# Patient Record
Sex: MALE | Race: WHITE | NOT HISPANIC OR LATINO | Employment: UNEMPLOYED | URBAN - METROPOLITAN AREA
[De-identification: names, ages, dates, MRNs, and addresses within clinical notes are randomized per-mention and may not be internally consistent; named-entity substitution may affect disease eponyms.]

---

## 2018-04-02 ENCOUNTER — OFFICE VISIT (OUTPATIENT)
Dept: ORTHOPEDICS | Facility: CLINIC | Age: 53
End: 2018-04-02
Payer: COMMERCIAL

## 2018-04-02 VITALS
WEIGHT: 215 LBS | SYSTOLIC BLOOD PRESSURE: 135 MMHG | HEART RATE: 98 BPM | BODY MASS INDEX: 30.78 KG/M2 | HEIGHT: 70 IN | DIASTOLIC BLOOD PRESSURE: 84 MMHG

## 2018-04-02 DIAGNOSIS — M75.122 COMPLETE ROTATOR CUFF TEAR OF LEFT SHOULDER: Primary | ICD-10-CM

## 2018-04-02 PROCEDURE — 73030 X-RAY EXAM OF SHOULDER: CPT | Mod: FY,LT,, | Performed by: ORTHOPAEDIC SURGERY

## 2018-04-02 PROCEDURE — 99203 OFFICE O/P NEW LOW 30 MIN: CPT | Mod: 25,,, | Performed by: ORTHOPAEDIC SURGERY

## 2018-04-02 NOTE — PROGRESS NOTES
Past Medical History:   Diagnosis Date    Alcohol abuse, unspecified     Allergic rhinitis, cause unspecified     Asthma attack     Carpal tunnel syndrome     Depressive disorder, not elsewhere classified     Diarrhea     GERD (gastroesophageal reflux disease)     Hypersomnia, unspecified     Hypertension     Lumbago     Obesity, unspecified     JAH (obstructive sleep apnea)     Other and unspecified hyperlipidemia     Other ankle sprain and strain     Psychosexual dysfunction with inhibited sexual excitement     Ventral hernia, unspecified, without mention of obstruction or gangrene        Past Surgical History:   Procedure Laterality Date    A-scope knees      Bilateral    CARPAL TUNNEL RELEASE      Bilateral    EYE SURGERY      Lasik    HERNIA REPAIR      KNEE SURGERY      osmar    NISSEN FUNDOPLICATION      ROTATOR CUFF REPAIR      right       Current Outpatient Prescriptions   Medication Sig    cyanocobalamin (VITAMIN B-12) 1000 MCG tablet Take 1,000 mcg by mouth once daily.      No current facility-administered medications for this visit.        Review of patient's allergies indicates:  No Known Allergies    History reviewed. No pertinent family history.    Social History     Social History    Marital status:      Spouse name: N/A    Number of children: N/A    Years of education: N/A     Occupational History    Not on file.     Social History Main Topics    Smoking status: Never Smoker    Smokeless tobacco: Not on file    Alcohol use Yes    Drug use: No    Sexual activity: Not on file     Other Topics Concern    Not on file     Social History Narrative    No narrative on file       Chief Complaint:   Chief Complaint   Patient presents with    Left Shoulder - Pain, Shoulder Injury     Left shoulder rotator cuff tear and bicep 5/2013. Surgery done in may 2013.  Coming back for re-injury shoulder and bicep torn again.  Torn again in the same year.  Not taking any  "medication for injury.       Consulting Physician: [facility]    History of Present Illness:    This is a 52 y.o. year old male who complains of patient has a history of rotator cuff tear which was repaired back in 2013 in the left shoulder he also biceps tendon rupture he 2 years ago the biceps reruptured he also complains of pain in the left shoulder and weaknes      ROS    Examination:    Vital Signs:    Vitals:    04/02/18 1427   BP: 135/84   Pulse: 98   Weight: 97.5 kg (215 lb)   Height: 5' 10" (1.778 m)   PainSc:   6       This a well-developed, well nourished patient in no acute distress.    Alert and oriented and cooperative to examination.       Physical Exam: left shoulder-patient hasn't atrophy side lateral head of biceps he has healed incisions on the left shoulder is able to move the shoulder well there is some crepitance he does have a lot of weakness on abduction    Imaging: X-rays ordered and reviewed today x-ray examination of the left shoulder shows no proximal migration of the humeral head appears be normal     Assessment: problem rerupture of her rotator cuff tear    Plan:  Patient has not had an MRI of the left shoulder for several months recommend getting an MRI of the left shoulder      DISCLAIMER: This note may have been dictated using voice recognition software and may contain grammatical errors.     NOTE: Consult report sent to referring provider via EPIC EMR.  "

## 2018-04-02 NOTE — LETTER
April 2, 2018      [facility]  [address] [address]           [facility] Orthopedic [facility]  [address]  [address] [place] [address]  Phone: [phone]  Fax: [phone]          Patient: Himanshu Connor II   MR Number: [id number]   YOB: 1965   Date of Visit: 4/2/2018       Dear [facility]:    Thank you for referring Himanshu Connor to me for evaluation. Attached you will find relevant portions of my assessment and plan of care.    If you have questions, please do not hesitate to call me. I look forward to following Himanshu Connor along with you.    Sincerely,    Alfonzo Carmona MD    Enclosure  CC:  No Recipients    If you would like to receive this communication electronically, please contact [email or web address] or [phone] to request more information on [employer] Link access.    For providers and/or their staff who would like to refer a patient to [facility], please contact us through our one-stop-shop provider referral line, [facility], at [phone].    If you feel you have received this communication in error or would no longer like to receive these types of communications, please e-mail [email or web address]

## 2018-07-23 ENCOUNTER — OFFICE VISIT (OUTPATIENT)
Dept: ORTHOPEDICS | Facility: CLINIC | Age: 53
End: 2018-07-23
Payer: OTHER GOVERNMENT

## 2018-07-23 VITALS
SYSTOLIC BLOOD PRESSURE: 136 MMHG | HEART RATE: 79 BPM | HEIGHT: 70 IN | DIASTOLIC BLOOD PRESSURE: 95 MMHG | WEIGHT: 215 LBS | BODY MASS INDEX: 30.78 KG/M2

## 2018-07-23 DIAGNOSIS — M75.122 COMPLETE TEAR OF LEFT ROTATOR CUFF: Primary | ICD-10-CM

## 2018-07-23 PROCEDURE — 20610 DRAIN/INJ JOINT/BURSA W/O US: CPT | Mod: PBBFAC,PN | Performed by: ORTHOPAEDIC SURGERY

## 2018-07-23 PROCEDURE — 99213 OFFICE O/P EST LOW 20 MIN: CPT | Mod: 25,S$PBB,, | Performed by: ORTHOPAEDIC SURGERY

## 2018-07-23 PROCEDURE — 99999 PR PBB SHADOW E&M-EST. PATIENT-LVL III: CPT | Mod: PBBFAC,,, | Performed by: ORTHOPAEDIC SURGERY

## 2018-07-23 PROCEDURE — 99213 OFFICE O/P EST LOW 20 MIN: CPT | Mod: PBBFAC,PN | Performed by: ORTHOPAEDIC SURGERY

## 2018-07-23 RX ORDER — TRIAMCINOLONE ACETONIDE 40 MG/ML
40 INJECTION, SUSPENSION INTRA-ARTICULAR; INTRAMUSCULAR
Status: DISCONTINUED | OUTPATIENT
Start: 2018-07-23 | End: 2018-07-23 | Stop reason: HOSPADM

## 2018-07-23 RX ADMIN — TRIAMCINOLONE ACETONIDE 40 MG: 40 INJECTION, SUSPENSION INTRA-ARTICULAR; INTRAMUSCULAR at 10:07

## 2018-07-23 NOTE — PROCEDURES
Large Joint Aspiration/Injection  Date/Time: 7/23/2018 10:19 AM  Performed by: DANIELITO JUSTICE  Authorized by: DANIELITO JUSTICE     Consent Done?:  Yes (Verbal)  Indications:  Pain  Procedure site marked: Yes    Timeout: Prior to procedure the correct patient, procedure, and site was verified      Location:  Shoulder  Site:  L subacromial bursa  Prep: Patient was prepped and draped in usual sterile fashion    Ultrasonic Guidance for needle placement: No  Needle size:  20 G  Approach:  Posterior  Medications:  40 mg triamcinolone acetonide 40 mg/mL  Patient tolerance:  Patient tolerated the procedure well with no immediate complications

## 2018-07-23 NOTE — PROGRESS NOTES
Past Medical History:   Diagnosis Date    Alcohol abuse, unspecified     Allergic rhinitis, cause unspecified     Asthma attack     Carpal tunnel syndrome     Depressive disorder, not elsewhere classified     Diarrhea     GERD (gastroesophageal reflux disease)     Hypersomnia, unspecified     Hypertension     Lumbago     Obesity, unspecified     JAH (obstructive sleep apnea)     Other and unspecified hyperlipidemia     Other ankle sprain and strain     Psychosexual dysfunction with inhibited sexual excitement     Ventral hernia, unspecified, without mention of obstruction or gangrene        Past Surgical History:   Procedure Laterality Date    A-scope knees      Bilateral    CARPAL TUNNEL RELEASE      Bilateral    EYE SURGERY      Lasik    HERNIA REPAIR      KNEE SURGERY      osmar    NISSEN FUNDOPLICATION      ROTATOR CUFF REPAIR      right       Current Outpatient Prescriptions   Medication Sig    cyanocobalamin (VITAMIN B-12) 1000 MCG tablet Take 1,000 mcg by mouth once daily.      No current facility-administered medications for this visit.        Review of patient's allergies indicates:  No Known Allergies    History reviewed. No pertinent family history.    Social History     Social History    Marital status:      Spouse name: N/A    Number of children: N/A    Years of education: N/A     Occupational History    Not on file.     Social History Main Topics    Smoking status: Never Smoker    Smokeless tobacco: Not on file    Alcohol use Yes    Drug use: No    Sexual activity: Not on file     Other Topics Concern    Not on file     Social History Narrative    No narrative on file       Chief Complaint:   Chief Complaint   Patient presents with    Shoulder Pain     left shoulder pain s/p RCR/scope 5/30/18       History of present illness: This is a 53-year-old right-hand-dominant male who comes in with left shoulder pain.  This been present for several years now but slowly  worsening.  I did a rotator cuff repair and biceps tenodesis back in 2013 on him.  He reinjured it about 7 months after the surgery doing some  work.  [facility]  He had an MRI done which shows recurrent rotator cuff tear.  Pain is up to a 7/10.  We injected him a few years ago with decent success.  Still working as a .      Review of Systems:    Constitution: Negative for chills, fever, and sweats.  Negative for unexplained weight loss.    HENT:  Negative for headaches and blurry vision.    Cardiovascular:Negative for chest pain or irregular heart beat. Negative for hypertension.    Respiratory:  Negative for cough and shortness of breath.    Gastrointestinal: Negative for abdominal pain, heartburn, melena, nausea, and vomitting.    Genitourinary:  Negative bladder incontinence and dysuria.    Musculoskeletal:  See HPI    Neurological: Negative for numbness.    Psychiatric/Behavioral: Negative for depression.  The patient is not nervous/anxious.      Endocrine: Negative for polyuria    Hematologic/Lymphatic: Negative for bleeding problem.  Does not bruise/bleed easily.    Skin: Negative for poor would healing and rash      Physical Examination:    Vital Signs:    Vitals:    07/23/18 0950   BP: (!) 136/95   Pulse: 79       Body mass index is 30.85 kg/m².    This a well-developed, well nourished patient in no acute distress.  They are alert and oriented and cooperative to examination.  Pt. walks without an antalgic gait.      Examination of the left shoulder shows no rashes or erythema. There are no masses, ecchymosis, or atrophy. The patient has full range of motion in forward flexion, external rotation, and internal rotation to the mid T-spine. The patient has moderately positive impingement signs. Nontender to palpation over a.c. joint. Normal stability anteriorly, posteriorly, and negative sulcus sign. Passive range of motion: Forward flexion of 180°, external rotation at  90° of 90°, internal rotation of 50°, and external rotation at 0° of 50°. 2+ radial pulse. Intact axillary, radial, median and ulnar sensation.  4 out of 5 resisted forward flexion, external rotation, and negative lift off test.    Examination of the right shoulder shows no rashes or erythema. There are no masses, ecchymosis, or atrophy. The patient has full range of motion in forward flexion, external rotation, and internal rotation to the mid T-spine. The patient has - impingement signs. - [place]'s test. - Speeds test. Nontender to palpation over a.c. joint. Normal stability anteriorly, posteriorly, and negative sulcus sign. Passive range of motion: Forward flexion of 180°, external rotation at 90° of 90°, internal rotation of 50°, and external rotation at 0° of 50°. 2+ radial pulse. Intact axillary, radial, median and ulnar sensation. 5 out of 5 resisted forward flexion, external rotation, and negative lift off test.    X-rays: X-rays left shoulder  reviewed which show previous distal clavicle excision.    MRI of the left shoulder dated October 29, 2015: Findings suggesting previous rotator cuff repair seen.  There appears to be a full-thickness tear in the posterior supraspinatus.  High-grade partial thickness articular and bursal surface tearing of the remaining supraspinatus.  Infraspinatus tendinosis.  Subscap tendinosis.  Moderate bursitis.  Moderate acromioclavicular joint degenerative changes are seen.     Assessment:: Recurrent left rotator cuff tear    Plan:  Reviewed the findings with the patient today.  We discussed treatment options.  Patient like to get this surgically addressed but cannot at this time.  He has some work obligations that he cannot miss time for this.  Recommend a cortisone injection today to help with some of his symptoms.  Follow-up as needed for periodic injections or to finally schedule surgery.

## 2018-07-23 NOTE — LETTER
July 23, 2018      [facility]  [address] [address]           [address] [address]  Phone: [phone]          Patient: Himanshu Connor II   MR Number: [id number]   YOB: 1965   Date of Visit: 7/23/2018       Dear [facility]:    Thank you for referring Himanshu Connor to me for evaluation. Attached you will find relevant portions of my assessment and plan of care.    If you have questions, please do not hesitate to call me. I look forward to following Himanshu Connor along with you.    Sincerely,    Felix Levin MD    Enclosure  CC:  No Recipients    If you would like to receive this communication electronically, please contact [email or web address] or [phone] to request more information on [employer] Link access.    For providers and/or their staff who would like to refer a patient to [facility], please contact us through our one-stop-shop provider referral line, [facility], at [phone].    If you feel you have received this communication in error or would no longer like to receive these types of communications, please e-mail [email or web address]

## 2018-09-17 ENCOUNTER — OFFICE VISIT (OUTPATIENT)
Dept: ORTHOPEDICS | Facility: CLINIC | Age: 53
End: 2018-09-17
Payer: OTHER GOVERNMENT

## 2018-09-17 VITALS
WEIGHT: 215 LBS | DIASTOLIC BLOOD PRESSURE: 80 MMHG | SYSTOLIC BLOOD PRESSURE: 144 MMHG | HEIGHT: 70 IN | HEART RATE: 77 BPM | BODY MASS INDEX: 30.78 KG/M2

## 2018-09-17 DIAGNOSIS — M75.122 COMPLETE TEAR OF LEFT ROTATOR CUFF: Primary | ICD-10-CM

## 2018-09-17 PROCEDURE — 99213 OFFICE O/P EST LOW 20 MIN: CPT | Mod: PBBFAC,PN | Performed by: ORTHOPAEDIC SURGERY

## 2018-09-17 PROCEDURE — 99999 PR PBB SHADOW E&M-EST. PATIENT-LVL III: CPT | Mod: PBBFAC,,, | Performed by: ORTHOPAEDIC SURGERY

## 2018-09-17 PROCEDURE — 99213 OFFICE O/P EST LOW 20 MIN: CPT | Mod: S$PBB,,, | Performed by: ORTHOPAEDIC SURGERY

## 2018-09-17 NOTE — PROGRESS NOTES
Past Medical History:   Diagnosis Date    Alcohol abuse, unspecified     Allergic rhinitis, cause unspecified     Asthma attack     Carpal tunnel syndrome     Depressive disorder, not elsewhere classified     Diarrhea     GERD (gastroesophageal reflux disease)     Hypersomnia, unspecified     Hypertension     Lumbago     Obesity, unspecified     JAH (obstructive sleep apnea)     Other and unspecified hyperlipidemia     Other ankle sprain and strain     Psychosexual dysfunction with inhibited sexual excitement     Ventral hernia, unspecified, without mention of obstruction or gangrene        Past Surgical History:   Procedure Laterality Date    A-scope knees      Bilateral    CARPAL TUNNEL RELEASE      Bilateral    EYE SURGERY      Lasik    HERNIA REPAIR      KNEE SURGERY      osmar    NISSEN FUNDOPLICATION      REPAIR, ROTATOR CUFF, ARTHROSCOPIC Left 5/30/2013    [facility] by [facility]    ROTATOR CUFF REPAIR      right       Current Outpatient Medications   Medication Sig    cyanocobalamin (VITAMIN B-12) 1000 MCG tablet Take 1,000 mcg by mouth once daily.      No current facility-administered medications for this visit.        Review of patient's allergies indicates:  No Known Allergies    History reviewed. No pertinent family history.    Social History     Socioeconomic History    Marital status:      Spouse name: Not on file    Number of children: Not on file    Years of education: Not on file    Highest education level: Not on file   Social Needs    Financial resource strain: Not on file    Food insecurity - worry: Not on file    Food insecurity - inability: Not on file    Transportation needs - medical: Not on file    Transportation needs - non-medical: Not on file   Occupational History    Not on file   Tobacco Use    Smoking status: Never Smoker   Substance and Sexual Activity    Alcohol use: Yes    Drug use: No    Sexual activity: Not on  file   Other Topics Concern    Not on file   Social History Narrative    Not on file       Chief Complaint:   Chief Complaint   Patient presents with    Left Shoulder - Pain       Interval history This is a 53-year-old right-hand-dominant male who comes in with left shoulder pain.  This been present for several years now but slowly worsening.  I did a rotator cuff repair and biceps tenodesis back in 2013 on him.  He reinjured it about 7 months after the surgery doing some  work.  [facility]  He had an MRI done which shows recurrent rotator cuff tear.  Pain is up to a 7/10.  We injected him a few years ago with decent success.  Still working as a .    History of present illness:  We injected him back in July.  Lasted about a month.  He is about to have a right wrist replacement done soon.  He would like to do his left shoulder surgery possibly at some point after that.  Pain currently a 4/10.      Review of Systems:    Constitution: Negative for chills, fever, and sweats.  Negative for unexplained weight loss.    HENT:  Negative for headaches and blurry vision.    Cardiovascular:Negative for chest pain or irregular heart beat. Negative for hypertension.    Respiratory:  Negative for cough and shortness of breath.    Gastrointestinal: Negative for abdominal pain, heartburn, melena, nausea, and vomitting.    Genitourinary:  Negative bladder incontinence and dysuria.    Musculoskeletal:  See HPI    Neurological: Negative for numbness.    Psychiatric/Behavioral: Negative for depression.  The patient is not nervous/anxious.      Endocrine: Negative for polyuria    Hematologic/Lymphatic: Negative for bleeding problem.  Does not bruise/bleed easily.    Skin: Negative for poor would healing and rash      Physical Examination:    Vital Signs:    Vitals:    09/17/18 0758   BP: (!) 144/80   Pulse: 77       Body mass index is 30.85 kg/m².    This a well-developed, well nourished patient  in no acute distress.  They are alert and oriented and cooperative to examination.  Pt. walks without an antalgic gait.      Examination of the left shoulder shows no rashes or erythema. There are no masses, ecchymosis, or atrophy. The patient has full range of motion in forward flexion, external rotation, and internal rotation to the mid T-spine. The patient has moderately positive impingement signs. Nontender to palpation over a.c. joint. Normal stability anteriorly, posteriorly, and negative sulcus sign. Passive range of motion: Forward flexion of 180°, external rotation at 90° of 90°, internal rotation of 50°, and external rotation at 0° of 50°. 2+ radial pulse. Intact axillary, radial, median and ulnar sensation.  4 out of 5 resisted forward flexion, external rotation, and negative lift off test.    Examination of the right shoulder shows no rashes or erythema. There are no masses, ecchymosis, or atrophy. The patient has full range of motion in forward flexion, external rotation, and internal rotation to the mid T-spine. The patient has - impingement signs. - [place]'s test. - Speeds test. Nontender to palpation over a.c. joint. Normal stability anteriorly, posteriorly, and negative sulcus sign. Passive range of motion: Forward flexion of 180°, external rotation at 90° of 90°, internal rotation of 50°, and external rotation at 0° of 50°. 2+ radial pulse. Intact axillary, radial, median and ulnar sensation. 5 out of 5 resisted forward flexion, external rotation, and negative lift off test.    X-rays: X-rays left shoulder  reviewed which show previous distal clavicle excision.    MRI of the left shoulder dated October 29, 2015: Findings suggesting previous rotator cuff repair seen.  There appears to be a full-thickness tear in the posterior supraspinatus.  High-grade partial thickness articular and bursal surface tearing of the remaining supraspinatus.  Infraspinatus tendinosis.  Subscap tendinosis.  Moderate  bursitis.  Moderate acromioclavicular joint degenerative changes are seen.     Assessment:: Recurrent left rotator cuff tear    Plan:  Reviewed the findings with the patient today.  We discussed treatment options.  Patient would like to get this surgically addressed but cannot at this time.  I gave him a home exercise guide to work on strength and flexibility.  Follow up in 6 weeks

## 2018-10-29 ENCOUNTER — OFFICE VISIT (OUTPATIENT)
Dept: ORTHOPEDICS | Facility: CLINIC | Age: 53
End: 2018-10-29
Payer: OTHER GOVERNMENT

## 2018-10-29 VITALS
SYSTOLIC BLOOD PRESSURE: 128 MMHG | HEIGHT: 70 IN | BODY MASS INDEX: 30.78 KG/M2 | HEART RATE: 72 BPM | WEIGHT: 215 LBS | DIASTOLIC BLOOD PRESSURE: 69 MMHG

## 2018-10-29 DIAGNOSIS — M75.122 COMPLETE TEAR OF LEFT ROTATOR CUFF: Primary | ICD-10-CM

## 2018-10-29 PROCEDURE — 20610 DRAIN/INJ JOINT/BURSA W/O US: CPT | Mod: PBBFAC,PN | Performed by: ORTHOPAEDIC SURGERY

## 2018-10-29 PROCEDURE — 99213 OFFICE O/P EST LOW 20 MIN: CPT | Mod: 25,S$PBB,, | Performed by: ORTHOPAEDIC SURGERY

## 2018-10-29 PROCEDURE — 99213 OFFICE O/P EST LOW 20 MIN: CPT | Mod: PBBFAC,PN,25 | Performed by: ORTHOPAEDIC SURGERY

## 2018-10-29 PROCEDURE — 99999 PR PBB SHADOW E&M-EST. PATIENT-LVL III: CPT | Mod: PBBFAC,,, | Performed by: ORTHOPAEDIC SURGERY

## 2018-10-29 RX ADMIN — TRIAMCINOLONE ACETONIDE 40 MG: 40 INJECTION, SUSPENSION INTRA-ARTICULAR; INTRAMUSCULAR at 07:10

## 2018-10-29 NOTE — PROGRESS NOTES
Past Medical History:   Diagnosis Date    Alcohol abuse, unspecified     Allergic rhinitis, cause unspecified     Asthma attack     Carpal tunnel syndrome     Depressive disorder, not elsewhere classified     Diarrhea     GERD (gastroesophageal reflux disease)     Hypersomnia, unspecified     Hypertension     Lumbago     Obesity, unspecified     JAH (obstructive sleep apnea)     Other and unspecified hyperlipidemia     Other ankle sprain and strain     Psychosexual dysfunction with inhibited sexual excitement     Ventral hernia, unspecified, without mention of obstruction or gangrene        Past Surgical History:   Procedure Laterality Date    A-scope knees      Bilateral    CARPAL TUNNEL RELEASE      Bilateral    EYE SURGERY      Lasik    HERNIA REPAIR      KNEE SURGERY      osmar    NISSEN FUNDOPLICATION      REPAIR, ROTATOR CUFF, ARTHROSCOPIC Left 5/30/2013    [facility]    ROTATOR CUFF REPAIR      right       Current Outpatient Medications   Medication Sig    cyanocobalamin (VITAMIN B-12) 1000 MCG tablet Take 1,000 mcg by mouth once daily.      No current facility-administered medications for this visit.        Review of patient's allergies indicates:  No Known Allergies    History reviewed. No pertinent family history.    Social History     Socioeconomic History    Marital status:      Spouse name: Not on file    Number of children: Not on file    Years of education: Not on file    Highest education level: Not on file   Social Needs    Financial resource strain: Not on file    Food insecurity - worry: Not on file    Food insecurity - inability: Not on file    Transportation needs - medical: Not on file    Transportation needs - non-medical: Not on file   Occupational History    Not on file   Tobacco Use    Smoking status: Never Smoker   Substance and Sexual Activity    Alcohol use: Yes    Drug use: No    Sexual activity: Not on  file   Other Topics Concern    Not on file   Social History Narrative    Not on file       Chief Complaint:   Chief Complaint   Patient presents with    Shoulder Pain     left shoulder 6 week followup       Interval history This is a 53-year-old right-hand-dominant male who comes in with left shoulder pain.  This been present for several years now but slowly worsening.  I did a rotator cuff repair and biceps tenodesis back in 2013 on him.  He reinjured it about 7 months after the surgery doing some  work.  [facility]  He had an MRI done which shows recurrent rotator cuff tear.  Pain is up to a 7/10.  We injected him a few years ago with decent success.  Still working as a .    History of present illness:  We injected him back in July.  Lasted about a month.  He is about to have a right wrist replacement done soon.  He would like to do his left shoulder surgery possibly at some point after that.  Pain currently a 6/10.       Review of Systems:    Constitution: Negative for chills, fever, and sweats.  Negative for unexplained weight loss.    HENT:  Negative for headaches and blurry vision.    Cardiovascular:Negative for chest pain or irregular heart beat. Negative for hypertension.    Respiratory:  Negative for cough and shortness of breath.    Gastrointestinal: Negative for abdominal pain, heartburn, melena, nausea, and vomitting.    Genitourinary:  Negative bladder incontinence and dysuria.    Musculoskeletal:  See HPI    Neurological: Negative for numbness.    Psychiatric/Behavioral: Negative for depression.  The patient is not nervous/anxious.      Endocrine: Negative for polyuria    Hematologic/Lymphatic: Negative for bleeding problem.  Does not bruise/bleed easily.    Skin: Negative for poor would healing and rash      Physical Examination:    Vital Signs:    Vitals:    10/29/18 1556   BP: 128/69   Pulse: 72       Body mass index is 30.85 kg/m².    This a well-developed,  well nourished patient in no acute distress.  They are alert and oriented and cooperative to examination.  Pt. walks without an antalgic gait.      Examination of the left shoulder shows no rashes or erythema. There are no masses, ecchymosis, or atrophy. The patient has full range of motion in forward flexion, external rotation, and internal rotation to the mid T-spine. The patient has moderately positive impingement signs. Nontender to palpation over a.c. joint. Passive range of motion: Forward flexion of 180°, external rotation at 90° of 90°, internal rotation of 50°, and external rotation at 0° of 50°. 2+ radial pulse. Intact axillary, radial, median and ulnar sensation.  4 out of 5 resisted forward flexion, external rotation, and negative lift off test.    X-rays: X-rays left shoulder  reviewed which show previous distal clavicle excision.    MRI of the left shoulder dated October 29, 2015: Findings suggesting previous rotator cuff repair seen.  There appears to be a full-thickness tear in the posterior supraspinatus.  High-grade partial thickness articular and bursal surface tearing of the remaining supraspinatus.  Infraspinatus tendinosis.  Subscap tendinosis.  Moderate bursitis.  Moderate acromioclavicular joint degenerative changes are seen.     Assessment:: Recurrent left rotator cuff tear    Plan:  Reviewed the findings with the patient today.  We discussed treatment options.  Patient would like to get this surgically addressed but cannot at this time.  I gave him a home exercise guide to work on strength and flexibility.  Injected him again today.  Follow up in 6 weeks

## 2018-10-31 RX ORDER — TRIAMCINOLONE ACETONIDE 40 MG/ML
40 INJECTION, SUSPENSION INTRA-ARTICULAR; INTRAMUSCULAR
Status: DISCONTINUED | OUTPATIENT
Start: 2018-10-29 | End: 2018-10-31 | Stop reason: HOSPADM

## 2018-10-31 NOTE — PROCEDURES
Large Joint Aspiration/Injection: L subacromial bursa  Date/Time: 10/29/2018 7:45 AM  Performed by: Felix Levin MD  Authorized by: Felix Levin MD     Consent Done?:  Yes (Verbal)  Indications:  Pain  Procedure site marked: Yes    Timeout: Prior to procedure the correct patient, procedure, and site was verified      Location:  Shoulder  Site:  L subacromial bursa  Prep: Patient was prepped and draped in usual sterile fashion    Ultrasonic Guidance for needle placement: No  Needle size:  20 G  Approach:  Posterior  Medications:  40 mg triamcinolone acetonide 40 mg/mL  Patient tolerance:  Patient tolerated the procedure well with no immediate complications

## 2018-12-27 ENCOUNTER — OFFICE VISIT (OUTPATIENT)
Dept: ORTHOPEDICS | Facility: CLINIC | Age: 53
End: 2018-12-27
Payer: OTHER GOVERNMENT

## 2018-12-27 VITALS
HEIGHT: 70 IN | WEIGHT: 215 LBS | BODY MASS INDEX: 30.78 KG/M2 | HEART RATE: 61 BPM | SYSTOLIC BLOOD PRESSURE: 135 MMHG | DIASTOLIC BLOOD PRESSURE: 78 MMHG

## 2018-12-27 DIAGNOSIS — M75.122 COMPLETE TEAR OF LEFT ROTATOR CUFF: Primary | ICD-10-CM

## 2018-12-27 PROCEDURE — 99999 PR PBB SHADOW E&M-EST. PATIENT-LVL III: CPT | Mod: PBBFAC,,, | Performed by: ORTHOPAEDIC SURGERY

## 2018-12-27 PROCEDURE — 99213 OFFICE O/P EST LOW 20 MIN: CPT | Mod: PBBFAC,PN | Performed by: ORTHOPAEDIC SURGERY

## 2018-12-27 PROCEDURE — 20610 DRAIN/INJ JOINT/BURSA W/O US: CPT | Mod: PBBFAC,PN | Performed by: ORTHOPAEDIC SURGERY

## 2018-12-27 PROCEDURE — 99213 OFFICE O/P EST LOW 20 MIN: CPT | Mod: 25,S$PBB,, | Performed by: ORTHOPAEDIC SURGERY

## 2018-12-27 RX ORDER — TRIAMCINOLONE ACETONIDE 40 MG/ML
40 INJECTION, SUSPENSION INTRA-ARTICULAR; INTRAMUSCULAR
Status: DISCONTINUED | OUTPATIENT
Start: 2018-12-27 | End: 2018-12-27 | Stop reason: HOSPADM

## 2018-12-27 RX ADMIN — TRIAMCINOLONE ACETONIDE 40 MG: 40 INJECTION, SUSPENSION INTRA-ARTICULAR; INTRAMUSCULAR at 08:12

## 2018-12-27 NOTE — PROGRESS NOTES
Past Medical History:   Diagnosis Date    Alcohol abuse, unspecified     Allergic rhinitis, cause unspecified     Asthma attack     Carpal tunnel syndrome     Depressive disorder, not elsewhere classified     Diarrhea     GERD (gastroesophageal reflux disease)     Hypersomnia, unspecified     Hypertension     Lumbago     Obesity, unspecified     JAH (obstructive sleep apnea)     Other and unspecified hyperlipidemia     Other ankle sprain and strain     Psychosexual dysfunction with inhibited sexual excitement     Ventral hernia, unspecified, without mention of obstruction or gangrene        Past Surgical History:   Procedure Laterality Date    A-scope knees      Bilateral    CARPAL TUNNEL RELEASE      Bilateral    EYE SURGERY      Lasik    HERNIA REPAIR      KNEE SURGERY      osmar    NISSEN FUNDOPLICATION      REPAIR, ROTATOR CUFF, ARTHROSCOPIC Left 5/30/2013    [facility] OR    ROTATOR CUFF REPAIR      right       Current Outpatient Medications   Medication Sig    cyanocobalamin (VITAMIN B-12) 1000 MCG tablet Take 1,000 mcg by mouth once daily.      No current facility-administered medications for this visit.        Review of patient's allergies indicates:  No Known Allergies    History reviewed. No pertinent family history.    Social History     Socioeconomic History    Marital status:      Spouse name: Not on file    Number of children: Not on file    Years of education: Not on file    Highest education level: Not on file   Social Needs    Financial resource strain: Not on file    Food insecurity - worry: Not on file    Food insecurity - inability: Not on file    Transportation needs - medical: Not on file    Transportation needs - non-medical: Not on file   Occupational History    Not on file   Tobacco Use    Smoking status: Never Smoker   Substance and Sexual Activity    Alcohol use: Yes    Drug use: No    Sexual activity: Not on  file   Other Topics Concern    Not on file   Social History Narrative    Not on file       Chief Complaint:   Chief Complaint   Patient presents with    Shoulder Pain     left shoulder pain-6 week follow up        Interval history This is a 53-year-old right-hand-dominant male who comes in with left shoulder pain.  This been present for several years now but slowly worsening.  I did a rotator cuff repair and biceps tenodesis back in 2013 on him.  He reinjured it about 7 months after the surgery doing some  work.  [facility]  He had an MRI done which shows recurrent rotator cuff tear.  Pain is up to a 7/10.  We injected him a few years ago with decent success.  Still working as a .    History of present illness:  We injected him back in October.  Lasted about 2 months.  He is about to have a right wrist replacement done soon.  He would like to do his left shoulder surgery possibly at some point after that.  Pain currently a 6/10.       Review of Systems:  Musculoskeletal:  See HPI      Physical Examination:    Vital Signs:    Vitals:    12/27/18 0831   BP: 135/78   Pulse: 61       Body mass index is 30.85 kg/m².    This a well-developed, well nourished patient in no acute distress.  They are alert and oriented and cooperative to examination.  Pt. walks without an antalgic gait.      Examination of the left shoulder shows no rashes or erythema. There are no masses, ecchymosis, or atrophy. The patient has full range of motion in forward flexion, external rotation, and internal rotation to the mid T-spine. The patient has moderately positive impingement signs. Nontender to palpation over a.c. joint. Passive range of motion: Forward flexion of 180°, external rotation at 90° of 90°, internal rotation of 50°, and external rotation at 0° of 50°. 2+ radial pulse. Intact axillary, radial, median and ulnar sensation.  4 out of 5 resisted forward flexion, external rotation, and negative  lift off test.    X-rays: X-rays left shoulder  reviewed which show previous distal clavicle excision.    MRI of the left shoulder dated October 29, 2015: Findings suggesting previous rotator cuff repair seen.  There appears to be a full-thickness tear in the posterior supraspinatus.  High-grade partial thickness articular and bursal surface tearing of the remaining supraspinatus.  Infraspinatus tendinosis.  Subscap tendinosis.  Moderate bursitis.  Moderate acromioclavicular joint degenerative changes are seen.     Assessment:: Recurrent left rotator cuff tear    Plan:  Reviewed the findings with the patient today.  We discussed treatment options.  Patient would like to get this surgically addressed but cannot at this time. Injected him again today.  Follow up in a few months.

## 2018-12-27 NOTE — PROCEDURES
Large Joint Aspiration/Injection: L subacromial bursa  Date/Time: 12/27/2018 8:49 AM  Performed by: Felix Levin MD  Authorized by: Felix Levin MD     Consent Done?:  Yes (Verbal)  Indications:  Pain  Procedure site marked: Yes    Timeout: Prior to procedure the correct patient, procedure, and site was verified      Location:  Shoulder  Site:  L subacromial bursa  Prep: Patient was prepped and draped in usual sterile fashion    Ultrasonic Guidance for needle placement: No  Needle size:  20 G  Approach:  Posterior  Medications:  40 mg triamcinolone acetonide 40 mg/mL  Patient tolerance:  Patient tolerated the procedure well with no immediate complications

## 2019-02-08 ENCOUNTER — TELEPHONE (OUTPATIENT)
Dept: ORTHOPEDICS | Facility: CLINIC | Age: 54
End: 2019-02-08

## 2019-02-08 NOTE — TELEPHONE ENCOUNTER
----- Message from Sherita Vail sent at 2/8/2019 12:36 PM CST -----  Contact: Patient  Type: Needs Medical Advice    Who Called:  Himanshu  Michel Call Back Number: [phone]   Additional Information: Patient is stating he needs the secondary authorization request needs to be sent to [employer]'s fax number at [phone].Please advise.

## 2019-02-14 ENCOUNTER — OFFICE VISIT (OUTPATIENT)
Dept: ORTHOPEDICS | Facility: CLINIC | Age: 54
End: 2019-02-14
Payer: OTHER GOVERNMENT

## 2019-02-14 VITALS
WEIGHT: 215 LBS | BODY MASS INDEX: 30.78 KG/M2 | HEART RATE: 67 BPM | SYSTOLIC BLOOD PRESSURE: 151 MMHG | HEIGHT: 70 IN | DIASTOLIC BLOOD PRESSURE: 83 MMHG

## 2019-02-14 DIAGNOSIS — M75.122 COMPLETE TEAR OF LEFT ROTATOR CUFF: Primary | ICD-10-CM

## 2019-02-14 PROCEDURE — 99213 OFFICE O/P EST LOW 20 MIN: CPT | Mod: PBBFAC,PN,25 | Performed by: ORTHOPAEDIC SURGERY

## 2019-02-14 PROCEDURE — 99999 PR PBB SHADOW E&M-EST. PATIENT-LVL III: CPT | Mod: PBBFAC,,, | Performed by: ORTHOPAEDIC SURGERY

## 2019-02-14 PROCEDURE — 20610 DRAIN/INJ JOINT/BURSA W/O US: CPT | Mod: PBBFAC,PN | Performed by: ORTHOPAEDIC SURGERY

## 2019-02-14 PROCEDURE — 99213 PR OFFICE/OUTPT VISIT, EST, LEVL III, 20-29 MIN: ICD-10-PCS | Mod: 25,S$PBB,, | Performed by: ORTHOPAEDIC SURGERY

## 2019-02-14 PROCEDURE — 99213 OFFICE O/P EST LOW 20 MIN: CPT | Mod: 25,S$PBB,, | Performed by: ORTHOPAEDIC SURGERY

## 2019-02-14 PROCEDURE — 99999 PR PBB SHADOW E&M-EST. PATIENT-LVL III: ICD-10-PCS | Mod: PBBFAC,,, | Performed by: ORTHOPAEDIC SURGERY

## 2019-02-14 PROCEDURE — 20610 LARGE JOINT ASPIRATION/INJECTION: L SUBACROMIAL BURSA: ICD-10-PCS | Mod: S$PBB,LT,, | Performed by: ORTHOPAEDIC SURGERY

## 2019-02-14 RX ORDER — SUCRALFATE 1 G/1
TABLET ORAL
Refills: 4 | COMMUNITY
Start: 2018-11-30 | End: 2021-02-11

## 2019-02-14 RX ORDER — TRIAMCINOLONE ACETONIDE 40 MG/ML
40 INJECTION, SUSPENSION INTRA-ARTICULAR; INTRAMUSCULAR
Status: DISCONTINUED | OUTPATIENT
Start: 2019-02-14 | End: 2019-02-14 | Stop reason: HOSPADM

## 2019-02-14 RX ORDER — PANTOPRAZOLE SODIUM 40 MG/1
TABLET, DELAYED RELEASE ORAL
Refills: 4 | Status: ON HOLD | COMMUNITY
Start: 2018-12-16 | End: 2021-06-01 | Stop reason: ALTCHOICE

## 2019-02-14 RX ORDER — ALPRAZOLAM 0.5 MG/1
TABLET ORAL
Refills: 3 | COMMUNITY
Start: 2019-02-10 | End: 2020-01-30

## 2019-02-14 RX ADMIN — TRIAMCINOLONE ACETONIDE 40 MG: 40 INJECTION, SUSPENSION INTRA-ARTICULAR; INTRAMUSCULAR at 09:02

## 2019-02-14 NOTE — PROCEDURES
Large Joint Aspiration/Injection: L subacromial bursa  Date/Time: 2/14/2019 9:20 AM  Performed by: Felix Levin MD  Authorized by: Felix Levin MD     Consent Done?:  Yes (Verbal)  Indications:  Pain  Procedure site marked: Yes    Timeout: Prior to procedure the correct patient, procedure, and site was verified      Location:  Shoulder  Site:  L subacromial bursa  Prep: Patient was prepped and draped in usual sterile fashion    Ultrasonic Guidance for needle placement: No  Needle size:  20 G  Approach:  Posterior  Medications:  40 mg triamcinolone acetonide 40 mg/mL  Patient tolerance:  Patient tolerated the procedure well with no immediate complications

## 2019-02-14 NOTE — PROGRESS NOTES
Past Medical History:   Diagnosis Date    Alcohol abuse, unspecified     Allergic rhinitis, cause unspecified     Asthma attack     Carpal tunnel syndrome     Depressive disorder, not elsewhere classified     Diarrhea     GERD (gastroesophageal reflux disease)     Hypersomnia, unspecified     Hypertension     Lumbago     Obesity, unspecified     JAH (obstructive sleep apnea)     Other and unspecified hyperlipidemia     Other ankle sprain and strain     Psychosexual dysfunction with inhibited sexual excitement     Ventral hernia, unspecified, without mention of obstruction or gangrene        Past Surgical History:   Procedure Laterality Date    A-scope knees      Bilateral    CARPAL TUNNEL RELEASE      Bilateral    EYE SURGERY      Lasik    HERNIA REPAIR      KNEE SURGERY      osmar    NISSEN FUNDOPLICATION      REPAIR, ROTATOR CUFF, ARTHROSCOPIC Left 5/30/2013    [facility]    ROTATOR CUFF REPAIR      right       Current Outpatient Medications   Medication Sig    cyanocobalamin (VITAMIN B-12) 1000 MCG tablet Take 1,000 mcg by mouth once daily.     ALPRAZolam (XANAX) 0.5 MG tablet TK 1 T PO BID    pantoprazole (PROTONIX) 40 MG tablet TK ONE  T PO D    ranitidine (ZANTAC) 150 MG capsule Take 150 mg by mouth.    sucralfate (CARAFATE) 1 gram tablet TK 1 T PO QID 1 HOUR BEFORE MEALS AND 1 T QHS OES     No current facility-administered medications for this visit.        Review of patient's allergies indicates:  No Known Allergies    History reviewed. No pertinent family history.    Social History     Socioeconomic History    Marital status:      Spouse name: Not on file    Number of children: Not on file    Years of education: Not on file    Highest education level: Not on file   Social Needs    Financial resource strain: Not on file    Food insecurity - worry: Not on file    Food insecurity - inability: Not on file    Transportation needs -  medical: Not on file    Transportation needs - non-medical: Not on file   Occupational History    Not on file   Tobacco Use    Smoking status: Never Smoker   Substance and Sexual Activity    Alcohol use: Yes    Drug use: No    Sexual activity: Not on file   Other Topics Concern    Not on file   Social History Narrative    Not on file       Chief Complaint:   Chief Complaint   Patient presents with    Left Shoulder - Pain    Right Knee - Pain    Left Knee - Pain       Interval history This is a 53-year-old right-hand-dominant male who comes in with left shoulder pain.  This been present for several years now but slowly worsening.  I did a rotator cuff repair and biceps tenodesis back in 2013 on him.  He reinjured it about 7 months after the surgery doing some  work.  [facility]  He had an MRI done which shows recurrent rotator cuff tear.  Pain is up to a 7/10.  We injected him a few years ago with decent success.  Still working as a .    History of present illness:  We injected him back in early December.  Lasted about 2 months.  He is about to have a right wrist replacement done soon.  He would like to do his left shoulder surgery possibly at some point after that.  Pain currently a 6/10.       Review of Systems:  Musculoskeletal:  See HPI      Physical Examination:    Vital Signs:    Vitals:    02/14/19 0830   BP: (!) 151/83   Pulse: 67       Body mass index is 30.85 kg/m².    This a well-developed, well nourished patient in no acute distress.  They are alert and oriented and cooperative to examination.  Pt. walks without an antalgic gait.      Examination of the left shoulder shows no rashes or erythema. There are no masses, ecchymosis, or atrophy. The patient has full range of motion in forward flexion, external rotation, and internal rotation to the mid T-spine. The patient has moderately positive impingement signs. Nontender to palpation over a.c. joint. Passive  range of motion: Forward flexion of 180°, external rotation at 90° of 90°, internal rotation of 50°, and external rotation at 0° of 50°. 2+ radial pulse. Intact axillary, radial, median and ulnar sensation.  4 out of 5 resisted forward flexion, external rotation, and negative lift off test.    X-rays: X-rays left shoulder  reviewed which show previous distal clavicle excision.    MRI of the left shoulder dated October 29, 2015: Findings suggesting previous rotator cuff repair seen.  There appears to be a full-thickness tear in the posterior supraspinatus.  High-grade partial thickness articular and bursal surface tearing of the remaining supraspinatus.  Infraspinatus tendinosis.  Subscap tendinosis.  Moderate bursitis.  Moderate acromioclavicular joint degenerative changes are seen.     Assessment:: Recurrent left rotator cuff tear    Plan:  Reviewed the findings with the patient today.  We discussed treatment options.  Patient would like to get this surgically addressed but cannot at this time. Injected him again today.  Follow up in a few months.

## 2019-04-29 ENCOUNTER — OFFICE VISIT (OUTPATIENT)
Dept: ORTHOPEDICS | Facility: CLINIC | Age: 54
End: 2019-04-29
Payer: OTHER GOVERNMENT

## 2019-04-29 VITALS
SYSTOLIC BLOOD PRESSURE: 151 MMHG | HEIGHT: 70 IN | BODY MASS INDEX: 30.78 KG/M2 | WEIGHT: 215 LBS | DIASTOLIC BLOOD PRESSURE: 85 MMHG | HEART RATE: 76 BPM

## 2019-04-29 DIAGNOSIS — M25.512 LEFT SHOULDER PAIN, UNSPECIFIED CHRONICITY: Primary | ICD-10-CM

## 2019-04-29 DIAGNOSIS — M75.122 COMPLETE TEAR OF LEFT ROTATOR CUFF: Primary | ICD-10-CM

## 2019-04-29 PROCEDURE — 99999 PR PBB SHADOW E&M-EST. PATIENT-LVL III: ICD-10-PCS | Mod: PBBFAC,,, | Performed by: ORTHOPAEDIC SURGERY

## 2019-04-29 PROCEDURE — 99999 PR PBB SHADOW E&M-EST. PATIENT-LVL III: CPT | Mod: PBBFAC,,, | Performed by: ORTHOPAEDIC SURGERY

## 2019-04-29 PROCEDURE — 99213 OFFICE O/P EST LOW 20 MIN: CPT | Mod: PBBFAC,PN | Performed by: ORTHOPAEDIC SURGERY

## 2019-04-29 PROCEDURE — 99213 PR OFFICE/OUTPT VISIT, EST, LEVL III, 20-29 MIN: ICD-10-PCS | Mod: S$PBB,,, | Performed by: ORTHOPAEDIC SURGERY

## 2019-04-29 PROCEDURE — 99213 OFFICE O/P EST LOW 20 MIN: CPT | Mod: S$PBB,,, | Performed by: ORTHOPAEDIC SURGERY

## 2019-04-29 NOTE — PROGRESS NOTES
Past Medical History:   Diagnosis Date    Alcohol abuse, unspecified     Allergic rhinitis, cause unspecified     Asthma attack     Carpal tunnel syndrome     Depressive disorder, not elsewhere classified     Diarrhea     GERD (gastroesophageal reflux disease)     Hypersomnia, unspecified     Hypertension     Lumbago     Obesity, unspecified     JAH (obstructive sleep apnea)     Other and unspecified hyperlipidemia     Other ankle sprain and strain     Psychosexual dysfunction with inhibited sexual excitement     Ventral hernia, unspecified, without mention of obstruction or gangrene        Past Surgical History:   Procedure Laterality Date    A-scope knees      Bilateral    CARPAL TUNNEL RELEASE      Bilateral    EYE SURGERY      Lasik    HERNIA REPAIR      KNEE SURGERY      osmar    NISSEN FUNDOPLICATION      REPAIR, ROTATOR CUFF, ARTHROSCOPIC Left 5/30/2013    [facility]    ROTATOR CUFF REPAIR      right       Current Outpatient Medications   Medication Sig    ALPRAZolam (XANAX) 0.5 MG tablet TK 1 T PO BID    cyanocobalamin (VITAMIN B-12) 1000 MCG tablet Take 1,000 mcg by mouth once daily.     pantoprazole (PROTONIX) 40 MG tablet TK ONE  T PO D    ranitidine (ZANTAC) 150 MG capsule Take 150 mg by mouth.    sucralfate (CARAFATE) 1 gram tablet TK 1 T PO QID 1 HOUR BEFORE MEALS AND 1 T QHS OES     No current facility-administered medications for this visit.        Review of patient's allergies indicates:  No Known Allergies    History reviewed. No pertinent family history.    Social History     Socioeconomic History    Marital status:      Spouse name: Not on file    Number of children: Not on file    Years of education: Not on file    Highest education level: Not on file   Occupational History    Not on file   Social Needs    Financial resource strain: Not on file    Food insecurity:     Worry: Not on file     Inability: Not on file     Transportation needs:     Medical: Not on file     Non-medical: Not on file   Tobacco Use    Smoking status: Never Smoker    Smokeless tobacco: Never Used   Substance and Sexual Activity    Alcohol use: Yes    Drug use: No    Sexual activity: Not on file   Lifestyle    Physical activity:     Days per week: Not on file     Minutes per session: Not on file    Stress: Not on file   Relationships    Social connections:     Talks on phone: Not on file     Gets together: Not on file     Attends Zoroastrianism service: Not on file     Active member of club or organization: Not on file     Attends meetings of clubs or organizations: Not on file     Relationship status: Not on file   Other Topics Concern    Not on file   Social History Narrative    Not on file       Chief Complaint:   Chief Complaint   Patient presents with    Left Shoulder - Pain       Interval history This is a 53-year-old right-hand-dominant male who comes in with left shoulder pain.  This been present for several years now but slowly worsening.  I did a rotator cuff repair and biceps tenodesis back in 2013 on him.  He reinjured it about 7 months after the surgery doing some  work.  [facility]  He had an MRI done which shows recurrent rotator cuff tear.  Pain is up to a 7/10.  We injected him a few years ago with decent success.  Still working as a .    History of present illness:  We injected him back in early February.  Lasted about 2 months.  He would like to do his left shoulder surgery possibly at some point.  Pain currently a 8/10.       Review of Systems:  Musculoskeletal:  See HPI      Physical Examination:    Vital Signs:    Vitals:    04/29/19 1121   BP: (!) 151/85   Pulse: 76       Body mass index is 30.85 kg/m².    This a well-developed, well nourished patient in no acute distress.  They are alert and oriented and cooperative to examination.  Pt. walks without an antalgic gait.      Examination  of the left shoulder shows no rashes or erythema. There are no masses, ecchymosis, or atrophy. The patient has full range of motion in forward flexion, external rotation, and internal rotation to the mid T-spine. The patient has moderately positive impingement signs. Nontender to palpation over a.c. joint. Passive range of motion: Forward flexion of 180°, external rotation at 90° of 90°, internal rotation of 50°, and external rotation at 0° of 50°. 2+ radial pulse. Intact axillary, radial, median and ulnar sensation.  4 out of 5 resisted forward flexion, external rotation, and negative lift off test.    X-rays: X-rays left shoulder  reviewed which show previous distal clavicle excision.    MRI of the left shoulder dated October 29, 2015: Findings suggesting previous rotator cuff repair seen.  There appears to be a full-thickness tear in the posterior supraspinatus.  High-grade partial thickness articular and bursal surface tearing of the remaining supraspinatus.  Infraspinatus tendinosis.  Subscap tendinosis.  Moderate bursitis.  Moderate acromioclavicular joint degenerative changes are seen.     Assessment:: Recurrent left rotator cuff tear    Plan:  Reviewed the findings with the patient today.  We discussed treatment options.  Recommended repeat MRI given that it has been 4 years since his last 1.  Cortisone injections seem to be diminishing in results.

## 2019-07-02 ENCOUNTER — TELEPHONE (OUTPATIENT)
Dept: ORTHOPEDICS | Facility: CLINIC | Age: 54
End: 2019-07-02

## 2019-07-02 NOTE — TELEPHONE ENCOUNTER
----- Message from Ariadna Maravilla MA sent at 7/2/2019 12:16 PM CDT -----  Contact: pt   States Dr Levin needs to send in request for MRI (scheduled tomorrow)and [facility] be [facility]   call back

## 2019-07-03 ENCOUNTER — TELEPHONE (OUTPATIENT)
Dept: ORTHOPEDICS | Facility: CLINIC | Age: 54
End: 2019-07-03

## 2019-07-03 NOTE — TELEPHONE ENCOUNTER
----- Message from Hemanth Burroughs sent at 7/3/2019 10:18 AM CDT -----  Contact: pt  Patient need to check on a referral for MRI today. [phone]

## 2019-07-03 NOTE — TELEPHONE ENCOUNTER
Called pt and advised [facility] yesterday. We are waiting for approval to be sent to us still. Pt verbalized understanding.

## 2019-07-03 NOTE — TELEPHONE ENCOUNTER
Called pt and advsied we still have not received approval for him [facility]ri from the [facility]. Cancelled mri for now per pt request. Advised will call to reschedule mri once [facility] approval has been obtained. Pt verbalized understanding.

## 2019-07-03 NOTE — TELEPHONE ENCOUNTER
----- Message from Hemanth Burroughs sent at 7/3/2019  3:43 PM CDT -----  Contact: pt  Pt is calling again requesting a call back. [phone]  ----- Message -----  From: Hemanth Burroughs  Sent: 7/3/2019  10:18 AM  To: Ollie Martin Staff    Patient need to check on a referral for MRI today. [phone]

## 2019-08-08 ENCOUNTER — TELEPHONE (OUTPATIENT)
Dept: ORTHOPEDICS | Facility: CLINIC | Age: 54
End: 2019-08-08

## 2019-08-08 NOTE — TELEPHONE ENCOUNTER
Returned call and advised we can add shoulder to appointment with Dr. Carmona on 8/21 as long as we get [facility]. Pt verbalized understanding.

## 2019-08-08 NOTE — TELEPHONE ENCOUNTER
----- Message from Preeti Olivas sent at 8/8/2019  1:46 PM CDT -----  Type: Needs Medical Advice    Who Called:  Patient     Best Call Back Number:  [phone] Tyrese Mccabe or daughter cell Tejal [phone]     Additional Information:  Patient would like to also see Dr Levin for his shoulder on the appt for 8/21 when he sees him for his knee as well - he [facility] that [facility] be [facility] for the [facility] and the knees as well  Currently in in a Rehab facility will leave on 8/16 however his fiance Jeanne Marvin does have POA for him and please contact her directly regarding any updates on his chart Also any day anytime he can do the shoulder and knees can do anytime or day after the 8/20

## 2019-08-08 NOTE — TELEPHONE ENCOUNTER
Returned call and rescheduled appt with Dr. Levin. [facility] being [facility] for pt to see Dr. Levin. Pt verbalized understanding.

## 2019-08-08 NOTE — TELEPHONE ENCOUNTER
----- Message from Debra Costello sent at 8/8/2019  4:17 PM CDT -----  Contact: Delores / Jeanne Marvin [phone]  Patient called to ask that the appointment for his shoulder be scheduled with Dr. Levin, as that is his Orthopedist.  [facility] the [facility] and the [facility].  Stated that Dr. Levin has done surgery on his shoulder in the past and he will need additional surgery.  Please call delores Marvin at [phone] to schedule.

## 2019-08-20 DIAGNOSIS — M25.562 PAIN IN BOTH KNEES, UNSPECIFIED CHRONICITY: Primary | ICD-10-CM

## 2019-08-20 DIAGNOSIS — M25.561 PAIN IN BOTH KNEES, UNSPECIFIED CHRONICITY: Primary | ICD-10-CM

## 2019-08-20 DIAGNOSIS — M25.519 SHOULDER PAIN, UNSPECIFIED CHRONICITY, UNSPECIFIED LATERALITY: ICD-10-CM

## 2019-08-22 ENCOUNTER — OFFICE VISIT (OUTPATIENT)
Dept: ORTHOPEDICS | Facility: CLINIC | Age: 54
End: 2019-08-22
Payer: COMMERCIAL

## 2019-08-22 ENCOUNTER — HOSPITAL ENCOUNTER (OUTPATIENT)
Dept: RADIOLOGY | Facility: HOSPITAL | Age: 54
Discharge: HOME | End: 2019-08-22
Attending: ORTHOPAEDIC SURGERY
Payer: COMMERCIAL

## 2019-08-22 VITALS
DIASTOLIC BLOOD PRESSURE: 76 MMHG | BODY MASS INDEX: 30.78 KG/M2 | HEIGHT: 70 IN | SYSTOLIC BLOOD PRESSURE: 139 MMHG | WEIGHT: 215 LBS | HEART RATE: 72 BPM

## 2019-08-22 DIAGNOSIS — M25.561 PAIN IN BOTH KNEES, UNSPECIFIED CHRONICITY: Primary | ICD-10-CM

## 2019-08-22 DIAGNOSIS — M75.122 COMPLETE TEAR OF LEFT ROTATOR CUFF: ICD-10-CM

## 2019-08-22 DIAGNOSIS — M25.561 PAIN IN BOTH KNEES, UNSPECIFIED CHRONICITY: ICD-10-CM

## 2019-08-22 DIAGNOSIS — S83.249A ACUTE MEDIAL MENISCAL TEAR, UNSPECIFIED LATERALITY, INITIAL ENCOUNTER: Primary | ICD-10-CM

## 2019-08-22 DIAGNOSIS — M25.562 PAIN IN BOTH KNEES, UNSPECIFIED CHRONICITY: ICD-10-CM

## 2019-08-22 DIAGNOSIS — M25.562 PAIN IN BOTH KNEES, UNSPECIFIED CHRONICITY: Primary | ICD-10-CM

## 2019-08-22 PROCEDURE — 73564 X-RAY EXAM KNEE 4 OR MORE: CPT | Mod: 26,50,, | Performed by: RADIOLOGY

## 2019-08-22 PROCEDURE — 73564 X-RAY EXAM KNEE 4 OR MORE: CPT | Mod: TC,50,PN

## 2019-08-22 PROCEDURE — 99213 OFFICE O/P EST LOW 20 MIN: CPT | Mod: PBBFAC,25,PN | Performed by: ORTHOPAEDIC SURGERY

## 2019-08-22 PROCEDURE — 99214 OFFICE O/P EST MOD 30 MIN: CPT | Mod: 57,S$PBB,, | Performed by: ORTHOPAEDIC SURGERY

## 2019-08-22 PROCEDURE — 99999 PR PBB SHADOW E&M-EST. PATIENT-LVL III: CPT | Mod: PBBFAC,,, | Performed by: ORTHOPAEDIC SURGERY

## 2019-08-22 PROCEDURE — 73564 XR KNEE ORTHO BILAT WITH FLEXION: ICD-10-PCS | Mod: 26,50,, | Performed by: RADIOLOGY

## 2019-08-22 PROCEDURE — 99214 PR OFFICE/OUTPT VISIT, EST, LEVL IV, 30-39 MIN: ICD-10-PCS | Mod: 57,S$PBB,, | Performed by: ORTHOPAEDIC SURGERY

## 2019-08-22 PROCEDURE — 99999 PR PBB SHADOW E&M-EST. PATIENT-LVL III: ICD-10-PCS | Mod: PBBFAC,,, | Performed by: ORTHOPAEDIC SURGERY

## 2019-08-22 NOTE — LETTER
August 22, 2019      [facility]  [address]  Claims  [place] [address]           [facility] [place] [address] [place] [id number]  Phone: [phone]          Patient: Himanshu Connor II   MR Number: [id number]   YOB: 1965   Date of Visit: 8/22/2019       Dear [facility]:    Thank you for referring Himanshu Connor to me for evaluation. Attached you will find relevant portions of my assessment and plan of care.    If you have questions, please do not hesitate to call me. I look forward to following Himanshu Connor along with you.    Sincerely,    Felix Levin MD    Enclosure  CC:  No Recipients    If you would like to receive this communication electronically, please contact [email or web address] or [phone] to request more information on [employer] Link access.    For providers and/or their staff who would like to refer a patient to [facility], please contact us through our one-stop-shop provider referral line, [facility], at [phone].    If you feel you have received this communication in error or would no longer like to receive these types of communications, please e-mail [email or web address]

## 2019-08-22 NOTE — PROGRESS NOTES
Past Medical History:   Diagnosis Date    Alcohol abuse, unspecified     Allergic rhinitis, cause unspecified     Asthma attack     Carpal tunnel syndrome     Depressive disorder, not elsewhere classified     Diarrhea     GERD (gastroesophageal reflux disease)     Hypersomnia, unspecified     Hypertension     Lumbago     Obesity, unspecified     JAH (obstructive sleep apnea)     Other and unspecified hyperlipidemia     Other ankle sprain and strain     Psychosexual dysfunction with inhibited sexual excitement     Ventral hernia, unspecified, without mention of obstruction or gangrene        Past Surgical History:   Procedure Laterality Date    A-scope knees      Bilateral    CARPAL TUNNEL RELEASE      Bilateral    EYE SURGERY      Lasik    HERNIA REPAIR      KNEE SURGERY      osmar    NISSEN FUNDOPLICATION      REPAIR, ROTATOR CUFF, ARTHROSCOPIC Left 5/30/2013    [facility]    ROTATOR CUFF REPAIR      right       Current Outpatient Medications   Medication Sig    ALPRAZolam (XANAX) 0.5 MG tablet TK 1 T PO BID    cyanocobalamin (VITAMIN B-12) 1000 MCG tablet Take 1,000 mcg by mouth once daily.     pantoprazole (PROTONIX) 40 MG tablet TK ONE  T PO D    ranitidine (ZANTAC) 150 MG capsule Take 150 mg by mouth.    sucralfate (CARAFATE) 1 gram tablet TK 1 T PO QID 1 HOUR BEFORE MEALS AND 1 T QHS OES     No current facility-administered medications for this visit.        Review of patient's allergies indicates:  No Known Allergies    History reviewed. No pertinent family history.    Social History     Socioeconomic History    Marital status:      Spouse name: Not on file    Number of children: Not on file    Years of education: Not on file    Highest education level: Not on file   Occupational History    Not on file   Social Needs    Financial resource strain: Not on file    Food insecurity:     Worry: Not on file     Inability: Not on file     Transportation needs:     Medical: Not on file     Non-medical: Not on file   Tobacco Use    Smoking status: Never Smoker    Smokeless tobacco: Never Used   Substance and Sexual Activity    Alcohol use: Yes    Drug use: No    Sexual activity: Not on file   Lifestyle    Physical activity:     Days per week: Not on file     Minutes per session: Not on file    Stress: Not on file   Relationships    Social connections:     Talks on phone: Not on file     Gets together: Not on file     Attends Sabianism service: Not on file     Active member of club or organization: Not on file     Attends meetings of clubs or organizations: Not on file     Relationship status: Not on file   Other Topics Concern    Not on file   Social History Narrative    Not on file       Chief Complaint:   Chief Complaint   Patient presents with    Knee Pain     bilateral knee pain       Interval history This is a 53-year-old right-hand-dominant male who comes in with left shoulder pain.  This been present for several years now but slowly worsening.  I did a rotator cuff repair and biceps tenodesis back in 2013 on him.  He reinjured it about 7 months after the surgery doing some  work.  [facility]  He had an MRI done which shows recurrent rotator cuff tear.  Pain is up to a 7/10.  We injected him a few years ago with decent success.  Still working as a .    History of present illness:  We injected him back in early February.  Lasted about 2 months.  He would like to do his left shoulder surgery possibly at some point.  Pain currently a 8/10.  He had his repeat MRI done which does show a recurrent tear of the rotator cuff with a moderate amount of retraction.  Has some early arthritic changes.  Also having worsening bilateral knee pain.  Left is worse than the right.  Patient [facility]  They did really help very much.  He was told he  needed a knee replacement although he still has a fair amount of joint space remaining.      Review of Systems:  Musculoskeletal:  See HPI      Physical Examination:    Vital Signs:    Vitals:    08/22/19 0929   BP: 139/76   Pulse: 72       Body mass index is 30.85 kg/m².    This a well-developed, well nourished patient in no acute distress.  They are alert and oriented and cooperative to examination.  Pt. walks without an antalgic gait.      Examination of the left shoulder shows no rashes or erythema. There are no masses, ecchymosis, or atrophy. The patient has full range of motion in forward flexion, external rotation, and internal rotation to the mid T-spine. The patient has moderately positive impingement signs. Nontender to palpation over a.c. joint. Passive range of motion: Forward flexion of 180°, external rotation at 90° of 90°, internal rotation of 50°, and external rotation at 0° of 50°. 2+ radial pulse. Intact axillary, radial, median and ulnar sensation.  4 out of 5 resisted forward flexion, external rotation, and negative lift off test.    Examination of bilateral knees shows no rashes or erythema. There are no masses ecchymosis or effusion. Patient has full range of motion from 0-130°. Patient is nontender to palpation over lateral joint line and mildly tender to palpation over the medial joint line. Patient has a - Lachman exam, - anterior drawer exam, and - posterior drawer exam. - Mallika's exam. Knee is stable to varus and valgus stress. 5 out of 5 motor strength. Palpable distal pulses. Intact light touch sensation. Negative Patellofemoral crepitus      X-rays: X-rays left shoulder  reviewed which show previous distal clavicle excision.  X-rays of both knees are ordered and reviewed which show some mild arthritic changes.    MRI of the left shoulder: Findings suggesting previous rotator cuff repair seen.  There appears to be a full-thickness tear of the supraspinatus with some high riding  changes of the humerus.  Early arthritic changes noted.  Moderate amount of tendon retraction.  Infraspinatus tendinosis.  Subscap tendinosis.  Moderate bursitis.  Moderate acromioclavicular joint degenerative changes are seen.     Assessment:: Recurrent left rotator cuff tear  The meniscal tears of both knees    Plan:  Reviewed the findings with the patient today.  We discussed treatment options.  Plan would be for a attempted left arthroscopic rotator cuff repair with likely graft augmentation versus superior capsular reconstruction.  We also get MRIs of both of his knees.  At his age and the amount of remaining joint space, I would not jump to knee replacements yet.  Risks, benefits, and alternatives to the procedure were explained to the patient including but not limited to damage to nerves, arteries, blood vessels, bones, tendons, ligaments, stiffness, instability, infection, DVT, PE, as well as general anesthetic complications including seizure, stroke, heart attack and even death. The patient understood these risks and wished to proceed and signed the informed consent.

## 2019-08-23 ENCOUNTER — HOSPITAL ENCOUNTER (OUTPATIENT)
Dept: RADIOLOGY | Facility: HOSPITAL | Age: 54
Discharge: HOME | End: 2019-08-23
Attending: ORTHOPAEDIC SURGERY
Payer: COMMERCIAL

## 2019-08-23 DIAGNOSIS — M25.562 PAIN IN BOTH KNEES, UNSPECIFIED CHRONICITY: ICD-10-CM

## 2019-08-23 DIAGNOSIS — M25.561 PAIN IN BOTH KNEES, UNSPECIFIED CHRONICITY: ICD-10-CM

## 2019-08-23 PROCEDURE — 73721 MRI JNT OF LWR EXTRE W/O DYE: CPT | Mod: TC,RT

## 2019-08-23 PROCEDURE — 73721 MRI KNEE WITHOUT CONTRAST RIGHT: ICD-10-PCS | Mod: 26,RT,, | Performed by: RADIOLOGY

## 2019-08-23 PROCEDURE — 73721 MRI KNEE WITHOUT CONTRAST LEFT: ICD-10-PCS | Mod: 26,LT,, | Performed by: RADIOLOGY

## 2019-08-23 PROCEDURE — 73721 MRI JNT OF LWR EXTRE W/O DYE: CPT | Mod: TC,LT

## 2019-08-23 PROCEDURE — 73721 MRI JNT OF LWR EXTRE W/O DYE: CPT | Mod: 26,RT,, | Performed by: RADIOLOGY

## 2019-08-23 PROCEDURE — 73721 MRI JNT OF LWR EXTRE W/O DYE: CPT | Mod: 26,LT,, | Performed by: RADIOLOGY

## 2019-08-23 RX ORDER — CEFAZOLIN SODIUM 2 G/50ML
2 SOLUTION INTRAVENOUS
Status: CANCELLED | OUTPATIENT
Start: 2019-08-23

## 2019-09-13 ENCOUNTER — HOSPITAL ENCOUNTER (OUTPATIENT)
Dept: PREADMISSION TESTING | Facility: HOSPITAL | Age: 54
Discharge: HOME | End: 2019-09-13
Attending: FAMILY MEDICINE
Payer: OTHER GOVERNMENT

## 2019-09-13 ENCOUNTER — HOSPITAL ENCOUNTER (OUTPATIENT)
Dept: RADIOLOGY | Facility: HOSPITAL | Age: 54
Discharge: HOME | End: 2019-09-13
Attending: FAMILY MEDICINE
Payer: OTHER GOVERNMENT

## 2019-09-13 VITALS — BODY MASS INDEX: 26.48 KG/M2 | WEIGHT: 185 LBS | HEIGHT: 70 IN

## 2019-09-13 DIAGNOSIS — M75.122 COMPLETE TEAR OF LEFT ROTATOR CUFF: ICD-10-CM

## 2019-09-13 LAB
ANION GAP SERPL CALC-SCNC: 8 MMOL/L (ref 8–16)
BASOPHILS # BLD AUTO: 0.05 K/UL (ref 0–0.2)
BASOPHILS NFR BLD: 1.1 % (ref 0–1.9)
BUN SERPL-MCNC: 8 MG/DL (ref 6–20)
CALCIUM SERPL-MCNC: 9 MG/DL (ref 8.7–10.5)
CHLORIDE SERPL-SCNC: 108 MMOL/L (ref 95–110)
CO2 SERPL-SCNC: 26 MMOL/L (ref 23–29)
CREAT SERPL-MCNC: 0.8 MG/DL (ref 0.5–1.4)
DIFFERENTIAL METHOD: ABNORMAL
EOSINOPHIL # BLD AUTO: 0.5 K/UL (ref 0–0.5)
EOSINOPHIL NFR BLD: 10.1 % (ref 0–8)
ERYTHROCYTE [DISTWIDTH] IN BLOOD BY AUTOMATED COUNT: 15.2 % (ref 11.5–14.5)
EST. GFR  (AFRICAN AMERICAN): >60 ML/MIN/1.73 M^2
EST. GFR  (NON AFRICAN AMERICAN): >60 ML/MIN/1.73 M^2
GLUCOSE SERPL-MCNC: 68 MG/DL (ref 70–110)
HCT VFR BLD AUTO: 36.1 % (ref 40–54)
HGB BLD-MCNC: 11.1 G/DL (ref 14–18)
IMM GRANULOCYTES # BLD AUTO: 0.02 K/UL (ref 0–0.04)
LYMPHOCYTES # BLD AUTO: 1.7 K/UL (ref 1–4.8)
LYMPHOCYTES NFR BLD: 36.8 % (ref 18–48)
MCH RBC QN AUTO: 27.5 PG (ref 27–31)
MCHC RBC AUTO-ENTMCNC: 30.7 G/DL (ref 32–36)
MCV RBC AUTO: 90 FL (ref 82–98)
MONOCYTES # BLD AUTO: 0.4 K/UL (ref 0.3–1)
MONOCYTES NFR BLD: 8.6 % (ref 4–15)
NEUTROPHILS # BLD AUTO: 2 K/UL (ref 1.8–7.7)
NEUTROPHILS NFR BLD: 43 % (ref 38–73)
NRBC BLD-RTO: 0 /100 WBC
PLATELET # BLD AUTO: 234 K/UL (ref 150–350)
PMV BLD AUTO: 9.1 FL (ref 9.2–12.9)
POTASSIUM SERPL-SCNC: 4.6 MMOL/L (ref 3.5–5.1)
RBC # BLD AUTO: 4.03 M/UL (ref 4.6–6.2)
SODIUM SERPL-SCNC: 142 MMOL/L (ref 136–145)
WBC # BLD AUTO: 4.56 K/UL (ref 3.9–12.7)

## 2019-09-13 PROCEDURE — 80048 BASIC METABOLIC PNL TOTAL CA: CPT

## 2019-09-13 PROCEDURE — 93005 ELECTROCARDIOGRAM TRACING: CPT

## 2019-09-13 PROCEDURE — 71046 X-RAY EXAM CHEST 2 VIEWS: CPT | Mod: TC,FY

## 2019-09-13 PROCEDURE — 99900103 DSU ONLY-NO CHARGE-INITIAL HR (STAT)

## 2019-09-13 PROCEDURE — 85025 COMPLETE CBC W/AUTO DIFF WBC: CPT

## 2019-09-13 PROCEDURE — 36415 COLL VENOUS BLD VENIPUNCTURE: CPT

## 2019-09-13 PROCEDURE — 71046 X-RAY EXAM CHEST 2 VIEWS: CPT | Mod: 26,,, | Performed by: RADIOLOGY

## 2019-09-13 PROCEDURE — 71046 XR CHEST PA AND LATERAL: ICD-10-PCS | Mod: 26,,, | Performed by: RADIOLOGY

## 2019-09-13 PROCEDURE — 99900104 DSU ONLY-NO CHARGE-EA ADD'L HR (STAT)

## 2019-09-13 RX ORDER — ESCITALOPRAM OXALATE 20 MG/1
20 TABLET ORAL DAILY
COMMUNITY
End: 2021-02-11

## 2019-09-13 RX ORDER — LOSARTAN POTASSIUM 50 MG/1
50 TABLET ORAL DAILY
COMMUNITY
End: 2023-03-03

## 2019-09-13 NOTE — DISCHARGE INSTRUCTIONS

## 2019-09-16 ENCOUNTER — OFFICE VISIT (OUTPATIENT)
Dept: ORTHOPEDICS | Facility: CLINIC | Age: 54
End: 2019-09-16
Payer: COMMERCIAL

## 2019-09-16 VITALS
SYSTOLIC BLOOD PRESSURE: 155 MMHG | DIASTOLIC BLOOD PRESSURE: 80 MMHG | WEIGHT: 185 LBS | BODY MASS INDEX: 26.48 KG/M2 | HEART RATE: 77 BPM | HEIGHT: 70 IN

## 2019-09-16 DIAGNOSIS — M23.200 OTHER OLD TEAR OF LATERAL MENISCUS OF RIGHT KNEE: ICD-10-CM

## 2019-09-16 DIAGNOSIS — M75.122 COMPLETE TEAR OF LEFT ROTATOR CUFF: Primary | ICD-10-CM

## 2019-09-16 PROCEDURE — 99999 PR PBB SHADOW E&M-EST. PATIENT-LVL III: ICD-10-PCS | Mod: PBBFAC,,, | Performed by: ORTHOPAEDIC SURGERY

## 2019-09-16 PROCEDURE — 99213 PR OFFICE/OUTPT VISIT, EST, LEVL III, 20-29 MIN: ICD-10-PCS | Mod: S$PBB,,, | Performed by: ORTHOPAEDIC SURGERY

## 2019-09-16 PROCEDURE — 99213 OFFICE O/P EST LOW 20 MIN: CPT | Mod: PBBFAC,PN | Performed by: ORTHOPAEDIC SURGERY

## 2019-09-16 PROCEDURE — 99999 PR PBB SHADOW E&M-EST. PATIENT-LVL III: CPT | Mod: PBBFAC,,, | Performed by: ORTHOPAEDIC SURGERY

## 2019-09-16 PROCEDURE — 99213 OFFICE O/P EST LOW 20 MIN: CPT | Mod: S$PBB,,, | Performed by: ORTHOPAEDIC SURGERY

## 2019-09-16 NOTE — H&P (VIEW-ONLY)
Past Medical History:   Diagnosis Date    Alcohol abuse, unspecified     Allergic rhinitis, cause unspecified     Asthma attack     Carpal tunnel syndrome     Depressive disorder, not elsewhere classified     Diarrhea     GERD (gastroesophageal reflux disease)     Hypersomnia, unspecified     Hypertension     Lumbago     Obesity, unspecified     JAH (obstructive sleep apnea)     Other and unspecified hyperlipidemia     Other ankle sprain and strain     Psychosexual dysfunction with inhibited sexual excitement     Ventral hernia, unspecified, without mention of obstruction or gangrene        Past Surgical History:   Procedure Laterality Date    A-scope knees      Bilateral    CARPAL TUNNEL RELEASE      Bilateral    EYE SURGERY      Lasik    HERNIA REPAIR      KNEE SURGERY      osmar    NISSEN FUNDOPLICATION      REPAIR, ROTATOR CUFF, ARTHROSCOPIC Left 5/30/2013    [facility]    ROTATOR CUFF REPAIR      right       Current Outpatient Medications   Medication Sig    ALPRAZolam (XANAX) 0.5 MG tablet TK 1 T PO BID    cyanocobalamin (VITAMIN B-12) 1000 MCG tablet Take 1,000 mcg by mouth once daily.     escitalopram oxalate (LEXAPRO) 20 MG tablet Take 20 mg by mouth once daily.    losartan (COZAAR) 25 MG tablet Take 25 mg by mouth once daily.    pantoprazole (PROTONIX) 40 MG tablet TK ONE  T PO D    ranitidine (ZANTAC) 150 MG capsule Take 150 mg by mouth 2 (two) times daily as needed.     sucralfate (CARAFATE) 1 gram tablet TK 1 T PO QID 1 HOUR BEFORE MEALS AND 1 T QHS OES     No current facility-administered medications for this visit.        Review of patient's allergies indicates:  No Known Allergies    History reviewed. No pertinent family history.    Social History     Socioeconomic History    Marital status:      Spouse name: Not on file    Number of children: Not on file    Years of education: Not on file    Highest education level: Not  on file   Occupational History    Not on file   Social Needs    Financial resource strain: Not on file    Food insecurity:     Worry: Not on file     Inability: Not on file    Transportation needs:     Medical: Not on file     Non-medical: Not on file   Tobacco Use    Smoking status: Never Smoker    Smokeless tobacco: Never Used   Substance and Sexual Activity    Alcohol use: Not Currently    Drug use: No    Sexual activity: Not on file   Lifestyle    Physical activity:     Days per week: Not on file     Minutes per session: Not on file    Stress: Not on file   Relationships    Social connections:     Talks on phone: Not on file     Gets together: Not on file     Attends Yazidism service: Not on file     Active member of club or organization: Not on file     Attends meetings of clubs or organizations: Not on file     Relationship status: Not on file   Other Topics Concern    Not on file   Social History Narrative    Not on file       Chief Complaint:   Chief Complaint   Patient presents with    Knee Pain     mri results        Interval history This is a 53-year-old right-hand-dominant male who comes in with left shoulder pain.  This been present for several years now but slowly worsening.  I did a rotator cuff repair and biceps tenodesis back in 2013 on him.  He reinjured it about 7 months after the surgery doing some  work.  [facility]  He had an MRI done which shows recurrent rotator cuff tear.  Pain is up to a 7/10.  We injected him a few years ago with decent success.  Still working as a .    History of present illness:  We injected him back in early February.  Lasted about 2 months.  He would like to do his left shoulder surgery possibly at some point.  Pain currently a 8/10.  He had his repeat MRI done which does show a recurrent tear of the rotator cuff with a moderate amount of retraction.  Has some early arthritic changes.  Also having worsening  bilateral knee pain.  Left is worse than the right.  Patient [facility]  They did really help very much.  He was told he needed a knee replacement although he still has a fair amount of joint space remaining.      Review of Systems:  Musculoskeletal:  See HPI      Physical Examination:    Vital Signs:    Vitals:    09/16/19 0921   BP: (!) 155/80   Pulse: 77       Body mass index is 26.54 kg/m².    This a well-developed, well nourished patient in no acute distress.  They are alert and oriented and cooperative to examination.  Pt. walks without an antalgic gait.      Examination of the left shoulder shows no rashes or erythema. There are no masses, ecchymosis, or atrophy. The patient has full range of motion in forward flexion, external rotation, and internal rotation to the mid T-spine. The patient has moderately positive impingement signs. Nontender to palpation over a.c. joint. Passive range of motion: Forward flexion of 180°, external rotation at 90° of 90°, internal rotation of 50°, and external rotation at 0° of 50°. 2+ radial pulse. Intact axillary, radial, median and ulnar sensation.  4 out of 5 resisted forward flexion, external rotation, and negative lift off test.    Examination of bilateral knees shows no rashes or erythema. There are no masses ecchymosis or effusion. Patient has full range of motion from 0-130°. Patient is nontender to palpation over lateral joint line and mildly tender to palpation over the medial joint line. Patient has a - Lachman exam, - anterior drawer exam, and - posterior drawer exam. - Mallika's exam. Knee is stable to varus and valgus stress. 5 out of 5 motor strength. Palpable distal pulses. Intact light touch sensation. Negative Patellofemoral crepitus      X-rays: X-rays left shoulder  reviewed which show previous distal clavicle excision.  X-rays of both knees are ordered and reviewed which show some mild arthritic  changes.    MRI of the left shoulder: Findings suggesting previous rotator cuff repair seen.  There appears to be a full-thickness tear of the supraspinatus with some high riding changes of the humerus.  Early arthritic changes noted.  Moderate amount of tendon retraction.  Infraspinatus tendinosis.  Subscap tendinosis.  Moderate bursitis.  Moderate acromioclavicular joint degenerative changes are seen.    MRI of the left knee:Small tear of the anterior horn of the lateral meniscus.  Moderate patellofemoral predominant osteoarthritis.  Moderate proximal tibiofibular osteoarthritis with ganglion formation.  Small joint effusion.  Small popliteal cyst.    MRI of the right knee:1. Complex degenerative tear of the anterior horn and body of the lateral meniscus.  2. Multifocal articular cartilage loss and fissuring as detailed above.  3. Small knee joint effusion.  4. Small ruptured Vale's cyst.  5. Grade II tear of the proximal and deep fibers of the lateral head of the gastrocnemius muscle within the posterolateral proximal calf.     Assessment:: Recurrent left rotator cuff tear  The meniscal tears of both knees    Plan:  Reviewed the findings with the patient today.  We discussed treatment options.  Plan would be for a attempted left arthroscopic rotator cuff repair with likely graft augmentation versus superior capsular reconstruction.  We also get MRIs of both of his knees.  At his age and the amount of remaining joint space, I would not jump to knee replacements yet.  Risks, benefits, and alternatives to the procedure were explained to the patient including but not limited to damage to nerves, arteries, blood vessels, bones, tendons, ligaments, stiffness, instability, infection, DVT, PE, as well as general anesthetic complications including seizure, stroke, heart attack and even death. The patient understood these risks and wished to proceed and signed the informed consent.

## 2019-09-16 NOTE — H&P (VIEW-ONLY)
Past Medical History:   Diagnosis Date    Alcohol abuse, unspecified     Allergic rhinitis, cause unspecified     Asthma attack     Carpal tunnel syndrome     Depressive disorder, not elsewhere classified     Diarrhea     GERD (gastroesophageal reflux disease)     Hypersomnia, unspecified     Hypertension     Lumbago     Obesity, unspecified     JAH (obstructive sleep apnea)     Other and unspecified hyperlipidemia     Other ankle sprain and strain     Psychosexual dysfunction with inhibited sexual excitement     Ventral hernia, unspecified, without mention of obstruction or gangrene        Past Surgical History:   Procedure Laterality Date    A-scope knees      Bilateral    CARPAL TUNNEL RELEASE      Bilateral    EYE SURGERY      Lasik    HERNIA REPAIR      KNEE SURGERY      osmar    NISSEN FUNDOPLICATION      REPAIR, ROTATOR CUFF, ARTHROSCOPIC Left 5/30/2013    [facility]    ROTATOR CUFF REPAIR      right       Current Outpatient Medications   Medication Sig    ALPRAZolam (XANAX) 0.5 MG tablet TK 1 T PO BID    cyanocobalamin (VITAMIN B-12) 1000 MCG tablet Take 1,000 mcg by mouth once daily.     escitalopram oxalate (LEXAPRO) 20 MG tablet Take 20 mg by mouth once daily.    losartan (COZAAR) 25 MG tablet Take 25 mg by mouth once daily.    pantoprazole (PROTONIX) 40 MG tablet TK ONE  T PO D    ranitidine (ZANTAC) 150 MG capsule Take 150 mg by mouth 2 (two) times daily as needed.     sucralfate (CARAFATE) 1 gram tablet TK 1 T PO QID 1 HOUR BEFORE MEALS AND 1 T QHS OES     No current facility-administered medications for this visit.        Review of patient's allergies indicates:  No Known Allergies    History reviewed. No pertinent family history.    Social History     Socioeconomic History    Marital status:      Spouse name: Not on file    Number of children: Not on file    Years of education: Not on file    Highest education level: Not  on file   Occupational History    Not on file   Social Needs    Financial resource strain: Not on file    Food insecurity:     Worry: Not on file     Inability: Not on file    Transportation needs:     Medical: Not on file     Non-medical: Not on file   Tobacco Use    Smoking status: Never Smoker    Smokeless tobacco: Never Used   Substance and Sexual Activity    Alcohol use: Not Currently    Drug use: No    Sexual activity: Not on file   Lifestyle    Physical activity:     Days per week: Not on file     Minutes per session: Not on file    Stress: Not on file   Relationships    Social connections:     Talks on phone: Not on file     Gets together: Not on file     Attends [employer] service: Not on file     Active member of club or organization: Not on file     Attends meetings of clubs or organizations: Not on file     Relationship status: Not on file   Other Topics Concern    Not on file   Social History Narrative    Not on file       Chief Complaint:   Chief Complaint   Patient presents with    Knee Pain     mri results        Interval history This is a 53-year-old right-hand-dominant male who comes in with left shoulder pain.  This been present for several years now but slowly worsening.  I did a rotator cuff repair and biceps tenodesis back in 2013 on him.  He reinjured it about 7 months after the surgery doing some  work.  [facility]  He had an MRI done which shows recurrent rotator cuff tear.  Pain is up to a 7/10.  We injected him a few years ago with decent success.  Still working as a .    History of present illness:  We injected him back in early February.  Lasted about 2 months.  He would like to do his left shoulder surgery possibly at some point.  Pain currently a 8/10.  He had his repeat MRI done which does show a recurrent tear of the rotator cuff with a moderate amount of retraction.  Has some early arthritic changes.  Also having worsening  bilateral knee pain.  Left is worse than the right.  Patient [facility]  They did really help very much.  He was told he needed a knee replacement although he still has a fair amount of joint space remaining.      Review of Systems:  Musculoskeletal:  See HPI      Physical Examination:    Vital Signs:    Vitals:    09/16/19 0921   BP: (!) 155/80   Pulse: 77       Body mass index is 26.54 kg/m².    This a well-developed, well nourished patient in no acute distress.  They are alert and oriented and cooperative to examination.  Pt. walks without an antalgic gait.      Examination of the left shoulder shows no rashes or erythema. There are no masses, ecchymosis, or atrophy. The patient has full range of motion in forward flexion, external rotation, and internal rotation to the mid T-spine. The patient has moderately positive impingement signs. Nontender to palpation over a.c. joint. Passive range of motion: Forward flexion of 180°, external rotation at 90° of 90°, internal rotation of 50°, and external rotation at 0° of 50°. 2+ radial pulse. Intact axillary, radial, median and ulnar sensation.  4 out of 5 resisted forward flexion, external rotation, and negative lift off test.    Examination of bilateral knees shows no rashes or erythema. There are no masses ecchymosis or effusion. Patient has full range of motion from 0-130°. Patient is nontender to palpation over lateral joint line and mildly tender to palpation over the medial joint line. Patient has a - Lachman exam, - anterior drawer exam, and - posterior drawer exam. - Mallika's exam. Knee is stable to varus and valgus stress. 5 out of 5 motor strength. Palpable distal pulses. Intact light touch sensation. Negative Patellofemoral crepitus      X-rays: X-rays left shoulder  reviewed which show previous distal clavicle excision.  X-rays of both knees are ordered and reviewed which show some mild arthritic  changes.    MRI of the left shoulder: Findings suggesting previous rotator cuff repair seen.  There appears to be a full-thickness tear of the supraspinatus with some high riding changes of the humerus.  Early arthritic changes noted.  Moderate amount of tendon retraction.  Infraspinatus tendinosis.  Subscap tendinosis.  Moderate bursitis.  Moderate acromioclavicular joint degenerative changes are seen.    MRI of the left knee:Small tear of the anterior horn of the lateral meniscus.  Moderate patellofemoral predominant osteoarthritis.  Moderate proximal tibiofibular osteoarthritis with ganglion formation.  Small joint effusion.  Small popliteal cyst.    MRI of the right knee:1. Complex degenerative tear of the anterior horn and body of the lateral meniscus.  2. Multifocal articular cartilage loss and fissuring as detailed above.  3. Small knee joint effusion.  4. Small ruptured Vale's cyst.  5. Grade II tear of the proximal and deep fibers of the lateral head of the gastrocnemius muscle within the posterolateral proximal calf.     Assessment:: Recurrent left rotator cuff tear  The meniscal tears of both knees    Plan:  Reviewed the findings with the patient today.  We discussed treatment options.  Plan would be for a attempted left arthroscopic rotator cuff repair with likely graft augmentation versus superior capsular reconstruction.  We also get MRIs of both of his knees.  At his age and the amount of remaining joint space, I would not jump to knee replacements yet.  Risks, benefits, and alternatives to the procedure were explained to the patient including but not limited to damage to nerves, arteries, blood vessels, bones, tendons, ligaments, stiffness, instability, infection, DVT, PE, as well as general anesthetic complications including seizure, stroke, heart attack and even death. The patient understood these risks and wished to proceed and signed the informed consent.

## 2019-09-16 NOTE — PROGRESS NOTES
Past Medical History:   Diagnosis Date    Alcohol abuse, unspecified     Allergic rhinitis, cause unspecified     Asthma attack     Carpal tunnel syndrome     Depressive disorder, not elsewhere classified     Diarrhea     GERD (gastroesophageal reflux disease)     Hypersomnia, unspecified     Hypertension     Lumbago     Obesity, unspecified     JAH (obstructive sleep apnea)     Other and unspecified hyperlipidemia     Other ankle sprain and strain     Psychosexual dysfunction with inhibited sexual excitement     Ventral hernia, unspecified, without mention of obstruction or gangrene        Past Surgical History:   Procedure Laterality Date    A-scope knees      Bilateral    CARPAL TUNNEL RELEASE      Bilateral    EYE SURGERY      Lasik    HERNIA REPAIR      KNEE SURGERY      osmar    NISSEN FUNDOPLICATION      REPAIR, ROTATOR CUFF, ARTHROSCOPIC Left 5/30/2013    [facility]    ROTATOR CUFF REPAIR      right       Current Outpatient Medications   Medication Sig    ALPRAZolam (XANAX) 0.5 MG tablet TK 1 T PO BID    cyanocobalamin (VITAMIN B-12) 1000 MCG tablet Take 1,000 mcg by mouth once daily.     escitalopram oxalate (LEXAPRO) 20 MG tablet Take 20 mg by mouth once daily.    losartan (COZAAR) 25 MG tablet Take 25 mg by mouth once daily.    pantoprazole (PROTONIX) 40 MG tablet TK ONE  T PO D    ranitidine (ZANTAC) 150 MG capsule Take 150 mg by mouth 2 (two) times daily as needed.     sucralfate (CARAFATE) 1 gram tablet TK 1 T PO QID 1 HOUR BEFORE MEALS AND 1 T QHS OES     No current facility-administered medications for this visit.        Review of patient's allergies indicates:  No Known Allergies    History reviewed. No pertinent family history.    Social History     Socioeconomic History    Marital status:      Spouse name: Not on file    Number of children: Not on file    Years of education: Not on file    Highest education level: Not  on file   Occupational History    Not on file   Social Needs    Financial resource strain: Not on file    Food insecurity:     Worry: Not on file     Inability: Not on file    Transportation needs:     Medical: Not on file     Non-medical: Not on file   Tobacco Use    Smoking status: Never Smoker    Smokeless tobacco: Never Used   Substance and Sexual Activity    Alcohol use: Not Currently    Drug use: No    Sexual activity: Not on file   Lifestyle    Physical activity:     Days per week: Not on file     Minutes per session: Not on file    Stress: Not on file   Relationships    Social connections:     Talks on phone: Not on file     Gets together: Not on file     Attends Nondenominational service: Not on file     Active member of club or organization: Not on file     Attends meetings of clubs or organizations: Not on file     Relationship status: Not on file   Other Topics Concern    Not on file   Social History Narrative    Not on file       Chief Complaint:   Chief Complaint   Patient presents with    Knee Pain     mri results        Interval history This is a 53-year-old right-hand-dominant male who comes in with left shoulder pain.  This been present for several years now but slowly worsening.  I did a rotator cuff repair and biceps tenodesis back in 2013 on him.  He reinjured it about 7 months after the surgery doing some  work.  [facility]  He had an MRI done which shows recurrent rotator cuff tear.  Pain is up to a 7/10.  We injected him a few years ago with decent success.  Still working as a .    History of present illness:  We injected him back in early February.  Lasted about 2 months.  He would like to do his left shoulder surgery possibly at some point.  Pain currently a 8/10.  He had his repeat MRI done which does show a recurrent tear of the rotator cuff with a moderate amount of retraction.  Has some early arthritic changes.  Also having worsening  bilateral knee pain.  Left is worse than the right.  Patient has [facility] injections [facility]  They did really help very much.  He was told he needed a knee replacement although he still has a fair amount of joint space remaining.      Review of Systems:  Musculoskeletal:  See HPI      Physical Examination:    Vital Signs:    Vitals:    09/16/19 0921   BP: (!) 155/80   Pulse: 77       Body mass index is 26.54 kg/m².    This a well-developed, well nourished patient in no acute distress.  They are alert and oriented and cooperative to examination.  Pt. walks without an antalgic gait.      Examination of the left shoulder shows no rashes or erythema. There are no masses, ecchymosis, or atrophy. The patient has full range of motion in forward flexion, external rotation, and internal rotation to the mid T-spine. The patient has moderately positive impingement signs. Nontender to palpation over a.c. joint. Passive range of motion: Forward flexion of 180°, external rotation at 90° of 90°, internal rotation of 50°, and external rotation at 0° of 50°. 2+ radial pulse. Intact axillary, radial, median and ulnar sensation.  4 out of 5 resisted forward flexion, external rotation, and negative lift off test.    Examination of bilateral knees shows no rashes or erythema. There are no masses ecchymosis or effusion. Patient has full range of motion from 0-130°. Patient is nontender to palpation over lateral joint line and mildly tender to palpation over the medial joint line. Patient has a - Lachman exam, - anterior drawer exam, and - posterior drawer exam. - Mallika's exam. Knee is stable to varus and valgus stress. 5 out of 5 motor strength. Palpable distal pulses. Intact light touch sensation. Negative Patellofemoral crepitus      X-rays: X-rays left shoulder  reviewed which show previous distal clavicle excision.  X-rays of both knees are ordered and reviewed which show some mild arthritic  changes.    MRI of the left shoulder: Findings suggesting previous rotator cuff repair seen.  There appears to be a full-thickness tear of the supraspinatus with some high riding changes of the humerus.  Early arthritic changes noted.  Moderate amount of tendon retraction.  Infraspinatus tendinosis.  Subscap tendinosis.  Moderate bursitis.  Moderate acromioclavicular joint degenerative changes are seen.    MRI of the left knee:Small tear of the anterior horn of the lateral meniscus.  Moderate patellofemoral predominant osteoarthritis.  Moderate proximal tibiofibular osteoarthritis with ganglion formation.  Small joint effusion.  Small popliteal cyst.    MRI of the right knee:1. Complex degenerative tear of the anterior horn and body of the lateral meniscus.  2. Multifocal articular cartilage loss and fissuring as detailed above.  3. Small knee joint effusion.  4. Small ruptured Vale's cyst.  5. Grade II tear of the proximal and deep fibers of the lateral head of the gastrocnemius muscle within the posterolateral proximal calf.     Assessment:: Recurrent left rotator cuff tear  The meniscal tears of both knees    Plan:  Reviewed the findings with the patient today.  We discussed treatment options.  Plan would be for a attempted left arthroscopic rotator cuff repair with likely graft augmentation versus superior capsular reconstruction.  We also get MRIs of both of his knees.  At his age and the amount of remaining joint space, I would not jump to knee replacements yet.  Risks, benefits, and alternatives to the procedure were explained to the patient including but not limited to damage to nerves, arteries, blood vessels, bones, tendons, ligaments, stiffness, instability, infection, DVT, PE, as well as general anesthetic complications including seizure, stroke, heart attack and even death. The patient understood these risks and wished to proceed and signed the informed consent.

## 2019-09-19 ENCOUNTER — ANESTHESIA EVENT (OUTPATIENT)
Dept: SURGERY | Facility: HOSPITAL | Age: 54
End: 2019-09-19
Payer: COMMERCIAL

## 2019-09-20 ENCOUNTER — ANESTHESIA (OUTPATIENT)
Dept: SURGERY | Facility: HOSPITAL | Age: 54
End: 2019-09-20
Payer: COMMERCIAL

## 2019-09-20 ENCOUNTER — HOSPITAL ENCOUNTER (OUTPATIENT)
Facility: HOSPITAL | Age: 54
Discharge: HOME | End: 2019-09-20
Attending: ORTHOPAEDIC SURGERY | Admitting: ORTHOPAEDIC SURGERY
Payer: COMMERCIAL

## 2019-09-20 DIAGNOSIS — M75.122 COMPLETE TEAR OF LEFT ROTATOR CUFF: ICD-10-CM

## 2019-09-20 PROCEDURE — D9220A PRA ANESTHESIA: Mod: CRNA,,, | Performed by: NURSE ANESTHETIST, CERTIFIED REGISTERED

## 2019-09-20 PROCEDURE — 71000015 HC POSTOP RECOV 1ST HR: Performed by: ORTHOPAEDIC SURGERY

## 2019-09-20 PROCEDURE — 29823 SHO ARTHRS SRG XTNSV DBRDMT: CPT | Mod: 51,LT,, | Performed by: ORTHOPAEDIC SURGERY

## 2019-09-20 PROCEDURE — 71000039 HC RECOVERY, EACH ADD'L HOUR: Performed by: ORTHOPAEDIC SURGERY

## 2019-09-20 PROCEDURE — 29827 PR SHLDR ARTHROSCOP,SURG,W/ROTAT CUFF REPR: ICD-10-PCS | Mod: LT,,, | Performed by: ORTHOPAEDIC SURGERY

## 2019-09-20 PROCEDURE — 29826 SHO ARTHRS SRG DECOMPRESSION: CPT | Mod: LT,,, | Performed by: ORTHOPAEDIC SURGERY

## 2019-09-20 PROCEDURE — 99900104 DSU ONLY-NO CHARGE-EA ADD'L HR (STAT): Performed by: ORTHOPAEDIC SURGERY

## 2019-09-20 PROCEDURE — 37000009 HC ANESTHESIA EA ADD 15 MINS: Performed by: ORTHOPAEDIC SURGERY

## 2019-09-20 PROCEDURE — 64415 NJX AA&/STRD BRCH PLXS IMG: CPT | Performed by: ANESTHESIOLOGY

## 2019-09-20 PROCEDURE — 29823 PR SHLDR ARTHROSCOP,EXTEN DEBRIDE: ICD-10-PCS | Mod: 51,LT,, | Performed by: ORTHOPAEDIC SURGERY

## 2019-09-20 PROCEDURE — 25000003 PHARM REV CODE 250: Performed by: ANESTHESIOLOGY

## 2019-09-20 PROCEDURE — 63600175 PHARM REV CODE 636 W HCPCS: Performed by: ORTHOPAEDIC SURGERY

## 2019-09-20 PROCEDURE — D9220A PRA ANESTHESIA: ICD-10-PCS | Mod: CRNA,,, | Performed by: NURSE ANESTHETIST, CERTIFIED REGISTERED

## 2019-09-20 PROCEDURE — 76942 ECHO GUIDE FOR BIOPSY: CPT | Performed by: ANESTHESIOLOGY

## 2019-09-20 PROCEDURE — 64415 PR NERVE BLOCK INJ, ANES/STEROID, BRACHIAL PLEXUS, INCL IMAG GUIDANCE: ICD-10-PCS | Mod: 59,LT,, | Performed by: ANESTHESIOLOGY

## 2019-09-20 PROCEDURE — 76942 ECHO GUIDE FOR BIOPSY: CPT | Mod: 26,,, | Performed by: ANESTHESIOLOGY

## 2019-09-20 PROCEDURE — 71000033 HC RECOVERY, INTIAL HOUR: Performed by: ORTHOPAEDIC SURGERY

## 2019-09-20 PROCEDURE — D9220A PRA ANESTHESIA: ICD-10-PCS | Mod: ANES,,, | Performed by: ANESTHESIOLOGY

## 2019-09-20 PROCEDURE — 63600175 PHARM REV CODE 636 W HCPCS

## 2019-09-20 PROCEDURE — C9290 INJ, BUPIVACAINE LIPOSOME: HCPCS | Performed by: ANESTHESIOLOGY

## 2019-09-20 PROCEDURE — 63600175 PHARM REV CODE 636 W HCPCS: Performed by: NURSE ANESTHETIST, CERTIFIED REGISTERED

## 2019-09-20 PROCEDURE — 36000710: Performed by: ORTHOPAEDIC SURGERY

## 2019-09-20 PROCEDURE — 29826 PR SHLDR ARTHROSCOP,PART ACROMIOPLAS: ICD-10-PCS | Mod: LT,,, | Performed by: ORTHOPAEDIC SURGERY

## 2019-09-20 PROCEDURE — 63600175 PHARM REV CODE 636 W HCPCS: Performed by: ANESTHESIOLOGY

## 2019-09-20 PROCEDURE — C1713 ANCHOR/SCREW BN/BN,TIS/BN: HCPCS | Performed by: ORTHOPAEDIC SURGERY

## 2019-09-20 PROCEDURE — C1769 GUIDE WIRE: HCPCS | Performed by: ORTHOPAEDIC SURGERY

## 2019-09-20 PROCEDURE — 29827 SHO ARTHRS SRG RT8TR CUF RPR: CPT | Mod: LT,,, | Performed by: ORTHOPAEDIC SURGERY

## 2019-09-20 PROCEDURE — 76942 PR U/S GUIDANCE FOR NEEDLE GUIDANCE: ICD-10-PCS | Mod: 26,,, | Performed by: ANESTHESIOLOGY

## 2019-09-20 PROCEDURE — 25000003 PHARM REV CODE 250: Performed by: NURSE ANESTHETIST, CERTIFIED REGISTERED

## 2019-09-20 PROCEDURE — 36000711: Performed by: ORTHOPAEDIC SURGERY

## 2019-09-20 PROCEDURE — 01630 ANES OPN/ARTHR PX SHO JT NOS: CPT | Performed by: ORTHOPAEDIC SURGERY

## 2019-09-20 PROCEDURE — 99900103 DSU ONLY-NO CHARGE-INITIAL HR (STAT): Performed by: ORTHOPAEDIC SURGERY

## 2019-09-20 PROCEDURE — D9220A PRA ANESTHESIA: Mod: ANES,,, | Performed by: ANESTHESIOLOGY

## 2019-09-20 PROCEDURE — S0020 INJECTION, BUPIVICAINE HYDRO: HCPCS | Performed by: ANESTHESIOLOGY

## 2019-09-20 PROCEDURE — 37000008 HC ANESTHESIA 1ST 15 MINUTES: Performed by: ORTHOPAEDIC SURGERY

## 2019-09-20 PROCEDURE — 27201423 OPTIME MED/SURG SUP & DEVICES STERILE SUPPLY: Performed by: ORTHOPAEDIC SURGERY

## 2019-09-20 PROCEDURE — C1889 IMPLANT/INSERT DEVICE, NOC: HCPCS | Performed by: ORTHOPAEDIC SURGERY

## 2019-09-20 PROCEDURE — 64415 NJX AA&/STRD BRCH PLXS IMG: CPT | Mod: 59,LT,, | Performed by: ANESTHESIOLOGY

## 2019-09-20 DEVICE — ANCHOR BIOCOMP SWVLLOK: Type: IMPLANTABLE DEVICE | Site: SHOULDER | Status: FUNCTIONAL

## 2019-09-20 DEVICE — ANCHOR BONE ARTHSCP DEL SYS: Type: IMPLANTABLE DEVICE | Site: SHOULDER | Status: FUNCTIONAL

## 2019-09-20 DEVICE — ANCHOR TENDON 8: Type: IMPLANTABLE DEVICE | Site: SHOULDER | Status: FUNCTIONAL

## 2019-09-20 DEVICE — IMPLANT ARTHSCP BIOINDUCTV LG: Type: IMPLANTABLE DEVICE | Site: SHOULDER | Status: FUNCTIONAL

## 2019-09-20 DEVICE — IMPLANTABLE DEVICE: Type: IMPLANTABLE DEVICE | Site: SHOULDER | Status: FUNCTIONAL

## 2019-09-20 DEVICE — ANCHOR SWIVELOCK C BLU FIBERTP: Type: IMPLANTABLE DEVICE | Site: SHOULDER | Status: FUNCTIONAL

## 2019-09-20 RX ORDER — HYDROCODONE BITARTRATE AND ACETAMINOPHEN 5; 325 MG/1; MG/1
1 TABLET ORAL EVERY 4 HOURS PRN
Status: DISCONTINUED | OUTPATIENT
Start: 2019-09-20 | End: 2019-09-20 | Stop reason: HOSPADM

## 2019-09-20 RX ORDER — BUPIVACAINE HYDROCHLORIDE 5 MG/ML
INJECTION, SOLUTION EPIDURAL; INTRACAUDAL
Status: DISCONTINUED | OUTPATIENT
Start: 2019-09-20 | End: 2019-09-20

## 2019-09-20 RX ORDER — DIPHENHYDRAMINE HYDROCHLORIDE 50 MG/ML
25 INJECTION INTRAMUSCULAR; INTRAVENOUS EVERY 6 HOURS PRN
Status: DISCONTINUED | OUTPATIENT
Start: 2019-09-20 | End: 2019-09-20 | Stop reason: HOSPADM

## 2019-09-20 RX ORDER — GLYCOPYRROLATE 0.2 MG/ML
INJECTION INTRAMUSCULAR; INTRAVENOUS
Status: DISCONTINUED | OUTPATIENT
Start: 2019-09-20 | End: 2019-09-20

## 2019-09-20 RX ORDER — SODIUM CHLORIDE 0.9 % (FLUSH) 0.9 %
10 SYRINGE (ML) INJECTION
Status: DISCONTINUED | OUTPATIENT
Start: 2019-09-20 | End: 2019-09-20 | Stop reason: HOSPADM

## 2019-09-20 RX ORDER — PHENYLEPHRINE HYDROCHLORIDE 10 MG/ML
INJECTION INTRAVENOUS
Status: DISCONTINUED | OUTPATIENT
Start: 2019-09-20 | End: 2019-09-20

## 2019-09-20 RX ORDER — SODIUM CHLORIDE 0.9 % (FLUSH) 0.9 %
3 SYRINGE (ML) INJECTION EVERY 8 HOURS
Status: DISCONTINUED | OUTPATIENT
Start: 2019-09-20 | End: 2019-09-20 | Stop reason: HOSPADM

## 2019-09-20 RX ORDER — LIDOCAINE HYDROCHLORIDE 10 MG/ML
1 INJECTION, SOLUTION EPIDURAL; INFILTRATION; INTRACAUDAL; PERINEURAL ONCE
Status: COMPLETED | OUTPATIENT
Start: 2019-09-20 | End: 2019-09-20

## 2019-09-20 RX ORDER — EPINEPHRINE 1 MG/ML
INJECTION, SOLUTION INTRACARDIAC; INTRAMUSCULAR; INTRAVENOUS; SUBCUTANEOUS
Status: DISCONTINUED | OUTPATIENT
Start: 2019-09-20 | End: 2019-09-20 | Stop reason: HOSPADM

## 2019-09-20 RX ORDER — MIDAZOLAM HYDROCHLORIDE 1 MG/ML
INJECTION, SOLUTION INTRAMUSCULAR; INTRAVENOUS
Status: DISCONTINUED | OUTPATIENT
Start: 2019-09-20 | End: 2019-09-20

## 2019-09-20 RX ORDER — ONDANSETRON 2 MG/ML
4 INJECTION INTRAMUSCULAR; INTRAVENOUS DAILY PRN
Status: DISCONTINUED | OUTPATIENT
Start: 2019-09-20 | End: 2019-09-20 | Stop reason: HOSPADM

## 2019-09-20 RX ORDER — HYDROMORPHONE HYDROCHLORIDE 2 MG/ML
0.2 INJECTION, SOLUTION INTRAMUSCULAR; INTRAVENOUS; SUBCUTANEOUS EVERY 5 MIN PRN
Status: DISCONTINUED | OUTPATIENT
Start: 2019-09-20 | End: 2019-09-20 | Stop reason: HOSPADM

## 2019-09-20 RX ORDER — ACETAMINOPHEN 10 MG/ML
INJECTION, SOLUTION INTRAVENOUS
Status: DISCONTINUED | OUTPATIENT
Start: 2019-09-20 | End: 2019-09-20

## 2019-09-20 RX ORDER — ROCURONIUM BROMIDE 10 MG/ML
INJECTION, SOLUTION INTRAVENOUS
Status: DISCONTINUED | OUTPATIENT
Start: 2019-09-20 | End: 2019-09-20

## 2019-09-20 RX ORDER — DEXAMETHASONE SODIUM PHOSPHATE 4 MG/ML
INJECTION, SOLUTION INTRA-ARTICULAR; INTRALESIONAL; INTRAMUSCULAR; INTRAVENOUS; SOFT TISSUE
Status: DISCONTINUED | OUTPATIENT
Start: 2019-09-20 | End: 2019-09-20

## 2019-09-20 RX ORDER — SUCCINYLCHOLINE CHLORIDE 20 MG/ML
INJECTION INTRAMUSCULAR; INTRAVENOUS
Status: DISCONTINUED | OUTPATIENT
Start: 2019-09-20 | End: 2019-09-20

## 2019-09-20 RX ORDER — FENTANYL CITRATE 50 UG/ML
INJECTION, SOLUTION INTRAMUSCULAR; INTRAVENOUS
Status: DISCONTINUED | OUTPATIENT
Start: 2019-09-20 | End: 2019-09-20

## 2019-09-20 RX ORDER — LIDOCAINE HCL/PF 100 MG/5ML
SYRINGE (ML) INTRAVENOUS
Status: DISCONTINUED | OUTPATIENT
Start: 2019-09-20 | End: 2019-09-20

## 2019-09-20 RX ORDER — PROPOFOL 10 MG/ML
VIAL (ML) INTRAVENOUS
Status: DISCONTINUED | OUTPATIENT
Start: 2019-09-20 | End: 2019-09-20

## 2019-09-20 RX ORDER — EPHEDRINE SULFATE 50 MG/ML
INJECTION, SOLUTION INTRAVENOUS
Status: DISCONTINUED | OUTPATIENT
Start: 2019-09-20 | End: 2019-09-20

## 2019-09-20 RX ORDER — CEFAZOLIN SODIUM 2 G/50ML
2 SOLUTION INTRAVENOUS
Status: COMPLETED | OUTPATIENT
Start: 2019-09-20 | End: 2019-09-20

## 2019-09-20 RX ORDER — OXYCODONE HYDROCHLORIDE 5 MG/1
5 TABLET ORAL
Status: DISCONTINUED | OUTPATIENT
Start: 2019-09-20 | End: 2019-09-20 | Stop reason: HOSPADM

## 2019-09-20 RX ORDER — SODIUM CHLORIDE 0.9 % (FLUSH) 0.9 %
3 SYRINGE (ML) INJECTION
Status: DISCONTINUED | OUTPATIENT
Start: 2019-09-20 | End: 2019-09-20 | Stop reason: HOSPADM

## 2019-09-20 RX ORDER — FENTANYL CITRATE 50 UG/ML
25 INJECTION, SOLUTION INTRAMUSCULAR; INTRAVENOUS EVERY 5 MIN PRN
Status: DISCONTINUED | OUTPATIENT
Start: 2019-09-20 | End: 2019-09-20 | Stop reason: HOSPADM

## 2019-09-20 RX ORDER — MEPERIDINE HYDROCHLORIDE 50 MG/ML
12.5 INJECTION INTRAMUSCULAR; INTRAVENOUS; SUBCUTANEOUS ONCE AS NEEDED
Status: DISCONTINUED | OUTPATIENT
Start: 2019-09-20 | End: 2019-09-20 | Stop reason: HOSPADM

## 2019-09-20 RX ORDER — ONDANSETRON 2 MG/ML
INJECTION INTRAMUSCULAR; INTRAVENOUS
Status: DISCONTINUED | OUTPATIENT
Start: 2019-09-20 | End: 2019-09-20

## 2019-09-20 RX ORDER — SODIUM CHLORIDE, SODIUM LACTATE, POTASSIUM CHLORIDE, CALCIUM CHLORIDE 600; 310; 30; 20 MG/100ML; MG/100ML; MG/100ML; MG/100ML
INJECTION, SOLUTION INTRAVENOUS CONTINUOUS
Status: DISCONTINUED | OUTPATIENT
Start: 2019-09-20 | End: 2019-09-20 | Stop reason: HOSPADM

## 2019-09-20 RX ADMIN — OXYCODONE HYDROCHLORIDE 5 MG: 5 TABLET ORAL at 12:09

## 2019-09-20 RX ADMIN — HYDROMORPHONE HYDROCHLORIDE 0.2 MG: 2 INJECTION INTRAMUSCULAR; INTRAVENOUS; SUBCUTANEOUS at 12:09

## 2019-09-20 RX ADMIN — BUPIVACAINE HYDROCHLORIDE 5 ML: 5 INJECTION, SOLUTION EPIDURAL; INTRACAUDAL; PERINEURAL at 07:09

## 2019-09-20 RX ADMIN — EPHEDRINE SULFATE 25 MG: 50 INJECTION, SOLUTION INTRAMUSCULAR; INTRAVENOUS; SUBCUTANEOUS at 10:09

## 2019-09-20 RX ADMIN — ONDANSETRON 4 MG: 2 INJECTION, SOLUTION INTRAMUSCULAR; INTRAVENOUS at 10:09

## 2019-09-20 RX ADMIN — PHENYLEPHRINE HYDROCHLORIDE 100 MCG: 10 INJECTION INTRAVENOUS at 10:09

## 2019-09-20 RX ADMIN — MIDAZOLAM 2 MG: 1 INJECTION INTRAMUSCULAR; INTRAVENOUS at 07:09

## 2019-09-20 RX ADMIN — LIDOCAINE HYDROCHLORIDE 100 MG: 20 INJECTION, SOLUTION INTRAVENOUS at 09:09

## 2019-09-20 RX ADMIN — SODIUM CHLORIDE, SODIUM LACTATE, POTASSIUM CHLORIDE, AND CALCIUM CHLORIDE: .6; .31; .03; .02 INJECTION, SOLUTION INTRAVENOUS at 11:09

## 2019-09-20 RX ADMIN — BUPIVACAINE 10 ML: 13.3 INJECTION, SUSPENSION, LIPOSOMAL INFILTRATION at 05:09

## 2019-09-20 RX ADMIN — ACETAMINOPHEN 1000 MG: 10 INJECTION, SOLUTION INTRAVENOUS at 10:09

## 2019-09-20 RX ADMIN — SODIUM CHLORIDE, SODIUM LACTATE, POTASSIUM CHLORIDE, AND CALCIUM CHLORIDE: .6; .31; .03; .02 INJECTION, SOLUTION INTRAVENOUS at 07:09

## 2019-09-20 RX ADMIN — CEFAZOLIN SODIUM 2 G: 2 SOLUTION INTRAVENOUS at 10:09

## 2019-09-20 RX ADMIN — SUCCINYLCHOLINE CHLORIDE 160 MG: 20 INJECTION, SOLUTION INTRAMUSCULAR; INTRAVENOUS at 09:09

## 2019-09-20 RX ADMIN — PHENYLEPHRINE HYDROCHLORIDE 100 MCG: 10 INJECTION INTRAVENOUS at 11:09

## 2019-09-20 RX ADMIN — ROCURONIUM BROMIDE 10 MG: 10 INJECTION, SOLUTION INTRAVENOUS at 09:09

## 2019-09-20 RX ADMIN — PHENYLEPHRINE HYDROCHLORIDE 200 MCG: 10 INJECTION INTRAVENOUS at 10:09

## 2019-09-20 RX ADMIN — MIDAZOLAM 2 MG: 1 INJECTION INTRAMUSCULAR; INTRAVENOUS at 09:09

## 2019-09-20 RX ADMIN — FENTANYL CITRATE 50 MCG: 50 INJECTION, SOLUTION INTRAMUSCULAR; INTRAVENOUS at 09:09

## 2019-09-20 RX ADMIN — DEXAMETHASONE SODIUM PHOSPHATE 4 MG: 4 INJECTION, SOLUTION INTRAMUSCULAR; INTRAVENOUS at 10:09

## 2019-09-20 RX ADMIN — FENTANYL CITRATE 50 MCG: 50 INJECTION, SOLUTION INTRAMUSCULAR; INTRAVENOUS at 07:09

## 2019-09-20 RX ADMIN — LIDOCAINE HYDROCHLORIDE 10 MG: 10 INJECTION, SOLUTION EPIDURAL; INFILTRATION; INTRACAUDAL; PERINEURAL at 07:09

## 2019-09-20 RX ADMIN — GLYCOPYRROLATE 0.2 MG: 0.2 INJECTION, SOLUTION INTRAMUSCULAR; INTRAVENOUS at 10:09

## 2019-09-20 RX ADMIN — FENTANYL CITRATE 50 MCG: 50 INJECTION, SOLUTION INTRAMUSCULAR; INTRAVENOUS at 11:09

## 2019-09-20 RX ADMIN — PROPOFOL 200 MG: 10 INJECTION, EMULSION INTRAVENOUS at 09:09

## 2019-09-20 NOTE — ANESTHESIA PROCEDURE NOTES
Left Interscalene Nerve Block    Patient location during procedure: pre-op   Block not for primary anesthetic.  Reason for block: at surgeon's request and post-op pain management   Post-op Pain Location: Left [place] pain  Start time: 9/20/2019 7:45 AM  Timeout: 9/20/2019 7:45 AM   End time: 9/20/2019 7:50 AM    Staffing  Authorizing Provider: John Truong MD  Performing Provider: John Truong MD    Preanesthetic Checklist  Completed: patient identified, site marked, surgical consent, pre-op evaluation, timeout performed, IV checked, risks and benefits discussed and monitors and equipment checked  Peripheral Block  Patient position: supine  Prep: ChloraPrep  Patient monitoring: heart rate, cardiac monitor, continuous pulse ox, continuous capnometry and frequent blood pressure checks  Block type: interscalene  Laterality: left  Injection technique: single shot  Needle  Needle type: Stimuplex   Needle gauge: 22 G  Needle length: 2 in  Needle localization: anatomical landmarks and ultrasound guidance  Needle insertion depth: 4 cm   -ultrasound image captured on disc.  Assessment  Injection assessment: negative aspiration, negative parasthesia and local visualized surrounding nerve  Paresthesia pain: none  Heart rate change: no  Slow fractionated injection: yes  Additional Notes  VSS.  [facility] RN monitoring vitals throughout procedure.  Patient tolerated procedure well.     Exparel 10 ml dosed under US guidance.

## 2019-09-20 NOTE — ANESTHESIA POSTPROCEDURE EVALUATION
Anesthesia Post Evaluation    Patient: Himanshu Connor II    Procedure(s) Performed: Procedure(s) (LRB):  ARTHROSCOPY, SHOULDER, WITH SUBACROMIAL SPACE DECOMPRESSION (Left)  REPAIR, ROTATOR CUFF, ARTHROSCOPIC (Left)    Final Anesthesia Type: general  Patient location during evaluation: PACU  Patient participation: Yes- Able to Participate  Level of consciousness: awake and alert and oriented  Post-procedure vital signs: reviewed and stable  Pain management: adequate  Airway patency: patent  PONV status at discharge: No PONV  Anesthetic complications: no      Cardiovascular status: blood pressure returned to baseline and stable  Respiratory status: unassisted and spontaneous ventilation  Hydration status: euvolemic  Follow-up not needed.          Vitals Value Taken Time   /70 9/20/2019  1:48 PM   Temp 36.8 °C (98.2 °F) 9/20/2019  1:48 PM   Pulse 80 9/20/2019  1:48 PM   Resp 18 9/20/2019  1:48 PM   SpO2 100 % 9/20/2019  1:48 PM         Event Time     Out of Recovery 13:07:00          Pain/Yovany Score: Pain Rating Prior to Med Admin: 6 (pt states pain tolerable) (9/20/2019 12:35 PM)  Pain Rating Post Med Admin: 5 (9/20/2019 12:45 PM)  Yovany Score: 10 (9/20/2019  1:48 PM)

## 2019-09-20 NOTE — ANESTHESIA PREPROCEDURE EVALUATION
09/20/2019  Himanshu Connor II is a 54 y.o., male.    Anesthesia Evaluation    I have reviewed the Patient Summary Reports.    I have reviewed the Nursing Notes.   I have reviewed the Medications.     Review of Systems  Anesthesia Hx:  No problems with previous Anesthesia    Social:  Non-Smoker    Cardiovascular:   Hypertension, well controlled    Pulmonary:   Asthma asymptomatic Sleep Apnea    Renal/:  Renal/ Normal     Hepatic/GI:   GERD, well controlled    Musculoskeletal:   Arthritis     Neurological:   Neuromuscular Disease,    Endocrine:  Endocrine Normal    Psych:   Psychiatric History depression          Physical Exam  General:  Well nourished    Airway/Jaw/Neck:  Airway Findings: Mouth Opening: Normal Tongue: Normal  General Airway Assessment: Adult  Oropharynx Findings:  Mallampati: II  Jaw/Neck Findings:  Neck ROM: Normal ROM     Eyes/Ears/Nose:  Eyes/Ears/Nose Findings:    Dental:  Dental Findings:   Chest/Lungs:  Chest/Lungs Findings: Normal Respiratory Rate     Heart/Vascular:  Heart Findings: Rate: Normal  Rhythm: Regular Rhythm        Mental Status:  Mental Status Findings:  Cooperative, Alert and Oriented         Anesthesia Plan  Type of Anesthesia, risks & benefits discussed:  Anesthesia Type:  general  Patient's Preference:   Intra-op Monitoring Plan: standard ASA monitors  Intra-op Monitoring Plan Comments:   Post Op Pain Control Plan: multimodal analgesia  Post Op Pain Control Plan Comments:   Induction:   IV  Beta Blocker:  Patient is not currently on a Beta-Blocker (No further documentation required).       Informed Consent: Patient understands risks and agrees with Anesthesia plan.  Questions answered. Anesthesia consent signed with patient.  ASA Score: 2     Day of Surgery Review of History & Physical:     H&P completed by Anesthesiologist.       Ready For Surgery From  Anesthesia Perspective.

## 2019-09-20 NOTE — TRANSFER OF CARE
"Anesthesia Transfer of Care Note    Patient: Himanshu Connor II    Procedure(s) Performed: Procedure(s) (LRB):  ARTHROSCOPY, SHOULDER, WITH SUBACROMIAL SPACE DECOMPRESSION (Left)  REPAIR, ROTATOR CUFF, ARTHROSCOPIC (Left)    Patient location: PACU    Anesthesia Type: general    Transport from OR: Transported from OR on 2-3 L/min O2 by NC with adequate spontaneous ventilation    Post pain: adequate analgesia    Post assessment: no apparent anesthetic complications and tolerated procedure well    Post vital signs: stable    Level of consciousness: awake and alert    Nausea/Vomiting: no nausea/vomiting    Complications: none    Transfer of care protocol was followed      Last vitals:   Visit Vitals  /72 (BP Location: Right arm, Patient Position: Sitting)   Pulse 71   Temp 36.8 °C (98.2 °F) (Skin)   Resp 18   Ht 5' 10" (1.778 m)   Wt 83.9 kg (185 lb)   SpO2 99%   BMI 26.54 kg/m²     "

## 2019-09-20 NOTE — OP NOTE
[facility]  Orthopedic Surgery  Operative Note    SUMMARY     Date of Procedure: 9/20/2019     Procedure: Procedure(s) (LRB):  ARTHROSCOPY, SHOULDER, WITH SUBACROMIAL SPACE DECOMPRESSION (Left)  REPAIR, ROTATOR CUFF, ARTHROSCOPIC (Left)   With SMith and Nephew patch augmentation  Extensive debridement left shoulder    Surgeon(s) and Role:     * Felix Lvein MD - Primary    Assistant: Wilmer Zhao    Pre-Operative Diagnosis: Complete tear of left rotator cuff [M75.122]    Post-Operative Diagnosis: Post-Op Diagnosis Codes:     * Complete tear of left rotator cuff [M75.122]    Anesthesia: General      Description of the Findings of the Procedure: Diagnostic arthroscopy findings on the patient's Left shoulder showed some degenerative labral tearing.  Of patient did have some moderate arthritis particularly the superior portion of the glenoid and some corresponding humeral arthritis. Because of this we wanted to shy away from doing an SCR.  Patient also had a prior biceps rupture.  The patient had intact subscapularis.  Large U shaped tear of the supraspinatus and infraspinatus.  In the subacromial space, the patient had bursitis in the subacromial space and scarring with some spurring around the AC joint in undersurface the acromion.    Complications: No    Estimated Blood Loss (EBL): 10           Implants:   Implant Name Type Inv. Item Serial No.  Lot No. LRB No. Used   BIOCOMPOSITE KNOTLESS SWIVELOCK SUTURE ANCHOR    [employer] [id number] Left 1   ANCHOR SWIVELOCK C JOSE FIBERTP - [id number]  ANCHOR SWIVELOCK C JOSE FIBERTP  [employer] [id number] Left 1   BIOCOMPOSITE KNOTLESS SWIVELOCK SUTURE ANCHOR    [employer] [id number] Left 1   ANCHOR BIOCOMP SWVLLOK - [id number]  ANCHOR BIOCOMP SWVLLOK  [employer] [id number] Left 2   IMPLANT ARTHSCP BIOINDUCTV LG - [id number]  IMPLANT ARTHSCP BIOINDUCTV LG  [facility] MEDICAL [facility] [id number] Left 1   ANCHOR TENDON 8 - [id number]  ANCHOR TENDON 8  [employer] [id number] Left 1   ANCHOR BONE ARTHSCP DEL SYS - [id number]  ANCHOR BONE ARTHSCP DEL SYS  [employer]  Left 1       Specimens:   Specimen (12h ago, onward)    None                  Condition: Good    Disposition: PACU - hemodynamically stable.    Attestation: I was present and scrubbed for the entire procedure.      Indications for the procedure:A 54 year old male with a history ofLeft shoulder pain that failed to resolve with physical therapy, activity modification, injections, and rest.  Patient desired the procedure listed above after MRI was obtained.    PROCEDURE IN DETAIL: Risks, benefits and alternatives of the procedure were   explained to the patient including, but not limited to damage to nerves,   arteries and blood vessels. Also explained risk of re-rupture, nonhealing, infection, DVT,   PE as well as anesthetic complications including seizure, stroke, heart attack   and death. They understood this, signed informed consent. The patient's Left  shoulder was marked prior to coming to the Operating Room. Once there a formal   timeout was done in which correct patient, procedure and operative site were all   correctly identified and confirmed by the entire operating team. Ancef 2 g was   given prior to surgical incision. General anesthesia was induced. The   patient was placed in lateral decubitus position on a bean bag with his Left upper extremity   hanging in balanced suspension.The arm was suspended with 10 lbs of weight for balanced traction. The extremity was prepped and draped in normal   sterile fashion.A standard posterior portal was then made. A spinal   needle was used to localize an anterior portal within the rotator interval and   this was made as well. We then performed a diagnostic arthroscopy of the   glenohumeral joint through both the anterior and posterior viewing portals,with the findings listed above. We did an extensive debridement within the joint removing some  frayed labrum and some articular fraying.  We also debrided the rotator cuff to some degree.  Rotator cuff seemed to be fairly mobile although retracted back to the level the glenoid.    After this was done, we then proceeded up in the subacromial space   where a spinal needle was used to localize a lateral portal and then this was   made as well. A 4.0 shaver was used to perform a subtotal bursectomy. We then   used the ablator to remove the soft tissue off the undersurface of the acromion   and then the 4.0 bur was used to turn removed some of the spurring around the undersurface of the acromion and the undersurface of the distal clavicle. We also used the bur to abrade the greater tuberosity to get a good bleeding surface for healing.  We did a thorough releases of both the supraspinatus and infraspinatus using a Sue elevator as well as a shaver.  Tendon was mobile.  We did a margin convergence stitch right at the apex of the U turning it into more of a crescent shaped tear although is still U shaped.  Passport cannulas were placed in the lateral portal and an accessory lateral portal was created right off the acromial edge. Cannula placed here also. Plastic shuttling cannula placed anteriorly. Footprint was prepared using the shaver and faustino. 3 x 4.75 Biocomposite Swivellock anchors loaded with Fibertape were punched and placed for the medial row right off the articular margin. The tapes were passed through the cuff tissue using a Fast Pass Scorpion. The tapes were then secured in the lateral row to 2 x 4.75 Biocomposite Swivellock anchors in a SpeedBridge pattern with 1 fiber tape from each anchor in each lateral row anchor.  We then used the knotless SwiveLock sutures from the most posterior to anchors and passed them through the shuttling stitch in the corresponding anchor to stabilize our medial row and give good medial row compression.  Since this was a revision cuff tear with large size and retraction, we  decided to do a patch augmentation to augment healing.  The large patch was then inserted over the cuff repair. The PDS suture staples were used to secure the medial aspect of the patch to the underlying rotator cuff.  We then placed a single peek staple in the anterior lateral corner to fix our anterolateral corner down to the greater tuberosity.      All excess fluid was removed from the shoulder. The portals were closed using nylon. Sterile dressing applied.  They were then placed in a cryotherapy cuff with   UltraSling, extubated, awakened and transferred from the Operating Room to   Recovery Room in stable condition.     Postoperative rehab course will be for massive rotator cuff repair with patch augmentation    22 modifier is being applied to this case due to the complexity being that it was a revision with significant retraction and that a patch had to be used to try and get healing.

## 2019-09-20 NOTE — DISCHARGE INSTRUCTIONS
1.Diet: Ice chips, clear liquids, and then diet as tolerated. Drink plenty of liquids.  2.Ice the area at least three times a day (20 minutes per session).  3.Elevate the extremity above the level of the heart to help reduce swelling.  4.Pain medication can be taken every four to six hours as needed. It is helpful to take pain medication prior to physical therapy.  5.Any activity that requires precise thinking or accuracy should be avoided for a minimum of 72 hours after surgery and while on narcotic pain medication. This includes operating machinery and/or driving a vehicle.  6.All sutures/staples will be removed approximately 14 days from the time of surgery. Leave steri-strips (skin tapes) in place until sutures are removed.  7. If skin glue is used instead of stitches, do not apply ointments or solutions to the incision. Keep the incision dry. The skin glue will peel off in 3-4 weeks.  8. Change dressing on the 2nd post-op day. Use gauze for the first 3 days, then start using Band-Aids over the incision sites.   9. All casts, splints, braces, slings, crutches, abduction pillows, etc... Are to be worn as instructed. Use sling at all times except for showering and exercises.  10. Keep the incision dry for 10-14 days. A waterproof dressing (purchase at [employer], [employer], etc) can be used to shower. No bath, pool, hot tub until instructed.  11. Start PT in 14 days. Call office to help with scheduling.  12. Call [phone] with any questions or concerns.    Discharge Instructions: After Your Surgery/Procedure  Youve just had surgery. During surgery you were given medicine called anesthesia to keep you relaxed and free of pain. After surgery you may have some pain or nausea. This is common. Here are some tips for feeling better and getting well after surgery.     Stay on schedule with your medication.   Going home  Your doctor or nurse will show you how to take care of yourself when you go home. He or she will also answer  "your questions. Have an adult family member or friend drive you home.      For your safety we recommend these precaution for the first 24 hours after your procedure:  · Do not drive or use heavy equipment.  · Do not make important decisions or sign legal papers.  · Do not drink alcohol.  · Have someone stay with you, if needed. He or she can watch for problems and help keep you safe.  · Your concentration, balance, coordination, and judgement may be impaired for many hours after anesthesia.  Use caution when ambulating or standing up.     · You may feel weak and "washed out" after anesthesia and surgery.      Subtle residual effects of general anesthesia or sedation with regional / local anesthesia can last more than 24 hours.  Rest for the remainder of the day or longer if your Doctor/Surgeon has advised you to do so.  Although you may feel normal within the first 24 hours, your reflexes and mental ability may be impaired without you realizing it.  You may feel dizzy, lightheaded or sleepy for 24 hours or longer.      Be sure to go to all follow-up visits with your doctor. And rest after your surgery for as long as your doctor tells you to.  Coping with pain  If you have pain after surgery, pain medicine will help you feel better. Take it as told, before pain becomes severe. Also, ask your doctor or pharmacist about other ways to control pain. This might be with heat, ice, or relaxation. And follow any other instructions your surgeon or nurse gives you.  Tips for taking pain medicine  To get the best relief possible, remember these points:  · Pain medicines can upset your stomach. Taking them with a little food may help.  · Most pain relievers taken by mouth need at least 20 to 30 minutes to start to work.  · Taking medicine on a schedule can help you remember to take it. Try to time your medicine so that you can take it before starting an activity. This might be before you get dressed, go for a walk, or sit down " for dinner.  · Constipation is a common side effect of pain medicines. Call your doctor before taking any medicines such as laxatives or stool softeners to help ease constipation. Also ask if you should skip any foods. Drinking lots of fluids and eating foods such as fruits and vegetables that are high in fiber can also help. Remember, do not take laxatives unless your surgeon has prescribed them.  · Drinking alcohol and taking pain medicine can cause dizziness and slow your breathing. It can even be deadly. Do not drink alcohol while taking pain medicine.  · Pain medicine can make you react more slowly to things. Do not drive or run machinery while taking pain medicine.  Your health care provider may tell you to take acetaminophen to help ease your pain. Ask him or her how much you are supposed to take each day. Acetaminophen or other pain relievers may interact with your prescription medicines or other over-the-counter (OTC) drugs. Some prescription medicines have acetaminophen and other ingredients. Using both prescription and OTC acetaminophen for pain can cause you to overdose. Read the labels on your OTC medicines with care. This will help you to clearly know the list of ingredients, how much to take, and any warnings. It may also help you not take too much acetaminophen. If you have questions or do not understand the information, ask your pharmacist or health care provider to explain it to you before you take the OTC medicine.  Managing nausea  Some people have an upset stomach after surgery. This is often because of anesthesia, pain, or pain medicine, or the stress of surgery. These tips will help you handle nausea and eat healthy foods as you get better. If you were on a special food plan before surgery, ask your doctor if you should follow it while you get better. These tips may help:  · Do not push yourself to eat. Your body will tell you when to eat and how much.  · Start off with clear liquids and soup.  They are easier to digest.  · Next try semi-solid foods, such as mashed potatoes, applesauce, and gelatin, as you feel ready.  · Slowly move to solid foods. Dont eat fatty, rich, or spicy foods at first.  · Do not force yourself to have 3 large meals a day. Instead eat smaller amounts more often.  · Take pain medicines with a small amount of solid food, such as crackers or toast, to avoid nausea.     Call your surgeon if  · You still have pain an hour after taking medicine. The medicine may not be strong enough.  · You feel too sleepy, dizzy, or groggy. The medicine may be too strong.  · You have side effects like nausea, vomiting, or skin changes, such as rash, itching, or hives.       If you have obstructive sleep apnea  You were given anesthesia medicine during surgery to keep you comfortable and free of pain. After surgery, you may have more apnea spells because of this medicine and other medicines you were given. The spells may last longer than usual.   At home:  · Keep using the continuous positive airway pressure (CPAP) device when you sleep. Unless your health care provider tells you not to, use it when you sleep, day or night. CPAP is a common device used to treat obstructive sleep apnea.  · Talk with your provider before taking any pain medicine, muscle relaxants, or sedatives. Your provider will tell you about the possible dangers of taking these medicines.  © 0040-6474 [employer]. [address] [place] [address], [place] [address]. All rights reserved. This information is not intended as a substitute for professional medical care. Always follow your healthcare professional's instructions.    Post op instructions for prevention of DVT  What is deep vein thrombosis?  Deep vein thrombosis (DVT) is the medical term for blood clots in the deep veins of the leg.  These blood clots can be dangerous.  A DVT can block a blood vessel and keep blood from getting where it needs to go.  Another problem is  that the clot can travel to other parts of the body such as the lungs.  A clot that travels to the lungs is called a pulmonary embolus (PE) and can cause serious problems with breathing which can lead to death.  Am I at risk for DVT/PE?  If you are not very active, you are at risk of DVT.  Anyone confined to bed, sitting for long periods of time, recovering from surgery, etc. increases the risk of DVT.  Other risk factors are cancer diagnosis, certain medications, estrogen replacement in any form,older age, obesity, pregnancy, smoking, history of clotting disorders, and dehydration.  How will I know if I have a DVT?   Swelling in the lower leg   Pain   Warmth, redness, hardness or bulging of the vein  If you have any of these symptoms, call your doctors office right away.  Some people will not have any symptoms until the clot moves to the lungs.  What are the symptoms of a PE?   Panting, shortness of breath, or trouble breathing   Sharp, knife-like chest pain when you breathe   Coughing or coughing up blood   Rapid heartbeat  If you have any of these symptoms or get worse quickly, call 911 for emergency treatment.  How can I prevent a DVT?   Avoid long periods of inactivity and dont cross your legs--get up and walk around every hour or so.   Stay active--walking after surgery is highly encouraged.  This means you should get out of the house and walk in the neighborhood.  Going up and down stairs will not impair healing (unless advised against such activity by your doctor).     Drink plenty of noncaffeinated, nonalcoholic fluids each day to prevent dehydration.   Wear special support stockings, if they have been advised by your doctor.   If you travel, stop at least once an hour and walk around.   Avoid smoking (assistance with stopping is available through your healthcare provider)  Always notify your doctor if you are not able to follow the post operative instructions that are given to you at the  time of discharge.  It may be necessary to prescribe one of the medications available to prevent DVT.    We hope your stay was comfortable as you heal now, mend and rest.    For we have enjoyed taking care of you by giving your our best.    And as you get better, by regaining your health and strength;   We count it as a privilege to have served you and hope your time at [facility] was well spent.      Thank  You!!!

## 2019-09-20 NOTE — PLAN OF CARE
Pt tolerating po and states that pain is tolerable.  Ambulated to bathroom, steady gait and voided qs without difficulty,  States that he is ready for discharge

## 2019-09-20 NOTE — DISCHARGE SUMMARY
[facility]  Discharge Note  Short Stay    Admit Date: 9/20/2019    Discharge Date and Time: 9/20/2019    Attending Physician: Felix Levin MD     Discharge Provider: Felix Levin    Diagnoses:  Active Hospital Problems    Diagnosis  POA    *Complete tear of left rotator cuff [M75.122]  Yes      Resolved Hospital Problems   No resolved problems to display.       Discharged Condition: good    Hospital Course: Patient was admitted for an outpatient procedure and tolerated the procedure well with no complications.    Final Diagnoses: Same as principal problem.    Disposition: Home or Self Care    Follow up/Patient Instructions:    Medications:  Reconciled Home Medications:      Medication List      CONTINUE taking these medications    ALPRAZolam 0.5 MG tablet  Commonly known as:  XANAX  TK 1 T PO BID     cyanocobalamin 1000 MCG tablet  Commonly known as:  VITAMIN B-12  Take 1,000 mcg by mouth once daily.     escitalopram oxalate 20 MG tablet  Commonly known as:  LEXAPRO  Take 20 mg by mouth once daily.     losartan 25 MG tablet  Commonly known as:  COZAAR  Take 25 mg by mouth once daily.     pantoprazole 40 MG tablet  Commonly known as:  PROTONIX  TK ONE  T PO D     ranitidine 150 MG capsule  Commonly known as:  ZANTAC  Take 150 mg by mouth 2 (two) times daily as needed.     sucralfate 1 gram tablet  Commonly known as:  CARAFATE  TK 1 T PO QID 1 HOUR BEFORE MEALS AND 1 T QHS OES          Discharge Procedure Orders   Diet general     Ice to affected area     Lifting restrictions     No driving, operating heavy equipment or signing legal documents while taking pain medication     Call MD for:  temperature >100.4     Call MD for:  persistent nausea and vomiting     Call MD for:  severe uncontrolled pain     Call MD for:  difficulty breathing, headache or visual disturbances     Call MD for:  redness, tenderness, or signs of infection (pain, swelling, redness, odor or green/yellow  discharge around incision site)     Call MD for:  hives     Call MD for:  persistent dizziness or light-headedness     Call MD for:  extreme fatigue     Remove dressing in 48 hours     Change dressing (specify)   Order Comments: Dressing change: 1 times per day using waterproof bandaids.     Follow-up Information     Felix Levin MD In 2 weeks.    Specialties:  Sports Medicine, Orthopedic Surgery  Contact information:  [address] [place] [address] [address] Gutierrez  [facility] [address]  [phone]                   Discharge Procedure Orders (must include Diet, Follow-up, Activity):   Discharge Procedure Orders (must include Diet, Follow-up, Activity)   Diet general     Ice to affected area     Lifting restrictions     No driving, operating heavy equipment or signing legal documents while taking pain medication     Call MD for:  temperature >100.4     Call MD for:  persistent nausea and vomiting     Call MD for:  severe uncontrolled pain     Call MD for:  difficulty breathing, headache or visual disturbances     Call MD for:  redness, tenderness, or signs of infection (pain, swelling, redness, odor or green/yellow discharge around incision site)     Call MD for:  hives     Call MD for:  persistent dizziness or light-headedness     Call MD for:  extreme fatigue     Remove dressing in 48 hours     Change dressing (specify)   Order Comments: Dressing change: 1 times per day using waterproof bandaids.

## 2019-09-23 VITALS
HEART RATE: 80 BPM | TEMPERATURE: 98 F | HEIGHT: 70 IN | SYSTOLIC BLOOD PRESSURE: 110 MMHG | OXYGEN SATURATION: 100 % | RESPIRATION RATE: 18 BRPM | BODY MASS INDEX: 26.48 KG/M2 | WEIGHT: 185 LBS | DIASTOLIC BLOOD PRESSURE: 70 MMHG

## 2019-10-03 ENCOUNTER — TELEPHONE (OUTPATIENT)
Dept: ORTHOPEDICS | Facility: CLINIC | Age: 54
End: 2019-10-03

## 2019-10-03 NOTE — TELEPHONE ENCOUNTER
Attempted to return call to pt. Unable to leave message. Called other pt contact in chart and left message to have pt return call.

## 2019-10-03 NOTE — TELEPHONE ENCOUNTER
----- Message from Alyson Seaman sent at 10/3/2019  3:27 PM CDT -----  Type: Needs Medical Advice    Who Called:  Patient  Best Call Back Number: [phone]  Additional Information: Needs to schedule a video chat with doctor to see if it is time to take his stitches out. Also, he needs to schedule his next surgery. Please call to advise.

## 2019-10-03 NOTE — TELEPHONE ENCOUNTER
----- Message from Teresa Mendes sent at 10/3/2019 12:59 PM CDT -----  Contact: PT  Type: Needs Medical Advice    Who Called:  Pt  Best Call Back Number: [phone]  Additional Information: Pt stated he had surgery on the 20th of september. And pt stated that pt was suppose to call in to have a video conference and to have stiches removed and to set up surgery on both knees   Please Advise ---Thank you

## 2019-10-04 DIAGNOSIS — S83.282D ACUTE LATERAL MENISCUS TEAR OF LEFT KNEE, SUBSEQUENT ENCOUNTER: Primary | ICD-10-CM

## 2019-10-04 DIAGNOSIS — S83.282S ACUTE LATERAL MENISCAL TEAR, LEFT, SEQUELA: ICD-10-CM

## 2019-10-04 RX ORDER — CEFAZOLIN SODIUM 2 G/50ML
2 SOLUTION INTRAVENOUS
Status: CANCELLED | OUTPATIENT
Start: 2019-10-04

## 2019-10-14 ENCOUNTER — OFFICE VISIT (OUTPATIENT)
Dept: ORTHOPEDICS | Facility: CLINIC | Age: 54
End: 2019-10-14
Payer: OTHER GOVERNMENT

## 2019-10-14 ENCOUNTER — ANESTHESIA EVENT (OUTPATIENT)
Dept: SURGERY | Facility: HOSPITAL | Age: 54
End: 2019-10-14
Payer: COMMERCIAL

## 2019-10-14 VITALS
HEIGHT: 70 IN | DIASTOLIC BLOOD PRESSURE: 88 MMHG | WEIGHT: 185 LBS | BODY MASS INDEX: 26.48 KG/M2 | HEART RATE: 98 BPM | SYSTOLIC BLOOD PRESSURE: 137 MMHG

## 2019-10-14 DIAGNOSIS — M75.122 NONTRAUMATIC COMPLETE TEAR OF LEFT ROTATOR CUFF: Primary | ICD-10-CM

## 2019-10-14 PROCEDURE — 99999 PR PBB SHADOW E&M-EST. PATIENT-LVL III: ICD-10-PCS | Mod: PBBFAC,,, | Performed by: ORTHOPAEDIC SURGERY

## 2019-10-14 PROCEDURE — 99213 OFFICE O/P EST LOW 20 MIN: CPT | Mod: PBBFAC,PN | Performed by: ORTHOPAEDIC SURGERY

## 2019-10-14 PROCEDURE — 99024 PR POST-OP FOLLOW-UP VISIT: ICD-10-PCS | Mod: ,,, | Performed by: ORTHOPAEDIC SURGERY

## 2019-10-14 PROCEDURE — 99999 PR PBB SHADOW E&M-EST. PATIENT-LVL III: CPT | Mod: PBBFAC,,, | Performed by: ORTHOPAEDIC SURGERY

## 2019-10-14 PROCEDURE — 99024 POSTOP FOLLOW-UP VISIT: CPT | Mod: ,,, | Performed by: ORTHOPAEDIC SURGERY

## 2019-10-14 RX ORDER — SODIUM CHLORIDE, SODIUM LACTATE, POTASSIUM CHLORIDE, CALCIUM CHLORIDE 600; 310; 30; 20 MG/100ML; MG/100ML; MG/100ML; MG/100ML
500 INJECTION, SOLUTION INTRAVENOUS ONCE
Status: CANCELLED | OUTPATIENT
Start: 2019-10-14 | End: 2019-10-14

## 2019-10-14 NOTE — PROGRESS NOTES
Past Medical History:   Diagnosis Date    Alcohol abuse, unspecified     Allergic rhinitis, cause unspecified     Arthritis     Asthma attack     Carpal tunnel syndrome     Depressive disorder, not elsewhere classified     Diarrhea     Encounter for blood transfusion     GERD (gastroesophageal reflux disease)     Hypersomnia, unspecified     Hypertension     Lumbago     Obesity, unspecified     JAH (obstructive sleep apnea)     Other and unspecified hyperlipidemia     Other ankle sprain and strain     Psychosexual dysfunction with inhibited sexual excitement     Ventral hernia, unspecified, without mention of obstruction or gangrene        Past Surgical History:   Procedure Laterality Date    A-scope knees      Bilateral    ARTHROSCOPIC REPAIR OF ROTATOR CUFF OF SHOULDER Left 9/20/2019    Procedure: REPAIR, ROTATOR CUFF, ARTHROSCOPIC;  Surgeon: Felix Levin MD;  Location: [facility] OR;  Service: Orthopedics;  Laterality: Left;    ARTHROSCOPY OF SHOULDER WITH DECOMPRESSION OF SUBACROMIAL SPACE Left 9/20/2019    Procedure: ARTHROSCOPY, SHOULDER, WITH SUBACROMIAL SPACE DECOMPRESSION;  Surgeon: Felix Levin MD;  Location: [facility] OR;  Service: Orthopedics;  Laterality: Left;  Jaspreet- Arthrex notified of all case today 9/16-tcb    CARPAL TUNNEL RELEASE      Bilateral    EYE SURGERY      Lasik    HERNIA REPAIR      KNEE SURGERY      osmar    NISSEN FUNDOPLICATION      ROTATOR CUFF REPAIR      right    SLEEVE GASTROPLASTY         Current Outpatient Medications   Medication Sig    ALPRAZolam (XANAX) 0.5 MG tablet TK 1 T PO BID    cyanocobalamin (VITAMIN B-12) 1000 MCG tablet Take 1,000 mcg by mouth once daily.     escitalopram oxalate (LEXAPRO) 20 MG tablet Take 20 mg by mouth once daily.    losartan (COZAAR) 25 MG tablet Take 25 mg by mouth once daily.    pantoprazole (PROTONIX) 40 MG tablet TK ONE  T PO D    ranitidine (ZANTAC) 150 MG capsule Take 150 mg by mouth 2 (two)  times daily as needed.     sucralfate (CARAFATE) 1 gram tablet TK 1 T PO QID 1 HOUR BEFORE MEALS AND 1 T QHS OES     No current facility-administered medications for this visit.        Review of patient's allergies indicates:  No Known Allergies    History reviewed. No pertinent family history.    Social History     Socioeconomic History    Marital status:      Spouse name: Not on file    Number of children: Not on file    Years of education: Not on file    Highest education level: Not on file   Occupational History    Not on file   Social Needs    Financial resource strain: Not on file    Food insecurity:     Worry: Not on file     Inability: Not on file    Transportation needs:     Medical: Not on file     Non-medical: Not on file   Tobacco Use    Smoking status: Never Smoker    Smokeless tobacco: Never Used   Substance and Sexual Activity    Alcohol use: Not Currently     Comment: sober 72 days    Drug use: No    Sexual activity: Not on file   Lifestyle    Physical activity:     Days per week: Not on file     Minutes per session: Not on file    Stress: Not on file   Relationships    Social connections:     Talks on phone: Not on file     Gets together: Not on file     Attends Taoist service: Not on file     Active member of club or organization: Not on file     Attends meetings of clubs or organizations: Not on file     Relationship status: Not on file   Other Topics Concern    Not on file   Social History Narrative    Not on file       Chief Complaint:   Chief Complaint   Patient presents with    Shoulder Pain     L shoulder s/p RC repair 9/20/19        Date of surgery:  September 20, 2019    History of present illness:  54-year-old male had a left revision massive rotator cuff repair including patch augmentation.  Patient comes in because he has a little more bruising and swelling in his biceps over the last couple days.  Patient is not wearing the sling.  Pain is an  8/10.      Review of Systems:    Musculoskeletal:  See HPI        Physical Examination:    Vital Signs:    Vitals:    10/14/19 1457   BP: 137/88   Pulse: 98       Body mass index is 26.54 kg/m².    This a well-developed, well nourished patient in no acute distress.  They are alert and oriented and cooperative to examination.  Pt. walks without an antalgic gait.      Examination left shoulder shows well-healed surgical portals.  No erythema or drainage. Patient does have some swelling and bruising in his biceps area likely from proximal biceps rupture.    X-rays:  None     Assessment::  Status post revision massive rotator cuff repair with patch augmentation    Plan:  I reviewed the arthroscopic photos with him today.  Patient had had a previous biceps rupture.  It likely pulled loose from more ever did previously scarred an.  I would not recommend any surgery for it given the extensiveness of his rotator cuff.  I gave him a postop exercise guide program and told him he needs to be more careful and use the sling for another month.  Follow up in 4 weeks.    This note was created using [employer] voice recognition software that occasionally misinterpreted phrases or words.

## 2019-10-15 ENCOUNTER — HOSPITAL ENCOUNTER (OUTPATIENT)
Facility: HOSPITAL | Age: 54
Discharge: HOME | End: 2019-10-15
Attending: ORTHOPAEDIC SURGERY | Admitting: ORTHOPAEDIC SURGERY
Payer: COMMERCIAL

## 2019-10-15 ENCOUNTER — ANESTHESIA (OUTPATIENT)
Dept: SURGERY | Facility: HOSPITAL | Age: 54
End: 2019-10-15
Payer: COMMERCIAL

## 2019-10-15 VITALS
TEMPERATURE: 98 F | SYSTOLIC BLOOD PRESSURE: 144 MMHG | OXYGEN SATURATION: 98 % | WEIGHT: 185 LBS | RESPIRATION RATE: 18 BRPM | HEIGHT: 70 IN | BODY MASS INDEX: 26.48 KG/M2 | DIASTOLIC BLOOD PRESSURE: 78 MMHG | HEART RATE: 66 BPM

## 2019-10-15 DIAGNOSIS — S83.282D ACUTE LATERAL MENISCUS TEAR OF LEFT KNEE, SUBSEQUENT ENCOUNTER: ICD-10-CM

## 2019-10-15 DIAGNOSIS — S83.282S ACUTE LATERAL MENISCAL TEAR, LEFT, SEQUELA: ICD-10-CM

## 2019-10-15 PROCEDURE — D9220A PRA ANESTHESIA: Mod: CRNA,,, | Performed by: NURSE ANESTHETIST, CERTIFIED REGISTERED

## 2019-10-15 PROCEDURE — 01400 ANES OPN/ARTHRS KNEE JT NOS: CPT | Performed by: ORTHOPAEDIC SURGERY

## 2019-10-15 PROCEDURE — 37000008 HC ANESTHESIA 1ST 15 MINUTES: Performed by: ORTHOPAEDIC SURGERY

## 2019-10-15 PROCEDURE — 25000003 PHARM REV CODE 250: Performed by: NURSE ANESTHETIST, CERTIFIED REGISTERED

## 2019-10-15 PROCEDURE — 63600175 PHARM REV CODE 636 W HCPCS: Performed by: NURSE ANESTHETIST, CERTIFIED REGISTERED

## 2019-10-15 PROCEDURE — 63600175 PHARM REV CODE 636 W HCPCS: Performed by: ANESTHESIOLOGY

## 2019-10-15 PROCEDURE — 99900103 DSU ONLY-NO CHARGE-INITIAL HR (STAT): Performed by: ORTHOPAEDIC SURGERY

## 2019-10-15 PROCEDURE — 25000003 PHARM REV CODE 250: Performed by: ANESTHESIOLOGY

## 2019-10-15 PROCEDURE — 71000033 HC RECOVERY, INTIAL HOUR: Performed by: ORTHOPAEDIC SURGERY

## 2019-10-15 PROCEDURE — 36000711: Performed by: ORTHOPAEDIC SURGERY

## 2019-10-15 PROCEDURE — 36000710: Performed by: ORTHOPAEDIC SURGERY

## 2019-10-15 PROCEDURE — 99900104 DSU ONLY-NO CHARGE-EA ADD'L HR (STAT): Performed by: ORTHOPAEDIC SURGERY

## 2019-10-15 PROCEDURE — D9220A PRA ANESTHESIA: ICD-10-PCS | Mod: CRNA,,, | Performed by: NURSE ANESTHETIST, CERTIFIED REGISTERED

## 2019-10-15 PROCEDURE — D9220A PRA ANESTHESIA: Mod: ANES,,, | Performed by: ANESTHESIOLOGY

## 2019-10-15 PROCEDURE — 27201423 OPTIME MED/SURG SUP & DEVICES STERILE SUPPLY: Performed by: ORTHOPAEDIC SURGERY

## 2019-10-15 PROCEDURE — 71000039 HC RECOVERY, EACH ADD'L HOUR: Performed by: ORTHOPAEDIC SURGERY

## 2019-10-15 PROCEDURE — 25000003 PHARM REV CODE 250: Performed by: ORTHOPAEDIC SURGERY

## 2019-10-15 PROCEDURE — 71000015 HC POSTOP RECOV 1ST HR: Performed by: ORTHOPAEDIC SURGERY

## 2019-10-15 PROCEDURE — 27200651 HC AIRWAY, LMA: Performed by: NURSE ANESTHETIST, CERTIFIED REGISTERED

## 2019-10-15 PROCEDURE — 29880 ARTHRS KNE SRG MNISECTMY M&L: CPT | Mod: 79,LT,, | Performed by: ORTHOPAEDIC SURGERY

## 2019-10-15 PROCEDURE — 29880 PR KNEE SCOPE MED/LAT MENISCECTOMY: ICD-10-PCS | Mod: 79,LT,, | Performed by: ORTHOPAEDIC SURGERY

## 2019-10-15 PROCEDURE — 37000009 HC ANESTHESIA EA ADD 15 MINS: Performed by: ORTHOPAEDIC SURGERY

## 2019-10-15 PROCEDURE — 63600175 PHARM REV CODE 636 W HCPCS: Performed by: ORTHOPAEDIC SURGERY

## 2019-10-15 PROCEDURE — D9220A PRA ANESTHESIA: ICD-10-PCS | Mod: ANES,,, | Performed by: ANESTHESIOLOGY

## 2019-10-15 RX ORDER — OXYCODONE HYDROCHLORIDE 5 MG/1
5 TABLET ORAL
Status: DISCONTINUED | OUTPATIENT
Start: 2019-10-15 | End: 2019-10-15 | Stop reason: HOSPADM

## 2019-10-15 RX ORDER — DIPHENHYDRAMINE HYDROCHLORIDE 50 MG/ML
25 INJECTION INTRAMUSCULAR; INTRAVENOUS EVERY 6 HOURS PRN
Status: DISCONTINUED | OUTPATIENT
Start: 2019-10-15 | End: 2019-10-15 | Stop reason: HOSPADM

## 2019-10-15 RX ORDER — ONDANSETRON 2 MG/ML
4 INJECTION INTRAMUSCULAR; INTRAVENOUS ONCE AS NEEDED
Status: DISCONTINUED | OUTPATIENT
Start: 2019-10-15 | End: 2019-10-15 | Stop reason: HOSPADM

## 2019-10-15 RX ORDER — GLYCOPYRROLATE 0.2 MG/ML
INJECTION INTRAMUSCULAR; INTRAVENOUS
Status: DISCONTINUED | OUTPATIENT
Start: 2019-10-15 | End: 2019-10-15

## 2019-10-15 RX ORDER — KETOROLAC TROMETHAMINE 30 MG/ML
INJECTION, SOLUTION INTRAMUSCULAR; INTRAVENOUS
Status: DISCONTINUED | OUTPATIENT
Start: 2019-10-15 | End: 2019-10-15

## 2019-10-15 RX ORDER — FENTANYL CITRATE 50 UG/ML
INJECTION, SOLUTION INTRAMUSCULAR; INTRAVENOUS
Status: DISCONTINUED | OUTPATIENT
Start: 2019-10-15 | End: 2019-10-15

## 2019-10-15 RX ORDER — CYCLOBENZAPRINE HCL 10 MG
10 TABLET ORAL 3 TIMES DAILY PRN
COMMUNITY
End: 2023-03-13 | Stop reason: ALTCHOICE

## 2019-10-15 RX ORDER — OXYCODONE HYDROCHLORIDE 5 MG/1
5 TABLET ORAL ONCE AS NEEDED
Status: COMPLETED | OUTPATIENT
Start: 2019-10-15 | End: 2019-10-15

## 2019-10-15 RX ORDER — LIDOCAINE HCL/PF 100 MG/5ML
SYRINGE (ML) INTRAVENOUS
Status: DISCONTINUED | OUTPATIENT
Start: 2019-10-15 | End: 2019-10-15

## 2019-10-15 RX ORDER — SODIUM CHLORIDE 0.9 % (FLUSH) 0.9 %
3 SYRINGE (ML) INJECTION EVERY 8 HOURS
Status: DISCONTINUED | OUTPATIENT
Start: 2019-10-15 | End: 2019-10-15 | Stop reason: HOSPADM

## 2019-10-15 RX ORDER — FENTANYL CITRATE 50 UG/ML
25 INJECTION, SOLUTION INTRAMUSCULAR; INTRAVENOUS EVERY 5 MIN PRN
Status: COMPLETED | OUTPATIENT
Start: 2019-10-15 | End: 2019-10-15

## 2019-10-15 RX ORDER — ONDANSETRON 2 MG/ML
4 INJECTION INTRAMUSCULAR; INTRAVENOUS DAILY PRN
Status: DISCONTINUED | OUTPATIENT
Start: 2019-10-15 | End: 2019-10-15 | Stop reason: HOSPADM

## 2019-10-15 RX ORDER — CEFAZOLIN SODIUM 2 G/50ML
2 SOLUTION INTRAVENOUS
Status: COMPLETED | OUTPATIENT
Start: 2019-10-15 | End: 2019-10-15

## 2019-10-15 RX ORDER — MIDAZOLAM HYDROCHLORIDE 1 MG/ML
INJECTION, SOLUTION INTRAMUSCULAR; INTRAVENOUS
Status: DISCONTINUED | OUTPATIENT
Start: 2019-10-15 | End: 2019-10-15

## 2019-10-15 RX ORDER — BUPIVACAINE HCL/EPINEPHRINE 0.25-.0005
VIAL (ML) INJECTION
Status: DISCONTINUED | OUTPATIENT
Start: 2019-10-15 | End: 2019-10-15 | Stop reason: HOSPADM

## 2019-10-15 RX ORDER — SODIUM CHLORIDE, SODIUM LACTATE, POTASSIUM CHLORIDE, CALCIUM CHLORIDE 600; 310; 30; 20 MG/100ML; MG/100ML; MG/100ML; MG/100ML
10 INJECTION, SOLUTION INTRAVENOUS CONTINUOUS
Status: DISCONTINUED | OUTPATIENT
Start: 2019-10-15 | End: 2019-10-15 | Stop reason: HOSPADM

## 2019-10-15 RX ORDER — LIDOCAINE HYDROCHLORIDE 10 MG/ML
1 INJECTION, SOLUTION EPIDURAL; INFILTRATION; INTRACAUDAL; PERINEURAL ONCE
Status: COMPLETED | OUTPATIENT
Start: 2019-10-15 | End: 2019-10-15

## 2019-10-15 RX ORDER — MEPERIDINE HYDROCHLORIDE 50 MG/ML
12.5 INJECTION INTRAMUSCULAR; INTRAVENOUS; SUBCUTANEOUS ONCE AS NEEDED
Status: DISCONTINUED | OUTPATIENT
Start: 2019-10-15 | End: 2019-10-15 | Stop reason: HOSPADM

## 2019-10-15 RX ORDER — FENTANYL CITRATE 50 UG/ML
25 INJECTION, SOLUTION INTRAMUSCULAR; INTRAVENOUS EVERY 5 MIN PRN
Status: DISCONTINUED | OUTPATIENT
Start: 2019-10-15 | End: 2019-10-15 | Stop reason: HOSPADM

## 2019-10-15 RX ORDER — OXYCODONE AND ACETAMINOPHEN 10; 325 MG/1; MG/1
1 TABLET ORAL EVERY 4 HOURS PRN
Qty: 30 TABLET | Refills: 0 | Status: ON HOLD | OUTPATIENT
Start: 2019-10-15 | End: 2019-11-22 | Stop reason: SDUPTHER

## 2019-10-15 RX ORDER — ONDANSETRON 2 MG/ML
INJECTION INTRAMUSCULAR; INTRAVENOUS
Status: DISCONTINUED | OUTPATIENT
Start: 2019-10-15 | End: 2019-10-15

## 2019-10-15 RX ORDER — DEXAMETHASONE SODIUM PHOSPHATE 4 MG/ML
INJECTION, SOLUTION INTRA-ARTICULAR; INTRALESIONAL; INTRAMUSCULAR; INTRAVENOUS; SOFT TISSUE
Status: DISCONTINUED | OUTPATIENT
Start: 2019-10-15 | End: 2019-10-15

## 2019-10-15 RX ORDER — SODIUM CHLORIDE 0.9 % (FLUSH) 0.9 %
3 SYRINGE (ML) INJECTION
Status: DISCONTINUED | OUTPATIENT
Start: 2019-10-15 | End: 2019-10-15 | Stop reason: HOSPADM

## 2019-10-15 RX ORDER — HYDROMORPHONE HYDROCHLORIDE 2 MG/ML
0.2 INJECTION, SOLUTION INTRAMUSCULAR; INTRAVENOUS; SUBCUTANEOUS EVERY 5 MIN PRN
Status: DISCONTINUED | OUTPATIENT
Start: 2019-10-15 | End: 2019-10-15 | Stop reason: HOSPADM

## 2019-10-15 RX ORDER — PROPOFOL 10 MG/ML
VIAL (ML) INTRAVENOUS
Status: DISCONTINUED | OUTPATIENT
Start: 2019-10-15 | End: 2019-10-15

## 2019-10-15 RX ADMIN — ONDANSETRON 4 MG: 2 INJECTION, SOLUTION INTRAMUSCULAR; INTRAVENOUS at 07:10

## 2019-10-15 RX ADMIN — FENTANYL CITRATE 50 MCG: 50 INJECTION, SOLUTION INTRAMUSCULAR; INTRAVENOUS at 07:10

## 2019-10-15 RX ADMIN — OXYCODONE HYDROCHLORIDE 5 MG: 5 TABLET ORAL at 08:10

## 2019-10-15 RX ADMIN — CEFAZOLIN SODIUM 2 G: 2 SOLUTION INTRAVENOUS at 07:10

## 2019-10-15 RX ADMIN — FENTANYL CITRATE 25 MCG: 0.05 INJECTION, SOLUTION INTRAMUSCULAR; INTRAVENOUS at 08:10

## 2019-10-15 RX ADMIN — KETOROLAC TROMETHAMINE 30 MG: 30 INJECTION, SOLUTION INTRAMUSCULAR; INTRAVENOUS at 07:10

## 2019-10-15 RX ADMIN — PROPOFOL 200 MG: 10 INJECTION, EMULSION INTRAVENOUS at 07:10

## 2019-10-15 RX ADMIN — GLYCOPYRROLATE 0.2 MG: 0.2 INJECTION, SOLUTION INTRAMUSCULAR; INTRAVENOUS at 06:10

## 2019-10-15 RX ADMIN — LIDOCAINE HYDROCHLORIDE 75 MG: 20 INJECTION, SOLUTION INTRAVENOUS at 07:10

## 2019-10-15 RX ADMIN — MIDAZOLAM 2 MG: 1 INJECTION INTRAMUSCULAR; INTRAVENOUS at 06:10

## 2019-10-15 RX ADMIN — LIDOCAINE HYDROCHLORIDE: 10 INJECTION, SOLUTION EPIDURAL; INFILTRATION; INTRACAUDAL; PERINEURAL at 06:10

## 2019-10-15 RX ADMIN — DEXAMETHASONE SODIUM PHOSPHATE 4 MG: 4 INJECTION, SOLUTION INTRAMUSCULAR; INTRAVENOUS at 07:10

## 2019-10-15 RX ADMIN — SODIUM CHLORIDE, SODIUM LACTATE, POTASSIUM CHLORIDE, AND CALCIUM CHLORIDE 10 ML/HR: .6; .31; .03; .02 INJECTION, SOLUTION INTRAVENOUS at 06:10

## 2019-10-15 NOTE — INTERVAL H&P NOTE
The patient has been examined and the H&P has been reviewed:    I concur with the findings and changes have been noted since the H&P was written: Heart: RRRAbdomen: soft  Lungs: clear    Anesthesia/Surgery risks, benefits and alternative options discussed and understood by patient/family.          Active Hospital Problems    Diagnosis  POA    Acute lateral meniscal tear, left, sequela [S80.282S]  Not Applicable      Resolved Hospital Problems   No resolved problems to display.

## 2019-10-15 NOTE — TRANSFER OF CARE
"Anesthesia Transfer of Care Note    Patient: Himanshu Connor II    Procedure(s) Performed: Procedure(s) (LRB):  ARTHROSCOPY, KNEE, WITH MENISCECTOMY (Left)    Patient location: PACU    Anesthesia Type: general    Transport from OR: Transported from OR on 2-3 L/min O2 by NC with adequate spontaneous ventilation    Post pain: adequate analgesia    Post assessment: no apparent anesthetic complications    Post vital signs: stable    Level of consciousness: awake    Nausea/Vomiting: no nausea/vomiting    Complications: none    Transfer of care protocol was followed      Last vitals:   Visit Vitals  /86 (BP Location: Left arm, Patient Position: Lying)   Pulse 63   Temp 36.8 °C (98.2 °F) (Skin)   Resp 18   Ht 5' 10" (1.778 m)   Wt 83.9 kg (185 lb)   SpO2 100%   BMI 26.54 kg/m²     "

## 2019-10-15 NOTE — PLAN OF CARE
Pt ambulated in room with steady gait, tolerating po and states that pain is minimal.  States that he is ready for dscharge

## 2019-10-15 NOTE — ANESTHESIA POSTPROCEDURE EVALUATION
Anesthesia Post Evaluation    Patient: Himanshu Connor II    Procedure(s) Performed: Procedure(s) (LRB):  ARTHROSCOPY, KNEE, WITH MENISCECTOMY (Left)    Final Anesthesia Type: general  Patient location during evaluation: PACU  Patient participation: Yes- Able to Participate  Level of consciousness: awake and alert  Post-procedure vital signs: reviewed and stable  Pain management: adequate  Airway patency: patent  PONV status at discharge: No PONV  Anesthetic complications: no      Cardiovascular status: hemodynamically stable  Respiratory status: unassisted and room air  Hydration status: euvolemic  Follow-up not needed.          Vitals Value Taken Time   /78 10/15/2019  9:08 AM   Temp 36.8 °C (98.2 °F) 10/15/2019  8:47 AM   Pulse 66 10/15/2019  9:08 AM   Resp 18 10/15/2019  9:08 AM   SpO2 98 % 10/15/2019  9:08 AM         Event Time     Out of Recovery 08:50:00          Pain/Yovany Score: Pain Rating Prior to Med Admin: 5 (10/15/2019  8:45 AM)  Yovany Score: 10 (10/15/2019  9:08 AM)

## 2019-10-15 NOTE — OP NOTE
[facility]  Orthopedic Surgery  Operative Note    SUMMARY     Date of Procedure: 10/15/2019     Procedure: Procedure(s) (LRB):  ARTHROSCOPY, KNEE, WITH partial medial and lateral MENISCECTOMY (Left)     Chondroplasty of left patellofemoral joint    Surgeon(s) and Role:     * Felix Levin MD - Primary    Assistant: Wilmer Zhao    Pre-Operative Diagnosis: Acute lateral meniscus tear of left knee, subsequent encounter [S83.282D]    Post-Operative Diagnosis: Post-Op Diagnosis Codes:     * Acute lateral meniscus tear of left knee, subsequent encounter [S83.282D]  Medial meniscal tear of the left knee  Patellofemoral arthritis    Anesthesia: General    Diagnostic arthroscopy findings: Diagnostic arthroscopy findings, the patient's medial compartment was thoroughly examined. The patient had minimal cartilage wear and tear of the anterior horn of the medial meniscus. ACL and PCL were both intact with   good tension. Lateral compartment showed similar tearing of the anterior horn of the lateral meniscus as well with no articular   wear. In the patellofemoral joint, the patient had good central tracking without tilt and moderate chondromalacia particularly of the central trochlea    Complications: No    Estimated Blood Loss (EBL): 5 mL    Tourniquet Time: 15min at 300mmHg           Implants: * No implants in log *    Specimens:   Specimen (12h ago, onward)    None                  Condition: Good    Disposition: PACU - hemodynamically stable.    Attestation: I was present and scrubbed for the entire procedure.    INDICATIONS FOR THE PROCEDURE:  54-year-old male with a history of knee pain.  Patient had an MRI which showed lateral meniscal tear. Patient wished to proceed with the surgery listed above.    PROCEDURE IN DETAIL: Risks, benefits and alternatives of the procedure were   explained to the patient including, but not limited to damage to nerves,   arteries, blood vessels. Also explained  risk of infection, DVT, PE, continued pain due to arthritis,  as well as   anesthetic complications including seizure, stroke, heart attack and death. They  understood this and signed informed consent. The patient's Left knee was marked prior to coming to the Operating Room. Once there a formal   timeout was done in which correct patient, procedure and op site were all   correctly identified and confirmed by the entire operating team. Ancef 2 g was   given prior to surgical incision. Laryngeal mask anesthesia was induced. The   patient's Left lower extremity was prepped and draped in normal sterile fashion.   Leg was then exsanguinated with a tourniquet and tourniquet was inflated up   300 mmHg. Standard inferior lateral portal was then made. A spinal needle was   used to localize an inferior medial portal and this was made under direct   arthroscopic visualization. Diagnostic arthroscopy was then performed with the   findings listed above. Shaver was used to remove redundant fat pad and   Synovium within the notch. A combination of arthroscopic biters and 3.5mm full radius shaver was used to perform a partial menisectomy back to stable healthy appearing tissue.  This was done for both the anterior horn lateral meniscus and the anterior horn medial meniscal tearing.  Chondroplasty was then performed using the shaver of loose chondral fragments within the trochlea.  Chondroplasty was performed back to stable cartilage margins.      Proceeded with closing. All   excess water was removed from the knee joint. Portals were closed using   nylons. Portal was then injected with 0.25% Marcaine with epinephrine. Sterile   dressing was then applied. They were then extubated and awakened and transferred   from the Operating Room to the Recovery Room in stable condition.     Postop course is for an arthroscopic medial and lateral menicestomy

## 2019-10-15 NOTE — PLAN OF CARE
Pt aaox4, denies pain and nausea, tolerating clear liquids, vss, family updated, released by anesthesia to transfer to phase II, report given to Zack pierce

## 2019-10-15 NOTE — DISCHARGE INSTRUCTIONS
1.Diet: Ice chips, clear liquids, and then diet as tolerated. Drink plenty of liquids.  2.Ice the area at least three times a day (20 minutes per session).  3.Elevate the extremity above the level of the heart to help reduce swelling.  4.Pain medication can be taken every four to six hours as needed. It is helpful to take pain medication prior to physical therapy.  5.Any activity that requires precise thinking or accuracy should be avoided for a minimum of 72 hours after surgery and while on narcotic pain medication. This includes operating machinery and/or driving a vehicle.  6.All sutures/staples will be removed approximately 14 days from the time of surgery. Leave steri-strips (skin tapes) in place until sutures are removed.  7. If skin glue is used instead of stitches, do not apply ointments or solutions to the incision. Keep the incision dry. The skin glue will peel off in 3-4 weeks.  8. Change dressing on the first post-op day. Use gauze for the first 3 days, then start using Band-Aids over the incision sites.   9. All casts, splints, braces, slings, crutches, abduction pillows, etc... Are to be worn as instructed. Crutches are just to be used as needed. If not needed for soreness or balance, may weight bear as tolerated without the crutches.  10. Keep the incision dry for 10-14 days. A waterproof dressing (purchase at [employer], [employer], etc) can be used to shower. No bath, pool, hot tub until instructed.  11. Call [phone] with any questions or concerns.      Discharge Instructions: After Your Surgery/Procedure  Youve just had surgery. During surgery you were given medicine called anesthesia to keep you relaxed and free of pain. After surgery you may have some pain or nausea. This is common. Here are some tips for feeling better and getting well after surgery.     Stay on schedule with your medication.   Going home  Your doctor or nurse will show you how to take care of yourself when you go home. He or she will  "also answer your questions. Have an adult family member or friend drive you home.      For your safety we recommend these precaution for the first 24 hours after your procedure:  · Do not drive or use heavy equipment.  · Do not make important decisions or sign legal papers.  · Do not drink alcohol.  · Have someone stay with you, if needed. He or she can watch for problems and help keep you safe.  · Your concentration, balance, coordination, and judgement may be impaired for many hours after anesthesia.  Use caution when ambulating or standing up.     · You may feel weak and "washed out" after anesthesia and surgery.      Subtle residual effects of general anesthesia or sedation with regional / local anesthesia can last more than 24 hours.  Rest for the remainder of the day or longer if your Doctor/Surgeon has advised you to do so.  Although you may feel normal within the first 24 hours, your reflexes and mental ability may be impaired without you realizing it.  You may feel dizzy, lightheaded or sleepy for 24 hours or longer.      Be sure to go to all follow-up visits with your doctor. And rest after your surgery for as long as your doctor tells you to.  Coping with pain  If you have pain after surgery, pain medicine will help you feel better. Take it as told, before pain becomes severe. Also, ask your doctor or pharmacist about other ways to control pain. This might be with heat, ice, or relaxation. And follow any other instructions your surgeon or nurse gives you.  Tips for taking pain medicine  To get the best relief possible, remember these points:  · Pain medicines can upset your stomach. Taking them with a little food may help.  · Most pain relievers taken by mouth need at least 20 to 30 minutes to start to work.  · Taking medicine on a schedule can help you remember to take it. Try to time your medicine so that you can take it before starting an activity. This might be before you get dressed, go for a walk, " or sit down for dinner.  · Constipation is a common side effect of pain medicines. Call your doctor before taking any medicines such as laxatives or stool softeners to help ease constipation. Also ask if you should skip any foods. Drinking lots of fluids and eating foods such as fruits and vegetables that are high in fiber can also help. Remember, do not take laxatives unless your surgeon has prescribed them.  · Drinking alcohol and taking pain medicine can cause dizziness and slow your breathing. It can even be deadly. Do not drink alcohol while taking pain medicine.  · Pain medicine can make you react more slowly to things. Do not drive or run machinery while taking pain medicine.  Your health care provider may tell you to take acetaminophen to help ease your pain. Ask him or her how much you are supposed to take each day. Acetaminophen or other pain relievers may interact with your prescription medicines or other over-the-counter (OTC) drugs. Some prescription medicines have acetaminophen and other ingredients. Using both prescription and OTC acetaminophen for pain can cause you to overdose. Read the labels on your OTC medicines with care. This will help you to clearly know the list of ingredients, how much to take, and any warnings. It may also help you not take too much acetaminophen. If you have questions or do not understand the information, ask your pharmacist or health care provider to explain it to you before you take the OTC medicine.  Managing nausea  Some people have an upset stomach after surgery. This is often because of anesthesia, pain, or pain medicine, or the stress of surgery. These tips will help you handle nausea and eat healthy foods as you get better. If you were on a special food plan before surgery, ask your doctor if you should follow it while you get better. These tips may help:  · Do not push yourself to eat. Your body will tell you when to eat and how much.  · Start off with clear  liquids and soup. They are easier to digest.  · Next try semi-solid foods, such as mashed potatoes, applesauce, and gelatin, as you feel ready.  · Slowly move to solid foods. Dont eat fatty, rich, or spicy foods at first.  · Do not force yourself to have 3 large meals a day. Instead eat smaller amounts more often.  · Take pain medicines with a small amount of solid food, such as crackers or toast, to avoid nausea.     Call your surgeon if  · You still have pain an hour after taking medicine. The medicine may not be strong enough.  · You feel too sleepy, dizzy, or groggy. The medicine may be too strong.  · You have side effects like nausea, vomiting, or skin changes, such as rash, itching, or hives.       If you have obstructive sleep apnea  You were given anesthesia medicine during surgery to keep you comfortable and free of pain. After surgery, you may have more apnea spells because of this medicine and other medicines you were given. The spells may last longer than usual.   At home:  · Keep using the continuous positive airway pressure (CPAP) device when you sleep. Unless your health care provider tells you not to, use it when you sleep, day or night. CPAP is a common device used to treat obstructive sleep apnea.  · Talk with your provider before taking any pain medicine, muscle relaxants, or sedatives. Your provider will tell you about the possible dangers of taking these medicines.  © 2861-1340 [employer]. [address] [place] [address]. All rights reserved. This information is not intended as a substitute for professional medical care. Always follow your healthcare professional's instructions.    Post op instructions for prevention of DVT  What is deep vein thrombosis?  Deep vein thrombosis (DVT) is the medical term for blood clots in the deep veins of the leg.  These blood clots can be dangerous.  A DVT can block a blood vessel and keep blood from getting where it needs to go.   Another problem is that the clot can travel to other parts of the body such as the lungs.  A clot that travels to the lungs is called a pulmonary embolus (PE) and can cause serious problems with breathing which can lead to death.  Am I at risk for DVT/PE?  If you are not very active, you are at risk of DVT.  Anyone confined to bed, sitting for long periods of time, recovering from surgery, etc. increases the risk of DVT.  Other risk factors are cancer diagnosis, certain medications, estrogen replacement in any form,older age, obesity, pregnancy, smoking, history of clotting disorders, and dehydration.  How will I know if I have a DVT?   Swelling in the lower leg   Pain   Warmth, redness, hardness or bulging of the vein  If you have any of these symptoms, call your doctors office right away.  Some people will not have any symptoms until the clot moves to the lungs.  What are the symptoms of a PE?   Panting, shortness of breath, or trouble breathing   Sharp, knife-like chest pain when you breathe   Coughing or coughing up blood   Rapid heartbeat  If you have any of these symptoms or get worse quickly, call 911 for emergency treatment.  How can I prevent a DVT?   Avoid long periods of inactivity and dont cross your legs--get up and walk around every hour or so.   Stay active--walking after surgery is highly encouraged.  This means you should get out of the house and walk in the neighborhood.  Going up and down stairs will not impair healing (unless advised against such activity by your doctor).     Drink plenty of noncaffeinated, nonalcoholic fluids each day to prevent dehydration.   Wear special support stockings, if they have been advised by your doctor.   If you travel, stop at least once an hour and walk around.   Avoid smoking (assistance with stopping is available through your healthcare provider)  Always notify your doctor if you are not able to follow the post operative instructions that are  given to you at the time of discharge.  It may be necessary to prescribe one of the medications available to prevent DVT.

## 2019-10-15 NOTE — ANESTHESIA PREPROCEDURE EVALUATION
10/15/2019  Himanshu Connor II is a 54 y.o., male.    Pre-op Assessment    I have reviewed the Patient Summary Reports.     I have reviewed the Nursing Notes.   I have reviewed the Medications.     Review of Systems  Anesthesia Hx:  No problems with previous Anesthesia    Social:  Non-Smoker    Cardiovascular:   Hypertension, well controlled    Pulmonary:   Asthma asymptomatic Sleep Apnea    Renal/:  Renal/ Normal     Hepatic/GI:   GERD, well controlled    Musculoskeletal:   Arthritis     Neurological:   Neuromuscular Disease,    Endocrine:  Endocrine Normal    Psych:   Psychiatric History depression          Physical Exam  General:  Well nourished    Airway/Jaw/Neck:  Airway Findings: Mouth Opening: Normal Tongue: Normal  General Airway Assessment: Adult  Oropharynx Findings:  Mallampati: II  Jaw/Neck Findings:  Neck ROM: Normal ROM     Eyes/Ears/Nose:  Eyes/Ears/Nose Findings:    Dental:  Dental Findings:   Chest/Lungs:  Chest/Lungs Findings: Normal Respiratory Rate     Heart/Vascular:  Heart Findings: Rate: Normal  Rhythm: Regular Rhythm        Mental Status:  Mental Status Findings:  Cooperative, Alert and Oriented         Anesthesia Plan  Type of Anesthesia, risks & benefits discussed:  Anesthesia Type:  general  Patient's Preference:   Intra-op Monitoring Plan: standard ASA monitors  Intra-op Monitoring Plan Comments:   Post Op Pain Control Plan: multimodal analgesia  Post Op Pain Control Plan Comments:   Induction:   IV  Beta Blocker:  Patient is not currently on a Beta-Blocker (No further documentation required).       Informed Consent: Patient understands risks and agrees with Anesthesia plan.  Questions answered. Anesthesia consent signed with patient.  ASA Score: 2     Day of Surgery Review of History & Physical: I have interviewed and examined the patient. I have reviewed the patient's H&P  dated:  There are no significant changes.  H&P update referred to the surgeon.  H&P completed by Anesthesiologist.       Ready For Surgery From Anesthesia Perspective.

## 2019-11-07 ENCOUNTER — OFFICE VISIT (OUTPATIENT)
Dept: ORTHOPEDICS | Facility: CLINIC | Age: 54
End: 2019-11-07
Payer: COMMERCIAL

## 2019-11-07 VITALS
DIASTOLIC BLOOD PRESSURE: 77 MMHG | HEIGHT: 70 IN | BODY MASS INDEX: 26.48 KG/M2 | HEART RATE: 82 BPM | SYSTOLIC BLOOD PRESSURE: 161 MMHG | WEIGHT: 185 LBS

## 2019-11-07 DIAGNOSIS — S83.281D ACUTE LATERAL MENISCAL TEAR, RIGHT, SUBSEQUENT ENCOUNTER: Primary | ICD-10-CM

## 2019-11-07 PROCEDURE — 99999 PR PBB SHADOW E&M-EST. PATIENT-LVL III: CPT | Mod: PBBFAC,,, | Performed by: ORTHOPAEDIC SURGERY

## 2019-11-07 PROCEDURE — 99213 OFFICE O/P EST LOW 20 MIN: CPT | Mod: PBBFAC,PN | Performed by: ORTHOPAEDIC SURGERY

## 2019-11-07 PROCEDURE — 99999 PR PBB SHADOW E&M-EST. PATIENT-LVL III: ICD-10-PCS | Mod: PBBFAC,,, | Performed by: ORTHOPAEDIC SURGERY

## 2019-11-07 PROCEDURE — 99214 PR OFFICE/OUTPT VISIT, EST, LEVL IV, 30-39 MIN: ICD-10-PCS | Mod: 24,57,S$PBB, | Performed by: ORTHOPAEDIC SURGERY

## 2019-11-07 PROCEDURE — 99214 OFFICE O/P EST MOD 30 MIN: CPT | Mod: 24,57,S$PBB, | Performed by: ORTHOPAEDIC SURGERY

## 2019-11-07 NOTE — PROGRESS NOTES
Past Medical History:   Diagnosis Date    Alcohol abuse, unspecified     Allergic rhinitis, cause unspecified     Arthritis     Asthma attack     Carpal tunnel syndrome     Depressive disorder, not elsewhere classified     Diarrhea     Encounter for blood transfusion     GERD (gastroesophageal reflux disease)     Hypersomnia, unspecified     Hypertension     Lumbago     Obesity, unspecified     JAH (obstructive sleep apnea)     uses BIPAP    Other and unspecified hyperlipidemia     Other ankle sprain and strain     Psychosexual dysfunction with inhibited sexual excitement     Ventral hernia, unspecified, without mention of obstruction or gangrene        Past Surgical History:   Procedure Laterality Date    A-scope knees      Bilateral    ARTHROSCOPIC REPAIR OF ROTATOR CUFF OF SHOULDER Left 9/20/2019    Procedure: REPAIR, ROTATOR CUFF, ARTHROSCOPIC;  Surgeon: Felix Levin MD;  Location: [facility] OR;  Service: Orthopedics;  Laterality: Left;    ARTHROSCOPY OF SHOULDER WITH DECOMPRESSION OF SUBACROMIAL SPACE Left 9/20/2019    Procedure: ARTHROSCOPY, SHOULDER, WITH SUBACROMIAL SPACE DECOMPRESSION;  Surgeon: Felix Levin MD;  Location: [facility] OR;  Service: Orthopedics;  Laterality: Left;  Jaspreet- Arthrex notified of all case today 9/16-tcb    CARPAL TUNNEL RELEASE      Bilateral    EYE SURGERY      Lasik    HERNIA REPAIR      KNEE ARTHROSCOPY W/ MENISCECTOMY Left 10/15/2019    Procedure: ARTHROSCOPY, KNEE, WITH MENISCECTOMY;  Surgeon: Felix Levin MD;  Location: [facility] OR;  Service: Orthopedics;  Laterality: Left;  Medial and Lateral Meniscectomy    KNEE SURGERY      osmar    NISSEN FUNDOPLICATION      ROTATOR CUFF REPAIR      right    SLEEVE GASTROPLASTY         Current Outpatient Medications   Medication Sig    ALPRAZolam (XANAX) 0.5 MG tablet TK 1 T PO BID    cyanocobalamin (VITAMIN B-12) 1000 MCG tablet Take 1,000 mcg by mouth once daily.     cyclobenzaprine  (FLEXERIL) 10 MG tablet Take 10 mg by mouth 3 (three) times daily as needed for Muscle spasms.    escitalopram oxalate (LEXAPRO) 20 MG tablet Take 20 mg by mouth once daily.    losartan (COZAAR) 25 MG tablet Take 25 mg by mouth once daily.    oxyCODONE-acetaminophen (PERCOCET)  mg per tablet Take 1 tablet by mouth every 4 (four) hours as needed for Pain.    pantoprazole (PROTONIX) 40 MG tablet TK ONE  T PO D    ranitidine (ZANTAC) 150 MG capsule Take 150 mg by mouth 2 (two) times daily as needed.     sucralfate (CARAFATE) 1 gram tablet TK 1 T PO QID 1 HOUR BEFORE MEALS AND 1 T QHS OES     No current facility-administered medications for this visit.        Review of patient's allergies indicates:  No Known Allergies    History reviewed. No pertinent family history.    Social History     Socioeconomic History    Marital status:      Spouse name: Not on file    Number of children: Not on file    Years of education: Not on file    Highest education level: Not on file   Occupational History    Not on file   Social Needs    Financial resource strain: Not on file    Food insecurity:     Worry: Not on file     Inability: Not on file    Transportation needs:     Medical: Not on file     Non-medical: Not on file   Tobacco Use    Smoking status: Never Smoker    Smokeless tobacco: Never Used   Substance and Sexual Activity    Alcohol use: Not Currently     Comment: sober 72 days    Drug use: No    Sexual activity: Not on file   Lifestyle    Physical activity:     Days per week: Not on file     Minutes per session: Not on file    Stress: Not on file   Relationships    Social connections:     Talks on phone: Not on file     Gets together: Not on file     Attends Mandaen service: Not on file     Active member of club or organization: Not on file     Attends meetings of clubs or organizations: Not on file     Relationship status: Not on file   Other Topics Concern    Not on file   Social  History Narrative    Not on file       Chief Complaint:   Chief Complaint   Patient presents with    Left Knee - Pain       Date of surgery:  September 20, 2019 revision left massive rotator cuff repair including patch augmentation  October 15, 2019 left partial lateral meniscectomy    History of present illness:  54-year-old male had a left knee arthroscopy recently.  His knee is doing well.  His right knee is giving him more trouble.  He has a lateral meniscal tear on that side as well.  He would like to go ahead and schedule that surgery. Pain is 6/10 in the right knee and is 0/10 in the left.      Review of Systems:    Musculoskeletal:  See HPI        Physical Examination:    Vital Signs:    Vitals:    11/07/19 1051   BP: (!) 161/77   Pulse: 82       Body mass index is 26.54 kg/m².    This a well-developed, well nourished patient in no acute distress.  They are alert and oriented and cooperative to examination.  Pt. walks without an antalgic gait.      Examination left knee shows healing surgical portals.  No erythema or drainage. Full range of motion. No calf pain. Negative Homans sign.    Examination of the right knee shows no rashes or erythema. There are no masses ecchymosis or effusion. Patient has full range of motion from 0-130°. Patient is moderately tender to palpation over lateral joint line and nontender to palpation over the medial joint line. Patient has a - Lachman exam, - anterior drawer exam, and - posterior drawer exam. - Mallika's exam. Knee is stable to varus and valgus stress. 5 out of 5 motor strength. Palpable distal pulses. Intact light touch sensation. Negative Patellofemoral crepitus    Heart is regular rate without obvious murmurs   Normal respiratory effort without audible wheezing  Abdomen is soft and nontender     MRI of the right knee:1. Complex degenerative tear of the anterior horn and body of the lateral meniscus.  2. Multifocal articular cartilage loss and fissuring as  detailed above.  3. Small knee joint effusion.  4. Small ruptured Vale's cyst.  5. Grade II tear of the proximal and deep fibers of the lateral head of the gastrocnemius muscle within the posterolateral proximal calf.     Assessment::  Status post revision massive rotator cuff repair with patch augmentation  Status post left partial lateral meniscectomy  Complex right lateral meniscal tear    Plan:  I reviewed the MRI of the right knee.  We discussed treatment options.  Patient like to go ahead and have a partial lateral meniscectomy.  He did well on the other side. Risks, benefits, and alternatives to the procedure were explained to the patient including but not limited to damage to nerves, arteries, blood vessels, bones, tendons, ligaments, stiffness, instability, infection, DVT, PE, as well as general anesthetic complications including seizure, stroke, heart attack and even death. The patient understood these risks and wished to proceed and signed the informed consent.     This note was created using [employer] voice recognition software that occasionally misinterpreted phrases or words.

## 2019-11-07 NOTE — H&P (VIEW-ONLY)
Past Medical History:   Diagnosis Date    Alcohol abuse, unspecified     Allergic rhinitis, cause unspecified     Arthritis     Asthma attack     Carpal tunnel syndrome     Depressive disorder, not elsewhere classified     Diarrhea     Encounter for blood transfusion     GERD (gastroesophageal reflux disease)     Hypersomnia, unspecified     Hypertension     Lumbago     Obesity, unspecified     JAH (obstructive sleep apnea)     uses BIPAP    Other and unspecified hyperlipidemia     Other ankle sprain and strain     Psychosexual dysfunction with inhibited sexual excitement     Ventral hernia, unspecified, without mention of obstruction or gangrene        Past Surgical History:   Procedure Laterality Date    A-scope knees      Bilateral    ARTHROSCOPIC REPAIR OF ROTATOR CUFF OF SHOULDER Left 9/20/2019    Procedure: REPAIR, ROTATOR CUFF, ARTHROSCOPIC;  Surgeon: Felix Levin MD;  Location: [facility] OR;  Service: Orthopedics;  Laterality: Left;    ARTHROSCOPY OF SHOULDER WITH DECOMPRESSION OF SUBACROMIAL SPACE Left 9/20/2019    Procedure: ARTHROSCOPY, SHOULDER, WITH SUBACROMIAL SPACE DECOMPRESSION;  Surgeon: Felix Levin MD;  Location: [facility] OR;  Service: Orthopedics;  Laterality: Left;  Jaspreet- Arthrex notified of all case today 9/16-tcb    CARPAL TUNNEL RELEASE      Bilateral    EYE SURGERY      Lasik    HERNIA REPAIR      KNEE ARTHROSCOPY W/ MENISCECTOMY Left 10/15/2019    Procedure: ARTHROSCOPY, KNEE, WITH MENISCECTOMY;  Surgeon: Felix Levin MD;  Location: [facility] OR;  Service: Orthopedics;  Laterality: Left;  Medial and Lateral Meniscectomy    KNEE SURGERY      osmar    NISSEN FUNDOPLICATION      ROTATOR CUFF REPAIR      right    SLEEVE GASTROPLASTY         Current Outpatient Medications   Medication Sig    ALPRAZolam (XANAX) 0.5 MG tablet TK 1 T PO BID    cyanocobalamin (VITAMIN B-12) 1000 MCG tablet Take 1,000 mcg by mouth once daily.     cyclobenzaprine  (FLEXERIL) 10 MG tablet Take 10 mg by mouth 3 (three) times daily as needed for Muscle spasms.    escitalopram oxalate (LEXAPRO) 20 MG tablet Take 20 mg by mouth once daily.    losartan (COZAAR) 25 MG tablet Take 25 mg by mouth once daily.    oxyCODONE-acetaminophen (PERCOCET)  mg per tablet Take 1 tablet by mouth every 4 (four) hours as needed for Pain.    pantoprazole (PROTONIX) 40 MG tablet TK ONE  T PO D    ranitidine (ZANTAC) 150 MG capsule Take 150 mg by mouth 2 (two) times daily as needed.     sucralfate (CARAFATE) 1 gram tablet TK 1 T PO QID 1 HOUR BEFORE MEALS AND 1 T QHS OES     No current facility-administered medications for this visit.        Review of patient's allergies indicates:  No Known Allergies    History reviewed. No pertinent family history.    Social History     Socioeconomic History    Marital status:      Spouse name: Not on file    Number of children: Not on file    Years of education: Not on file    Highest education level: Not on file   Occupational History    Not on file   Social Needs    Financial resource strain: Not on file    Food insecurity:     Worry: Not on file     Inability: Not on file    Transportation needs:     Medical: Not on file     Non-medical: Not on file   Tobacco Use    Smoking status: Never Smoker    Smokeless tobacco: Never Used   Substance and Sexual Activity    Alcohol use: Not Currently     Comment: sober 72 days    Drug use: No    Sexual activity: Not on file   Lifestyle    Physical activity:     Days per week: Not on file     Minutes per session: Not on file    Stress: Not on file   Relationships    Social connections:     Talks on phone: Not on file     Gets together: Not on file     Attends Caodaism service: Not on file     Active member of club or organization: Not on file     Attends meetings of clubs or organizations: Not on file     Relationship status: Not on file   Other Topics Concern    Not on file   Social  History Narrative    Not on file       Chief Complaint:   Chief Complaint   Patient presents with    Left Knee - Pain       Date of surgery:  September 20, 2019 revision left massive rotator cuff repair including patch augmentation  October 15, 2019 left partial lateral meniscectomy    History of present illness:  54-year-old male had a left knee arthroscopy recently.  His knee is doing well.  His right knee is giving him more trouble.  He has a lateral meniscal tear on that side as well.  He would like to go ahead and schedule that surgery. Pain is 6/10 in the right knee and is 0/10 in the left.      Review of Systems:    Musculoskeletal:  See HPI        Physical Examination:    Vital Signs:    Vitals:    11/07/19 1051   BP: (!) 161/77   Pulse: 82       Body mass index is 26.54 kg/m².    This a well-developed, well nourished patient in no acute distress.  They are alert and oriented and cooperative to examination.  Pt. walks without an antalgic gait.      Examination left knee shows healing surgical portals.  No erythema or drainage. Full range of motion. No calf pain. Negative Homans sign.    Examination of the right knee shows no rashes or erythema. There are no masses ecchymosis or effusion. Patient has full range of motion from 0-130°. Patient is moderately tender to palpation over lateral joint line and nontender to palpation over the medial joint line. Patient has a - Lachman exam, - anterior drawer exam, and - posterior drawer exam. - Mallika's exam. Knee is stable to varus and valgus stress. 5 out of 5 motor strength. Palpable distal pulses. Intact light touch sensation. Negative Patellofemoral crepitus    Heart is regular rate without obvious murmurs   Normal respiratory effort without audible wheezing  Abdomen is soft and nontender     MRI of the right knee:1. Complex degenerative tear of the anterior horn and body of the lateral meniscus.  2. Multifocal articular cartilage loss and fissuring as  detailed above.  3. Small knee joint effusion.  4. Small ruptured Vale's cyst.  5. Grade II tear of the proximal and deep fibers of the lateral head of the gastrocnemius muscle within the posterolateral proximal calf.     Assessment::  Status post revision massive rotator cuff repair with patch augmentation  Status post left partial lateral meniscectomy  Complex right lateral meniscal tear    Plan:  I reviewed the MRI of the right knee.  We discussed treatment options.  Patient like to go ahead and have a partial lateral meniscectomy.  He did well on the other side. Risks, benefits, and alternatives to the procedure were explained to the patient including but not limited to damage to nerves, arteries, blood vessels, bones, tendons, ligaments, stiffness, instability, infection, DVT, PE, as well as general anesthetic complications including seizure, stroke, heart attack and even death. The patient understood these risks and wished to proceed and signed the informed consent.     This note was created using [employer] voice recognition software that occasionally misinterpreted phrases or words.

## 2019-11-08 RX ORDER — CEFAZOLIN SODIUM 2 G/50ML
2 SOLUTION INTRAVENOUS
Status: CANCELLED | OUTPATIENT
Start: 2019-11-08

## 2019-11-08 RX ORDER — MUPIROCIN 20 MG/G
OINTMENT TOPICAL
Status: CANCELLED | OUTPATIENT
Start: 2019-11-08

## 2019-11-21 ENCOUNTER — ANESTHESIA EVENT (OUTPATIENT)
Dept: SURGERY | Facility: HOSPITAL | Age: 54
End: 2019-11-21
Payer: COMMERCIAL

## 2019-11-22 ENCOUNTER — ANESTHESIA (OUTPATIENT)
Dept: SURGERY | Facility: HOSPITAL | Age: 54
End: 2019-11-22
Payer: COMMERCIAL

## 2019-11-22 ENCOUNTER — HOSPITAL ENCOUNTER (OUTPATIENT)
Facility: HOSPITAL | Age: 54
Discharge: HOME | End: 2019-11-22
Attending: ORTHOPAEDIC SURGERY | Admitting: ORTHOPAEDIC SURGERY
Payer: COMMERCIAL

## 2019-11-22 DIAGNOSIS — S83.281D ACUTE LATERAL MENISCAL TEAR, RIGHT, SUBSEQUENT ENCOUNTER: ICD-10-CM

## 2019-11-22 LAB
ANION GAP SERPL CALC-SCNC: 8 MMOL/L (ref 8–16)
BASOPHILS # BLD AUTO: 0.01 K/UL (ref 0–0.2)
BASOPHILS NFR BLD: 0.3 % (ref 0–1.9)
BUN SERPL-MCNC: 8 MG/DL (ref 6–20)
CALCIUM SERPL-MCNC: 8.7 MG/DL (ref 8.7–10.5)
CHLORIDE SERPL-SCNC: 105 MMOL/L (ref 95–110)
CO2 SERPL-SCNC: 25 MMOL/L (ref 23–29)
CREAT SERPL-MCNC: 0.9 MG/DL (ref 0.5–1.4)
DIFFERENTIAL METHOD: ABNORMAL
EOSINOPHIL # BLD AUTO: 0.1 K/UL (ref 0–0.5)
EOSINOPHIL NFR BLD: 2.9 % (ref 0–8)
ERYTHROCYTE [DISTWIDTH] IN BLOOD BY AUTOMATED COUNT: 14.9 % (ref 11.5–14.5)
EST. GFR  (AFRICAN AMERICAN): >60 ML/MIN/1.73 M^2
EST. GFR  (NON AFRICAN AMERICAN): >60 ML/MIN/1.73 M^2
GLUCOSE SERPL-MCNC: 63 MG/DL (ref 70–110)
HCT VFR BLD AUTO: 31.1 % (ref 40–54)
HGB BLD-MCNC: 9.7 G/DL (ref 14–18)
IMM GRANULOCYTES # BLD AUTO: 0.01 K/UL (ref 0–0.04)
LYMPHOCYTES # BLD AUTO: 1.8 K/UL (ref 1–4.8)
LYMPHOCYTES NFR BLD: 48.4 % (ref 18–48)
MCH RBC QN AUTO: 27.6 PG (ref 27–31)
MCHC RBC AUTO-ENTMCNC: 31.2 G/DL (ref 32–36)
MCV RBC AUTO: 88 FL (ref 82–98)
MONOCYTES # BLD AUTO: 0.4 K/UL (ref 0.3–1)
MONOCYTES NFR BLD: 9.3 % (ref 4–15)
NEUTROPHILS # BLD AUTO: 1.5 K/UL (ref 1.8–7.7)
NEUTROPHILS NFR BLD: 38.8 % (ref 38–73)
NRBC BLD-RTO: 0 /100 WBC
PLATELET # BLD AUTO: 191 K/UL (ref 150–350)
PMV BLD AUTO: 8.5 FL (ref 9.2–12.9)
POTASSIUM SERPL-SCNC: 4 MMOL/L (ref 3.5–5.1)
RBC # BLD AUTO: 3.52 M/UL (ref 4.6–6.2)
SODIUM SERPL-SCNC: 138 MMOL/L (ref 136–145)
WBC # BLD AUTO: 3.76 K/UL (ref 3.9–12.7)

## 2019-11-22 PROCEDURE — 71000039 HC RECOVERY, EACH ADD'L HOUR: Performed by: ORTHOPAEDIC SURGERY

## 2019-11-22 PROCEDURE — 29881 ARTHRS KNE SRG MNISECTMY M/L: CPT | Mod: 79,RT,, | Performed by: ORTHOPAEDIC SURGERY

## 2019-11-22 PROCEDURE — 29881 PR KNEE SCOPE SINGLE MENISECECTOMY: ICD-10-PCS | Mod: 79,RT,, | Performed by: ORTHOPAEDIC SURGERY

## 2019-11-22 PROCEDURE — D9220A PRA ANESTHESIA: Mod: CRNA,,, | Performed by: NURSE ANESTHETIST, CERTIFIED REGISTERED

## 2019-11-22 PROCEDURE — 37000008 HC ANESTHESIA 1ST 15 MINUTES: Performed by: ORTHOPAEDIC SURGERY

## 2019-11-22 PROCEDURE — 80048 BASIC METABOLIC PNL TOTAL CA: CPT

## 2019-11-22 PROCEDURE — 25000003 PHARM REV CODE 250: Performed by: ANESTHESIOLOGY

## 2019-11-22 PROCEDURE — 27200651 HC AIRWAY, LMA: Performed by: NURSE ANESTHETIST, CERTIFIED REGISTERED

## 2019-11-22 PROCEDURE — D9220A PRA ANESTHESIA: ICD-10-PCS | Mod: ANES,,, | Performed by: ANESTHESIOLOGY

## 2019-11-22 PROCEDURE — 99900103 DSU ONLY-NO CHARGE-INITIAL HR (STAT): Performed by: ORTHOPAEDIC SURGERY

## 2019-11-22 PROCEDURE — 63600175 PHARM REV CODE 636 W HCPCS: Performed by: ANESTHESIOLOGY

## 2019-11-22 PROCEDURE — 99900104 DSU ONLY-NO CHARGE-EA ADD'L HR (STAT): Performed by: ORTHOPAEDIC SURGERY

## 2019-11-22 PROCEDURE — D9220A PRA ANESTHESIA: ICD-10-PCS | Mod: CRNA,,, | Performed by: NURSE ANESTHETIST, CERTIFIED REGISTERED

## 2019-11-22 PROCEDURE — 63600175 PHARM REV CODE 636 W HCPCS: Performed by: ORTHOPAEDIC SURGERY

## 2019-11-22 PROCEDURE — 25000003 PHARM REV CODE 250: Performed by: ORTHOPAEDIC SURGERY

## 2019-11-22 PROCEDURE — 36000710: Performed by: ORTHOPAEDIC SURGERY

## 2019-11-22 PROCEDURE — 36000711: Performed by: ORTHOPAEDIC SURGERY

## 2019-11-22 PROCEDURE — 63600175 PHARM REV CODE 636 W HCPCS: Performed by: NURSE ANESTHETIST, CERTIFIED REGISTERED

## 2019-11-22 PROCEDURE — 37000009 HC ANESTHESIA EA ADD 15 MINS: Performed by: ORTHOPAEDIC SURGERY

## 2019-11-22 PROCEDURE — 36415 COLL VENOUS BLD VENIPUNCTURE: CPT

## 2019-11-22 PROCEDURE — 71000015 HC POSTOP RECOV 1ST HR: Performed by: ORTHOPAEDIC SURGERY

## 2019-11-22 PROCEDURE — 27201423 OPTIME MED/SURG SUP & DEVICES STERILE SUPPLY: Performed by: ORTHOPAEDIC SURGERY

## 2019-11-22 PROCEDURE — D9220A PRA ANESTHESIA: Mod: ANES,,, | Performed by: ANESTHESIOLOGY

## 2019-11-22 PROCEDURE — 85025 COMPLETE CBC W/AUTO DIFF WBC: CPT

## 2019-11-22 PROCEDURE — 71000033 HC RECOVERY, INTIAL HOUR: Performed by: ORTHOPAEDIC SURGERY

## 2019-11-22 PROCEDURE — 25000003 PHARM REV CODE 250: Performed by: NURSE ANESTHETIST, CERTIFIED REGISTERED

## 2019-11-22 RX ORDER — ONDANSETRON 2 MG/ML
4 INJECTION INTRAMUSCULAR; INTRAVENOUS ONCE
Status: DISCONTINUED | OUTPATIENT
Start: 2019-11-22 | End: 2019-11-22 | Stop reason: HOSPADM

## 2019-11-22 RX ORDER — SODIUM CHLORIDE 0.9 % (FLUSH) 0.9 %
3 SYRINGE (ML) INJECTION
Status: DISCONTINUED | OUTPATIENT
Start: 2019-11-22 | End: 2019-11-22 | Stop reason: HOSPADM

## 2019-11-22 RX ORDER — LIDOCAINE HYDROCHLORIDE 10 MG/ML
1 INJECTION, SOLUTION EPIDURAL; INFILTRATION; INTRACAUDAL; PERINEURAL ONCE
Status: COMPLETED | OUTPATIENT
Start: 2019-11-22 | End: 2019-11-22

## 2019-11-22 RX ORDER — HYDROMORPHONE HYDROCHLORIDE 2 MG/ML
0.2 INJECTION, SOLUTION INTRAMUSCULAR; INTRAVENOUS; SUBCUTANEOUS EVERY 5 MIN PRN
Status: DISCONTINUED | OUTPATIENT
Start: 2019-11-22 | End: 2019-11-22 | Stop reason: HOSPADM

## 2019-11-22 RX ORDER — ONDANSETRON HYDROCHLORIDE 2 MG/ML
INJECTION, SOLUTION INTRAMUSCULAR; INTRAVENOUS
Status: DISCONTINUED | OUTPATIENT
Start: 2019-11-22 | End: 2019-11-22

## 2019-11-22 RX ORDER — FENTANYL CITRATE 50 UG/ML
25 INJECTION, SOLUTION INTRAMUSCULAR; INTRAVENOUS EVERY 5 MIN PRN
Status: COMPLETED | OUTPATIENT
Start: 2019-11-22 | End: 2019-11-22

## 2019-11-22 RX ORDER — ACETAMINOPHEN 10 MG/ML
INJECTION, SOLUTION INTRAVENOUS
Status: DISCONTINUED | OUTPATIENT
Start: 2019-11-22 | End: 2019-11-22

## 2019-11-22 RX ORDER — MIDAZOLAM HYDROCHLORIDE 1 MG/ML
INJECTION, SOLUTION INTRAMUSCULAR; INTRAVENOUS
Status: DISCONTINUED | OUTPATIENT
Start: 2019-11-22 | End: 2019-11-22

## 2019-11-22 RX ORDER — MEPERIDINE HYDROCHLORIDE 50 MG/ML
12.5 INJECTION INTRAMUSCULAR; INTRAVENOUS; SUBCUTANEOUS ONCE AS NEEDED
Status: DISCONTINUED | OUTPATIENT
Start: 2019-11-22 | End: 2019-11-22 | Stop reason: HOSPADM

## 2019-11-22 RX ORDER — SODIUM CHLORIDE, SODIUM LACTATE, POTASSIUM CHLORIDE, CALCIUM CHLORIDE 600; 310; 30; 20 MG/100ML; MG/100ML; MG/100ML; MG/100ML
INJECTION, SOLUTION INTRAVENOUS CONTINUOUS
Status: DISCONTINUED | OUTPATIENT
Start: 2019-11-22 | End: 2019-11-22 | Stop reason: HOSPADM

## 2019-11-22 RX ORDER — FENTANYL CITRATE 50 UG/ML
INJECTION, SOLUTION INTRAMUSCULAR; INTRAVENOUS
Status: DISCONTINUED | OUTPATIENT
Start: 2019-11-22 | End: 2019-11-22

## 2019-11-22 RX ORDER — EPHEDRINE SULFATE 50 MG/ML
INJECTION, SOLUTION INTRAVENOUS
Status: DISCONTINUED | OUTPATIENT
Start: 2019-11-22 | End: 2019-11-22

## 2019-11-22 RX ORDER — OXYCODONE AND ACETAMINOPHEN 10; 325 MG/1; MG/1
1 TABLET ORAL EVERY 6 HOURS PRN
Qty: 20 TABLET | Refills: 0 | Status: SHIPPED | OUTPATIENT
Start: 2019-11-22 | End: 2020-01-30

## 2019-11-22 RX ORDER — BUPIVACAINE HYDROCHLORIDE 2.5 MG/ML
INJECTION, SOLUTION EPIDURAL; INFILTRATION; INTRACAUDAL
Status: DISCONTINUED | OUTPATIENT
Start: 2019-11-22 | End: 2019-11-22 | Stop reason: HOSPADM

## 2019-11-22 RX ORDER — SODIUM CHLORIDE, SODIUM LACTATE, POTASSIUM CHLORIDE, CALCIUM CHLORIDE 600; 310; 30; 20 MG/100ML; MG/100ML; MG/100ML; MG/100ML
75 INJECTION, SOLUTION INTRAVENOUS CONTINUOUS
Status: DISCONTINUED | OUTPATIENT
Start: 2019-11-22 | End: 2019-11-22 | Stop reason: HOSPADM

## 2019-11-22 RX ORDER — PROPOFOL 10 MG/ML
VIAL (ML) INTRAVENOUS
Status: DISCONTINUED | OUTPATIENT
Start: 2019-11-22 | End: 2019-11-22

## 2019-11-22 RX ORDER — CEFAZOLIN SODIUM 2 G/50ML
2 SOLUTION INTRAVENOUS
Status: COMPLETED | OUTPATIENT
Start: 2019-11-22 | End: 2019-11-22

## 2019-11-22 RX ORDER — LIDOCAINE HCL/PF 100 MG/5ML
SYRINGE (ML) INTRAVENOUS
Status: DISCONTINUED | OUTPATIENT
Start: 2019-11-22 | End: 2019-11-22

## 2019-11-22 RX ORDER — DIPHENHYDRAMINE HYDROCHLORIDE 50 MG/ML
25 INJECTION INTRAMUSCULAR; INTRAVENOUS EVERY 6 HOURS PRN
Status: DISCONTINUED | OUTPATIENT
Start: 2019-11-22 | End: 2019-11-22 | Stop reason: HOSPADM

## 2019-11-22 RX ORDER — MUPIROCIN 20 MG/G
OINTMENT TOPICAL
Status: DISCONTINUED | OUTPATIENT
Start: 2019-11-22 | End: 2019-11-22 | Stop reason: HOSPADM

## 2019-11-22 RX ORDER — OXYCODONE HYDROCHLORIDE 5 MG/1
5 TABLET ORAL
Status: DISCONTINUED | OUTPATIENT
Start: 2019-11-22 | End: 2019-11-22 | Stop reason: HOSPADM

## 2019-11-22 RX ORDER — DEXAMETHASONE SODIUM PHOSPHATE 4 MG/ML
INJECTION, SOLUTION INTRA-ARTICULAR; INTRALESIONAL; INTRAMUSCULAR; INTRAVENOUS; SOFT TISSUE
Status: DISCONTINUED | OUTPATIENT
Start: 2019-11-22 | End: 2019-11-22

## 2019-11-22 RX ADMIN — FENTANYL CITRATE 25 MCG: 0.05 INJECTION, SOLUTION INTRAMUSCULAR; INTRAVENOUS at 08:11

## 2019-11-22 RX ADMIN — EPHEDRINE SULFATE 25 MG: 50 INJECTION, SOLUTION INTRAMUSCULAR; INTRAVENOUS; SUBCUTANEOUS at 07:11

## 2019-11-22 RX ADMIN — DEXAMETHASONE SODIUM PHOSPHATE 4 MG: 4 INJECTION, SOLUTION INTRAMUSCULAR; INTRAVENOUS at 07:11

## 2019-11-22 RX ADMIN — CEFAZOLIN SODIUM 2 G: 2 SOLUTION INTRAVENOUS at 07:11

## 2019-11-22 RX ADMIN — MIDAZOLAM 2 MG: 1 INJECTION INTRAMUSCULAR; INTRAVENOUS at 07:11

## 2019-11-22 RX ADMIN — LIDOCAINE HYDROCHLORIDE 10 MG: 10 INJECTION, SOLUTION EPIDURAL; INFILTRATION; INTRACAUDAL; PERINEURAL at 06:11

## 2019-11-22 RX ADMIN — OXYCODONE HYDROCHLORIDE 5 MG: 5 TABLET ORAL at 08:11

## 2019-11-22 RX ADMIN — ONDANSETRON 4 MG: 2 INJECTION, SOLUTION INTRAMUSCULAR; INTRAVENOUS at 07:11

## 2019-11-22 RX ADMIN — SODIUM CHLORIDE, SODIUM LACTATE, POTASSIUM CHLORIDE, AND CALCIUM CHLORIDE: .6; .31; .03; .02 INJECTION, SOLUTION INTRAVENOUS at 06:11

## 2019-11-22 RX ADMIN — FENTANYL CITRATE 50 MCG: 50 INJECTION, SOLUTION INTRAMUSCULAR; INTRAVENOUS at 07:11

## 2019-11-22 RX ADMIN — MUPIROCIN: 20 OINTMENT TOPICAL at 06:11

## 2019-11-22 RX ADMIN — PROPOFOL 200 MG: 10 INJECTION, EMULSION INTRAVENOUS at 07:11

## 2019-11-22 RX ADMIN — ACETAMINOPHEN 1000 MG: 10 INJECTION, SOLUTION INTRAVENOUS at 07:11

## 2019-11-22 RX ADMIN — LIDOCAINE HYDROCHLORIDE 100 MG: 20 INJECTION, SOLUTION INTRAVENOUS at 07:11

## 2019-11-22 NOTE — PLAN OF CARE
Dr Aguirre updated and released from pacu vs wnl skin w+d  Pain controlled  Tolerable v/s wnl ice to r knee dsg   No nausea no emesis

## 2019-11-22 NOTE — OP NOTE
[facility]  Orthopedic Surgery  Operative Note    SUMMARY     Date of Procedure: 11/22/2019     Procedure: Procedure(s) (LRB):  ARTHROSCOPY, KNEE (Right) with partial lateral meniscectomy       Surgeon(s) and Role:     * Felix Levin MD - Primary    Assistant: Wilmer Zhao    Pre-Operative Diagnosis: Acute lateral meniscal tear, right, subsequent encounter [S83.281D]    Post-Operative Diagnosis: Post-Op Diagnosis Codes:     * Acute lateral meniscal tear, right, subsequent encounter [S83.281D]    Anesthesia: General    Diagnostic arthroscopy findings: Diagnostic arthroscopy findings, the patient's medial compartment was thoroughly examined. The patient had very mild femoral condylar cartilage wear and no distinct meniscal tear. ACL and PCL were both intact with   good tension. Lateral compartment showed anterior horn tearing as well with some mild tibial plateau articular   wear. In the patellofemoral joint, the patient had good central tracking without tilt and mild chondromalacia    Complications: No    Estimated Blood Loss (EBL): 5ml    Tourniquet Time: 12min at 300mmHg           Implants: * No implants in log *    Specimens:   Specimen (12h ago, onward)    None                  Condition: Good    Disposition: PACU - hemodynamically stable.    Attestation: I was present and scrubbed for the entire procedure.    INDICATIONS FOR THE PROCEDURE:  54-year-old male with history of right lateral knee pain.  Patient had an MRI which showed lateral meniscal tear. Patient failed conservative measures and wished to proceed with the procedure listed previously.    PROCEDURE IN DETAIL: Risks, benefits and alternatives of the procedure were   explained to the patient including, but not limited to damage to nerves,   arteries, blood vessels. Also explained risk of infection, DVT, PE, continued pain due to arthritis,  as well as   anesthetic complications including seizure, stroke, heart attack and  death. They  understood this and signed informed consent. The patient's Right knee was marked prior to coming to the Operating Room. Once there a formal   timeout was done in which correct patient, procedure and op site were all   correctly identified and confirmed by the entire operating team. Ancef 2 g was   given prior to surgical incision. Laryngeal mask anesthesia was induced. The   patient's Right lower extremity was prepped and draped in normal sterile fashion.   Leg was then exsanguinated with a tourniquet and tourniquet was inflated up   300 mmHg. Standard inferior lateral portal was then made. A spinal needle was   used to localize an inferior medial portal and this was made under direct   arthroscopic visualization. Diagnostic arthroscopy was then performed with the   findings listed above. Shaver was used to remove redundant fat pad and   Synovium within the notch. A combination of arthroscopic biters and 3.5mm full radius shaver was used to perform a partial anterior horn lateral menisectomy back to stable healthy appearing tissue.      Proceeded with closing. All   excess water was removed from the knee joint. Portals were closed using   nylons. Portal was then injected with 0.25% Marcaine with epinephrine. Sterile   dressing was then applied. They were then extubated and awakened and transferred   from the Operating Room to the Recovery Room in stable condition.     Postop course is for an arthroscopic anterior horn partial lateral meniscectomy

## 2019-11-22 NOTE — TRANSFER OF CARE
"Anesthesia Transfer of Care Note    Patient: Himanshu Connor II    Procedure(s) Performed: Procedure(s) (LRB):  ARTHROSCOPY, KNEE (Right)    Patient location: PACU    Anesthesia Type: general    Transport from OR: Transported from OR on 2-3 L/min O2 by NC with adequate spontaneous ventilation    Post pain: adequate analgesia    Post assessment: no apparent anesthetic complications and tolerated procedure well    Post vital signs: stable    Level of consciousness: awake, alert and oriented    Nausea/Vomiting: no nausea/vomiting    Complications: none    Transfer of care protocol was followed      Last vitals:   Visit Vitals  BP (!) 107/59   Pulse 78   Temp 36.8 °C (98.2 °F) (Skin)   Resp 14   Ht 5' 10" (1.778 m)   Wt 83.9 kg (185 lb)   SpO2 97%   BMI 26.54 kg/m²     "

## 2019-11-22 NOTE — ANESTHESIA PROCEDURE NOTES
Intubation  Performed by: Eloy Munoz CRNA  Authorized by: Rudy Sandoval MD     Intubation:     Induction:  Intravenous    Intubated:  Postinduction    Mask Ventilation:  Easy with oral airway    Attempts:  1    Attempted By:  CRNA    Difficult Airway Encountered?: No      Complications:  None    Airway Device:  Supraglottic airway/LMA    Airway Device Size:  4.0    Style/Cuff Inflation:  Cuffed (inflated to minimal occlusive pressure)    Inflation Amount (mL):  20    Secured at:  The lips    Placement Verified By:  Capnometry    Complicating Factors:  None    Findings Post-Intubation:  BS equal bilateral

## 2019-11-22 NOTE — DISCHARGE INSTRUCTIONS
1.Diet: Ice chips, clear liquids, and then diet as tolerated. Drink plenty of liquids.  2.Ice the area at least three times a day (20 minutes per session).  3.Elevate the extremity above the level of the heart to help reduce swelling.  4.Pain medication can be taken every four to six hours as needed. It is helpful to take pain medication prior to physical therapy.  5.Any activity that requires precise thinking or accuracy should be avoided for a minimum of 72 hours after surgery and while on narcotic pain medication. This includes operating machinery and/or driving a vehicle.  6.All sutures/staples will be removed approximately 14 days from the time of surgery. Leave steri-strips (skin tapes) in place until sutures are removed.  7. If skin glue is used instead of stitches, do not apply ointments or solutions to the incision. Keep the incision dry. The skin glue will peel off in 3-4 weeks.  8. Change dressing on the first post-op day. Use gauze for the first 3 days, then start using Band-Aids over the incision sites.   9. All casts, splints, braces, slings, crutches, abduction pillows, etc... Are to be worn as instructed. Crutches are just to be used as needed. If not needed for soreness or balance, may weight bear as tolerated without the crutches.  10. Keep the incision dry for 10-14 days. A waterproof dressing (purchase at [employer], [employer], etc) can be used to shower. No bath, pool, hot tub until instructed.  11. Call [phone] with any questions or concerns.      Discharge Instructions: After Your Surgery/Procedure  Youve just had surgery. During surgery you were given medicine called anesthesia to keep you relaxed and free of pain. After surgery you may have some pain or nausea. This is common. Here are some tips for feeling better and getting well after surgery.     Stay on schedule with your medication.   Going home  Your doctor or nurse will show you how to take care of yourself when you go home. He or she will  "also answer your questions. Have an adult family member or friend drive you home.      For your safety we recommend these precaution for the first 24 hours after your procedure:  · Do not drive or use heavy equipment.  · Do not make important decisions or sign legal papers.  · Do not drink alcohol.  · Have someone stay with you, if needed. He or she can watch for problems and help keep you safe.  · Your concentration, balance, coordination, and judgement may be impaired for many hours after anesthesia.  Use caution when ambulating or standing up.     · You may feel weak and "washed out" after anesthesia and surgery.      Subtle residual effects of general anesthesia or sedation with regional / local anesthesia can last more than 24 hours.  Rest for the remainder of the day or longer if your Doctor/Surgeon has advised you to do so.  Although you may feel normal within the first 24 hours, your reflexes and mental ability may be impaired without you realizing it.  You may feel dizzy, lightheaded or sleepy for 24 hours or longer.      Be sure to go to all follow-up visits with your doctor. And rest after your surgery for as long as your doctor tells you to.  Coping with pain  If you have pain after surgery, pain medicine will help you feel better. Take it as told, before pain becomes severe. Also, ask your doctor or pharmacist about other ways to control pain. This might be with heat, ice, or relaxation. And follow any other instructions your surgeon or nurse gives you.  Tips for taking pain medicine  To get the best relief possible, remember these points:  · Pain medicines can upset your stomach. Taking them with a little food may help.  · Most pain relievers taken by mouth need at least 20 to 30 minutes to start to work.  · Taking medicine on a schedule can help you remember to take it. Try to time your medicine so that you can take it before starting an activity. This might be before you get dressed, go for a walk, " or sit down for dinner.  · Constipation is a common side effect of pain medicines. Call your doctor before taking any medicines such as laxatives or stool softeners to help ease constipation. Also ask if you should skip any foods. Drinking lots of fluids and eating foods such as fruits and vegetables that are high in fiber can also help. Remember, do not take laxatives unless your surgeon has prescribed them.  · Drinking alcohol and taking pain medicine can cause dizziness and slow your breathing. It can even be deadly. Do not drink alcohol while taking pain medicine.  · Pain medicine can make you react more slowly to things. Do not drive or run machinery while taking pain medicine.  Your health care provider may tell you to take acetaminophen to help ease your pain. Ask him or her how much you are supposed to take each day. Acetaminophen or other pain relievers may interact with your prescription medicines or other over-the-counter (OTC) drugs. Some prescription medicines have acetaminophen and other ingredients. Using both prescription and OTC acetaminophen for pain can cause you to overdose. Read the labels on your OTC medicines with care. This will help you to clearly know the list of ingredients, how much to take, and any warnings. It may also help you not take too much acetaminophen. If you have questions or do not understand the information, ask your pharmacist or health care provider to explain it to you before you take the OTC medicine.  Managing nausea  Some people have an upset stomach after surgery. This is often because of anesthesia, pain, or pain medicine, or the stress of surgery. These tips will help you handle nausea and eat healthy foods as you get better. If you were on a special food plan before surgery, ask your doctor if you should follow it while you get better. These tips may help:  · Do not push yourself to eat. Your body will tell you when to eat and how much.  · Start off with clear  liquids and soup. They are easier to digest.  · Next try semi-solid foods, such as mashed potatoes, applesauce, and gelatin, as you feel ready.  · Slowly move to solid foods. Dont eat fatty, rich, or spicy foods at first.  · Do not force yourself to have 3 large meals a day. Instead eat smaller amounts more often.  · Take pain medicines with a small amount of solid food, such as crackers or toast, to avoid nausea.     Call your surgeon if  · You still have pain an hour after taking medicine. The medicine may not be strong enough.  · You feel too sleepy, dizzy, or groggy. The medicine may be too strong.  · You have side effects like nausea, vomiting, or skin changes, such as rash, itching, or hives.       If you have obstructive sleep apnea  You were given anesthesia medicine during surgery to keep you comfortable and free of pain. After surgery, you may have more apnea spells because of this medicine and other medicines you were given. The spells may last longer than usual.   At home:  · Keep using the continuous positive airway pressure (CPAP) device when you sleep. Unless your health care provider tells you not to, use it when you sleep, day or night. CPAP is a common device used to treat obstructive sleep apnea.  · Talk with your provider before taking any pain medicine, muscle relaxants, or sedatives. Your provider will tell you about the possible dangers of taking these medicines.  © 0231-1280 [employer]. [address] [place] [address]. All rights reserved. This information is not intended as a substitute for professional medical care. Always follow your healthcare professional's instructions.          General Information:    1.  Do not drink alcoholic beverages including beer for 24 hours or as long as you are on pain medication..  2.  Do not drive a motor vehicle, operate machinery or power tools, or signs legal papers for 24 hours or as long as you are on pain medication.   3.   You may experience light-headedness, dizziness, and sleepiness following surgery. Please do not stay alone. A responsible adult should be with you for this 24 hour period.  4.  Go home and rest.    5. Progress slowly to a normal diet unless instructed.  Otherwise, begin with liquids such as soft drinks, then soup and crackers working up to solid foods. Drink plenty of nonalcoholic fluids.  6.  Certain anesthetics and pain medications produce nausea and vomiting in certain       individuals. If nausea becomes a problem at home, call you doctor.    7. A nurse will be calling you sometime after surgery. Do not be alarmed. This is our way of finding out how you are doing.    8. Several times every hour while you are awake, take 2-3 deep breaths and cough. If you had stomach surgery hold a pillow or rolled towel firmly against your stomach before you cough. This will help with any pain the cough might cause.  9. Several times every hour while you are awake, pump and flex your feet 5-6 times and do foot circles. This will help prevent blood clots.    10.Call your doctor for severe pain, bleeding, fever, or signs or symptoms of infection (pain, swelling, redness, foul odor, drainage).        Post op instructions for prevention of DVT  What is deep vein thrombosis?  Deep vein thrombosis (DVT) is the medical term for blood clots in the deep veins of the leg.  These blood clots can be dangerous.  A DVT can block a blood vessel and keep blood from getting where it needs to go.  Another problem is that the clot can travel to other parts of the body such as the lungs.  A clot that travels to the lungs is called a pulmonary embolus (PE) and can cause serious problems with breathing which can lead to death.  Am I at risk for DVT/PE?  If you are not very active, you are at risk of DVT.  Anyone confined to bed, sitting for long periods of time, recovering from surgery, etc. increases the risk of DVT.  Other risk factors are cancer  diagnosis, certain medications, estrogen replacement in any form,older age, obesity, pregnancy, smoking, history of clotting disorders, and dehydration.  How will I know if I have a DVT?   Swelling in the lower leg   Pain   Warmth, redness, hardness or bulging of the vein  If you have any of these symptoms, call your doctors office right away.  Some people will not have any symptoms until the clot moves to the lungs.  What are the symptoms of a PE?   Panting, shortness of breath, or trouble breathing   Sharp, knife-like chest pain when you breathe   Coughing or coughing up blood   Rapid heartbeat  If you have any of these symptoms or get worse quickly, call 911 for emergency treatment.  How can I prevent a DVT?   Avoid long periods of inactivity and dont cross your legs--get up and walk around every hour or so.   Stay active--walking after surgery is highly encouraged.  This means you should get out of the house and walk in the neighborhood.  Going up and down stairs will not impair healing (unless advised against such activity by your doctor).     Drink plenty of noncaffeinated, nonalcoholic fluids each day to prevent dehydration.   Wear special support stockings, if they have been advised by your doctor.   If you travel, stop at least once an hour and walk around.   Avoid smoking (assistance with stopping is available through your healthcare provider)  Always notify your doctor if you are not able to follow the post operative instructions that are given to you at the time of discharge.  It may be necessary to prescribe one of the medications available to prevent DVT.

## 2019-11-22 NOTE — ANESTHESIA PREPROCEDURE EVALUATION
11/22/2019  Himanshu Connor II is a 54 y.o., male.    Pre-op Assessment    I have reviewed the Patient Summary Reports.     I have reviewed the Nursing Notes.   I have reviewed the Medications.     Review of Systems  Anesthesia Hx:  No problems with previous Anesthesia    Social:  Non-Smoker    Cardiovascular:   Hypertension, well controlled    Pulmonary:   Asthma asymptomatic Sleep Apnea    Renal/:  Renal/ Normal     Hepatic/GI:   GERD, well controlled    Musculoskeletal:   Arthritis     Neurological:   Neuromuscular Disease,    Endocrine:  Endocrine Normal    Psych:   Psychiatric History depression          Physical Exam  General:  Well nourished    Airway/Jaw/Neck:  Airway Findings: Mouth Opening: Normal Tongue: Normal  General Airway Assessment: Adult  Oropharynx Findings:  Mallampati: II  Jaw/Neck Findings:  Neck ROM: Normal ROM     Eyes/Ears/Nose:  Eyes/Ears/Nose Findings:    Dental:  Dental Findings:   Chest/Lungs:  Chest/Lungs Findings: Normal Respiratory Rate     Heart/Vascular:  Heart Findings: Rate: Normal  Rhythm: Regular Rhythm        Mental Status:  Mental Status Findings:  Cooperative, Alert and Oriented         Anesthesia Plan  Type of Anesthesia, risks & benefits discussed:  Anesthesia Type:  general  Patient's Preference:   Intra-op Monitoring Plan: standard ASA monitors  Intra-op Monitoring Plan Comments:   Post Op Pain Control Plan: multimodal analgesia  Post Op Pain Control Plan Comments:   Induction:   IV  Beta Blocker:  Patient is not currently on a Beta-Blocker (No further documentation required).       Informed Consent: Patient understands risks and agrees with Anesthesia plan.  Questions answered. Anesthesia consent signed with patient.  ASA Score: 2     Day of Surgery Review of History & Physical: I have interviewed and examined the patient. I have reviewed the patient's H&P  dated:  There are no significant changes.  H&P update referred to the surgeon.  H&P completed by Anesthesiologist.       Ready For Surgery From Anesthesia Perspective.

## 2019-11-22 NOTE — ANESTHESIA POSTPROCEDURE EVALUATION
"Anesthesia Post Evaluation    Patient: Himanshu Connor II    Procedure(s) Performed: Procedure(s) (LRB):  ARTHROSCOPY, KNEE (Right)    Final Anesthesia Type: general    Patient location during evaluation: PACU  Patient participation: Yes- Able to Participate  Level of consciousness: awake and alert and oriented  Post-procedure vital signs: reviewed and stable  Pain management: adequate  Airway patency: patent    PONV status at discharge: No PONV  Anesthetic complications: no      Cardiovascular status: blood pressure returned to baseline  Respiratory status: unassisted, spontaneous ventilation and room air  Hydration status: euvolemic  Follow-up not needed.          Vitals Value Taken Time   /72 11/22/2019  8:59 AM   Temp 36.8 °C (98.2 °F) 11/22/2019  8:55 AM   Pulse 77 11/22/2019  8:59 AM   Resp 14 11/22/2019  8:59 AM   SpO2 99 % 11/22/2019  8:59 AM         Event Time     Out of Recovery 08:59:00          Pain/Yovany Score: Pain Rating Prior to Med Admin: 5 (11/22/2019  8:58 AM)  Pain Rating Post Med Admin: 4 (tolerable ") (11/22/2019  8:58 AM)  Yovany Score: 10 (Simultaneous filing. User may not have seen previous data.) (11/22/2019  8:58 AM)        "

## 2019-11-22 NOTE — PLAN OF CARE
Discharge instructions given to pt who voices understanding.  Friend who is giving him a ride home will call from entrance, will not come up to unit.  Taking po fluids without nausea.  Pain 5/10 and tolerable per pt.  Surgical dressing to RLE dry and intact.  All questions answered, all personal belongings returned to pt.  D/c instructions to be reinforced w/ friend upon actual discharge

## 2019-11-22 NOTE — DISCHARGE SUMMARY
[facility]  Discharge Note  Short Stay    Admit Date: 11/22/2019    Discharge Date and Time: 11/22/2019    Attending Physician: Felix Levin MD     Discharge Provider: Felix Levin    Diagnoses:  Active Hospital Problems    Diagnosis  POA    *Acute lateral meniscal tear, right, subsequent encounter [S83.281D]  Not Applicable      Resolved Hospital Problems   No resolved problems to display.       Discharged Condition: good    Hospital Course: Patient was admitted for an outpatient procedure and tolerated the procedure well with no complications.    Final Diagnoses: Same as principal problem.    Disposition: Home or Self Care    Follow up/Patient Instructions:    Medications:  Reconciled Home Medications:      Medication List      CHANGE how you take these medications    oxyCODONE-acetaminophen  mg per tablet  Commonly known as:  PERCOCET  Take 1 tablet by mouth every 6 (six) hours as needed for Pain.  What changed:  when to take this        CONTINUE taking these medications    ALPRAZolam 0.5 MG tablet  Commonly known as:  XANAX  TK 1 T PO BID     cyanocobalamin 1000 MCG tablet  Commonly known as:  VITAMIN B-12  Take 1,000 mcg by mouth once daily.     cyclobenzaprine 10 MG tablet  Commonly known as:  FLEXERIL  Take 10 mg by mouth 3 (three) times daily as needed for Muscle spasms.     escitalopram oxalate 20 MG tablet  Commonly known as:  LEXAPRO  Take 20 mg by mouth once daily.     losartan 25 MG tablet  Commonly known as:  COZAAR  Take 25 mg by mouth once daily.     pantoprazole 40 MG tablet  Commonly known as:  PROTONIX  TK ONE  T PO D     ranitidine 150 MG capsule  Commonly known as:  ZANTAC  Take 150 mg by mouth 2 (two) times daily as needed.     sucralfate 1 gram tablet  Commonly known as:  CARAFATE  TK 1 T PO QID 1 HOUR BEFORE MEALS AND 1 T QHS OES          Discharge Procedure Orders   Diet Adult Regular     Keep surgical extremity elevated     Ice to affected area      Call MD for:  temperature >100.4     Call MD for:  severe uncontrolled pain     Call MD for:  redness, tenderness, or signs of infection (pain, swelling, redness, odor or green/yellow discharge around incision site)     Remove dressing in 48 hours     Change dressing (specify)   Order Comments: Dressing change: One time per day using Waterproof Bandaids.     Activity as tolerated     Shower on day dressing removed (No bath)     Weight bearing restrictions (specify):     Follow-up Information     Felix Levin MD In 2 weeks.    Specialties:  Sports Medicine, Orthopedic Surgery  Why:  For suture removal  Contact information:  [address] [place] [address] [address]  [place] [address]  [phone]                   Discharge Procedure Orders (must include Diet, Follow-up, Activity):   Discharge Procedure Orders (must include Diet, Follow-up, Activity)   Diet Adult Regular     Keep surgical extremity elevated     Ice to affected area     Call MD for:  temperature >100.4     Call MD for:  severe uncontrolled pain     Call MD for:  redness, tenderness, or signs of infection (pain, swelling, redness, odor or green/yellow discharge around incision site)     Remove dressing in 48 hours     Change dressing (specify)   Order Comments: Dressing change: One time per day using Waterproof Bandaids.     Activity as tolerated     Shower on day dressing removed (No bath)     Weight bearing restrictions (specify):

## 2019-11-22 NOTE — PROGRESS NOTES
Escorted to exit via w/c and discharged home with friend per private vehicle.  Discharge instructions re-inforced

## 2019-11-22 NOTE — INTERVAL H&P NOTE
The patient has been examined and the H&P has been reviewed:    I concur with the findings and no changes have occurred since H&P was written.    Anesthesia/Surgery risks, benefits and alternative options discussed and understood by patient/family.          Active Hospital Problems    Diagnosis  POA    Acute lateral meniscal tear, right, subsequent encounter [H08.816Q]  Not Applicable      Resolved Hospital Problems   No resolved problems to display.

## 2019-11-25 VITALS
WEIGHT: 185 LBS | HEIGHT: 70 IN | OXYGEN SATURATION: 99 % | RESPIRATION RATE: 14 BRPM | HEART RATE: 73 BPM | BODY MASS INDEX: 26.48 KG/M2 | DIASTOLIC BLOOD PRESSURE: 74 MMHG | SYSTOLIC BLOOD PRESSURE: 131 MMHG | TEMPERATURE: 98 F

## 2019-12-09 ENCOUNTER — OFFICE VISIT (OUTPATIENT)
Dept: ORTHOPEDICS | Facility: CLINIC | Age: 54
End: 2019-12-09
Payer: COMMERCIAL

## 2019-12-09 VITALS — WEIGHT: 185 LBS | RESPIRATION RATE: 18 BRPM | HEIGHT: 70 IN | BODY MASS INDEX: 26.48 KG/M2

## 2019-12-09 DIAGNOSIS — S83.282S ACUTE LATERAL MENISCAL TEAR, LEFT, SEQUELA: ICD-10-CM

## 2019-12-09 DIAGNOSIS — S83.281D ACUTE LATERAL MENISCAL TEAR, RIGHT, SUBSEQUENT ENCOUNTER: ICD-10-CM

## 2019-12-09 DIAGNOSIS — M75.122 NONTRAUMATIC COMPLETE TEAR OF LEFT ROTATOR CUFF: Primary | ICD-10-CM

## 2019-12-09 PROCEDURE — 99213 OFFICE O/P EST LOW 20 MIN: CPT | Mod: PBBFAC,PN | Performed by: ORTHOPAEDIC SURGERY

## 2019-12-09 PROCEDURE — 20610 DRAIN/INJ JOINT/BURSA W/O US: CPT | Mod: PBBFAC,PN | Performed by: ORTHOPAEDIC SURGERY

## 2019-12-09 PROCEDURE — 99024 POSTOP FOLLOW-UP VISIT: CPT | Mod: ,,, | Performed by: ORTHOPAEDIC SURGERY

## 2019-12-09 PROCEDURE — 99024 PR POST-OP FOLLOW-UP VISIT: ICD-10-PCS | Mod: ,,, | Performed by: ORTHOPAEDIC SURGERY

## 2019-12-09 PROCEDURE — 99999 PR PBB SHADOW E&M-EST. PATIENT-LVL III: ICD-10-PCS | Mod: PBBFAC,,, | Performed by: ORTHOPAEDIC SURGERY

## 2019-12-09 PROCEDURE — 20610 LARGE JOINT ASPIRATION/INJECTION: L SUBACROMIAL BURSA: ICD-10-PCS | Mod: S$PBB,58,LT, | Performed by: ORTHOPAEDIC SURGERY

## 2019-12-09 PROCEDURE — 99999 PR PBB SHADOW E&M-EST. PATIENT-LVL III: CPT | Mod: PBBFAC,,, | Performed by: ORTHOPAEDIC SURGERY

## 2019-12-09 RX ORDER — TRIAMCINOLONE ACETONIDE 40 MG/ML
40 INJECTION, SUSPENSION INTRA-ARTICULAR; INTRAMUSCULAR
Status: DISCONTINUED | OUTPATIENT
Start: 2019-12-09 | End: 2019-12-09 | Stop reason: HOSPADM

## 2019-12-09 RX ADMIN — TRIAMCINOLONE ACETONIDE 40 MG: 40 INJECTION, SUSPENSION INTRA-ARTICULAR; INTRAMUSCULAR at 11:12

## 2019-12-09 NOTE — PROGRESS NOTES
Past Medical History:   Diagnosis Date    Alcohol abuse, unspecified     Allergic rhinitis, cause unspecified     Arthritis     Asthma attack     Carpal tunnel syndrome     Depressive disorder, not elsewhere classified     Diarrhea     Encounter for blood transfusion     GERD (gastroesophageal reflux disease)     Hypersomnia, unspecified     Hypertension     Lumbago     Obesity, unspecified     JAH (obstructive sleep apnea)     uses BIPAP    Other and unspecified hyperlipidemia     Other ankle sprain and strain     Psychosexual dysfunction with inhibited sexual excitement     Ventral hernia, unspecified, without mention of obstruction or gangrene        Past Surgical History:   Procedure Laterality Date    A-scope knees      Bilateral    ARTHROSCOPIC REPAIR OF ROTATOR CUFF OF SHOULDER Left 9/20/2019    Procedure: REPAIR, ROTATOR CUFF, ARTHROSCOPIC;  Surgeon: Felix Levin MD;  Location: [facility] OR;  Service: Orthopedics;  Laterality: Left;    ARTHROSCOPY OF SHOULDER WITH DECOMPRESSION OF SUBACROMIAL SPACE Left 9/20/2019    Procedure: ARTHROSCOPY, SHOULDER, WITH SUBACROMIAL SPACE DECOMPRESSION;  Surgeon: Felix Levin MD;  Location: [facility] OR;  Service: Orthopedics;  Laterality: Left;  Jaspreet- Arthrex notified of all case today 9/16-tcb    CARPAL TUNNEL RELEASE      Bilateral    EYE SURGERY      Lasik    HERNIA REPAIR      KNEE ARTHROSCOPY W/ MENISCECTOMY Left 10/15/2019    Procedure: ARTHROSCOPY, KNEE, WITH MENISCECTOMY;  Surgeon: Felix Levin MD;  Location: [facility] OR;  Service: Orthopedics;  Laterality: Left;  Medial and Lateral Meniscectomy    KNEE ARTHROSCOPY W/ MENISCECTOMY Right 11/22/2019    Procedure: ARTHROSCOPY, KNEE, WITH MENISCECTOMY;  Surgeon: Felix Levin MD;  Location: [facility] OR;  Service: Orthopedics;  Laterality: Right;    KNEE SURGERY      osmar    NISSEN FUNDOPLICATION      ROTATOR CUFF REPAIR      right    SLEEVE GASTROPLASTY          Current Outpatient Medications   Medication Sig    ALPRAZolam (XANAX) 0.5 MG tablet TK 1 T PO BID    cyanocobalamin (VITAMIN B-12) 1000 MCG tablet Take 1,000 mcg by mouth once daily.     cyclobenzaprine (FLEXERIL) 10 MG tablet Take 10 mg by mouth 3 (three) times daily as needed for Muscle spasms.    escitalopram oxalate (LEXAPRO) 20 MG tablet Take 20 mg by mouth once daily.    losartan (COZAAR) 25 MG tablet Take 25 mg by mouth once daily.    oxyCODONE-acetaminophen (PERCOCET)  mg per tablet Take 1 tablet by mouth every 6 (six) hours as needed for Pain.    pantoprazole (PROTONIX) 40 MG tablet TK ONE  T PO D    ranitidine (ZANTAC) 150 MG capsule Take 150 mg by mouth 2 (two) times daily as needed.     sucralfate (CARAFATE) 1 gram tablet TK 1 T PO QID 1 HOUR BEFORE MEALS AND 1 T QHS OES     No current facility-administered medications for this visit.        Review of patient's allergies indicates:  No Known Allergies    History reviewed. No pertinent family history.    Social History     Socioeconomic History    Marital status:      Spouse name: Not on file    Number of children: Not on file    Years of education: Not on file    Highest education level: Not on file   Occupational History    Not on file   Social Needs    Financial resource strain: Not on file    Food insecurity:     Worry: Not on file     Inability: Not on file    Transportation needs:     Medical: Not on file     Non-medical: Not on file   Tobacco Use    Smoking status: Never Smoker    Smokeless tobacco: Never Used   Substance and Sexual Activity    Alcohol use: Not Currently     Comment: sober 72 days    Drug use: No    Sexual activity: Not on file   Lifestyle    Physical activity:     Days per week: Not on file     Minutes per session: Not on file    Stress: Not on file   Relationships    Social connections:     Talks on phone: Not on file     Gets together: Not on file     Attends Zoroastrian service: Not on  file     Active member of club or organization: Not on file     Attends meetings of clubs or organizations: Not on file     Relationship status: Not on file   Other Topics Concern    Not on file   Social History Narrative    Not on file       Chief Complaint:   Chief Complaint   Patient presents with    Post-op Evaluation     s/p right knee scope 11/22/19        Date of surgery:  September 20, 2019 revision left massive rotator cuff repair including patch augmentation  October 15, 2019 left partial lateral meniscectomy  November 22, 2019 right partial lateral meniscectomy    History of present illness:  54-year-old male had a right knee arthroscopy recently.  His knee is doing well.  His shoulder is clicking and crunching a little more.  Increased pain.  Pain is an 8/10.  Knee pain is minimal.      Review of Systems:    Musculoskeletal:  See HPI        Physical Examination:    Vital Signs:    Vitals:    12/09/19 1035   Resp: 18       Body mass index is 26.54 kg/m².    This a well-developed, well nourished patient in no acute distress.  They are alert and oriented and cooperative to examination.  Pt. walks without an antalgic gait.      Examination of the left shoulder shows full range of motion.  Fair amount of crepitus.  4/5 strength. Neurovascularly intact      Assessment::  Status post revision massive rotator cuff repair with patch augmentation  Status post left partial lateral meniscectomy  Status post right partial lateral meniscectomy    Plan:  I reviewed the arthroscopic photos with him from his knee.  Continue to rehab his knee. Offered him an injection for his shoulder.  I will see him back in a couple months.    This note was created using [employer] voice recognition software that occasionally misinterpreted phrases or words.

## 2019-12-09 NOTE — PROCEDURES
Large Joint Aspiration/Injection: L subacromial bursa  Date/Time: 12/9/2019 11:15 AM  Performed by: Felix Levin MD  Authorized by: Felix Levin MD     Consent Done?:  Yes (Verbal)  Indications:  Pain  Procedure site marked: Yes    Timeout: Prior to procedure the correct patient, procedure, and site was verified    Anesthesia    Anesthetic: lidocaine 1% without epinephrine and bupivacaine 0.25% without epinephrine  Anesthetic total: 6mL    Location:  Shoulder  Site:  L subacromial bursa  Prep: Patient was prepped and draped in usual sterile fashion    Needle size:  20 G  Ultrasonic Guidance for needle placement: No  Approach:  Posterior  Medications:  40 mg triamcinolone acetonide 40 mg/mL  Patient tolerance:  Patient tolerated the procedure well with no immediate complications

## 2020-01-06 ENCOUNTER — OFFICE VISIT (OUTPATIENT)
Dept: ORTHOPEDICS | Facility: CLINIC | Age: 55
End: 2020-01-06
Payer: COMMERCIAL

## 2020-01-06 VITALS
WEIGHT: 184.94 LBS | SYSTOLIC BLOOD PRESSURE: 152 MMHG | HEART RATE: 88 BPM | DIASTOLIC BLOOD PRESSURE: 81 MMHG | BODY MASS INDEX: 26.48 KG/M2 | RESPIRATION RATE: 13 BRPM | HEIGHT: 70 IN

## 2020-01-06 DIAGNOSIS — M75.122 NONTRAUMATIC COMPLETE TEAR OF LEFT ROTATOR CUFF: Primary | ICD-10-CM

## 2020-01-06 PROCEDURE — 99999 PR PBB SHADOW E&M-EST. PATIENT-LVL III: ICD-10-PCS | Mod: PBBFAC,,, | Performed by: ORTHOPAEDIC SURGERY

## 2020-01-06 PROCEDURE — 99999 PR PBB SHADOW E&M-EST. PATIENT-LVL III: CPT | Mod: PBBFAC,,, | Performed by: ORTHOPAEDIC SURGERY

## 2020-01-06 PROCEDURE — 99213 PR OFFICE/OUTPT VISIT, EST, LEVL III, 20-29 MIN: ICD-10-PCS | Mod: S$PBB,24,, | Performed by: ORTHOPAEDIC SURGERY

## 2020-01-06 PROCEDURE — 99213 OFFICE O/P EST LOW 20 MIN: CPT | Mod: S$PBB,24,, | Performed by: ORTHOPAEDIC SURGERY

## 2020-01-06 PROCEDURE — 99213 OFFICE O/P EST LOW 20 MIN: CPT | Mod: PBBFAC,PN | Performed by: ORTHOPAEDIC SURGERY

## 2020-01-06 RX ORDER — HYDROCODONE BITARTRATE AND ACETAMINOPHEN 5; 325 MG/1; MG/1
1 TABLET ORAL EVERY 6 HOURS PRN
Qty: 40 TABLET | Refills: 0 | Status: SHIPPED | OUTPATIENT
Start: 2020-01-06 | End: 2020-01-16 | Stop reason: SDUPTHER

## 2020-01-06 NOTE — PROGRESS NOTES
Past Medical History:   Diagnosis Date    Alcohol abuse, unspecified     Allergic rhinitis, cause unspecified     Arthritis     Asthma attack     Carpal tunnel syndrome     Depressive disorder, not elsewhere classified     Diarrhea     Encounter for blood transfusion     GERD (gastroesophageal reflux disease)     Hypersomnia, unspecified     Hypertension     Lumbago     Obesity, unspecified     JAH (obstructive sleep apnea)     uses BIPAP    Other and unspecified hyperlipidemia     Other ankle sprain and strain     Psychosexual dysfunction with inhibited sexual excitement     Ventral hernia, unspecified, without mention of obstruction or gangrene        Past Surgical History:   Procedure Laterality Date    A-scope knees      Bilateral    ARTHROSCOPIC REPAIR OF ROTATOR CUFF OF SHOULDER Left 9/20/2019    Procedure: REPAIR, ROTATOR CUFF, ARTHROSCOPIC;  Surgeon: Felix Levin MD;  Location: [facility] OR;  Service: Orthopedics;  Laterality: Left;    ARTHROSCOPY OF SHOULDER WITH DECOMPRESSION OF SUBACROMIAL SPACE Left 9/20/2019    Procedure: ARTHROSCOPY, SHOULDER, WITH SUBACROMIAL SPACE DECOMPRESSION;  Surgeon: Felix Levin MD;  Location: [facility] OR;  Service: Orthopedics;  Laterality: Left;  Jaspreet- Arthrex notified of all case today 9/16-tcb    CARPAL TUNNEL RELEASE      Bilateral    EYE SURGERY      Lasik    HERNIA REPAIR      KNEE ARTHROSCOPY W/ MENISCECTOMY Left 10/15/2019    Procedure: ARTHROSCOPY, KNEE, WITH MENISCECTOMY;  Surgeon: Felix Levin MD;  Location: [facility] OR;  Service: Orthopedics;  Laterality: Left;  Medial and Lateral Meniscectomy    KNEE ARTHROSCOPY W/ MENISCECTOMY Right 11/22/2019    Procedure: ARTHROSCOPY, KNEE, WITH MENISCECTOMY;  Surgeon: Felix Levin MD;  Location: [facility] OR;  Service: Orthopedics;  Laterality: Right;    KNEE SURGERY      osmar    NISSEN FUNDOPLICATION      ROTATOR CUFF REPAIR      right    SLEEVE GASTROPLASTY          Current Outpatient Medications   Medication Sig    ALPRAZolam (XANAX) 0.5 MG tablet TK 1 T PO BID    cyanocobalamin (VITAMIN B-12) 1000 MCG tablet Take 1,000 mcg by mouth once daily.     cyclobenzaprine (FLEXERIL) 10 MG tablet Take 10 mg by mouth 3 (three) times daily as needed for Muscle spasms.    escitalopram oxalate (LEXAPRO) 20 MG tablet Take 20 mg by mouth once daily.    losartan (COZAAR) 25 MG tablet Take 25 mg by mouth once daily.    oxyCODONE-acetaminophen (PERCOCET)  mg per tablet Take 1 tablet by mouth every 6 (six) hours as needed for Pain.    pantoprazole (PROTONIX) 40 MG tablet TK ONE  T PO D    ranitidine (ZANTAC) 150 MG capsule Take 150 mg by mouth 2 (two) times daily as needed.     sucralfate (CARAFATE) 1 gram tablet TK 1 T PO QID 1 HOUR BEFORE MEALS AND 1 T QHS OES     No current facility-administered medications for this visit.        Review of patient's allergies indicates:  No Known Allergies    History reviewed. No pertinent family history.    Social History     Socioeconomic History    Marital status:      Spouse name: Not on file    Number of children: Not on file    Years of education: Not on file    Highest education level: Not on file   Occupational History    Not on file   Social Needs    Financial resource strain: Not on file    Food insecurity:     Worry: Not on file     Inability: Not on file    Transportation needs:     Medical: Not on file     Non-medical: Not on file   Tobacco Use    Smoking status: Never Smoker    Smokeless tobacco: Never Used   Substance and Sexual Activity    Alcohol use: Not Currently     Comment: sober 72 days    Drug use: No    Sexual activity: Not on file   Lifestyle    Physical activity:     Days per week: Not on file     Minutes per session: Not on file    Stress: Not on file   Relationships    Social connections:     Talks on phone: Not on file     Gets together: Not on file     Attends Judaism service: Not on  file     Active member of club or organization: Not on file     Attends meetings of clubs or organizations: Not on file     Relationship status: Not on file   Other Topics Concern    Not on file   Social History Narrative    Not on file       Chief Complaint:   Chief Complaint   Patient presents with    Right Knee - Pain, Post-op Evaluation       Date of surgery:  September 20, 2019 revision left massive rotator cuff repair including patch augmentation  October 15, 2019 left partial lateral meniscectomy  November 22, 2019 right partial lateral meniscectomy    History of present illness:  54-year-old male had a right knee arthroscopy recently.  His knee is doing well.  His shoulder is clicking and crunching a little more.  Increased pain. Shoulder injection really did not help for more than a day or 2.  Pain is an 8/10.  Knee pain is minimal.  Lot of pain at night as well as 1st thing in the morning.      Review of Systems:    Musculoskeletal:  See HPI        Physical Examination:    Vital Signs:    Vitals:    01/06/20 1052   BP: (!) 152/81   Pulse: 88   Resp: 13       Body mass index is 26.54 kg/m².    This a well-developed, well nourished patient in no acute distress.  They are alert and oriented and cooperative to examination.  Pt. walks without an antalgic gait.      Examination of the left shoulder shows full range of motion.  Fair amount of crepitus.  4/5 strength. Neurovascularly intact      Assessment::  Status post revision massive rotator cuff repair with patch augmentation  Status post left partial lateral meniscectomy  Status post right partial lateral meniscectomy    Plan: Continue to rehab his knee. The injection shoulder really did not help much.  I recommended repeating the MRI of his left shoulder.  Given the size of his tear and then it was already revision, I fear that it is likely torn again and that the next step would be SCR.  Gave him some Barre to help with his pain.    This note was  created using [employer] voice recognition software that occasionally misinterpreted phrases or words.

## 2020-01-09 ENCOUNTER — TELEPHONE (OUTPATIENT)
Dept: ORTHOPEDICS | Facility: CLINIC | Age: 55
End: 2020-01-09

## 2020-01-09 NOTE — TELEPHONE ENCOUNTER
----- Message from Mercedes Rosen sent at 1/9/2020  2:48 PM CST -----  [facility] to pt has MRI scheduled to be done at their clinic  and she needs a authorization that's on file to make sure it is covered at their facility     Please advise Anastasia can be reached  [phone]

## 2020-01-09 NOTE — TELEPHONE ENCOUNTER
Attempted to contact patient 4x's with no answer. Unable to leave message due to full voicemail. Attempting to contact patient to [facility] to have [facility] at the [place] location as requested.

## 2020-01-09 NOTE — TELEPHONE ENCOUNTER
----- Message from Don Freire sent at 1/9/2020 11:58 AM CST -----  Contact: pt  Type: Needs Medical Advice    Who Called:  pt    Best Call Back Number: [phone]  Additional Information: States that his MRI was scheduled at the wrong location. Please call to advise.

## 2020-01-13 ENCOUNTER — OFFICE VISIT (OUTPATIENT)
Dept: ORTHOPEDICS | Facility: CLINIC | Age: 55
End: 2020-01-13
Payer: COMMERCIAL

## 2020-01-13 VITALS
BODY MASS INDEX: 26.34 KG/M2 | DIASTOLIC BLOOD PRESSURE: 92 MMHG | HEIGHT: 70 IN | WEIGHT: 184 LBS | SYSTOLIC BLOOD PRESSURE: 127 MMHG | HEART RATE: 67 BPM | RESPIRATION RATE: 13 BRPM

## 2020-01-13 DIAGNOSIS — M75.122 NONTRAUMATIC COMPLETE TEAR OF LEFT ROTATOR CUFF: Primary | ICD-10-CM

## 2020-01-13 PROCEDURE — 99999 PR PBB SHADOW E&M-EST. PATIENT-LVL III: ICD-10-PCS | Mod: PBBFAC,,, | Performed by: ORTHOPAEDIC SURGERY

## 2020-01-13 PROCEDURE — 99214 PR OFFICE/OUTPT VISIT, EST, LEVL IV, 30-39 MIN: ICD-10-PCS | Mod: 57,S$PBB,, | Performed by: ORTHOPAEDIC SURGERY

## 2020-01-13 PROCEDURE — 99214 OFFICE O/P EST MOD 30 MIN: CPT | Mod: 57,S$PBB,, | Performed by: ORTHOPAEDIC SURGERY

## 2020-01-13 PROCEDURE — 99213 OFFICE O/P EST LOW 20 MIN: CPT | Mod: PBBFAC,PN | Performed by: ORTHOPAEDIC SURGERY

## 2020-01-13 PROCEDURE — 99999 PR PBB SHADOW E&M-EST. PATIENT-LVL III: CPT | Mod: PBBFAC,,, | Performed by: ORTHOPAEDIC SURGERY

## 2020-01-13 NOTE — H&P (VIEW-ONLY)
Past Medical History:   Diagnosis Date    Alcohol abuse, unspecified     Allergic rhinitis, cause unspecified     Arthritis     Asthma attack     Carpal tunnel syndrome     Depressive disorder, not elsewhere classified     Diarrhea     Encounter for blood transfusion     GERD (gastroesophageal reflux disease)     Hypersomnia, unspecified     Hypertension     Lumbago     Obesity, unspecified     JAH (obstructive sleep apnea)     uses BIPAP    Other and unspecified hyperlipidemia     Other ankle sprain and strain     Psychosexual dysfunction with inhibited sexual excitement     Ventral hernia, unspecified, without mention of obstruction or gangrene        Past Surgical History:   Procedure Laterality Date    A-scope knees      Bilateral    ARTHROSCOPIC REPAIR OF ROTATOR CUFF OF SHOULDER Left 9/20/2019    Procedure: REPAIR, ROTATOR CUFF, ARTHROSCOPIC;  Surgeon: Felix Levin MD;  Location: [facility] OR;  Service: Orthopedics;  Laterality: Left;    ARTHROSCOPY OF SHOULDER WITH DECOMPRESSION OF SUBACROMIAL SPACE Left 9/20/2019    Procedure: ARTHROSCOPY, SHOULDER, WITH SUBACROMIAL SPACE DECOMPRESSION;  Surgeon: Felix Levin MD;  Location: [facility] OR;  Service: Orthopedics;  Laterality: Left;  Jaspreet- Arthrex notified of all case today 9/16-tcb    CARPAL TUNNEL RELEASE      Bilateral    EYE SURGERY      Lasik    HERNIA REPAIR      KNEE ARTHROSCOPY W/ MENISCECTOMY Left 10/15/2019    Procedure: ARTHROSCOPY, KNEE, WITH MENISCECTOMY;  Surgeon: Felix Levin MD;  Location: [facility] OR;  Service: Orthopedics;  Laterality: Left;  Medial and Lateral Meniscectomy    KNEE ARTHROSCOPY W/ MENISCECTOMY Right 11/22/2019    Procedure: ARTHROSCOPY, KNEE, WITH MENISCECTOMY;  Surgeon: Felix Levin MD;  Location: [facility] OR;  Service: Orthopedics;  Laterality: Right;    KNEE SURGERY      osmar    NISSEN FUNDOPLICATION      ROTATOR CUFF REPAIR      right    SLEEVE GASTROPLASTY          Current Outpatient Medications   Medication Sig    ALPRAZolam (XANAX) 0.5 MG tablet TK 1 T PO BID    cyanocobalamin (VITAMIN B-12) 1000 MCG tablet Take 1,000 mcg by mouth once daily.     cyclobenzaprine (FLEXERIL) 10 MG tablet Take 10 mg by mouth 3 (three) times daily as needed for Muscle spasms.    escitalopram oxalate (LEXAPRO) 20 MG tablet Take 20 mg by mouth once daily.    HYDROcodone-acetaminophen (NORCO) 5-325 mg per tablet Take 1 tablet by mouth every 6 (six) hours as needed for Pain.    losartan (COZAAR) 25 MG tablet Take 25 mg by mouth once daily.    oxyCODONE-acetaminophen (PERCOCET)  mg per tablet Take 1 tablet by mouth every 6 (six) hours as needed for Pain. (Patient not taking: Reported on 1/13/2020)    pantoprazole (PROTONIX) 40 MG tablet TK ONE  T PO D    ranitidine (ZANTAC) 150 MG capsule Take 150 mg by mouth 2 (two) times daily as needed.     sucralfate (CARAFATE) 1 gram tablet TK 1 T PO QID 1 HOUR BEFORE MEALS AND 1 T QHS OES     No current facility-administered medications for this visit.        Review of patient's allergies indicates:  No Known Allergies    History reviewed. No pertinent family history.    Social History     Socioeconomic History    Marital status:      Spouse name: Not on file    Number of children: Not on file    Years of education: Not on file    Highest education level: Not on file   Occupational History    Not on file   Social Needs    Financial resource strain: Not on file    Food insecurity:     Worry: Not on file     Inability: Not on file    Transportation needs:     Medical: Not on file     Non-medical: Not on file   Tobacco Use    Smoking status: Never Smoker    Smokeless tobacco: Never Used   Substance and Sexual Activity    Alcohol use: Not Currently     Comment: sober 72 days    Drug use: No    Sexual activity: Not on file   Lifestyle    Physical activity:     Days per week: Not on file     Minutes per session: Not on file     Stress: Not on file   Relationships    Social connections:     Talks on phone: Not on file     Gets together: Not on file     Attends Yarsani service: Not on file     Active member of club or organization: Not on file     Attends meetings of clubs or organizations: Not on file     Relationship status: Not on file   Other Topics Concern    Not on file   Social History Narrative    Not on file       Chief Complaint:   Chief Complaint   Patient presents with    Left Shoulder - Pain       Date of surgery:  September 20, 2019 revision left massive rotator cuff repair including patch augmentation  October 15, 2019 left partial lateral meniscectomy  November 22, 2019 right partial lateral meniscectomy    History of present illness:  54-year-old male had a right knee arthroscopy recently.  His knee is doing well.  His shoulder is clicking and crunching a little more.  Increased pain. Shoulder injection really did not help for more than a day or 2.  Pain is an 8/10.  Knee pain is minimal.  Lot of pain at night as well as 1st thing in the morning.  MRI did show significant read tearing of the cuff.      Review of Systems:    Musculoskeletal:  See HPI        Physical Examination:    Vital Signs:    Vitals:    01/13/20 1015   BP: (!) 127/92   Pulse: 67   Resp: 13       Body mass index is 26.4 kg/m².    This a well-developed, well nourished patient in no acute distress.  They are alert and oriented and cooperative to examination.  Pt. walks without an antalgic gait.      Examination of the left shoulder shows full range of motion.  Fair amount of crepitus.  4/5 strength. Neurovascularly intact    Heart is regular rate without obvious murmurs   Normal respiratory effort without audible wheezing  Abdomen is soft and nontender     MRI of the left shoulder:Rotator cuff tear with retraction back to the level the glenoid.  Moderate arthritic changes of the joint. Prior surgical changes noted      Assessment::  Status post  revision massive rotator cuff repair with patch augmentation  Recurrent left rotator cuff tear with arthritis present    Plan:  We talked in detail about next steps for his left shoulder.  We talked about possible superior capsular reconstruction versus reverse total shoulder arthroplasty.  Patient is concerned that he has already had 3 attempted rotator cuff repairs with failure of each of them.  He would like to just proceed with shoulder replacement so that he does not have to work about healing of a patch or graft.  The plan is for left reverse total shoulder arthroplasty. Risks, benefits, and alternatives to the procedure were explained to the patient including but not limited to damage to nerves, arteries, blood vessels, bones, tendons, ligaments, stiffness, instability, infection, DVT, PE, as well as general anesthetic complications including seizure, stroke, heart attack and even death. The patient understood these risks and wished to proceed and signed the informed consent.   .    This note was created using [employer] voice recognition software that occasionally misinterpreted phrases or words.

## 2020-01-13 NOTE — PROGRESS NOTES
Past Medical History:   Diagnosis Date    Alcohol abuse, unspecified     Allergic rhinitis, cause unspecified     Arthritis     Asthma attack     Carpal tunnel syndrome     Depressive disorder, not elsewhere classified     Diarrhea     Encounter for blood transfusion     GERD (gastroesophageal reflux disease)     Hypersomnia, unspecified     Hypertension     Lumbago     Obesity, unspecified     JAH (obstructive sleep apnea)     uses BIPAP    Other and unspecified hyperlipidemia     Other ankle sprain and strain     Psychosexual dysfunction with inhibited sexual excitement     Ventral hernia, unspecified, without mention of obstruction or gangrene        Past Surgical History:   Procedure Laterality Date    A-scope knees      Bilateral    ARTHROSCOPIC REPAIR OF ROTATOR CUFF OF SHOULDER Left 9/20/2019    Procedure: REPAIR, ROTATOR CUFF, ARTHROSCOPIC;  Surgeon: Felix Levin MD;  Location: [facility] OR;  Service: Orthopedics;  Laterality: Left;    ARTHROSCOPY OF SHOULDER WITH DECOMPRESSION OF SUBACROMIAL SPACE Left 9/20/2019    Procedure: ARTHROSCOPY, SHOULDER, WITH SUBACROMIAL SPACE DECOMPRESSION;  Surgeon: Felix Levin MD;  Location: [facility] OR;  Service: Orthopedics;  Laterality: Left;  Jaspreet- Arthrex notified of all case today 9/16-tcb    CARPAL TUNNEL RELEASE      Bilateral    EYE SURGERY      Lasik    HERNIA REPAIR      KNEE ARTHROSCOPY W/ MENISCECTOMY Left 10/15/2019    Procedure: ARTHROSCOPY, KNEE, WITH MENISCECTOMY;  Surgeon: Felix Levin MD;  Location: [facility] OR;  Service: Orthopedics;  Laterality: Left;  Medial and Lateral Meniscectomy    KNEE ARTHROSCOPY W/ MENISCECTOMY Right 11/22/2019    Procedure: ARTHROSCOPY, KNEE, WITH MENISCECTOMY;  Surgeon: Felix Levin MD;  Location: [facility] OR;  Service: Orthopedics;  Laterality: Right;    KNEE SURGERY      osmar    NISSEN FUNDOPLICATION      ROTATOR CUFF REPAIR      right    SLEEVE GASTROPLASTY          Current Outpatient Medications   Medication Sig    ALPRAZolam (XANAX) 0.5 MG tablet TK 1 T PO BID    cyanocobalamin (VITAMIN B-12) 1000 MCG tablet Take 1,000 mcg by mouth once daily.     cyclobenzaprine (FLEXERIL) 10 MG tablet Take 10 mg by mouth 3 (three) times daily as needed for Muscle spasms.    escitalopram oxalate (LEXAPRO) 20 MG tablet Take 20 mg by mouth once daily.    HYDROcodone-acetaminophen (NORCO) 5-325 mg per tablet Take 1 tablet by mouth every 6 (six) hours as needed for Pain.    losartan (COZAAR) 25 MG tablet Take 25 mg by mouth once daily.    oxyCODONE-acetaminophen (PERCOCET)  mg per tablet Take 1 tablet by mouth every 6 (six) hours as needed for Pain. (Patient not taking: Reported on 1/13/2020)    pantoprazole (PROTONIX) 40 MG tablet TK ONE  T PO D    ranitidine (ZANTAC) 150 MG capsule Take 150 mg by mouth 2 (two) times daily as needed.     sucralfate (CARAFATE) 1 gram tablet TK 1 T PO QID 1 HOUR BEFORE MEALS AND 1 T QHS OES     No current facility-administered medications for this visit.        Review of patient's allergies indicates:  No Known Allergies    History reviewed. No pertinent family history.    Social History     Socioeconomic History    Marital status:      Spouse name: Not on file    Number of children: Not on file    Years of education: Not on file    Highest education level: Not on file   Occupational History    Not on file   Social Needs    Financial resource strain: Not on file    Food insecurity:     Worry: Not on file     Inability: Not on file    Transportation needs:     Medical: Not on file     Non-medical: Not on file   Tobacco Use    Smoking status: Never Smoker    Smokeless tobacco: Never Used   Substance and Sexual Activity    Alcohol use: Not Currently     Comment: sober 72 days    Drug use: No    Sexual activity: Not on file   Lifestyle    Physical activity:     Days per week: Not on file     Minutes per session: Not on file     Stress: Not on file   Relationships    Social connections:     Talks on phone: Not on file     Gets together: Not on file     Attends [employer] service: Not on file     Active member of club or organization: Not on file     Attends meetings of clubs or organizations: Not on file     Relationship status: Not on file   Other Topics Concern    Not on file   Social History Narrative    Not on file       Chief Complaint:   Chief Complaint   Patient presents with    Left Shoulder - Pain       Date of surgery:  September 20, 2019 revision left massive rotator cuff repair including patch augmentation  October 15, 2019 left partial lateral meniscectomy  November 22, 2019 right partial lateral meniscectomy    History of present illness:  54-year-old male had a right knee arthroscopy recently.  His knee is doing well.  His shoulder is clicking and crunching a little more.  Increased pain. Shoulder injection really did not help for more than a day or 2.  Pain is an 8/10.  Knee pain is minimal.  Lot of pain at night as well as 1st thing in the morning.  MRI did show significant read tearing of the cuff.      Review of Systems:    Musculoskeletal:  See HPI        Physical Examination:    Vital Signs:    Vitals:    01/13/20 1015   BP: (!) 127/92   Pulse: 67   Resp: 13       Body mass index is 26.4 kg/m².    This a well-developed, well nourished patient in no acute distress.  They are alert and oriented and cooperative to examination.  Pt. walks without an antalgic gait.      Examination of the left shoulder shows full range of motion.  Fair amount of crepitus.  4/5 strength. Neurovascularly intact    Heart is regular rate without obvious murmurs   Normal respiratory effort without audible wheezing  Abdomen is soft and nontender     MRI of the left shoulder:Rotator cuff tear with retraction back to the level the glenoid.  Moderate arthritic changes of the joint. Prior surgical changes noted      Assessment::  Status post  revision massive rotator cuff repair with patch augmentation  Recurrent left rotator cuff tear with arthritis present    Plan:  We talked in detail about next steps for his left shoulder.  We talked about possible superior capsular reconstruction versus reverse total shoulder arthroplasty.  Patient is concerned that he has already had 3 attempted rotator cuff repairs with failure of each of them.  He would like to just proceed with shoulder replacement so that he does not have to work about healing of a patch or graft.  The plan is for left reverse total shoulder arthroplasty. Risks, benefits, and alternatives to the procedure were explained to the patient including but not limited to damage to nerves, arteries, blood vessels, bones, tendons, ligaments, stiffness, instability, infection, DVT, PE, as well as general anesthetic complications including seizure, stroke, heart attack and even death. The patient understood these risks and wished to proceed and signed the informed consent.   .    This note was created using [employer] voice recognition software that occasionally misinterpreted phrases or words.

## 2020-01-14 ENCOUNTER — TELEPHONE (OUTPATIENT)
Dept: ORTHOPEDICS | Facility: CLINIC | Age: 55
End: 2020-01-14

## 2020-01-14 NOTE — TELEPHONE ENCOUNTER
----- Message from Ariadna Maravilla MA sent at 1/14/2020  1:50 PM CST -----  Contact: samantha   Wants to schedule surgery   Call back

## 2020-01-14 NOTE — TELEPHONE ENCOUNTER
Returned pt's call, advised that Claribel will contact him later this week and get surgery date scheduled.  He verbalized understanding.

## 2020-01-16 ENCOUNTER — TELEPHONE (OUTPATIENT)
Dept: ORTHOPEDICS | Facility: CLINIC | Age: 55
End: 2020-01-16

## 2020-01-16 DIAGNOSIS — Z98.890 S/P ARTHROSCOPIC SURGERY OF RIGHT KNEE: Primary | ICD-10-CM

## 2020-01-16 RX ORDER — HYDROCODONE BITARTRATE AND ACETAMINOPHEN 5; 325 MG/1; MG/1
1 TABLET ORAL EVERY 6 HOURS PRN
Qty: 40 TABLET | Refills: 0 | Status: SHIPPED | OUTPATIENT
Start: 2020-01-16 | End: 2020-01-29 | Stop reason: SDUPTHER

## 2020-01-17 RX ORDER — MUPIROCIN 20 MG/G
OINTMENT TOPICAL
Status: CANCELLED | OUTPATIENT
Start: 2020-01-17

## 2020-01-17 RX ORDER — CEFAZOLIN SODIUM 2 G/50ML
2 SOLUTION INTRAVENOUS
Status: CANCELLED | OUTPATIENT
Start: 2020-01-17

## 2020-01-29 DIAGNOSIS — Z98.890 S/P ARTHROSCOPIC SURGERY OF RIGHT KNEE: ICD-10-CM

## 2020-01-29 NOTE — TELEPHONE ENCOUNTER
----- Message from Hemanth Burroughs sent at 1/29/2020  3:56 PM CST -----  Contact: pt,      [phone]  Refill HYDROcodone-acetaminophen (NORCO) 5-325 mg per tablet 40     [facility]  [phone]  [address] [id number]

## 2020-01-30 ENCOUNTER — HOSPITAL ENCOUNTER (OUTPATIENT)
Dept: RADIOLOGY | Facility: HOSPITAL | Age: 55
Discharge: HOME | End: 2020-01-30
Attending: ORTHOPAEDIC SURGERY
Payer: COMMERCIAL

## 2020-01-30 ENCOUNTER — HOSPITAL ENCOUNTER (OUTPATIENT)
Dept: PREADMISSION TESTING | Facility: HOSPITAL | Age: 55
Discharge: HOME | End: 2020-01-30
Attending: ORTHOPAEDIC SURGERY
Payer: COMMERCIAL

## 2020-01-30 DIAGNOSIS — M75.122 NONTRAUMATIC COMPLETE TEAR OF LEFT ROTATOR CUFF: Primary | ICD-10-CM

## 2020-01-30 LAB
ABO + RH BLD: NORMAL
ANION GAP SERPL CALC-SCNC: 8 MMOL/L (ref 8–16)
BASOPHILS # BLD AUTO: 0.04 K/UL (ref 0–0.2)
BASOPHILS NFR BLD: 0.8 % (ref 0–1.9)
BILIRUB UR QL STRIP: NEGATIVE
BLD GP AB SCN CELLS X3 SERPL QL: NORMAL
BUN SERPL-MCNC: 11 MG/DL (ref 6–20)
CALCIUM SERPL-MCNC: 8.9 MG/DL (ref 8.7–10.5)
CHLORIDE SERPL-SCNC: 110 MMOL/L (ref 95–110)
CLARITY UR: CLEAR
CO2 SERPL-SCNC: 23 MMOL/L (ref 23–29)
COLOR UR: YELLOW
CREAT SERPL-MCNC: 0.9 MG/DL (ref 0.5–1.4)
DIFFERENTIAL METHOD: ABNORMAL
EOSINOPHIL # BLD AUTO: 0.1 K/UL (ref 0–0.5)
EOSINOPHIL NFR BLD: 2.3 % (ref 0–8)
ERYTHROCYTE [DISTWIDTH] IN BLOOD BY AUTOMATED COUNT: 16.1 % (ref 11.5–14.5)
EST. GFR  (AFRICAN AMERICAN): >60 ML/MIN/1.73 M^2
EST. GFR  (NON AFRICAN AMERICAN): >60 ML/MIN/1.73 M^2
GLUCOSE SERPL-MCNC: 92 MG/DL (ref 70–110)
GLUCOSE UR QL STRIP: NEGATIVE
HCT VFR BLD AUTO: 38.4 % (ref 40–54)
HGB BLD-MCNC: 11.8 G/DL (ref 14–18)
HGB UR QL STRIP: NEGATIVE
IMM GRANULOCYTES # BLD AUTO: 0.02 K/UL (ref 0–0.04)
KETONES UR QL STRIP: NEGATIVE
LEUKOCYTE ESTERASE UR QL STRIP: NEGATIVE
LYMPHOCYTES # BLD AUTO: 1.6 K/UL (ref 1–4.8)
LYMPHOCYTES NFR BLD: 33.5 % (ref 18–48)
MCH RBC QN AUTO: 27.4 PG (ref 27–31)
MCHC RBC AUTO-ENTMCNC: 30.7 G/DL (ref 32–36)
MCV RBC AUTO: 89 FL (ref 82–98)
MONOCYTES # BLD AUTO: 0.3 K/UL (ref 0.3–1)
MONOCYTES NFR BLD: 7 % (ref 4–15)
NEUTROPHILS # BLD AUTO: 2.7 K/UL (ref 1.8–7.7)
NEUTROPHILS NFR BLD: 56 % (ref 38–73)
NITRITE UR QL STRIP: NEGATIVE
NRBC BLD-RTO: 0 /100 WBC
PH UR STRIP: 6 [PH] (ref 5–8)
PLATELET # BLD AUTO: 230 K/UL (ref 150–350)
PMV BLD AUTO: 8.8 FL (ref 9.2–12.9)
POTASSIUM SERPL-SCNC: 4.7 MMOL/L (ref 3.5–5.1)
PROT UR QL STRIP: NEGATIVE
RBC # BLD AUTO: 4.3 M/UL (ref 4.6–6.2)
SODIUM SERPL-SCNC: 141 MMOL/L (ref 136–145)
SP GR UR STRIP: 1.02 (ref 1–1.03)
URN SPEC COLLECT METH UR: NORMAL
UROBILINOGEN UR STRIP-ACNC: NEGATIVE EU/DL
WBC # BLD AUTO: 4.83 K/UL (ref 3.9–12.7)

## 2020-01-30 PROCEDURE — 80048 BASIC METABOLIC PNL TOTAL CA: CPT

## 2020-01-30 PROCEDURE — 99900103 DSU ONLY-NO CHARGE-INITIAL HR (STAT)

## 2020-01-30 PROCEDURE — 85025 COMPLETE CBC W/AUTO DIFF WBC: CPT

## 2020-01-30 PROCEDURE — 86850 RBC ANTIBODY SCREEN: CPT

## 2020-01-30 PROCEDURE — 87081 CULTURE SCREEN ONLY: CPT

## 2020-01-30 PROCEDURE — 99900104 DSU ONLY-NO CHARGE-EA ADD'L HR (STAT)

## 2020-01-30 PROCEDURE — 81003 URINALYSIS AUTO W/O SCOPE: CPT

## 2020-01-30 RX ORDER — GABAPENTIN 300 MG/1
300 CAPSULE ORAL NIGHTLY
COMMUNITY
End: 2021-02-11

## 2020-01-30 RX ORDER — CHOLECALCIFEROL (VITAMIN D3) 25 MCG
2000 TABLET ORAL DAILY
COMMUNITY

## 2020-01-30 RX ORDER — LIDOCAINE 50 MG/G
1 PATCH TOPICAL NIGHTLY
COMMUNITY
End: 2021-02-11

## 2020-01-30 RX ORDER — HYDROXYZINE HYDROCHLORIDE 25 MG/1
25 TABLET, FILM COATED ORAL 3 TIMES DAILY
COMMUNITY
End: 2021-02-11

## 2020-01-30 RX ORDER — HYDROCODONE BITARTRATE AND ACETAMINOPHEN 5; 325 MG/1; MG/1
1 TABLET ORAL EVERY 6 HOURS PRN
Qty: 40 TABLET | Refills: 0 | Status: ON HOLD | OUTPATIENT
Start: 2020-01-30 | End: 2020-08-05 | Stop reason: HOSPADM

## 2020-01-30 NOTE — DISCHARGE INSTRUCTIONS

## 2020-02-01 LAB — MRSA SPEC QL CULT: NORMAL

## 2020-02-10 ENCOUNTER — ANESTHESIA EVENT (OUTPATIENT)
Dept: SURGERY | Facility: HOSPITAL | Age: 55
DRG: 483 | End: 2020-02-10
Payer: COMMERCIAL

## 2020-02-11 ENCOUNTER — HOSPITAL ENCOUNTER (INPATIENT)
Facility: HOSPITAL | Age: 55
LOS: 1 days | Discharge: HOME | DRG: 483 | End: 2020-02-12
Attending: ORTHOPAEDIC SURGERY | Admitting: HOSPITALIST
Payer: COMMERCIAL

## 2020-02-11 ENCOUNTER — ANESTHESIA (OUTPATIENT)
Dept: SURGERY | Facility: HOSPITAL | Age: 55
DRG: 483 | End: 2020-02-11
Payer: COMMERCIAL

## 2020-02-11 DIAGNOSIS — M75.122 NONTRAUMATIC COMPLETE TEAR OF LEFT ROTATOR CUFF: Primary | ICD-10-CM

## 2020-02-11 PROBLEM — I10 ESSENTIAL HYPERTENSION: Status: ACTIVE | Noted: 2020-02-11

## 2020-02-11 PROBLEM — K21.9 GASTROESOPHAGEAL REFLUX DISEASE WITHOUT ESOPHAGITIS: Status: ACTIVE | Noted: 2020-02-11

## 2020-02-11 PROBLEM — F32.A DEPRESSION: Status: ACTIVE | Noted: 2020-02-11

## 2020-02-11 PROBLEM — F33.0 MILD EPISODE OF RECURRENT MAJOR DEPRESSIVE DISORDER: Status: ACTIVE | Noted: 2020-02-11

## 2020-02-11 LAB
POCT GLUCOSE: 75 MG/DL (ref 70–110)
POCT GLUCOSE: 83 MG/DL (ref 70–110)

## 2020-02-11 PROCEDURE — 36000711: Performed by: ORTHOPAEDIC SURGERY

## 2020-02-11 PROCEDURE — 12000002 HC ACUTE/MED SURGE SEMI-PRIVATE ROOM

## 2020-02-11 PROCEDURE — 63600175 PHARM REV CODE 636 W HCPCS: Performed by: ANESTHESIOLOGY

## 2020-02-11 PROCEDURE — 27201423 OPTIME MED/SURG SUP & DEVICES STERILE SUPPLY: Performed by: ORTHOPAEDIC SURGERY

## 2020-02-11 PROCEDURE — 25000003 PHARM REV CODE 250: Performed by: ANESTHESIOLOGY

## 2020-02-11 PROCEDURE — 37000008 HC ANESTHESIA 1ST 15 MINUTES: Performed by: ORTHOPAEDIC SURGERY

## 2020-02-11 PROCEDURE — D9220A PRA ANESTHESIA: Mod: CRNA,,, | Performed by: NURSE ANESTHETIST, CERTIFIED REGISTERED

## 2020-02-11 PROCEDURE — 71000033 HC RECOVERY, INTIAL HOUR: Performed by: ORTHOPAEDIC SURGERY

## 2020-02-11 PROCEDURE — 63600175 PHARM REV CODE 636 W HCPCS: Performed by: ORTHOPAEDIC SURGERY

## 2020-02-11 PROCEDURE — 25000003 PHARM REV CODE 250: Performed by: ORTHOPAEDIC SURGERY

## 2020-02-11 PROCEDURE — 63600175 PHARM REV CODE 636 W HCPCS

## 2020-02-11 PROCEDURE — 94761 N-INVAS EAR/PLS OXIMETRY MLT: CPT

## 2020-02-11 PROCEDURE — 25000003 PHARM REV CODE 250: Performed by: NURSE ANESTHETIST, CERTIFIED REGISTERED

## 2020-02-11 PROCEDURE — 71000039 HC RECOVERY, EACH ADD'L HOUR: Performed by: ORTHOPAEDIC SURGERY

## 2020-02-11 PROCEDURE — 99900104 DSU ONLY-NO CHARGE-EA ADD'L HR (STAT): Performed by: ORTHOPAEDIC SURGERY

## 2020-02-11 PROCEDURE — C1776 JOINT DEVICE (IMPLANTABLE): HCPCS | Performed by: ORTHOPAEDIC SURGERY

## 2020-02-11 PROCEDURE — 23472 PR RECONSTR TOTAL SHOULDER IMPLANT: ICD-10-PCS | Mod: 79,LT,, | Performed by: ORTHOPAEDIC SURGERY

## 2020-02-11 PROCEDURE — D9220A PRA ANESTHESIA: ICD-10-PCS | Mod: CRNA,,, | Performed by: NURSE ANESTHETIST, CERTIFIED REGISTERED

## 2020-02-11 PROCEDURE — 37000009 HC ANESTHESIA EA ADD 15 MINS: Performed by: ORTHOPAEDIC SURGERY

## 2020-02-11 PROCEDURE — 36000710: Performed by: ORTHOPAEDIC SURGERY

## 2020-02-11 PROCEDURE — C1713 ANCHOR/SCREW BN/BN,TIS/BN: HCPCS | Performed by: ORTHOPAEDIC SURGERY

## 2020-02-11 PROCEDURE — D9220A PRA ANESTHESIA: ICD-10-PCS | Mod: ANES,,, | Performed by: ANESTHESIOLOGY

## 2020-02-11 PROCEDURE — D9220A PRA ANESTHESIA: Mod: ANES,,, | Performed by: ANESTHESIOLOGY

## 2020-02-11 PROCEDURE — 99900103 DSU ONLY-NO CHARGE-INITIAL HR (STAT): Performed by: ORTHOPAEDIC SURGERY

## 2020-02-11 PROCEDURE — C9290 INJ, BUPIVACAINE LIPOSOME: HCPCS | Performed by: ANESTHESIOLOGY

## 2020-02-11 PROCEDURE — 63600175 PHARM REV CODE 636 W HCPCS: Performed by: NURSE ANESTHETIST, CERTIFIED REGISTERED

## 2020-02-11 PROCEDURE — C1769 GUIDE WIRE: HCPCS | Performed by: ORTHOPAEDIC SURGERY

## 2020-02-11 PROCEDURE — 23472 RECONSTRUCT SHOULDER JOINT: CPT | Mod: 79,LT,, | Performed by: ORTHOPAEDIC SURGERY

## 2020-02-11 DEVICE — SCREW PERIPH REUNION 4.5X16MM: Type: IMPLANTABLE DEVICE | Site: SHOULDER | Status: FUNCTIONAL

## 2020-02-11 DEVICE — SCREW BONE TSA 4.5X32MM: Type: IMPLANTABLE DEVICE | Site: SHOULDER | Status: FUNCTIONAL

## 2020-02-11 DEVICE — BASEPLATE GLENOID REUNION 28MM: Type: IMPLANTABLE DEVICE | Site: SHOULDER | Status: FUNCTIONAL

## 2020-02-11 DEVICE — CUP HUMERAL REUN RSA 36X4MM: Type: IMPLANTABLE DEVICE | Site: SHOULDER | Status: FUNCTIONAL

## 2020-02-11 RX ORDER — ACETAMINOPHEN 10 MG/ML
INJECTION, SOLUTION INTRAVENOUS
Status: DISCONTINUED | OUTPATIENT
Start: 2020-02-11 | End: 2020-02-11

## 2020-02-11 RX ORDER — SODIUM CHLORIDE 0.9 % (FLUSH) 0.9 %
10 SYRINGE (ML) INJECTION
Status: DISCONTINUED | OUTPATIENT
Start: 2020-02-11 | End: 2020-02-12 | Stop reason: HOSPADM

## 2020-02-11 RX ORDER — SODIUM CHLORIDE 9 MG/ML
INJECTION, SOLUTION INTRAVENOUS CONTINUOUS
Status: DISCONTINUED | OUTPATIENT
Start: 2020-02-11 | End: 2020-02-12 | Stop reason: HOSPADM

## 2020-02-11 RX ORDER — MIDAZOLAM HYDROCHLORIDE 1 MG/ML
INJECTION INTRAMUSCULAR; INTRAVENOUS
Status: DISCONTINUED | OUTPATIENT
Start: 2020-02-11 | End: 2020-02-11

## 2020-02-11 RX ORDER — LANOLIN ALCOHOL/MO/W.PET/CERES
800 CREAM (GRAM) TOPICAL
Status: DISCONTINUED | OUTPATIENT
Start: 2020-02-11 | End: 2020-02-12 | Stop reason: HOSPADM

## 2020-02-11 RX ORDER — HYDROXYZINE HYDROCHLORIDE 25 MG/1
25 TABLET, FILM COATED ORAL 3 TIMES DAILY PRN
Status: DISCONTINUED | OUTPATIENT
Start: 2020-02-11 | End: 2020-02-12 | Stop reason: HOSPADM

## 2020-02-11 RX ORDER — PROPOFOL 10 MG/ML
VIAL (ML) INTRAVENOUS
Status: DISCONTINUED | OUTPATIENT
Start: 2020-02-11 | End: 2020-02-11

## 2020-02-11 RX ORDER — TRANEXAMIC ACID 100 MG/ML
INJECTION, SOLUTION INTRAVENOUS
Status: DISCONTINUED | OUTPATIENT
Start: 2020-02-11 | End: 2020-02-11

## 2020-02-11 RX ORDER — ONDANSETRON HYDROCHLORIDE 2 MG/ML
INJECTION, SOLUTION INTRAMUSCULAR; INTRAVENOUS
Status: DISCONTINUED | OUTPATIENT
Start: 2020-02-11 | End: 2020-02-11

## 2020-02-11 RX ORDER — AMOXICILLIN 250 MG
1 CAPSULE ORAL 2 TIMES DAILY
Status: DISCONTINUED | OUTPATIENT
Start: 2020-02-11 | End: 2020-02-12 | Stop reason: HOSPADM

## 2020-02-11 RX ORDER — POTASSIUM CHLORIDE 20 MEQ/15ML
40 SOLUTION ORAL
Status: DISCONTINUED | OUTPATIENT
Start: 2020-02-11 | End: 2020-02-12 | Stop reason: HOSPADM

## 2020-02-11 RX ORDER — ONDANSETRON 2 MG/ML
8 INJECTION INTRAMUSCULAR; INTRAVENOUS EVERY 8 HOURS PRN
Status: DISCONTINUED | OUTPATIENT
Start: 2020-02-11 | End: 2020-02-12 | Stop reason: HOSPADM

## 2020-02-11 RX ORDER — SUCRALFATE 1 G/1
1 TABLET ORAL
Status: DISCONTINUED | OUTPATIENT
Start: 2020-02-11 | End: 2020-02-12 | Stop reason: HOSPADM

## 2020-02-11 RX ORDER — CHOLECALCIFEROL (VITAMIN D3) 25 MCG
2000 TABLET ORAL DAILY
Status: DISCONTINUED | OUTPATIENT
Start: 2020-02-11 | End: 2020-02-12 | Stop reason: HOSPADM

## 2020-02-11 RX ORDER — MIDAZOLAM HYDROCHLORIDE 1 MG/ML
INJECTION INTRAMUSCULAR; INTRAVENOUS
Status: COMPLETED
Start: 2020-02-11 | End: 2020-02-11

## 2020-02-11 RX ORDER — NEOSTIGMINE METHYLSULFATE 1 MG/ML
INJECTION, SOLUTION INTRAVENOUS
Status: DISCONTINUED | OUTPATIENT
Start: 2020-02-11 | End: 2020-02-11

## 2020-02-11 RX ORDER — LOSARTAN POTASSIUM 25 MG/1
25 TABLET ORAL DAILY
Status: DISCONTINUED | OUTPATIENT
Start: 2020-02-11 | End: 2020-02-12 | Stop reason: HOSPADM

## 2020-02-11 RX ORDER — DICLOFENAC SODIUM 10 MG/G
GEL TOPICAL DAILY PRN
COMMUNITY

## 2020-02-11 RX ORDER — ACETAMINOPHEN 500 MG
1000 TABLET ORAL EVERY 6 HOURS PRN
Status: DISCONTINUED | OUTPATIENT
Start: 2020-02-11 | End: 2020-02-12 | Stop reason: HOSPADM

## 2020-02-11 RX ORDER — CYANOCOBALAMIN (VITAMIN B-12) 250 MCG
1000 TABLET ORAL DAILY
Status: DISCONTINUED | OUTPATIENT
Start: 2020-02-11 | End: 2020-02-12 | Stop reason: HOSPADM

## 2020-02-11 RX ORDER — HYDROCODONE BITARTRATE AND ACETAMINOPHEN 5; 325 MG/1; MG/1
1 TABLET ORAL EVERY 4 HOURS PRN
Status: DISCONTINUED | OUTPATIENT
Start: 2020-02-11 | End: 2020-02-12 | Stop reason: SDUPTHER

## 2020-02-11 RX ORDER — MIRTAZAPINE 30 MG/1
30 TABLET, FILM COATED ORAL NIGHTLY
COMMUNITY
End: 2021-02-11

## 2020-02-11 RX ORDER — SUCCINYLCHOLINE CHLORIDE 20 MG/ML
INJECTION INTRAMUSCULAR; INTRAVENOUS
Status: DISCONTINUED | OUTPATIENT
Start: 2020-02-11 | End: 2020-02-11

## 2020-02-11 RX ORDER — IBUPROFEN 200 MG
24 TABLET ORAL
Status: DISCONTINUED | OUTPATIENT
Start: 2020-02-11 | End: 2020-02-12 | Stop reason: HOSPADM

## 2020-02-11 RX ORDER — MIRTAZAPINE 15 MG/1
30 TABLET, FILM COATED ORAL NIGHTLY
Status: DISCONTINUED | OUTPATIENT
Start: 2020-02-11 | End: 2020-02-12 | Stop reason: HOSPADM

## 2020-02-11 RX ORDER — KETOROLAC TROMETHAMINE 30 MG/ML
INJECTION, SOLUTION INTRAMUSCULAR; INTRAVENOUS
Status: DISCONTINUED | OUTPATIENT
Start: 2020-02-11 | End: 2020-02-11

## 2020-02-11 RX ORDER — MUPIROCIN 20 MG/G
OINTMENT TOPICAL 2 TIMES DAILY
Status: DISCONTINUED | OUTPATIENT
Start: 2020-02-11 | End: 2020-02-12 | Stop reason: HOSPADM

## 2020-02-11 RX ORDER — LOPERAMIDE HYDROCHLORIDE 2 MG/1
2 CAPSULE ORAL CONTINUOUS PRN
Status: DISCONTINUED | OUTPATIENT
Start: 2020-02-11 | End: 2020-02-12 | Stop reason: HOSPADM

## 2020-02-11 RX ORDER — FENTANYL CITRATE 50 UG/ML
25 INJECTION, SOLUTION INTRAMUSCULAR; INTRAVENOUS EVERY 5 MIN PRN
Status: COMPLETED | OUTPATIENT
Start: 2020-02-11 | End: 2020-02-11

## 2020-02-11 RX ORDER — ESCITALOPRAM OXALATE 10 MG/1
20 TABLET ORAL DAILY
Status: DISCONTINUED | OUTPATIENT
Start: 2020-02-11 | End: 2020-02-12 | Stop reason: HOSPADM

## 2020-02-11 RX ORDER — IBUPROFEN 200 MG
16 TABLET ORAL
Status: DISCONTINUED | OUTPATIENT
Start: 2020-02-11 | End: 2020-02-12 | Stop reason: HOSPADM

## 2020-02-11 RX ORDER — POTASSIUM CHLORIDE 20 MEQ/15ML
60 SOLUTION ORAL
Status: DISCONTINUED | OUTPATIENT
Start: 2020-02-11 | End: 2020-02-12 | Stop reason: HOSPADM

## 2020-02-11 RX ORDER — TALC
9 POWDER (GRAM) TOPICAL NIGHTLY PRN
Status: DISCONTINUED | OUTPATIENT
Start: 2020-02-11 | End: 2020-02-12 | Stop reason: HOSPADM

## 2020-02-11 RX ORDER — PANTOPRAZOLE SODIUM 40 MG/1
40 TABLET, DELAYED RELEASE ORAL DAILY
Status: DISCONTINUED | OUTPATIENT
Start: 2020-02-11 | End: 2020-02-12 | Stop reason: HOSPADM

## 2020-02-11 RX ORDER — CEFAZOLIN SODIUM 2 G/50ML
2 SOLUTION INTRAVENOUS
Status: COMPLETED | OUTPATIENT
Start: 2020-02-11 | End: 2020-02-11

## 2020-02-11 RX ORDER — LIDOCAINE HYDROCHLORIDE 10 MG/ML
1 INJECTION, SOLUTION EPIDURAL; INFILTRATION; INTRACAUDAL; PERINEURAL ONCE
Status: DISCONTINUED | OUTPATIENT
Start: 2020-02-11 | End: 2020-02-11 | Stop reason: HOSPADM

## 2020-02-11 RX ORDER — FENTANYL CITRATE 50 UG/ML
INJECTION, SOLUTION INTRAMUSCULAR; INTRAVENOUS
Status: DISCONTINUED | OUTPATIENT
Start: 2020-02-11 | End: 2020-02-11

## 2020-02-11 RX ORDER — OXYCODONE HYDROCHLORIDE 5 MG/1
5 TABLET ORAL
Status: DISCONTINUED | OUTPATIENT
Start: 2020-02-11 | End: 2020-02-11 | Stop reason: HOSPADM

## 2020-02-11 RX ORDER — GABAPENTIN 300 MG/1
300 CAPSULE ORAL NIGHTLY
Status: DISCONTINUED | OUTPATIENT
Start: 2020-02-11 | End: 2020-02-12 | Stop reason: HOSPADM

## 2020-02-11 RX ORDER — ROCURONIUM BROMIDE 10 MG/ML
INJECTION, SOLUTION INTRAVENOUS
Status: DISCONTINUED | OUTPATIENT
Start: 2020-02-11 | End: 2020-02-11

## 2020-02-11 RX ORDER — CYCLOBENZAPRINE HCL 10 MG
10 TABLET ORAL 3 TIMES DAILY PRN
Status: DISCONTINUED | OUTPATIENT
Start: 2020-02-11 | End: 2020-02-12 | Stop reason: HOSPADM

## 2020-02-11 RX ORDER — MUPIROCIN 20 MG/G
OINTMENT TOPICAL
Status: DISCONTINUED | OUTPATIENT
Start: 2020-02-11 | End: 2020-02-11 | Stop reason: HOSPADM

## 2020-02-11 RX ORDER — CELECOXIB 100 MG/1
200 CAPSULE ORAL 2 TIMES DAILY
Status: DISCONTINUED | OUTPATIENT
Start: 2020-02-11 | End: 2020-02-12 | Stop reason: HOSPADM

## 2020-02-11 RX ORDER — SODIUM CHLORIDE, SODIUM LACTATE, POTASSIUM CHLORIDE, CALCIUM CHLORIDE 600; 310; 30; 20 MG/100ML; MG/100ML; MG/100ML; MG/100ML
INJECTION, SOLUTION INTRAVENOUS CONTINUOUS
Status: DISCONTINUED | OUTPATIENT
Start: 2020-02-11 | End: 2020-02-11

## 2020-02-11 RX ORDER — CEFAZOLIN SODIUM 2 G/50ML
2 SOLUTION INTRAVENOUS
Status: COMPLETED | OUTPATIENT
Start: 2020-02-11 | End: 2020-02-12

## 2020-02-11 RX ORDER — GLUCAGON 1 MG
1 KIT INJECTION
Status: DISCONTINUED | OUTPATIENT
Start: 2020-02-11 | End: 2020-02-12 | Stop reason: HOSPADM

## 2020-02-11 RX ORDER — DEXAMETHASONE SODIUM PHOSPHATE 4 MG/ML
INJECTION, SOLUTION INTRA-ARTICULAR; INTRALESIONAL; INTRAMUSCULAR; INTRAVENOUS; SOFT TISSUE
Status: DISCONTINUED | OUTPATIENT
Start: 2020-02-11 | End: 2020-02-11

## 2020-02-11 RX ORDER — GLYCOPYRROLATE 0.2 MG/ML
INJECTION INTRAMUSCULAR; INTRAVENOUS
Status: DISCONTINUED | OUTPATIENT
Start: 2020-02-11 | End: 2020-02-11

## 2020-02-11 RX ORDER — LIDOCAINE HCL/PF 100 MG/5ML
SYRINGE (ML) INTRAVENOUS
Status: DISCONTINUED | OUTPATIENT
Start: 2020-02-11 | End: 2020-02-11

## 2020-02-11 RX ORDER — LIDOCAINE 50 MG/G
1 PATCH TOPICAL NIGHTLY
Status: DISCONTINUED | OUTPATIENT
Start: 2020-02-11 | End: 2020-02-12 | Stop reason: HOSPADM

## 2020-02-11 RX ADMIN — MUPIROCIN: 20 OINTMENT TOPICAL at 08:02

## 2020-02-11 RX ADMIN — NEOSTIGMINE METHYLSULFATE 5 MG: 1 INJECTION INTRAVENOUS at 10:02

## 2020-02-11 RX ADMIN — HYDROCODONE BITARTRATE AND ACETAMINOPHEN 1 TABLET: 5; 325 TABLET ORAL at 07:02

## 2020-02-11 RX ADMIN — MIRTAZAPINE 30 MG: 15 TABLET, FILM COATED ORAL at 08:02

## 2020-02-11 RX ADMIN — FENTANYL CITRATE 100 MCG: 50 INJECTION, SOLUTION INTRAMUSCULAR; INTRAVENOUS at 08:02

## 2020-02-11 RX ADMIN — FENTANYL CITRATE 25 MCG: 50 INJECTION INTRAMUSCULAR; INTRAVENOUS at 11:02

## 2020-02-11 RX ADMIN — LIDOCAINE 1 PATCH: 50 PATCH TOPICAL at 08:02

## 2020-02-11 RX ADMIN — ONDANSETRON 4 MG: 2 INJECTION, SOLUTION INTRAMUSCULAR; INTRAVENOUS at 10:02

## 2020-02-11 RX ADMIN — CYANOCOBALAMIN TAB 250 MCG 1000 MCG: 250 TAB at 02:02

## 2020-02-11 RX ADMIN — SENNOSIDES AND DOCUSATE SODIUM 1 TABLET: 8.6; 5 TABLET ORAL at 08:02

## 2020-02-11 RX ADMIN — FENTANYL CITRATE 50 MCG: 50 INJECTION, SOLUTION INTRAMUSCULAR; INTRAVENOUS at 09:02

## 2020-02-11 RX ADMIN — MELATONIN 2000 UNITS: at 02:02

## 2020-02-11 RX ADMIN — LIDOCAINE HYDROCHLORIDE 80 MG: 20 INJECTION, SOLUTION INTRAVENOUS at 09:02

## 2020-02-11 RX ADMIN — PROPOFOL 200 MG: 10 INJECTION, EMULSION INTRAVENOUS at 09:02

## 2020-02-11 RX ADMIN — PANTOPRAZOLE SODIUM 40 MG: 40 TABLET, DELAYED RELEASE ORAL at 02:02

## 2020-02-11 RX ADMIN — ROCURONIUM BROMIDE 5 MG: 10 INJECTION, SOLUTION INTRAVENOUS at 09:02

## 2020-02-11 RX ADMIN — SUCRALFATE 1 G: 1 TABLET ORAL at 08:02

## 2020-02-11 RX ADMIN — TRANEXAMIC ACID 1000 MG: 100 INJECTION, SOLUTION INTRAVENOUS at 10:02

## 2020-02-11 RX ADMIN — CEFAZOLIN SODIUM 2 G: 2 SOLUTION INTRAVENOUS at 04:02

## 2020-02-11 RX ADMIN — ACETAMINOPHEN 1000 MG: 10 INJECTION, SOLUTION INTRAVENOUS at 10:02

## 2020-02-11 RX ADMIN — KETOROLAC TROMETHAMINE 30 MG: 30 INJECTION, SOLUTION INTRAMUSCULAR; INTRAVENOUS at 10:02

## 2020-02-11 RX ADMIN — CEFAZOLIN SODIUM 2 G: 2 SOLUTION INTRAVENOUS at 09:02

## 2020-02-11 RX ADMIN — CELECOXIB 200 MG: 100 CAPSULE ORAL at 08:02

## 2020-02-11 RX ADMIN — MIDAZOLAM HYDROCHLORIDE 1 MG: 1 INJECTION, SOLUTION INTRAMUSCULAR; INTRAVENOUS at 08:02

## 2020-02-11 RX ADMIN — MIDAZOLAM HYDROCHLORIDE 2 MG: 1 INJECTION, SOLUTION INTRAMUSCULAR; INTRAVENOUS at 08:02

## 2020-02-11 RX ADMIN — SUCCINYLCHOLINE CHLORIDE 140 MG: 20 INJECTION, SOLUTION INTRAMUSCULAR; INTRAVENOUS; PARENTERAL at 09:02

## 2020-02-11 RX ADMIN — SODIUM CHLORIDE, SODIUM GLUCONATE, SODIUM ACETATE, POTASSIUM CHLORIDE, MAGNESIUM CHLORIDE, SODIUM PHOSPHATE, DIBASIC, AND POTASSIUM PHOSPHATE: .53; .5; .37; .037; .03; .012; .00082 INJECTION, SOLUTION INTRAVENOUS at 10:02

## 2020-02-11 RX ADMIN — SODIUM CHLORIDE: 0.9 INJECTION, SOLUTION INTRAVENOUS at 12:02

## 2020-02-11 RX ADMIN — SUCRALFATE 1 G: 1 TABLET ORAL at 04:02

## 2020-02-11 RX ADMIN — HYDROCODONE BITARTRATE AND ACETAMINOPHEN 1 TABLET: 5; 325 TABLET ORAL at 11:02

## 2020-02-11 RX ADMIN — DEXAMETHASONE SODIUM PHOSPHATE 4 MG: 4 INJECTION, SOLUTION INTRAMUSCULAR; INTRAVENOUS at 09:02

## 2020-02-11 RX ADMIN — ESCITALOPRAM OXALATE 20 MG: 10 TABLET ORAL at 02:02

## 2020-02-11 RX ADMIN — GLYCOPYRROLATE 0.8 MG: 0.2 INJECTION, SOLUTION INTRAMUSCULAR; INTRAVENOUS at 10:02

## 2020-02-11 RX ADMIN — GABAPENTIN 300 MG: 300 CAPSULE ORAL at 08:02

## 2020-02-11 RX ADMIN — TRANEXAMIC ACID 1000 MG: 100 INJECTION, SOLUTION INTRAVENOUS at 09:02

## 2020-02-11 RX ADMIN — BUPIVACAINE 20 ML: 13.3 INJECTION, SUSPENSION, LIPOSOMAL INFILTRATION at 08:02

## 2020-02-11 RX ADMIN — CELECOXIB 200 MG: 100 CAPSULE ORAL at 11:02

## 2020-02-11 RX ADMIN — ROCURONIUM BROMIDE 40 MG: 10 INJECTION, SOLUTION INTRAVENOUS at 09:02

## 2020-02-11 RX ADMIN — SENNOSIDES AND DOCUSATE SODIUM 1 TABLET: 8.6; 5 TABLET ORAL at 02:02

## 2020-02-11 RX ADMIN — CYCLOBENZAPRINE 10 MG: 10 TABLET, FILM COATED ORAL at 02:02

## 2020-02-11 RX ADMIN — SODIUM CHLORIDE, SODIUM GLUCONATE, SODIUM ACETATE, POTASSIUM CHLORIDE, MAGNESIUM CHLORIDE, SODIUM PHOSPHATE, DIBASIC, AND POTASSIUM PHOSPHATE: .53; .5; .37; .037; .03; .012; .00082 INJECTION, SOLUTION INTRAVENOUS at 08:02

## 2020-02-11 NOTE — ANESTHESIA PROCEDURE NOTES
Intubation  Performed by: Oriana Pabon CRNA  Authorized by: Rudy Sandoval MD     Intubation:     Induction:  Intravenous    Intubated:  Postinduction    Mask Ventilation:  Easy with oral airway    Attempts:  1    Attempted By:  CRNA    Method of Intubation:  Direct    Blade:  [place] 2    Laryngeal View Grade: Grade I - full view of chords      Difficult Airway Encountered?: No      Complications:  None    Airway Device:  Oral endotracheal tube    Airway Device Size:  7.5    Style/Cuff Inflation:  Cuffed (inflated to minimal occlusive pressure)    Tube secured:  23    Secured at:  The lips    Placement Verified By:  Capnometry    Complicating Factors:  None    Findings Post-Intubation:  BS equal bilateral and atraumatic/condition of teeth unchanged

## 2020-02-11 NOTE — TRANSFER OF CARE
"Anesthesia Transfer of Care Note    Patient: Himanshu Connor II    Procedure(s) Performed: Procedure(s) (LRB):  ARTHROPLASTY, SHOULDER, TOTAL, REVERSE (Left)    Patient location: PACU    Anesthesia Type: general    Transport from OR: Transported from OR on 2-3 L/min O2 by NC with adequate spontaneous ventilation    Post pain: adequate analgesia    Post assessment: no apparent anesthetic complications and tolerated procedure well    Post vital signs: stable    Level of consciousness: awake, alert and oriented    Nausea/Vomiting: no nausea/vomiting    Complications: none    Transfer of care protocol was followed      Last vitals:   Visit Vitals  /71 (BP Location: Right arm, Patient Position: Lying)   Pulse 72   Temp 36.5 °C (97.7 °F) (Skin)   Resp 18   Ht 5' 10" (1.778 m)   Wt 86.2 kg (190 lb)   SpO2 100%   BMI 27.26 kg/m²     "

## 2020-02-11 NOTE — ADDENDUM NOTE
Addendum  created 02/11/20 1511 by Rudy Sandoval MD    Attestation recorded in Intraprocedure, Intraprocedure Attestations filed

## 2020-02-11 NOTE — H&P
[facility] Medicine  History & Physical    Patient Name: Himanshu Connor II  MRN: [id number]  Admission Date: 2/11/2020  Attending Physician: Lazara Pearson MD  Primary Care Provider: Gilo Kawasaki, MD         Patient information was obtained from patient and past medical records.     Subjective:     Principal Problem:Nontraumatic complete tear of left rotator cuff    Chief Complaint:   Chief Complaint   Patient presents with    Shoulder Pain        HPI: Himanshu Connor is a 54-year-old with PMHx significant for HTN, GERD, depression, and arthritis.  Pt is S/P left reverse total shoulder arthroplasty Dr. Levin.  Pt reports preoperative that he had severe left shoulder pain refractive to injections and multiple surgical procedures.  Postoperatively, he currently rates his pain 8/10.  He was just given pain medication upon my arrival in PACU.  He denies any nausea, vomiting, chest pain, SOB, fever, or chills.  Other pertinent medical Hx as below:    Past Medical History:   Diagnosis Date    Alcohol abuse, unspecified     Allergic rhinitis, cause unspecified     Arthritis     Asthma attack     Carpal tunnel syndrome     Depressive disorder, not elsewhere classified     Diarrhea     Encounter for blood transfusion     GERD (gastroesophageal reflux disease)     Hypersomnia, unspecified     Hypertension     Hypoglycemia     Lumbago     Obesity, unspecified     JAH (obstructive sleep apnea)     uses BIPAP    Other and unspecified hyperlipidemia     Other ankle sprain and strain     Psychosexual dysfunction with inhibited sexual excitement     Ventral hernia, unspecified, without mention of obstruction or gangrene        Past Surgical History:   Procedure Laterality Date    A-scope knees      Bilateral    ARTHROSCOPIC REPAIR OF ROTATOR CUFF OF SHOULDER Left 9/20/2019    Procedure: REPAIR, ROTATOR CUFF, ARTHROSCOPIC;  Surgeon: Felix Levin MD;  Location: [facility]  OR;  Service: Orthopedics;  Laterality: Left;    ARTHROSCOPY OF SHOULDER WITH DECOMPRESSION OF SUBACROMIAL SPACE Left 9/20/2019    Procedure: ARTHROSCOPY, SHOULDER, WITH SUBACROMIAL SPACE DECOMPRESSION;  Surgeon: Felix Levin MD;  Location: [facility] OR;  Service: Orthopedics;  Laterality: Left;  Jaspreet- Arthrex notified of all case today 9/16-tcb    CARPAL TUNNEL RELEASE      Bilateral    EYE SURGERY      Lasik    HERNIA REPAIR      KNEE ARTHROSCOPY W/ MENISCECTOMY Left 10/15/2019    Procedure: ARTHROSCOPY, KNEE, WITH MENISCECTOMY;  Surgeon: Felix Levin MD;  Location: [facility] OR;  Service: Orthopedics;  Laterality: Left;  Medial and Lateral Meniscectomy    KNEE ARTHROSCOPY W/ MENISCECTOMY Right 11/22/2019    Procedure: ARTHROSCOPY, KNEE, WITH MENISCECTOMY;  Surgeon: Felix Levin MD;  Location: [facility] OR;  Service: Orthopedics;  Laterality: Right;    KNEE SURGERY      osmar    NISSEN FUNDOPLICATION      ROTATOR CUFF REPAIR      right    SLEEVE GASTROPLASTY         Review of patient's allergies indicates:  No Known Allergies    No current facility-administered medications on file prior to encounter.      Current Outpatient Medications on File Prior to Encounter   Medication Sig    cyanocobalamin (VITAMIN B-12) 1000 MCG tablet Take 1,000 mcg by mouth once daily.     cyclobenzaprine (FLEXERIL) 10 MG tablet Take 10 mg by mouth 3 (three) times daily as needed for Muscle spasms.    diclofenac sodium (VOLTAREN) 1 % Gel Apply topically daily as needed.    escitalopram oxalate (LEXAPRO) 20 MG tablet Take 20 mg by mouth once daily.    losartan (COZAAR) 25 MG tablet Take 25 mg by mouth once daily.    mirtazapine (REMERON) 30 MG tablet Take 30 mg by mouth every evening.    pantoprazole (PROTONIX) 40 MG tablet TK ONE  T PO D    sucralfate (CARAFATE) 1 gram tablet TK 1 T PO QID 1 HOUR BEFORE MEALS AND 1 T QHS OES     Family History     Problem Relation (Age of Onset)    Arthritis Mother     COPD Father    Heart disease Father    Hypertension Father    Kidney disease Father        Tobacco Use    Smoking status: Never Smoker    Smokeless tobacco: Former User     Types: Chew   Substance and Sexual Activity    Alcohol use: Yes     Comment: occasional    Drug use: No    Sexual activity: Not on file     Review of Systems   Constitutional: Negative for activity change, appetite change, chills, fatigue and fever.   HENT: Negative for congestion, postnasal drip, sore throat and trouble swallowing.    Eyes: Negative for photophobia and visual disturbance.   Respiratory: Negative for cough, shortness of breath and wheezing.    Cardiovascular: Negative for chest pain, palpitations and leg swelling.   Gastrointestinal: Negative for abdominal pain, constipation, diarrhea, nausea and vomiting.   Genitourinary: Negative for difficulty urinating, frequency and hematuria.   Musculoskeletal: Positive for arthralgias. Negative for myalgias.   Skin: Negative for color change.   Neurological: Positive for headaches (mild). Negative for weakness and light-headedness.   Psychiatric/Behavioral: Negative for confusion. The patient is not nervous/anxious.      Objective:     Vital Signs (Most Recent):  Temp: 97.7 °F (36.5 °C) (02/11/20 1050)  Pulse: 74 (02/11/20 1055)  Resp: 10 (02/11/20 1055)  BP: 130/77 (02/11/20 1055)  SpO2: 100 % (02/11/20 1055) Vital Signs (24h Range):  Temp:  [97.7 °F (36.5 °C)] 97.7 °F (36.5 °C)  Pulse:  [72-75] 74  Resp:  [10-18] 10  SpO2:  [100 %] 100 %  BP: (126-130)/(71-77) 130/77     Weight: 86.2 kg (190 lb)  Body mass index is 27.26 kg/m².    Physical Exam   Constitutional: He is oriented to person, place, and time. He appears well-developed and well-nourished. No distress.   HENT:   Head: Normocephalic and atraumatic.   Eyes: Pupils are equal, round, and reactive to light. Conjunctivae and EOM are normal.   Neck: Normal range of motion. No thyromegaly present.   Cardiovascular: Normal rate,  regular rhythm, normal heart sounds and intact distal pulses.   Pulmonary/Chest: Effort normal and breath sounds normal. No respiratory distress.   Abdominal: Soft. Bowel sounds are normal. He exhibits no distension. There is no tenderness.   Musculoskeletal:   LEFT shoulder immobilized.  Dressing CDI.   Neurological: He is alert and oriented to person, place, and time. No cranial nerve deficit.   Skin: Skin is warm and dry. Capillary refill takes less than 2 seconds. No erythema.   Psychiatric: He has a normal mood and affect. His behavior is normal. Judgment and thought content normal.         CRANIAL NERVES     CN III, IV, VI   Pupils are equal, round, and reactive to light.  Extraocular motions are normal.        Significant Labs: I reviewed pre-op labs.        Assessment/Plan:     * Nontraumatic complete tear of left rotator cuff  POD 0 s/p left reverse total shoulder arthroplasty with Dr. Levin.   Continue to follow Orthopedic recommendations.  Needs aggressive incentive spirometry.  Follow hemoglobin and hematocrit closely.  Pain control with narcotics and antiemetics as needed.  OT as per Orthopedics protocol.        Gastroesophageal reflux disease without esophagitis  Chronic, stable.  Continue PPI.      Mild episode of recurrent major depressive disorder  Chronic, stable.  Continue Lexapro.  Monitor clinically.      Essential hypertension  Chronic, stable.  Continue oral antihypertensives, monitor BP closely, and titrate medication as required for sustained BP control.        VTE Risk Mitigation (From admission, onward)    High: SCD/TEDs             Angela Roque NP  Department of Hospital Medicine   [facility]

## 2020-02-11 NOTE — ASSESSMENT & PLAN NOTE
POD 0 s/p left reverse total shoulder arthroplasty with Dr. Levin.   Continue to follow Orthopedic recommendations.  Needs aggressive incentive spirometry.  Follow hemoglobin and hematocrit closely.  Pain control with narcotics and antiemetics as needed.  OT as per Orthopedics protocol.

## 2020-02-11 NOTE — SUBJECTIVE & OBJECTIVE
Past Medical History:   Diagnosis Date    Alcohol abuse, unspecified     Allergic rhinitis, cause unspecified     Arthritis     Asthma attack     Carpal tunnel syndrome     Depressive disorder, not elsewhere classified     Diarrhea     Encounter for blood transfusion     GERD (gastroesophageal reflux disease)     Hypersomnia, unspecified     Hypertension     Hypoglycemia     Lumbago     Obesity, unspecified     JAH (obstructive sleep apnea)     uses BIPAP    Other and unspecified hyperlipidemia     Other ankle sprain and strain     Psychosexual dysfunction with inhibited sexual excitement     Ventral hernia, unspecified, without mention of obstruction or gangrene        Past Surgical History:   Procedure Laterality Date    A-scope knees      Bilateral    ARTHROSCOPIC REPAIR OF ROTATOR CUFF OF SHOULDER Left 9/20/2019    Procedure: REPAIR, ROTATOR CUFF, ARTHROSCOPIC;  Surgeon: Felix Levin MD;  Location: [facility] OR;  Service: Orthopedics;  Laterality: Left;    ARTHROSCOPY OF SHOULDER WITH DECOMPRESSION OF SUBACROMIAL SPACE Left 9/20/2019    Procedure: ARTHROSCOPY, SHOULDER, WITH SUBACROMIAL SPACE DECOMPRESSION;  Surgeon: Felix Levin MD;  Location: [facility] OR;  Service: Orthopedics;  Laterality: Left;  Jaspreet- Arthrex notified of all case today 9/16-tcb    CARPAL TUNNEL RELEASE      Bilateral    EYE SURGERY      Lasik    HERNIA REPAIR      KNEE ARTHROSCOPY W/ MENISCECTOMY Left 10/15/2019    Procedure: ARTHROSCOPY, KNEE, WITH MENISCECTOMY;  Surgeon: Felix Levin MD;  Location: [facility] OR;  Service: Orthopedics;  Laterality: Left;  Medial and Lateral Meniscectomy    KNEE ARTHROSCOPY W/ MENISCECTOMY Right 11/22/2019    Procedure: ARTHROSCOPY, KNEE, WITH MENISCECTOMY;  Surgeon: Felix Levin MD;  Location: [facility] OR;  Service: Orthopedics;  Laterality: Right;    KNEE SURGERY      osmar    NISSEN FUNDOPLICATION      ROTATOR CUFF REPAIR      right    SLEEVE  GASTROPLASTY         Review of patient's allergies indicates:  No Known Allergies    No current facility-administered medications on file prior to encounter.      Current Outpatient Medications on File Prior to Encounter   Medication Sig    cyanocobalamin (VITAMIN B-12) 1000 MCG tablet Take 1,000 mcg by mouth once daily.     cyclobenzaprine (FLEXERIL) 10 MG tablet Take 10 mg by mouth 3 (three) times daily as needed for Muscle spasms.    diclofenac sodium (VOLTAREN) 1 % Gel Apply topically daily as needed.    escitalopram oxalate (LEXAPRO) 20 MG tablet Take 20 mg by mouth once daily.    losartan (COZAAR) 25 MG tablet Take 25 mg by mouth once daily.    mirtazapine (REMERON) 30 MG tablet Take 30 mg by mouth every evening.    pantoprazole (PROTONIX) 40 MG tablet TK ONE  T PO D    sucralfate (CARAFATE) 1 gram tablet TK 1 T PO QID 1 HOUR BEFORE MEALS AND 1 T QHS OES     Family History     Problem Relation (Age of Onset)    Arthritis Mother    COPD Father    Heart disease Father    Hypertension Father    Kidney disease Father        Tobacco Use    Smoking status: Never Smoker    Smokeless tobacco: Former User     Types: Chew   Substance and Sexual Activity    Alcohol use: Yes     Comment: occasional    Drug use: No    Sexual activity: Not on file     Review of Systems   Constitutional: Negative for activity change, appetite change, chills, fatigue and fever.   HENT: Negative for congestion, postnasal drip, sore throat and trouble swallowing.    Eyes: Negative for photophobia and visual disturbance.   Respiratory: Negative for cough, shortness of breath and wheezing.    Cardiovascular: Negative for chest pain, palpitations and leg swelling.   Gastrointestinal: Negative for abdominal pain, constipation, diarrhea, nausea and vomiting.   Genitourinary: Negative for difficulty urinating, frequency and hematuria.   Musculoskeletal: Positive for arthralgias. Negative for myalgias.   Skin: Negative for color change.    Neurological: Positive for headaches (mild). Negative for weakness and light-headedness.   Psychiatric/Behavioral: Negative for confusion. The patient is not nervous/anxious.      Objective:     Vital Signs (Most Recent):  Temp: 97.7 °F (36.5 °C) (02/11/20 1050)  Pulse: 74 (02/11/20 1055)  Resp: 10 (02/11/20 1055)  BP: 130/77 (02/11/20 1055)  SpO2: 100 % (02/11/20 1055) Vital Signs (24h Range):  Temp:  [97.7 °F (36.5 °C)] 97.7 °F (36.5 °C)  Pulse:  [72-75] 74  Resp:  [10-18] 10  SpO2:  [100 %] 100 %  BP: (126-130)/(71-77) 130/77     Weight: 86.2 kg (190 lb)  Body mass index is 27.26 kg/m².    Physical Exam   Constitutional: He is oriented to person, place, and time. He appears well-developed and well-nourished. No distress.   HENT:   Head: Normocephalic and atraumatic.   Eyes: Pupils are equal, round, and reactive to light. Conjunctivae and EOM are normal.   Neck: Normal range of motion. No thyromegaly present.   Cardiovascular: Normal rate, regular rhythm, normal heart sounds and intact distal pulses.   Pulmonary/Chest: Effort normal and breath sounds normal. No respiratory distress.   Abdominal: Soft. Bowel sounds are normal. He exhibits no distension. There is no tenderness.   Musculoskeletal:   LEFT shoulder immobilized.  Dressing CDI.   Neurological: He is alert and oriented to person, place, and time. No cranial nerve deficit.   Skin: Skin is warm and dry. Capillary refill takes less than 2 seconds. No erythema.   Psychiatric: He has a normal mood and affect. His behavior is normal. Judgment and thought content normal.         CRANIAL NERVES     CN III, IV, VI   Pupils are equal, round, and reactive to light.  Extraocular motions are normal.        Significant Labs: I reviewed pre-op labs.

## 2020-02-11 NOTE — ANESTHESIA POSTPROCEDURE EVALUATION
Anesthesia Post Evaluation    Patient: Himanshu Connor II    Procedure(s) Performed: Procedure(s) (LRB):  ARTHROPLASTY, SHOULDER, TOTAL, REVERSE (Left)    Final Anesthesia Type: general    Patient location during evaluation: PACU  Patient participation: Yes- Able to Participate  Level of consciousness: awake and alert and oriented  Post-procedure vital signs: reviewed and stable  Pain management: adequate  Airway patency: patent    PONV status at discharge: No PONV  Anesthetic complications: no      Cardiovascular status: blood pressure returned to baseline  Respiratory status: unassisted, spontaneous ventilation and room air  Hydration status: euvolemic  Follow-up not needed.          Vitals Value Taken Time   /75 2/11/2020 12:34 PM   Temp 36.5 °C (97.7 °F) 2/11/2020 12:34 PM   Pulse 66 2/11/2020 12:34 PM   Resp 16 2/11/2020 12:34 PM   SpO2 100 % 2/11/2020 12:34 PM         Event Time     Out of Recovery 12:20:00          Pain/Yovany Score: Pain Rating Prior to Med Admin: 6 (2/11/2020 12:00 PM)  Yovany Score: 10 (2/11/2020 12:00 PM)

## 2020-02-11 NOTE — HPI
Himanshu Connor is a 54-year-old with PMHx significant for HTN, GERD, depression, and arthritis.  Pt is S/P left reverse total shoulder arthroplasty Dr. Levin.  Pt reports preoperative that he had severe left shoulder pain refractive to injections and multiple surgical procedures.  Postoperatively, he currently rates his pain 8/10.  He was just given pain medication upon my arrival in PACU.  He denies any nausea, vomiting, chest pain, SOB, fever, or chills.  Other pertinent medical Hx as below:

## 2020-02-11 NOTE — PLAN OF CARE
Pt arrived to unit from surgery via stretcher, dressing to left shoulder dry/intact with sling immobilizer and ice pack in place, IVF and SCD's applied, denies joint pain/discomfort at this time, will continue to monitor, observe and note any changes, safety maintain

## 2020-02-11 NOTE — OP NOTE
[facility]  Orthopedic Surgery  Operative Note    SUMMARY     Date of Procedure: 2/11/2020     Procedure: Procedure(s) (LRB):  ARTHROPLASTY, SHOULDER, TOTAL, REVERSE (Left)       Surgeon(s) and Role:     * Felix Levin MD - Primary    Assistant: Wilmer Zhao    Pre-Operative Diagnosis:  complete tear of left rotator cuff [M75.122]    Post-Operative Diagnosis: Post-Op Diagnosis Codes:      complete tear of left rotator cuff [M75.122]    Anesthesia: General    Description of the Findings of the Procedure:  recurrent tearing of both the supraspinatus and the subscapularis with arthritic changes as well    Complications: No    Estimated Blood Loss (EBL): 100 mL           Implants:   Implant Name Type Inv. Item Serial No.  Lot No. LRB No. Used   BASEPLATE GLENOID REUNION 28MM - [id number]  BASEPLATE GLENOID REUNION 28MM  [employer] CHAN[employer]. [id number] Left 1   ReUnion RSA center  screw, 5.5mm x28 mm      [id number] Left 1   ReUnion [employer] Glenosphere     [id number] Left 1   SCREW PERIPH REUNION 4.5X16MM - [id number]  SCREW PERIPH REUNION 4.5X16MM  [employer]. [id number] Left 1   SCREW BONE TSA 4.5X32MM - [id number]  SCREW BONE TSA 4.5X32MM  [employer] CHANTELL. [id number] Left 1   SCREW PERIPH REUNION 4.5X16MM - [id number]  SCREW PERIPH REUNION 4.5X16MM  [employer] CHANTELL. [id number] Left 1   SCREW BONE TSA 4.5X32MM - [id number]  SCREW BONE TSA 4.5X32MM  [employer] CHANTELL. [id number] Left 1   ReUnion S Press-Fit Humeral Stem, 12  mm     [id number] Left 1   ReUnion RSA Humeral Insert, 36mm     [id number] Left 1   CUP HUMERAL REUN RSA 36X4MM - [id number]  CUP HUMERAL REUN RSA 36X4MM  [employer] CH[employer]. [id number] Left 1       Specimens:   Specimen (12h ago, onward)    None                  Condition: Good    Disposition: PACU - hemodynamically stable.    Attestation: I was present and scrubbed for the entire procedure.    INDICATIONS FOR THE PROCEDURE: A 54-year-old male with history of Left  Rotator cuff  arthropasthy. The patient had failed conservative management including   cortisone injections, physical therapy and after a long discussion, the patient   elected for the procedure listed above.      PROCEDURE IN DETAIL: Risks, benefits and alternatives of the procedure were   explained to the patient including, but not limited to damage to nerves,   arteries or blood vessels. Also, explained risk of instability, infection,   hardware failure, polyware infection, DVT, PE as well as anesthetic   complications including seizure, stroke, heart attack and death. They understood  this and signed informed consent. The patient's Left shoulder was marked   prior to coming to the Operating Room. Once there a formal timeout was done in which   correct patient, procedure and operating site were all correctly identified and   confirmed by the entire operating team, 2 g of Ancef given prior to surgical   incision. General endotracheal anesthesia was induced. The patient was placed   in a relaxed beach chair type position. The patient's Left upper extremity was  prepped and draped in normal sterile fashion. Standard deltopectoral incision   was made. This was taken down through the skin and some fat. Cephalic vein was  identified and retracted laterally with the deltoid. Some clavipectoral fascia  was dissected and the conjoint tendon was retracted medially, protecting the   musculocutaneous nerve. The subdeltoid and subacromial spaces were then bluntly  dissected, so the retractor could be placed and full exposure of the proximal   humerus was obtained. The biceps tendon had previously torn and was not identified within the groove.  After this was   done, we then did our subscapularis tenotomy tagging with some Ethibond at the   superior portion and the inferior portion.  This was mainly scar tissue and not good subscapularis tendon. This was then fully released   dislocating the head. After this was done, we then planned for a  humeral neck   cut, 30 degrees of retroversion was placed. Cutting guide was placed and pinned in   place, so the top of the cut was right of the edge of the rotator cuff   insertion. This was then cut. After this was done, we   then used a rongeur and osteotomes to remove inferior humeral osteophytes circumferentially.  We also used it to remove a lot of the old suture anchors and sutures. All old hardware was removed.  We then turned our attention   to the glenoid. Bat-wing retractors were placed posteriorly and anteriorly,   exposing the glenoid and the remaining labrum well. The labrum and the   remainder of the biceps tendon was circumferentially removed for full exposure.   We fully exposed the glenoid identified the inferior border.  We then held the guide into place and drilled our guide pin bicortically.   We then placed our initial reamer,   reaming to obtain about 1 to 2 mm inferiorly, getting good bleeding bone. We then inserted our size 28mm baseplate with a 28 screw. We got great purchase that moved the entire scapula once tight. We then placed the screw guide and drilled our 4 peripheral screws. The guide was removed and the screws were placed. We then planned for final glenosphere implantation.The Pulsavac was used to freshen up the joint. It was then thoroughly dried. We then placed the 36 +6 glenosphere on and impacted it down..     After this was done, we started preparing the stem. The stem was reamed up from  an 8 to 12 and then broached up to a 12 as well. After this was done, we did our calcar planer. We then trialed with a 36+ 4 cup and a 36+ 4 poly, seeing good   appropriate shuck with good stability, no impingement, and good ROM. We then implanted final components. The final 12 stem was  then placed in with a 36 +4 poly with 36 +4 cup was placed  as well.   After   this was done, we then thoroughly irrigated the joint, reduced the shoulder and   then used her diluted Betadine solution.  We  then thoroughly irrigated out again.  I then closed the rotator interval with an 0 Vicryl..  We then closed the subcutaneous tissue with 2-0 strata fix l and skin   was closed using a Stratafix and Dermabond. Sterile dressing was applied. She   was placed in a sling, extubated, awakened, transferred from the Operating Room   to the Recovery Room in stable condition.

## 2020-02-11 NOTE — ANESTHESIA PREPROCEDURE EVALUATION
02/11/2020  Himanshu Connor II is a 54 y.o., male.    Anesthesia Evaluation    I have reviewed the Patient Summary Reports.    I have reviewed the Nursing Notes.   I have reviewed the Medications.     Review of Systems  Anesthesia Hx:  No problems with previous Anesthesia    Social:  Non-Smoker Smoking Status: Never Smoker  Smokeless Tobacco Status: Former User  Quit Smokeless Tobacco:   Alcohol use: Yes, unspecified volume  Drug use: No       Cardiovascular:   Hypertension    Pulmonary:   Asthma asymptomatic Sleep Apnea, CPAP    Hepatic/GI:   GERD    Musculoskeletal:  Musculoskeletal General/Symptoms: Complete tear of left rotator cuff   Neurological:   Neuromuscular Disease,    Psych:   Psychiatric History PTSD         Physical Exam  General:  Well nourished    Airway/Jaw/Neck:  Airway Findings: Mouth Opening: Normal Tongue: Normal  General Airway Assessment: Adult, Good  Mallampati: II  Improves to II with phonation.  TM Distance: 4-6 cm      Dental:  Dental Findings: In tact   Chest/Lungs:  Chest/Lungs Findings: Clear to auscultation, Normal Respiratory Rate     Heart/Vascular:  Heart Findings: Rate: Normal  Rhythm: Regular Rhythm  Sounds: Normal  Heart murmur: negative       Mental Status:  Mental Status Findings:  Cooperative, Alert and Oriented         Anesthesia Plan  Type of Anesthesia, risks & benefits discussed:  Anesthesia Type:  general  Patient's Preference:   Intra-op Monitoring Plan: standard ASA monitors  Intra-op Monitoring Plan Comments:   Post Op Pain Control Plan:   Post Op Pain Control Plan Comments:   Induction:   IV  Beta Blocker:  Patient is not currently on a Beta-Blocker (No further documentation required).       Informed Consent: Patient understands risks and agrees with Anesthesia plan.  Questions answered. Anesthesia consent signed with patient.  ASA Score: 2     Day of  Surgery Review of History & Physical: I have interviewed and examined the patient. I have reviewed the patient's H&P dated:  There are no significant changes.  H&P update referred to the surgeon.  H&P completed by Anesthesiologist.       Ready For Surgery From Anesthesia Perspective.

## 2020-02-12 VITALS
BODY MASS INDEX: 27.2 KG/M2 | SYSTOLIC BLOOD PRESSURE: 141 MMHG | RESPIRATION RATE: 16 BRPM | TEMPERATURE: 98 F | DIASTOLIC BLOOD PRESSURE: 88 MMHG | WEIGHT: 190 LBS | HEART RATE: 74 BPM | HEIGHT: 70 IN | OXYGEN SATURATION: 99 %

## 2020-02-12 LAB
ANION GAP SERPL CALC-SCNC: 6 MMOL/L (ref 8–16)
ANION GAP SERPL CALC-SCNC: 6 MMOL/L (ref 8–16)
BASOPHILS # BLD AUTO: 0.01 K/UL (ref 0–0.2)
BASOPHILS NFR BLD: 0.2 % (ref 0–1.9)
BUN SERPL-MCNC: 7 MG/DL (ref 6–20)
BUN SERPL-MCNC: 7 MG/DL (ref 6–20)
CALCIUM SERPL-MCNC: 7.9 MG/DL (ref 8.7–10.5)
CALCIUM SERPL-MCNC: 7.9 MG/DL (ref 8.7–10.5)
CHLORIDE SERPL-SCNC: 113 MMOL/L (ref 95–110)
CHLORIDE SERPL-SCNC: 113 MMOL/L (ref 95–110)
CO2 SERPL-SCNC: 24 MMOL/L (ref 23–29)
CO2 SERPL-SCNC: 24 MMOL/L (ref 23–29)
CREAT SERPL-MCNC: 0.8 MG/DL (ref 0.5–1.4)
CREAT SERPL-MCNC: 0.8 MG/DL (ref 0.5–1.4)
DIFFERENTIAL METHOD: ABNORMAL
EOSINOPHIL # BLD AUTO: 0.1 K/UL (ref 0–0.5)
EOSINOPHIL NFR BLD: 2.2 % (ref 0–8)
ERYTHROCYTE [DISTWIDTH] IN BLOOD BY AUTOMATED COUNT: 16.1 % (ref 11.5–14.5)
EST. GFR  (AFRICAN AMERICAN): >60 ML/MIN/1.73 M^2
EST. GFR  (AFRICAN AMERICAN): >60 ML/MIN/1.73 M^2
EST. GFR  (NON AFRICAN AMERICAN): >60 ML/MIN/1.73 M^2
EST. GFR  (NON AFRICAN AMERICAN): >60 ML/MIN/1.73 M^2
GLUCOSE SERPL-MCNC: 76 MG/DL (ref 70–110)
GLUCOSE SERPL-MCNC: 76 MG/DL (ref 70–110)
HCT VFR BLD AUTO: 31.9 % (ref 40–54)
HGB BLD-MCNC: 9.6 G/DL (ref 14–18)
IMM GRANULOCYTES # BLD AUTO: 0.01 K/UL (ref 0–0.04)
LYMPHOCYTES # BLD AUTO: 1.2 K/UL (ref 1–4.8)
LYMPHOCYTES NFR BLD: 21.4 % (ref 18–48)
MCH RBC QN AUTO: 27.4 PG (ref 27–31)
MCHC RBC AUTO-ENTMCNC: 30.1 G/DL (ref 32–36)
MCV RBC AUTO: 91 FL (ref 82–98)
MONOCYTES # BLD AUTO: 0.5 K/UL (ref 0.3–1)
MONOCYTES NFR BLD: 8.7 % (ref 4–15)
NEUTROPHILS # BLD AUTO: 3.7 K/UL (ref 1.8–7.7)
NEUTROPHILS NFR BLD: 67.3 % (ref 38–73)
NRBC BLD-RTO: 0 /100 WBC
PLATELET # BLD AUTO: 178 K/UL (ref 150–350)
PMV BLD AUTO: 9.2 FL (ref 9.2–12.9)
POTASSIUM SERPL-SCNC: 5 MMOL/L (ref 3.5–5.1)
POTASSIUM SERPL-SCNC: 5 MMOL/L (ref 3.5–5.1)
RBC # BLD AUTO: 3.5 M/UL (ref 4.6–6.2)
SODIUM SERPL-SCNC: 143 MMOL/L (ref 136–145)
SODIUM SERPL-SCNC: 143 MMOL/L (ref 136–145)
WBC # BLD AUTO: 5.43 K/UL (ref 3.9–12.7)

## 2020-02-12 PROCEDURE — 25000003 PHARM REV CODE 250: Performed by: NURSE PRACTITIONER

## 2020-02-12 PROCEDURE — 94760 N-INVAS EAR/PLS OXIMETRY 1: CPT

## 2020-02-12 PROCEDURE — 25000003 PHARM REV CODE 250: Performed by: ORTHOPAEDIC SURGERY

## 2020-02-12 PROCEDURE — 80048 BASIC METABOLIC PNL TOTAL CA: CPT

## 2020-02-12 PROCEDURE — 94761 N-INVAS EAR/PLS OXIMETRY MLT: CPT

## 2020-02-12 PROCEDURE — 63600175 PHARM REV CODE 636 W HCPCS: Performed by: ORTHOPAEDIC SURGERY

## 2020-02-12 PROCEDURE — 85025 COMPLETE CBC W/AUTO DIFF WBC: CPT

## 2020-02-12 PROCEDURE — 36415 COLL VENOUS BLD VENIPUNCTURE: CPT

## 2020-02-12 RX ORDER — OXYCODONE AND ACETAMINOPHEN 5; 325 MG/1; MG/1
1 TABLET ORAL EVERY 6 HOURS PRN
Qty: 40 TABLET | Refills: 0 | Status: SHIPPED | OUTPATIENT
Start: 2020-02-12 | End: 2020-02-24 | Stop reason: SDUPTHER

## 2020-02-12 RX ORDER — SODIUM CHLORIDE 0.9 % (FLUSH) 0.9 %
10 SYRINGE (ML) INJECTION
Status: DISCONTINUED | OUTPATIENT
Start: 2020-02-12 | End: 2020-02-12 | Stop reason: HOSPADM

## 2020-02-12 RX ORDER — HYDROCODONE BITARTRATE AND ACETAMINOPHEN 5; 325 MG/1; MG/1
1 TABLET ORAL EVERY 4 HOURS PRN
Status: DISCONTINUED | OUTPATIENT
Start: 2020-02-12 | End: 2020-02-12 | Stop reason: HOSPADM

## 2020-02-12 RX ADMIN — PANTOPRAZOLE SODIUM 40 MG: 40 TABLET, DELAYED RELEASE ORAL at 09:02

## 2020-02-12 RX ADMIN — HYDROCODONE BITARTRATE AND ACETAMINOPHEN 1 TABLET: 5; 325 TABLET ORAL at 04:02

## 2020-02-12 RX ADMIN — SENNOSIDES AND DOCUSATE SODIUM 1 TABLET: 8.6; 5 TABLET ORAL at 09:02

## 2020-02-12 RX ADMIN — HYDROCODONE BITARTRATE AND ACETAMINOPHEN 1 TABLET: 5; 325 TABLET ORAL at 02:02

## 2020-02-12 RX ADMIN — CELECOXIB 200 MG: 100 CAPSULE ORAL at 09:02

## 2020-02-12 RX ADMIN — MUPIROCIN: 20 OINTMENT TOPICAL at 09:02

## 2020-02-12 RX ADMIN — SUCRALFATE 1 G: 1 TABLET ORAL at 07:02

## 2020-02-12 RX ADMIN — CEFAZOLIN SODIUM 2 G: 2 SOLUTION INTRAVENOUS at 01:02

## 2020-02-12 RX ADMIN — HYDROCODONE BITARTRATE AND ACETAMINOPHEN 1 TABLET: 5; 325 TABLET ORAL at 07:02

## 2020-02-12 RX ADMIN — SUCRALFATE 1 G: 1 TABLET ORAL at 12:02

## 2020-02-12 RX ADMIN — ESCITALOPRAM OXALATE 20 MG: 10 TABLET ORAL at 09:02

## 2020-02-12 RX ADMIN — MELATONIN 2000 UNITS: at 09:02

## 2020-02-12 RX ADMIN — CYANOCOBALAMIN TAB 250 MCG 1000 MCG: 250 TAB at 09:02

## 2020-02-12 RX ADMIN — ACETAMINOPHEN 1000 MG: 500 TABLET ORAL at 02:02

## 2020-02-12 RX ADMIN — LOSARTAN POTASSIUM 25 MG: 25 TABLET, FILM COATED ORAL at 09:02

## 2020-02-12 RX ADMIN — SODIUM CHLORIDE: 0.9 INJECTION, SOLUTION INTRAVENOUS at 02:02

## 2020-02-12 RX ADMIN — CYCLOBENZAPRINE 10 MG: 10 TABLET, FILM COATED ORAL at 09:02

## 2020-02-12 NOTE — PT/OT/SLP DISCHARGE
Occupational Therapy Discharge Summary    Himanshu Connor II  MRN: [id number]   Principal Problem: Nontraumatic complete tear of left rotator cuff      Patient Discharged from acute Occupational Therapy on 2/12/2020.      Objective:     GOALS:   Multidisciplinary Problems     Occupational Therapy Goals     Not on file                Reasons for Discontinuation of Therapy Services  Pt refused participation with OT evaluation. However, pt stated this is his third sx and he is aware of movement restrictions as well as adaptive methods to perform ADLs. Pt stated he ambulated to the bathroom and performed toileting without assistance. Pt also stated his girlfriend will assist with dressing and bathing tasks. No further acute OT needs. Nurse notified.      Plan:     Patient Discharged to: home with outpatient OT    Edmund Loving, OT  2/12/2020

## 2020-02-12 NOTE — DISCHARGE INSTRUCTIONS
Thank you for choosing [facility] for your medical care. The primary doctor who is taking care of you at the time of your discharge is Lazara Pearson MD.     You were admitted to the hospital with Nontraumatic complete tear of left rotator cuff.     Please note your discharge instructions, including diet/activity restrictions, follow-up appointments, and medication changes.  If you have any questions about your medical issues, prescriptions, or any other questions, please feel free to contact the [facility] Medicine Dept at [phone] and we will help.    If you are previously with Home health, outpatient PT/OT or under a therapy program, you are cleared to return to those programs.    Please direct all long term medication refills and follow up to your primary care provider, Gilo Kawasaki, MD. Thank you again for letting us take care of your health care needs.    Please note the following discharge instructions per your discharging physician-  Angela Rqoue NP

## 2020-02-12 NOTE — PLAN OF CARE
"Pt states "understanding," to d/c instructions, follow up visits and new medication administration, IV removed, pt tolerated well, pt packed personal belongings per self, denies joint pain/discomfort prior to d/c, escorted to main lobby by staff, to home per private vehicle, safety maintain    "

## 2020-02-12 NOTE — HPI
Himanshu Connor is a 54-year-old with PMHx significant for HTN, GERD, depression, and arthritis.  Pt is S/P left reverse total shoulder arthroplasty Dr. Levin.  Pt reports preoperative that he had severe left shoulder pain refractive to injections and multiple surgical procedures.  Postoperatively, he currently rates his pain 8/10.  He was just given pain medication upon my arrival in PACU.  He denies any nausea, vomiting, chest pain, SOB, fever, or chills.

## 2020-02-12 NOTE — PLAN OF CARE
02/12/20 1605   Final Note   Assessment Type Final Discharge Note   Anticipated Discharge Disposition Home   Hospital Follow Up  Appt(s) scheduled? Yes

## 2020-02-12 NOTE — DISCHARGE SUMMARY
[facility]  Orthopedics  Discharge Summary      Patient Name: Himanshu Connor II  MRN: [id number]  Admission Date: 2/11/2020  Hospital Length of Stay: 1 days  Discharge Date and Time:  02/12/2020 3:33 PM  Attending Physician: Lazara Pearson MD   Discharging Provider: Felix Levin MD  Primary Care Provider: Gilo Kawasaki, MD    HPI:   Himanshu Connor is a 54-year-old with PMHx significant for HTN, GERD, depression, and arthritis.  Pt is S/P left reverse total shoulder arthroplasty Dr. Levin.  Pt reports preoperative that he had severe left shoulder pain refractive to injections and multiple surgical procedures.  Postoperatively, he currently rates his pain 8/10.  He was just given pain medication upon my arrival in PACU.  He denies any nausea, vomiting, chest pain, SOB, fever, or chills.    Procedure(s) (LRB):  ARTHROPLASTY, SHOULDER, TOTAL, REVERSE (Left)      Hospital Course:  S/p L RTSA    Consults (From admission, onward)        Status Ordering Provider     Inpatient consult to Hospitalist  Once     Provider:  GEMINI Lance BENJAMIN GRAY          Significant Diagnostic Studies: No pertinent studies.    Pending Diagnostic Studies:     None        Final Active Diagnoses:    Diagnosis Date Noted POA    PRINCIPAL PROBLEM:  Nontraumatic complete tear of left rotator cuff [M75.122] 02/11/2020 Yes    Essential hypertension [I10] 02/11/2020 Yes    Mild episode of recurrent major depressive disorder [F33.0] 02/11/2020 Yes    Gastroesophageal reflux disease without esophagitis [K21.9] 02/11/2020 Yes      Problems Resolved During this Admission:      Discharged Condition: good    Disposition: Home or Self Care    Follow Up:  Follow-up Information     Felix Levin MD In 2 weeks.    Specialties:  Sports Medicine, Orthopedic Surgery  Why:  as instructed  Contact information:  [address] [place] [address] [address]  [place] [address] [phone]              Gilo Kawasaki, MD In 1 week.    Specialty:  Family Medicine  Contact information:  [address]  [place] [address]  [phone]             Felix Levin MD In 2 weeks.    Specialties:  Sports Medicine, Orthopedic Surgery  Why:  For suture removal  Contact information:  [address] [place] [address] [address]  [place] [address]  [phone]                 Patient Instructions:      Diet Adult Regular     Diet general     Other restrictions (specify):   Order Comments: Shoulder restrictions per orthopedist- Dr. Levin     Notify your health care provider if you experience any of the following:  temperature >100.4     Notify your health care provider if you experience any of the following:  persistent nausea and vomiting or diarrhea     Notify your health care provider if you experience any of the following:  severe uncontrolled pain     Notify your health care provider if you experience any of the following:  redness, tenderness, or signs of infection (pain, swelling, redness, odor or green/yellow discharge around incision site)     Notify your health care provider if you experience any of the following:  difficulty breathing or increased cough     Ice to affected area     Lifting restrictions     No driving, operating heavy equipment or signing legal documents while taking pain medication     Call MD for:  temperature >100.4     Call MD for:  persistent nausea and vomiting     Call MD for:  severe uncontrolled pain     Call MD for:  difficulty breathing, headache or visual disturbances     Call MD for:  redness, tenderness, or signs of infection (pain, swelling, redness, odor or green/yellow discharge around incision site)     Call MD for:  hives     Call MD for:  persistent dizziness or light-headedness     Call MD for:  extreme fatigue     Remove dressing in 48 hours     Change dressing (specify)   Order Comments: Dressing change: 1 times per day using waterproof bandaids.     Medications:  Reconciled  Home Medications:      Medication List      START taking these medications    oxyCODONE-acetaminophen 5-325 mg per tablet  Commonly known as:  PERCOCET  Take 1 tablet by mouth every 6 (six) hours as needed.        CONTINUE taking these medications    cyanocobalamin 1000 MCG tablet  Commonly known as:  VITAMIN B-12  Take 1,000 mcg by mouth once daily.     cyclobenzaprine 10 MG tablet  Commonly known as:  FLEXERIL  Take 10 mg by mouth 3 (three) times daily as needed for Muscle spasms.     diclofenac sodium 1 % Gel  Commonly known as:  VOLTAREN  Apply topically daily as needed.     escitalopram oxalate 20 MG tablet  Commonly known as:  LEXAPRO  Take 20 mg by mouth once daily.     gabapentin 300 MG capsule  Commonly known as:  NEURONTIN  Take 300 mg by mouth every evening.     HYDROcodone-acetaminophen 5-325 mg per tablet  Commonly known as:  NORCO  Take 1 tablet by mouth every 6 (six) hours as needed for Pain.     hydrOXYzine HCl 25 MG tablet  Commonly known as:  ATARAX  Take 25 mg by mouth 3 (three) times daily.     lidocaine 5 %  Commonly known as:  LIDODERM  Place 1 patch onto the skin every evening. Remove & Discard patch within 12 hours or as directed by MD     losartan 25 MG tablet  Commonly known as:  COZAAR  Take 25 mg by mouth once daily.     mirtazapine 30 MG tablet  Commonly known as:  REMERON  Take 30 mg by mouth every evening.     pantoprazole 40 MG tablet  Commonly known as:  PROTONIX  TK ONE  T PO D     sucralfate 1 gram tablet  Commonly known as:  CARAFATE  TK 1 T PO QID 1 HOUR BEFORE MEALS AND 1 T QHS OES     vitamin D 1000 units Tab  Commonly known as:  VITAMIN D3  Take 2,000 Units by mouth once daily.            Felix Levin MD  Orthopedics  [facility]

## 2020-02-12 NOTE — PLAN OF CARE
CM met with pt bedside to complete discharge assessment. Pt verified information on facesheet as correct. Denies POA/LW. Reports living at listed address with his mother. PCP is Dr. Kawasaki. [facility] [address]. Denies hh/hd. dme- bipap. Pt reports being independent with ADLs. Able to drive himself to appts. Friend will provide transportation home upon discharge. DC plan is home. CM following to assist in any DC needs.      02/12/20 1022   Discharge Assessment   Assessment Type Discharge Planning Assessment   Confirmed/corrected address and phone number on facesheet? Yes   Assessment information obtained from? Patient   Communicated expected length of stay with patient/caregiver yes   Prior to hospitilization cognitive status: Alert/Oriented   Prior to hospitalization functional status: Independent   Current cognitive status: Alert/Oriented   Current Functional Status: Independent   Lives With parent(s)   Able to Return to Prior Arrangements yes   Is patient able to care for self after discharge? Yes   Patient's perception of discharge disposition home or selfcare   Readmission Within the Last 30 Days no previous admission in last 30 days   Patient currently being followed by outpatient case management? No   Patient currently receives any other outside agency services? No   Equipment Currently Used at Home BIPAP   Do you have any problems affording any of your prescribed medications? No   Is the patient taking medications as prescribed? yes   Does the patient have transportation home? Yes   Transportation Anticipated family or friend will provide   Does the patient receive services at the Coumadin Clinic? No   Discharge Plan A Home   DME Needed Upon Discharge  none   Patient/Family in Agreement with Plan yes

## 2020-02-12 NOTE — PLAN OF CARE
POC discussed with patient, verbalized understanding. Patient with uneventful night, slept well between care. VS stable. Surgical dressing CDI L shoulder, NV wnl. Shoulder immobilizer in place. Receiving IV fluids/abx as ordered. Voiding per urinal . Call light at bedside. Service dog at bedside.

## 2020-02-13 NOTE — ASSESSMENT & PLAN NOTE
POD 1 s/p left reverse total shoulder arthroplasty with Dr. Levin.   Continue to follow Orthopedic recommendations.  Needs aggressive incentive spirometry.  Follow hemoglobin and hematocrit closely.  Pain control with narcotics and antiemetics as needed.  OT as per Orthopedics protocol.  Anticipate discharge today once cleared by surgeon.

## 2020-02-13 NOTE — PROGRESS NOTES
[facility] Hospital Medicine  Progress Note    Patient Name: Himanshu Connor II  MRN: [id number]  Patient Class: IP- Inpatient   Admission Date: 2/11/2020  Length of Stay: 1 days  Attending Physician: No att. providers found  Primary Care Provider: Gilo Kawasaki, MD        Subjective:     Principal Problem:Nontraumatic complete tear of left rotator cuff        HPI:  Himanshu Connor is a 54-year-old with PMHx significant for HTN, GERD, depression, and arthritis.  Pt is S/P left reverse total shoulder arthroplasty Dr. Levin.  Pt reports preoperative that he had severe left shoulder pain refractive to injections and multiple surgical procedures.  Postoperatively, he currently rates his pain 8/10.  He was just given pain medication upon my arrival in PACU.  He denies any nausea, vomiting, chest pain, SOB, fever, or chills.  Other pertinent medical Hx as below:    Overview/Hospital Course:  No notes on file    Interval History: seen and examined.  Reports pain 5/10 to LEFT shoulder that is responding well to PRN pain meds.  Denies any nausea or vomiting.  Tolerating diet.    Review of Systems   Constitutional: Negative for chills and fever.   HENT: Negative for postnasal drip, sore throat and trouble swallowing.    Eyes: Negative for photophobia and visual disturbance.   Respiratory: Negative for cough, shortness of breath and wheezing.    Cardiovascular: Negative for chest pain, palpitations and leg swelling.   Gastrointestinal: Negative for abdominal pain, nausea and vomiting.   Genitourinary: Negative for difficulty urinating, frequency and hematuria.   Musculoskeletal: Positive for arthralgias. Negative for myalgias.   Skin: Negative for color change.   Neurological: Negative for weakness and headaches.   Psychiatric/Behavioral: Negative for confusion. The patient is not nervous/anxious.      Objective:     Vital Signs (Most Recent):  Temp: 97.6 °F (36.4 °C) (02/12/20 1059)  Pulse: 74  (02/12/20 1059)  Resp: 16 (02/12/20 1059)  BP: (!) 141/88 (02/12/20 1059)  SpO2: 99 % (02/12/20 1059) Vital Signs (24h Range):  Temp:  [97.6 °F (36.4 °C)-97.7 °F (36.5 °C)] 97.6 °F (36.4 °C)  Pulse:  [74] 74  Resp:  [16-18] 16  SpO2:  [96 %-99 %] 99 %  BP: (141-142)/(88-92) 141/88     Weight: 86.2 kg (190 lb)  Body mass index is 27.26 kg/m².    Intake/Output Summary (Last 24 hours) at 2/13/2020 0720  Last data filed at 2/12/2020 1200  Gross per 24 hour   Intake 645 ml   Output 700 ml   Net -55 ml      Physical Exam   Constitutional: He is oriented to person, place, and time. He appears well-developed and well-nourished. No distress.   HENT:   Head: Normocephalic and atraumatic.   Eyes: Pupils are equal, round, and reactive to light. Conjunctivae and EOM are normal.   Neck: Normal range of motion.   Cardiovascular: Normal rate, regular rhythm, normal heart sounds and intact distal pulses.   Pulmonary/Chest: Effort normal and breath sounds normal. No respiratory distress.   Abdominal: Soft. Bowel sounds are normal. He exhibits no distension. There is no tenderness.   Musculoskeletal:   LEFT shoulder immobilized.  Dressing CDI.   Neurological: He is alert and oriented to person, place, and time. No cranial nerve deficit.   Skin: Skin is warm and dry. Capillary refill takes less than 2 seconds. No erythema.       Significant Labs:   BMP:   Recent Labs   Lab 02/12/20  0454   GLU 76  76     143   K 5.0  5.0   *  113*   CO2 24  24   BUN 7  7   CREATININE 0.8  0.8   CALCIUM 7.9*  7.9*     CBC:   Recent Labs   Lab 02/12/20  0454   WBC 5.43   HGB 9.6*   HCT 31.9*              Assessment/Plan:      * Nontraumatic complete tear of left rotator cuff  POD 1 s/p left reverse total shoulder arthroplasty with Dr. Levin.   Continue to follow Orthopedic recommendations.  Needs aggressive incentive spirometry.  Follow hemoglobin and hematocrit closely.  Pain control with narcotics and antiemetics as  needed.  OT as per Orthopedics protocol.  Anticipate discharge today once cleared by surgeon.      Gastroesophageal reflux disease without esophagitis  Chronic, stable.  Continue PPI.      Mild episode of recurrent major depressive disorder  Chronic, stable.  Continue Lexapro.  Monitor clinically.      Essential hypertension  Chronic, stable.  Continue oral antihypertensives, monitor BP closely, and titrate medication as required for sustained BP control.        VTE Risk Mitigation (From admission, onward)         Ordered     IP VTE HIGH RISK PATIENT  Once      02/11/20 7706                      Angela Roque NP  Department of Hospital Medicine   [facility]

## 2020-02-13 NOTE — SUBJECTIVE & OBJECTIVE
Interval History: seen and examined.  Reports pain 5/10 to LEFT shoulder that is responding well to PRN pain meds.  Denies any nausea or vomiting.  Tolerating diet.    Review of Systems   Constitutional: Negative for chills and fever.   HENT: Negative for postnasal drip, sore throat and trouble swallowing.    Eyes: Negative for photophobia and visual disturbance.   Respiratory: Negative for cough, shortness of breath and wheezing.    Cardiovascular: Negative for chest pain, palpitations and leg swelling.   Gastrointestinal: Negative for abdominal pain, nausea and vomiting.   Genitourinary: Negative for difficulty urinating, frequency and hematuria.   Musculoskeletal: Positive for arthralgias. Negative for myalgias.   Skin: Negative for color change.   Neurological: Negative for weakness and headaches.   Psychiatric/Behavioral: Negative for confusion. The patient is not nervous/anxious.      Objective:     Vital Signs (Most Recent):  Temp: 97.6 °F (36.4 °C) (02/12/20 1059)  Pulse: 74 (02/12/20 1059)  Resp: 16 (02/12/20 1059)  BP: (!) 141/88 (02/12/20 1059)  SpO2: 99 % (02/12/20 1059) Vital Signs (24h Range):  Temp:  [97.6 °F (36.4 °C)-97.7 °F (36.5 °C)] 97.6 °F (36.4 °C)  Pulse:  [74] 74  Resp:  [16-18] 16  SpO2:  [96 %-99 %] 99 %  BP: (141-142)/(88-92) 141/88     Weight: 86.2 kg (190 lb)  Body mass index is 27.26 kg/m².    Intake/Output Summary (Last 24 hours) at 2/13/2020 0720  Last data filed at 2/12/2020 1200  Gross per 24 hour   Intake 645 ml   Output 700 ml   Net -55 ml      Physical Exam   Constitutional: He is oriented to person, place, and time. He appears well-developed and well-nourished. No distress.   HENT:   Head: Normocephalic and atraumatic.   Eyes: Pupils are equal, round, and reactive to light. Conjunctivae and EOM are normal.   Neck: Normal range of motion.   Cardiovascular: Normal rate, regular rhythm, normal heart sounds and intact distal pulses.   Pulmonary/Chest: Effort normal and breath sounds  normal. No respiratory distress.   Abdominal: Soft. Bowel sounds are normal. He exhibits no distension. There is no tenderness.   Musculoskeletal:   LEFT shoulder immobilized.  Dressing CDI.   Neurological: He is alert and oriented to person, place, and time. No cranial nerve deficit.   Skin: Skin is warm and dry. Capillary refill takes less than 2 seconds. No erythema.       Significant Labs:   BMP:   Recent Labs   Lab 02/12/20  0454   GLU 76  76     143   K 5.0  5.0   *  113*   CO2 24  24   BUN 7  7   CREATININE 0.8  0.8   CALCIUM 7.9*  7.9*     CBC:   Recent Labs   Lab 02/12/20  0454   WBC 5.43   HGB 9.6*   HCT 31.9*

## 2020-02-24 DIAGNOSIS — M25.512 LEFT SHOULDER PAIN, UNSPECIFIED CHRONICITY: Primary | ICD-10-CM

## 2020-02-24 RX ORDER — OXYCODONE AND ACETAMINOPHEN 5; 325 MG/1; MG/1
1 TABLET ORAL EVERY 6 HOURS PRN
Qty: 40 TABLET | Refills: 0 | Status: SHIPPED | OUTPATIENT
Start: 2020-02-24 | End: 2020-02-26 | Stop reason: SDUPTHER

## 2020-02-24 NOTE — TELEPHONE ENCOUNTER
----- Message from Ariadna Maravilla MA sent at 2/24/2020  1:58 PM CST -----  Contact: pt   Refill pain medication   Pharmacy [facility] [place]   Call back

## 2020-02-26 RX ORDER — OXYCODONE AND ACETAMINOPHEN 5; 325 MG/1; MG/1
1 TABLET ORAL EVERY 6 HOURS PRN
Qty: 40 TABLET | Refills: 0 | Status: SHIPPED | OUTPATIENT
Start: 2020-02-26 | End: 2020-03-06 | Stop reason: SDUPTHER

## 2020-02-26 NOTE — TELEPHONE ENCOUNTER
----- Message from Mercedes Rosen sent at 2/26/2020 11:37 AM CST -----  Contact: Self  Himanshu Connor calling regarding a call back from nurse please     [email or web address] now since [facility] will not fill his prescription       ==oxyCODONE-acetaminophen (PERCOCET) 5-325 mg per tablet    [facility] [address] [place] [address] & [facility]      Please advise pt can be contact at [phone]

## 2020-02-27 ENCOUNTER — OFFICE VISIT (OUTPATIENT)
Dept: ORTHOPEDICS | Facility: CLINIC | Age: 55
End: 2020-02-27
Payer: OTHER GOVERNMENT

## 2020-02-27 ENCOUNTER — HOSPITAL ENCOUNTER (OUTPATIENT)
Dept: RADIOLOGY | Facility: HOSPITAL | Age: 55
Discharge: HOME | End: 2020-02-27
Attending: ORTHOPAEDIC SURGERY
Payer: COMMERCIAL

## 2020-02-27 VITALS — WEIGHT: 190 LBS | BODY MASS INDEX: 27.2 KG/M2 | HEIGHT: 70 IN

## 2020-02-27 DIAGNOSIS — Z96.612 STATUS POST REVERSE ARTHROPLASTY OF SHOULDER, LEFT: Primary | ICD-10-CM

## 2020-02-27 DIAGNOSIS — M17.0 PRIMARY OSTEOARTHRITIS OF BOTH KNEES: Primary | ICD-10-CM

## 2020-02-27 DIAGNOSIS — M17.10 ARTHRITIS OF KNEE: ICD-10-CM

## 2020-02-27 DIAGNOSIS — M25.512 LEFT SHOULDER PAIN, UNSPECIFIED CHRONICITY: ICD-10-CM

## 2020-02-27 PROCEDURE — 99212 OFFICE O/P EST SF 10 MIN: CPT | Mod: PBBFAC,25,PN | Performed by: ORTHOPAEDIC SURGERY

## 2020-02-27 PROCEDURE — 99999 PR PBB SHADOW E&M-EST. PATIENT-LVL II: CPT | Mod: PBBFAC,,, | Performed by: ORTHOPAEDIC SURGERY

## 2020-02-27 PROCEDURE — 73030 XR SHOULDER COMPLETE 2 OR MORE VIEWS LEFT: ICD-10-PCS | Mod: 26,LT,, | Performed by: RADIOLOGY

## 2020-02-27 PROCEDURE — 99213 OFFICE O/P EST LOW 20 MIN: CPT | Mod: 24,S$PBB,, | Performed by: ORTHOPAEDIC SURGERY

## 2020-02-27 PROCEDURE — 99999 PR PBB SHADOW E&M-EST. PATIENT-LVL II: ICD-10-PCS | Mod: PBBFAC,,, | Performed by: ORTHOPAEDIC SURGERY

## 2020-02-27 PROCEDURE — 73030 X-RAY EXAM OF SHOULDER: CPT | Mod: TC,PN,LT

## 2020-02-27 PROCEDURE — 99213 PR OFFICE/OUTPT VISIT, EST, LEVL III, 20-29 MIN: ICD-10-PCS | Mod: 24,S$PBB,, | Performed by: ORTHOPAEDIC SURGERY

## 2020-02-27 PROCEDURE — 73030 X-RAY EXAM OF SHOULDER: CPT | Mod: 26,LT,, | Performed by: RADIOLOGY

## 2020-02-27 RX ORDER — ZOLPIDEM TARTRATE 5 MG/1
5 TABLET ORAL NIGHTLY PRN
Qty: 20 TABLET | Refills: 0 | Status: SHIPPED | OUTPATIENT
Start: 2020-02-27 | End: 2020-03-27 | Stop reason: SDUPTHER

## 2020-02-27 NOTE — PROGRESS NOTES
Past Medical History:   Diagnosis Date    Alcohol abuse, unspecified     Allergic rhinitis, cause unspecified     Arthritis     Asthma attack     Carpal tunnel syndrome     Depressive disorder, not elsewhere classified     Diarrhea     Encounter for blood transfusion     GERD (gastroesophageal reflux disease)     Hypersomnia, unspecified     Hypertension     Hypoglycemia     Lumbago     Obesity, unspecified     JAH (obstructive sleep apnea)     uses BIPAP    Other and unspecified hyperlipidemia     Other ankle sprain and strain     Psychosexual dysfunction with inhibited sexual excitement     Ventral hernia, unspecified, without mention of obstruction or gangrene        Past Surgical History:   Procedure Laterality Date    A-scope knees      Bilateral    ARTHROSCOPIC REPAIR OF ROTATOR CUFF OF SHOULDER Left 9/20/2019    Procedure: REPAIR, ROTATOR CUFF, ARTHROSCOPIC;  Surgeon: Felix Levin MD;  Location: [facility] OR;  Service: Orthopedics;  Laterality: Left;    ARTHROSCOPY OF SHOULDER WITH DECOMPRESSION OF SUBACROMIAL SPACE Left 9/20/2019    Procedure: ARTHROSCOPY, SHOULDER, WITH SUBACROMIAL SPACE DECOMPRESSION;  Surgeon: Felix Levin MD;  Location: [facility] OR;  Service: Orthopedics;  Laterality: Left;  Jaspreet- Arthrex notified of all case today 9/16-tcb    CARPAL TUNNEL RELEASE      Bilateral    EYE SURGERY      Lasik    HERNIA REPAIR      KNEE ARTHROSCOPY W/ MENISCECTOMY Left 10/15/2019    Procedure: ARTHROSCOPY, KNEE, WITH MENISCECTOMY;  Surgeon: Felix Levin MD;  Location: [facility] OR;  Service: Orthopedics;  Laterality: Left;  Medial and Lateral Meniscectomy    KNEE ARTHROSCOPY W/ MENISCECTOMY Right 11/22/2019    Procedure: ARTHROSCOPY, KNEE, WITH MENISCECTOMY;  Surgeon: Felix Levin MD;  Location: [facility] OR;  Service: Orthopedics;  Laterality: Right;    KNEE SURGERY      osmar    NISSEN FUNDOPLICATION      REVERSE TOTAL SHOULDER ARTHROPLASTY Left  2/11/2020    Procedure: ARTHROPLASTY, SHOULDER, TOTAL, REVERSE;  Surgeon: Felix Levin MD;  Location: [facility];  Service: Orthopedics;  Laterality: Left;  [place]    ROTATOR CUFF REPAIR      right    SLEEVE GASTROPLASTY         Current Outpatient Medications   Medication Sig    cyanocobalamin (VITAMIN B-12) 1000 MCG tablet Take 1,000 mcg by mouth once daily.     cyclobenzaprine (FLEXERIL) 10 MG tablet Take 10 mg by mouth 3 (three) times daily as needed for Muscle spasms.    diclofenac sodium (VOLTAREN) 1 % Gel Apply topically daily as needed.    escitalopram oxalate (LEXAPRO) 20 MG tablet Take 20 mg by mouth once daily.    gabapentin (NEURONTIN) 300 MG capsule Take 300 mg by mouth every evening.    HYDROcodone-acetaminophen (NORCO) 5-325 mg per tablet Take 1 tablet by mouth every 6 (six) hours as needed for Pain.    hydrOXYzine HCl (ATARAX) 25 MG tablet Take 25 mg by mouth 3 (three) times daily.    lidocaine (LIDODERM) 5 % Place 1 patch onto the skin every evening. Remove & Discard patch within 12 hours or as directed by MD    losartan (COZAAR) 25 MG tablet Take 25 mg by mouth once daily.    mirtazapine (REMERON) 30 MG tablet Take 30 mg by mouth every evening.    oxyCODONE-acetaminophen (PERCOCET) 5-325 mg per tablet Take 1 tablet by mouth every 6 (six) hours as needed.    pantoprazole (PROTONIX) 40 MG tablet TK ONE  T PO D    sucralfate (CARAFATE) 1 gram tablet TK 1 T PO QID 1 HOUR BEFORE MEALS AND 1 T QHS OES    vitamin D (VITAMIN D3) 1000 units Tab Take 2,000 Units by mouth once daily.     No current facility-administered medications for this visit.        Review of patient's allergies indicates:  No Known Allergies    Family History   Problem Relation Age of Onset    Arthritis Mother     Hypertension Father     Kidney disease Father     Heart disease Father     COPD Father        Social History     Socioeconomic History    Marital status:      Spouse name: Not on file     Number of children: Not on file    Years of education: Not on file    Highest education level: Not on file   Occupational History    Not on file   Social Needs    Financial resource strain: Not on file    Food insecurity:     Worry: Not on file     Inability: Not on file    Transportation needs:     Medical: Not on file     Non-medical: Not on file   Tobacco Use    Smoking status: Never Smoker    Smokeless tobacco: Former User     Types: Chew   Substance and Sexual Activity    Alcohol use: Yes     Comment: occasional    Drug use: No    Sexual activity: Not on file   Lifestyle    Physical activity:     Days per week: Not on file     Minutes per session: Not on file    Stress: Not on file   Relationships    Social connections:     Talks on phone: Not on file     Gets together: Not on file     Attends Sikh service: Not on file     Active member of club or organization: Not on file     Attends meetings of clubs or organizations: Not on file     Relationship status: Not on file   Other Topics Concern    Not on file   Social History Narrative    Not on file       Chief Complaint:   Chief Complaint   Patient presents with    Post-op Evaluation     s/p left reverse total shoulder 2/11/20       Date of surgery:  February 11, 2020    History of present illness:  54-year-old male underwent left reverse total shoulder arthroplasty about 2 weeks ago.  Shoulder is doing well.  No wound issues.  Pain is a 6/10.  Having more popping and pain in his knees.  Right knee pops more than the left.  Significant pain with prolonged standing or walking.  Also having trouble sleeping at night right now.      Review of Systems:    Musculoskeletal:  See HPI        Physical Examination:    Vital Signs:  There were no vitals filed for this visit.    Body mass index is 27.26 kg/m².    This a well-developed, well nourished patient in no acute distress.  They are alert and oriented and cooperative to examination.  Pt. walks  without an antalgic gait.      Examination left shoulder shows well-healing surgical incision. No erythema or drainage. Neurovascularly intact.    Examination of bilateral knees shows no rashes or erythema. There are no masses ecchymosis or effusion. Patient has full range of motion from 0-130°. Patient is nontender to palpation over lateral joint line and moderately tender to palpation over the medial joint line. Patient has a - Lachman exam, - anterior drawer exam, and - posterior drawer exam. - Mallika's exam. Knee is stable to varus and valgus stress. 5 out of 5 motor strength. Palpable distal pulses. Intact light touch sensation. Negative Patellofemoral crepitus      X-rays:  X-rays of the left shoulder ordered and reviewed which show well-aligned reverse total shoulder arthroplasty without complications.     Assessment::  Status post left reverse total shoulder arthroplasty  Bilateral knee arthritis    Plan:  Reviewed the findings with him today.  He is concerned about his knees.  I recommended a hyaluronic acid series to help with his knee arthritis.  We will get him started with some physical therapy for his shoulder.  I gave him a small prescription Ambien to help with his insomnia    This note was created using [employer] voice recognition software that occasionally misinterpreted phrases or words.

## 2020-03-06 RX ORDER — OXYCODONE AND ACETAMINOPHEN 5; 325 MG/1; MG/1
1 TABLET ORAL EVERY 6 HOURS PRN
Qty: 40 TABLET | Refills: 0 | Status: SHIPPED | OUTPATIENT
Start: 2020-03-06 | End: 2020-03-06 | Stop reason: SDUPTHER

## 2020-03-06 RX ORDER — OXYCODONE AND ACETAMINOPHEN 5; 325 MG/1; MG/1
1 TABLET ORAL EVERY 6 HOURS PRN
Qty: 40 TABLET | Refills: 0 | Status: SHIPPED | OUTPATIENT
Start: 2020-03-06 | End: 2020-03-17 | Stop reason: SDUPTHER

## 2020-03-06 NOTE — TELEPHONE ENCOUNTER
----- Message from Hemanth Burroughs sent at 3/6/2020 11:02 AM CST -----  Contact: pt -  [phone]  Refill oxyCODONE-acetaminophen (PERCOCET) 5-325 mg per tablet 40 tablet

## 2020-03-11 ENCOUNTER — TELEPHONE (OUTPATIENT)
Dept: ORTHOPEDICS | Facility: CLINIC | Age: 55
End: 2020-03-11

## 2020-03-11 NOTE — TELEPHONE ENCOUNTER
----- Message from Ariadna Maravilla MA sent at 3/11/2020  2:02 PM CDT -----  Contact: samantha   Wants to reschedule injection   Call back

## 2020-03-17 RX ORDER — OXYCODONE AND ACETAMINOPHEN 5; 325 MG/1; MG/1
1 TABLET ORAL EVERY 6 HOURS PRN
Qty: 40 TABLET | Refills: 0 | Status: SHIPPED | OUTPATIENT
Start: 2020-03-17 | End: 2020-03-27 | Stop reason: SDUPTHER

## 2020-03-17 NOTE — TELEPHONE ENCOUNTER
----- Message from Ariadna Maravilla MA sent at 3/17/2020 12:40 PM CDT -----  Contact: pt   Refill on pain medication   [facility]   [facility]   Call back

## 2020-03-27 RX ORDER — ZOLPIDEM TARTRATE 5 MG/1
5 TABLET ORAL NIGHTLY PRN
Qty: 20 TABLET | Refills: 0 | Status: SHIPPED | OUTPATIENT
Start: 2020-03-27 | End: 2020-04-17 | Stop reason: SDUPTHER

## 2020-03-27 RX ORDER — OXYCODONE AND ACETAMINOPHEN 5; 325 MG/1; MG/1
1 TABLET ORAL EVERY 6 HOURS PRN
Qty: 40 TABLET | Refills: 0 | Status: SHIPPED | OUTPATIENT
Start: 2020-03-27 | End: 2020-04-08 | Stop reason: SDUPTHER

## 2020-03-27 NOTE — TELEPHONE ENCOUNTER
----- Message from Hemanth Burroughs sent at 3/27/2020 11:53 AM CDT -----  Contact: pt  --[phone]  Refill  oxyCODONE-acetaminophen (PERCOCET) 5-325 mg per tablet and zolpidem (AMBIEN) 5 MG Tab    [facility]  [place] [address] [phone] (Phone)  [phone] (Fax)

## 2020-04-08 RX ORDER — OXYCODONE AND ACETAMINOPHEN 5; 325 MG/1; MG/1
1 TABLET ORAL EVERY 6 HOURS PRN
Qty: 40 TABLET | Refills: 0 | Status: SHIPPED | OUTPATIENT
Start: 2020-04-08 | End: 2020-04-17 | Stop reason: SDUPTHER

## 2020-04-17 NOTE — TELEPHONE ENCOUNTER
----- Message from Ariadna Maravilla MA sent at 4/17/2020  2:23 PM CDT -----  Contact: pt   Refill fausto and percocet   [facility] pharmacy   Call back

## 2020-04-18 RX ORDER — ZOLPIDEM TARTRATE 5 MG/1
5 TABLET ORAL NIGHTLY PRN
Qty: 20 TABLET | Refills: 0 | Status: SHIPPED | OUTPATIENT
Start: 2020-04-18 | End: 2020-05-21 | Stop reason: SDUPTHER

## 2020-04-18 RX ORDER — OXYCODONE AND ACETAMINOPHEN 5; 325 MG/1; MG/1
1 TABLET ORAL EVERY 6 HOURS PRN
Qty: 40 TABLET | Refills: 0 | Status: SHIPPED | OUTPATIENT
Start: 2020-04-18 | End: 2020-04-27 | Stop reason: SDUPTHER

## 2020-04-23 ENCOUNTER — OFFICE VISIT (OUTPATIENT)
Dept: ORTHOPEDICS | Facility: CLINIC | Age: 55
End: 2020-04-23
Payer: COMMERCIAL

## 2020-04-23 VITALS — HEIGHT: 70 IN | WEIGHT: 190 LBS | BODY MASS INDEX: 27.2 KG/M2 | RESPIRATION RATE: 16 BRPM

## 2020-04-23 DIAGNOSIS — M17.10 ARTHRITIS OF KNEE: Primary | ICD-10-CM

## 2020-04-23 PROCEDURE — 99499 UNLISTED E&M SERVICE: CPT | Mod: S$PBB,,, | Performed by: ORTHOPAEDIC SURGERY

## 2020-04-23 PROCEDURE — 99999 PR PBB SHADOW E&M-EST. PATIENT-LVL II: CPT | Mod: PBBFAC,,, | Performed by: ORTHOPAEDIC SURGERY

## 2020-04-23 PROCEDURE — 99999 PR PBB SHADOW E&M-EST. PATIENT-LVL II: ICD-10-PCS | Mod: PBBFAC,,, | Performed by: ORTHOPAEDIC SURGERY

## 2020-04-23 PROCEDURE — 20610 LARGE JOINT ASPIRATION/INJECTION: BILATERAL KNEE: ICD-10-PCS | Mod: 79,50,S$PBB, | Performed by: ORTHOPAEDIC SURGERY

## 2020-04-23 PROCEDURE — 99499 NO LOS: ICD-10-PCS | Mod: S$PBB,,, | Performed by: ORTHOPAEDIC SURGERY

## 2020-04-23 PROCEDURE — 20610 DRAIN/INJ JOINT/BURSA W/O US: CPT | Mod: 50,PBBFAC,PN | Performed by: ORTHOPAEDIC SURGERY

## 2020-04-23 RX ADMIN — Medication 4 ML: at 11:04

## 2020-04-23 NOTE — PROGRESS NOTES
Past Medical History:   Diagnosis Date    Alcohol abuse, unspecified     Allergic rhinitis, cause unspecified     Arthritis     Asthma attack     Carpal tunnel syndrome     Depressive disorder, not elsewhere classified     Diarrhea     Encounter for blood transfusion     GERD (gastroesophageal reflux disease)     Hypersomnia, unspecified     Hypertension     Hypoglycemia     Lumbago     Obesity, unspecified     JAH (obstructive sleep apnea)     uses BIPAP    Other and unspecified hyperlipidemia     Other ankle sprain and strain     Psychosexual dysfunction with inhibited sexual excitement     Ventral hernia, unspecified, without mention of obstruction or gangrene        Past Surgical History:   Procedure Laterality Date    A-scope knees      Bilateral    ARTHROSCOPIC REPAIR OF ROTATOR CUFF OF SHOULDER Left 9/20/2019    Procedure: REPAIR, ROTATOR CUFF, ARTHROSCOPIC;  Surgeon: Felix Levin MD;  Location: [facility] OR;  Service: Orthopedics;  Laterality: Left;    ARTHROSCOPY OF SHOULDER WITH DECOMPRESSION OF SUBACROMIAL SPACE Left 9/20/2019    Procedure: ARTHROSCOPY, SHOULDER, WITH SUBACROMIAL SPACE DECOMPRESSION;  Surgeon: Felix Levin MD;  Location: [facility] OR;  Service: Orthopedics;  Laterality: Left;  Jaspreet- Arthrex notified of all case today 9/16-tcb    CARPAL TUNNEL RELEASE      Bilateral    EYE SURGERY      Lasik    HERNIA REPAIR      KNEE ARTHROSCOPY W/ MENISCECTOMY Left 10/15/2019    Procedure: ARTHROSCOPY, KNEE, WITH MENISCECTOMY;  Surgeon: Felix Levin MD;  Location: [facility] OR;  Service: Orthopedics;  Laterality: Left;  Medial and Lateral Meniscectomy    KNEE ARTHROSCOPY W/ MENISCECTOMY Right 11/22/2019    Procedure: ARTHROSCOPY, KNEE, WITH MENISCECTOMY;  Surgeon: Felix Levin MD;  Location: [facility] OR;  Service: Orthopedics;  Laterality: Right;    KNEE SURGERY      osmar    NISSEN FUNDOPLICATION      REVERSE TOTAL SHOULDER ARTHROPLASTY Left  2/11/2020    Procedure: ARTHROPLASTY, SHOULDER, TOTAL, REVERSE;  Surgeon: Felix Levin MD;  Location: [facility];  Service: Orthopedics;  Laterality: Left;  [place]    ROTATOR CUFF REPAIR      right    SLEEVE GASTROPLASTY         Current Outpatient Medications   Medication Sig    cyanocobalamin (VITAMIN B-12) 1000 MCG tablet Take 1,000 mcg by mouth once daily.     cyclobenzaprine (FLEXERIL) 10 MG tablet Take 10 mg by mouth 3 (three) times daily as needed for Muscle spasms.    diclofenac sodium (VOLTAREN) 1 % Gel Apply topically daily as needed.    escitalopram oxalate (LEXAPRO) 20 MG tablet Take 20 mg by mouth once daily.    gabapentin (NEURONTIN) 300 MG capsule Take 300 mg by mouth every evening.    HYDROcodone-acetaminophen (NORCO) 5-325 mg per tablet Take 1 tablet by mouth every 6 (six) hours as needed for Pain.    hydrOXYzine HCl (ATARAX) 25 MG tablet Take 25 mg by mouth 3 (three) times daily.    lidocaine (LIDODERM) 5 % Place 1 patch onto the skin every evening. Remove & Discard patch within 12 hours or as directed by MD    losartan (COZAAR) 25 MG tablet Take 25 mg by mouth once daily.    mirtazapine (REMERON) 30 MG tablet Take 30 mg by mouth every evening.    oxyCODONE-acetaminophen (PERCOCET) 5-325 mg per tablet Take 1 tablet by mouth every 6 (six) hours as needed.    pantoprazole (PROTONIX) 40 MG tablet TK ONE  T PO D    sucralfate (CARAFATE) 1 gram tablet TK 1 T PO QID 1 HOUR BEFORE MEALS AND 1 T QHS OES    vitamin D (VITAMIN D3) 1000 units Tab Take 2,000 Units by mouth once daily.    zolpidem (AMBIEN) 5 MG Tab Take 1 tablet (5 mg total) by mouth nightly as needed.     No current facility-administered medications for this visit.        Review of patient's allergies indicates:  No Known Allergies    Family History   Problem Relation Age of Onset    Arthritis Mother     Hypertension Father     Kidney disease Father     Heart disease Father     COPD Father        Social History      Socioeconomic History    Marital status:      Spouse name: Not on file    Number of children: Not on file    Years of education: Not on file    Highest education level: Not on file   Occupational History    Not on file   Social Needs    Financial resource strain: Not on file    Food insecurity:     Worry: Not on file     Inability: Not on file    Transportation needs:     Medical: Not on file     Non-medical: Not on file   Tobacco Use    Smoking status: Never Smoker    Smokeless tobacco: Former User     Types: Chew   Substance and Sexual Activity    Alcohol use: Yes     Comment: occasional    Drug use: No    Sexual activity: Not on file   Lifestyle    Physical activity:     Days per week: Not on file     Minutes per session: Not on file    Stress: Not on file   Relationships    Social connections:     Talks on phone: Not on file     Gets together: Not on file     Attends Yazdanism service: Not on file     Active member of club or organization: Not on file     Attends meetings of clubs or organizations: Not on file     Relationship status: Not on file   Other Topics Concern    Not on file   Social History Narrative    Not on file       Chief Complaint:   No chief complaint on file.      Date of surgery:  February 11, 2020    History of present illness:  54-year-old male underwent left reverse total shoulder arthroplasty about 10 weeks ago.  Shoulder is doing well.  No wound issues.  Pain is a 6/10.  Having more popping and pain in his knees.  Right knee pops more than the left.  Significant pain with prolonged standing or walking.  Also having trouble sleeping at night right now.      Review of Systems:    Musculoskeletal:  See HPI        Physical Examination:    Vital Signs:    Vitals:    04/23/20 1122   Resp: 16       Body mass index is 27.26 kg/m².    This a well-developed, well nourished patient in no acute distress.  They are alert and oriented and cooperative to examination.  Pt.  walks without an antalgic gait.      Examination left shoulder shows well-healed surgical incision. No erythema or drainage. Neurovascularly intact.    Examination of bilateral knees shows no rashes or erythema. There are no masses ecchymosis or effusion. Patient has full range of motion from 0-130°. Patient is nontender to palpation over lateral joint line and moderately tender to palpation over the medial joint line.  Knee is stable to varus and valgus stress. 5 out of 5 motor strength. Palpable distal pulses. Intact light touch sensation. Negative Patellofemoral crepitus      X-rays:  X-rays of the left shoulder reviewed which show well-aligned reverse total shoulder arthroplasty without complications.     Assessment::  Status post left reverse total shoulder arthroplasty  Bilateral knee arthritis    Plan:  Reviewed the findings with him today.  Injected both knees with Monovisc.  Follow-up in 6 weeks.  X-ray of his left shoulder at that time.    This note was created using [employer] voice recognition software that occasionally misinterpreted phrases or words.

## 2020-04-23 NOTE — PROCEDURES
Large Joint Aspiration/Injection: bilateral knee  Date/Time: 4/23/2020 11:30 AM  Performed by: Felix Levin MD  Authorized by: Felix Levin MD     Consent Done?:  Yes (Verbal)  Indications:  Pain  Site marked: the procedure site was marked    Timeout: prior to procedure the correct patient, procedure, and site was verified      Details:  Needle Size:  18 G  Approach:  Anterolateral  Location:  Knee  Laterality:  Bilateral  Site:  Bilateral knee  Medications (Right):  4 mL hyaluronate sodium, stabilized 88 mg/4 mL  Medications (Left):  4 mL hyaluronate sodium, stabilized 88 mg/4 mL  Patient tolerance:  Patient tolerated the procedure well with no immediate complications

## 2020-04-27 NOTE — TELEPHONE ENCOUNTER
----- Message from Ariadna Maravilla MA sent at 4/27/2020  2:49 PM CDT -----  Contact: [facility]   Pharmacy  [facility] [place]  Call back

## 2020-04-28 RX ORDER — OXYCODONE AND ACETAMINOPHEN 5; 325 MG/1; MG/1
1 TABLET ORAL EVERY 6 HOURS PRN
Qty: 40 TABLET | Refills: 0 | Status: SHIPPED | OUTPATIENT
Start: 2020-04-28 | End: 2020-05-06 | Stop reason: SDUPTHER

## 2020-05-06 RX ORDER — OXYCODONE AND ACETAMINOPHEN 5; 325 MG/1; MG/1
1 TABLET ORAL EVERY 6 HOURS PRN
Qty: 60 TABLET | Refills: 0 | Status: SHIPPED | OUTPATIENT
Start: 2020-05-06 | End: 2020-05-21 | Stop reason: SDUPTHER

## 2020-05-06 NOTE — TELEPHONE ENCOUNTER
----- Message from Eleanor Rodriguez MA sent at 5/6/2020  9:13 AM CDT -----  Contact: self     Patient requesting a refill on following medications listed below , patient requesting 30 day supply due to he pay out of pocket per patient       oxyCODONE-acetaminophen (PERCOCET) 5-325 mg per tablet  zolpidem (AMBIEN) 5 MG [id number] [address] & [address] [address]  Phone: [phone] Fax: [phone]      Patient contact is [phone] (home)

## 2020-05-06 NOTE — TELEPHONE ENCOUNTER
Patient is requesting a refill on meds. Last refill was Percocet 10/325mg tabs # 40 on 4/28/20. He is requesting an increase in his quantity.

## 2020-05-21 RX ORDER — OXYCODONE AND ACETAMINOPHEN 5; 325 MG/1; MG/1
1 TABLET ORAL EVERY 6 HOURS PRN
Qty: 60 TABLET | Refills: 0 | Status: SHIPPED | OUTPATIENT
Start: 2020-05-21 | End: 2020-06-04 | Stop reason: SDUPTHER

## 2020-05-21 RX ORDER — ZOLPIDEM TARTRATE 5 MG/1
5 TABLET ORAL NIGHTLY PRN
Qty: 20 TABLET | Refills: 0 | Status: SHIPPED | OUTPATIENT
Start: 2020-05-21 | End: 2020-07-20 | Stop reason: SDUPTHER

## 2020-05-21 NOTE — TELEPHONE ENCOUNTER
Patient is requesting a refill on meds. Last refill was Percocet 5/325mg tabs # 60 on 5/6/20. He is also requesting a refill on Ambien, last refilled 4/18/20 #20

## 2020-05-21 NOTE — TELEPHONE ENCOUNTER
----- Message from Ariadna Maravilla MA sent at 5/21/2020  8:10 AM CDT -----  Contact: pt   ambien and [facility]    Call back

## 2020-06-04 ENCOUNTER — OFFICE VISIT (OUTPATIENT)
Dept: ORTHOPEDICS | Facility: CLINIC | Age: 55
End: 2020-06-04
Payer: COMMERCIAL

## 2020-06-04 VITALS — WEIGHT: 190 LBS | BODY MASS INDEX: 27.2 KG/M2 | HEIGHT: 70 IN | TEMPERATURE: 98 F

## 2020-06-04 DIAGNOSIS — M17.10 ARTHRITIS OF KNEE: Primary | ICD-10-CM

## 2020-06-04 PROCEDURE — 99213 OFFICE O/P EST LOW 20 MIN: CPT | Mod: S$PBB,,, | Performed by: ORTHOPAEDIC SURGERY

## 2020-06-04 PROCEDURE — 99999 PR PBB SHADOW E&M-EST. PATIENT-LVL III: CPT | Mod: PBBFAC,,, | Performed by: ORTHOPAEDIC SURGERY

## 2020-06-04 PROCEDURE — 99213 PR OFFICE/OUTPT VISIT, EST, LEVL III, 20-29 MIN: ICD-10-PCS | Mod: S$PBB,,, | Performed by: ORTHOPAEDIC SURGERY

## 2020-06-04 PROCEDURE — 99999 PR PBB SHADOW E&M-EST. PATIENT-LVL III: ICD-10-PCS | Mod: PBBFAC,,, | Performed by: ORTHOPAEDIC SURGERY

## 2020-06-04 PROCEDURE — 99213 OFFICE O/P EST LOW 20 MIN: CPT | Mod: PBBFAC,PN | Performed by: ORTHOPAEDIC SURGERY

## 2020-06-04 RX ORDER — OXYCODONE AND ACETAMINOPHEN 5; 325 MG/1; MG/1
1 TABLET ORAL EVERY 6 HOURS PRN
Qty: 60 TABLET | Refills: 0 | Status: SHIPPED | OUTPATIENT
Start: 2020-06-04 | End: 2020-06-19 | Stop reason: SDUPTHER

## 2020-06-04 NOTE — PROGRESS NOTES
Past Medical History:   Diagnosis Date    Alcohol abuse, unspecified     Allergic rhinitis, cause unspecified     Arthritis     Asthma attack     Carpal tunnel syndrome     Depressive disorder, not elsewhere classified     Diarrhea     Encounter for blood transfusion     GERD (gastroesophageal reflux disease)     Hypersomnia, unspecified     Hypertension     Hypoglycemia     Lumbago     Obesity, unspecified     JAH (obstructive sleep apnea)     uses BIPAP    Other and unspecified hyperlipidemia     Other ankle sprain and strain     Psychosexual dysfunction with inhibited sexual excitement     Ventral hernia, unspecified, without mention of obstruction or gangrene        Past Surgical History:   Procedure Laterality Date    A-scope knees      Bilateral    ARTHROSCOPIC REPAIR OF ROTATOR CUFF OF SHOULDER Left 9/20/2019    Procedure: REPAIR, ROTATOR CUFF, ARTHROSCOPIC;  Surgeon: Felix Levin MD;  Location: [facility] OR;  Service: Orthopedics;  Laterality: Left;    ARTHROSCOPY OF SHOULDER WITH DECOMPRESSION OF SUBACROMIAL SPACE Left 9/20/2019    Procedure: ARTHROSCOPY, SHOULDER, WITH SUBACROMIAL SPACE DECOMPRESSION;  Surgeon: Felix Levin MD;  Location: [facility] OR;  Service: Orthopedics;  Laterality: Left;  Jaspreet- Arthrex notified of all case today 9/16-tcb    CARPAL TUNNEL RELEASE      Bilateral    EYE SURGERY      Lasik    HERNIA REPAIR      KNEE ARTHROSCOPY W/ MENISCECTOMY Left 10/15/2019    Procedure: ARTHROSCOPY, KNEE, WITH MENISCECTOMY;  Surgeon: Felix Levin MD;  Location: [facility] OR;  Service: Orthopedics;  Laterality: Left;  Medial and Lateral Meniscectomy    KNEE ARTHROSCOPY W/ MENISCECTOMY Right 11/22/2019    Procedure: ARTHROSCOPY, KNEE, WITH MENISCECTOMY;  Surgeon: Felix Levin MD;  Location: [facility] OR;  Service: Orthopedics;  Laterality: Right;    KNEE SURGERY      osmar    NISSEN FUNDOPLICATION      REVERSE TOTAL SHOULDER ARTHROPLASTY Left  2/11/2020    Procedure: ARTHROPLASTY, SHOULDER, TOTAL, REVERSE;  Surgeon: Felix Levin MD;  Location: [facility];  Service: Orthopedics;  Laterality: Left;  [place]    ROTATOR CUFF REPAIR      right    SLEEVE GASTROPLASTY         Current Outpatient Medications   Medication Sig    cyanocobalamin (VITAMIN B-12) 1000 MCG tablet Take 1,000 mcg by mouth once daily.     cyclobenzaprine (FLEXERIL) 10 MG tablet Take 10 mg by mouth 3 (three) times daily as needed for Muscle spasms.    diclofenac sodium (VOLTAREN) 1 % Gel Apply topically daily as needed.    escitalopram oxalate (LEXAPRO) 20 MG tablet Take 20 mg by mouth once daily.    gabapentin (NEURONTIN) 300 MG capsule Take 300 mg by mouth every evening.    HYDROcodone-acetaminophen (NORCO) 5-325 mg per tablet Take 1 tablet by mouth every 6 (six) hours as needed for Pain.    hydrOXYzine HCl (ATARAX) 25 MG tablet Take 25 mg by mouth 3 (three) times daily.    lidocaine (LIDODERM) 5 % Place 1 patch onto the skin every evening. Remove & Discard patch within 12 hours or as directed by MD    losartan (COZAAR) 25 MG tablet Take 25 mg by mouth once daily.    mirtazapine (REMERON) 30 MG tablet Take 30 mg by mouth every evening.    oxyCODONE-acetaminophen (PERCOCET) 5-325 mg per tablet Take 1 tablet by mouth every 6 (six) hours as needed.    pantoprazole (PROTONIX) 40 MG tablet TK ONE  T PO D    sucralfate (CARAFATE) 1 gram tablet TK 1 T PO QID 1 HOUR BEFORE MEALS AND 1 T QHS OES    vitamin D (VITAMIN D3) 1000 units Tab Take 2,000 Units by mouth once daily.    zolpidem (AMBIEN) 5 MG Tab Take 1 tablet (5 mg total) by mouth nightly as needed.     No current facility-administered medications for this visit.        Review of patient's allergies indicates:  No Known Allergies    Family History   Problem Relation Age of Onset    Arthritis Mother     Hypertension Father     Kidney disease Father     Heart disease Father     COPD Father        Social History      Socioeconomic History    Marital status:      Spouse name: Not on file    Number of children: Not on file    Years of education: Not on file    Highest education level: Not on file   Occupational History    Not on file   Social Needs    Financial resource strain: Not on file    Food insecurity:     Worry: Not on file     Inability: Not on file    Transportation needs:     Medical: Not on file     Non-medical: Not on file   Tobacco Use    Smoking status: Never Smoker    Smokeless tobacco: Former User     Types: Chew   Substance and Sexual Activity    Alcohol use: Yes     Comment: occasional    Drug use: No    Sexual activity: Not on file   Lifestyle    Physical activity:     Days per week: Not on file     Minutes per session: Not on file    Stress: Not on file   Relationships    Social connections:     Talks on phone: Not on file     Gets together: Not on file     Attends Religion service: Not on file     Active member of club or organization: Not on file     Attends meetings of clubs or organizations: Not on file     Relationship status: Not on file   Other Topics Concern    Not on file   Social History Narrative    Not on file       Chief Complaint:   Chief Complaint   Patient presents with    Left Knee - Pain    Right Knee - Pain    Left Shoulder - Pain       Date of surgery:  February 11, 2020    History of present illness:  54-year-old male underwent left reverse total shoulder arthroplasty about 4 months ago.  Shoulder is doing well.  No wound issues.  Has more pain in his knees.  Still popping.  Right knee is worse than the left.  Pain is a 5/10.  We did Monovisc injection on him back in April in the only lasted about a week or so.  This similar what it has been going on with [facility]  He has had numerous viscosupplementation injections as well as numerous cortisone injections.  Patient has some arthritis but it is definitely not severe enough  to warrant knee replacement.      Review of Systems:    Musculoskeletal:  See HPI        Physical Examination:    Vital Signs:    Vitals:    06/04/20 0955   Temp: 98.1 °F (36.7 °C)       Body mass index is 27.26 kg/m².    This a well-developed, well nourished patient in no acute distress.  They are alert and oriented and cooperative to examination.  Pt. walks without an antalgic gait.      Examination left shoulder shows well-healed surgical incision. No erythema or drainage. Neurovascularly intact.    Examination of bilateral knees shows no rashes or erythema. There are no masses ecchymosis or effusion. Patient has full range of motion from 0-130°. Patient is nontender to palpation over lateral joint line and moderately tender to palpation over the medial joint line.  Knee is stable to varus and valgus stress. 5 out of 5 motor strength. Palpable distal pulses. Intact light touch sensation. Negative Patellofemoral crepitus      X-rays:  X-rays of the left shoulder reviewed which show well-aligned reverse total shoulder arthroplasty without complications.     Assessment::  Status post left reverse total shoulder arthroplasty  Bilateral knee arthritis    Plan:  Reviewed the findings with him today.  I will trying get authorization from PRP for both of his knees.  He has tried and failed all other conservative measures for his knees and his knees are not severe enough to warrant knee replacements.  I think this could be a good next step to give him some substantial relief without going down the surgical route again.    This note was created using [employer] voice recognition software that occasionally misinterpreted phrases or words.

## 2020-06-19 RX ORDER — OXYCODONE AND ACETAMINOPHEN 5; 325 MG/1; MG/1
1 TABLET ORAL EVERY 6 HOURS PRN
Qty: 60 TABLET | Refills: 0 | Status: SHIPPED | OUTPATIENT
Start: 2020-06-19 | End: 2020-07-07 | Stop reason: SDUPTHER

## 2020-06-19 NOTE — TELEPHONE ENCOUNTER
----- Message from Hemanth Burroughs sent at 6/19/2020  3:19 PM CDT -----  Regarding: Refill  Contact: pt   [phone]  Refill  oxyCODONE-acetaminophen (PERCOCET) 5-325 mg per tablet      [facility] [place] [address] [phone] (Phone)  [phone] (Fax)

## 2020-07-01 DIAGNOSIS — M25.562 PAIN IN BOTH KNEES, UNSPECIFIED CHRONICITY: Primary | ICD-10-CM

## 2020-07-01 DIAGNOSIS — M25.561 PAIN IN BOTH KNEES, UNSPECIFIED CHRONICITY: Primary | ICD-10-CM

## 2020-07-06 ENCOUNTER — OFFICE VISIT (OUTPATIENT)
Dept: ORTHOPEDICS | Facility: CLINIC | Age: 55
End: 2020-07-06
Payer: COMMERCIAL

## 2020-07-06 ENCOUNTER — HOSPITAL ENCOUNTER (OUTPATIENT)
Dept: RADIOLOGY | Facility: HOSPITAL | Age: 55
Discharge: HOME | End: 2020-07-06
Attending: ORTHOPAEDIC SURGERY
Payer: COMMERCIAL

## 2020-07-06 VITALS — RESPIRATION RATE: 18 BRPM | BODY MASS INDEX: 27.2 KG/M2 | WEIGHT: 190 LBS | TEMPERATURE: 99 F | HEIGHT: 70 IN

## 2020-07-06 DIAGNOSIS — M25.562 PAIN IN BOTH KNEES, UNSPECIFIED CHRONICITY: ICD-10-CM

## 2020-07-06 DIAGNOSIS — Z01.818 PRE-OP TESTING: ICD-10-CM

## 2020-07-06 DIAGNOSIS — M17.10 ARTHRITIS OF KNEE: Primary | ICD-10-CM

## 2020-07-06 DIAGNOSIS — M25.561 PAIN IN BOTH KNEES, UNSPECIFIED CHRONICITY: ICD-10-CM

## 2020-07-06 PROCEDURE — 99214 OFFICE O/P EST MOD 30 MIN: CPT | Mod: 57,S$PBB,, | Performed by: ORTHOPAEDIC SURGERY

## 2020-07-06 PROCEDURE — 73564 X-RAY EXAM KNEE 4 OR MORE: CPT | Mod: 26,50,, | Performed by: RADIOLOGY

## 2020-07-06 PROCEDURE — 99213 OFFICE O/P EST LOW 20 MIN: CPT | Mod: PBBFAC,25,PN | Performed by: ORTHOPAEDIC SURGERY

## 2020-07-06 PROCEDURE — 99999 PR PBB SHADOW E&M-EST. PATIENT-LVL III: ICD-10-PCS | Mod: PBBFAC,,, | Performed by: ORTHOPAEDIC SURGERY

## 2020-07-06 PROCEDURE — 73564 X-RAY EXAM KNEE 4 OR MORE: CPT | Mod: TC,50,PN

## 2020-07-06 PROCEDURE — 73564 XR KNEE ORTHO BILAT WITH FLEXION: ICD-10-PCS | Mod: 26,50,, | Performed by: RADIOLOGY

## 2020-07-06 PROCEDURE — 99214 PR OFFICE/OUTPT VISIT, EST, LEVL IV, 30-39 MIN: ICD-10-PCS | Mod: 57,S$PBB,, | Performed by: ORTHOPAEDIC SURGERY

## 2020-07-06 PROCEDURE — 99999 PR PBB SHADOW E&M-EST. PATIENT-LVL III: CPT | Mod: PBBFAC,,, | Performed by: ORTHOPAEDIC SURGERY

## 2020-07-06 NOTE — PROGRESS NOTES
Past Medical History:   Diagnosis Date    Alcohol abuse, unspecified     Allergic rhinitis, cause unspecified     Arthritis     Asthma attack     Carpal tunnel syndrome     Depressive disorder, not elsewhere classified     Diarrhea     Encounter for blood transfusion     GERD (gastroesophageal reflux disease)     Hypersomnia, unspecified     Hypertension     Hypoglycemia     Lumbago     Obesity, unspecified     JAH (obstructive sleep apnea)     uses BIPAP    Other and unspecified hyperlipidemia     Other ankle sprain and strain     Psychosexual dysfunction with inhibited sexual excitement     Ventral hernia, unspecified, without mention of obstruction or gangrene        Past Surgical History:   Procedure Laterality Date    A-scope knees      Bilateral    ARTHROSCOPIC REPAIR OF ROTATOR CUFF OF SHOULDER Left 9/20/2019    Procedure: REPAIR, ROTATOR CUFF, ARTHROSCOPIC;  Surgeon: Felix Levin MD;  Location: [facility] OR;  Service: Orthopedics;  Laterality: Left;    ARTHROSCOPY OF SHOULDER WITH DECOMPRESSION OF SUBACROMIAL SPACE Left 9/20/2019    Procedure: ARTHROSCOPY, SHOULDER, WITH SUBACROMIAL SPACE DECOMPRESSION;  Surgeon: Felix Levin MD;  Location: [facility] OR;  Service: Orthopedics;  Laterality: Left;  Jaspreet- Arthrex notified of all case today 9/16-tcb    CARPAL TUNNEL RELEASE      Bilateral    EYE SURGERY      Lasik    HERNIA REPAIR      KNEE ARTHROSCOPY W/ MENISCECTOMY Left 10/15/2019    Procedure: ARTHROSCOPY, KNEE, WITH MENISCECTOMY;  Surgeon: Felix Levin MD;  Location: [facility];  Service: Orthopedics;  Laterality: Left;  Medial and Lateral Meniscectomy    KNEE ARTHROSCOPY W/ MENISCECTOMY Right 11/22/2019    Procedure: ARTHROSCOPY, KNEE, WITH MENISCECTOMY;  Surgeon: Felix Levin MD;  Location: [facility] OR;  Service: Orthopedics;  Laterality: Right;    KNEE SURGERY      osmar    NISSEN FUNDOPLICATION      REVERSE TOTAL SHOULDER ARTHROPLASTY Left  2/11/2020    Procedure: ARTHROPLASTY, SHOULDER, TOTAL, REVERSE;  Surgeon: Felix Levin MD;  Location: [facility];  Service: Orthopedics;  Laterality: Left;  [place]    ROTATOR CUFF REPAIR      right    SLEEVE GASTROPLASTY         Current Outpatient Medications   Medication Sig    cyanocobalamin (VITAMIN B-12) 1000 MCG tablet Take 1,000 mcg by mouth once daily.     cyclobenzaprine (FLEXERIL) 10 MG tablet Take 10 mg by mouth 3 (three) times daily as needed for Muscle spasms.    diclofenac sodium (VOLTAREN) 1 % Gel Apply topically daily as needed.    escitalopram oxalate (LEXAPRO) 20 MG tablet Take 20 mg by mouth once daily.    gabapentin (NEURONTIN) 300 MG capsule Take 300 mg by mouth every evening.    HYDROcodone-acetaminophen (NORCO) 5-325 mg per tablet Take 1 tablet by mouth every 6 (six) hours as needed for Pain.    hydrOXYzine HCl (ATARAX) 25 MG tablet Take 25 mg by mouth 3 (three) times daily.    lidocaine (LIDODERM) 5 % Place 1 patch onto the skin every evening. Remove & Discard patch within 12 hours or as directed by MD    losartan (COZAAR) 25 MG tablet Take 25 mg by mouth once daily.    mirtazapine (REMERON) 30 MG tablet Take 30 mg by mouth every evening.    oxyCODONE-acetaminophen (PERCOCET) 5-325 mg per tablet Take 1 tablet by mouth every 6 (six) hours as needed.    pantoprazole (PROTONIX) 40 MG tablet TK ONE  T PO D    sucralfate (CARAFATE) 1 gram tablet TK 1 T PO QID 1 HOUR BEFORE MEALS AND 1 T QHS OES    vitamin D (VITAMIN D3) 1000 units Tab Take 2,000 Units by mouth once daily.    zolpidem (AMBIEN) 5 MG Tab Take 1 tablet (5 mg total) by mouth nightly as needed.     No current facility-administered medications for this visit.        Review of patient's allergies indicates:  No Known Allergies    Family History   Problem Relation Age of Onset    Arthritis Mother     Hypertension Father     Kidney disease Father     Heart disease Father     COPD Father        Social History      Socioeconomic History    Marital status:      Spouse name: Not on file    Number of children: Not on file    Years of education: Not on file    Highest education level: Not on file   Occupational History    Not on file   Social Needs    Financial resource strain: Not on file    Food insecurity     Worry: Not on file     Inability: Not on file    Transportation needs     Medical: Not on file     Non-medical: Not on file   Tobacco Use    Smoking status: Never Smoker    Smokeless tobacco: Former User     Types: Chew   Substance and Sexual Activity    Alcohol use: Yes     Comment: occasional    Drug use: No    Sexual activity: Not on file   Lifestyle    Physical activity     Days per week: Not on file     Minutes per session: Not on file    Stress: Not on file   Relationships    Social connections     Talks on phone: Not on file     Gets together: Not on file     Attends Jewish service: Not on file     Active member of club or organization: Not on file     Attends meetings of clubs or organizations: Not on file     Relationship status: Not on file   Other Topics Concern    Not on file   Social History Narrative    Not on file       Chief Complaint:   Chief Complaint   Patient presents with    Knee Pain     bilateral knee pain       Date of surgery:  February 11, 2020    History of present illness:  55-year-old male underwent left reverse total shoulder arthroplasty about 6 months ago.  Shoulder is doing well.  No wound issues.  Has more pain in his knees.  Still popping.  Left knee is currently the worst of the 2.  Pain is a 5/10.  We did Monovisc injection on him back in April and it only lasted about a week or so.  This is similar to what has been going on with his [facility].  He has had numerous viscosupplementation injections as well as numerous cortisone injections.  He has had 3 or 4 arthroscopic surgeries on both knees.  Patient has been told he needed  [facility].  We tried to get PRP authorized but it was denied as well.      Review of Systems:    Musculoskeletal:  See HPI        Physical Examination:    Vital Signs:    Vitals:    07/06/20 1039   Resp: 18   Temp: 99 °F (37.2 °C)       Body mass index is 27.26 kg/m².    This a well-developed, well nourished patient in no acute distress.  They are alert and oriented and cooperative to examination.  Pt. walks without an antalgic gait.      Examination left shoulder shows well-healed surgical incision. No erythema or drainage. Neurovascularly intact.    Examination of bilateral knees shows no rashes or erythema. There are no masses ecchymosis or effusion. Patient has full range of motion from 0-130°. Patient is nontender to palpation over lateral joint line and moderately tender to palpation over the medial joint line.  Knee is stable to varus and valgus stress. 5 out of 5 motor strength. Palpable distal pulses. Intact light touch sensation.  Moderate Patellofemoral crepitus    Heart is regular rate without obvious murmurs   Normal respiratory effort without audible wheezing  Abdomen is soft and nontender       X-rays:  X-rays of both knees are ordered and reviewed which show arthritic changes of both knees particularly the patellofemoral joint     Assessment::    Bilateral knee arthritis.  Left greater than right    Plan:  Reviewed the findings with him today.  He has tried everything for these knee short of a knee replacement.  He has had multiple surgeries on the knees.  He has had multiple rounds of injections, both viscosupplementation and cortisone.  He has been denied PRP and other biologics.  Plan is for left total knee arthroplasty.  Risks, benefits, and alternatives to the procedure were explained to the patient including but not limited to damage to nerves, arteries, blood vessels, bones, tendons, ligaments, stiffness, instability, infection, DVT, PE, as well as general  anesthetic complications including seizure, stroke, heart attack and even death. The patient understood these risks and wished to proceed and signed the informed consent.       This note was created using [employer] voice recognition software that occasionally misinterpreted phrases or words.

## 2020-07-06 NOTE — H&P (VIEW-ONLY)
Past Medical History:   Diagnosis Date    Alcohol abuse, unspecified     Allergic rhinitis, cause unspecified     Arthritis     Asthma attack     Carpal tunnel syndrome     Depressive disorder, not elsewhere classified     Diarrhea     Encounter for blood transfusion     GERD (gastroesophageal reflux disease)     Hypersomnia, unspecified     Hypertension     Hypoglycemia     Lumbago     Obesity, unspecified     JAH (obstructive sleep apnea)     uses BIPAP    Other and unspecified hyperlipidemia     Other ankle sprain and strain     Psychosexual dysfunction with inhibited sexual excitement     Ventral hernia, unspecified, without mention of obstruction or gangrene        Past Surgical History:   Procedure Laterality Date    A-scope knees      Bilateral    ARTHROSCOPIC REPAIR OF ROTATOR CUFF OF SHOULDER Left 9/20/2019    Procedure: REPAIR, ROTATOR CUFF, ARTHROSCOPIC;  Surgeon: Felix Levin MD;  Location: [facility] OR;  Service: Orthopedics;  Laterality: Left;    ARTHROSCOPY OF SHOULDER WITH DECOMPRESSION OF SUBACROMIAL SPACE Left 9/20/2019    Procedure: ARTHROSCOPY, SHOULDER, WITH SUBACROMIAL SPACE DECOMPRESSION;  Surgeon: Felix Levin MD;  Location: [facility] OR;  Service: Orthopedics;  Laterality: Left;  Jaspreet- Arthrex notified of all case today 9/16-tcb    CARPAL TUNNEL RELEASE      Bilateral    EYE SURGERY      Lasik    HERNIA REPAIR      KNEE ARTHROSCOPY W/ MENISCECTOMY Left 10/15/2019    Procedure: ARTHROSCOPY, KNEE, WITH MENISCECTOMY;  Surgeon: Felix Levin MD;  Location: [facility] OR;  Service: Orthopedics;  Laterality: Left;  Medial and Lateral Meniscectomy    KNEE ARTHROSCOPY W/ MENISCECTOMY Right 11/22/2019    Procedure: ARTHROSCOPY, KNEE, WITH MENISCECTOMY;  Surgeon: Felix Levin MD;  Location: [facility] OR;  Service: Orthopedics;  Laterality: Right;    KNEE SURGERY      osmar    NISSEN FUNDOPLICATION      REVERSE TOTAL SHOULDER ARTHROPLASTY Left  2/11/2020    Procedure: ARTHROPLASTY, SHOULDER, TOTAL, REVERSE;  Surgeon: Felix Levin MD;  Location: [facility];  Service: Orthopedics;  Laterality: Left;  [place]    ROTATOR CUFF REPAIR      right    SLEEVE GASTROPLASTY         Current Outpatient Medications   Medication Sig    cyanocobalamin (VITAMIN B-12) 1000 MCG tablet Take 1,000 mcg by mouth once daily.     cyclobenzaprine (FLEXERIL) 10 MG tablet Take 10 mg by mouth 3 (three) times daily as needed for Muscle spasms.    diclofenac sodium (VOLTAREN) 1 % Gel Apply topically daily as needed.    escitalopram oxalate (LEXAPRO) 20 MG tablet Take 20 mg by mouth once daily.    gabapentin (NEURONTIN) 300 MG capsule Take 300 mg by mouth every evening.    HYDROcodone-acetaminophen (NORCO) 5-325 mg per tablet Take 1 tablet by mouth every 6 (six) hours as needed for Pain.    hydrOXYzine HCl (ATARAX) 25 MG tablet Take 25 mg by mouth 3 (three) times daily.    lidocaine (LIDODERM) 5 % Place 1 patch onto the skin every evening. Remove & Discard patch within 12 hours or as directed by MD    losartan (COZAAR) 25 MG tablet Take 25 mg by mouth once daily.    mirtazapine (REMERON) 30 MG tablet Take 30 mg by mouth every evening.    oxyCODONE-acetaminophen (PERCOCET) 5-325 mg per tablet Take 1 tablet by mouth every 6 (six) hours as needed.    pantoprazole (PROTONIX) 40 MG tablet TK ONE  T PO D    sucralfate (CARAFATE) 1 gram tablet TK 1 T PO QID 1 HOUR BEFORE MEALS AND 1 T QHS OES    vitamin D (VITAMIN D3) 1000 units Tab Take 2,000 Units by mouth once daily.    zolpidem (AMBIEN) 5 MG Tab Take 1 tablet (5 mg total) by mouth nightly as needed.     No current facility-administered medications for this visit.        Review of patient's allergies indicates:  No Known Allergies    Family History   Problem Relation Age of Onset    Arthritis Mother     Hypertension Father     Kidney disease Father     Heart disease Father     COPD Father        Social History      Socioeconomic History    Marital status:      Spouse name: Not on file    Number of children: Not on file    Years of education: Not on file    Highest education level: Not on file   Occupational History    Not on file   Social Needs    Financial resource strain: Not on file    Food insecurity     Worry: Not on file     Inability: Not on file    Transportation needs     Medical: Not on file     Non-medical: Not on file   Tobacco Use    Smoking status: Never Smoker    Smokeless tobacco: Former User     Types: Chew   Substance and Sexual Activity    Alcohol use: Yes     Comment: occasional    Drug use: No    Sexual activity: Not on file   Lifestyle    Physical activity     Days per week: Not on file     Minutes per session: Not on file    Stress: Not on file   Relationships    Social connections     Talks on phone: Not on file     Gets together: Not on file     Attends Cheondoism service: Not on file     Active member of club or organization: Not on file     Attends meetings of clubs or organizations: Not on file     Relationship status: Not on file   Other Topics Concern    Not on file   Social History Narrative    Not on file       Chief Complaint:   Chief Complaint   Patient presents with    Knee Pain     bilateral knee pain       Date of surgery:  February 11, 2020    History of present illness:  55-year-old male underwent left reverse total shoulder arthroplasty about 6 months ago.  Shoulder is doing well.  No wound issues.  Has more pain in his knees.  Still popping.  Left knee is currently the worst of the 2.  Pain is a 5/10.  We did Monovisc injection on him back in April and it only lasted about a week or so.  This is similar to what has been going on with [facility]  He has had numerous viscosupplementation injections as well as numerous cortisone injections.  He has had 3 or 4 arthroscopic surgeries on both knees.  Patient has [facility].  We tried to get PRP authorized but it was denied as well.      Review of Systems:    Musculoskeletal:  See HPI        Physical Examination:    Vital Signs:    Vitals:    07/06/20 1039   Resp: 18   Temp: 99 °F (37.2 °C)       Body mass index is 27.26 kg/m².    This a well-developed, well nourished patient in no acute distress.  They are alert and oriented and cooperative to examination.  Pt. walks without an antalgic gait.      Examination left shoulder shows well-healed surgical incision. No erythema or drainage. Neurovascularly intact.    Examination of bilateral knees shows no rashes or erythema. There are no masses ecchymosis or effusion. Patient has full range of motion from 0-130°. Patient is nontender to palpation over lateral joint line and moderately tender to palpation over the medial joint line.  Knee is stable to varus and valgus stress. 5 out of 5 motor strength. Palpable distal pulses. Intact light touch sensation.  Moderate Patellofemoral crepitus    Heart is regular rate without obvious murmurs   Normal respiratory effort without audible wheezing  Abdomen is soft and nontender       X-rays:  X-rays of both knees are ordered and reviewed which show arthritic changes of both knees particularly the patellofemoral joint     Assessment::    Bilateral knee arthritis.  Left greater than right    Plan:  Reviewed the findings with him today.  He has tried everything for these knee short of a knee replacement.  He has had multiple surgeries on the knees.  He has had multiple rounds of injections, both viscosupplementation and cortisone.  He has been denied PRP and other biologics.  Plan is for left total knee arthroplasty.  Risks, benefits, and alternatives to the procedure were explained to the patient including but not limited to damage to nerves, arteries, blood vessels, bones, tendons, ligaments, stiffness, instability, infection, DVT, PE, as well as general  anesthetic complications including seizure, stroke, heart attack and even death. The patient understood these risks and wished to proceed and signed the informed consent.       This note was created using M Modal voice recognition software that occasionally misinterpreted phrases or words.

## 2020-07-07 RX ORDER — OXYCODONE AND ACETAMINOPHEN 5; 325 MG/1; MG/1
1 TABLET ORAL EVERY 6 HOURS PRN
Qty: 60 TABLET | Refills: 0 | Status: SHIPPED | OUTPATIENT
Start: 2020-07-07 | End: 2020-07-20 | Stop reason: SDUPTHER

## 2020-07-07 NOTE — TELEPHONE ENCOUNTER
----- Message from Hemanth Burroughs sent at 7/7/2020  9:06 AM CDT -----  Regarding: Looking for medication  Contact: pt  [phone]  Please call

## 2020-07-09 RX ORDER — MUPIROCIN 20 MG/G
OINTMENT TOPICAL
Status: CANCELLED | OUTPATIENT
Start: 2020-07-09

## 2020-07-09 RX ORDER — CEFAZOLIN SODIUM 2 G/50ML
2 SOLUTION INTRAVENOUS
Status: CANCELLED | OUTPATIENT
Start: 2020-07-09

## 2020-07-20 RX ORDER — ZOLPIDEM TARTRATE 5 MG/1
5 TABLET ORAL NIGHTLY PRN
Qty: 20 TABLET | Refills: 0 | Status: SHIPPED | OUTPATIENT
Start: 2020-07-20 | End: 2020-09-03 | Stop reason: SDUPTHER

## 2020-07-20 RX ORDER — OXYCODONE AND ACETAMINOPHEN 5; 325 MG/1; MG/1
1 TABLET ORAL EVERY 6 HOURS PRN
Qty: 60 TABLET | Refills: 0 | Status: ON HOLD | OUTPATIENT
Start: 2020-07-20 | End: 2020-08-05

## 2020-07-20 NOTE — TELEPHONE ENCOUNTER
----- Message from Ariadna Maravilla MA sent at 7/20/2020  1:41 PM CDT -----  Contact: pt  Refill   Clinic pharmacy   Ambien and oxy   Call back

## 2020-07-21 NOTE — PRE ADMISSION SCREENING
JOINT CAMP ASSESSMENT    Name Himanshu Connor II   MRN [id number]    Age/Sex 55 y.o. male    Surgeon Dr. Felix Mitchell*   Joint Camp Date 7/23/2020   Surgery Date 8/4/2020   Procedure Left knee arthroplasty   Insurance Payor: [employer] / Plan: [facility] CHOICE PROGRAM / Product Type: Government /    Care Team Patient Care Team:  Gilo Kawasaki, MD as PCP - General (Family Medicine)    Pharmacy   [id number] [place], [place] - [address]  AT [facility]  [address] [facility] [address]  Phone: [phone] Fax: [phone]    [facility] [address] [place] [address] [address]  [address] [facility] [address]  Phone: [phone] Fax: [phone]    [id number] [place], [place] - [address] [facility]  & [facility]  [address] [facility] [address]  Phone: [phone] Fax: [phone]    [facility] [address] [facility] [address]  [address] [address]  Phone: [phone] Fax: [phone]     AM-PAC Score    23   Risk Assessment Score 9     Past Medical History:   Diagnosis Date    Alcohol abuse, unspecified     Allergic rhinitis, cause unspecified     Arthritis     Asthma attack     Carpal tunnel syndrome     Depressive disorder, not elsewhere classified     Diarrhea     Encounter for blood transfusion     GERD (gastroesophageal reflux disease)     Hypersomnia, unspecified     Hypertension     Hypoglycemia     Lumbago     Obesity, unspecified     JAH (obstructive sleep apnea)     uses BIPAP    Other and unspecified hyperlipidemia     Other ankle sprain and strain     Psychosexual dysfunction with inhibited sexual excitement     Ventral hernia, unspecified, without mention of obstruction or gangrene        Past Surgical History:   Procedure Laterality Date    A-scope knees      Bilateral    ARTHROSCOPIC REPAIR OF ROTATOR CUFF OF SHOULDER Left 9/20/2019     Procedure: REPAIR, ROTATOR CUFF, ARTHROSCOPIC;  Surgeon: Felix Levin MD;  Location: [facility] OR;  Service: Orthopedics;  Laterality: Left;    ARTHROSCOPY OF SHOULDER WITH DECOMPRESSION OF SUBACROMIAL SPACE Left 9/20/2019    Procedure: ARTHROSCOPY, SHOULDER, WITH SUBACROMIAL SPACE DECOMPRESSION;  Surgeon: Felix Levin MD;  Location: [facility] OR;  Service: Orthopedics;  Laterality: Left;  Jaspreet- Arthrex notified of all case today 9/16-tcb    CARPAL TUNNEL RELEASE      Bilateral    EYE SURGERY      Lasik    HERNIA REPAIR      KNEE ARTHROSCOPY W/ MENISCECTOMY Left 10/15/2019    Procedure: ARTHROSCOPY, KNEE, WITH MENISCECTOMY;  Surgeon: Felix Levin MD;  Location: [facility] OR;  Service: Orthopedics;  Laterality: Left;  Medial and Lateral Meniscectomy    KNEE ARTHROSCOPY W/ MENISCECTOMY Right 11/22/2019    Procedure: ARTHROSCOPY, KNEE, WITH MENISCECTOMY;  Surgeon: Felix Levin MD;  Location: [facility] OR;  Service: Orthopedics;  Laterality: Right;    KNEE SURGERY      osmar    NISSEN FUNDOPLICATION      REVERSE TOTAL SHOULDER ARTHROPLASTY Left 2/11/2020    Procedure: ARTHROPLASTY, SHOULDER, TOTAL, REVERSE;  Surgeon: Felix Levin MD;  Location: [facility] OR;  Service: Orthopedics;  Laterality: Left;  [place]    ROTATOR CUFF REPAIR      right    SLEEVE GASTROPLASTY           Home Enviroment     Living Arrangement: Lives with his mother in [place], [place]  Home Environment: 1-story house/ trailer, number of outside stairs: 1, walk-in shower  Home Safety Concerns: Pets in the home: dogs (1).    DISCHARGE CAREGIVER/SUPPORT SYSTEM     Identified post-op caregiver: Patient has children / family / friends to help, mother.  Patient's caregiver(s) will be able to provide physical assistance. Patient will have someone to assist overnight.      Caregiver present at pre-op interview:  No      PRE-OPERATIVE FUNCTIONAL STATUS     Employment: Unemployed     Pre-op Functional Status: Patient is  independent with mobility/ambulation, transfers, ADL's, IADL's.    Use of assistive device for ambulation: none  ADL: self care  ADL Limitations: difficulty with walking  Medical Restrictions: None    POTENTIAL BARRIERS TO DISCHARGE/POTENTIAL POST-OP COMPLICATIONS   Patient currently takes opiate pain medications so may have some increased difficulty with pain control after surgery. He has a history of requiring a blood transfusion.His medical record lists a history of alcohol abuse and he may be at risk of withdrawal symptoms.    Patient lives in [place] and plans to make trips back to this area for post-op appointments with Dr. Levin and may be at increased risk of blood clot formation with prolonged driving trips. Patient was advised to plan frequent stops to walk around.       DISCHARGE PLAN     Expected LOS of 2 days or less for joint replacement discussed with patient. We also discussed a discharge path of HH for approximately two weeks with a transition to outpatient PT on the third week given no post-op complications.      Patient in agreement with discharge plan: Yes    Discharge to: Discharge home with home health (PT/OT) x2 weeks with transition to outpatient PT     HH:  [facility] in [place], [place]   OP PT: [facility] DME: shower chair, raised toilet, safety bars at toilet and in shower    Needed DME at D/C: rolling walker      Rx: Per Dr. Levin at discharge     Meds to Beds: N/A  Patient expected to discharge on Aspirin 81 mg by mouth twice daily for DVT prophylaxis. Coumadin Clinic Needed: No

## 2020-07-21 NOTE — PRE ADMISSION SCREENING
"               [facility] Risk Assessment Scale    Patient Name: Himanshu Connor II  YOB: 1965  MRN: [id number]            RIsk Factor Measure Recommendation Patient Data Scale/Score   BMI >40 Reconsider surgery, weight loss   Estimated body mass index is 28.7 kg/m² as calculated from the following:    Height as of this encounter: 5' 10" (1.778 m).    Weight as of this encounter: 90.7 kg (200 lb).   [] 0 = 1 - 24.9  [x] 1 = 25-29.9  [] 2 = 30-34.9  [] 3 = 35-39.9  [] 4 = 40-44.9  [] 5 = 45-99.9   Hemoglobin AIC (if applicable) >9 Delay surgery until DM under control  Refer for:  · Nutrition Therapy  · Exercise   · Medication    No results found for: LABA1C, HGBA1C    Lab Results   Component Value Date     (H) 07/23/2020      [] 0 = 4.0-5.6  [] 1 = 5.7-6.4  [] 2 = 6.5-6.9  [] 3 = 7.0-7.9  [] 4 = 8.0-8.9  [] 5 = 9.0-12   Hemoglobin (Anemia) <9 Delay surgery   Correct anemia Lab Results   Component Value Date    HGB 10.3 (L) 07/23/2020    [] 20 - <9.0                    Albumin <3 Delay surgery &Workup No results found for: ALBUMIN [] 20 - <3.0   Smoking Cessation >4 Weeks Delay Surgery  Refer to OP Cessation Class    N/A [] 20 - current smoker                                _____ PPD                    Hx of MI, PE, Arrhythmia, CVA, DVT <30 Days Delay Surgery    N/A [] 20      Infection Variable Delay surgery and re-evaluate   N/A [] 20 - recent/current infection     Depression (PHQ) >10 out of 27 Delay Surgery and re-evaluate  Medication  Counseling              [x] 0     []1     []2     []3      []4      [] 5                    (1-4)      (5-9)  (10-14)  (15-19)   (20-27)     Memory Impairment & Memory loss (Mini-Cog Screening Tool) Advanced dementia and/or Parkinson's Reconsider surgery     [x] 0     []1     []2     []3     []4     [] 5     Physical Conditioning (Modified AM-PAC Per Physical Therapy at [facility]) Unable to ambulate on day of surgery Delay surgery and " re-evaluate  Pre-Rehabilitation   (PT evaluation)       []  0   []4       [x]8     []12        []16     []20       (<20%)   (<40%)   (<60%)   (<80% )    (>80%)     Home Environment/Caregiver support  (Per /Navigator Interview)    Availability of basic services and/or approprate assistance during post-operative period Delay surgery and re-evaluate  Safe home environment  Health   1 week post-surgery  Transportation  availability  Ability to obtain DME/Medications post-op    [x] 0     []1     []2     []3     []4     [] 5  [x] 0     []1     []2     []3     []4     [] 5  [x] 0     []1     []2     []3     []4     [] 5  [x] 0     []1     []2     []3     []4     [] 5         MD Contact:  Ollie Comments:  Total Score:  9

## 2020-07-21 NOTE — PRE ADMISSION SCREENING
Patient Name: Himanshu Connor II  YOB: 1965   MRN: [id number]     [facility]   Basic Mobility Inpatient Short Form 6 Clicks         How much difficulty does the patient currently have  Unable  A Lot  A Little  None      1. Turning over in bed (including adjusting bedclothes, sheets and blankets)?     1 []    2 [x]    3 []    4 []        2. Sitting down on and standing up from a chair with arms (e.g., wheelchair, bedside commode, etc.)     1 []  2 []  3 [x]     4 []      3. Moving from lying on back to sitting on the side of the bed?     1 []  2 []  3 []    4 [x]    How much help from another person does the patient currently need  Total  A Lot  A Little  None      4. Moving to and from a bed to a chair (including a wheelchair)?    1 []  2 []  3 []    4 [x]      5. Need to walk in hospital room?    1 []  2 []  3 []    4 [x]      6. Climbing 3-5 steps with a railing?    1 []  2 []  3 []    4 [x]       Raw Score:    21              CMS 0-100% Score:    28.97       %   Standardized Score:    50.25           CMS Modifier:     CJ                                   [facility]   Basic Mobility Inpatient Short Form 6 Clicks Score Conversion Table*         *Use this form to convert -PAC Basic Mobility Inpatient Raw Scores.   [facility] Inpatient Basic Mobility Short Form Scoring Example   1. Add the number values associated with the response to each item. For example, items totals yield a Raw Score of 21.   2. Match the raw score to the t-Scale scores (t-Scale score = 50.25, SE = 4.69).   3. Find the associated CMS % (CMS % = 28.97%).   4. Locate the correct CMS Functional Modifier Code, or G Code (G code = CJ)     NOTE: Each -PAC Short Form has a separate conversion table. Make sure that you use the correct conversion table.       Instruction Manual - page 45 contains conversion table

## 2020-07-23 ENCOUNTER — HOSPITAL ENCOUNTER (OUTPATIENT)
Dept: PREADMISSION TESTING | Facility: HOSPITAL | Age: 55
Discharge: HOME | End: 2020-07-23
Attending: ORTHOPAEDIC SURGERY
Payer: COMMERCIAL

## 2020-07-23 VITALS — HEIGHT: 70 IN | WEIGHT: 200 LBS | BODY MASS INDEX: 28.63 KG/M2

## 2020-07-23 DIAGNOSIS — M17.10 ARTHRITIS OF KNEE: Primary | ICD-10-CM

## 2020-07-23 LAB
ABO + RH BLD: NORMAL
ANION GAP SERPL CALC-SCNC: 10 MMOL/L (ref 8–16)
BASOPHILS # BLD AUTO: 0.04 K/UL (ref 0–0.2)
BASOPHILS NFR BLD: 0.9 % (ref 0–1.9)
BLD GP AB SCN CELLS X3 SERPL QL: NORMAL
BUN SERPL-MCNC: 8 MG/DL (ref 6–20)
CALCIUM SERPL-MCNC: 8.4 MG/DL (ref 8.7–10.5)
CHLORIDE SERPL-SCNC: 111 MMOL/L (ref 95–110)
CO2 SERPL-SCNC: 21 MMOL/L (ref 23–29)
CREAT SERPL-MCNC: 1.1 MG/DL (ref 0.5–1.4)
DIFFERENTIAL METHOD: ABNORMAL
EOSINOPHIL # BLD AUTO: 0.3 K/UL (ref 0–0.5)
EOSINOPHIL NFR BLD: 7.3 % (ref 0–8)
ERYTHROCYTE [DISTWIDTH] IN BLOOD BY AUTOMATED COUNT: 16.8 % (ref 11.5–14.5)
EST. GFR  (AFRICAN AMERICAN): >60 ML/MIN/1.73 M^2
EST. GFR  (NON AFRICAN AMERICAN): >60 ML/MIN/1.73 M^2
GLUCOSE SERPL-MCNC: 117 MG/DL (ref 70–110)
HCT VFR BLD AUTO: 34.9 % (ref 40–54)
HGB BLD-MCNC: 10.3 G/DL (ref 14–18)
IMM GRANULOCYTES # BLD AUTO: 0.04 K/UL (ref 0–0.04)
IMM GRANULOCYTES NFR BLD AUTO: 0.9 % (ref 0–0.5)
LYMPHOCYTES # BLD AUTO: 1.7 K/UL (ref 1–4.8)
LYMPHOCYTES NFR BLD: 38.9 % (ref 18–48)
MCH RBC QN AUTO: 26.8 PG (ref 27–31)
MCHC RBC AUTO-ENTMCNC: 29.5 G/DL (ref 32–36)
MCV RBC AUTO: 91 FL (ref 82–98)
MONOCYTES # BLD AUTO: 0.4 K/UL (ref 0.3–1)
MONOCYTES NFR BLD: 8.2 % (ref 4–15)
NEUTROPHILS # BLD AUTO: 1.9 K/UL (ref 1.8–7.7)
NEUTROPHILS NFR BLD: 43.8 % (ref 38–73)
NRBC BLD-RTO: 0 /100 WBC
PLATELET # BLD AUTO: 223 K/UL (ref 150–350)
PMV BLD AUTO: 8.5 FL (ref 9.2–12.9)
POTASSIUM SERPL-SCNC: 5 MMOL/L (ref 3.5–5.1)
RBC # BLD AUTO: 3.85 M/UL (ref 4.6–6.2)
SODIUM SERPL-SCNC: 142 MMOL/L (ref 136–145)
WBC # BLD AUTO: 4.37 K/UL (ref 3.9–12.7)

## 2020-07-23 PROCEDURE — 87081 CULTURE SCREEN ONLY: CPT

## 2020-07-23 PROCEDURE — 86901 BLOOD TYPING SEROLOGIC RH(D): CPT

## 2020-07-23 PROCEDURE — 99900104 DSU ONLY-NO CHARGE-EA ADD'L HR (STAT)

## 2020-07-23 PROCEDURE — 36415 COLL VENOUS BLD VENIPUNCTURE: CPT

## 2020-07-23 PROCEDURE — 80048 BASIC METABOLIC PNL TOTAL CA: CPT

## 2020-07-23 PROCEDURE — 99900103 DSU ONLY-NO CHARGE-INITIAL HR (STAT)

## 2020-07-23 PROCEDURE — 85025 COMPLETE CBC W/AUTO DIFF WBC: CPT

## 2020-07-23 NOTE — DISCHARGE INSTRUCTIONS

## 2020-07-25 LAB — MRSA SPEC QL CULT: NORMAL

## 2020-07-31 DIAGNOSIS — M17.10 ARTHRITIS OF KNEE: Primary | ICD-10-CM

## 2020-08-01 ENCOUNTER — LAB VISIT (OUTPATIENT)
Dept: PRIMARY CARE CLINIC | Facility: CLINIC | Age: 55
End: 2020-08-01
Payer: COMMERCIAL

## 2020-08-01 DIAGNOSIS — Z01.818 PRE-OP TESTING: ICD-10-CM

## 2020-08-01 PROCEDURE — U0003 INFECTIOUS AGENT DETECTION BY NUCLEIC ACID (DNA OR RNA); SEVERE ACUTE RESPIRATORY SYNDROME CORONAVIRUS 2 (SARS-COV-2) (CORONAVIRUS DISEASE [COVID-19]), AMPLIFIED PROBE TECHNIQUE, MAKING USE OF HIGH THROUGHPUT TECHNOLOGIES AS DESCRIBED BY CMS-2020-01-R: HCPCS

## 2020-08-02 LAB — SARS-COV-2 RNA RESP QL NAA+PROBE: NOT DETECTED

## 2020-08-03 ENCOUNTER — ANESTHESIA EVENT (OUTPATIENT)
Dept: SURGERY | Facility: HOSPITAL | Age: 55
DRG: 470 | End: 2020-08-03
Payer: COMMERCIAL

## 2020-08-04 ENCOUNTER — ANESTHESIA (OUTPATIENT)
Dept: SURGERY | Facility: HOSPITAL | Age: 55
DRG: 470 | End: 2020-08-04
Payer: COMMERCIAL

## 2020-08-04 ENCOUNTER — HOSPITAL ENCOUNTER (INPATIENT)
Facility: HOSPITAL | Age: 55
LOS: 1 days | Discharge: HOME HEALTH | DRG: 470 | End: 2020-08-05
Attending: ORTHOPAEDIC SURGERY | Admitting: ORTHOPAEDIC SURGERY
Payer: COMMERCIAL

## 2020-08-04 DIAGNOSIS — M17.10 ARTHRITIS OF KNEE: ICD-10-CM

## 2020-08-04 PROBLEM — E66.811 CLASS 1 OBESITY DUE TO EXCESS CALORIES IN ADULT: Status: ACTIVE | Noted: 2020-08-04

## 2020-08-04 PROBLEM — E66.09 CLASS 1 OBESITY DUE TO EXCESS CALORIES IN ADULT: Status: ACTIVE | Noted: 2020-08-04

## 2020-08-04 PROCEDURE — C9290 INJ, BUPIVACAINE LIPOSOME: HCPCS | Performed by: ANESTHESIOLOGY

## 2020-08-04 PROCEDURE — 76942 PR U/S GUIDANCE FOR NEEDLE GUIDANCE: ICD-10-PCS | Mod: 26,,, | Performed by: ANESTHESIOLOGY

## 2020-08-04 PROCEDURE — 63600175 PHARM REV CODE 636 W HCPCS: Performed by: ANESTHESIOLOGY

## 2020-08-04 PROCEDURE — 97161 PT EVAL LOW COMPLEX 20 MIN: CPT

## 2020-08-04 PROCEDURE — 76942 ECHO GUIDE FOR BIOPSY: CPT | Mod: 26,,, | Performed by: ANESTHESIOLOGY

## 2020-08-04 PROCEDURE — 97110 THERAPEUTIC EXERCISES: CPT

## 2020-08-04 PROCEDURE — 27200750 HC INSULATED NEEDLE/ STIMUPLEX: Performed by: ANESTHESIOLOGY

## 2020-08-04 PROCEDURE — 12000002 HC ACUTE/MED SURGE SEMI-PRIVATE ROOM

## 2020-08-04 PROCEDURE — 94761 N-INVAS EAR/PLS OXIMETRY MLT: CPT

## 2020-08-04 PROCEDURE — D9220A PRA ANESTHESIA: Mod: CRNA,,, | Performed by: NURSE ANESTHETIST, CERTIFIED REGISTERED

## 2020-08-04 PROCEDURE — 63600175 PHARM REV CODE 636 W HCPCS: Performed by: ORTHOPAEDIC SURGERY

## 2020-08-04 PROCEDURE — 25000003 PHARM REV CODE 250: Performed by: ORTHOPAEDIC SURGERY

## 2020-08-04 PROCEDURE — 63600175 PHARM REV CODE 636 W HCPCS: Performed by: NURSE ANESTHETIST, CERTIFIED REGISTERED

## 2020-08-04 PROCEDURE — 25000003 PHARM REV CODE 250: Performed by: ANESTHESIOLOGY

## 2020-08-04 PROCEDURE — 71000039 HC RECOVERY, EACH ADD'L HOUR: Performed by: ORTHOPAEDIC SURGERY

## 2020-08-04 PROCEDURE — 27201423 OPTIME MED/SURG SUP & DEVICES STERILE SUPPLY: Performed by: ORTHOPAEDIC SURGERY

## 2020-08-04 PROCEDURE — 71000033 HC RECOVERY, INTIAL HOUR: Performed by: ORTHOPAEDIC SURGERY

## 2020-08-04 PROCEDURE — 64447 PR NERVE BLOCK INJ, ANES/STEROID, FEMORAL, INCL IMAG GUIDANCE: ICD-10-PCS | Mod: 59,LT,, | Performed by: ANESTHESIOLOGY

## 2020-08-04 PROCEDURE — 99900103 DSU ONLY-NO CHARGE-INITIAL HR (STAT): Performed by: ORTHOPAEDIC SURGERY

## 2020-08-04 PROCEDURE — 63600175 PHARM REV CODE 636 W HCPCS: Performed by: HOSPITALIST

## 2020-08-04 PROCEDURE — C1713 ANCHOR/SCREW BN/BN,TIS/BN: HCPCS | Performed by: ORTHOPAEDIC SURGERY

## 2020-08-04 PROCEDURE — 37000009 HC ANESTHESIA EA ADD 15 MINS: Performed by: ORTHOPAEDIC SURGERY

## 2020-08-04 PROCEDURE — 27447 PR TOTAL KNEE ARTHROPLASTY: ICD-10-PCS | Mod: LT,,, | Performed by: ORTHOPAEDIC SURGERY

## 2020-08-04 PROCEDURE — 36000710: Performed by: ORTHOPAEDIC SURGERY

## 2020-08-04 PROCEDURE — 99900104 DSU ONLY-NO CHARGE-EA ADD'L HR (STAT): Performed by: ORTHOPAEDIC SURGERY

## 2020-08-04 PROCEDURE — D9220A PRA ANESTHESIA: Mod: ANES,,, | Performed by: ANESTHESIOLOGY

## 2020-08-04 PROCEDURE — 27200688 HC TRAY, SPINAL-HYPER/ ISOBARIC: Performed by: ANESTHESIOLOGY

## 2020-08-04 PROCEDURE — D9220A PRA ANESTHESIA: ICD-10-PCS | Mod: ANES,,, | Performed by: ANESTHESIOLOGY

## 2020-08-04 PROCEDURE — 25000003 PHARM REV CODE 250: Performed by: HOSPITALIST

## 2020-08-04 PROCEDURE — 27447 TOTAL KNEE ARTHROPLASTY: CPT | Mod: LT,,, | Performed by: ORTHOPAEDIC SURGERY

## 2020-08-04 PROCEDURE — 36000711: Performed by: ORTHOPAEDIC SURGERY

## 2020-08-04 PROCEDURE — 64447 NJX AA&/STRD FEMORAL NRV IMG: CPT | Mod: 59,LT,, | Performed by: ANESTHESIOLOGY

## 2020-08-04 PROCEDURE — 37000008 HC ANESTHESIA 1ST 15 MINUTES: Performed by: ORTHOPAEDIC SURGERY

## 2020-08-04 PROCEDURE — 63600175 PHARM REV CODE 636 W HCPCS

## 2020-08-04 PROCEDURE — 64447 NJX AA&/STRD FEMORAL NRV IMG: CPT | Performed by: ANESTHESIOLOGY

## 2020-08-04 PROCEDURE — C1776 JOINT DEVICE (IMPLANTABLE): HCPCS | Performed by: ORTHOPAEDIC SURGERY

## 2020-08-04 PROCEDURE — D9220A PRA ANESTHESIA: ICD-10-PCS | Mod: CRNA,,, | Performed by: NURSE ANESTHETIST, CERTIFIED REGISTERED

## 2020-08-04 PROCEDURE — 25000003 PHARM REV CODE 250: Performed by: NURSE ANESTHETIST, CERTIFIED REGISTERED

## 2020-08-04 PROCEDURE — 97116 GAIT TRAINING THERAPY: CPT

## 2020-08-04 DEVICE — BASEPLATE TIB CEM PRIM SZ 6: Type: IMPLANTABLE DEVICE | Site: KNEE | Status: FUNCTIONAL

## 2020-08-04 DEVICE — IMPLANTABLE DEVICE: Type: IMPLANTABLE DEVICE | Site: KNEE | Status: FUNCTIONAL

## 2020-08-04 DEVICE — IMPLANTABLE DEVICE
Type: IMPLANTABLE DEVICE | Site: KNEE | Status: NON-FUNCTIONAL
Removed: 2021-09-21

## 2020-08-04 DEVICE — CEMENT BONE SURG SMPLX P RADPQ: Type: IMPLANTABLE DEVICE | Site: KNEE | Status: FUNCTIONAL

## 2020-08-04 DEVICE — PATELLA TRI 33X9 X3 POLYETHYLE: Type: IMPLANTABLE DEVICE | Site: KNEE | Status: FUNCTIONAL

## 2020-08-04 RX ORDER — HYDROMORPHONE HYDROCHLORIDE 1 MG/ML
1 INJECTION, SOLUTION INTRAMUSCULAR; INTRAVENOUS; SUBCUTANEOUS EVERY 6 HOURS PRN
Status: DISCONTINUED | OUTPATIENT
Start: 2020-08-04 | End: 2020-08-05 | Stop reason: HOSPADM

## 2020-08-04 RX ORDER — OXYCODONE HCL 10 MG/1
10 TABLET, FILM COATED, EXTENDED RELEASE ORAL EVERY 12 HOURS
Status: DISCONTINUED | OUTPATIENT
Start: 2020-08-04 | End: 2020-08-05 | Stop reason: HOSPADM

## 2020-08-04 RX ORDER — LOPERAMIDE HYDROCHLORIDE 2 MG/1
2 CAPSULE ORAL CONTINUOUS PRN
Status: DISCONTINUED | OUTPATIENT
Start: 2020-08-04 | End: 2020-08-05 | Stop reason: HOSPADM

## 2020-08-04 RX ORDER — OXYCODONE HYDROCHLORIDE 10 MG/1
10 TABLET ORAL EVERY 6 HOURS PRN
Status: DISCONTINUED | OUTPATIENT
Start: 2020-08-04 | End: 2020-08-05 | Stop reason: HOSPADM

## 2020-08-04 RX ORDER — ACETAMINOPHEN 10 MG/ML
1000 INJECTION, SOLUTION INTRAVENOUS EVERY 8 HOURS
Status: DISCONTINUED | OUTPATIENT
Start: 2020-08-04 | End: 2020-08-04 | Stop reason: HOSPADM

## 2020-08-04 RX ORDER — SODIUM CHLORIDE, SODIUM LACTATE, POTASSIUM CHLORIDE, CALCIUM CHLORIDE 600; 310; 30; 20 MG/100ML; MG/100ML; MG/100ML; MG/100ML
INJECTION, SOLUTION INTRAVENOUS CONTINUOUS
Status: DISCONTINUED | OUTPATIENT
Start: 2020-08-04 | End: 2020-08-04

## 2020-08-04 RX ORDER — OXYCODONE HYDROCHLORIDE 5 MG/1
5 TABLET ORAL ONCE AS NEEDED
Status: DISCONTINUED | OUTPATIENT
Start: 2020-08-04 | End: 2020-08-04 | Stop reason: HOSPADM

## 2020-08-04 RX ORDER — ONDANSETRON 8 MG/1
8 TABLET, ORALLY DISINTEGRATING ORAL EVERY 6 HOURS PRN
Status: DISCONTINUED | OUTPATIENT
Start: 2020-08-04 | End: 2020-08-05 | Stop reason: HOSPADM

## 2020-08-04 RX ORDER — PREGABALIN 75 MG/1
75 CAPSULE ORAL ONCE
Status: COMPLETED | OUTPATIENT
Start: 2020-08-04 | End: 2020-08-04

## 2020-08-04 RX ORDER — FAMOTIDINE 20 MG/1
20 TABLET, FILM COATED ORAL 2 TIMES DAILY
Status: DISCONTINUED | OUTPATIENT
Start: 2020-08-04 | End: 2020-08-04

## 2020-08-04 RX ORDER — PROMETHAZINE HYDROCHLORIDE 25 MG/1
50 TABLET ORAL EVERY 6 HOURS PRN
Status: DISCONTINUED | OUTPATIENT
Start: 2020-08-04 | End: 2020-08-05 | Stop reason: HOSPADM

## 2020-08-04 RX ORDER — MIDAZOLAM HYDROCHLORIDE 1 MG/ML
INJECTION, SOLUTION INTRAMUSCULAR; INTRAVENOUS
Status: DISCONTINUED | OUTPATIENT
Start: 2020-08-04 | End: 2020-08-04

## 2020-08-04 RX ORDER — DEXAMETHASONE SODIUM PHOSPHATE 4 MG/ML
INJECTION, SOLUTION INTRA-ARTICULAR; INTRALESIONAL; INTRAMUSCULAR; INTRAVENOUS; SOFT TISSUE
Status: DISCONTINUED | OUTPATIENT
Start: 2020-08-04 | End: 2020-08-04

## 2020-08-04 RX ORDER — PANTOPRAZOLE SODIUM 40 MG/1
40 TABLET, DELAYED RELEASE ORAL DAILY
Status: DISCONTINUED | OUTPATIENT
Start: 2020-08-05 | End: 2020-08-05 | Stop reason: HOSPADM

## 2020-08-04 RX ORDER — CELECOXIB 100 MG/1
100 CAPSULE ORAL 2 TIMES DAILY
Status: DISCONTINUED | OUTPATIENT
Start: 2020-08-05 | End: 2020-08-05

## 2020-08-04 RX ORDER — PROPOFOL 10 MG/ML
VIAL (ML) INTRAVENOUS
Status: DISCONTINUED | OUTPATIENT
Start: 2020-08-04 | End: 2020-08-04

## 2020-08-04 RX ORDER — ZOLPIDEM TARTRATE 5 MG/1
5 TABLET ORAL NIGHTLY PRN
Status: DISCONTINUED | OUTPATIENT
Start: 2020-08-04 | End: 2020-08-05 | Stop reason: HOSPADM

## 2020-08-04 RX ORDER — DEXTROSE MONOHYDRATE AND SODIUM CHLORIDE 5; .9 G/100ML; G/100ML
INJECTION, SOLUTION INTRAVENOUS CONTINUOUS
Status: DISCONTINUED | OUTPATIENT
Start: 2020-08-04 | End: 2020-08-05 | Stop reason: HOSPADM

## 2020-08-04 RX ORDER — GLYCOPYRROLATE 0.2 MG/ML
INJECTION INTRAMUSCULAR; INTRAVENOUS
Status: DISCONTINUED | OUTPATIENT
Start: 2020-08-04 | End: 2020-08-04

## 2020-08-04 RX ORDER — LOSARTAN POTASSIUM 25 MG/1
25 TABLET ORAL DAILY
Status: DISCONTINUED | OUTPATIENT
Start: 2020-08-05 | End: 2020-08-05 | Stop reason: HOSPADM

## 2020-08-04 RX ORDER — CHOLECALCIFEROL (VITAMIN D3) 25 MCG
2000 TABLET ORAL DAILY
Status: DISCONTINUED | OUTPATIENT
Start: 2020-08-05 | End: 2020-08-05 | Stop reason: HOSPADM

## 2020-08-04 RX ORDER — ONDANSETRON 2 MG/ML
4 INJECTION INTRAMUSCULAR; INTRAVENOUS ONCE AS NEEDED
Status: DISCONTINUED | OUTPATIENT
Start: 2020-08-04 | End: 2020-08-04 | Stop reason: HOSPADM

## 2020-08-04 RX ORDER — CELECOXIB 100 MG/1
400 CAPSULE ORAL ONCE
Status: COMPLETED | OUTPATIENT
Start: 2020-08-04 | End: 2020-08-04

## 2020-08-04 RX ORDER — PHENYLEPHRINE HYDROCHLORIDE 10 MG/ML
INJECTION INTRAVENOUS
Status: DISCONTINUED | OUTPATIENT
Start: 2020-08-04 | End: 2020-08-04

## 2020-08-04 RX ORDER — ACETAMINOPHEN 10 MG/ML
1000 INJECTION, SOLUTION INTRAVENOUS EVERY 8 HOURS
Status: DISCONTINUED | OUTPATIENT
Start: 2020-08-04 | End: 2020-08-04

## 2020-08-04 RX ORDER — DOCUSATE SODIUM 100 MG/1
100 CAPSULE, LIQUID FILLED ORAL EVERY 12 HOURS
Status: DISCONTINUED | OUTPATIENT
Start: 2020-08-04 | End: 2020-08-05 | Stop reason: HOSPADM

## 2020-08-04 RX ORDER — LIDOCAINE HYDROCHLORIDE 10 MG/ML
1 INJECTION, SOLUTION EPIDURAL; INFILTRATION; INTRACAUDAL; PERINEURAL ONCE
Status: DISCONTINUED | OUTPATIENT
Start: 2020-08-04 | End: 2020-08-04 | Stop reason: HOSPADM

## 2020-08-04 RX ORDER — MUPIROCIN 20 MG/G
1 OINTMENT TOPICAL 2 TIMES DAILY
Status: DISCONTINUED | OUTPATIENT
Start: 2020-08-04 | End: 2020-08-05 | Stop reason: HOSPADM

## 2020-08-04 RX ORDER — MIDAZOLAM HYDROCHLORIDE 1 MG/ML
2 INJECTION INTRAMUSCULAR; INTRAVENOUS
Status: DISCONTINUED | OUTPATIENT
Start: 2020-08-04 | End: 2020-08-04 | Stop reason: HOSPADM

## 2020-08-04 RX ORDER — LANOLIN ALCOHOL/MO/W.PET/CERES
1000 CREAM (GRAM) TOPICAL DAILY
Status: DISCONTINUED | OUTPATIENT
Start: 2020-08-05 | End: 2020-08-05 | Stop reason: HOSPADM

## 2020-08-04 RX ORDER — FENTANYL CITRATE 50 UG/ML
INJECTION, SOLUTION INTRAMUSCULAR; INTRAVENOUS
Status: DISCONTINUED | OUTPATIENT
Start: 2020-08-04 | End: 2020-08-04

## 2020-08-04 RX ORDER — PROPOFOL 10 MG/ML
VIAL (ML) INTRAVENOUS CONTINUOUS PRN
Status: DISCONTINUED | OUTPATIENT
Start: 2020-08-04 | End: 2020-08-04

## 2020-08-04 RX ORDER — OXYCODONE HCL 10 MG/1
10 TABLET, FILM COATED, EXTENDED RELEASE ORAL ONCE
Status: COMPLETED | OUTPATIENT
Start: 2020-08-04 | End: 2020-08-04

## 2020-08-04 RX ORDER — CEFAZOLIN SODIUM 2 G/50ML
2 SOLUTION INTRAVENOUS
Status: COMPLETED | OUTPATIENT
Start: 2020-08-04 | End: 2020-08-04

## 2020-08-04 RX ORDER — ONDANSETRON 2 MG/ML
INJECTION INTRAMUSCULAR; INTRAVENOUS
Status: DISCONTINUED | OUTPATIENT
Start: 2020-08-04 | End: 2020-08-04

## 2020-08-04 RX ORDER — NAPROXEN SODIUM 220 MG/1
81 TABLET, FILM COATED ORAL 2 TIMES DAILY
Status: DISCONTINUED | OUTPATIENT
Start: 2020-08-04 | End: 2020-08-05 | Stop reason: HOSPADM

## 2020-08-04 RX ORDER — OXYCODONE HYDROCHLORIDE 5 MG/1
5 TABLET ORAL
Status: DISCONTINUED | OUTPATIENT
Start: 2020-08-04 | End: 2020-08-05 | Stop reason: HOSPADM

## 2020-08-04 RX ORDER — SODIUM CHLORIDE 0.9 % (FLUSH) 0.9 %
10 SYRINGE (ML) INJECTION
Status: DISCONTINUED | OUTPATIENT
Start: 2020-08-04 | End: 2020-08-05 | Stop reason: HOSPADM

## 2020-08-04 RX ORDER — MUPIROCIN 20 MG/G
OINTMENT TOPICAL
Status: DISCONTINUED | OUTPATIENT
Start: 2020-08-04 | End: 2020-08-04 | Stop reason: HOSPADM

## 2020-08-04 RX ORDER — FENTANYL CITRATE 50 UG/ML
100 INJECTION, SOLUTION INTRAMUSCULAR; INTRAVENOUS SEE ADMIN INSTRUCTIONS
Status: DISCONTINUED | OUTPATIENT
Start: 2020-08-04 | End: 2020-08-05 | Stop reason: HOSPADM

## 2020-08-04 RX ORDER — TRANEXAMIC ACID 100 MG/ML
INJECTION, SOLUTION INTRAVENOUS
Status: DISCONTINUED | OUTPATIENT
Start: 2020-08-04 | End: 2020-08-04

## 2020-08-04 RX ORDER — LIDOCAINE HYDROCHLORIDE 20 MG/ML
INJECTION INTRAVENOUS
Status: DISCONTINUED | OUTPATIENT
Start: 2020-08-04 | End: 2020-08-04

## 2020-08-04 RX ORDER — ESCITALOPRAM OXALATE 10 MG/1
20 TABLET ORAL DAILY
Status: DISCONTINUED | OUTPATIENT
Start: 2020-08-05 | End: 2020-08-05 | Stop reason: HOSPADM

## 2020-08-04 RX ORDER — KETAMINE HYDROCHLORIDE 100 MG/ML
INJECTION, SOLUTION INTRAMUSCULAR; INTRAVENOUS
Status: DISCONTINUED | OUTPATIENT
Start: 2020-08-04 | End: 2020-08-04

## 2020-08-04 RX ORDER — PREGABALIN 75 MG/1
75 CAPSULE ORAL 2 TIMES DAILY
Status: DISCONTINUED | OUTPATIENT
Start: 2020-08-04 | End: 2020-08-05 | Stop reason: HOSPADM

## 2020-08-04 RX ORDER — FENTANYL CITRATE 50 UG/ML
25 INJECTION, SOLUTION INTRAMUSCULAR; INTRAVENOUS EVERY 5 MIN PRN
Status: DISCONTINUED | OUTPATIENT
Start: 2020-08-04 | End: 2020-08-04 | Stop reason: HOSPADM

## 2020-08-04 RX ADMIN — MUPIROCIN 1 G: 20 OINTMENT TOPICAL at 08:08

## 2020-08-04 RX ADMIN — CEFAZOLIN SODIUM 2 G: 2 SOLUTION INTRAVENOUS at 03:08

## 2020-08-04 RX ADMIN — OXYCODONE HYDROCHLORIDE 10 MG: 10 TABLET, FILM COATED, EXTENDED RELEASE ORAL at 08:08

## 2020-08-04 RX ADMIN — ZOLPIDEM TARTRATE 5 MG: 5 TABLET ORAL at 11:08

## 2020-08-04 RX ADMIN — ASPIRIN 81 MG 81 MG: 81 TABLET ORAL at 01:08

## 2020-08-04 RX ADMIN — OXYCODONE HYDROCHLORIDE 10 MG: 10 TABLET ORAL at 08:08

## 2020-08-04 RX ADMIN — MIDAZOLAM 2 MG: 1 INJECTION INTRAMUSCULAR; INTRAVENOUS at 08:08

## 2020-08-04 RX ADMIN — TRANEXAMIC ACID 1000 MG: 100 INJECTION, SOLUTION INTRAVENOUS at 09:08

## 2020-08-04 RX ADMIN — FENTANYL CITRATE 100 MCG: 50 INJECTION, SOLUTION INTRAMUSCULAR; INTRAVENOUS at 07:08

## 2020-08-04 RX ADMIN — HYDROMORPHONE HYDROCHLORIDE 1 MG: 1 INJECTION, SOLUTION INTRAMUSCULAR; INTRAVENOUS; SUBCUTANEOUS at 11:08

## 2020-08-04 RX ADMIN — OXYCODONE HYDROCHLORIDE 10 MG: 10 TABLET ORAL at 01:08

## 2020-08-04 RX ADMIN — ONDANSETRON 8 MG: 8 TABLET, ORALLY DISINTEGRATING ORAL at 01:08

## 2020-08-04 RX ADMIN — MIDAZOLAM 2 MG: 1 INJECTION INTRAMUSCULAR; INTRAVENOUS at 09:08

## 2020-08-04 RX ADMIN — PROPOFOL 50 MG: 10 INJECTION, EMULSION INTRAVENOUS at 09:08

## 2020-08-04 RX ADMIN — CEFAZOLIN SODIUM 2 G: 2 SOLUTION INTRAVENOUS at 11:08

## 2020-08-04 RX ADMIN — DEXAMETHASONE SODIUM PHOSPHATE 4 MG: 4 INJECTION, SOLUTION INTRA-ARTICULAR; INTRALESIONAL; INTRAMUSCULAR; INTRAVENOUS; SOFT TISSUE at 08:08

## 2020-08-04 RX ADMIN — OXYCODONE HYDROCHLORIDE 10 MG: 10 TABLET, FILM COATED, EXTENDED RELEASE ORAL at 07:08

## 2020-08-04 RX ADMIN — SODIUM CHLORIDE, SODIUM GLUCONATE, SODIUM ACETATE, POTASSIUM CHLORIDE, MAGNESIUM CHLORIDE, SODIUM PHOSPHATE, DIBASIC, AND POTASSIUM PHOSPHATE: .53; .5; .37; .037; .03; .012; .00082 INJECTION, SOLUTION INTRAVENOUS at 07:08

## 2020-08-04 RX ADMIN — KETAMINE HYDROCHLORIDE 20 MG: 100 INJECTION, SOLUTION, CONCENTRATE INTRAMUSCULAR; INTRAVENOUS at 09:08

## 2020-08-04 RX ADMIN — DEXTROSE AND SODIUM CHLORIDE: 5; .9 INJECTION, SOLUTION INTRAVENOUS at 11:08

## 2020-08-04 RX ADMIN — PREGABALIN 75 MG: 75 CAPSULE ORAL at 08:08

## 2020-08-04 RX ADMIN — OXYCODONE 5 MG: 5 TABLET ORAL at 03:08

## 2020-08-04 RX ADMIN — ONDANSETRON 4 MG: 2 INJECTION, SOLUTION INTRAMUSCULAR; INTRAVENOUS at 08:08

## 2020-08-04 RX ADMIN — PHENYLEPHRINE HYDROCHLORIDE 100 MCG: 10 INJECTION INTRAVENOUS at 08:08

## 2020-08-04 RX ADMIN — MUPIROCIN 1 G: 20 OINTMENT TOPICAL at 01:08

## 2020-08-04 RX ADMIN — PHENYLEPHRINE HYDROCHLORIDE 100 MCG: 10 INJECTION INTRAVENOUS at 09:08

## 2020-08-04 RX ADMIN — MUPIROCIN: 20 OINTMENT TOPICAL at 07:08

## 2020-08-04 RX ADMIN — DOCUSATE SODIUM 100 MG: 100 CAPSULE, LIQUID FILLED ORAL at 01:08

## 2020-08-04 RX ADMIN — SODIUM CHLORIDE, SODIUM GLUCONATE, SODIUM ACETATE, POTASSIUM CHLORIDE, MAGNESIUM CHLORIDE, SODIUM PHOSPHATE, DIBASIC, AND POTASSIUM PHOSPHATE: .53; .5; .37; .037; .03; .012; .00082 INJECTION, SOLUTION INTRAVENOUS at 09:08

## 2020-08-04 RX ADMIN — TRANEXAMIC ACID 1000 MG: 100 INJECTION, SOLUTION INTRAVENOUS at 08:08

## 2020-08-04 RX ADMIN — HYDROMORPHONE HYDROCHLORIDE 1 MG: 1 INJECTION, SOLUTION INTRAMUSCULAR; INTRAVENOUS; SUBCUTANEOUS at 04:08

## 2020-08-04 RX ADMIN — ACETAMINOPHEN 1000 MG: 10 INJECTION, SOLUTION INTRAVENOUS at 07:08

## 2020-08-04 RX ADMIN — BUPIVACAINE 20 ML: 13.3 INJECTION, SUSPENSION, LIPOSOMAL INFILTRATION at 07:08

## 2020-08-04 RX ADMIN — PREGABALIN 75 MG: 75 CAPSULE ORAL at 07:08

## 2020-08-04 RX ADMIN — MIDAZOLAM 2 MG: 1 INJECTION INTRAMUSCULAR; INTRAVENOUS at 07:08

## 2020-08-04 RX ADMIN — ROPIVACAINE HYDROCHLORIDE: 5 INJECTION, SOLUTION EPIDURAL; INFILTRATION; PERINEURAL at 08:08

## 2020-08-04 RX ADMIN — KETAMINE HYDROCHLORIDE 20 MG: 100 INJECTION, SOLUTION, CONCENTRATE INTRAMUSCULAR; INTRAVENOUS at 08:08

## 2020-08-04 RX ADMIN — GLYCOPYRROLATE 0.2 MG: 0.2 INJECTION, SOLUTION INTRAMUSCULAR; INTRAVENOUS at 08:08

## 2020-08-04 RX ADMIN — CEFAZOLIN SODIUM 2 G: 2 SOLUTION INTRAVENOUS at 08:08

## 2020-08-04 RX ADMIN — LIDOCAINE HYDROCHLORIDE 75 MG: 20 INJECTION, SOLUTION INTRAVENOUS at 08:08

## 2020-08-04 RX ADMIN — OXYCODONE 5 MG: 5 TABLET ORAL at 06:08

## 2020-08-04 RX ADMIN — PHENYLEPHRINE HYDROCHLORIDE 200 MCG: 10 INJECTION INTRAVENOUS at 08:08

## 2020-08-04 RX ADMIN — CELECOXIB 400 MG: 100 CAPSULE ORAL at 07:08

## 2020-08-04 RX ADMIN — ASPIRIN 81 MG 81 MG: 81 TABLET ORAL at 08:08

## 2020-08-04 RX ADMIN — PROPOFOL 50 MG: 10 INJECTION, EMULSION INTRAVENOUS at 08:08

## 2020-08-04 RX ADMIN — PROPOFOL 50 MCG/KG/MIN: 10 INJECTION, EMULSION INTRAVENOUS at 08:08

## 2020-08-04 NOTE — PLAN OF CARE
Arrived ambulatory with family in the waiting room, plan of care reviewed and warm blanket provided

## 2020-08-04 NOTE — ANESTHESIA POSTPROCEDURE EVALUATION
Anesthesia Post Evaluation    Patient: Himanshu Connor II    Procedure(s) Performed: Procedure(s) (LRB):  ARTHROPLASTY, KNEE (Left)    Final Anesthesia Type: spinal    Patient location during evaluation: PACU  Patient participation: Yes- Able to Participate  Level of consciousness: awake and alert  Post-procedure vital signs: reviewed and stable  Pain management: adequate  Airway patency: patent    PONV status at discharge: No PONV  Anesthetic complications: no      Cardiovascular status: hemodynamically stable  Respiratory status: unassisted and room air  Hydration status: euvolemic  Follow-up not needed.          Vitals Value Taken Time   /67 08/04/20 1125   Temp 36.6 °C (97.9 °F) 08/04/20 1125   Pulse 66 08/04/20 1125   Resp 15 08/04/20 1125   SpO2 100 % 08/04/20 1125         Event Time   Out of Recovery 11:15:00         Pain/Yovany Score: Pain Rating Prior to Med Admin: 5 (8/4/2020  7:28 AM)  Yovany Score: 10 (8/4/2020 11:10 AM)

## 2020-08-04 NOTE — ANESTHESIA PROCEDURE NOTES
Peripheral Block    Patient location during procedure: pre-op   Block not for primary anesthetic.  Reason for block: at surgeon's request and post-op pain management   Post-op Pain Location: left knee   Start time: 8/4/2020 7:20 AM  Timeout: 8/4/2020 7:20 AM   End time: 8/4/2020 7:25 AM    Staffing  Authorizing Provider: Brent Sandhu MD  Performing Provider: Brent Sandhu MD    Preanesthetic Checklist  Completed: patient identified, site marked, surgical consent, pre-op evaluation, timeout performed, IV checked, risks and benefits discussed and monitors and equipment checked  Peripheral Block  Patient position: supine  Prep: ChloraPrep  Patient monitoring: heart rate, cardiac monitor, continuous pulse ox, continuous capnometry and frequent blood pressure checks  Block type: adductor canal  Laterality: left  Injection technique: single shot  Needle  Needle type: Stimuplex   Needle gauge: 21 G  Needle length: 4 in  Needle localization: anatomical landmarks and ultrasound guidance   -ultrasound image captured on disc.  Assessment  Injection assessment: negative aspiration, negative parasthesia and local visualized surrounding nerve  Paresthesia pain: none  Heart rate change: no  Slow fractionated injection: yes  Additional Notes  VSS.  [facility] RN monitoring vitals throughout procedure.  Patient tolerated procedure well.

## 2020-08-04 NOTE — PLAN OF CARE
VSS, denies pain, dressing to L knee cdi, cryo in place to L knee, tolerated clear liquids without N/V, IV fluids infusing, SCD to R leg and L foot. Ok per Dr. Sandhu to transfer the pt to the floor. Pt transported via bed to room 421. Pt's friend at the bedside with pt's belongings. Call light within reach. Dr. Solis is pt's hospitalist. Report given to KATHARINA Garcia.

## 2020-08-04 NOTE — ASSESSMENT & PLAN NOTE
He underwent knee replacement today.  Analgesics for post-op soreness.  Consult PT.  DVT prophylaxis.

## 2020-08-04 NOTE — OP NOTE
[facility]  Orthopedic Surgery  Operative Note    SUMMARY     Date of Procedure: 8/4/2020     Procedure: Procedure(s) (LRB):  ARTHROPLASTY, KNEE (Left)       Surgeon(s) and Role:     * Felix Levin MD - Primary    Assistant: Felicity Yoder    Pre-Operative Diagnosis: Arthritis of knee [M17.10]    Post-Operative Diagnosis: Post-Op Diagnosis Codes:     * Arthritis of knee [M17.10]    Anesthesia: General    Complications: No    Estimated Blood Loss (EBL): 20ml           Implants:   Implant Name Type Inv. Item Serial No.  Lot No. LRB No. Used Action   CEMENT BONE SURG SMPLX P RADPQ - [id number]  CEMENT BONE SURG SMPLX P RADPQ  [employer] CHANTELL. [id number] Left 1 Implanted   TRIATHLON CRUCIATE RETAINING FEMORAL SIZE 6 LEFT CR    DEANNA [id number] Left 1 Implanted   PATELLA TRI 33X9 X3 POLYETHYLE - [id number]  PATELLA TRI 33X9 X3 POLYETHYLE  [employer] CHANTELL. [id number] Left 1 Implanted   BASEPLATE TIB ISABELLA PRIM SZ 6 - [id number]  BASEPLATE TIB ISABELLA PRIM SZ 6  [employer] CHANTELL. [id number] Left 1 Implanted   INSERT TIBIAL SZ 6 11MMX3 - [id number]  INSERT TIBIAL SZ 6 11MMX3  [employer] CHANTELL. [id number] Left 1 Implanted       Tourniquet time: 45min at 300mmHg    Specimens:   Specimen (12h ago, onward)    None                  Condition: Good    Disposition: PACU - hemodynamically stable.    Attestation: I was present and scrubbed for the entire procedure.    INDICATIONS FOR THE PROCEDURE: A 55M with a history of chronic   Left knee arthritis, had failed all conservative measures including cortisone   injections, PT, viscosupplementation and activity modification. After a long   discussion, the patient wished to proceed with the procedure above.     PROCEDURE IN DETAIL: Risks, benefits and alternatives of the procedure were   explained to the patient including, but not limited to damage to nerves,   arteries or blood vessels. Also explained risk of infection, stiffness, DVT, PE,    polyethylene wear as well as anesthetic complications including seizure, stroke,   heart attack and death, understood this and signed informed consent. The   patient's Left knee was marked prior to coming to the Operating Room. The patient was brought to the operating room, placed on the operating table in a supine position.A  formal timeout was done in which correct patient, procedure and op site were all   correctly identified and confirmed by the entire operating team.  2gm Ancef was given prior to surgical incision. Spinal anesthesia was induced. The patient'sLeft  lower extremity was prepped and   draped in normal sterile fashion. TheLeft  leg was exsanguinated with an   Esmarch. Tourniquet was inflated up 300 mmHg. Standard anterior approach to   the knee was made. Medial parapatellar arthrotomy was made, leaving about 1/8   inch of tissue for later repair. Proximal medial tibial release was performed,   making sure not to release any of the MCL. We then   everted the patella and reflexed the knee. Fat pad was excised as well as some   of the ACL. Soft tissue off the distal anterior femur was removed for   visualization, so as to help prevent notching.  The intramedullary canal was then drilled and suctioned of fat.  The intramedullary guide was then inserted with 5° of valgus set.  It was then pinned into place.  I then made a distal femoral cut of 8 millimeters.  After   this was done, we turned our attention to the tibia. Ankle clamp was then inserted.  We then used the stylus to measure 2 millimeters off the most affected side. The ankle clamp was used to replicate the tibial slope and went perfectly centered down the tibial crest. A tibial cut   was then made. We then checked our extension gap, a 9-spacer was able to be placed.  We then turned our attention back to the femur.  We then sized the femur using 3° off the posterior condylar axis. This was also parallel to the epicondylar axis.  It measured a  size 6.  We then drilled our holes.  Four in 1 block for the size 6 block was then placed and the 4 in 1 cuts were made. We then   checked posteriorly and removed posterior femoral osteophytes.  We then used the gap  to make sure that a 9 spacer good fit both in flexion and extension. Varus and valgus balancing was then checked as well. We   decided to trial. Trial components were placed with a 9 meniscal bearing. The   patient had good balancing again in varus valgus in both flexion and extension.  It was a little tight in flexion so I released a little the PCL off the femoral notch.  That way we could put an 11 in and it would be more equal in both extension and flexion.  We turned our attention to patella, caliper was used on the patella where it   measured 24. We set guide at 15 and made our 9-mm cut for polyethylene component, 33 was selected. Then drill holes were placed and the patellar button was placed.   This had good tracking. No need for a lateral release and with no hand tests,   it stayed centered the whole time. We then marked our tibial rotation. We then drilled our femoral lugs.  We then   removed everything except the size 6 tibial tray. We then checked our tibial tray   with a drop stacie again and then marked our rotation and pinned it into place then   drilled, and then punched for our keel. We then started preparing final   components on the back table. Anterior, medial and lateral structures were injected with our local   Cocktail. Bony surfaces   copiously irrigated with Pulsavac and then thoroughly dried. Once the cement   was the appropriate consistency, first the tibia and then the femur were   cemented into place, removing excess cement. A 11 trial was placed. The knee   was held in compression and then the patella was placed. Cement was allowed to   cure. Once the cement was cured, we then removed excess cement. Again trialed   one final time, again seeing a 11. We then tapped into  place the   final 11 meniscal bearing into place. After this was done, we then did our   diluted iodine soak for 3 minutes and then proceeded with closing.  Arthrotomy was closed using our StrataFix.   Subcutaneous tissue was closed using 2-0 Vicryl and skin was using a running 3-0   StrataFix and Dermabond.Instrument, sponge, and needle counts were correct prior to wound closure and at the conclusion of the case.  Sterile dressing was applied including a Cryo/Cuff.   They were extubated, awakened and transferred from the Operating Room to the   Recovery Room in stable condition.

## 2020-08-04 NOTE — TRANSFER OF CARE
"Anesthesia Transfer of Care Note    Patient: Himanshu Connor II    Procedure(s) Performed: Procedure(s) (LRB):  ARTHROPLASTY, KNEE (Left)    Patient location: PACU    Anesthesia Type: spinal    Transport from OR: Transported from OR on 6-10 L/min O2 by face mask with adequate spontaneous ventilation    Post pain: adequate analgesia    Post assessment: no apparent anesthetic complications and tolerated procedure well    Post vital signs: stable    Level of consciousness: sedated and responds to stimulation    Nausea/Vomiting: no nausea/vomiting    Complications: none    Transfer of care protocol was followed      Last vitals:   Visit Vitals  /74 (BP Location: Right arm, Patient Position: Lying)   Pulse 68   Temp 36.8 °C (98.2 °F) (Oral)   Resp 14   Ht 5' 10" (1.778 m)   Wt 103.9 kg (229 lb)   SpO2 99%   BMI 32.86 kg/m²     "

## 2020-08-04 NOTE — H&P
[facility] Hospital Medicine  History & Physical    Patient Name: Himanshu Connor II  MRN: [id number]  Admission Date: 8/4/2020  Attending Physician: Andrés Solis MD   Primary Care Provider: Gilo Kawasaki, MD     THIS PATIENT VISIT WAS PERFORMED USING TELEMEDICINE USING THE SECURE [employer] SOFTWARE PLATFORM WITH 2-WAY AUDIO/VIDEO.  THE PROVIDER WAS LOCATED OFF-SITE AND THE PATIENT WAS LOCATED IN THE HOSPITAL.  THE AFOREMENTIONED VIDEO SOFTWARE WAS UTILIZED TO DOCUMENT THE RELEVANT HISTORY AND PHYSICAL EXAM.      Patient information was obtained from patient and past medical records.       Subjective:     Principal Problem:Arthritis of knee    Chief Complaint: No chief complaint on file.       HPI: Patient is admitted to the hospital for monitoring after having undergone left knee arthroplasty.  The operation went well.  He has no unusual complaints at this time.  He has class I obesity, HTN, and mild depression.  He has also sleep apnea, but he does not use NIPPV at home while sleeping.  He states that he saw an endocrinologist last week because of frequent bouts of hypoglycemia.  A diagnosis has not been made to explain it yet.    Past Medical History:   Diagnosis Date    Alcohol abuse, unspecified     Allergic rhinitis, cause unspecified     Arthritis     Asthma attack     Carpal tunnel syndrome     Depressive disorder, not elsewhere classified     Diarrhea     Encounter for blood transfusion     GERD (gastroesophageal reflux disease)     Hypersomnia, unspecified     Hypertension     Hypoglycemia     Lumbago     Obesity, unspecified     JAH (obstructive sleep apnea)     uses BIPAP    Other and unspecified hyperlipidemia     Other ankle sprain and strain     Psychosexual dysfunction with inhibited sexual excitement     Ventral hernia, unspecified, without mention of obstruction or gangrene        Past Surgical History:   Procedure Laterality Date    A-scope knees       Bilateral    ARTHROSCOPIC REPAIR OF ROTATOR CUFF OF SHOULDER Left 9/20/2019    Procedure: REPAIR, ROTATOR CUFF, ARTHROSCOPIC;  Surgeon: Felix Levin MD;  Location: [facility] OR;  Service: Orthopedics;  Laterality: Left;    ARTHROSCOPY OF SHOULDER WITH DECOMPRESSION OF SUBACROMIAL SPACE Left 9/20/2019    Procedure: ARTHROSCOPY, SHOULDER, WITH SUBACROMIAL SPACE DECOMPRESSION;  Surgeon: Felix Levin MD;  Location: [facility] OR;  Service: Orthopedics;  Laterality: Left;  Jaspreet- Arthrex notified of all case today 9/16-tcb    CARPAL TUNNEL RELEASE      Bilateral    EYE SURGERY      Lasik    HERNIA REPAIR      KNEE ARTHROSCOPY W/ MENISCECTOMY Left 10/15/2019    Procedure: ARTHROSCOPY, KNEE, WITH MENISCECTOMY;  Surgeon: Felix Levin MD;  Location: [facility] OR;  Service: Orthopedics;  Laterality: Left;  Medial and Lateral Meniscectomy    KNEE ARTHROSCOPY W/ MENISCECTOMY Right 11/22/2019    Procedure: ARTHROSCOPY, KNEE, WITH MENISCECTOMY;  Surgeon: Felix Levin MD;  Location: [facility] OR;  Service: Orthopedics;  Laterality: Right;    KNEE SURGERY      osmar    NISSEN FUNDOPLICATION      REVERSE TOTAL SHOULDER ARTHROPLASTY Left 2/11/2020    Procedure: ARTHROPLASTY, SHOULDER, TOTAL, REVERSE;  Surgeon: Felix Levin MD;  Location: [facility] OR;  Service: Orthopedics;  Laterality: Left;  [place]man    ROTATOR CUFF REPAIR      right    SLEEVE GASTROPLASTY  12/2013       Review of patient's allergies indicates:  No Known Allergies    No current facility-administered medications on file prior to encounter.      Current Outpatient Medications on File Prior to Encounter   Medication Sig    cyclobenzaprine (FLEXERIL) 10 MG tablet Take 10 mg by mouth 3 (three) times daily as needed for Muscle spasms.    escitalopram oxalate (LEXAPRO) 20 MG tablet Take 20 mg by mouth once daily.    HYDROcodone-acetaminophen (NORCO) 5-325 mg per tablet Take 1 tablet by mouth every 6 (six) hours as needed for Pain.     hydrOXYzine HCl (ATARAX) 25 MG tablet Take 25 mg by mouth 3 (three) times daily.    mirtazapine (REMERON) 30 MG tablet Take 30 mg by mouth every evening.    cyanocobalamin (VITAMIN B-12) 1000 MCG tablet Take 1,000 mcg by mouth once daily.     diclofenac sodium (VOLTAREN) 1 % Gel Apply topically daily as needed.    gabapentin (NEURONTIN) 300 MG capsule Take 300 mg by mouth every evening.    lidocaine (LIDODERM) 5 % Place 1 patch onto the skin every evening. Remove & Discard patch within 12 hours or as directed by MD    losartan (COZAAR) 25 MG tablet Take 25 mg by mouth once daily.    pantoprazole (PROTONIX) 40 MG tablet TK ONE  T PO D    sucralfate (CARAFATE) 1 gram tablet TK 1 T PO QID 1 HOUR BEFORE MEALS AND 1 T QHS OES    vitamin D (VITAMIN D3) 1000 units Tab Take 2,000 Units by mouth once daily.     Family History     Problem Relation (Age of Onset)    Arthritis Mother    COPD Father    Heart disease Father    Hypertension Father    Kidney disease Father        Tobacco Use    Smoking status: Never Smoker    Smokeless tobacco: Former User     Types: Chew   Substance and Sexual Activity    Alcohol use: Yes     Comment: occasional    Drug use: No    Sexual activity: Not on file     Review of Systems   Constitutional: Negative for chills, fatigue and fever.   HENT: Negative for congestion and sinus pressure.    Eyes: Negative for pain and visual disturbance.   Respiratory: Negative for cough, shortness of breath and wheezing.    Cardiovascular: Negative for chest pain, palpitations and leg swelling.   Gastrointestinal: Negative for abdominal pain, diarrhea, nausea and vomiting.   Genitourinary: Negative for difficulty urinating, dysuria and hematuria.   Musculoskeletal: Positive for arthralgias (chronic in knees). Negative for myalgias.   Skin: Negative for rash and wound.   Neurological: Negative for dizziness, weakness, light-headedness and headaches.   Hematological: Negative for adenopathy. Does not  bruise/bleed easily.   Psychiatric/Behavioral: Negative for confusion and dysphoric mood. The patient is not nervous/anxious.      Objective:     Vital Signs (Most Recent):  Temp: 97.9 °F (36.6 °C) (08/04/20 1125)  Pulse: 66 (08/04/20 1125)  Resp: 18 (08/04/20 1308)  BP: 106/67 (08/04/20 1125)  SpO2: 98 % (08/04/20 1204) Vital Signs (24h Range):  Temp:  [97.5 °F (36.4 °C)-98.2 °F (36.8 °C)] 97.9 °F (36.6 °C)  Pulse:  [63-82] 66  Resp:  [9-20] 18  SpO2:  [98 %-100 %] 98 %  BP: (105-129)/(59-85) 106/67     Weight: 103.9 kg (229 lb)  Body mass index is 32.86 kg/m².    Physical Exam  Constitutional:       Appearance: He is obese.   HENT:      Head: Normocephalic.      Right Ear: External ear normal.      Left Ear: External ear normal.   Eyes:      General: No scleral icterus.        Right eye: No discharge.         Left eye: No discharge.   Neck:      Musculoskeletal: Normal range of motion.   Pulmonary:      Effort: Pulmonary effort is normal.   Abdominal:      General: Abdomen is flat. There is no distension.   Musculoskeletal:      Right lower leg: No edema.      Left lower leg: No edema.   Skin:     Findings: No rash.   Neurological:      Mental Status: He is alert and oriented to person, place, and time.   Psychiatric:         Behavior: Behavior is cooperative.             Significant Labs: None    Significant Imaging: none    Assessment/Plan:     * Arthritis of knee  He underwent knee replacement today.  Analgesics for post-op soreness.  Consult PT.  DVT prophylaxis.    Class 1 obesity due to excess calories in adult  Body mass index is 32.86 kg/m². Morbid obesity complicates all aspects of disease management from diagnostic modalities to treatment. Weight loss encouraged and health benefits explained to patient.      Mild episode of recurrent major depressive disorder  Continue anti-depressant.  Stable at this time.    Psychotherapeutics (From admission, onward)    Start     Stop Route Frequency Ordered     08/05/20 0900  escitalopram oxalate tablet 20 mg      -- Oral Daily 08/04/20 1227    08/04/20 1327  zolpidem tablet 5 mg      -- Oral Nightly PRN 08/04/20 1227              Essential hypertension  Chronic, controlled.  Latest blood pressure and vitals reviewed-   Temp:  [97.5 °F (36.4 °C)-98.2 °F (36.8 °C)]   Pulse:  [63-82]   Resp:  [9-20]   BP: (105-129)/(59-85)   SpO2:  [98 %-100 %] .   Home meds for hypertension were reviewed and noted below. Hospital anti-hypertensive changes were made as shown below.  Hypertension Medications at home            losartan (COZAAR) 25 MG tablet Take 25 mg by mouth once daily.      Hospital Medications             losartan tablet 25 mg Starting on 8/5/2020. 25 mg, Oral, Daily        Will utilize p.r.n. blood pressure medication only if patient's blood pressure greater than  180/110 and he develops symptoms such as worsening chest pain or shortness of breath.          VTE Risk Mitigation (From admission, onward)         Ordered     IP VTE LOW RISK PATIENT  Once      08/04/20 1122     Place sequential compression device  Until discontinued      08/04/20 1122                   Andrés Solis MD  Department of Hospital Medicine   [facility]

## 2020-08-04 NOTE — ANESTHESIA PREPROCEDURE EVALUATION
08/04/2020  Himanshu Connor II is a 55 y.o., male.    Pre-op Assessment    I have reviewed the Patient Summary Reports.     I have reviewed the Nursing Notes. I have reviewed the NPO Status.   I have reviewed the Medications.     Review of Systems  Anesthesia Hx:  No problems with previous Anesthesia    Social:  Non-Smoker    Cardiovascular:   Hypertension, well controlled    Pulmonary:   Asthma asymptomatic Sleep Apnea    Renal/:  Renal/ Normal     Hepatic/GI:   GERD, well controlled    Musculoskeletal:   Arthritis  Left knee arthritis    Neurological:   Neuromuscular Disease,    Endocrine:  Endocrine Normal    Psych:   Psychiatric History depression          Physical Exam  General:  Obesity    Airway/Jaw/Neck:  Airway Findings: Mouth Opening: Normal Tongue: Normal  General Airway Assessment: Adult  Oropharynx Findings:  Mallampati: II  Jaw/Neck Findings:  Neck ROM: Normal ROM     Eyes/Ears/Nose:  Eyes/Ears/Nose Findings:    Dental:  Dental Findings: In tact   Chest/Lungs:  Chest/Lungs Findings: Normal Respiratory Rate, Clear to auscultation     Heart/Vascular:  Heart Findings: Rate: Normal  Rhythm: Regular Rhythm        Mental Status:  Mental Status Findings:  Cooperative, Alert and Oriented         Anesthesia Plan  Type of Anesthesia, risks & benefits discussed:  Anesthesia Type:  spinal  Patient's Preference:   Intra-op Monitoring Plan: standard ASA monitors  Intra-op Monitoring Plan Comments:   Post Op Pain Control Plan: multimodal analgesia, peripheral nerve block and IV/PO Opioids PRN  Post Op Pain Control Plan Comments:   Induction:   IV  Beta Blocker:  Patient is not currently on a Beta-Blocker (No further documentation required).       Informed Consent: Patient understands risks and agrees with Anesthesia plan.  Questions answered. Anesthesia consent signed with patient.  ASA Score: 2      Day of Surgery Review of History & Physical: I have interviewed and examined the patient. I have reviewed the patient's H&P dated:  There are no significant changes.  H&P update referred to the surgeon.         Ready For Surgery From Anesthesia Perspective.

## 2020-08-04 NOTE — PLAN OF CARE
POC reviewed with pt. Pt AAOX4. Pt verbalized understanding. Moderate pain and nausea controlled with prn meds per order. Cryo in use. Iv antibiotics administered per order. Neuro checks done. Ambulated in barber with PT. Bed in low position, call light in reach. Will continue to monitor.

## 2020-08-04 NOTE — INTERVAL H&P NOTE
The patient has been examined and the H&P has been reviewed:    I concur with the findings and no changes have occurred since H&P was written.    Surgery risks, benefits and alternative options discussed and understood by patient/family.          Active Hospital Problems    Diagnosis  POA    Arthritis of knee [M17.10]  Yes      Resolved Hospital Problems   No resolved problems to display.

## 2020-08-04 NOTE — ASSESSMENT & PLAN NOTE
Continue anti-depressant.  Stable at this time.    Psychotherapeutics (From admission, onward)    Start     Stop Route Frequency Ordered    08/05/20 0900  escitalopram oxalate tablet 20 mg      -- Oral Daily 08/04/20 1227    08/04/20 1327  zolpidem tablet 5 mg      -- Oral Nightly PRN 08/04/20 1227

## 2020-08-04 NOTE — ASSESSMENT & PLAN NOTE
Body mass index is 32.86 kg/m². Morbid obesity complicates all aspects of disease management from diagnostic modalities to treatment. Weight loss encouraged and health benefits explained to patient.

## 2020-08-04 NOTE — SUBJECTIVE & OBJECTIVE
Past Medical History:   Diagnosis Date    Alcohol abuse, unspecified     Allergic rhinitis, cause unspecified     Arthritis     Asthma attack     Carpal tunnel syndrome     Depressive disorder, not elsewhere classified     Diarrhea     Encounter for blood transfusion     GERD (gastroesophageal reflux disease)     Hypersomnia, unspecified     Hypertension     Hypoglycemia     Lumbago     Obesity, unspecified     JAH (obstructive sleep apnea)     uses BIPAP    Other and unspecified hyperlipidemia     Other ankle sprain and strain     Psychosexual dysfunction with inhibited sexual excitement     Ventral hernia, unspecified, without mention of obstruction or gangrene        Past Surgical History:   Procedure Laterality Date    A-scope knees      Bilateral    ARTHROSCOPIC REPAIR OF ROTATOR CUFF OF SHOULDER Left 9/20/2019    Procedure: REPAIR, ROTATOR CUFF, ARTHROSCOPIC;  Surgeon: Felix Levin MD;  Location: [facility] OR;  Service: Orthopedics;  Laterality: Left;    ARTHROSCOPY OF SHOULDER WITH DECOMPRESSION OF SUBACROMIAL SPACE Left 9/20/2019    Procedure: ARTHROSCOPY, SHOULDER, WITH SUBACROMIAL SPACE DECOMPRESSION;  Surgeon: Felix Levin MD;  Location: [facility] OR;  Service: Orthopedics;  Laterality: Left;  Jaspreet- Arthrex notified of all case today 9/16-tcb    CARPAL TUNNEL RELEASE      Bilateral    EYE SURGERY      Lasik    HERNIA REPAIR      KNEE ARTHROSCOPY W/ MENISCECTOMY Left 10/15/2019    Procedure: ARTHROSCOPY, KNEE, WITH MENISCECTOMY;  Surgeon: Felix Levin MD;  Location: [facility] OR;  Service: Orthopedics;  Laterality: Left;  Medial and Lateral Meniscectomy    KNEE ARTHROSCOPY W/ MENISCECTOMY Right 11/22/2019    Procedure: ARTHROSCOPY, KNEE, WITH MENISCECTOMY;  Surgeon: Felix Levin MD;  Location: [facility] OR;  Service: Orthopedics;  Laterality: Right;    KNEE SURGERY      osmar    NISSEN FUNDOPLICATION      REVERSE TOTAL SHOULDER ARTHROPLASTY Left 2/11/2020     Procedure: ARTHROPLASTY, SHOULDER, TOTAL, REVERSE;  Surgeon: Felix Levin MD;  Location: [facility];  Service: Orthopedics;  Laterality: Left;  [place]    ROTATOR CUFF REPAIR      right    SLEEVE GASTROPLASTY  12/2013       Review of patient's allergies indicates:  No Known Allergies    No current facility-administered medications on file prior to encounter.      Current Outpatient Medications on File Prior to Encounter   Medication Sig    cyclobenzaprine (FLEXERIL) 10 MG tablet Take 10 mg by mouth 3 (three) times daily as needed for Muscle spasms.    escitalopram oxalate (LEXAPRO) 20 MG tablet Take 20 mg by mouth once daily.    HYDROcodone-acetaminophen (NORCO) 5-325 mg per tablet Take 1 tablet by mouth every 6 (six) hours as needed for Pain.    hydrOXYzine HCl (ATARAX) 25 MG tablet Take 25 mg by mouth 3 (three) times daily.    mirtazapine (REMERON) 30 MG tablet Take 30 mg by mouth every evening.    cyanocobalamin (VITAMIN B-12) 1000 MCG tablet Take 1,000 mcg by mouth once daily.     diclofenac sodium (VOLTAREN) 1 % Gel Apply topically daily as needed.    gabapentin (NEURONTIN) 300 MG capsule Take 300 mg by mouth every evening.    lidocaine (LIDODERM) 5 % Place 1 patch onto the skin every evening. Remove & Discard patch within 12 hours or as directed by MD    losartan (COZAAR) 25 MG tablet Take 25 mg by mouth once daily.    pantoprazole (PROTONIX) 40 MG tablet TK ONE  T PO D    sucralfate (CARAFATE) 1 gram tablet TK 1 T PO QID 1 HOUR BEFORE MEALS AND 1 T QHS OES    vitamin D (VITAMIN D3) 1000 units Tab Take 2,000 Units by mouth once daily.     Family History     Problem Relation (Age of Onset)    Arthritis Mother    COPD Father    Heart disease Father    Hypertension Father    Kidney disease Father        Tobacco Use    Smoking status: Never Smoker    Smokeless tobacco: Former User     Types: Chew   Substance and Sexual Activity    Alcohol use: Yes     Comment: occasional    Drug use:  No    Sexual activity: Not on file     Review of Systems   Constitutional: Negative for chills, fatigue and fever.   HENT: Negative for congestion and sinus pressure.    Eyes: Negative for pain and visual disturbance.   Respiratory: Negative for cough, shortness of breath and wheezing.    Cardiovascular: Negative for chest pain, palpitations and leg swelling.   Gastrointestinal: Negative for abdominal pain, diarrhea, nausea and vomiting.   Genitourinary: Negative for difficulty urinating, dysuria and hematuria.   Musculoskeletal: Positive for arthralgias (chronic in knees). Negative for myalgias.   Skin: Negative for rash and wound.   Neurological: Negative for dizziness, weakness, light-headedness and headaches.   Hematological: Negative for adenopathy. Does not bruise/bleed easily.   Psychiatric/Behavioral: Negative for confusion and dysphoric mood. The patient is not nervous/anxious.      Objective:     Vital Signs (Most Recent):  Temp: 97.9 °F (36.6 °C) (08/04/20 1125)  Pulse: 66 (08/04/20 1125)  Resp: 18 (08/04/20 1308)  BP: 106/67 (08/04/20 1125)  SpO2: 98 % (08/04/20 1204) Vital Signs (24h Range):  Temp:  [97.5 °F (36.4 °C)-98.2 °F (36.8 °C)] 97.9 °F (36.6 °C)  Pulse:  [63-82] 66  Resp:  [9-20] 18  SpO2:  [98 %-100 %] 98 %  BP: (105-129)/(59-85) 106/67     Weight: 103.9 kg (229 lb)  Body mass index is 32.86 kg/m².    Physical Exam  Constitutional:       Appearance: He is obese.   HENT:      Head: Normocephalic.      Right Ear: External ear normal.      Left Ear: External ear normal.   Eyes:      General: No scleral icterus.        Right eye: No discharge.         Left eye: No discharge.   Neck:      Musculoskeletal: Normal range of motion.   Pulmonary:      Effort: Pulmonary effort is normal.   Abdominal:      General: Abdomen is flat. There is no distension.   Musculoskeletal:      Right lower leg: No edema.      Left lower leg: No edema.   Skin:     Findings: No rash.   Neurological:      Mental Status: He  is alert and oriented to person, place, and time.   Psychiatric:         Behavior: Behavior is cooperative.             Significant Labs: None    Significant Imaging: none

## 2020-08-04 NOTE — CARE UPDATE
08/04/20 1204   PRE-TX-O2   O2 Device (Oxygen Therapy) room air   SpO2 98 %   Pulse Oximetry Type Intermittent   $ Pulse Oximetry - Multiple Charge Pulse Oximetry - Multiple

## 2020-08-04 NOTE — PT/OT/SLP EVAL
Physical Therapy Evaluation    Patient Name:  Himanshu Connor II   MRN:  [id number]    Recommendations:     Discharge Recommendations:  home health PT   Discharge Equipment Recommendations: walker, rolling, bedside commode   Barriers to discharge: None    Assessment:     Himanshu Connor II is a 55 y.o. male admitted with a medical diagnosis of Arthritis of knee.  He presents with the following impairments/functional limitations:  weakness, impaired endurance, impaired functional mobilty, impaired balance, decreased lower extremity function, decreased safety awareness, decreased ROM, orthopedic precautions. Patient is POD #0 L TKA. He is agreeable to PT evaluation. He was premedicated for activity. He lives in [place] with his mother. He does not work. He does not use an AD at baseline. He notes frequent L knee buckling prior to surgery with a fall 2-3 months ago. Today he requires SBA for transfers. He ambulated 250' with RW and CGA. He progressed from step to gait pattern to step through gait pattern with preference for step through. He states his DME was supposed to be delivered to his room since he is from [place]. RN aware.      Rehab Prognosis: Good; patient would benefit from acute skilled PT services to address these deficits and reach maximum level of function.    Recent Surgery: Procedure(s) (LRB):  ARTHROPLASTY, KNEE (Left) Day of Surgery    Plan:     During this hospitalization, patient to be seen BID to address the identified rehab impairments via gait training, therapeutic activities, therapeutic exercises and progress toward the following goals:    · Plan of Care Expires:  09/04/20    Subjective     Chief Complaint: hs not gotten RW yet  Patient/Family Comments/goals: home tomorrow  Pain/Comfort:  · Pain Rating 1: 2/10  · Location - Side 1: Left  · Location 1: knee  · Pain Addressed 1: Pre-medicate for activity    Patients cultural, spiritual, Taoist conflicts given the current situation:       Living Environment:  Patient lives with his mother in a 1SH with 1 step to enter  Prior to admission, patients level of function was independent. He does not use an AD at baseline. He has a history of L knee buckling with a fall a few months ago. He notes a recent L TSA and was going to outpatient therapy, but had to discontinue when the COVID pandemic began. Equipment used at home: none.  DME owned (not currently used): he uses a folding chair in his shower as a shower chair.  Upon discharge, patient will have assistance from mother.    Objective:     Communicated with YANNI Garcia prior to session.  Patient found HOB elevated with bed alarm, cryotherapy, peripheral IV, telemetry  upon PT entry to room.    General Precautions: Standard, fall   Orthopedic Precautions:LLE weight bearing as tolerated   Braces: N/A     Exams:  · Cognitive Exam:  Patient is oriented to Person, Place, Time and Situation  · RUE ROM: WFL  · LUE ROM: WFL except patient reports recent TSA with some discomfort with movement  · RLE Strength: WFL  · LLE ROM: ~100 degrees knee flexion    Functional Mobility:  · Bed Mobility:     · Supine to Sit: stand by assistance  · Transfers:     · Sit to Stand:  stand by assistance with rolling walker  · Gait: 250' RW, CGA, step through gait pattern with 1 instance of right ankle rolling  · Balance: Good    Therapeutic Activities and Exercises:   Patient was educated on the importance of OOB activity during hospital stay, safe transfers and ambulation, D/C planning, LLE WBAT, home safety    Patient performed 20 reps of LLE QS, SLR, HS, SLR     AM-PAC 6 CLICK MOBILITY  Total Score:22     Patient left up in chair with all lines intact, call button in reach, chair alarm on and RN notified.    GOALS:   Multidisciplinary Problems     Physical Therapy Goals        Problem: Physical Therapy Goal    Goal Priority Disciplines Outcome Goal Variances Interventions   Physical Therapy Goal     PT, PT/OT       Description: Goals to be met by: [id number]     Patient will increase functional independence with mobility by performin. Supine to sit with Supervision  2. Sit to stand transfer with Supervision  3. Bed to chair transfer with Supervision using Rolling Walker  4. Gait  x 250 feet with Supervision using Rolling Walker.   5. Lower extremity exercise program x20 reps per handout, with independence                     History:     Past Medical History:   Diagnosis Date    Alcohol abuse, unspecified     Allergic rhinitis, cause unspecified     Arthritis     Asthma attack     Carpal tunnel syndrome     Depressive disorder, not elsewhere classified     Diarrhea     Encounter for blood transfusion     GERD (gastroesophageal reflux disease)     Hypersomnia, unspecified     Hypertension     Hypoglycemia     Lumbago     Obesity, unspecified     JAH (obstructive sleep apnea)     uses BIPAP    Other and unspecified hyperlipidemia     Other ankle sprain and strain     Psychosexual dysfunction with inhibited sexual excitement     Ventral hernia, unspecified, without mention of obstruction or gangrene        Past Surgical History:   Procedure Laterality Date    A-scope knees      Bilateral    ARTHROSCOPIC REPAIR OF ROTATOR CUFF OF SHOULDER Left 2019    Procedure: REPAIR, ROTATOR CUFF, ARTHROSCOPIC;  Surgeon: Felix Levin MD;  Location: [facility] OR;  Service: Orthopedics;  Laterality: Left;    ARTHROSCOPY OF SHOULDER WITH DECOMPRESSION OF SUBACROMIAL SPACE Left 2019    Procedure: ARTHROSCOPY, SHOULDER, WITH SUBACROMIAL SPACE DECOMPRESSION;  Surgeon: Felix Levin MD;  Location: [facility] OR;  Service: Orthopedics;  Laterality: Left;  Jaspreet- Arthedith notified of all case today -tcb    CARPAL TUNNEL RELEASE      Bilateral    EYE SURGERY      Lasik    HERNIA REPAIR      KNEE ARTHROSCOPY W/ MENISCECTOMY Left 10/15/2019    Procedure: ARTHROSCOPY, KNEE, WITH MENISCECTOMY;  Surgeon:  Felix Levin MD;  Location: [facility];  Service: Orthopedics;  Laterality: Left;  Medial and Lateral Meniscectomy    KNEE ARTHROSCOPY W/ MENISCECTOMY Right 11/22/2019    Procedure: ARTHROSCOPY, KNEE, WITH MENISCECTOMY;  Surgeon: Felix Levin MD;  Location: [facility] OR;  Service: Orthopedics;  Laterality: Right;    KNEE SURGERY      osmar    NISSEN FUNDOPLICATION      REVERSE TOTAL SHOULDER ARTHROPLASTY Left 2/11/2020    Procedure: ARTHROPLASTY, SHOULDER, TOTAL, REVERSE;  Surgeon: Felix Levin MD;  Location: [facility] OR;  Service: Orthopedics;  Laterality: Left;  [place]    ROTATOR CUFF REPAIR      right    SLEEVE GASTROPLASTY  12/2013       Time Tracking:     PT Received On: 08/04/20  PT Start Time: 1413     PT Stop Time: 1449  PT Total Time (min): 36 min     Billable Minutes: Evaluation 10, Gait Training 13 and Therapeutic Exercise 13      Kendal Edwards, PT  08/04/2020

## 2020-08-04 NOTE — ANESTHESIA PROCEDURE NOTES
Spinal    Diagnosis: osteoarthritis   Patient location during procedure: OR  Start time: 8/4/2020 8:15 AM  Timeout: 8/4/2020 8:15 AM  End time: 8/4/2020 8:20 AM    Staffing  Authorizing Provider: Brent Sandhu MD  Performing Provider: Brent Sandhu MD    Preanesthetic Checklist  Completed: patient identified, site marked, surgical consent, pre-op evaluation, timeout performed, IV checked, risks and benefits discussed and monitors and equipment checked  Spinal Block  Patient position: sitting  Prep: ChloraPrep  Patient monitoring: heart rate, cardiac monitor, continuous pulse ox, continuous capnometry and frequent blood pressure checks  Approach: midline  Location: L4-5  Injection technique: single shot  CSF Fluid: clear free-flowing CSF  Needle  Needle type: pencil-tip   Needle gauge: 25 G  Needle length: 3.5 in  Additional Documentation: incremental injection, negative aspiration for heme and no paresthesia on injection  Needle localization: anatomical landmarks  Assessment  Sensory level: T10   Dermatomal levels determined by alcohol wipe  Ease of block: easy  Patient's tolerance of the procedure: comfortable throughout block and no complaints

## 2020-08-04 NOTE — HPI
Patient is admitted to the hospital for monitoring after having undergone left knee arthroplasty.  The operation went well.  He has no unusual complaints at this time.  He has class I obesity, HTN, and mild depression.  He has also sleep apnea, but he does not use NIPPV at home while sleeping.  He states that he saw an endocrinologist last week because of frequent bouts of hypoglycemia.  A diagnosis has not been made to explain it yet.

## 2020-08-04 NOTE — ASSESSMENT & PLAN NOTE
Chronic, controlled.  Latest blood pressure and vitals reviewed-   Temp:  [97.5 °F (36.4 °C)-98.2 °F (36.8 °C)]   Pulse:  [63-82]   Resp:  [9-20]   BP: (105-129)/(59-85)   SpO2:  [98 %-100 %] .   Home meds for hypertension were reviewed and noted below. Hospital anti-hypertensive changes were made as shown below.  Hypertension Medications at home            losartan (COZAAR) 25 MG tablet Take 25 mg by mouth once daily.      Hospital Medications             losartan tablet 25 mg Starting on 8/5/2020. 25 mg, Oral, Daily        Will utilize p.r.n. blood pressure medication only if patient's blood pressure greater than  180/110 and he develops symptoms such as worsening chest pain or shortness of breath.

## 2020-08-05 PROBLEM — E87.5 HYPERKALEMIA: Status: ACTIVE | Noted: 2020-08-05

## 2020-08-05 LAB
ANION GAP SERPL CALC-SCNC: 6 MMOL/L (ref 8–16)
ANION GAP SERPL CALC-SCNC: 8 MMOL/L (ref 8–16)
BASOPHILS # BLD AUTO: 0.02 K/UL (ref 0–0.2)
BASOPHILS NFR BLD: 0.3 % (ref 0–1.9)
BUN SERPL-MCNC: 10 MG/DL (ref 6–20)
BUN SERPL-MCNC: 12 MG/DL (ref 6–20)
CALCIUM SERPL-MCNC: 7.7 MG/DL (ref 8.7–10.5)
CALCIUM SERPL-MCNC: 7.7 MG/DL (ref 8.7–10.5)
CHLORIDE SERPL-SCNC: 111 MMOL/L (ref 95–110)
CHLORIDE SERPL-SCNC: 112 MMOL/L (ref 95–110)
CO2 SERPL-SCNC: 22 MMOL/L (ref 23–29)
CO2 SERPL-SCNC: 24 MMOL/L (ref 23–29)
CREAT SERPL-MCNC: 0.9 MG/DL (ref 0.5–1.4)
CREAT SERPL-MCNC: 1 MG/DL (ref 0.5–1.4)
DIFFERENTIAL METHOD: ABNORMAL
EOSINOPHIL # BLD AUTO: 0 K/UL (ref 0–0.5)
EOSINOPHIL NFR BLD: 0 % (ref 0–8)
ERYTHROCYTE [DISTWIDTH] IN BLOOD BY AUTOMATED COUNT: 16.2 % (ref 11.5–14.5)
EST. GFR  (AFRICAN AMERICAN): >60 ML/MIN/1.73 M^2
EST. GFR  (AFRICAN AMERICAN): >60 ML/MIN/1.73 M^2
EST. GFR  (NON AFRICAN AMERICAN): >60 ML/MIN/1.73 M^2
EST. GFR  (NON AFRICAN AMERICAN): >60 ML/MIN/1.73 M^2
GLUCOSE SERPL-MCNC: 91 MG/DL (ref 70–110)
GLUCOSE SERPL-MCNC: 92 MG/DL (ref 70–110)
HCT VFR BLD AUTO: 29.9 % (ref 40–54)
HGB BLD-MCNC: 8.9 G/DL (ref 14–18)
IMM GRANULOCYTES # BLD AUTO: 0.04 K/UL (ref 0–0.04)
IMM GRANULOCYTES NFR BLD AUTO: 0.6 % (ref 0–0.5)
LYMPHOCYTES # BLD AUTO: 1 K/UL (ref 1–4.8)
LYMPHOCYTES NFR BLD: 14.5 % (ref 18–48)
MCH RBC QN AUTO: 27.4 PG (ref 27–31)
MCHC RBC AUTO-ENTMCNC: 29.8 G/DL (ref 32–36)
MCV RBC AUTO: 92 FL (ref 82–98)
MONOCYTES # BLD AUTO: 0.7 K/UL (ref 0.3–1)
MONOCYTES NFR BLD: 9.5 % (ref 4–15)
NEUTROPHILS # BLD AUTO: 5.4 K/UL (ref 1.8–7.7)
NEUTROPHILS NFR BLD: 75.1 % (ref 38–73)
NRBC BLD-RTO: 0 /100 WBC
PLATELET # BLD AUTO: 239 K/UL (ref 150–350)
PMV BLD AUTO: 8.7 FL (ref 9.2–12.9)
POTASSIUM SERPL-SCNC: 4.7 MMOL/L (ref 3.5–5.1)
POTASSIUM SERPL-SCNC: 5.6 MMOL/L (ref 3.5–5.1)
RBC # BLD AUTO: 3.25 M/UL (ref 4.6–6.2)
SODIUM SERPL-SCNC: 141 MMOL/L (ref 136–145)
SODIUM SERPL-SCNC: 142 MMOL/L (ref 136–145)
WBC # BLD AUTO: 7.16 K/UL (ref 3.9–12.7)

## 2020-08-05 PROCEDURE — 25000003 PHARM REV CODE 250: Performed by: ANESTHESIOLOGY

## 2020-08-05 PROCEDURE — 36415 COLL VENOUS BLD VENIPUNCTURE: CPT

## 2020-08-05 PROCEDURE — 63600175 PHARM REV CODE 636 W HCPCS: Performed by: HOSPITALIST

## 2020-08-05 PROCEDURE — 25000003 PHARM REV CODE 250: Performed by: ORTHOPAEDIC SURGERY

## 2020-08-05 PROCEDURE — 80048 BASIC METABOLIC PNL TOTAL CA: CPT | Mod: 91

## 2020-08-05 PROCEDURE — 25000003 PHARM REV CODE 250: Performed by: INTERNAL MEDICINE

## 2020-08-05 PROCEDURE — 94761 N-INVAS EAR/PLS OXIMETRY MLT: CPT

## 2020-08-05 PROCEDURE — 85025 COMPLETE CBC W/AUTO DIFF WBC: CPT

## 2020-08-05 PROCEDURE — 25000003 PHARM REV CODE 250: Performed by: HOSPITALIST

## 2020-08-05 PROCEDURE — 97116 GAIT TRAINING THERAPY: CPT

## 2020-08-05 PROCEDURE — 80048 BASIC METABOLIC PNL TOTAL CA: CPT

## 2020-08-05 PROCEDURE — 97165 OT EVAL LOW COMPLEX 30 MIN: CPT

## 2020-08-05 RX ORDER — OXYCODONE AND ACETAMINOPHEN 10; 325 MG/1; MG/1
1 TABLET ORAL EVERY 4 HOURS PRN
Qty: 60 TABLET | Refills: 0 | Status: SHIPPED | OUTPATIENT
Start: 2020-08-05 | End: 2020-08-05

## 2020-08-05 RX ORDER — OXYCODONE AND ACETAMINOPHEN 10; 325 MG/1; MG/1
1 TABLET ORAL EVERY 4 HOURS PRN
Qty: 60 TABLET | Refills: 0 | Status: SHIPPED | OUTPATIENT
Start: 2020-08-05 | End: 2020-08-13 | Stop reason: SDUPTHER

## 2020-08-05 RX ORDER — ASPIRIN 325 MG
325 TABLET ORAL 2 TIMES DAILY
Qty: 60 TABLET | Refills: 0 | Status: SHIPPED | OUTPATIENT
Start: 2020-08-05 | End: 2021-02-11

## 2020-08-05 RX ADMIN — OXYCODONE HYDROCHLORIDE 10 MG: 10 TABLET ORAL at 03:08

## 2020-08-05 RX ADMIN — PANTOPRAZOLE SODIUM 40 MG: 40 TABLET, DELAYED RELEASE ORAL at 09:08

## 2020-08-05 RX ADMIN — OXYCODONE HYDROCHLORIDE 10 MG: 10 TABLET ORAL at 12:08

## 2020-08-05 RX ADMIN — PREGABALIN 75 MG: 75 CAPSULE ORAL at 09:08

## 2020-08-05 RX ADMIN — ASPIRIN 81 MG 81 MG: 81 TABLET ORAL at 09:08

## 2020-08-05 RX ADMIN — ONDANSETRON 8 MG: 8 TABLET, ORALLY DISINTEGRATING ORAL at 03:08

## 2020-08-05 RX ADMIN — PROMETHAZINE HYDROCHLORIDE 50 MG: 25 TABLET ORAL at 09:08

## 2020-08-05 RX ADMIN — CYANOCOBALAMIN TAB 1000 MCG 1000 MCG: 1000 TAB at 09:08

## 2020-08-05 RX ADMIN — HYDROMORPHONE HYDROCHLORIDE 1 MG: 1 INJECTION, SOLUTION INTRAMUSCULAR; INTRAVENOUS; SUBCUTANEOUS at 07:08

## 2020-08-05 RX ADMIN — OXYCODONE 5 MG: 5 TABLET ORAL at 03:08

## 2020-08-05 RX ADMIN — DOCUSATE SODIUM 100 MG: 100 CAPSULE, LIQUID FILLED ORAL at 09:08

## 2020-08-05 RX ADMIN — OXYCODONE HYDROCHLORIDE 10 MG: 10 TABLET, FILM COATED, EXTENDED RELEASE ORAL at 09:08

## 2020-08-05 RX ADMIN — ESCITALOPRAM OXALATE 20 MG: 10 TABLET, FILM COATED ORAL at 09:08

## 2020-08-05 RX ADMIN — CHOLECALCIFEROL TAB 25 MCG (1000 UNIT) 2000 UNITS: 25 TAB at 09:08

## 2020-08-05 RX ADMIN — LOSARTAN POTASSIUM 25 MG: 25 TABLET, FILM COATED ORAL at 09:08

## 2020-08-05 RX ADMIN — MUPIROCIN 1 G: 20 OINTMENT TOPICAL at 10:08

## 2020-08-05 RX ADMIN — SODIUM POLYSTYRENE SULFONATE 30 G: 15 SUSPENSION ORAL; RECTAL at 09:08

## 2020-08-05 NOTE — PLAN OF CARE
POC/Meds reviewed, pt verbalized understanding. Vitals stable. Afebrile.  IV fluids infusing per order, IVPB abx administered. Neuro checks performed, no deficits noted. Up with standby to BR. SCD's on. Cryo in place- checked and refilled as needed. PRN pain medication given for complaints of pain, pt reports little relied and calls as soon as available for each dose.  Complaints of nausea Zofran given. Repositions self. Hourly/Q2hr rounding performed, safety maintained. Bed in lowest position, wheels locked, SR up x2, call light in easy reach. No  complaints at this time. Will continue to monitor.

## 2020-08-05 NOTE — NURSING
Discharge instructions given to pt. Instructed on medicine regimen and f/u appts. Pt verbalizes understanding. IV  removed. Transport request in system. Alvarado romero

## 2020-08-05 NOTE — PLAN OF CARE
The pt is discharging home with DME from [facility] and  [facility]. The pt is clear for discharge from case management.    I [facility] at  and left a detailed message for her to call me and the pt back once  company has been assigned. SIERRA Frias LCSW   08/05/20 1037   Final Note   Assessment Type Final Discharge Note   Anticipated Discharge Disposition Home-Health

## 2020-08-05 NOTE — PLAN OF CARE
Pt made aware that home health is not set up as of yet due to needing [facility] auth[facility]approval. Informed pt that it could take a week before approval. Pt verbalized understanding.     Dr. Levin's nurse, sharyn Gonzales as well as Walter Hunt (Orthopedic nurse Navigator)

## 2020-08-05 NOTE — PLAN OF CARE
I delivered the pts RW and BSC to the pts bedside. Approval from Michelle Still with [facility] to pull. Delivery ticket signed and completed paperwork returned to DME closet. Funmi Hood, SIERRA     08/05/20 1011   Post-Acute Status   Post-Acute Authorization [facility] Status Set-up Complete

## 2020-08-05 NOTE — PROGRESS NOTES
Patient is doing well from an orthopedic standpoint.  Awaiting a another potassium.  Pain medicines are put in the system.  Okay for discharge from orthopedic standpoint.

## 2020-08-05 NOTE — TELEPHONE ENCOUNTER
----- Message from Hemanth Burroughs sent at 8/5/2020  3:17 PM CDT -----  Regarding: Needs Rx sent to a different pharmicy because the one it was called into is out  [facility] is asking you call the  pt at [phone], needs to go to [facility] on

## 2020-08-05 NOTE — PT/OT/SLP PROGRESS
Physical Therapy Treatment    Patient Name:  Himanshu Connor II   MRN:  [id number]    Recommendations:     Discharge Recommendations:  home health PT   Discharge Equipment Recommendations: walker, rolling, bedside commode   Barriers to discharge: None    Assessment:     Himanshu Connor II is a 55 y.o. male admitted with a medical diagnosis of Arthritis of knee.  He presents with the following impairments/functional limitations:  weakness, impaired endurance, impaired functional mobilty, gait instability, impaired balance, decreased lower extremity function, pain, decreased ROM, edema, orthopedic precautions . Patient agreeable to PT treatment but indicated he was ready to go home now.  Patient presented up in his room packing his belongings walking around his room. Patient then ambulated x 250 feet with no AD with supervision/independence and up/down 5 steps with bilateral hand rails with supervision/independence.  Patient appears ready for DC home.    Rehab Prognosis: Good; patient would benefit from acute skilled PT services to address these deficits and reach maximum level of function.    Recent Surgery: Procedure(s) (LRB):  ARTHROPLASTY, KNEE (Left) 1 Day Post-Op    Plan:     During this hospitalization, patient to be seen BID to address the identified rehab impairments via gait training, therapeutic activities, therapeutic exercises and progress toward the following goals:    · Plan of Care Expires:  09/04/20    Subjective     Chief Complaint: none given  Patient/Family Comments/goals: go home  Pain/Comfort:  ·        Objective:     Communicated with nurse prior to session.  Patient found up in room packing his things with   upon PT entry to room.     General Precautions: Standard, fall   Orthopedic Precautions:RLE weight bearing as tolerated   Braces:       Functional Mobility:  · Bed Mobility:     · Supine to Sit: independence  · Sit to Supine: independence  · Transfers:     · Sit to Stand:   independence with no AD  · Gait: x 250 feet with no AD with independence  · Stairs:  Pt ascended/descended 5 stair(s) with No Assistive Device with bilateral handrails with Modified Independent.       AM-PAC 6 CLICK MOBILITY          Therapeutic Activities and Exercises:   gait training x 250 feet with no AD with independence and up/down 5 steps with bilateral hand rails with independence    Patient left sitting in chair at bedside with call button in reach..    GOALS:   Multidisciplinary Problems     Physical Therapy Goals        Problem: Physical Therapy Goal    Goal Priority Disciplines Outcome Goal Variances Interventions   Physical Therapy Goal     PT, PT/OT      Description: Goals to be met by: [id number]     Patient will increase functional independence with mobility by performin. Supine to sit with Supervision  2. Sit to stand transfer with Supervision  3. Bed to chair transfer with Supervision using Rolling Walker  4. Gait  x 250 feet with Supervision using Rolling Walker.   5. Lower extremity exercise program x20 reps per handout, with independence                     Time Tracking:     PT Received On: 20  PT Start Time: 0803     PT Stop Time: 08  PT Total Time (min): 18 min     Billable Minutes: Gait Training 18    Treatment Type: Treatment  PT/PTA: PT     PTA Visit Number: 0     Chris Megilligan, PT  2020

## 2020-08-05 NOTE — PT/OT/SLP EVAL
Occupational Therapy   Evaluation and Discharge Note    Name: Himanshu Connor II  MRN: [id number]  Admitting Diagnosis:  Arthritis of knee 1 Day Post-Op    Recommendations:     Discharge Recommendations: home health PT  Discharge Equipment Recommendations:  bedside commode(RW in room)  Barriers to discharge:  None    Assessment:     Himanshu Connor II is a 55 y.o. male with a medical diagnosis of Arthritis of knee. At this time, patient is functioning at their prior level of function and does not require further acute OT services.     Plan:     During this hospitalization, patient does not require further acute OT services.  Please re-consult if situation changes.    · Plan of Care Reviewed with: patient, significant other    Subjective     Chief Complaint: none  Patient/Family Comments/goals: none    Occupational Profile:  Living Environment: Patient lives with his mother.  Previous level of function: Patient was independent with ADLs and mobility.   Roles and Routines: Retired   Equipment Used at home:  none  Assistance upon Discharge: Patient will receive assistance from significant other.    Pain/Comfort:  · Pain Rating 1: 2/10  · Location - Side 1: Left  · Location - Orientation 1: generalized  · Pain Rating Post-Intervention 1: 2/10    Patients cultural, spiritual, Zoroastrianism conflicts given the current situation:      Objective:     Communicated with: nurse Simms prior to session.  Patient found up in chair with cryotherapy, peripheral IV, telemetry upon OT entry to room.    General Precautions: Standard, fall   Orthopedic Precautions:RLE weight bearing as tolerated   Braces: N/A     Occupational Performance:    Bed Mobility:    · Not assessed; pt seated in chair upon arrival. See PT notes.    Functional Mobility/Transfers:  · Patient completed Sit <> Stand Transfer with independence  with  no assistive device   · Patient completed Toilet Transfer Stand Pivot technique with independence with   no AD    Activities of Daily Living:  · Grooming: independence with hand hygiene while standing at sink.  · Lower Body Dressing: independence to don/doff socks and shoes while seated in chair.  · Toileting: independence with voiding while standing over toilet.     Cognitive/Visual Perceptual:  Cognitive/Psychosocial Skills:     -       Oriented to: x4   -       Follows Commands/attention:Follows multistep  commands  -       Communication: clear/fluent  -       Safety awareness/insight to disability: intact   -       Mood/Affect/Coping skills/emotional control: Appropriate to situation and Cooperative  Visual/Perceptual:      -Intact     Physical Exam:  Postural examination/scapula alignment:    -       Rounded shoulders  -       Forward head  Upper Extremity Range of Motion:     -       Right Upper Extremity: WFL  -       Left Upper Extremity: WFL  Upper Extremity Strength:    -       Right Upper Extremity: WFL  -       Left Upper Extremity: WFL   Strength:    -       Right Upper Extremity: WFL  -       Left Upper Extremity: WFL  Fine Motor Coordination:    -       Intact  Gross motor coordination:   WFL    AMPAC 6 Click ADL:  AMPAC Total Score: 24    Education:    Patient left up in chair with all lines intact, call button in reach and nurse notified    GOALS:   Multidisciplinary Problems     Occupational Therapy Goals     Not on file                History:     Past Medical History:   Diagnosis Date    Alcohol abuse, unspecified     Allergic rhinitis, cause unspecified     Arthritis     Asthma attack     Carpal tunnel syndrome     Depressive disorder, not elsewhere classified     Diarrhea     Encounter for blood transfusion     GERD (gastroesophageal reflux disease)     Hypersomnia, unspecified     Hypertension     Hypoglycemia     Lumbago     Obesity, unspecified     JAH (obstructive sleep apnea)     uses BIPAP    Other and unspecified hyperlipidemia     Other ankle sprain and strain      Psychosexual dysfunction with inhibited sexual excitement     Ventral hernia, unspecified, without mention of obstruction or gangrene        Past Surgical History:   Procedure Laterality Date    A-scope knees      Bilateral    ARTHROSCOPIC REPAIR OF ROTATOR CUFF OF SHOULDER Left 9/20/2019    Procedure: REPAIR, ROTATOR CUFF, ARTHROSCOPIC;  Surgeon: Felix Levin MD;  Location: [facility] OR;  Service: Orthopedics;  Laterality: Left;    ARTHROSCOPY OF SHOULDER WITH DECOMPRESSION OF SUBACROMIAL SPACE Left 9/20/2019    Procedure: ARTHROSCOPY, SHOULDER, WITH SUBACROMIAL SPACE DECOMPRESSION;  Surgeon: Felix Levin MD;  Location: [facility] OR;  Service: Orthopedics;  Laterality: Left;  Jaspreet- Arthrex notified of all case today 9/16-tcb    CARPAL TUNNEL RELEASE      Bilateral    EYE SURGERY      Lasik    HERNIA REPAIR      KNEE ARTHROPLASTY Left 8/4/2020    Procedure: ARTHROPLASTY, KNEE;  Surgeon: Felix Levin MD;  Location: [facility] OR;  Service: Orthopedics;  Laterality: Left;    KNEE ARTHROSCOPY W/ MENISCECTOMY Left 10/15/2019    Procedure: ARTHROSCOPY, KNEE, WITH MENISCECTOMY;  Surgeon: Felix Levin MD;  Location: [facility] OR;  Service: Orthopedics;  Laterality: Left;  Medial and Lateral Meniscectomy    KNEE ARTHROSCOPY W/ MENISCECTOMY Right 11/22/2019    Procedure: ARTHROSCOPY, KNEE, WITH MENISCECTOMY;  Surgeon: Felix Levin MD;  Location: [facility] OR;  Service: Orthopedics;  Laterality: Right;    KNEE SURGERY      osmar    NISSEN FUNDOPLICATION      REVERSE TOTAL SHOULDER ARTHROPLASTY Left 2/11/2020    Procedure: ARTHROPLASTY, SHOULDER, TOTAL, REVERSE;  Surgeon: Felix Levin MD;  Location: [facility] OR;  Service: Orthopedics;  Laterality: Left;  [place]    ROTATOR CUFF REPAIR      right    SLEEVE GASTROPLASTY  12/2013       Time Tracking:     OT Date of Treatment: 08/05/20  OT Start Time: 1029  OT Stop Time: 1037  OT Total Time (min): 8 min    Billable Minutes:Evaluation  8    Edmund Loving, OT  8/5/2020

## 2020-08-05 NOTE — DISCHARGE SUMMARY
[facility] Hospital Medicine  Discharge Summary      Patient Name: Himanshu Connor II  MRN: [id number]  Admission Date: 8/4/2020  Hospital Length of Stay: 1 days  Discharge Date and Time:  08/05/2020 9:55 AM  Attending Physician: Joyce Harrison MD   Discharging Provider: Joyce Harrison MD  Primary Care Provider: Gilo Kawasaki, MD      HPI:   Patient is admitted to the hospital for monitoring after having undergone left knee arthroplasty.  The operation went well.  He has no unusual complaints at this time.  He has class I obesity, HTN, and mild depression.  He has also sleep apnea, but he does not use NIPPV at home while sleeping.  He states that he saw an endocrinologist last week because of frequent bouts of hypoglycemia.  A diagnosis has not been made to explain it yet.    Procedure(s) (LRB):  ARTHROPLASTY, KNEE (Left)      Hospital Course:   Post-operative, patient did well. Pain adequately controlled. Patient participated with physical therapy. Home health and home physical therapy has been arranged. Fall precautions discussed with the patient. Patient to follow up with primary care physician next week and orthopedic doctor in 2 weeks. Post-operative anti-coagulation as per orthopedics recommendations advised.  During hospital stay patient noted to have hyperkalemia due to excessive consumption of apple juices.  Patient required use of Kayexalate therapy.  In case of chest pain, shortness of breath, stroke or stroke like symptoms, high grade fever or any signs or symptoms of surgical site wound infection symptoms, patient to return to nearest emergency room as soon as possible.    Consults:   Consults (From admission, onward)        Status Ordering Provider     Inpatient consult to Hospitalist  Once     Provider:  Andrés Solis MD    Acknowledged DANIELITO JUSTICE     Inpatient virtual consult to Hospital Medicine  Once     Provider:  Andrés Solis MD    Acknowledged SANTHOSH  "TRISH BAJWA          Final Active Diagnoses:    Diagnosis Date Noted POA    PRINCIPAL PROBLEM:  Arthritis of knee [M17.10] 02/27/2020 Yes    Hyperkalemia [E87.5] 08/05/2020 No    Class 1 obesity due to excess calories in adult [E66.09] 08/04/2020 Yes    Essential hypertension [I10] 02/11/2020 Yes    Mild episode of recurrent major depressive disorder [F33.0] 02/11/2020 Yes      Problems Resolved During this Admission:       Discharged Condition: good    Disposition: Home or Self Care    Follow Up:  Follow-up Information     Gilo Kawasaki, MD In 1 week.    Specialty: Family Medicine  Contact information:  [address]  [place] [address]  [phone]             Felix Levin MD In 2 weeks.    Specialties: Sports Medicine, Orthopedic Surgery  Contact information:  [address] [place] [address] [address]  [place] [address]  [phone]                 Patient Instructions:      COMMODE FOR HOME USE     Order Specific Question Answer Comments   Type: Standard    Height: 5' 10" (1.778 m)    Weight: 103.9 kg (229 lb)    Does patient have medical equipment at home? none    Length of need (1-99 months): 99      WALKER FOR HOME USE     Order Specific Question Answer Comments   Type of Walker: Adult (5'4"-6'6")    With wheels? Yes    Height: 5' 10" (1.778 m)    Weight: 103.9 kg (229 lb)    Length of need (1-99 months): 99    Does patient have medical equipment at home? none    Please check all that apply: Patient's condition impairs ambulation.      Referral to Home health   Referral Priority: Routine Referral Type: Home Health   Referral Reason: Specialty Services Required   Requested Specialty: Home Health Services   Number of Visits Requested: 1     Diet Cardiac     Other restrictions (specify):   Order Comments: PLEASE OBSERVE FALL PRECAUTIONS     Call MD for:   Order Comments: For worsening symptoms, chest pain, shortness of breath, increased abdominal pain, high grade fever, stroke or stroke like symptoms, " immediately go to the nearest Emergency Room or call 911 as soon as possible.       Significant Diagnostic Studies: Labs:   CMP   Recent Labs   Lab 08/05/20 0423      K 5.6*   *   CO2 22*   GLU 92   BUN 12   CREATININE 1.0   CALCIUM 7.7*   ANIONGAP 8   ESTGFRAFRICA >60   EGFRNONAA >60    and CBC   Recent Labs   Lab 08/05/20 0423   WBC 7.16   HGB 8.9*   HCT 29.9*          Pending Diagnostic Studies:     Procedure Component Value Units Date/Time    Potassium [[id number]]     Order Status: Sent Lab Status: No result     Specimen: Blood          Medications:  Reconciled Home Medications:      Medication List      START taking these medications    aspirin 325 MG tablet  Take 1 tablet (325 mg total) by mouth 2 (two) times a day.        CONTINUE taking these medications    cyanocobalamin 1000 MCG tablet  Commonly known as: VITAMIN B-12  Take 1,000 mcg by mouth once daily.     cyclobenzaprine 10 MG tablet  Commonly known as: FLEXERIL  Take 10 mg by mouth 3 (three) times daily as needed for Muscle spasms.     diclofenac sodium 1 % Gel  Commonly known as: VOLTAREN  Apply topically daily as needed.     escitalopram oxalate 20 MG tablet  Commonly known as: LEXAPRO  Take 20 mg by mouth once daily.     gabapentin 300 MG capsule  Commonly known as: NEURONTIN  Take 300 mg by mouth every evening.     hydrOXYzine HCL 25 MG tablet  Commonly known as: ATARAX  Take 25 mg by mouth 3 (three) times daily.     lidocaine 5 %  Commonly known as: LIDODERM  Place 1 patch onto the skin every evening. Remove & Discard patch within 12 hours or as directed by MD     losartan 25 MG tablet  Commonly known as: COZAAR  Take 25 mg by mouth once daily.     mirtazapine 30 MG tablet  Commonly known as: REMERON  Take 30 mg by mouth every evening.     oxyCODONE-acetaminophen 5-325 mg per tablet  Commonly known as: PERCOCET  Take 1 tablet by mouth every 6 (six) hours as needed.     pantoprazole 40 MG tablet  Commonly known as: PROTONIX  TK  ONE  T PO D     sucralfate 1 gram tablet  Commonly known as: CARAFATE  TK 1 T PO QID 1 HOUR BEFORE MEALS AND 1 T QHS OES     vitamin D 1000 units Tab  Commonly known as: VITAMIN D3  Take 2,000 Units by mouth once daily.     zolpidem 5 MG Tab  Commonly known as: AMBIEN  Take 1 tablet (5 mg total) by mouth nightly as needed.        STOP taking these medications    HYDROcodone-acetaminophen 5-325 mg per tablet  Commonly known as: NORCO            Indwelling Lines/Drains at time of discharge:   Lines/Drains/Airways     None                 Time spent on the discharge of patient: 31 minutes  Patient was seen and examined on the date of discharge and determined to be suitable for discharge.         Joyce Harrison MD  Department of Hospital Medicine  [facility]

## 2020-08-05 NOTE — TELEPHONE ENCOUNTER
Called and spoke with patient and advised that we will send his RX to [facility] on . He verbalized understanding.

## 2020-08-05 NOTE — PLAN OF CARE
I faxed the [facility] discharge worksheet to [phone]- along with the pts face sheet, H&P, and HH orders. [facility]. Funmi Hood LCSW     08/05/20 0956   Post-Acute Status   Post-Acute Authorization Home Health   Home Health Status Referrals Sent

## 2020-08-05 NOTE — CARE UPDATE
08/05/20 1229   PRE-TX-O2   O2 Device (Oxygen Therapy) room air   Pulse Oximetry Type Intermittent   $ Pulse Oximetry - Multiple Charge Pulse Oximetry - Multiple

## 2020-08-07 ENCOUNTER — TELEPHONE (OUTPATIENT)
Dept: ORTHOPEDICS | Facility: CLINIC | Age: 55
End: 2020-08-07

## 2020-08-07 NOTE — TELEPHONE ENCOUNTER
Patient contacted. Attempted to reach his home health nurse, Charly at [phone]. No answer. Will have to call back Monday. Syeda Watts LPN

## 2020-08-07 NOTE — TELEPHONE ENCOUNTER
----- Message from Hemanth Burroughs sent at 8/7/2020  4:29 PM CDT -----  Regarding: pt needs HH and wound care orders  Contact: pt   Please call

## 2020-08-10 VITALS
BODY MASS INDEX: 36.94 KG/M2 | SYSTOLIC BLOOD PRESSURE: 127 MMHG | RESPIRATION RATE: 18 BRPM | TEMPERATURE: 97 F | HEART RATE: 85 BPM | WEIGHT: 258 LBS | HEIGHT: 70 IN | OXYGEN SATURATION: 100 % | DIASTOLIC BLOOD PRESSURE: 77 MMHG

## 2020-08-13 ENCOUNTER — TELEPHONE (OUTPATIENT)
Dept: ORTHOPEDICS | Facility: CLINIC | Age: 55
End: 2020-08-13

## 2020-08-13 DIAGNOSIS — Z96.659 STATUS POST TOTAL KNEE REPLACEMENT, UNSPECIFIED LATERALITY: Primary | ICD-10-CM

## 2020-08-13 RX ORDER — OXYCODONE AND ACETAMINOPHEN 10; 325 MG/1; MG/1
1 TABLET ORAL EVERY 4 HOURS PRN
Qty: 60 TABLET | Refills: 0 | Status: SHIPPED | OUTPATIENT
Start: 2020-08-13 | End: 2020-08-13 | Stop reason: SDUPTHER

## 2020-08-13 RX ORDER — OXYCODONE AND ACETAMINOPHEN 10; 325 MG/1; MG/1
1 TABLET ORAL EVERY 4 HOURS PRN
Qty: 60 TABLET | Refills: 0 | Status: SHIPPED | OUTPATIENT
Start: 2020-08-13 | End: 2020-08-24 | Stop reason: SDUPTHER

## 2020-08-13 NOTE — TELEPHONE ENCOUNTER
----- Message from Hemanth Burroughs sent at 8/13/2020 10:31 AM CDT -----  Regarding: Wants to speak to nurse abot post op issues and need refill  Contact: pt   Refill  oxyCODONE-acetaminophen (PERCOCET)  mg per tablet

## 2020-08-13 NOTE — TELEPHONE ENCOUNTER
Patient requesting refill on Percocet 10-325mg #60 last refill 8/5/2020. DOS 8/04/2020  Please advise.     Called Sarah, cancelled the Rx. Patient would like sent to different pharamcy

## 2020-08-13 NOTE — TELEPHONE ENCOUNTER
Rescheduled post op to Monday per request due to recent ER visit due to Cellulitis in the operative leg. Offered appointment today but patient states that he is in [place] and can not make it here. Soonest would be on Monday. Advised patient to go to ER if his symptoms get worse and not relieved by the antibiotics he is on from the ER. He verbalized understanding.

## 2020-08-13 NOTE — TELEPHONE ENCOUNTER
----- Message from Hemanth Burroughs sent at 8/13/2020 11:32 AM CDT -----  Regarding: FW: Wants to speak to nurse abot post op issues and need refill  Contact: pt   Please call AGAIN Re Rx  ----- Message -----  From: Hemanth Burroughs  Sent: 8/13/2020  10:31 AM CDT  To: Ollie [facility] Staff  Subject: Wants to speak to nurse abot post op issues #    Refill  oxyCODONE-acetaminophen (PERCOCET)  mg per tablet

## 2020-08-14 DIAGNOSIS — M25.562 LEFT KNEE PAIN, UNSPECIFIED CHRONICITY: Primary | ICD-10-CM

## 2020-08-17 ENCOUNTER — OFFICE VISIT (OUTPATIENT)
Dept: ORTHOPEDICS | Facility: CLINIC | Age: 55
End: 2020-08-17
Payer: OTHER GOVERNMENT

## 2020-08-17 ENCOUNTER — HOSPITAL ENCOUNTER (OUTPATIENT)
Dept: RADIOLOGY | Facility: HOSPITAL | Age: 55
Discharge: HOME | End: 2020-08-17
Attending: ORTHOPAEDIC SURGERY
Payer: OTHER GOVERNMENT

## 2020-08-17 VITALS — WEIGHT: 258 LBS | RESPIRATION RATE: 16 BRPM | BODY MASS INDEX: 36.94 KG/M2 | HEIGHT: 70 IN

## 2020-08-17 DIAGNOSIS — M25.562 LEFT KNEE PAIN, UNSPECIFIED CHRONICITY: ICD-10-CM

## 2020-08-17 DIAGNOSIS — Z96.652 STATUS POST TOTAL LEFT KNEE REPLACEMENT USING CEMENT: ICD-10-CM

## 2020-08-17 DIAGNOSIS — M25.562 LEFT KNEE PAIN, UNSPECIFIED CHRONICITY: Primary | ICD-10-CM

## 2020-08-17 PROCEDURE — 73560 XR KNEE 1 OR 2 VIEW LEFT: ICD-10-PCS | Mod: 26,LT,, | Performed by: RADIOLOGY

## 2020-08-17 PROCEDURE — 99024 POSTOP FOLLOW-UP VISIT: CPT | Mod: ,,, | Performed by: ORTHOPAEDIC SURGERY

## 2020-08-17 PROCEDURE — 99213 OFFICE O/P EST LOW 20 MIN: CPT | Mod: PBBFAC,25,PN | Performed by: ORTHOPAEDIC SURGERY

## 2020-08-17 PROCEDURE — 99999 PR PBB SHADOW E&M-EST. PATIENT-LVL III: CPT | Mod: PBBFAC,,, | Performed by: ORTHOPAEDIC SURGERY

## 2020-08-17 PROCEDURE — 99024 PR POST-OP FOLLOW-UP VISIT: ICD-10-PCS | Mod: ,,, | Performed by: ORTHOPAEDIC SURGERY

## 2020-08-17 PROCEDURE — 73560 X-RAY EXAM OF KNEE 1 OR 2: CPT | Mod: 26,LT,, | Performed by: RADIOLOGY

## 2020-08-17 PROCEDURE — 99999 PR PBB SHADOW E&M-EST. PATIENT-LVL III: ICD-10-PCS | Mod: PBBFAC,,, | Performed by: ORTHOPAEDIC SURGERY

## 2020-08-17 PROCEDURE — 73560 X-RAY EXAM OF KNEE 1 OR 2: CPT | Mod: TC,PN,LT

## 2020-08-17 RX ORDER — HYDROMORPHONE HYDROCHLORIDE 4 MG/1
4 TABLET ORAL EVERY 4 HOURS PRN
Qty: 40 TABLET | Refills: 0 | Status: SHIPPED | OUTPATIENT
Start: 2020-08-17 | End: 2021-02-11

## 2020-08-17 RX ORDER — CLINDAMYCIN HYDROCHLORIDE 300 MG/1
300 CAPSULE ORAL 3 TIMES DAILY
Qty: 30 CAPSULE | Refills: 0 | Status: SHIPPED | OUTPATIENT
Start: 2020-08-17 | End: 2020-08-27

## 2020-08-17 NOTE — PROGRESS NOTES
Past Medical History:   Diagnosis Date    Alcohol abuse, unspecified     Allergic rhinitis, cause unspecified     Arthritis     Asthma attack     Carpal tunnel syndrome     Depressive disorder, not elsewhere classified     Diarrhea     Encounter for blood transfusion     GERD (gastroesophageal reflux disease)     Hypersomnia, unspecified     Hypertension     Hypoglycemia     Lumbago     Obesity, unspecified     JAH (obstructive sleep apnea)     uses BIPAP    Other and unspecified hyperlipidemia     Other ankle sprain and strain     Psychosexual dysfunction with inhibited sexual excitement     Ventral hernia, unspecified, without mention of obstruction or gangrene        Past Surgical History:   Procedure Laterality Date    A-scope knees      Bilateral    ARTHROSCOPIC REPAIR OF ROTATOR CUFF OF SHOULDER Left 9/20/2019    Procedure: REPAIR, ROTATOR CUFF, ARTHROSCOPIC;  Surgeon: Felix Levin MD;  Location: [facility] OR;  Service: Orthopedics;  Laterality: Left;    ARTHROSCOPY OF SHOULDER WITH DECOMPRESSION OF SUBACROMIAL SPACE Left 9/20/2019    Procedure: ARTHROSCOPY, SHOULDER, WITH SUBACROMIAL SPACE DECOMPRESSION;  Surgeon: Felix Levin MD;  Location: [facility] OR;  Service: Orthopedics;  Laterality: Left;  Jaspreet- Arthrex notified of all case today 9/16-tcb    CARPAL TUNNEL RELEASE      Bilateral    EYE SURGERY      Lasik    HERNIA REPAIR      KNEE ARTHROPLASTY Left 8/4/2020    Procedure: ARTHROPLASTY, KNEE;  Surgeon: Felix Levin MD;  Location: [facility] OR;  Service: Orthopedics;  Laterality: Left;    KNEE ARTHROSCOPY W/ MENISCECTOMY Left 10/15/2019    Procedure: ARTHROSCOPY, KNEE, WITH MENISCECTOMY;  Surgeon: Felix Levin MD;  Location: [facility] OR;  Service: Orthopedics;  Laterality: Left;  Medial and Lateral Meniscectomy    KNEE ARTHROSCOPY W/ MENISCECTOMY Right 11/22/2019    Procedure: ARTHROSCOPY, KNEE, WITH MENISCECTOMY;  Surgeon: Felix Levin MD;   Location: [facility] OR;  Service: Orthopedics;  Laterality: Right;    KNEE SURGERY      osmar    NISSEN FUNDOPLICATION      REVERSE TOTAL SHOULDER ARTHROPLASTY Left 2/11/2020    Procedure: ARTHROPLASTY, SHOULDER, TOTAL, REVERSE;  Surgeon: Felix Levin MD;  Location: [facility] OR;  Service: Orthopedics;  Laterality: Left;  [place]    ROTATOR CUFF REPAIR      right    SLEEVE GASTROPLASTY  12/2013       Current Outpatient Medications   Medication Sig    aspirin 325 MG tablet Take 1 tablet (325 mg total) by mouth 2 (two) times a day.    clindamycin (CLEOCIN) 150 MG capsule Take 2 capsules (300 mg total) by mouth 2 (two) times daily. for 7 days    cyanocobalamin (VITAMIN B-12) 1000 MCG tablet Take 1,000 mcg by mouth once daily.     cyclobenzaprine (FLEXERIL) 10 MG tablet Take 10 mg by mouth 3 (three) times daily as needed for Muscle spasms.    diclofenac sodium (VOLTAREN) 1 % Gel Apply topically daily as needed.    escitalopram oxalate (LEXAPRO) 20 MG tablet Take 20 mg by mouth once daily.    gabapentin (NEURONTIN) 300 MG capsule Take 300 mg by mouth every evening.    hydrOXYzine HCl (ATARAX) 25 MG tablet Take 25 mg by mouth 3 (three) times daily.    lidocaine (LIDODERM) 5 % Place 1 patch onto the skin every evening. Remove & Discard patch within 12 hours or as directed by MD    losartan (COZAAR) 25 MG tablet Take 25 mg by mouth once daily.    mirtazapine (REMERON) 30 MG tablet Take 30 mg by mouth every evening.    oxyCODONE-acetaminophen (PERCOCET)  mg per tablet Take 1 tablet by mouth every 4 (four) hours as needed for Pain.    pantoprazole (PROTONIX) 40 MG tablet TK ONE  T PO D    sucralfate (CARAFATE) 1 gram tablet TK 1 T PO QID 1 HOUR BEFORE MEALS AND 1 T QHS OES    vitamin D (VITAMIN D3) 1000 units Tab Take 2,000 Units by mouth once daily.    zolpidem (AMBIEN) 5 MG Tab Take 1 tablet (5 mg total) by mouth nightly as needed.     No current facility-administered medications for this visit.         Review of patient's allergies indicates:  No Known Allergies    Family History   Problem Relation Age of Onset    Arthritis Mother     Hypertension Father     Kidney disease Father     Heart disease Father     COPD Father        Social History     Socioeconomic History    Marital status:      Spouse name: Not on file    Number of children: Not on file    Years of education: Not on file    Highest education level: Not on file   Occupational History    Not on file   Social Needs    Financial resource strain: Not on file    Food insecurity     Worry: Not on file     Inability: Not on file    Transportation needs     Medical: Not on file     Non-medical: Not on file   Tobacco Use    Smoking status: Never Smoker    Smokeless tobacco: Former User     Types: Chew   Substance and Sexual Activity    Alcohol use: Yes     Comment: occasional    Drug use: No    Sexual activity: Not on file   Lifestyle    Physical activity     Days per week: Not on file     Minutes per session: Not on file    Stress: Not on file   Relationships    Social connections     Talks on phone: Not on file     Gets together: Not on file     Attends Jain service: Not on file     Active member of club or organization: Not on file     Attends meetings of clubs or organizations: Not on file     Relationship status: Not on file   Other Topics Concern    Not on file   Social History Narrative    Not on file       Chief Complaint:   Chief Complaint   Patient presents with    Left Knee - Post-op Evaluation     Dos 08-04-20 status post left TKA       Date of surgery:  August 4, 2020    History of present illness:  This is a 55-year-old male underwent left total knee arthroplasty about 2 weeks ago.  Patient had extreme pain over the weekend went to the emergency room.  Was diagnosed some cellulitis.  Patient was given clindamycin but he has not taken it yet.  Pain is an 8/10.  Having a lot of swelling in the calf and  foot.  Also an ultrasound to rule out DVT.      Review of Systems:    Musculoskeletal:  See HPI        Physical Examination:    Vital Signs:    Vitals:    08/17/20 1324   Resp: 16       Body mass index is 37.02 kg/m².    This a well-developed, well nourished patient in no acute distress.  They are alert and oriented and cooperative to examination.  Pt. walks without an antalgic gait.      Examination left knee shows well-healing surgical incision.  No erythema or drainage.  Patient does have a lot of swelling in his calf and foot.  Range of motion about 0-95 degrees.    X-rays:  Two views left knee are ordered and reviewed which show well-aligned total knee arthroplasty components without complication     Assessment::  Status post left Loren CR total knee arthroplasty  Cellulitis    Plan:  Reviewed the finding with him today.  It is certainly possible that he has some cellulitis versus just some pain from soft tissue swelling.  We will try Dilaudid instead of the Percocet to help manage his pain a little better.  Patient has been on Percocet for about 25 years now also his body definitely has a tolerance to it.  Also gave him some clindamycin.  Follow-up in 4 weeks.  No x-ray needed.  Continue the physical therapy.    This note was created using M Modal voice recognition software that occasionally misinterpreted phrases or words.

## 2020-08-21 ENCOUNTER — TELEPHONE (OUTPATIENT)
Dept: ORTHOPEDICS | Facility: CLINIC | Age: 55
End: 2020-08-21

## 2020-08-21 DIAGNOSIS — Z96.652 STATUS POST TOTAL LEFT KNEE REPLACEMENT USING CEMENT: Primary | ICD-10-CM

## 2020-08-21 DIAGNOSIS — Z96.612 STATUS POST REVERSE ARTHROPLASTY OF SHOULDER, LEFT: ICD-10-CM

## 2020-08-21 NOTE — TELEPHONE ENCOUNTER
----- Message from Hemanth Burroughs sent at 8/21/2020  3:19 PM CDT -----  Regarding: Wants to speak to staff about PT  Contact: pt   Please call

## 2020-08-21 NOTE — TELEPHONE ENCOUNTER
Called and spoke with patient and he would like orders sent to outpatient therapy since his he will be getting d/c from . Will send orders per request to [facility].

## 2020-08-24 DIAGNOSIS — Z96.659 STATUS POST TOTAL KNEE REPLACEMENT, UNSPECIFIED LATERALITY: ICD-10-CM

## 2020-08-24 RX ORDER — OXYCODONE AND ACETAMINOPHEN 10; 325 MG/1; MG/1
1 TABLET ORAL EVERY 4 HOURS PRN
Qty: 60 TABLET | Refills: 0 | Status: SHIPPED | OUTPATIENT
Start: 2020-08-24 | End: 2020-08-25 | Stop reason: CLARIF

## 2020-08-24 NOTE — TELEPHONE ENCOUNTER
----- Message from Ariadna Maravilla MA sent at 8/24/2020 12:24 PM CDT -----  Contact: pt  Pharmacy   [facility]   [facility]xy   Call back

## 2020-08-25 ENCOUNTER — TELEPHONE (OUTPATIENT)
Dept: ORTHOPEDICS | Facility: CLINIC | Age: 55
End: 2020-08-25

## 2020-08-25 DIAGNOSIS — Z96.659 STATUS POST TOTAL KNEE REPLACEMENT, UNSPECIFIED LATERALITY: ICD-10-CM

## 2020-08-25 RX ORDER — OXYCODONE AND ACETAMINOPHEN 10; 325 MG/1; MG/1
1 TABLET ORAL EVERY 4 HOURS PRN
Qty: 60 TABLET | Refills: 0 | Status: SHIPPED | OUTPATIENT
Start: 2020-08-25 | End: 2020-09-03 | Stop reason: SDUPTHER

## 2020-08-25 NOTE — TELEPHONE ENCOUNTER
Attempted to contact patient. New prescription pended. Please review/sign if agreeable. PT orders [facility]. Syeda Watts LPN

## 2020-08-25 NOTE — TELEPHONE ENCOUNTER
----- Message from Hemanth Burroughs sent at 8/25/2020  4:11 PM CDT -----  Regarding: pt wants to speak to staff about PT  Contact: pt   Please call

## 2020-08-25 NOTE — TELEPHONE ENCOUNTER
----- Message from Ariadna Maravilla MA sent at 8/25/2020 12:48 PM CDT -----  Contact: annika jackson  [facility]  refill pain med   Refill sent to [facility]  wrong place   Out pt PT orders to continue     Call back

## 2020-08-26 NOTE — TELEPHONE ENCOUNTER
Spoke to patient. Advised per Dr Levin, his Percocet medication was recently refilled. Unable to refill Dilaudid will need to take one or the other. Pt verbalized understanding.

## 2020-09-03 DIAGNOSIS — Z96.659 STATUS POST TOTAL KNEE REPLACEMENT, UNSPECIFIED LATERALITY: ICD-10-CM

## 2020-09-03 RX ORDER — ZOLPIDEM TARTRATE 5 MG/1
5 TABLET ORAL NIGHTLY PRN
Qty: 20 TABLET | Refills: 0 | Status: SHIPPED | OUTPATIENT
Start: 2020-09-03 | End: 2021-02-11

## 2020-09-03 RX ORDER — OXYCODONE AND ACETAMINOPHEN 10; 325 MG/1; MG/1
1 TABLET ORAL EVERY 4 HOURS PRN
Qty: 60 TABLET | Refills: 0 | Status: SHIPPED | OUTPATIENT
Start: 2020-09-03 | End: 2020-09-11 | Stop reason: SDUPTHER

## 2020-09-03 NOTE — TELEPHONE ENCOUNTER
----- Message from Ariadna Maravilla MA sent at 9/3/2020  3:25 PM CDT -----  Contact: pt  Refill \ambien and oxy    [facility]armacy  [facility]   Call back  [phone]

## 2020-09-09 ENCOUNTER — TELEPHONE (OUTPATIENT)
Dept: ORTHOPEDICS | Facility: CLINIC | Age: 55
End: 2020-09-09

## 2020-09-09 NOTE — TELEPHONE ENCOUNTER
----- Message from Ariadna Maravilla MA sent at 9/8/2020  4:17 PM CDT -----  Contact: pt  Wants PT orders   Ne NARVAEZ, cecile logan   Call back

## 2020-09-11 DIAGNOSIS — Z96.659 STATUS POST TOTAL KNEE REPLACEMENT, UNSPECIFIED LATERALITY: ICD-10-CM

## 2020-09-11 NOTE — TELEPHONE ENCOUNTER
Called and spoke with patient and advised that it is okay for him to come in the morning for his post op per request.

## 2020-09-11 NOTE — TELEPHONE ENCOUNTER
----- Message from Ariadna Maravilla MA sent at 9/11/2020  1:13 PM CDT -----  Contact:   Pharmacy    [facility]   Call back

## 2020-09-11 NOTE — TELEPHONE ENCOUNTER
----- Message from Hemanth Burroughs sent at 9/11/2020  1:38 PM CDT -----  Regarding: pt needs to come in earlier in the day, Monday due to work  Contact: pt

## 2020-09-11 NOTE — TELEPHONE ENCOUNTER
----- Message from Varsha Campa sent at 9/11/2020  3:50 PM CDT -----  Regarding: refill  Contact: SIENA RANDOLPH II [[id number]]  Patient requesting a refill on oxyCODONE-acetaminophen (PERCOCET)  mg per tablet.    [facility]   [id number] [address] & [address] [address]  Phone: [phone] Fax: [phone]    Please call patient at [phone]. Thanks!

## 2020-09-12 ENCOUNTER — HOSPITAL ENCOUNTER (EMERGENCY)
Facility: HOSPITAL | Age: 55
Discharge: HOME | End: 2020-09-13
Attending: EMERGENCY MEDICINE
Payer: COMMERCIAL

## 2020-09-12 DIAGNOSIS — M25.569 KNEE PAIN: ICD-10-CM

## 2020-09-12 DIAGNOSIS — M25.562 ACUTE PAIN OF LEFT KNEE: Primary | ICD-10-CM

## 2020-09-12 PROCEDURE — 99284 EMERGENCY DEPT VISIT MOD MDM: CPT | Mod: 25

## 2020-09-12 RX ORDER — MORPHINE SULFATE 4 MG/ML
4 INJECTION, SOLUTION INTRAMUSCULAR; INTRAVENOUS
Status: COMPLETED | OUTPATIENT
Start: 2020-09-13 | End: 2020-09-13

## 2020-09-13 VITALS
RESPIRATION RATE: 20 BRPM | DIASTOLIC BLOOD PRESSURE: 87 MMHG | HEART RATE: 77 BPM | HEIGHT: 71 IN | OXYGEN SATURATION: 99 % | SYSTOLIC BLOOD PRESSURE: 142 MMHG | TEMPERATURE: 98 F | WEIGHT: 220 LBS | BODY MASS INDEX: 30.8 KG/M2

## 2020-09-13 LAB
ALBUMIN SERPL BCP-MCNC: 3.5 G/DL (ref 3.5–5.2)
ALP SERPL-CCNC: 85 U/L (ref 55–135)
ALT SERPL W/O P-5'-P-CCNC: 21 U/L (ref 10–44)
ANION GAP SERPL CALC-SCNC: 11 MMOL/L (ref 8–16)
AST SERPL-CCNC: 24 U/L (ref 10–40)
BASOPHILS # BLD AUTO: 0.04 K/UL (ref 0–0.2)
BASOPHILS NFR BLD: 0.8 % (ref 0–1.9)
BILIRUB SERPL-MCNC: 0.1 MG/DL (ref 0.1–1)
BUN SERPL-MCNC: 10 MG/DL (ref 6–20)
CALCIUM SERPL-MCNC: 8.7 MG/DL (ref 8.7–10.5)
CHLORIDE SERPL-SCNC: 110 MMOL/L (ref 95–110)
CO2 SERPL-SCNC: 23 MMOL/L (ref 23–29)
CREAT SERPL-MCNC: 0.9 MG/DL (ref 0.5–1.4)
CRP SERPL-MCNC: 0.7 MG/L (ref 0–8.2)
DIFFERENTIAL METHOD: ABNORMAL
EOSINOPHIL # BLD AUTO: 0.2 K/UL (ref 0–0.5)
EOSINOPHIL NFR BLD: 4.3 % (ref 0–8)
ERYTHROCYTE [DISTWIDTH] IN BLOOD BY AUTOMATED COUNT: 15.9 % (ref 11.5–14.5)
ERYTHROCYTE [SEDIMENTATION RATE] IN BLOOD BY WESTERGREN METHOD: 15 MM/HR (ref 0–10)
EST. GFR  (AFRICAN AMERICAN): >60 ML/MIN/1.73 M^2
EST. GFR  (NON AFRICAN AMERICAN): >60 ML/MIN/1.73 M^2
GLUCOSE SERPL-MCNC: 89 MG/DL (ref 70–110)
HCT VFR BLD AUTO: 30 % (ref 40–54)
HGB BLD-MCNC: 9.1 G/DL (ref 14–18)
IMM GRANULOCYTES # BLD AUTO: 0.02 K/UL (ref 0–0.04)
IMM GRANULOCYTES NFR BLD AUTO: 0.4 % (ref 0–0.5)
LYMPHOCYTES # BLD AUTO: 1.8 K/UL (ref 1–4.8)
LYMPHOCYTES NFR BLD: 33.1 % (ref 18–48)
MCH RBC QN AUTO: 26.1 PG (ref 27–31)
MCHC RBC AUTO-ENTMCNC: 30.3 G/DL (ref 32–36)
MCV RBC AUTO: 86 FL (ref 82–98)
MONOCYTES # BLD AUTO: 0.4 K/UL (ref 0.3–1)
MONOCYTES NFR BLD: 8.3 % (ref 4–15)
NEUTROPHILS # BLD AUTO: 2.8 K/UL (ref 1.8–7.7)
NEUTROPHILS NFR BLD: 53.1 % (ref 38–73)
NRBC BLD-RTO: 0 /100 WBC
PLATELET # BLD AUTO: 224 K/UL (ref 150–350)
PMV BLD AUTO: 8.8 FL (ref 9.2–12.9)
POTASSIUM SERPL-SCNC: 4.4 MMOL/L (ref 3.5–5.1)
PROT SERPL-MCNC: 6.5 G/DL (ref 6–8.4)
RBC # BLD AUTO: 3.48 M/UL (ref 4.6–6.2)
SODIUM SERPL-SCNC: 144 MMOL/L (ref 136–145)
WBC # BLD AUTO: 5.31 K/UL (ref 3.9–12.7)

## 2020-09-13 PROCEDURE — 36415 COLL VENOUS BLD VENIPUNCTURE: CPT

## 2020-09-13 PROCEDURE — 96376 TX/PRO/DX INJ SAME DRUG ADON: CPT

## 2020-09-13 PROCEDURE — 63600175 PHARM REV CODE 636 W HCPCS: Performed by: EMERGENCY MEDICINE

## 2020-09-13 PROCEDURE — 85651 RBC SED RATE NONAUTOMATED: CPT

## 2020-09-13 PROCEDURE — 80053 COMPREHEN METABOLIC PANEL: CPT

## 2020-09-13 PROCEDURE — 96374 THER/PROPH/DIAG INJ IV PUSH: CPT

## 2020-09-13 PROCEDURE — 86140 C-REACTIVE PROTEIN: CPT

## 2020-09-13 PROCEDURE — 85025 COMPLETE CBC W/AUTO DIFF WBC: CPT

## 2020-09-13 RX ORDER — MORPHINE SULFATE 4 MG/ML
4 INJECTION, SOLUTION INTRAMUSCULAR; INTRAVENOUS
Status: COMPLETED | OUTPATIENT
Start: 2020-09-13 | End: 2020-09-13

## 2020-09-13 RX ADMIN — MORPHINE SULFATE 4 MG: 4 INJECTION INTRAVENOUS at 12:09

## 2020-09-13 RX ADMIN — MORPHINE SULFATE 4 MG: 4 INJECTION INTRAVENOUS at 02:09

## 2020-09-13 NOTE — ED TRIAGE NOTES
"Himanshu Rene Nikolas SLAUGHTER is here with left knee pain and :"fever" to it; S/P knee replacement and has appointment with MD on Monday.  "

## 2020-09-13 NOTE — ED PROVIDER NOTES
Encounter Date: 9/12/2020    SCRIBE #1 NOTE: INicolas am scribing for, and in the presence of, Marlon Blackburn MD.       History     Chief Complaint   Patient presents with    Knee Pain     and S/P knee replacement       Time seen by provider: 11:47 PM on 09/12/2020    Himanshu Connor II is a 55 y.o. male with PMHx of HTN and lumbago who presents to the ED with an onset of left knee pain and warmth beginning x2 days ago. The patient had a left knee arthroplasty on 8/4/2020. Recently, the patient was on antibiotics for left leg cellulitis, but finished his antibiotics x3 days ago. The patient is scheduled to see his orthopedics in x2 days. The patient endorses being on aspirin. The patient denies any other symptoms at this time. No other pertinent PSHx.      The history is provided by the patient.     Review of patient's allergies indicates:  No Known Allergies  Past Medical History:   Diagnosis Date    Alcohol abuse, unspecified     Allergic rhinitis, cause unspecified     Arthritis     Asthma attack     Carpal tunnel syndrome     Depressive disorder, not elsewhere classified     Diarrhea     Encounter for blood transfusion     GERD (gastroesophageal reflux disease)     Hypersomnia, unspecified     Hypertension     Hypoglycemia     Lumbago     Obesity, unspecified     JAH (obstructive sleep apnea)     uses BIPAP    Other and unspecified hyperlipidemia     Other ankle sprain and strain     Psychosexual dysfunction with inhibited sexual excitement     Ventral hernia, unspecified, without mention of obstruction or gangrene      Past Surgical History:   Procedure Laterality Date    A-scope knees      Bilateral    ARTHROSCOPIC REPAIR OF ROTATOR CUFF OF SHOULDER Left 9/20/2019    Procedure: REPAIR, ROTATOR CUFF, ARTHROSCOPIC;  Surgeon: Felix Levin MD;  Location: [facility];  Service: Orthopedics;  Laterality: Left;    ARTHROSCOPY OF SHOULDER WITH DECOMPRESSION OF SUBACROMIAL SPACE  Left 9/20/2019    Procedure: ARTHROSCOPY, SHOULDER, WITH SUBACROMIAL SPACE DECOMPRESSION;  Surgeon: Felix Levin MD;  Location: [facility] OR;  Service: Orthopedics;  Laterality: Left;  Jaspreet- Arthrex notified of all case today 9/16-tcb    CARPAL TUNNEL RELEASE      Bilateral    EYE SURGERY      Lasik    HERNIA REPAIR      KNEE ARTHROPLASTY Left 8/4/2020    Procedure: ARTHROPLASTY, KNEE;  Surgeon: Felix Levin MD;  Location: [facility] OR;  Service: Orthopedics;  Laterality: Left;    KNEE ARTHROSCOPY W/ MENISCECTOMY Left 10/15/2019    Procedure: ARTHROSCOPY, KNEE, WITH MENISCECTOMY;  Surgeon: Felix Levin MD;  Location: [facility] OR;  Service: Orthopedics;  Laterality: Left;  Medial and Lateral Meniscectomy    KNEE ARTHROSCOPY W/ MENISCECTOMY Right 11/22/2019    Procedure: ARTHROSCOPY, KNEE, WITH MENISCECTOMY;  Surgeon: Felix Levin MD;  Location: [facility] OR;  Service: Orthopedics;  Laterality: Right;    KNEE SURGERY      osmar    NISSEN FUNDOPLICATION      REVERSE TOTAL SHOULDER ARTHROPLASTY Left 2/11/2020    Procedure: ARTHROPLASTY, SHOULDER, TOTAL, REVERSE;  Surgeon: Felix Levin MD;  Location: [facility] OR;  Service: Orthopedics;  Laterality: Left;  [place]    ROTATOR CUFF REPAIR      right    SLEEVE GASTROPLASTY  12/2013     Family History   Problem Relation Age of Onset    Arthritis Mother     Hypertension Father     Kidney disease Father     Heart disease Father     COPD Father      Social History     Tobacco Use    Smoking status: Never Smoker    Smokeless tobacco: Former User     Types: Chew   Substance Use Topics    Alcohol use: Yes     Comment: occasional    Drug use: No     Review of Systems   Constitutional: Negative for chills and fever.   Gastrointestinal: Negative.    Musculoskeletal: Positive for arthralgias and joint swelling.   Skin: Negative.        Physical Exam     Initial Vitals [09/12/20 2337]   BP Pulse Resp Temp SpO2   (!) 142/87 94 18 98.3 °F  (36.8 °C) 100 %      MAP       --         Physical Exam    Nursing note and vitals reviewed.  Constitutional: He appears well-developed and well-nourished. He is not diaphoretic.  Non-toxic appearance. He does not have a sickly appearance. He does not appear ill. No distress.   HENT:   Head: Normocephalic and atraumatic.   Eyes: EOM are normal.   Neck: Normal range of motion. Neck supple. Normal range of motion present. No neck rigidity.   Cardiovascular: Intact distal pulses.   Pulmonary/Chest: No respiratory distress.   Musculoskeletal: Normal range of motion.      Comments: Small effusion of the left knee. Almost full ROM of the left knee.    Neurological: He is alert and oriented to person, place, and time.   Skin: Skin is warm and dry. No rash noted.   Total left knee arthroplasty scar is well appearing.   Psychiatric: He has a normal mood and affect. His behavior is normal. Judgment and thought content normal.         ED Course   Procedures  Labs Reviewed - No data to display       Imaging Results    None          Medical Decision Making:   History:   Old Medical Records: I decided to obtain old medical records.  Independently Interpreted Test(s):   I have ordered and independently interpreted X-rays - see prior notes.  Clinical Tests:   Lab Tests: Reviewed and Ordered  Radiological Study: Ordered and Reviewed            Scribe Attestation:   Scribe #1: I performed the above scribed service and the documentation accurately describes the services I performed. I attest to the accuracy of the note.    I, Dr. Blackburn, personally performed the services described in this documentation. All medical record entries made by the scribe were at my direction and in my presence.  I have reviewed the chart and agree that the record reflects my personal performance and is accurate and complete.3:32 AM 09/13/2020            ED Course as of Sep 13 0331   Sat Sep 12, 2020   2346 BP(!): 142/87 [EF]   2346 Temp: 98.3 °F (36.8 °C)  [EF]   2346 Pulse: 94 [EF]   2346 Temp src: Oral [EF]   2346 Resp: 18 [EF]   2346 SpO2: 100 % [EF]   Sun Sep 13, 2020   0112 WBC: 5.31 [EF]   0112 Hemoglobin(!): 9.1 [EF]   0112 Platelets: 224 [EF]   0128 CRP: 0.7 [EF]   0159 Sed Rate(!): 15 [EF]   0214 55-year-old 5 weeks out from a left total knee presents with pain and subjective warmth to the knee.  He does have a small joint effusion but has a full range of motion.  No fever here inflammatory markers are completely normal.  Patient can be discharged home.  Patient will be administered a 2nd dose of IV morphine and observed and then discharged home.  I do not think this represents a septic arthritis.   [EF]      ED Course User Index  [EF] Marlon Blackburn MD            Clinical Impression:       ICD-10-CM ICD-9-CM   1. Acute pain of left knee  M25.562 719.46   2. Knee pain  M25.569 719.46                                  Marlon Blackburn MD  09/13/20 0332

## 2020-09-14 ENCOUNTER — OFFICE VISIT (OUTPATIENT)
Dept: ORTHOPEDICS | Facility: CLINIC | Age: 55
End: 2020-09-14
Payer: OTHER GOVERNMENT

## 2020-09-14 VITALS — HEIGHT: 71 IN | WEIGHT: 220 LBS | BODY MASS INDEX: 30.8 KG/M2 | RESPIRATION RATE: 16 BRPM

## 2020-09-14 DIAGNOSIS — Z96.659 STATUS POST TOTAL KNEE REPLACEMENT, UNSPECIFIED LATERALITY: ICD-10-CM

## 2020-09-14 DIAGNOSIS — Z96.652 STATUS POST TOTAL LEFT KNEE REPLACEMENT USING CEMENT: Primary | ICD-10-CM

## 2020-09-14 PROCEDURE — 99024 POSTOP FOLLOW-UP VISIT: CPT | Mod: ,,, | Performed by: ORTHOPAEDIC SURGERY

## 2020-09-14 PROCEDURE — 99999 PR PBB SHADOW E&M-EST. PATIENT-LVL III: CPT | Mod: PBBFAC,,, | Performed by: ORTHOPAEDIC SURGERY

## 2020-09-14 PROCEDURE — 99213 OFFICE O/P EST LOW 20 MIN: CPT | Mod: PBBFAC,PN | Performed by: ORTHOPAEDIC SURGERY

## 2020-09-14 PROCEDURE — 99024 PR POST-OP FOLLOW-UP VISIT: ICD-10-PCS | Mod: ,,, | Performed by: ORTHOPAEDIC SURGERY

## 2020-09-14 PROCEDURE — 99999 PR PBB SHADOW E&M-EST. PATIENT-LVL III: ICD-10-PCS | Mod: PBBFAC,,, | Performed by: ORTHOPAEDIC SURGERY

## 2020-09-14 RX ORDER — OXYCODONE AND ACETAMINOPHEN 10; 325 MG/1; MG/1
1 TABLET ORAL EVERY 4 HOURS PRN
Qty: 60 TABLET | Refills: 0 | Status: SHIPPED | OUTPATIENT
Start: 2020-09-14 | End: 2020-09-23 | Stop reason: SDUPTHER

## 2020-09-14 RX ORDER — OXYCODONE AND ACETAMINOPHEN 10; 325 MG/1; MG/1
1 TABLET ORAL EVERY 4 HOURS PRN
Qty: 60 TABLET | Refills: 0 | Status: SHIPPED | OUTPATIENT
Start: 2020-09-14 | End: 2020-09-14 | Stop reason: SDUPTHER

## 2020-09-14 NOTE — PROGRESS NOTES
Past Medical History:   Diagnosis Date    Alcohol abuse, unspecified     Allergic rhinitis, cause unspecified     Arthritis     Asthma attack     Carpal tunnel syndrome     Depressive disorder, not elsewhere classified     Diarrhea     Encounter for blood transfusion     GERD (gastroesophageal reflux disease)     Hypersomnia, unspecified     Hypertension     Hypoglycemia     Lumbago     Obesity, unspecified     JAH (obstructive sleep apnea)     uses BIPAP    Other and unspecified hyperlipidemia     Other ankle sprain and strain     Psychosexual dysfunction with inhibited sexual excitement     Ventral hernia, unspecified, without mention of obstruction or gangrene        Past Surgical History:   Procedure Laterality Date    A-scope knees      Bilateral    ARTHROSCOPIC REPAIR OF ROTATOR CUFF OF SHOULDER Left 9/20/2019    Procedure: REPAIR, ROTATOR CUFF, ARTHROSCOPIC;  Surgeon: Felix Levin MD;  Location: [facility] OR;  Service: Orthopedics;  Laterality: Left;    ARTHROSCOPY OF SHOULDER WITH DECOMPRESSION OF SUBACROMIAL SPACE Left 9/20/2019    Procedure: ARTHROSCOPY, SHOULDER, WITH SUBACROMIAL SPACE DECOMPRESSION;  Surgeon: Felix Levin MD;  Location: [facility] OR;  Service: Orthopedics;  Laterality: Left;  Jaspreet- Arthrex notified of all case today 9/16-tcb    CARPAL TUNNEL RELEASE      Bilateral    EYE SURGERY      Lasik    HERNIA REPAIR      KNEE ARTHROPLASTY Left 8/4/2020    Procedure: ARTHROPLASTY, KNEE;  Surgeon: Felix Levin MD;  Location: [facility] OR;  Service: Orthopedics;  Laterality: Left;    KNEE ARTHROSCOPY W/ MENISCECTOMY Left 10/15/2019    Procedure: ARTHROSCOPY, KNEE, WITH MENISCECTOMY;  Surgeon: Felix Levin MD;  Location: [facility] OR;  Service: Orthopedics;  Laterality: Left;  Medial and Lateral Meniscectomy    KNEE ARTHROSCOPY W/ MENISCECTOMY Right 11/22/2019    Procedure: ARTHROSCOPY, KNEE, WITH MENISCECTOMY;  Surgeon: Felix Levin MD;   Location: [facility] OR;  Service: Orthopedics;  Laterality: Right;    KNEE SURGERY      osmar    NISSEN FUNDOPLICATION      REVERSE TOTAL SHOULDER ARTHROPLASTY Left 2/11/2020    Procedure: ARTHROPLASTY, SHOULDER, TOTAL, REVERSE;  Surgeon: Felix Levin MD;  Location: [facility] OR;  Service: Orthopedics;  Laterality: Left;  [place]    ROTATOR CUFF REPAIR      right    SLEEVE GASTROPLASTY  12/2013       Current Outpatient Medications   Medication Sig    aspirin 325 MG tablet Take 1 tablet (325 mg total) by mouth 2 (two) times a day.    cyanocobalamin (VITAMIN B-12) 1000 MCG tablet Take 1,000 mcg by mouth once daily.     cyclobenzaprine (FLEXERIL) 10 MG tablet Take 10 mg by mouth 3 (three) times daily as needed for Muscle spasms.    diclofenac sodium (VOLTAREN) 1 % Gel Apply topically daily as needed.    escitalopram oxalate (LEXAPRO) 20 MG tablet Take 20 mg by mouth once daily.    gabapentin (NEURONTIN) 300 MG capsule Take 300 mg by mouth every evening.    HYDROmorphone (DILAUDID) 4 MG tablet Take 1 tablet (4 mg total) by mouth every 4 (four) hours as needed for Pain.    hydrOXYzine HCl (ATARAX) 25 MG tablet Take 25 mg by mouth 3 (three) times daily.    lidocaine (LIDODERM) 5 % Place 1 patch onto the skin every evening. Remove & Discard patch within 12 hours or as directed by MD    losartan (COZAAR) 25 MG tablet Take 25 mg by mouth once daily.    mirtazapine (REMERON) 30 MG tablet Take 30 mg by mouth every evening.    pantoprazole (PROTONIX) 40 MG tablet TK ONE  T PO D    sucralfate (CARAFATE) 1 gram tablet TK 1 T PO QID 1 HOUR BEFORE MEALS AND 1 T QHS OES    vitamin D (VITAMIN D3) 1000 units Tab Take 2,000 Units by mouth once daily.    zolpidem (AMBIEN) 5 MG Tab Take 1 tablet (5 mg total) by mouth nightly as needed.    oxyCODONE-acetaminophen (PERCOCET)  mg per tablet Take 1 tablet by mouth every 4 (four) hours as needed for Pain.     No current facility-administered medications for this  visit.        Review of patient's allergies indicates:  No Known Allergies    Family History   Problem Relation Age of Onset    Arthritis Mother     Hypertension Father     Kidney disease Father     Heart disease Father     COPD Father        Social History     Socioeconomic History    Marital status:      Spouse name: Not on file    Number of children: Not on file    Years of education: Not on file    Highest education level: Not on file   Occupational History    Not on file   Social Needs    Financial resource strain: Not on file    Food insecurity     Worry: Not on file     Inability: Not on file    Transportation needs     Medical: Not on file     Non-medical: Not on file   Tobacco Use    Smoking status: Never Smoker    Smokeless tobacco: Former User     Types: Chew   Substance and Sexual Activity    Alcohol use: Yes     Comment: occasional    Drug use: No    Sexual activity: Not on file   Lifestyle    Physical activity     Days per week: Not on file     Minutes per session: Not on file    Stress: Not on file   Relationships    Social connections     Talks on phone: Not on file     Gets together: Not on file     Attends Zoroastrianism service: Not on file     Active member of club or organization: Not on file     Attends meetings of clubs or organizations: Not on file     Relationship status: Not on file   Other Topics Concern    Not on file   Social History Narrative    Not on file       Chief Complaint:   Chief Complaint   Patient presents with    Post-op Evaluation     S/P TKA 8/4/20        Date of surgery:  August 4, 2020    History of present illness:  This is a 55-year-old male underwent left total knee arthroplasty about 6 weeks ago.  Pain is 8/10.     Review of Systems:    Musculoskeletal:  See HPI        Physical Examination:    Vital Signs:    Vitals:    09/14/20 0820   Resp: 16       Body mass index is 30.68 kg/m².    This a well-developed, well nourished patient in no acute  distress.  They are alert and oriented and cooperative to examination.  Pt. walks without an antalgic gait.      Examination left knee shows well-healed surgical incision.  No erythema or drainage. Range of motion about 0-115 degrees.    X-rays:  Two views left knee are ordered and reviewed which show well-aligned total knee arthroplasty components without complication     Assessment::  Status post left [place] CR total knee arthroplasty      Plan:  Reviewed the finding with him today.  Continue the physical therapy.Follow up in 6 weeks    This note was created using M Modal voice recognition software that occasionally misinterpreted phrases or words.

## 2020-09-23 DIAGNOSIS — Z96.659 STATUS POST TOTAL KNEE REPLACEMENT, UNSPECIFIED LATERALITY: ICD-10-CM

## 2020-09-23 NOTE — TELEPHONE ENCOUNTER
----- Message from Hemanth Burroughs sent at 9/23/2020  2:12 PM CDT -----  Regarding: refill  Contact: pt  [phone]  Refill  oxyCODONE-acetaminophen (PERCOCET)  mg per tablet    [facility] [place] [address] [phone] (Phone)  [phone] (Fax)

## 2020-09-24 RX ORDER — OXYCODONE AND ACETAMINOPHEN 10; 325 MG/1; MG/1
1 TABLET ORAL EVERY 4 HOURS PRN
Qty: 60 TABLET | Refills: 0 | Status: SHIPPED | OUTPATIENT
Start: 2020-09-24 | End: 2020-10-02 | Stop reason: SDUPTHER

## 2020-09-25 ENCOUNTER — TELEPHONE (OUTPATIENT)
Dept: ORTHOPEDICS | Facility: CLINIC | Age: 55
End: 2020-09-25

## 2020-09-25 NOTE — TELEPHONE ENCOUNTER
----- Message from Hemanth Burroughs sent at 9/25/2020  2:08 PM CDT -----  Regarding: Medication change and PT  Contact: pt   call

## 2020-09-25 NOTE — TELEPHONE ENCOUNTER
Patient called and wanted to know if you can increase his dosage of Ambien. He is also requesting a refill for a 1 month supply. Please advise.

## 2020-09-28 ENCOUNTER — TELEPHONE (OUTPATIENT)
Dept: ORTHOPEDICS | Facility: CLINIC | Age: 55
End: 2020-09-28

## 2020-09-28 NOTE — TELEPHONE ENCOUNTER
Called and spoke with patient and he stated that he is having trouble with approval for his PT. He gave me a number to his PT place to see if there is something that we can do to help her get the authorization.   Called and spoke with Lexii [phone] and she advised that she is already working with [facility] to get [facility]. She stated that she will contact us if she needs anything further.

## 2020-09-28 NOTE — TELEPHONE ENCOUNTER
----- Message from Ariadna Maravilla MA sent at 9/28/2020  3:46 PM CDT -----  Contact: pt  Wants call back for L knee, L shoulder PT   Call back

## 2020-10-01 ENCOUNTER — TELEPHONE (OUTPATIENT)
Dept: ORTHOPEDICS | Facility: CLINIC | Age: 55
End: 2020-10-01

## 2020-10-01 NOTE — TELEPHONE ENCOUNTER
"----- Message from Ariadna Maravilla MA sent at 10/1/2020  1:52 PM CDT -----  Contact: pt  PT orders,   "jim knows where to send"   no more information could be obtained   Call back      "

## 2020-10-02 ENCOUNTER — TELEPHONE (OUTPATIENT)
Dept: ORTHOPEDICS | Facility: CLINIC | Age: 55
End: 2020-10-02

## 2020-10-02 DIAGNOSIS — Z96.659 STATUS POST TOTAL KNEE REPLACEMENT, UNSPECIFIED LATERALITY: ICD-10-CM

## 2020-10-02 RX ORDER — OXYCODONE AND ACETAMINOPHEN 10; 325 MG/1; MG/1
1 TABLET ORAL EVERY 4 HOURS PRN
Qty: 60 TABLET | Refills: 0 | Status: SHIPPED | OUTPATIENT
Start: 2020-10-02 | End: 2020-10-12 | Stop reason: SDUPTHER

## 2020-10-02 RX ORDER — OXYCODONE AND ACETAMINOPHEN 10; 325 MG/1; MG/1
1 TABLET ORAL EVERY 4 HOURS PRN
Qty: 60 TABLET | Refills: 0 | Status: SHIPPED | OUTPATIENT
Start: 2020-10-02 | End: 2020-10-02 | Stop reason: SDUPTHER

## 2020-10-02 NOTE — TELEPHONE ENCOUNTER
----- Message from Hemanth Burroughs sent at 10/2/2020 12:08 PM CDT -----  Regarding: Sent Rx to wrong pharmacy  Contact: pt   call

## 2020-10-02 NOTE — TELEPHONE ENCOUNTER
Called and spoke with patient and advised that his RX has already been cancelled at [facility] and sent to the correct pharmacy. He verbalized understanding.

## 2020-10-02 NOTE — TELEPHONE ENCOUNTER
----- Message from Hemanth Burroughs sent at 10/2/2020  9:48 AM CDT -----  Regarding: refill and PT  Contact: pt   Call about PT      Refill  oxyCODONE-acetaminophen (PERCOCET)  mg per tablet    [facility]  [address] [phone] (Phone)  [phone] (Fax)

## 2020-10-07 ENCOUNTER — TELEPHONE (OUTPATIENT)
Dept: ORTHOPEDICS | Facility: CLINIC | Age: 55
End: 2020-10-07

## 2020-10-07 NOTE — TELEPHONE ENCOUNTER
----- Message from Ariadna Maravilla MA sent at 10/7/2020  1:14 PM CDT -----  Contact: shonna Guadalupe for PT has been sent today    [phone]        Call back

## 2020-10-12 DIAGNOSIS — Z96.659 STATUS POST TOTAL KNEE REPLACEMENT, UNSPECIFIED LATERALITY: ICD-10-CM

## 2020-10-12 RX ORDER — OXYCODONE AND ACETAMINOPHEN 10; 325 MG/1; MG/1
1 TABLET ORAL EVERY 4 HOURS PRN
Qty: 60 TABLET | Refills: 0 | Status: SHIPPED | OUTPATIENT
Start: 2020-10-12 | End: 2020-10-20 | Stop reason: SDUPTHER

## 2020-10-12 NOTE — TELEPHONE ENCOUNTER
----- Message from Hemanth Burroughs sent at 10/12/2020 11:34 AM CDT -----  Regarding: refill  PLEASE SEE PHARMACY BELOW  Contact: pt   Refill  oxyCODONE-acetaminophen (PERCOCET)  mg per tablet    [facility] [address] [phone] (Phone)  [phone] (Fax)

## 2020-10-19 ENCOUNTER — OFFICE VISIT (OUTPATIENT)
Dept: ORTHOPEDICS | Facility: CLINIC | Age: 55
End: 2020-10-19
Payer: OTHER GOVERNMENT

## 2020-10-19 VITALS — RESPIRATION RATE: 16 BRPM | WEIGHT: 220 LBS | HEIGHT: 71 IN | BODY MASS INDEX: 30.8 KG/M2

## 2020-10-19 DIAGNOSIS — Z96.652 STATUS POST TOTAL LEFT KNEE REPLACEMENT USING CEMENT: Primary | ICD-10-CM

## 2020-10-19 PROCEDURE — 99024 POSTOP FOLLOW-UP VISIT: CPT | Mod: ,,, | Performed by: ORTHOPAEDIC SURGERY

## 2020-10-19 PROCEDURE — 99024 PR POST-OP FOLLOW-UP VISIT: ICD-10-PCS | Mod: ,,, | Performed by: ORTHOPAEDIC SURGERY

## 2020-10-19 PROCEDURE — 99999 PR PBB SHADOW E&M-EST. PATIENT-LVL III: ICD-10-PCS | Mod: PBBFAC,,, | Performed by: ORTHOPAEDIC SURGERY

## 2020-10-19 PROCEDURE — 99213 OFFICE O/P EST LOW 20 MIN: CPT | Mod: PBBFAC,PN | Performed by: ORTHOPAEDIC SURGERY

## 2020-10-19 PROCEDURE — 99999 PR PBB SHADOW E&M-EST. PATIENT-LVL III: CPT | Mod: PBBFAC,,, | Performed by: ORTHOPAEDIC SURGERY

## 2020-10-19 NOTE — LETTER
October 19, 2020      Yessica Avalos MD  [address] [facility]  [facility] [address]           [facility] [place] [address] [place] [address]  Phone: [phone]          Patient: Himanshu Connor II   MR Number: [id number]   YOB: 1965   Date of Visit: 10/19/2020       Dear Dr. Yessica Avalos:    Thank you for referring Himanshu Connor to me for evaluation. Attached you will find relevant portions of my assessment and plan of care.    If you have questions, please do not hesitate to call me. I look forward to following Himanshu Connor along with you.    Sincerely,    Felix Levin MD    Enclosure  CC:  No Recipients    If you would like to receive this communication electronically, please contact [email or web address] or [phone] to request more information on Pay with a Tweet Link access.    For providers and/or their staff who would like to refer a patient to [facility], please contact us through our one-stop-shop provider referral line, [facility], at [phone].    If you feel you have received this communication in error or would no longer like to receive these types of communications, please e-mail [email or web address]

## 2020-10-19 NOTE — PROGRESS NOTES
Past Medical History:   Diagnosis Date    Alcohol abuse, unspecified     Allergic rhinitis, cause unspecified     Arthritis     Asthma attack     Carpal tunnel syndrome     Depressive disorder, not elsewhere classified     Diarrhea     Encounter for blood transfusion     GERD (gastroesophageal reflux disease)     Hypersomnia, unspecified     Hypertension     Hypoglycemia     Lumbago     Obesity, unspecified     JAH (obstructive sleep apnea)     uses BIPAP    Other and unspecified hyperlipidemia     Other ankle sprain and strain     Psychosexual dysfunction with inhibited sexual excitement     Ventral hernia, unspecified, without mention of obstruction or gangrene        Past Surgical History:   Procedure Laterality Date    A-scope knees      Bilateral    ARTHROSCOPIC REPAIR OF ROTATOR CUFF OF SHOULDER Left 9/20/2019    Procedure: REPAIR, ROTATOR CUFF, ARTHROSCOPIC;  Surgeon: Felix Levin MD;  Location: [facility] OR;  Service: Orthopedics;  Laterality: Left;    ARTHROSCOPY OF SHOULDER WITH DECOMPRESSION OF SUBACROMIAL SPACE Left 9/20/2019    Procedure: ARTHROSCOPY, SHOULDER, WITH SUBACROMIAL SPACE DECOMPRESSION;  Surgeon: Felix Levin MD;  Location: [facility] OR;  Service: Orthopedics;  Laterality: Left;  Jaspreet- Arthrex notified of all case today 9/16-tcb    CARPAL TUNNEL RELEASE      Bilateral    EYE SURGERY      Lasik    HERNIA REPAIR      KNEE ARTHROPLASTY Left 8/4/2020    Procedure: ARTHROPLASTY, KNEE;  Surgeon: Felix Levin MD;  Location: [facility] OR;  Service: Orthopedics;  Laterality: Left;    KNEE ARTHROSCOPY W/ MENISCECTOMY Left 10/15/2019    Procedure: ARTHROSCOPY, KNEE, WITH MENISCECTOMY;  Surgeon: Felix Levin MD;  Location: [facility] OR;  Service: Orthopedics;  Laterality: Left;  Medial and Lateral Meniscectomy    KNEE ARTHROSCOPY W/ MENISCECTOMY Right 11/22/2019    Procedure: ARTHROSCOPY, KNEE, WITH MENISCECTOMY;  Surgeon: Felix Levin MD;   Location: [facility] OR;  Service: Orthopedics;  Laterality: Right;    KNEE SURGERY      osmar    NISSEN FUNDOPLICATION      REVERSE TOTAL SHOULDER ARTHROPLASTY Left 2/11/2020    Procedure: ARTHROPLASTY, SHOULDER, TOTAL, REVERSE;  Surgeon: Felix Levin MD;  Location: [facility] OR;  Service: Orthopedics;  Laterality: Left;  [place]    ROTATOR CUFF REPAIR      right    SLEEVE GASTROPLASTY  12/2013       Current Outpatient Medications   Medication Sig    aspirin 325 MG tablet Take 1 tablet (325 mg total) by mouth 2 (two) times a day.    cyanocobalamin (VITAMIN B-12) 1000 MCG tablet Take 1,000 mcg by mouth once daily.     cyclobenzaprine (FLEXERIL) 10 MG tablet Take 10 mg by mouth 3 (three) times daily as needed for Muscle spasms.    diclofenac sodium (VOLTAREN) 1 % Gel Apply topically daily as needed.    escitalopram oxalate (LEXAPRO) 20 MG tablet Take 20 mg by mouth once daily.    gabapentin (NEURONTIN) 300 MG capsule Take 300 mg by mouth every evening.    HYDROmorphone (DILAUDID) 4 MG tablet Take 1 tablet (4 mg total) by mouth every 4 (four) hours as needed for Pain.    hydrOXYzine HCl (ATARAX) 25 MG tablet Take 25 mg by mouth 3 (three) times daily.    lidocaine (LIDODERM) 5 % Place 1 patch onto the skin every evening. Remove & Discard patch within 12 hours or as directed by MD    losartan (COZAAR) 25 MG tablet Take 25 mg by mouth once daily.    mirtazapine (REMERON) 30 MG tablet Take 30 mg by mouth every evening.    oxyCODONE-acetaminophen (PERCOCET)  mg per tablet Take 1 tablet by mouth every 4 (four) hours as needed for Pain.    pantoprazole (PROTONIX) 40 MG tablet TK ONE  T PO D    sucralfate (CARAFATE) 1 gram tablet TK 1 T PO QID 1 HOUR BEFORE MEALS AND 1 T QHS OES    vitamin D (VITAMIN D3) 1000 units Tab Take 2,000 Units by mouth once daily.    zolpidem (AMBIEN) 5 MG Tab Take 1 tablet (5 mg total) by mouth nightly as needed.     No current facility-administered medications for this  visit.        Review of patient's allergies indicates:  No Known Allergies    Family History   Problem Relation Age of Onset    Arthritis Mother     Hypertension Father     Kidney disease Father     Heart disease Father     COPD Father        Social History     Socioeconomic History    Marital status:      Spouse name: Not on file    Number of children: Not on file    Years of education: Not on file    Highest education level: Not on file   Occupational History    Not on file   Social Needs    Financial resource strain: Not on file    Food insecurity     Worry: Not on file     Inability: Not on file    Transportation needs     Medical: Not on file     Non-medical: Not on file   Tobacco Use    Smoking status: Never Smoker    Smokeless tobacco: Former User     Types: Chew   Substance and Sexual Activity    Alcohol use: Yes     Comment: occasional    Drug use: No    Sexual activity: Not on file   Lifestyle    Physical activity     Days per week: Not on file     Minutes per session: Not on file    Stress: Not on file   Relationships    Social connections     Talks on phone: Not on file     Gets together: Not on file     Attends Christianity service: Not on file     Active member of club or organization: Not on file     Attends meetings of clubs or organizations: Not on file     Relationship status: Not on file   Other Topics Concern    Not on file   Social History Narrative    Not on file       Chief Complaint:   Chief Complaint   Patient presents with    Post-op Evaluation     s/p left knee TKA 8/4/20       Date of surgery:  August 4, 2020    History of present illness:  This is a 55-year-old male underwent left total knee arthroplasty about 2 months ago.  He is doing very well in regards to range of motion.  Has not done physical therapy yet due to [facility] issues.  Pain is a 6/10.  Mostly over the IT band.    Review of Systems:    Musculoskeletal:  See HPI        Physical Examination:    Vital  Signs:    Vitals:    10/19/20 0805   Resp: 16       Body mass index is 30.68 kg/m².    This a well-developed, well nourished patient in no acute distress.  They are alert and oriented and cooperative to examination.  Pt. walks without an antalgic gait.      Examination left knee shows well-healed surgical incision.  No erythema or drainage. Range of motion about 0-130 degrees.  Stable to varus and valgus stress.  Negative drawer exam.    X-rays:  Two views left knee are  reviewed which show well-aligned total knee arthroplasty components without complication     Assessment::  Status post left [place] CR total knee arthroplasty      Plan:  Reviewed the finding with him today.  He is doing great.  He has an even started therapy yet.  He is supposed to start this week.  I will see him back in 2 months with four views of both knees.    This note was created using M Modal voice recognition software that occasionally misinterpreted phrases or words.

## 2020-10-20 DIAGNOSIS — Z96.659 STATUS POST TOTAL KNEE REPLACEMENT, UNSPECIFIED LATERALITY: ICD-10-CM

## 2020-10-20 RX ORDER — OXYCODONE AND ACETAMINOPHEN 10; 325 MG/1; MG/1
1 TABLET ORAL EVERY 4 HOURS PRN
Qty: 60 TABLET | Refills: 0 | Status: SHIPPED | OUTPATIENT
Start: 2020-10-20 | End: 2020-10-29 | Stop reason: SDUPTHER

## 2020-10-20 NOTE — TELEPHONE ENCOUNTER
----- Message from Ariadna Maravilla MA sent at 10/20/2020 12:54 PM CDT -----  Contact: pt  Refill   The clinic pharmacy in [place]   Call back

## 2020-10-29 DIAGNOSIS — Z96.659 STATUS POST TOTAL KNEE REPLACEMENT, UNSPECIFIED LATERALITY: ICD-10-CM

## 2020-10-29 RX ORDER — OXYCODONE AND ACETAMINOPHEN 10; 325 MG/1; MG/1
1 TABLET ORAL EVERY 4 HOURS PRN
Qty: 60 TABLET | Refills: 0 | Status: SHIPPED | OUTPATIENT
Start: 2020-10-29 | End: 2020-11-06 | Stop reason: SDUPTHER

## 2020-10-29 NOTE — TELEPHONE ENCOUNTER
----- Message from Ariadna Maravilla MA sent at 10/29/2020  8:25 AM CDT -----  Contact: pt  Pharmacy clinic in [place]   Call back

## 2020-11-06 DIAGNOSIS — Z96.659 STATUS POST TOTAL KNEE REPLACEMENT, UNSPECIFIED LATERALITY: ICD-10-CM

## 2020-11-06 NOTE — TELEPHONE ENCOUNTER
----- Message from Ariadna Maravilla MA sent at 11/6/2020  1:28 PM CST -----  Contact: pt  Refill pain med   Pharmacy   the clinic in [facility]   Call back

## 2020-11-09 RX ORDER — OXYCODONE AND ACETAMINOPHEN 10; 325 MG/1; MG/1
1 TABLET ORAL EVERY 4 HOURS PRN
Qty: 60 TABLET | Refills: 0 | Status: SHIPPED | OUTPATIENT
Start: 2020-11-09 | End: 2020-11-18 | Stop reason: SDUPTHER

## 2020-11-18 DIAGNOSIS — Z96.659 STATUS POST TOTAL KNEE REPLACEMENT, UNSPECIFIED LATERALITY: ICD-10-CM

## 2020-11-18 RX ORDER — OXYCODONE AND ACETAMINOPHEN 10; 325 MG/1; MG/1
1 TABLET ORAL EVERY 4 HOURS PRN
Qty: 60 TABLET | Refills: 0 | Status: SHIPPED | OUTPATIENT
Start: 2020-11-18 | End: 2020-11-18 | Stop reason: SDUPTHER

## 2020-11-18 RX ORDER — OXYCODONE AND ACETAMINOPHEN 10; 325 MG/1; MG/1
1 TABLET ORAL EVERY 4 HOURS PRN
Qty: 60 TABLET | Refills: 0 | Status: SHIPPED | OUTPATIENT
Start: 2020-11-18 | End: 2020-11-27 | Stop reason: SDUPTHER

## 2020-11-18 NOTE — TELEPHONE ENCOUNTER
----- Message from Hemanth Burroughs sent at 11/18/2020 10:48 AM CST -----  Regarding: refill  Contact: pt   Refill  oxyCODONE-acetaminophen (PERCOCET)  mg per tablet    [facility] Pharm[facility] [place] [id number] [phone] (Phone)  [phone] (Fax)

## 2020-11-27 DIAGNOSIS — Z96.659 STATUS POST TOTAL KNEE REPLACEMENT, UNSPECIFIED LATERALITY: ICD-10-CM

## 2020-11-27 RX ORDER — SILDENAFIL CITRATE 100 MG/1
TABLET, FILM COATED ORAL
COMMUNITY
Start: 2020-09-30 | End: 2021-10-20

## 2020-11-27 NOTE — TELEPHONE ENCOUNTER
Last fill 11/18/20 #60. DOS 08/04/20. Please review/sign if agreeable. Patient contacted, is aware it will be addressed 11/30/20. Syeda Watts LPN

## 2020-11-27 NOTE — TELEPHONE ENCOUNTER
----- Message from Hemanth Burroughs sent at 11/27/2020 11:39 AM CST -----  Regarding: refill  new pharmacy  Contact: pt   Refill  oxyCODONE-acetaminophen (PERCOCET)  mg per tablet    [facility]inic Pharm[facility] [phone] (Phone)  [phone] (Fax)

## 2020-11-30 ENCOUNTER — TELEPHONE (OUTPATIENT)
Dept: ORTHOPEDICS | Facility: CLINIC | Age: 55
End: 2020-11-30

## 2020-11-30 DIAGNOSIS — Z96.659 STATUS POST TOTAL KNEE REPLACEMENT, UNSPECIFIED LATERALITY: ICD-10-CM

## 2020-11-30 RX ORDER — OXYCODONE AND ACETAMINOPHEN 10; 325 MG/1; MG/1
1 TABLET ORAL EVERY 4 HOURS PRN
Qty: 60 TABLET | Refills: 0 | Status: SHIPPED | OUTPATIENT
Start: 2020-11-30 | End: 2020-11-30 | Stop reason: SDUPTHER

## 2020-11-30 RX ORDER — OXYCODONE AND ACETAMINOPHEN 10; 325 MG/1; MG/1
1 TABLET ORAL EVERY 4 HOURS PRN
Qty: 60 TABLET | Refills: 0 | Status: SHIPPED | OUTPATIENT
Start: 2020-11-30 | End: 2020-12-09 | Stop reason: ALTCHOICE

## 2020-11-30 NOTE — TELEPHONE ENCOUNTER
Patient called and stated that he would like to see if he can start getting [facility]. Advised that for further refills he can get this changed. He verbalized understanding.

## 2020-11-30 NOTE — TELEPHONE ENCOUNTER
----- Message from Ariadna Maravilla MA sent at 11/30/2020  9:38 AM CST -----  Contact: pt  Medication sent to wrong pharmacy   The clinic pharmacy in [facility]     Call back

## 2020-11-30 NOTE — TELEPHONE ENCOUNTER
----- Message from Ariadna Maravilla MA sent at 11/30/2020  1:27 PM CST -----  Contact: pt  [facility] refill thru [facility]     Call back

## 2020-12-03 ENCOUNTER — TELEPHONE (OUTPATIENT)
Dept: ORTHOPEDICS | Facility: CLINIC | Age: 55
End: 2020-12-03

## 2020-12-03 NOTE — TELEPHONE ENCOUNTER
----- Message from Hemanth Burroughs sent at 12/3/2020 10:45 AM CST -----  Regarding: Medication issues  Contact: pt

## 2020-12-03 NOTE — TELEPHONE ENCOUNTER
Patient would like to see if he can get [facility]. Advised that we will look into how to send this and contact him back. He verbalized understanding.

## 2020-12-04 ENCOUNTER — TELEPHONE (OUTPATIENT)
Dept: ORTHOPEDICS | Facility: CLINIC | Age: 55
End: 2020-12-04

## 2020-12-04 NOTE — TELEPHONE ENCOUNTER
Called and left message advising patient that I have [facility] [place]. The pharmacy stated that they do not except controlled substance RX's unless it is hand written and dropped off to them in person. Advised him to contact us back with any questions.

## 2020-12-08 DIAGNOSIS — Z96.652 STATUS POST TOTAL LEFT KNEE REPLACEMENT USING CEMENT: Primary | ICD-10-CM

## 2020-12-08 NOTE — TELEPHONE ENCOUNTER
----- Message from Hemanth Burroughs sent at 12/8/2020  2:46 PM CST -----  Regarding: Refill     ---      NEW PHARMACY  Contact: [phone]  Refill  oxyCODONE-acetaminophen (PERCOCET)  mg per tablet    [facility]   [phone]

## 2020-12-09 DIAGNOSIS — Z96.652 STATUS POST TOTAL LEFT KNEE REPLACEMENT USING CEMENT: ICD-10-CM

## 2020-12-09 RX ORDER — OXYCODONE AND ACETAMINOPHEN 5; 325 MG/1; MG/1
1 TABLET ORAL EVERY 4 HOURS PRN
Qty: 28 TABLET | Refills: 0 | Status: SHIPPED | OUTPATIENT
Start: 2020-12-09 | End: 2020-12-16 | Stop reason: SDUPTHER

## 2020-12-09 RX ORDER — OXYCODONE AND ACETAMINOPHEN 5; 325 MG/1; MG/1
1 TABLET ORAL EVERY 4 HOURS PRN
Qty: 28 TABLET | Refills: 0 | Status: SHIPPED | OUTPATIENT
Start: 2020-12-09 | End: 2020-12-09 | Stop reason: SDUPTHER

## 2020-12-09 NOTE — TELEPHONE ENCOUNTER
----- Message from Hemanth Burroughs sent at 12/9/2020  1:44 PM CST -----  Regarding: needs to speak to staff  Contact: pt   Please call ASAP

## 2020-12-10 DIAGNOSIS — M25.569 KNEE PAIN, UNSPECIFIED CHRONICITY, UNSPECIFIED LATERALITY: Primary | ICD-10-CM

## 2020-12-16 ENCOUNTER — TELEPHONE (OUTPATIENT)
Dept: ORTHOPEDICS | Facility: CLINIC | Age: 55
End: 2020-12-16

## 2020-12-16 DIAGNOSIS — Z96.652 STATUS POST TOTAL LEFT KNEE REPLACEMENT USING CEMENT: ICD-10-CM

## 2020-12-16 RX ORDER — OXYCODONE AND ACETAMINOPHEN 5; 325 MG/1; MG/1
1 TABLET ORAL EVERY 4 HOURS PRN
Qty: 28 TABLET | Refills: 0 | Status: CANCELLED | OUTPATIENT
Start: 2020-12-16

## 2020-12-16 RX ORDER — OXYCODONE AND ACETAMINOPHEN 5; 325 MG/1; MG/1
1 TABLET ORAL EVERY 4 HOURS PRN
Qty: 40 TABLET | Refills: 0 | Status: SHIPPED | OUTPATIENT
Start: 2020-12-16 | End: 2020-12-28 | Stop reason: SDUPTHER

## 2020-12-16 NOTE — TELEPHONE ENCOUNTER
Called and left message advising patient that Dr. Levin has approved his medication refill for 40 tablets of the Percocet 5/325mg. I also advised that per Dr. Levin he is going to start weaning him off the narcotic medication. If he feels that he needs to continue with this much medication he will need to be sent to a pain management MD.

## 2020-12-16 NOTE — TELEPHONE ENCOUNTER
----- Message from Hemanth Burroughs sent at 12/16/2020 10:54 AM CST -----  Regarding: refill Please call pt  Contact: pt [phone]  Refill oxyCODONE-acetaminophen (PERCOCET) 5-325 mg per tablet

## 2020-12-16 NOTE — TELEPHONE ENCOUNTER
Last refill 12/9/20 # 28 of Percocet 5/325mg tabs. He is asking to increase the quantity. Please advise,    Thanks!

## 2020-12-28 ENCOUNTER — TELEPHONE (OUTPATIENT)
Dept: ORTHOPEDICS | Facility: CLINIC | Age: 55
End: 2020-12-28

## 2020-12-28 DIAGNOSIS — Z96.652 STATUS POST TOTAL LEFT KNEE REPLACEMENT USING CEMENT: ICD-10-CM

## 2020-12-28 RX ORDER — OXYCODONE AND ACETAMINOPHEN 5; 325 MG/1; MG/1
1 TABLET ORAL EVERY 4 HOURS PRN
Qty: 40 TABLET | Refills: 0 | Status: SHIPPED | OUTPATIENT
Start: 2020-12-28 | End: 2021-02-11

## 2020-12-28 NOTE — TELEPHONE ENCOUNTER
Called and spoke with patient and advised that Dr. Levin has sent in his refill of pain medication to his pharmacy. Also advised that he will not longer be able to refill medication due to our clinic narcotic policy change. He verbalized understanding.

## 2020-12-28 NOTE — TELEPHONE ENCOUNTER
----- Message from Ariadna Maravilla MA sent at 12/28/2020 10:53 AM CST -----  Contact: pt  Refill   Pharmacy  [facility]   Call back

## 2021-01-06 RX ORDER — AMOXICILLIN 500 MG/1
500 CAPSULE ORAL ONCE AS NEEDED
Qty: 4 CAPSULE | Refills: 0 | Status: SHIPPED | OUTPATIENT
Start: 2021-01-06 | End: 2021-01-06

## 2021-01-07 DIAGNOSIS — Z96.652 STATUS POST TOTAL LEFT KNEE REPLACEMENT USING CEMENT: Primary | ICD-10-CM

## 2021-01-07 DIAGNOSIS — M25.561 RIGHT KNEE PAIN, UNSPECIFIED CHRONICITY: ICD-10-CM

## 2021-01-11 ENCOUNTER — OFFICE VISIT (OUTPATIENT)
Dept: ORTHOPEDICS | Facility: CLINIC | Age: 56
End: 2021-01-11
Payer: OTHER GOVERNMENT

## 2021-01-11 ENCOUNTER — HOSPITAL ENCOUNTER (OUTPATIENT)
Dept: RADIOLOGY | Facility: HOSPITAL | Age: 56
Discharge: HOME | End: 2021-01-11
Attending: ORTHOPAEDIC SURGERY
Payer: OTHER GOVERNMENT

## 2021-01-11 VITALS — HEIGHT: 71 IN | WEIGHT: 220 LBS | RESPIRATION RATE: 16 BRPM | BODY MASS INDEX: 30.8 KG/M2

## 2021-01-11 DIAGNOSIS — Z01.818 PRE-OP TESTING: ICD-10-CM

## 2021-01-11 DIAGNOSIS — M25.561 RIGHT KNEE PAIN, UNSPECIFIED CHRONICITY: ICD-10-CM

## 2021-01-11 DIAGNOSIS — M17.10 ARTHRITIS OF KNEE: Primary | ICD-10-CM

## 2021-01-11 DIAGNOSIS — Z96.652 STATUS POST TOTAL LEFT KNEE REPLACEMENT USING CEMENT: ICD-10-CM

## 2021-01-11 PROCEDURE — 73564 X-RAY EXAM KNEE 4 OR MORE: CPT | Mod: TC,50,PN

## 2021-01-11 PROCEDURE — 73564 X-RAY EXAM KNEE 4 OR MORE: CPT | Mod: 26,50,, | Performed by: RADIOLOGY

## 2021-01-11 PROCEDURE — 99999 PR PBB SHADOW E&M-EST. PATIENT-LVL III: CPT | Mod: PBBFAC,,, | Performed by: ORTHOPAEDIC SURGERY

## 2021-01-11 PROCEDURE — 99214 OFFICE O/P EST MOD 30 MIN: CPT | Mod: 57,S$PBB,, | Performed by: ORTHOPAEDIC SURGERY

## 2021-01-11 PROCEDURE — 99999 PR PBB SHADOW E&M-EST. PATIENT-LVL III: ICD-10-PCS | Mod: PBBFAC,,, | Performed by: ORTHOPAEDIC SURGERY

## 2021-01-11 PROCEDURE — 73564 XR KNEE ORTHO BILAT WITH FLEXION: ICD-10-PCS | Mod: 26,50,, | Performed by: RADIOLOGY

## 2021-01-11 PROCEDURE — 99213 OFFICE O/P EST LOW 20 MIN: CPT | Mod: PBBFAC,25,PN | Performed by: ORTHOPAEDIC SURGERY

## 2021-01-11 PROCEDURE — 99214 PR OFFICE/OUTPT VISIT, EST, LEVL IV, 30-39 MIN: ICD-10-PCS | Mod: 57,S$PBB,, | Performed by: ORTHOPAEDIC SURGERY

## 2021-01-12 RX ORDER — MUPIROCIN 20 MG/G
OINTMENT TOPICAL
Status: CANCELLED | OUTPATIENT
Start: 2021-01-12

## 2021-01-12 RX ORDER — CEFAZOLIN SODIUM 2 G/50ML
2 SOLUTION INTRAVENOUS
Status: CANCELLED | OUTPATIENT
Start: 2021-01-12

## 2021-02-04 ENCOUNTER — PATIENT MESSAGE (OUTPATIENT)
Dept: ORTHOPEDICS | Facility: CLINIC | Age: 56
End: 2021-02-04

## 2021-02-11 ENCOUNTER — HOSPITAL ENCOUNTER (OUTPATIENT)
Dept: PREADMISSION TESTING | Facility: HOSPITAL | Age: 56
Discharge: HOME | End: 2021-02-11
Attending: ORTHOPAEDIC SURGERY
Payer: OTHER GOVERNMENT

## 2021-02-11 ENCOUNTER — HOSPITAL ENCOUNTER (OUTPATIENT)
Dept: RADIOLOGY | Facility: HOSPITAL | Age: 56
Discharge: HOME | End: 2021-02-11
Attending: ORTHOPAEDIC SURGERY
Payer: OTHER GOVERNMENT

## 2021-02-11 VITALS — BODY MASS INDEX: 32.93 KG/M2 | HEIGHT: 70 IN | WEIGHT: 230 LBS

## 2021-02-11 DIAGNOSIS — M17.10 ARTHRITIS OF KNEE: ICD-10-CM

## 2021-02-11 DIAGNOSIS — Z01.818 PREOP TESTING: Primary | ICD-10-CM

## 2021-02-11 LAB
ABO + RH BLD: NORMAL
ANION GAP SERPL CALC-SCNC: 7 MMOL/L (ref 8–16)
BASOPHILS # BLD AUTO: 0.01 K/UL (ref 0–0.2)
BASOPHILS NFR BLD: 0.4 % (ref 0–1.9)
BLD GP AB SCN CELLS X3 SERPL QL: NORMAL
BUN SERPL-MCNC: 9 MG/DL (ref 6–20)
CALCIUM SERPL-MCNC: 8.6 MG/DL (ref 8.7–10.5)
CHLORIDE SERPL-SCNC: 110 MMOL/L (ref 95–110)
CO2 SERPL-SCNC: 25 MMOL/L (ref 23–29)
CREAT SERPL-MCNC: 0.9 MG/DL (ref 0.5–1.4)
DIFFERENTIAL METHOD: ABNORMAL
EOSINOPHIL # BLD AUTO: 0.2 K/UL (ref 0–0.5)
EOSINOPHIL NFR BLD: 5.6 % (ref 0–8)
ERYTHROCYTE [DISTWIDTH] IN BLOOD BY AUTOMATED COUNT: 16.8 % (ref 11.5–14.5)
EST. GFR  (AFRICAN AMERICAN): >60 ML/MIN/1.73 M^2
EST. GFR  (NON AFRICAN AMERICAN): >60 ML/MIN/1.73 M^2
GLUCOSE SERPL-MCNC: 113 MG/DL (ref 70–110)
HCT VFR BLD AUTO: 31.8 % (ref 40–54)
HGB BLD-MCNC: 9.5 G/DL (ref 14–18)
IMM GRANULOCYTES # BLD AUTO: 0 K/UL (ref 0–0.04)
IMM GRANULOCYTES NFR BLD AUTO: 0 % (ref 0–0.5)
LYMPHOCYTES # BLD AUTO: 1 K/UL (ref 1–4.8)
LYMPHOCYTES NFR BLD: 36.7 % (ref 18–48)
MCH RBC QN AUTO: 25.1 PG (ref 27–31)
MCHC RBC AUTO-ENTMCNC: 29.9 G/DL (ref 32–36)
MCV RBC AUTO: 84 FL (ref 82–98)
MONOCYTES # BLD AUTO: 0.3 K/UL (ref 0.3–1)
MONOCYTES NFR BLD: 9.6 % (ref 4–15)
NEUTROPHILS # BLD AUTO: 1.3 K/UL (ref 1.8–7.7)
NEUTROPHILS NFR BLD: 47.7 % (ref 38–73)
NRBC BLD-RTO: 0 /100 WBC
PLATELET # BLD AUTO: 230 K/UL (ref 150–350)
PMV BLD AUTO: 8.2 FL (ref 9.2–12.9)
POTASSIUM SERPL-SCNC: 5.2 MMOL/L (ref 3.5–5.1)
RBC # BLD AUTO: 3.79 M/UL (ref 4.6–6.2)
SODIUM SERPL-SCNC: 142 MMOL/L (ref 136–145)
WBC # BLD AUTO: 2.7 K/UL (ref 3.9–12.7)

## 2021-02-11 PROCEDURE — 80048 BASIC METABOLIC PNL TOTAL CA: CPT

## 2021-02-11 PROCEDURE — 99900104 DSU ONLY-NO CHARGE-EA ADD'L HR (STAT)

## 2021-02-11 PROCEDURE — 86900 BLOOD TYPING SEROLOGIC ABO: CPT

## 2021-02-11 PROCEDURE — 93010 ELECTROCARDIOGRAM REPORT: CPT | Mod: ,,, | Performed by: INTERNAL MEDICINE

## 2021-02-11 PROCEDURE — 71046 XR CHEST PA AND LATERAL: ICD-10-PCS | Mod: 26,,, | Performed by: RADIOLOGY

## 2021-02-11 PROCEDURE — 93010 EKG 12-LEAD: ICD-10-PCS | Mod: ,,, | Performed by: INTERNAL MEDICINE

## 2021-02-11 PROCEDURE — 85025 COMPLETE CBC W/AUTO DIFF WBC: CPT

## 2021-02-11 PROCEDURE — 87081 CULTURE SCREEN ONLY: CPT

## 2021-02-11 PROCEDURE — 71046 X-RAY EXAM CHEST 2 VIEWS: CPT | Mod: 26,,, | Performed by: RADIOLOGY

## 2021-02-11 PROCEDURE — 71046 X-RAY EXAM CHEST 2 VIEWS: CPT | Mod: TC,FY

## 2021-02-11 PROCEDURE — 93005 ELECTROCARDIOGRAM TRACING: CPT

## 2021-02-11 PROCEDURE — 99900103 DSU ONLY-NO CHARGE-INITIAL HR (STAT)

## 2021-02-11 PROCEDURE — 36415 COLL VENOUS BLD VENIPUNCTURE: CPT

## 2021-02-11 RX ORDER — FAMOTIDINE 20 MG/1
TABLET, FILM COATED ORAL
Status: ON HOLD | COMMUNITY
Start: 2020-12-04 | End: 2021-06-01 | Stop reason: ALTCHOICE

## 2021-02-13 LAB — MRSA SPEC QL CULT: NORMAL

## 2021-02-18 ENCOUNTER — TELEPHONE (OUTPATIENT)
Dept: ORTHOPEDICS | Facility: CLINIC | Age: 56
End: 2021-02-18

## 2021-02-18 DIAGNOSIS — M17.10 ARTHRITIS OF KNEE: Primary | ICD-10-CM

## 2021-03-16 DIAGNOSIS — M17.10 ARTHRITIS OF KNEE: Primary | ICD-10-CM

## 2021-03-19 ENCOUNTER — TELEPHONE (OUTPATIENT)
Dept: ORTHOPEDICS | Facility: CLINIC | Age: 56
End: 2021-03-19

## 2021-03-20 ENCOUNTER — LAB VISIT (OUTPATIENT)
Dept: PRIMARY CARE CLINIC | Facility: CLINIC | Age: 56
End: 2021-03-20
Payer: OTHER GOVERNMENT

## 2021-03-20 DIAGNOSIS — Z01.818 PRE-OP TESTING: ICD-10-CM

## 2021-03-20 PROCEDURE — U0005 INFEC AGEN DETEC AMPLI PROBE: HCPCS | Performed by: ORTHOPAEDIC SURGERY

## 2021-03-20 PROCEDURE — U0003 INFECTIOUS AGENT DETECTION BY NUCLEIC ACID (DNA OR RNA); SEVERE ACUTE RESPIRATORY SYNDROME CORONAVIRUS 2 (SARS-COV-2) (CORONAVIRUS DISEASE [COVID-19]), AMPLIFIED PROBE TECHNIQUE, MAKING USE OF HIGH THROUGHPUT TECHNOLOGIES AS DESCRIBED BY CMS-2020-01-R: HCPCS | Performed by: ORTHOPAEDIC SURGERY

## 2021-03-21 LAB — SARS-COV-2 RNA RESP QL NAA+PROBE: NOT DETECTED

## 2021-03-22 ENCOUNTER — TELEPHONE (OUTPATIENT)
Dept: ORTHOPEDICS | Facility: CLINIC | Age: 56
End: 2021-03-22

## 2021-03-22 ENCOUNTER — ANESTHESIA EVENT (OUTPATIENT)
Dept: SURGERY | Facility: HOSPITAL | Age: 56
End: 2021-03-22
Payer: OTHER GOVERNMENT

## 2021-03-23 ENCOUNTER — ANESTHESIA (OUTPATIENT)
Dept: SURGERY | Facility: HOSPITAL | Age: 56
End: 2021-03-23
Payer: OTHER GOVERNMENT

## 2021-03-23 ENCOUNTER — TELEPHONE (OUTPATIENT)
Dept: ORTHOPEDICS | Facility: CLINIC | Age: 56
End: 2021-03-23

## 2021-03-23 ENCOUNTER — HOSPITAL ENCOUNTER (OUTPATIENT)
Facility: HOSPITAL | Age: 56
Discharge: HOME HEALTH | End: 2021-03-26
Attending: ORTHOPAEDIC SURGERY | Admitting: ORTHOPAEDIC SURGERY
Payer: OTHER GOVERNMENT

## 2021-03-23 DIAGNOSIS — M17.10 ARTHRITIS OF KNEE: ICD-10-CM

## 2021-03-23 LAB
ABO + RH BLD: NORMAL
BLD GP AB SCN CELLS X3 SERPL QL: NORMAL

## 2021-03-23 PROCEDURE — 63600175 PHARM REV CODE 636 W HCPCS: Performed by: ANESTHESIOLOGY

## 2021-03-23 PROCEDURE — 25000003 PHARM REV CODE 250: Performed by: NURSE ANESTHETIST, CERTIFIED REGISTERED

## 2021-03-23 PROCEDURE — 27200750 HC INSULATED NEEDLE/ STIMUPLEX: Performed by: ANESTHESIOLOGY

## 2021-03-23 PROCEDURE — 63600175 PHARM REV CODE 636 W HCPCS: Performed by: ORTHOPAEDIC SURGERY

## 2021-03-23 PROCEDURE — 71000033 HC RECOVERY, INTIAL HOUR: Performed by: ORTHOPAEDIC SURGERY

## 2021-03-23 PROCEDURE — 27201423 OPTIME MED/SURG SUP & DEVICES STERILE SUPPLY: Performed by: ORTHOPAEDIC SURGERY

## 2021-03-23 PROCEDURE — 25000003 PHARM REV CODE 250: Performed by: ANESTHESIOLOGY

## 2021-03-23 PROCEDURE — 01402 ANES OPN/ARTH TOT KNE ARTHRP: CPT | Performed by: ORTHOPAEDIC SURGERY

## 2021-03-23 PROCEDURE — 25000003 PHARM REV CODE 250: Performed by: ORTHOPAEDIC SURGERY

## 2021-03-23 PROCEDURE — 27447 PR TOTAL KNEE ARTHROPLASTY: ICD-10-PCS | Mod: RT,,, | Performed by: ORTHOPAEDIC SURGERY

## 2021-03-23 PROCEDURE — 76942 ECHO GUIDE FOR BIOPSY: CPT | Mod: 26,,, | Performed by: ANESTHESIOLOGY

## 2021-03-23 PROCEDURE — C1713 ANCHOR/SCREW BN/BN,TIS/BN: HCPCS | Performed by: ORTHOPAEDIC SURGERY

## 2021-03-23 PROCEDURE — 97110 THERAPEUTIC EXERCISES: CPT

## 2021-03-23 PROCEDURE — 86900 BLOOD TYPING SEROLOGIC ABO: CPT | Performed by: ORTHOPAEDIC SURGERY

## 2021-03-23 PROCEDURE — 94761 N-INVAS EAR/PLS OXIMETRY MLT: CPT

## 2021-03-23 PROCEDURE — 99900103 DSU ONLY-NO CHARGE-INITIAL HR (STAT): Performed by: ORTHOPAEDIC SURGERY

## 2021-03-23 PROCEDURE — D9220A PRA ANESTHESIA: Mod: ,,, | Performed by: ANESTHESIOLOGY

## 2021-03-23 PROCEDURE — 76942 ECHO GUIDE FOR BIOPSY: CPT | Performed by: ANESTHESIOLOGY

## 2021-03-23 PROCEDURE — 64447 PR NERVE BLOCK INJ, ANES/STEROID, FEMORAL, INCL IMAG GUIDANCE: ICD-10-PCS | Mod: 59,RT,, | Performed by: ANESTHESIOLOGY

## 2021-03-23 PROCEDURE — 36000710: Performed by: ORTHOPAEDIC SURGERY

## 2021-03-23 PROCEDURE — 36415 COLL VENOUS BLD VENIPUNCTURE: CPT | Performed by: ORTHOPAEDIC SURGERY

## 2021-03-23 PROCEDURE — 25000003 PHARM REV CODE 250: Performed by: NURSE PRACTITIONER

## 2021-03-23 PROCEDURE — D9220A PRA ANESTHESIA: ICD-10-PCS | Mod: ,,, | Performed by: ANESTHESIOLOGY

## 2021-03-23 PROCEDURE — 64447 NJX AA&/STRD FEMORAL NRV IMG: CPT | Performed by: ANESTHESIOLOGY

## 2021-03-23 PROCEDURE — 71000039 HC RECOVERY, EACH ADD'L HOUR: Performed by: ORTHOPAEDIC SURGERY

## 2021-03-23 PROCEDURE — 63600175 PHARM REV CODE 636 W HCPCS: Performed by: NURSE ANESTHETIST, CERTIFIED REGISTERED

## 2021-03-23 PROCEDURE — 76942 PR U/S GUIDANCE FOR NEEDLE GUIDANCE: ICD-10-PCS | Mod: 26,,, | Performed by: ANESTHESIOLOGY

## 2021-03-23 PROCEDURE — 37000008 HC ANESTHESIA 1ST 15 MINUTES: Performed by: ORTHOPAEDIC SURGERY

## 2021-03-23 PROCEDURE — 63600175 PHARM REV CODE 636 W HCPCS

## 2021-03-23 PROCEDURE — C1776 JOINT DEVICE (IMPLANTABLE): HCPCS | Performed by: ORTHOPAEDIC SURGERY

## 2021-03-23 PROCEDURE — 64447 NJX AA&/STRD FEMORAL NRV IMG: CPT | Mod: 59,RT,, | Performed by: ANESTHESIOLOGY

## 2021-03-23 PROCEDURE — 36000711: Performed by: ORTHOPAEDIC SURGERY

## 2021-03-23 PROCEDURE — 99900035 HC TECH TIME PER 15 MIN (STAT)

## 2021-03-23 PROCEDURE — 99900104 DSU ONLY-NO CHARGE-EA ADD'L HR (STAT): Performed by: ORTHOPAEDIC SURGERY

## 2021-03-23 PROCEDURE — 37000009 HC ANESTHESIA EA ADD 15 MINS: Performed by: ORTHOPAEDIC SURGERY

## 2021-03-23 PROCEDURE — C9290 INJ, BUPIVACAINE LIPOSOME: HCPCS | Performed by: ANESTHESIOLOGY

## 2021-03-23 PROCEDURE — 97161 PT EVAL LOW COMPLEX 20 MIN: CPT

## 2021-03-23 PROCEDURE — 27447 TOTAL KNEE ARTHROPLASTY: CPT | Mod: RT,,, | Performed by: ORTHOPAEDIC SURGERY

## 2021-03-23 DEVICE — PRIMARY TIBIAL BASEPLATE
Type: IMPLANTABLE DEVICE | Site: KNEE | Status: FUNCTIONAL
Brand: TRIATHLON

## 2021-03-23 DEVICE — TIBIAL BEARING INSERT
Type: IMPLANTABLE DEVICE | Site: KNEE | Status: FUNCTIONAL
Brand: TRIATHLON

## 2021-03-23 DEVICE — CRUCIATE RETAINING FEMORAL
Type: IMPLANTABLE DEVICE | Site: KNEE | Status: FUNCTIONAL
Brand: TRIATHLON

## 2021-03-23 DEVICE — FULL DOSE BONE CEMENT, 10 PACK CATALOG NUMBER IS 6191-1-010
Type: IMPLANTABLE DEVICE | Site: KNEE | Status: FUNCTIONAL
Brand: SIMPLEX

## 2021-03-23 DEVICE — PATELLA
Type: IMPLANTABLE DEVICE | Site: KNEE | Status: FUNCTIONAL
Brand: TRIATHLON

## 2021-03-23 RX ORDER — SODIUM CHLORIDE 0.9 % (FLUSH) 0.9 %
3 SYRINGE (ML) INJECTION EVERY 8 HOURS
Status: DISCONTINUED | OUTPATIENT
Start: 2021-03-23 | End: 2021-03-23 | Stop reason: HOSPADM

## 2021-03-23 RX ORDER — PREGABALIN 75 MG/1
75 CAPSULE ORAL ONCE
Status: DISCONTINUED | OUTPATIENT
Start: 2021-03-24 | End: 2021-03-23 | Stop reason: HOSPADM

## 2021-03-23 RX ORDER — MAG HYDROX/ALUMINUM HYD/SIMETH 200-200-20
30 SUSPENSION, ORAL (FINAL DOSE FORM) ORAL
Status: DISCONTINUED | OUTPATIENT
Start: 2021-03-23 | End: 2021-03-26 | Stop reason: HOSPADM

## 2021-03-23 RX ORDER — CELECOXIB 100 MG/1
200 CAPSULE ORAL ONCE
Status: COMPLETED | OUTPATIENT
Start: 2021-03-24 | End: 2021-03-23

## 2021-03-23 RX ORDER — CELECOXIB 100 MG/1
100 CAPSULE ORAL EVERY 12 HOURS
Status: DISCONTINUED | OUTPATIENT
Start: 2021-03-25 | End: 2021-03-26 | Stop reason: HOSPADM

## 2021-03-23 RX ORDER — LIDOCAINE HCL/PF 100 MG/5ML
SYRINGE (ML) INTRAVENOUS
Status: DISCONTINUED | OUTPATIENT
Start: 2021-03-23 | End: 2021-03-23

## 2021-03-23 RX ORDER — FENTANYL CITRATE 50 UG/ML
25 INJECTION, SOLUTION INTRAMUSCULAR; INTRAVENOUS EVERY 5 MIN PRN
Status: COMPLETED | OUTPATIENT
Start: 2021-03-23 | End: 2021-03-23

## 2021-03-23 RX ORDER — LOSARTAN POTASSIUM 25 MG/1
25 TABLET ORAL DAILY
Status: DISCONTINUED | OUTPATIENT
Start: 2021-03-24 | End: 2021-03-26 | Stop reason: HOSPADM

## 2021-03-23 RX ORDER — TRANEXAMIC ACID 100 MG/ML
INJECTION, SOLUTION INTRAVENOUS
Status: DISCONTINUED | OUTPATIENT
Start: 2021-03-23 | End: 2021-03-23

## 2021-03-23 RX ORDER — CELECOXIB 100 MG/1
400 CAPSULE ORAL DAILY
Status: DISCONTINUED | OUTPATIENT
Start: 2021-03-23 | End: 2021-03-23 | Stop reason: HOSPADM

## 2021-03-23 RX ORDER — SODIUM CHLORIDE 0.9 % (FLUSH) 0.9 %
10 SYRINGE (ML) INJECTION
Status: DISCONTINUED | OUTPATIENT
Start: 2021-03-23 | End: 2021-03-26 | Stop reason: HOSPADM

## 2021-03-23 RX ORDER — KETAMINE HYDROCHLORIDE 10 MG/ML
INJECTION, SOLUTION INTRAMUSCULAR; INTRAVENOUS
Status: DISCONTINUED | OUTPATIENT
Start: 2021-03-23 | End: 2021-03-23

## 2021-03-23 RX ORDER — NAPROXEN SODIUM 220 MG/1
81 TABLET, FILM COATED ORAL 2 TIMES DAILY
Status: DISCONTINUED | OUTPATIENT
Start: 2021-03-23 | End: 2021-03-26 | Stop reason: HOSPADM

## 2021-03-23 RX ORDER — FAMOTIDINE 20 MG/1
20 TABLET, FILM COATED ORAL 2 TIMES DAILY
Status: DISCONTINUED | OUTPATIENT
Start: 2021-03-23 | End: 2021-03-26 | Stop reason: HOSPADM

## 2021-03-23 RX ORDER — ACETAMINOPHEN 500 MG
1000 TABLET ORAL EVERY 6 HOURS SCHEDULED
Status: DISCONTINUED | OUTPATIENT
Start: 2021-03-24 | End: 2021-03-23 | Stop reason: HOSPADM

## 2021-03-23 RX ORDER — HYDROMORPHONE HYDROCHLORIDE 2 MG/ML
0.25 INJECTION, SOLUTION INTRAMUSCULAR; INTRAVENOUS; SUBCUTANEOUS
Status: DISCONTINUED | OUTPATIENT
Start: 2021-03-23 | End: 2021-03-25

## 2021-03-23 RX ORDER — SODIUM CHLORIDE, SODIUM LACTATE, POTASSIUM CHLORIDE, CALCIUM CHLORIDE 600; 310; 30; 20 MG/100ML; MG/100ML; MG/100ML; MG/100ML
500 INJECTION, SOLUTION INTRAVENOUS ONCE
Status: DISCONTINUED | OUTPATIENT
Start: 2021-03-23 | End: 2021-03-23

## 2021-03-23 RX ORDER — SUCRALFATE 1 G/1
1 TABLET ORAL 2 TIMES DAILY
Status: DISCONTINUED | OUTPATIENT
Start: 2021-03-23 | End: 2021-03-26 | Stop reason: HOSPADM

## 2021-03-23 RX ORDER — PROMETHAZINE HYDROCHLORIDE 25 MG/1
25 TABLET ORAL EVERY 6 HOURS PRN
Status: DISCONTINUED | OUTPATIENT
Start: 2021-03-23 | End: 2021-03-26 | Stop reason: HOSPADM

## 2021-03-23 RX ORDER — OXYCODONE HCL 10 MG/1
10 TABLET, FILM COATED, EXTENDED RELEASE ORAL EVERY 12 HOURS
Status: DISCONTINUED | OUTPATIENT
Start: 2021-03-23 | End: 2021-03-26 | Stop reason: HOSPADM

## 2021-03-23 RX ORDER — SODIUM CHLORIDE 9 MG/ML
INJECTION, SOLUTION INTRAVENOUS CONTINUOUS
Status: DISCONTINUED | OUTPATIENT
Start: 2021-03-23 | End: 2021-03-26 | Stop reason: HOSPADM

## 2021-03-23 RX ORDER — OXYCODONE HYDROCHLORIDE 5 MG/1
5 TABLET ORAL
Status: DISCONTINUED | OUTPATIENT
Start: 2021-03-23 | End: 2021-03-26 | Stop reason: HOSPADM

## 2021-03-23 RX ORDER — LOPERAMIDE HYDROCHLORIDE 2 MG/1
2 CAPSULE ORAL CONTINUOUS PRN
Status: DISCONTINUED | OUTPATIENT
Start: 2021-03-23 | End: 2021-03-26 | Stop reason: HOSPADM

## 2021-03-23 RX ORDER — PANTOPRAZOLE SODIUM 40 MG/1
40 TABLET, DELAYED RELEASE ORAL DAILY
Status: DISCONTINUED | OUTPATIENT
Start: 2021-03-23 | End: 2021-03-26 | Stop reason: HOSPADM

## 2021-03-23 RX ORDER — ZOLPIDEM TARTRATE 5 MG/1
5 TABLET ORAL NIGHTLY PRN
COMMUNITY
End: 2021-06-28

## 2021-03-23 RX ORDER — SODIUM CHLORIDE, SODIUM LACTATE, POTASSIUM CHLORIDE, CALCIUM CHLORIDE 600; 310; 30; 20 MG/100ML; MG/100ML; MG/100ML; MG/100ML
10 INJECTION, SOLUTION INTRAVENOUS CONTINUOUS
Status: DISCONTINUED | OUTPATIENT
Start: 2021-03-23 | End: 2021-03-23

## 2021-03-23 RX ORDER — PREGABALIN 75 MG/1
75 CAPSULE ORAL EVERY 12 HOURS
Status: DISCONTINUED | OUTPATIENT
Start: 2021-03-23 | End: 2021-03-26 | Stop reason: HOSPADM

## 2021-03-23 RX ORDER — LANOLIN ALCOHOL/MO/W.PET/CERES
1000 CREAM (GRAM) TOPICAL DAILY
Status: DISCONTINUED | OUTPATIENT
Start: 2021-03-24 | End: 2021-03-26 | Stop reason: HOSPADM

## 2021-03-23 RX ORDER — MIDAZOLAM HYDROCHLORIDE 1 MG/ML
INJECTION INTRAMUSCULAR; INTRAVENOUS
Status: DISCONTINUED | OUTPATIENT
Start: 2021-03-23 | End: 2021-03-23

## 2021-03-23 RX ORDER — PROCHLORPERAZINE EDISYLATE 5 MG/ML
6.25 INJECTION INTRAMUSCULAR; INTRAVENOUS EVERY 30 MIN PRN
Status: DISCONTINUED | OUTPATIENT
Start: 2021-03-23 | End: 2021-03-23 | Stop reason: SDUPTHER

## 2021-03-23 RX ORDER — HYDROMORPHONE HYDROCHLORIDE 2 MG/ML
0.2 INJECTION, SOLUTION INTRAMUSCULAR; INTRAVENOUS; SUBCUTANEOUS EVERY 5 MIN PRN
Status: DISCONTINUED | OUTPATIENT
Start: 2021-03-23 | End: 2021-03-23 | Stop reason: HOSPADM

## 2021-03-23 RX ORDER — OXYCODONE HYDROCHLORIDE 10 MG/1
10 TABLET ORAL
Status: DISCONTINUED | OUTPATIENT
Start: 2021-03-23 | End: 2021-03-26 | Stop reason: HOSPADM

## 2021-03-23 RX ORDER — DIPHENHYDRAMINE HYDROCHLORIDE 50 MG/ML
25 INJECTION INTRAMUSCULAR; INTRAVENOUS EVERY 6 HOURS PRN
Status: DISCONTINUED | OUTPATIENT
Start: 2021-03-23 | End: 2021-03-23 | Stop reason: HOSPADM

## 2021-03-23 RX ORDER — ONDANSETRON 2 MG/ML
4 INJECTION INTRAMUSCULAR; INTRAVENOUS EVERY 12 HOURS PRN
Status: DISCONTINUED | OUTPATIENT
Start: 2021-03-23 | End: 2021-03-26 | Stop reason: HOSPADM

## 2021-03-23 RX ORDER — FENTANYL CITRATE 50 UG/ML
INJECTION, SOLUTION INTRAMUSCULAR; INTRAVENOUS
Status: DISCONTINUED | OUTPATIENT
Start: 2021-03-23 | End: 2021-03-23

## 2021-03-23 RX ORDER — CEFAZOLIN SODIUM 2 G/50ML
2 SOLUTION INTRAVENOUS
Status: COMPLETED | OUTPATIENT
Start: 2021-03-23 | End: 2021-03-23

## 2021-03-23 RX ORDER — LIDOCAINE HYDROCHLORIDE 10 MG/ML
1 INJECTION, SOLUTION EPIDURAL; INFILTRATION; INTRACAUDAL; PERINEURAL ONCE
Status: DISCONTINUED | OUTPATIENT
Start: 2021-03-23 | End: 2021-03-23 | Stop reason: HOSPADM

## 2021-03-23 RX ORDER — BUPIVACAINE HYDROCHLORIDE 5 MG/ML
INJECTION, SOLUTION EPIDURAL; INTRACAUDAL
Status: COMPLETED | OUTPATIENT
Start: 2021-03-23 | End: 2021-03-23

## 2021-03-23 RX ORDER — SODIUM CHLORIDE, SODIUM LACTATE, POTASSIUM CHLORIDE, CALCIUM CHLORIDE 600; 310; 30; 20 MG/100ML; MG/100ML; MG/100ML; MG/100ML
75 INJECTION, SOLUTION INTRAVENOUS CONTINUOUS
Status: DISCONTINUED | OUTPATIENT
Start: 2021-03-23 | End: 2021-03-23

## 2021-03-23 RX ORDER — KETOROLAC TROMETHAMINE 30 MG/ML
INJECTION, SOLUTION INTRAMUSCULAR; INTRAVENOUS
Status: DISCONTINUED | OUTPATIENT
Start: 2021-03-23 | End: 2021-03-23

## 2021-03-23 RX ORDER — MUPIROCIN 20 MG/G
OINTMENT TOPICAL
Status: DISCONTINUED | OUTPATIENT
Start: 2021-03-23 | End: 2021-03-23 | Stop reason: HOSPADM

## 2021-03-23 RX ORDER — DEXAMETHASONE SODIUM PHOSPHATE 4 MG/ML
INJECTION, SOLUTION INTRA-ARTICULAR; INTRALESIONAL; INTRAMUSCULAR; INTRAVENOUS; SOFT TISSUE
Status: DISCONTINUED | OUTPATIENT
Start: 2021-03-23 | End: 2021-03-23

## 2021-03-23 RX ORDER — OXYCODONE HCL 10 MG/1
10 TABLET, FILM COATED, EXTENDED RELEASE ORAL
Status: COMPLETED | OUTPATIENT
Start: 2021-03-23 | End: 2021-03-23

## 2021-03-23 RX ORDER — PROPOFOL 10 MG/ML
VIAL (ML) INTRAVENOUS
Status: DISCONTINUED | OUTPATIENT
Start: 2021-03-23 | End: 2021-03-23

## 2021-03-23 RX ORDER — SIMETHICONE 80 MG
1 TABLET,CHEWABLE ORAL 3 TIMES DAILY PRN
Status: DISCONTINUED | OUTPATIENT
Start: 2021-03-23 | End: 2021-03-23

## 2021-03-23 RX ORDER — ESCITALOPRAM OXALATE 5 MG/1
25 TABLET ORAL DAILY
Status: ON HOLD | COMMUNITY
End: 2021-06-01 | Stop reason: ALTCHOICE

## 2021-03-23 RX ORDER — SUCRALFATE 1 G/1
1 TABLET ORAL 2 TIMES DAILY
Status: ON HOLD | COMMUNITY
End: 2021-06-01 | Stop reason: ALTCHOICE

## 2021-03-23 RX ORDER — HYDROMORPHONE HYDROCHLORIDE 2 MG/ML
0.5 INJECTION, SOLUTION INTRAMUSCULAR; INTRAVENOUS; SUBCUTANEOUS
Status: DISCONTINUED | OUTPATIENT
Start: 2021-03-23 | End: 2021-03-25

## 2021-03-23 RX ORDER — DOCUSATE SODIUM 100 MG/1
100 CAPSULE, LIQUID FILLED ORAL EVERY 12 HOURS
Status: DISCONTINUED | OUTPATIENT
Start: 2021-03-23 | End: 2021-03-26 | Stop reason: HOSPADM

## 2021-03-23 RX ORDER — ONDANSETRON 2 MG/ML
INJECTION INTRAMUSCULAR; INTRAVENOUS
Status: DISCONTINUED | OUTPATIENT
Start: 2021-03-23 | End: 2021-03-23

## 2021-03-23 RX ORDER — PREGABALIN 75 MG/1
150 CAPSULE ORAL
Status: COMPLETED | OUTPATIENT
Start: 2021-03-23 | End: 2021-03-23

## 2021-03-23 RX ORDER — ONDANSETRON 2 MG/ML
4 INJECTION INTRAMUSCULAR; INTRAVENOUS ONCE AS NEEDED
Status: DISCONTINUED | OUTPATIENT
Start: 2021-03-23 | End: 2021-03-23 | Stop reason: HOSPADM

## 2021-03-23 RX ORDER — OXYCODONE HYDROCHLORIDE 5 MG/1
5 TABLET ORAL
Status: DISCONTINUED | OUTPATIENT
Start: 2021-03-23 | End: 2021-03-23 | Stop reason: HOSPADM

## 2021-03-23 RX ORDER — HYDROMORPHONE HYDROCHLORIDE 2 MG/ML
1 INJECTION, SOLUTION INTRAMUSCULAR; INTRAVENOUS; SUBCUTANEOUS
Status: DISCONTINUED | OUTPATIENT
Start: 2021-03-23 | End: 2021-03-25

## 2021-03-23 RX ORDER — MUPIROCIN 20 MG/G
1 OINTMENT TOPICAL 2 TIMES DAILY
Status: DISCONTINUED | OUTPATIENT
Start: 2021-03-23 | End: 2021-03-26 | Stop reason: HOSPADM

## 2021-03-23 RX ORDER — FAMOTIDINE 20 MG/1
20 TABLET, FILM COATED ORAL DAILY
Status: DISCONTINUED | OUTPATIENT
Start: 2021-03-23 | End: 2021-03-23

## 2021-03-23 RX ORDER — ACETAMINOPHEN 10 MG/ML
1000 INJECTION, SOLUTION INTRAVENOUS
Status: COMPLETED | OUTPATIENT
Start: 2021-03-23 | End: 2021-03-23

## 2021-03-23 RX ORDER — SODIUM CHLORIDE 0.9 % (FLUSH) 0.9 %
3 SYRINGE (ML) INJECTION
Status: DISCONTINUED | OUTPATIENT
Start: 2021-03-23 | End: 2021-03-23 | Stop reason: HOSPADM

## 2021-03-23 RX ORDER — SUCCINYLCHOLINE CHLORIDE 20 MG/ML
INJECTION INTRAMUSCULAR; INTRAVENOUS
Status: DISCONTINUED | OUTPATIENT
Start: 2021-03-23 | End: 2021-03-23

## 2021-03-23 RX ORDER — ROCURONIUM BROMIDE 10 MG/ML
INJECTION, SOLUTION INTRAVENOUS
Status: DISCONTINUED | OUTPATIENT
Start: 2021-03-23 | End: 2021-03-23

## 2021-03-23 RX ADMIN — MIDAZOLAM HYDROCHLORIDE 2 MG: 1 INJECTION, SOLUTION INTRAMUSCULAR; INTRAVENOUS at 09:03

## 2021-03-23 RX ADMIN — FENTANYL CITRATE 25 MCG: 50 INJECTION INTRAMUSCULAR; INTRAVENOUS at 09:03

## 2021-03-23 RX ADMIN — FAMOTIDINE 20 MG: 20 TABLET ORAL at 11:03

## 2021-03-23 RX ADMIN — ASPIRIN 81 MG CHEWABLE TABLET 81 MG: 81 TABLET CHEWABLE at 08:03

## 2021-03-23 RX ADMIN — CEFAZOLIN SODIUM 2 G: 2 SOLUTION INTRAVENOUS at 02:03

## 2021-03-23 RX ADMIN — ROCURONIUM BROMIDE 15 MG: 10 INJECTION, SOLUTION INTRAVENOUS at 07:03

## 2021-03-23 RX ADMIN — PANTOPRAZOLE SODIUM 40 MG: 40 TABLET, DELAYED RELEASE ORAL at 11:03

## 2021-03-23 RX ADMIN — OXYCODONE HYDROCHLORIDE 5 MG: 5 TABLET ORAL at 09:03

## 2021-03-23 RX ADMIN — ACETAMINOPHEN 1000 MG: 10 INJECTION, SOLUTION INTRAVENOUS at 06:03

## 2021-03-23 RX ADMIN — DOCUSATE SODIUM 100 MG: 100 CAPSULE, LIQUID FILLED ORAL at 11:03

## 2021-03-23 RX ADMIN — FENTANYL CITRATE 100 MCG: 50 INJECTION, SOLUTION INTRAMUSCULAR; INTRAVENOUS at 07:03

## 2021-03-23 RX ADMIN — SODIUM CHLORIDE, SODIUM GLUCONATE, SODIUM ACETATE, POTASSIUM CHLORIDE, MAGNESIUM CHLORIDE, SODIUM PHOSPHATE, DIBASIC, AND POTASSIUM PHOSPHATE: .53; .5; .37; .037; .03; .012; .00082 INJECTION, SOLUTION INTRAVENOUS at 07:03

## 2021-03-23 RX ADMIN — KETAMINE HYDROCHLORIDE 50 MG: 10 INJECTION, SOLUTION INTRAMUSCULAR; INTRAVENOUS at 09:03

## 2021-03-23 RX ADMIN — ASPIRIN 81 MG CHEWABLE TABLET 81 MG: 81 TABLET CHEWABLE at 11:03

## 2021-03-23 RX ADMIN — ONDANSETRON 4 MG: 2 INJECTION, SOLUTION INTRAMUSCULAR; INTRAVENOUS at 07:03

## 2021-03-23 RX ADMIN — OXYCODONE HYDROCHLORIDE 10 MG: 10 TABLET ORAL at 06:03

## 2021-03-23 RX ADMIN — ROCURONIUM BROMIDE 5 MG: 10 INJECTION, SOLUTION INTRAVENOUS at 07:03

## 2021-03-23 RX ADMIN — SUCRALFATE 1 G: 1 TABLET ORAL at 08:03

## 2021-03-23 RX ADMIN — BUPIVACAINE HYDROCHLORIDE 10 ML: 5 INJECTION, SOLUTION EPIDURAL; INTRACAUDAL; PERINEURAL at 07:03

## 2021-03-23 RX ADMIN — MIDAZOLAM HYDROCHLORIDE 2 MG: 1 INJECTION, SOLUTION INTRAMUSCULAR; INTRAVENOUS at 07:03

## 2021-03-23 RX ADMIN — HYDROMORPHONE HYDROCHLORIDE 1 MG: 2 INJECTION INTRAMUSCULAR; INTRAVENOUS; SUBCUTANEOUS at 11:03

## 2021-03-23 RX ADMIN — HYDROMORPHONE HYDROCHLORIDE 1 MG: 2 INJECTION INTRAMUSCULAR; INTRAVENOUS; SUBCUTANEOUS at 07:03

## 2021-03-23 RX ADMIN — DOCUSATE SODIUM 100 MG: 100 CAPSULE, LIQUID FILLED ORAL at 08:03

## 2021-03-23 RX ADMIN — SODIUM CHLORIDE: 0.9 INJECTION, SOLUTION INTRAVENOUS at 10:03

## 2021-03-23 RX ADMIN — SUCCINYLCHOLINE CHLORIDE 160 MG: 20 INJECTION, SOLUTION INTRAMUSCULAR; INTRAVENOUS; PARENTERAL at 07:03

## 2021-03-23 RX ADMIN — SODIUM CHLORIDE: 0.9 INJECTION, SOLUTION INTRAVENOUS at 11:03

## 2021-03-23 RX ADMIN — SODIUM CHLORIDE, SODIUM GLUCONATE, SODIUM ACETATE, POTASSIUM CHLORIDE, MAGNESIUM CHLORIDE, SODIUM PHOSPHATE, DIBASIC, AND POTASSIUM PHOSPHATE: .53; .5; .37; .037; .03; .012; .00082 INJECTION, SOLUTION INTRAVENOUS at 06:03

## 2021-03-23 RX ADMIN — OXYCODONE HYDROCHLORIDE 10 MG: 10 TABLET, FILM COATED, EXTENDED RELEASE ORAL at 08:03

## 2021-03-23 RX ADMIN — OXYCODONE HYDROCHLORIDE 10 MG: 10 TABLET ORAL at 10:03

## 2021-03-23 RX ADMIN — OXYCODONE HYDROCHLORIDE 10 MG: 10 TABLET, FILM COATED, EXTENDED RELEASE ORAL at 07:03

## 2021-03-23 RX ADMIN — CEFAZOLIN SODIUM 2 G: 2 SOLUTION INTRAVENOUS at 10:03

## 2021-03-23 RX ADMIN — MUPIROCIN 1 G: 20 OINTMENT TOPICAL at 08:03

## 2021-03-23 RX ADMIN — MUPIROCIN: 20 OINTMENT TOPICAL at 06:03

## 2021-03-23 RX ADMIN — CELECOXIB 400 MG: 100 CAPSULE ORAL at 07:03

## 2021-03-23 RX ADMIN — CELECOXIB 200 MG: 100 CAPSULE ORAL at 11:03

## 2021-03-23 RX ADMIN — SUCRALFATE 1 G: 1 TABLET ORAL at 11:03

## 2021-03-23 RX ADMIN — PREGABALIN 75 MG: 75 CAPSULE ORAL at 08:03

## 2021-03-23 RX ADMIN — LIDOCAINE HYDROCHLORIDE 100 MG: 20 INJECTION, SOLUTION INTRAVENOUS at 07:03

## 2021-03-23 RX ADMIN — OXYCODONE HYDROCHLORIDE 10 MG: 10 TABLET ORAL at 09:03

## 2021-03-23 RX ADMIN — HYDROMORPHONE HYDROCHLORIDE 1 MG: 2 INJECTION INTRAMUSCULAR; INTRAVENOUS; SUBCUTANEOUS at 04:03

## 2021-03-23 RX ADMIN — CEFAZOLIN SODIUM 2 G: 2 SOLUTION INTRAVENOUS at 07:03

## 2021-03-23 RX ADMIN — PREGABALIN 150 MG: 75 CAPSULE ORAL at 07:03

## 2021-03-23 RX ADMIN — PROPOFOL 200 MG: 10 INJECTION, EMULSION INTRAVENOUS at 07:03

## 2021-03-23 RX ADMIN — ONDANSETRON 4 MG: 2 INJECTION INTRAMUSCULAR; INTRAVENOUS at 02:03

## 2021-03-23 RX ADMIN — KETOROLAC TROMETHAMINE: 30 INJECTION, SOLUTION INTRAMUSCULAR; INTRAVENOUS at 07:03

## 2021-03-23 RX ADMIN — FAMOTIDINE 20 MG: 20 TABLET ORAL at 08:03

## 2021-03-23 RX ADMIN — BUPIVACAINE 20 MG: 13.3 INJECTION, SUSPENSION, LIPOSOMAL INFILTRATION at 07:03

## 2021-03-23 RX ADMIN — TRANEXAMIC ACID 1000 MG: 100 INJECTION, SOLUTION INTRAVENOUS at 07:03

## 2021-03-23 RX ADMIN — KETOROLAC TROMETHAMINE 30 MG: 30 INJECTION, SOLUTION INTRAMUSCULAR; INTRAVENOUS at 08:03

## 2021-03-23 RX ADMIN — DEXAMETHASONE SODIUM PHOSPHATE 4 MG: 4 INJECTION, SOLUTION INTRA-ARTICULAR; INTRALESIONAL; INTRAMUSCULAR; INTRAVENOUS; SOFT TISSUE at 07:03

## 2021-03-23 RX ADMIN — OXYCODONE HYDROCHLORIDE 10 MG: 10 TABLET ORAL at 02:03

## 2021-03-23 RX ADMIN — MUPIROCIN 1 G: 20 OINTMENT TOPICAL at 11:03

## 2021-03-23 RX ADMIN — ALUMINUM HYDROXIDE, MAGNESIUM HYDROXIDE, AND SIMETHICONE 30 ML: 200; 200; 20 SUSPENSION ORAL at 03:03

## 2021-03-23 RX ADMIN — FENTANYL CITRATE 100 MCG: 50 INJECTION, SOLUTION INTRAMUSCULAR; INTRAVENOUS at 08:03

## 2021-03-24 LAB
ANION GAP SERPL CALC-SCNC: 7 MMOL/L (ref 8–16)
BASOPHILS # BLD AUTO: 0.02 K/UL (ref 0–0.2)
BASOPHILS NFR BLD: 0.3 % (ref 0–1.9)
BUN SERPL-MCNC: 10 MG/DL (ref 6–20)
CALCIUM SERPL-MCNC: 7.7 MG/DL (ref 8.7–10.5)
CHLORIDE SERPL-SCNC: 106 MMOL/L (ref 95–110)
CO2 SERPL-SCNC: 26 MMOL/L (ref 23–29)
CREAT SERPL-MCNC: 1 MG/DL (ref 0.5–1.4)
DIFFERENTIAL METHOD: ABNORMAL
EOSINOPHIL # BLD AUTO: 0 K/UL (ref 0–0.5)
EOSINOPHIL NFR BLD: 0.5 % (ref 0–8)
ERYTHROCYTE [DISTWIDTH] IN BLOOD BY AUTOMATED COUNT: 16.7 % (ref 11.5–14.5)
EST. GFR  (AFRICAN AMERICAN): >60 ML/MIN/1.73 M^2
EST. GFR  (NON AFRICAN AMERICAN): >60 ML/MIN/1.73 M^2
GLUCOSE SERPL-MCNC: 91 MG/DL (ref 70–110)
HCT VFR BLD AUTO: 28.6 % (ref 40–54)
HGB BLD-MCNC: 8.4 G/DL (ref 14–18)
IMM GRANULOCYTES # BLD AUTO: 0.02 K/UL (ref 0–0.04)
IMM GRANULOCYTES NFR BLD AUTO: 0.3 % (ref 0–0.5)
LYMPHOCYTES # BLD AUTO: 1.3 K/UL (ref 1–4.8)
LYMPHOCYTES NFR BLD: 20.9 % (ref 18–48)
MCH RBC QN AUTO: 25 PG (ref 27–31)
MCHC RBC AUTO-ENTMCNC: 29.4 G/DL (ref 32–36)
MCV RBC AUTO: 85 FL (ref 82–98)
MONOCYTES # BLD AUTO: 0.7 K/UL (ref 0.3–1)
MONOCYTES NFR BLD: 11.7 % (ref 4–15)
NEUTROPHILS # BLD AUTO: 4.1 K/UL (ref 1.8–7.7)
NEUTROPHILS NFR BLD: 66.3 % (ref 38–73)
NRBC BLD-RTO: 0 /100 WBC
PLATELET # BLD AUTO: 269 K/UL (ref 150–350)
PMV BLD AUTO: 8.9 FL (ref 9.2–12.9)
POTASSIUM SERPL-SCNC: 4.9 MMOL/L (ref 3.5–5.1)
RBC # BLD AUTO: 3.36 M/UL (ref 4.6–6.2)
SODIUM SERPL-SCNC: 139 MMOL/L (ref 136–145)
WBC # BLD AUTO: 6.18 K/UL (ref 3.9–12.7)

## 2021-03-24 PROCEDURE — 85025 COMPLETE CBC W/AUTO DIFF WBC: CPT | Performed by: ORTHOPAEDIC SURGERY

## 2021-03-24 PROCEDURE — 25000003 PHARM REV CODE 250: Performed by: NURSE PRACTITIONER

## 2021-03-24 PROCEDURE — 94799 UNLISTED PULMONARY SVC/PX: CPT

## 2021-03-24 PROCEDURE — 25000003 PHARM REV CODE 250: Performed by: ORTHOPAEDIC SURGERY

## 2021-03-24 PROCEDURE — 63600175 PHARM REV CODE 636 W HCPCS: Performed by: ORTHOPAEDIC SURGERY

## 2021-03-24 PROCEDURE — 94761 N-INVAS EAR/PLS OXIMETRY MLT: CPT

## 2021-03-24 PROCEDURE — 36415 COLL VENOUS BLD VENIPUNCTURE: CPT | Performed by: ORTHOPAEDIC SURGERY

## 2021-03-24 PROCEDURE — 80048 BASIC METABOLIC PNL TOTAL CA: CPT | Performed by: ORTHOPAEDIC SURGERY

## 2021-03-24 RX ORDER — ZOLPIDEM TARTRATE 5 MG/1
5 TABLET ORAL NIGHTLY PRN
Status: DISCONTINUED | OUTPATIENT
Start: 2021-03-24 | End: 2021-03-26 | Stop reason: HOSPADM

## 2021-03-24 RX ADMIN — FAMOTIDINE 20 MG: 20 TABLET ORAL at 09:03

## 2021-03-24 RX ADMIN — DOCUSATE SODIUM 100 MG: 100 CAPSULE, LIQUID FILLED ORAL at 09:03

## 2021-03-24 RX ADMIN — SUCRALFATE 1 G: 1 TABLET ORAL at 08:03

## 2021-03-24 RX ADMIN — PREGABALIN 75 MG: 75 CAPSULE ORAL at 08:03

## 2021-03-24 RX ADMIN — OXYCODONE HYDROCHLORIDE 10 MG: 10 TABLET ORAL at 12:03

## 2021-03-24 RX ADMIN — ESCITALOPRAM OXALATE 25 MG: 10 TABLET, FILM COATED ORAL at 09:03

## 2021-03-24 RX ADMIN — HYDROMORPHONE HYDROCHLORIDE 1 MG: 2 INJECTION INTRAMUSCULAR; INTRAVENOUS; SUBCUTANEOUS at 05:03

## 2021-03-24 RX ADMIN — CYANOCOBALAMIN TAB 1000 MCG 1000 MCG: 1000 TAB at 09:03

## 2021-03-24 RX ADMIN — OXYCODONE HYDROCHLORIDE 10 MG: 10 TABLET ORAL at 07:03

## 2021-03-24 RX ADMIN — HYDROMORPHONE HYDROCHLORIDE 1 MG: 2 INJECTION INTRAMUSCULAR; INTRAVENOUS; SUBCUTANEOUS at 04:03

## 2021-03-24 RX ADMIN — HYDROMORPHONE HYDROCHLORIDE 1 MG: 2 INJECTION INTRAMUSCULAR; INTRAVENOUS; SUBCUTANEOUS at 08:03

## 2021-03-24 RX ADMIN — MUPIROCIN 1 G: 20 OINTMENT TOPICAL at 08:03

## 2021-03-24 RX ADMIN — ONDANSETRON 4 MG: 2 INJECTION INTRAMUSCULAR; INTRAVENOUS at 02:03

## 2021-03-24 RX ADMIN — ASPIRIN 81 MG CHEWABLE TABLET 81 MG: 81 TABLET CHEWABLE at 09:03

## 2021-03-24 RX ADMIN — LOPERAMIDE HYDROCHLORIDE 2 MG: 2 CAPSULE ORAL at 11:03

## 2021-03-24 RX ADMIN — OXYCODONE HYDROCHLORIDE 10 MG: 10 TABLET, FILM COATED, EXTENDED RELEASE ORAL at 08:03

## 2021-03-24 RX ADMIN — MUPIROCIN 1 G: 20 OINTMENT TOPICAL at 09:03

## 2021-03-24 RX ADMIN — OXYCODONE HYDROCHLORIDE 10 MG: 10 TABLET ORAL at 06:03

## 2021-03-24 RX ADMIN — ASPIRIN 81 MG CHEWABLE TABLET 81 MG: 81 TABLET CHEWABLE at 08:03

## 2021-03-24 RX ADMIN — HYDROMORPHONE HYDROCHLORIDE 0.5 MG: 2 INJECTION INTRAMUSCULAR; INTRAVENOUS; SUBCUTANEOUS at 11:03

## 2021-03-24 RX ADMIN — OXYCODONE HYDROCHLORIDE 10 MG: 10 TABLET ORAL at 02:03

## 2021-03-24 RX ADMIN — ZOLPIDEM TARTRATE 5 MG: 5 TABLET ORAL at 02:03

## 2021-03-24 RX ADMIN — PREGABALIN 75 MG: 75 CAPSULE ORAL at 09:03

## 2021-03-24 RX ADMIN — OXYCODONE HYDROCHLORIDE 10 MG: 10 TABLET, FILM COATED, EXTENDED RELEASE ORAL at 09:03

## 2021-03-24 RX ADMIN — LOSARTAN POTASSIUM 25 MG: 25 TABLET, FILM COATED ORAL at 09:03

## 2021-03-24 RX ADMIN — FAMOTIDINE 20 MG: 20 TABLET ORAL at 08:03

## 2021-03-24 RX ADMIN — PROMETHAZINE HYDROCHLORIDE 25 MG: 25 TABLET ORAL at 06:03

## 2021-03-24 RX ADMIN — PANTOPRAZOLE SODIUM 40 MG: 40 TABLET, DELAYED RELEASE ORAL at 09:03

## 2021-03-24 RX ADMIN — SODIUM CHLORIDE: 0.9 INJECTION, SOLUTION INTRAVENOUS at 12:03

## 2021-03-24 RX ADMIN — SUCRALFATE 1 G: 1 TABLET ORAL at 09:03

## 2021-03-25 ENCOUNTER — TELEPHONE (OUTPATIENT)
Dept: ORTHOPEDICS | Facility: CLINIC | Age: 56
End: 2021-03-25

## 2021-03-25 LAB
ANION GAP SERPL CALC-SCNC: 6 MMOL/L (ref 8–16)
BASOPHILS # BLD AUTO: 0.04 K/UL (ref 0–0.2)
BASOPHILS NFR BLD: 0.8 % (ref 0–1.9)
BUN SERPL-MCNC: 7 MG/DL (ref 6–20)
CALCIUM SERPL-MCNC: 7.6 MG/DL (ref 8.7–10.5)
CHLORIDE SERPL-SCNC: 106 MMOL/L (ref 95–110)
CO2 SERPL-SCNC: 26 MMOL/L (ref 23–29)
CREAT SERPL-MCNC: 0.8 MG/DL (ref 0.5–1.4)
DIFFERENTIAL METHOD: ABNORMAL
EOSINOPHIL # BLD AUTO: 0.1 K/UL (ref 0–0.5)
EOSINOPHIL NFR BLD: 2.6 % (ref 0–8)
ERYTHROCYTE [DISTWIDTH] IN BLOOD BY AUTOMATED COUNT: 17 % (ref 11.5–14.5)
EST. GFR  (AFRICAN AMERICAN): >60 ML/MIN/1.73 M^2
EST. GFR  (NON AFRICAN AMERICAN): >60 ML/MIN/1.73 M^2
GLUCOSE SERPL-MCNC: 117 MG/DL (ref 70–110)
HCT VFR BLD AUTO: 27.8 % (ref 40–54)
HGB BLD-MCNC: 8.1 G/DL (ref 14–18)
IMM GRANULOCYTES # BLD AUTO: 0.02 K/UL (ref 0–0.04)
IMM GRANULOCYTES NFR BLD AUTO: 0.4 % (ref 0–0.5)
LYMPHOCYTES # BLD AUTO: 0.8 K/UL (ref 1–4.8)
LYMPHOCYTES NFR BLD: 14.5 % (ref 18–48)
MCH RBC QN AUTO: 25 PG (ref 27–31)
MCHC RBC AUTO-ENTMCNC: 29.1 G/DL (ref 32–36)
MCV RBC AUTO: 86 FL (ref 82–98)
MONOCYTES # BLD AUTO: 0.7 K/UL (ref 0.3–1)
MONOCYTES NFR BLD: 12.2 % (ref 4–15)
NEUTROPHILS # BLD AUTO: 3.7 K/UL (ref 1.8–7.7)
NEUTROPHILS NFR BLD: 69.5 % (ref 38–73)
NRBC BLD-RTO: 0 /100 WBC
PLATELET # BLD AUTO: 246 K/UL (ref 150–350)
PMV BLD AUTO: 8.7 FL (ref 9.2–12.9)
POTASSIUM SERPL-SCNC: 5.1 MMOL/L (ref 3.5–5.1)
RBC # BLD AUTO: 3.24 M/UL (ref 4.6–6.2)
SODIUM SERPL-SCNC: 138 MMOL/L (ref 136–145)
WBC # BLD AUTO: 5.32 K/UL (ref 3.9–12.7)

## 2021-03-25 PROCEDURE — 25000003 PHARM REV CODE 250: Performed by: ORTHOPAEDIC SURGERY

## 2021-03-25 PROCEDURE — 97116 GAIT TRAINING THERAPY: CPT

## 2021-03-25 PROCEDURE — 63600175 PHARM REV CODE 636 W HCPCS: Performed by: ORTHOPAEDIC SURGERY

## 2021-03-25 PROCEDURE — 25000003 PHARM REV CODE 250: Performed by: NURSE PRACTITIONER

## 2021-03-25 PROCEDURE — 97530 THERAPEUTIC ACTIVITIES: CPT

## 2021-03-25 PROCEDURE — 85025 COMPLETE CBC W/AUTO DIFF WBC: CPT | Performed by: NURSE PRACTITIONER

## 2021-03-25 PROCEDURE — 36415 COLL VENOUS BLD VENIPUNCTURE: CPT | Performed by: NURSE PRACTITIONER

## 2021-03-25 PROCEDURE — 94799 UNLISTED PULMONARY SVC/PX: CPT

## 2021-03-25 PROCEDURE — 94761 N-INVAS EAR/PLS OXIMETRY MLT: CPT

## 2021-03-25 PROCEDURE — 80048 BASIC METABOLIC PNL TOTAL CA: CPT | Performed by: NURSE PRACTITIONER

## 2021-03-25 RX ORDER — OXYCODONE AND ACETAMINOPHEN 10; 325 MG/1; MG/1
1 TABLET ORAL EVERY 4 HOURS PRN
Qty: 42 TABLET | Refills: 0 | Status: SHIPPED | OUTPATIENT
Start: 2021-03-25 | End: 2021-03-31 | Stop reason: SDUPTHER

## 2021-03-25 RX ORDER — DOCUSATE SODIUM 100 MG/1
100 CAPSULE, LIQUID FILLED ORAL EVERY 12 HOURS
Qty: 30 CAPSULE | Refills: 0 | Status: ON HOLD | OUTPATIENT
Start: 2021-03-25 | End: 2021-06-01 | Stop reason: ALTCHOICE

## 2021-03-25 RX ORDER — NAPROXEN SODIUM 220 MG/1
81 TABLET, FILM COATED ORAL 2 TIMES DAILY
Qty: 60 TABLET | Refills: 0 | Status: ON HOLD | OUTPATIENT
Start: 2021-03-25 | End: 2021-09-08

## 2021-03-25 RX ADMIN — MUPIROCIN 1 G: 20 OINTMENT TOPICAL at 08:03

## 2021-03-25 RX ADMIN — DOCUSATE SODIUM 100 MG: 100 CAPSULE, LIQUID FILLED ORAL at 08:03

## 2021-03-25 RX ADMIN — PREGABALIN 75 MG: 75 CAPSULE ORAL at 08:03

## 2021-03-25 RX ADMIN — LOSARTAN POTASSIUM 25 MG: 25 TABLET, FILM COATED ORAL at 08:03

## 2021-03-25 RX ADMIN — FAMOTIDINE 20 MG: 20 TABLET ORAL at 08:03

## 2021-03-25 RX ADMIN — SUCRALFATE 1 G: 1 TABLET ORAL at 08:03

## 2021-03-25 RX ADMIN — ASPIRIN 81 MG CHEWABLE TABLET 81 MG: 81 TABLET CHEWABLE at 08:03

## 2021-03-25 RX ADMIN — ESCITALOPRAM OXALATE 25 MG: 10 TABLET, FILM COATED ORAL at 08:03

## 2021-03-25 RX ADMIN — HYDROMORPHONE HYDROCHLORIDE 1 MG: 2 INJECTION INTRAMUSCULAR; INTRAVENOUS; SUBCUTANEOUS at 01:03

## 2021-03-25 RX ADMIN — ZOLPIDEM TARTRATE 5 MG: 5 TABLET ORAL at 02:03

## 2021-03-25 RX ADMIN — OXYCODONE HYDROCHLORIDE 10 MG: 10 TABLET ORAL at 04:03

## 2021-03-25 RX ADMIN — CYANOCOBALAMIN TAB 1000 MCG 1000 MCG: 1000 TAB at 08:03

## 2021-03-25 RX ADMIN — OXYCODONE HYDROCHLORIDE 10 MG: 10 TABLET, FILM COATED, EXTENDED RELEASE ORAL at 08:03

## 2021-03-25 RX ADMIN — ZOLPIDEM TARTRATE 5 MG: 5 TABLET ORAL at 08:03

## 2021-03-25 RX ADMIN — HYDROMORPHONE HYDROCHLORIDE 1 MG: 2 INJECTION INTRAMUSCULAR; INTRAVENOUS; SUBCUTANEOUS at 03:03

## 2021-03-25 RX ADMIN — HYDROMORPHONE HYDROCHLORIDE 1 MG: 2 INJECTION INTRAMUSCULAR; INTRAVENOUS; SUBCUTANEOUS at 12:03

## 2021-03-25 RX ADMIN — PANTOPRAZOLE SODIUM 40 MG: 40 TABLET, DELAYED RELEASE ORAL at 08:03

## 2021-03-25 RX ADMIN — CELECOXIB 100 MG: 100 CAPSULE ORAL at 08:03

## 2021-03-25 RX ADMIN — HYDROMORPHONE HYDROCHLORIDE 1 MG: 2 INJECTION INTRAMUSCULAR; INTRAVENOUS; SUBCUTANEOUS at 10:03

## 2021-03-25 RX ADMIN — HYDROMORPHONE HYDROCHLORIDE 1 MG: 2 INJECTION INTRAMUSCULAR; INTRAVENOUS; SUBCUTANEOUS at 06:03

## 2021-03-26 ENCOUNTER — TELEPHONE (OUTPATIENT)
Dept: ORTHOPEDICS | Facility: CLINIC | Age: 56
End: 2021-03-26

## 2021-03-26 VITALS
RESPIRATION RATE: 16 BRPM | OXYGEN SATURATION: 96 % | WEIGHT: 230 LBS | DIASTOLIC BLOOD PRESSURE: 77 MMHG | HEART RATE: 83 BPM | SYSTOLIC BLOOD PRESSURE: 123 MMHG | TEMPERATURE: 96 F | HEIGHT: 70 IN | BODY MASS INDEX: 32.93 KG/M2

## 2021-03-26 LAB
ANION GAP SERPL CALC-SCNC: 4 MMOL/L (ref 8–16)
BUN SERPL-MCNC: 8 MG/DL (ref 6–20)
CALCIUM SERPL-MCNC: 8.1 MG/DL (ref 8.7–10.5)
CHLORIDE SERPL-SCNC: 108 MMOL/L (ref 95–110)
CO2 SERPL-SCNC: 28 MMOL/L (ref 23–29)
CREAT SERPL-MCNC: 0.8 MG/DL (ref 0.5–1.4)
EST. GFR  (AFRICAN AMERICAN): >60 ML/MIN/1.73 M^2
EST. GFR  (NON AFRICAN AMERICAN): >60 ML/MIN/1.73 M^2
GLUCOSE SERPL-MCNC: 98 MG/DL (ref 70–110)
POTASSIUM SERPL-SCNC: 4.9 MMOL/L (ref 3.5–5.1)
SODIUM SERPL-SCNC: 140 MMOL/L (ref 136–145)

## 2021-03-26 PROCEDURE — 25000003 PHARM REV CODE 250: Performed by: ORTHOPAEDIC SURGERY

## 2021-03-26 PROCEDURE — 80048 BASIC METABOLIC PNL TOTAL CA: CPT | Performed by: NURSE PRACTITIONER

## 2021-03-26 PROCEDURE — 36415 COLL VENOUS BLD VENIPUNCTURE: CPT | Performed by: NURSE PRACTITIONER

## 2021-03-26 PROCEDURE — 25000003 PHARM REV CODE 250: Performed by: NURSE PRACTITIONER

## 2021-03-26 PROCEDURE — 97116 GAIT TRAINING THERAPY: CPT

## 2021-03-26 RX ADMIN — ESCITALOPRAM OXALATE 25 MG: 10 TABLET, FILM COATED ORAL at 08:03

## 2021-03-26 RX ADMIN — PANTOPRAZOLE SODIUM 40 MG: 40 TABLET, DELAYED RELEASE ORAL at 08:03

## 2021-03-26 RX ADMIN — PREGABALIN 75 MG: 75 CAPSULE ORAL at 08:03

## 2021-03-26 RX ADMIN — SUCRALFATE 1 G: 1 TABLET ORAL at 08:03

## 2021-03-26 RX ADMIN — SODIUM CHLORIDE: 0.9 INJECTION, SOLUTION INTRAVENOUS at 04:03

## 2021-03-26 RX ADMIN — MUPIROCIN 1 G: 20 OINTMENT TOPICAL at 08:03

## 2021-03-26 RX ADMIN — DOCUSATE SODIUM 100 MG: 100 CAPSULE, LIQUID FILLED ORAL at 08:03

## 2021-03-26 RX ADMIN — CELECOXIB 100 MG: 100 CAPSULE ORAL at 08:03

## 2021-03-26 RX ADMIN — LOSARTAN POTASSIUM 25 MG: 25 TABLET, FILM COATED ORAL at 08:03

## 2021-03-26 RX ADMIN — OXYCODONE HYDROCHLORIDE 10 MG: 10 TABLET ORAL at 04:03

## 2021-03-26 RX ADMIN — FAMOTIDINE 20 MG: 20 TABLET ORAL at 08:03

## 2021-03-26 RX ADMIN — OXYCODONE HYDROCHLORIDE 10 MG: 10 TABLET, FILM COATED, EXTENDED RELEASE ORAL at 08:03

## 2021-03-26 RX ADMIN — OXYCODONE HYDROCHLORIDE 10 MG: 10 TABLET ORAL at 12:03

## 2021-03-26 RX ADMIN — ASPIRIN 81 MG CHEWABLE TABLET 81 MG: 81 TABLET CHEWABLE at 08:03

## 2021-03-26 RX ADMIN — CYANOCOBALAMIN TAB 1000 MCG 1000 MCG: 1000 TAB at 08:03

## 2021-03-29 ENCOUNTER — TELEPHONE (OUTPATIENT)
Dept: MEDSURG UNIT | Facility: HOSPITAL | Age: 56
End: 2021-03-29

## 2021-03-29 ENCOUNTER — TELEPHONE (OUTPATIENT)
Dept: ORTHOPEDICS | Facility: CLINIC | Age: 56
End: 2021-03-29

## 2021-03-31 DIAGNOSIS — Z96.652 STATUS POST TOTAL LEFT KNEE REPLACEMENT USING CEMENT: Primary | ICD-10-CM

## 2021-03-31 RX ORDER — OXYCODONE AND ACETAMINOPHEN 10; 325 MG/1; MG/1
1 TABLET ORAL EVERY 6 HOURS PRN
Qty: 120 TABLET | Refills: 0 | Status: SHIPPED | OUTPATIENT
Start: 2021-03-31 | End: 2021-04-12 | Stop reason: SDUPTHER

## 2021-04-06 DIAGNOSIS — M17.10 ARTHRITIS OF KNEE: Primary | ICD-10-CM

## 2021-04-12 ENCOUNTER — OFFICE VISIT (OUTPATIENT)
Dept: ORTHOPEDICS | Facility: CLINIC | Age: 56
End: 2021-04-12
Payer: OTHER GOVERNMENT

## 2021-04-12 ENCOUNTER — HOSPITAL ENCOUNTER (OUTPATIENT)
Dept: RADIOLOGY | Facility: HOSPITAL | Age: 56
Discharge: HOME | End: 2021-04-12
Attending: ORTHOPAEDIC SURGERY
Payer: OTHER GOVERNMENT

## 2021-04-12 VITALS — WEIGHT: 230 LBS | RESPIRATION RATE: 18 BRPM | HEIGHT: 70 IN | BODY MASS INDEX: 32.93 KG/M2

## 2021-04-12 DIAGNOSIS — M17.10 ARTHRITIS OF KNEE: ICD-10-CM

## 2021-04-12 DIAGNOSIS — Z96.652 STATUS POST TOTAL LEFT KNEE REPLACEMENT USING CEMENT: ICD-10-CM

## 2021-04-12 DIAGNOSIS — Z96.651 STATUS POST TOTAL KNEE REPLACEMENT USING CEMENT, RIGHT: Primary | ICD-10-CM

## 2021-04-12 DIAGNOSIS — M17.10 ARTHRITIS OF KNEE: Primary | ICD-10-CM

## 2021-04-12 DIAGNOSIS — T81.41XA POSTOPERATIVE ABSCESS INVOLVING SUTURE: ICD-10-CM

## 2021-04-12 PROCEDURE — 99999 PR PBB SHADOW E&M-EST. PATIENT-LVL III: ICD-10-PCS | Mod: PBBFAC,,, | Performed by: ORTHOPAEDIC SURGERY

## 2021-04-12 PROCEDURE — 73560 X-RAY EXAM OF KNEE 1 OR 2: CPT | Mod: TC,PN,RT

## 2021-04-12 PROCEDURE — 73560 X-RAY EXAM OF KNEE 1 OR 2: CPT | Mod: 26,RT,, | Performed by: RADIOLOGY

## 2021-04-12 PROCEDURE — 99024 PR POST-OP FOLLOW-UP VISIT: ICD-10-PCS | Mod: ,,, | Performed by: ORTHOPAEDIC SURGERY

## 2021-04-12 PROCEDURE — 99999 PR PBB SHADOW E&M-EST. PATIENT-LVL III: CPT | Mod: PBBFAC,,, | Performed by: ORTHOPAEDIC SURGERY

## 2021-04-12 PROCEDURE — 99213 OFFICE O/P EST LOW 20 MIN: CPT | Mod: PBBFAC,25,PN | Performed by: ORTHOPAEDIC SURGERY

## 2021-04-12 PROCEDURE — 73560 XR KNEE 1 OR 2 VIEW RIGHT: ICD-10-PCS | Mod: 26,RT,, | Performed by: RADIOLOGY

## 2021-04-12 PROCEDURE — 99024 POSTOP FOLLOW-UP VISIT: CPT | Mod: ,,, | Performed by: ORTHOPAEDIC SURGERY

## 2021-04-13 ENCOUNTER — TELEPHONE (OUTPATIENT)
Dept: ORTHOPEDICS | Facility: CLINIC | Age: 56
End: 2021-04-13

## 2021-04-13 RX ORDER — OXYCODONE AND ACETAMINOPHEN 10; 325 MG/1; MG/1
1 TABLET ORAL EVERY 6 HOURS PRN
Qty: 60 TABLET | Refills: 0 | Status: SHIPPED | OUTPATIENT
Start: 2021-04-13 | End: 2021-04-14

## 2021-04-13 RX ORDER — SULFAMETHOXAZOLE AND TRIMETHOPRIM 800; 160 MG/1; MG/1
1 TABLET ORAL 2 TIMES DAILY
Qty: 10 TABLET | Refills: 0 | Status: ON HOLD | OUTPATIENT
Start: 2021-04-13 | End: 2021-06-04 | Stop reason: HOSPADM

## 2021-04-14 DIAGNOSIS — Z96.651 STATUS POST TOTAL KNEE REPLACEMENT USING CEMENT, RIGHT: Primary | ICD-10-CM

## 2021-04-14 RX ORDER — OXYCODONE AND ACETAMINOPHEN 5; 325 MG/1; MG/1
1 TABLET ORAL EVERY 6 HOURS PRN
Qty: 60 TABLET | Refills: 0 | OUTPATIENT
Start: 2021-04-14 | End: 2021-04-24

## 2021-04-22 DIAGNOSIS — Z96.651 STATUS POST TOTAL KNEE REPLACEMENT USING CEMENT, RIGHT: Primary | ICD-10-CM

## 2021-04-24 ENCOUNTER — HOSPITAL ENCOUNTER (EMERGENCY)
Facility: HOSPITAL | Age: 56
Discharge: HOME | End: 2021-04-24
Attending: EMERGENCY MEDICINE
Payer: OTHER GOVERNMENT

## 2021-04-24 VITALS
HEART RATE: 91 BPM | OXYGEN SATURATION: 100 % | HEIGHT: 70 IN | DIASTOLIC BLOOD PRESSURE: 90 MMHG | RESPIRATION RATE: 18 BRPM | WEIGHT: 230 LBS | SYSTOLIC BLOOD PRESSURE: 125 MMHG | BODY MASS INDEX: 32.93 KG/M2 | TEMPERATURE: 100 F

## 2021-04-24 DIAGNOSIS — M25.569 KNEE PAIN: ICD-10-CM

## 2021-04-24 DIAGNOSIS — G89.18 POST-OPERATIVE PAIN: Primary | ICD-10-CM

## 2021-04-24 LAB
ANION GAP SERPL CALC-SCNC: 11 MMOL/L (ref 8–16)
BASOPHILS # BLD AUTO: 0.02 K/UL (ref 0–0.2)
BASOPHILS NFR BLD: 0.3 % (ref 0–1.9)
BUN SERPL-MCNC: 8 MG/DL (ref 6–20)
CALCIUM SERPL-MCNC: 9 MG/DL (ref 8.7–10.5)
CHLORIDE SERPL-SCNC: 104 MMOL/L (ref 95–110)
CO2 SERPL-SCNC: 24 MMOL/L (ref 23–29)
CREAT SERPL-MCNC: 0.9 MG/DL (ref 0.5–1.4)
CRP SERPL-MCNC: 112.5 MG/L (ref 0–8.2)
DIFFERENTIAL METHOD: ABNORMAL
EOSINOPHIL # BLD AUTO: 0 K/UL (ref 0–0.5)
EOSINOPHIL NFR BLD: 0.3 % (ref 0–8)
ERYTHROCYTE [DISTWIDTH] IN BLOOD BY AUTOMATED COUNT: 16.1 % (ref 11.5–14.5)
EST. GFR  (AFRICAN AMERICAN): >60 ML/MIN/1.73 M^2
EST. GFR  (NON AFRICAN AMERICAN): >60 ML/MIN/1.73 M^2
GLUCOSE SERPL-MCNC: 98 MG/DL (ref 70–110)
HCT VFR BLD AUTO: 28.2 % (ref 40–54)
HGB BLD-MCNC: 8.5 G/DL (ref 14–18)
IMM GRANULOCYTES # BLD AUTO: 0.06 K/UL (ref 0–0.04)
IMM GRANULOCYTES NFR BLD AUTO: 1 % (ref 0–0.5)
LYMPHOCYTES # BLD AUTO: 1.3 K/UL (ref 1–4.8)
LYMPHOCYTES NFR BLD: 21.3 % (ref 18–48)
MCH RBC QN AUTO: 24.3 PG (ref 27–31)
MCHC RBC AUTO-ENTMCNC: 30.1 G/DL (ref 32–36)
MCV RBC AUTO: 81 FL (ref 82–98)
MONOCYTES # BLD AUTO: 0.7 K/UL (ref 0.3–1)
MONOCYTES NFR BLD: 11.1 % (ref 4–15)
NEUTROPHILS # BLD AUTO: 4 K/UL (ref 1.8–7.7)
NEUTROPHILS NFR BLD: 66 % (ref 38–73)
NRBC BLD-RTO: 0 /100 WBC
PLATELET # BLD AUTO: 370 K/UL (ref 150–450)
PMV BLD AUTO: 8.1 FL (ref 9.2–12.9)
POTASSIUM SERPL-SCNC: 4.6 MMOL/L (ref 3.5–5.1)
RBC # BLD AUTO: 3.5 M/UL (ref 4.6–6.2)
SODIUM SERPL-SCNC: 139 MMOL/L (ref 136–145)
WBC # BLD AUTO: 6.11 K/UL (ref 3.9–12.7)

## 2021-04-24 PROCEDURE — 86140 C-REACTIVE PROTEIN: CPT | Performed by: EMERGENCY MEDICINE

## 2021-04-24 PROCEDURE — 99284 EMERGENCY DEPT VISIT MOD MDM: CPT | Mod: 25

## 2021-04-24 PROCEDURE — 85025 COMPLETE CBC W/AUTO DIFF WBC: CPT | Performed by: EMERGENCY MEDICINE

## 2021-04-24 PROCEDURE — 36415 COLL VENOUS BLD VENIPUNCTURE: CPT | Performed by: EMERGENCY MEDICINE

## 2021-04-24 PROCEDURE — 80048 BASIC METABOLIC PNL TOTAL CA: CPT | Performed by: EMERGENCY MEDICINE

## 2021-04-24 PROCEDURE — 96372 THER/PROPH/DIAG INJ SC/IM: CPT

## 2021-04-24 PROCEDURE — 63600175 PHARM REV CODE 636 W HCPCS: Performed by: EMERGENCY MEDICINE

## 2021-04-24 RX ORDER — MORPHINE SULFATE 4 MG/ML
8 INJECTION, SOLUTION INTRAMUSCULAR; INTRAVENOUS
Status: COMPLETED | OUTPATIENT
Start: 2021-04-24 | End: 2021-04-24

## 2021-04-24 RX ORDER — MORPHINE SULFATE 15 MG/1
15 TABLET ORAL EVERY 4 HOURS PRN
Qty: 12 TABLET | Refills: 0 | Status: ON HOLD | OUTPATIENT
Start: 2021-04-24 | End: 2021-06-01 | Stop reason: ALTCHOICE

## 2021-04-24 RX ADMIN — MORPHINE SULFATE 8 MG: 4 INJECTION, SOLUTION INTRAMUSCULAR; INTRAVENOUS at 08:04

## 2021-04-26 RX ORDER — OXYCODONE AND ACETAMINOPHEN 10; 325 MG/1; MG/1
1 TABLET ORAL EVERY 6 HOURS PRN
Qty: 28 TABLET | Refills: 0 | Status: CANCELLED | OUTPATIENT
Start: 2021-04-26

## 2021-04-27 RX ORDER — OXYCODONE AND ACETAMINOPHEN 10; 325 MG/1; MG/1
1 TABLET ORAL EVERY 6 HOURS PRN
Qty: 28 TABLET | Refills: 0 | Status: SHIPPED | OUTPATIENT
Start: 2021-04-27 | End: 2021-05-24 | Stop reason: SDUPTHER

## 2021-04-29 ENCOUNTER — OFFICE VISIT (OUTPATIENT)
Dept: ORTHOPEDICS | Facility: CLINIC | Age: 56
End: 2021-04-29
Payer: OTHER GOVERNMENT

## 2021-04-29 VITALS — WEIGHT: 230 LBS | BODY MASS INDEX: 32.93 KG/M2 | HEIGHT: 70 IN | RESPIRATION RATE: 17 BRPM

## 2021-04-29 DIAGNOSIS — Z96.651 STATUS POST TOTAL KNEE REPLACEMENT USING CEMENT, RIGHT: Primary | ICD-10-CM

## 2021-04-29 DIAGNOSIS — Z96.651 STATUS POST TOTAL KNEE REPLACEMENT USING CEMENT, RIGHT: ICD-10-CM

## 2021-04-29 PROCEDURE — 99999 PR PBB SHADOW E&M-EST. PATIENT-LVL III: CPT | Mod: PBBFAC,,, | Performed by: ORTHOPAEDIC SURGERY

## 2021-04-29 PROCEDURE — 99024 POSTOP FOLLOW-UP VISIT: CPT | Mod: ,,, | Performed by: ORTHOPAEDIC SURGERY

## 2021-04-29 PROCEDURE — 99999 PR PBB SHADOW E&M-EST. PATIENT-LVL III: ICD-10-PCS | Mod: PBBFAC,,, | Performed by: ORTHOPAEDIC SURGERY

## 2021-04-29 PROCEDURE — 99024 PR POST-OP FOLLOW-UP VISIT: ICD-10-PCS | Mod: ,,, | Performed by: ORTHOPAEDIC SURGERY

## 2021-04-29 PROCEDURE — 99213 OFFICE O/P EST LOW 20 MIN: CPT | Mod: PBBFAC,PN | Performed by: ORTHOPAEDIC SURGERY

## 2021-04-29 RX ORDER — HYDROMORPHONE HYDROCHLORIDE 2 MG/1
2 TABLET ORAL EVERY 4 HOURS PRN
Qty: 60 TABLET | Refills: 0 | Status: SHIPPED | OUTPATIENT
Start: 2021-04-29 | End: 2021-05-07 | Stop reason: SDUPTHER

## 2021-04-29 RX ORDER — HYDROMORPHONE HYDROCHLORIDE 2 MG/1
2 TABLET ORAL EVERY 4 HOURS PRN
Qty: 60 TABLET | Refills: 0 | Status: SHIPPED | OUTPATIENT
Start: 2021-04-29 | End: 2021-04-29 | Stop reason: SDUPTHER

## 2021-04-30 ENCOUNTER — CLINICAL SUPPORT (OUTPATIENT)
Dept: REHABILITATION | Facility: HOSPITAL | Age: 56
End: 2021-04-30
Payer: OTHER GOVERNMENT

## 2021-04-30 DIAGNOSIS — Z96.659 POSTOPERATIVE STIFFNESS OF TOTAL KNEE REPLACEMENT, INITIAL ENCOUNTER: ICD-10-CM

## 2021-04-30 DIAGNOSIS — R26.81 GAIT INSTABILITY: ICD-10-CM

## 2021-04-30 DIAGNOSIS — Z96.651 STATUS POST TOTAL KNEE REPLACEMENT USING CEMENT, RIGHT: ICD-10-CM

## 2021-04-30 DIAGNOSIS — M25.669 POSTOPERATIVE STIFFNESS OF TOTAL KNEE REPLACEMENT, INITIAL ENCOUNTER: ICD-10-CM

## 2021-04-30 DIAGNOSIS — T84.89XA POSTOPERATIVE STIFFNESS OF TOTAL KNEE REPLACEMENT, INITIAL ENCOUNTER: ICD-10-CM

## 2021-04-30 DIAGNOSIS — M25.461 PAIN AND SWELLING OF KNEE, RIGHT: ICD-10-CM

## 2021-04-30 DIAGNOSIS — M25.561 PAIN AND SWELLING OF KNEE, RIGHT: ICD-10-CM

## 2021-04-30 PROCEDURE — 97014 ELECTRIC STIMULATION THERAPY: CPT | Mod: PN

## 2021-04-30 PROCEDURE — 97110 THERAPEUTIC EXERCISES: CPT | Mod: PN

## 2021-04-30 PROCEDURE — 97162 PT EVAL MOD COMPLEX 30 MIN: CPT | Mod: PN

## 2021-05-05 ENCOUNTER — CLINICAL SUPPORT (OUTPATIENT)
Dept: REHABILITATION | Facility: HOSPITAL | Age: 56
End: 2021-05-05
Attending: ORTHOPAEDIC SURGERY
Payer: OTHER GOVERNMENT

## 2021-05-05 DIAGNOSIS — M25.461 PAIN AND SWELLING OF KNEE, RIGHT: ICD-10-CM

## 2021-05-05 DIAGNOSIS — M25.669 POSTOPERATIVE STIFFNESS OF TOTAL KNEE REPLACEMENT, SUBSEQUENT ENCOUNTER: Primary | ICD-10-CM

## 2021-05-05 DIAGNOSIS — M25.561 PAIN AND SWELLING OF KNEE, RIGHT: ICD-10-CM

## 2021-05-05 DIAGNOSIS — T84.89XD POSTOPERATIVE STIFFNESS OF TOTAL KNEE REPLACEMENT, SUBSEQUENT ENCOUNTER: Primary | ICD-10-CM

## 2021-05-05 DIAGNOSIS — Z96.659 POSTOPERATIVE STIFFNESS OF TOTAL KNEE REPLACEMENT, SUBSEQUENT ENCOUNTER: Primary | ICD-10-CM

## 2021-05-05 DIAGNOSIS — R26.81 GAIT INSTABILITY: ICD-10-CM

## 2021-05-05 PROCEDURE — 97110 THERAPEUTIC EXERCISES: CPT | Mod: PN,CQ

## 2021-05-07 ENCOUNTER — TELEPHONE (OUTPATIENT)
Dept: ORTHOPEDICS | Facility: CLINIC | Age: 56
End: 2021-05-07

## 2021-05-07 DIAGNOSIS — Z96.651 STATUS POST TOTAL KNEE REPLACEMENT USING CEMENT, RIGHT: Primary | ICD-10-CM

## 2021-05-07 RX ORDER — HYDROMORPHONE HYDROCHLORIDE 2 MG/1
2 TABLET ORAL EVERY 4 HOURS PRN
Qty: 60 TABLET | Refills: 0 | Status: SHIPPED | OUTPATIENT
Start: 2021-05-07 | End: 2021-05-17 | Stop reason: SDUPTHER

## 2021-05-17 ENCOUNTER — TELEPHONE (OUTPATIENT)
Dept: ORTHOPEDICS | Facility: CLINIC | Age: 56
End: 2021-05-17

## 2021-05-17 DIAGNOSIS — Z96.651 STATUS POST TOTAL KNEE REPLACEMENT USING CEMENT, RIGHT: ICD-10-CM

## 2021-05-17 RX ORDER — HYDROMORPHONE HYDROCHLORIDE 2 MG/1
2 TABLET ORAL EVERY 4 HOURS PRN
Qty: 60 TABLET | Refills: 0 | Status: ON HOLD | OUTPATIENT
Start: 2021-05-17 | End: 2021-06-04 | Stop reason: HOSPADM

## 2021-05-18 ENCOUNTER — TELEPHONE (OUTPATIENT)
Dept: FAMILY MEDICINE | Facility: CLINIC | Age: 56
End: 2021-05-18

## 2021-05-24 ENCOUNTER — OFFICE VISIT (OUTPATIENT)
Dept: ORTHOPEDICS | Facility: CLINIC | Age: 56
End: 2021-05-24
Payer: OTHER GOVERNMENT

## 2021-05-24 ENCOUNTER — LAB VISIT (OUTPATIENT)
Dept: LAB | Facility: HOSPITAL | Age: 56
End: 2021-05-24
Attending: ORTHOPAEDIC SURGERY
Payer: OTHER GOVERNMENT

## 2021-05-24 VITALS — WEIGHT: 230 LBS | RESPIRATION RATE: 18 BRPM | HEIGHT: 70 IN | BODY MASS INDEX: 32.93 KG/M2

## 2021-05-24 DIAGNOSIS — Z96.651 STATUS POST TOTAL KNEE REPLACEMENT USING CEMENT, RIGHT: Primary | ICD-10-CM

## 2021-05-24 DIAGNOSIS — Z96.651 STATUS POST TOTAL KNEE REPLACEMENT USING CEMENT, RIGHT: ICD-10-CM

## 2021-05-24 DIAGNOSIS — T81.41XA POSTOPERATIVE ABSCESS INVOLVING SUTURE: ICD-10-CM

## 2021-05-24 LAB
BASOPHILS # BLD AUTO: ABNORMAL K/UL (ref 0–0.2)
BASOPHILS NFR BLD: 0 % (ref 0–1.9)
CRP SERPL-MCNC: 111.6 MG/L (ref 0–8.2)
DIFFERENTIAL METHOD: ABNORMAL
EOSINOPHIL # BLD AUTO: ABNORMAL K/UL (ref 0–0.5)
EOSINOPHIL NFR BLD: 11 % (ref 0–8)
ERYTHROCYTE [DISTWIDTH] IN BLOOD BY AUTOMATED COUNT: 17.4 % (ref 11.5–14.5)
ERYTHROCYTE [SEDIMENTATION RATE] IN BLOOD BY WESTERGREN METHOD: 102 MM/HR (ref 0–10)
HCT VFR BLD AUTO: 31.7 % (ref 40–54)
HGB BLD-MCNC: 8.9 G/DL (ref 14–18)
IMM GRANULOCYTES # BLD AUTO: ABNORMAL K/UL (ref 0–0.04)
IMM GRANULOCYTES NFR BLD AUTO: ABNORMAL % (ref 0–0.5)
LYMPHOCYTES # BLD AUTO: ABNORMAL K/UL (ref 1–4.8)
LYMPHOCYTES NFR BLD: 17 % (ref 18–48)
MCH RBC QN AUTO: 21.4 PG (ref 27–31)
MCHC RBC AUTO-ENTMCNC: 28.1 G/DL (ref 32–36)
MCV RBC AUTO: 76 FL (ref 82–98)
MONOCYTES # BLD AUTO: ABNORMAL K/UL (ref 0.3–1)
MONOCYTES NFR BLD: 9 % (ref 4–15)
NEUTROPHILS NFR BLD: 62 % (ref 38–73)
NEUTS BAND NFR BLD MANUAL: 1 %
NRBC BLD-RTO: 0 /100 WBC
OVALOCYTES BLD QL SMEAR: ABNORMAL
PLATELET # BLD AUTO: 541 K/UL (ref 150–450)
PLATELET BLD QL SMEAR: ABNORMAL
PMV BLD AUTO: 9.1 FL (ref 9.2–12.9)
POIKILOCYTOSIS BLD QL SMEAR: SLIGHT
RBC # BLD AUTO: 4.16 M/UL (ref 4.6–6.2)
WBC # BLD AUTO: 8.39 K/UL (ref 3.9–12.7)

## 2021-05-24 PROCEDURE — 99213 OFFICE O/P EST LOW 20 MIN: CPT | Mod: PBBFAC,PN | Performed by: ORTHOPAEDIC SURGERY

## 2021-05-24 PROCEDURE — 99024 POSTOP FOLLOW-UP VISIT: CPT | Mod: ,,, | Performed by: ORTHOPAEDIC SURGERY

## 2021-05-24 PROCEDURE — 99024 PR POST-OP FOLLOW-UP VISIT: ICD-10-PCS | Mod: ,,, | Performed by: ORTHOPAEDIC SURGERY

## 2021-05-24 PROCEDURE — 85651 RBC SED RATE NONAUTOMATED: CPT | Performed by: ORTHOPAEDIC SURGERY

## 2021-05-24 PROCEDURE — 99999 PR PBB SHADOW E&M-EST. PATIENT-LVL III: CPT | Mod: PBBFAC,,, | Performed by: ORTHOPAEDIC SURGERY

## 2021-05-24 PROCEDURE — 85007 BL SMEAR W/DIFF WBC COUNT: CPT | Performed by: ORTHOPAEDIC SURGERY

## 2021-05-24 PROCEDURE — 36415 COLL VENOUS BLD VENIPUNCTURE: CPT | Performed by: ORTHOPAEDIC SURGERY

## 2021-05-24 PROCEDURE — 99999 PR PBB SHADOW E&M-EST. PATIENT-LVL III: ICD-10-PCS | Mod: PBBFAC,,, | Performed by: ORTHOPAEDIC SURGERY

## 2021-05-24 PROCEDURE — 86140 C-REACTIVE PROTEIN: CPT | Performed by: ORTHOPAEDIC SURGERY

## 2021-05-24 PROCEDURE — 85027 COMPLETE CBC AUTOMATED: CPT | Performed by: ORTHOPAEDIC SURGERY

## 2021-05-24 RX ORDER — OXYCODONE AND ACETAMINOPHEN 10; 325 MG/1; MG/1
1 TABLET ORAL EVERY 6 HOURS PRN
Qty: 28 TABLET | Refills: 0 | Status: SHIPPED | OUTPATIENT
Start: 2021-05-24 | End: 2021-05-31 | Stop reason: SDUPTHER

## 2021-05-27 ENCOUNTER — OFFICE VISIT (OUTPATIENT)
Dept: ORTHOPEDICS | Facility: CLINIC | Age: 56
End: 2021-05-27
Payer: OTHER GOVERNMENT

## 2021-05-27 VITALS — WEIGHT: 230 LBS | HEIGHT: 70 IN | BODY MASS INDEX: 32.93 KG/M2 | RESPIRATION RATE: 17 BRPM

## 2021-05-27 DIAGNOSIS — T81.41XA POSTOPERATIVE ABSCESS INVOLVING SUTURE: Primary | ICD-10-CM

## 2021-05-27 DIAGNOSIS — Z96.651 STATUS POST TOTAL KNEE REPLACEMENT USING CEMENT, RIGHT: Primary | ICD-10-CM

## 2021-05-27 DIAGNOSIS — Z01.818 PRE-OP TESTING: Primary | ICD-10-CM

## 2021-05-27 DIAGNOSIS — Z96.651 STATUS POST TOTAL KNEE REPLACEMENT USING CEMENT, RIGHT: ICD-10-CM

## 2021-05-27 DIAGNOSIS — Z96.659 INFECTION OF TOTAL KNEE REPLACEMENT: ICD-10-CM

## 2021-05-27 DIAGNOSIS — T84.59XA INFECTION OF TOTAL KNEE REPLACEMENT: ICD-10-CM

## 2021-05-27 LAB
APPEARANCE FLD: NORMAL
BODY FLD TYPE: NORMAL
COLOR FLD: NORMAL
LYMPHOCYTES NFR FLD MANUAL: 3 %
MONOS+MACROS NFR FLD MANUAL: 3 %
NEUTROPHILS NFR FLD MANUAL: 94 %
WBC # FLD: NORMAL /CU MM

## 2021-05-27 PROCEDURE — 20610 DRAIN/INJ JOINT/BURSA W/O US: CPT | Mod: PBBFAC,PN | Performed by: ORTHOPAEDIC SURGERY

## 2021-05-27 PROCEDURE — 20610 LARGE JOINT ASPIRATION/INJECTION: R KNEE: ICD-10-PCS | Mod: S$PBB,58,RT, | Performed by: ORTHOPAEDIC SURGERY

## 2021-05-27 PROCEDURE — 87186 SC STD MICRODIL/AGAR DIL: CPT | Performed by: ORTHOPAEDIC SURGERY

## 2021-05-27 PROCEDURE — 89051 BODY FLUID CELL COUNT: CPT | Performed by: ORTHOPAEDIC SURGERY

## 2021-05-27 PROCEDURE — 99024 PR POST-OP FOLLOW-UP VISIT: ICD-10-PCS | Mod: ,,, | Performed by: ORTHOPAEDIC SURGERY

## 2021-05-27 PROCEDURE — 87102 FUNGUS ISOLATION CULTURE: CPT | Performed by: ORTHOPAEDIC SURGERY

## 2021-05-27 PROCEDURE — 99213 OFFICE O/P EST LOW 20 MIN: CPT | Mod: PBBFAC,PN | Performed by: ORTHOPAEDIC SURGERY

## 2021-05-27 PROCEDURE — 99999 PR PBB SHADOW E&M-EST. PATIENT-LVL III: ICD-10-PCS | Mod: PBBFAC,,, | Performed by: ORTHOPAEDIC SURGERY

## 2021-05-27 PROCEDURE — 87077 CULTURE AEROBIC IDENTIFY: CPT | Performed by: ORTHOPAEDIC SURGERY

## 2021-05-27 PROCEDURE — 99024 POSTOP FOLLOW-UP VISIT: CPT | Mod: ,,, | Performed by: ORTHOPAEDIC SURGERY

## 2021-05-27 PROCEDURE — 87075 CULTR BACTERIA EXCEPT BLOOD: CPT | Performed by: ORTHOPAEDIC SURGERY

## 2021-05-27 PROCEDURE — 87070 CULTURE OTHR SPECIMN AEROBIC: CPT | Performed by: ORTHOPAEDIC SURGERY

## 2021-05-27 PROCEDURE — 99999 PR PBB SHADOW E&M-EST. PATIENT-LVL III: CPT | Mod: PBBFAC,,, | Performed by: ORTHOPAEDIC SURGERY

## 2021-05-27 RX ORDER — MUPIROCIN 20 MG/G
OINTMENT TOPICAL
Status: CANCELLED | OUTPATIENT
Start: 2021-05-27

## 2021-05-27 RX ORDER — CEFAZOLIN SODIUM 2 G/50ML
2 SOLUTION INTRAVENOUS
Status: CANCELLED | OUTPATIENT
Start: 2021-05-27

## 2021-05-29 ENCOUNTER — LAB VISIT (OUTPATIENT)
Dept: PRIMARY CARE CLINIC | Facility: CLINIC | Age: 56
End: 2021-05-29
Payer: OTHER GOVERNMENT

## 2021-05-29 DIAGNOSIS — Z01.818 PRE-OP TESTING: ICD-10-CM

## 2021-05-29 PROCEDURE — U0005 INFEC AGEN DETEC AMPLI PROBE: HCPCS | Performed by: ORTHOPAEDIC SURGERY

## 2021-05-29 PROCEDURE — U0003 INFECTIOUS AGENT DETECTION BY NUCLEIC ACID (DNA OR RNA); SEVERE ACUTE RESPIRATORY SYNDROME CORONAVIRUS 2 (SARS-COV-2) (CORONAVIRUS DISEASE [COVID-19]), AMPLIFIED PROBE TECHNIQUE, MAKING USE OF HIGH THROUGHPUT TECHNOLOGIES AS DESCRIBED BY CMS-2020-01-R: HCPCS | Performed by: ORTHOPAEDIC SURGERY

## 2021-05-30 LAB — SARS-COV-2 RNA RESP QL NAA+PROBE: NOT DETECTED

## 2021-05-31 ENCOUNTER — ANESTHESIA EVENT (OUTPATIENT)
Dept: SURGERY | Facility: HOSPITAL | Age: 56
DRG: 468 | End: 2021-05-31
Payer: OTHER GOVERNMENT

## 2021-05-31 LAB — BACTERIA SPEC AEROBE CULT: ABNORMAL

## 2021-05-31 RX ORDER — OXYCODONE AND ACETAMINOPHEN 10; 325 MG/1; MG/1
1 TABLET ORAL EVERY 6 HOURS PRN
Qty: 28 TABLET | Refills: 0 | Status: ON HOLD | OUTPATIENT
Start: 2021-05-31 | End: 2021-06-04 | Stop reason: SDUPTHER

## 2021-06-01 ENCOUNTER — HOSPITAL ENCOUNTER (INPATIENT)
Facility: HOSPITAL | Age: 56
LOS: 3 days | Discharge: HOME HEALTH | DRG: 468 | End: 2021-06-04
Attending: ORTHOPAEDIC SURGERY | Admitting: INTERNAL MEDICINE
Payer: OTHER GOVERNMENT

## 2021-06-01 ENCOUNTER — ANESTHESIA (OUTPATIENT)
Dept: SURGERY | Facility: HOSPITAL | Age: 56
DRG: 468 | End: 2021-06-01
Payer: OTHER GOVERNMENT

## 2021-06-01 DIAGNOSIS — Z96.659 INFECTION OF TOTAL KNEE REPLACEMENT, SUBSEQUENT ENCOUNTER: ICD-10-CM

## 2021-06-01 DIAGNOSIS — Z01.818 PRE-OP TESTING: ICD-10-CM

## 2021-06-01 DIAGNOSIS — Z96.659 INFECTION OF TOTAL KNEE REPLACEMENT: ICD-10-CM

## 2021-06-01 DIAGNOSIS — T84.59XA INFECTION OF TOTAL KNEE REPLACEMENT: ICD-10-CM

## 2021-06-01 DIAGNOSIS — T84.59XD INFECTION OF TOTAL KNEE REPLACEMENT, SUBSEQUENT ENCOUNTER: ICD-10-CM

## 2021-06-01 LAB
ANION GAP SERPL CALC-SCNC: 8 MMOL/L (ref 8–16)
BACTERIA SPEC ANAEROBE CULT: NORMAL
BASOPHILS # BLD AUTO: 0.05 K/UL (ref 0–0.2)
BASOPHILS NFR BLD: 0.7 % (ref 0–1.9)
BUN SERPL-MCNC: 10 MG/DL (ref 6–20)
CALCIUM SERPL-MCNC: 9.1 MG/DL (ref 8.7–10.5)
CHLORIDE SERPL-SCNC: 107 MMOL/L (ref 95–110)
CO2 SERPL-SCNC: 25 MMOL/L (ref 23–29)
CREAT SERPL-MCNC: 0.9 MG/DL (ref 0.5–1.4)
DIFFERENTIAL METHOD: ABNORMAL
EOSINOPHIL # BLD AUTO: 0 K/UL (ref 0–0.5)
EOSINOPHIL NFR BLD: 0.3 % (ref 0–8)
ERYTHROCYTE [DISTWIDTH] IN BLOOD BY AUTOMATED COUNT: 18.2 % (ref 11.5–14.5)
EST. GFR  (AFRICAN AMERICAN): >60 ML/MIN/1.73 M^2
EST. GFR  (NON AFRICAN AMERICAN): >60 ML/MIN/1.73 M^2
GLUCOSE SERPL-MCNC: 100 MG/DL (ref 70–110)
HCT VFR BLD AUTO: 31.2 % (ref 40–54)
HGB BLD-MCNC: 8.8 G/DL (ref 14–18)
IMM GRANULOCYTES # BLD AUTO: 0.03 K/UL (ref 0–0.04)
IMM GRANULOCYTES NFR BLD AUTO: 0.4 % (ref 0–0.5)
LYMPHOCYTES # BLD AUTO: 1.8 K/UL (ref 1–4.8)
LYMPHOCYTES NFR BLD: 27.3 % (ref 18–48)
MCH RBC QN AUTO: 21.1 PG (ref 27–31)
MCHC RBC AUTO-ENTMCNC: 28.2 G/DL (ref 32–36)
MCV RBC AUTO: 75 FL (ref 82–98)
MONOCYTES # BLD AUTO: 0.6 K/UL (ref 0.3–1)
MONOCYTES NFR BLD: 8.8 % (ref 4–15)
NEUTROPHILS # BLD AUTO: 4.2 K/UL (ref 1.8–7.7)
NEUTROPHILS NFR BLD: 62.5 % (ref 38–73)
NRBC BLD-RTO: 0 /100 WBC
PLATELET # BLD AUTO: 511 K/UL (ref 150–450)
PMV BLD AUTO: 8.7 FL (ref 9.2–12.9)
POTASSIUM SERPL-SCNC: 4.3 MMOL/L (ref 3.5–5.1)
RBC # BLD AUTO: 4.17 M/UL (ref 4.6–6.2)
SODIUM SERPL-SCNC: 140 MMOL/L (ref 136–145)
WBC # BLD AUTO: 6.73 K/UL (ref 3.9–12.7)

## 2021-06-01 PROCEDURE — 25000003 PHARM REV CODE 250: Performed by: ANESTHESIOLOGY

## 2021-06-01 PROCEDURE — 87070 CULTURE OTHR SPECIMN AEROBIC: CPT | Performed by: ORTHOPAEDIC SURGERY

## 2021-06-01 PROCEDURE — 80048 BASIC METABOLIC PNL TOTAL CA: CPT | Performed by: ORTHOPAEDIC SURGERY

## 2021-06-01 PROCEDURE — 99900103 DSU ONLY-NO CHARGE-INITIAL HR (STAT): Performed by: ORTHOPAEDIC SURGERY

## 2021-06-01 PROCEDURE — 87077 CULTURE AEROBIC IDENTIFY: CPT | Mod: 59 | Performed by: ORTHOPAEDIC SURGERY

## 2021-06-01 PROCEDURE — 27200651 HC AIRWAY, LMA: Performed by: ANESTHESIOLOGY

## 2021-06-01 PROCEDURE — 85025 COMPLETE CBC W/AUTO DIFF WBC: CPT | Performed by: ORTHOPAEDIC SURGERY

## 2021-06-01 PROCEDURE — 27488 PR REMOVAL OF KNEE PROSTHESIS: ICD-10-PCS | Mod: 78,RT,, | Performed by: ORTHOPAEDIC SURGERY

## 2021-06-01 PROCEDURE — 63600175 PHARM REV CODE 636 W HCPCS: Performed by: ORTHOPAEDIC SURGERY

## 2021-06-01 PROCEDURE — 71000033 HC RECOVERY, INTIAL HOUR: Performed by: ORTHOPAEDIC SURGERY

## 2021-06-01 PROCEDURE — 87186 SC STD MICRODIL/AGAR DIL: CPT | Mod: 59 | Performed by: ORTHOPAEDIC SURGERY

## 2021-06-01 PROCEDURE — 99900104 DSU ONLY-NO CHARGE-EA ADD'L HR (STAT): Performed by: ORTHOPAEDIC SURGERY

## 2021-06-01 PROCEDURE — 25000003 PHARM REV CODE 250: Performed by: ORTHOPAEDIC SURGERY

## 2021-06-01 PROCEDURE — 25000003 PHARM REV CODE 250: Performed by: REGISTERED NURSE

## 2021-06-01 PROCEDURE — 36000704 HC OR TIME LEV I 1ST 15 MIN: Performed by: ORTHOPAEDIC SURGERY

## 2021-06-01 PROCEDURE — 27201423 OPTIME MED/SURG SUP & DEVICES STERILE SUPPLY: Performed by: ORTHOPAEDIC SURGERY

## 2021-06-01 PROCEDURE — 11981 PR INSERT, DRUG DELIVERY IMPLANT, BIORESORB/BIODEGR/NON-BIODEGR: ICD-10-PCS | Mod: 78,51,, | Performed by: ORTHOPAEDIC SURGERY

## 2021-06-01 PROCEDURE — 63600175 PHARM REV CODE 636 W HCPCS: Performed by: REGISTERED NURSE

## 2021-06-01 PROCEDURE — C1776 JOINT DEVICE (IMPLANTABLE): HCPCS | Performed by: ORTHOPAEDIC SURGERY

## 2021-06-01 PROCEDURE — 27000221 HC OXYGEN, UP TO 24 HOURS

## 2021-06-01 PROCEDURE — D9220A PRA ANESTHESIA: Mod: ,,, | Performed by: ANESTHESIOLOGY

## 2021-06-01 PROCEDURE — 11981 INSERTION DRUG DLVR IMPLANT: CPT | Mod: 78,51,, | Performed by: ORTHOPAEDIC SURGERY

## 2021-06-01 PROCEDURE — 25000003 PHARM REV CODE 250: Performed by: NURSE PRACTITIONER

## 2021-06-01 PROCEDURE — 36000705 HC OR TIME LEV I EA ADD 15 MIN: Performed by: ORTHOPAEDIC SURGERY

## 2021-06-01 PROCEDURE — 27488 REMOVAL OF KNEE PROSTHESIS: CPT | Mod: 78,RT,, | Performed by: ORTHOPAEDIC SURGERY

## 2021-06-01 PROCEDURE — 87075 CULTR BACTERIA EXCEPT BLOOD: CPT | Mod: 59 | Performed by: ORTHOPAEDIC SURGERY

## 2021-06-01 PROCEDURE — C1713 ANCHOR/SCREW BN/BN,TIS/BN: HCPCS | Performed by: ORTHOPAEDIC SURGERY

## 2021-06-01 PROCEDURE — 37000008 HC ANESTHESIA 1ST 15 MINUTES: Performed by: ORTHOPAEDIC SURGERY

## 2021-06-01 PROCEDURE — 94761 N-INVAS EAR/PLS OXIMETRY MLT: CPT

## 2021-06-01 PROCEDURE — 37000009 HC ANESTHESIA EA ADD 15 MINS: Performed by: ORTHOPAEDIC SURGERY

## 2021-06-01 PROCEDURE — D9220A PRA ANESTHESIA: ICD-10-PCS | Mod: ,,, | Performed by: ANESTHESIOLOGY

## 2021-06-01 PROCEDURE — 71000039 HC RECOVERY, EACH ADD'L HOUR: Performed by: ORTHOPAEDIC SURGERY

## 2021-06-01 PROCEDURE — 36415 COLL VENOUS BLD VENIPUNCTURE: CPT | Performed by: ORTHOPAEDIC SURGERY

## 2021-06-01 PROCEDURE — 12000002 HC ACUTE/MED SURGE SEMI-PRIVATE ROOM

## 2021-06-01 DEVICE — TOBRA FULL DOSE ANTIBIOTIC BONE CEMENT, 10 PACK CATALOG NUMBER IS 6197-9-010
Type: IMPLANTABLE DEVICE | Site: KNEE | Status: FUNCTIONAL
Brand: SIMPLEX

## 2021-06-01 DEVICE — REMEDY FEMORAL COMPONENT 74MM
Type: IMPLANTABLE DEVICE | Site: KNEE | Status: FUNCTIONAL
Brand: REMEDY SPACER

## 2021-06-01 RX ORDER — CEFAZOLIN SODIUM 2 G/50ML
2 SOLUTION INTRAVENOUS
Status: COMPLETED | OUTPATIENT
Start: 2021-06-01 | End: 2021-06-01

## 2021-06-01 RX ORDER — ONDANSETRON 2 MG/ML
INJECTION INTRAMUSCULAR; INTRAVENOUS
Status: DISCONTINUED | OUTPATIENT
Start: 2021-06-01 | End: 2021-06-01

## 2021-06-01 RX ORDER — DEXAMETHASONE SODIUM PHOSPHATE 4 MG/ML
INJECTION, SOLUTION INTRA-ARTICULAR; INTRALESIONAL; INTRAMUSCULAR; INTRAVENOUS; SOFT TISSUE
Status: DISCONTINUED | OUTPATIENT
Start: 2021-06-01 | End: 2021-06-01

## 2021-06-01 RX ORDER — ACETAMINOPHEN 10 MG/ML
INJECTION, SOLUTION INTRAVENOUS
Status: DISCONTINUED | OUTPATIENT
Start: 2021-06-01 | End: 2021-06-01

## 2021-06-01 RX ORDER — MUPIROCIN 20 MG/G
OINTMENT TOPICAL
Status: DISCONTINUED | OUTPATIENT
Start: 2021-06-01 | End: 2021-06-01

## 2021-06-01 RX ORDER — LOSARTAN POTASSIUM 25 MG/1
25 TABLET ORAL DAILY
Status: DISCONTINUED | OUTPATIENT
Start: 2021-06-02 | End: 2021-06-04 | Stop reason: HOSPADM

## 2021-06-01 RX ORDER — FAMOTIDINE 20 MG/1
20 TABLET, FILM COATED ORAL 2 TIMES DAILY
Status: DISCONTINUED | OUTPATIENT
Start: 2021-06-01 | End: 2021-06-04 | Stop reason: HOSPADM

## 2021-06-01 RX ORDER — DEXTROSE MONOHYDRATE AND SODIUM CHLORIDE 5; .9 G/100ML; G/100ML
INJECTION, SOLUTION INTRAVENOUS CONTINUOUS
Status: DISCONTINUED | OUTPATIENT
Start: 2021-06-01 | End: 2021-06-03

## 2021-06-01 RX ORDER — OXYCODONE HYDROCHLORIDE 5 MG/1
5 TABLET ORAL ONCE AS NEEDED
Status: COMPLETED | OUTPATIENT
Start: 2021-06-01 | End: 2021-06-01

## 2021-06-01 RX ORDER — ONDANSETRON 2 MG/ML
4 INJECTION INTRAMUSCULAR; INTRAVENOUS ONCE AS NEEDED
Status: DISCONTINUED | OUTPATIENT
Start: 2021-06-01 | End: 2021-06-01

## 2021-06-01 RX ORDER — VANCOMYCIN HCL IN 5 % DEXTROSE 1G/250ML
1000 PLASTIC BAG, INJECTION (ML) INTRAVENOUS
Status: DISCONTINUED | OUTPATIENT
Start: 2021-06-02 | End: 2021-06-03

## 2021-06-01 RX ORDER — LORAZEPAM 2 MG/ML
0.25 INJECTION INTRAMUSCULAR ONCE AS NEEDED
Status: DISCONTINUED | OUTPATIENT
Start: 2021-06-01 | End: 2021-06-01

## 2021-06-01 RX ORDER — PHENYLEPHRINE HYDROCHLORIDE 10 MG/ML
INJECTION INTRAVENOUS
Status: DISCONTINUED | OUTPATIENT
Start: 2021-06-01 | End: 2021-06-01

## 2021-06-01 RX ORDER — MUPIROCIN 20 MG/G
1 OINTMENT TOPICAL 2 TIMES DAILY
Status: DISCONTINUED | OUTPATIENT
Start: 2021-06-01 | End: 2021-06-04 | Stop reason: HOSPADM

## 2021-06-01 RX ORDER — PROPOFOL 10 MG/ML
VIAL (ML) INTRAVENOUS
Status: DISCONTINUED | OUTPATIENT
Start: 2021-06-01 | End: 2021-06-01

## 2021-06-01 RX ORDER — FENTANYL CITRATE 50 UG/ML
INJECTION, SOLUTION INTRAMUSCULAR; INTRAVENOUS
Status: DISCONTINUED | OUTPATIENT
Start: 2021-06-01 | End: 2021-06-01

## 2021-06-01 RX ORDER — HYDROMORPHONE HYDROCHLORIDE 2 MG/ML
INJECTION, SOLUTION INTRAMUSCULAR; INTRAVENOUS; SUBCUTANEOUS
Status: DISCONTINUED | OUTPATIENT
Start: 2021-06-01 | End: 2021-06-01

## 2021-06-01 RX ORDER — MEPERIDINE HYDROCHLORIDE 50 MG/ML
12.5 INJECTION INTRAMUSCULAR; INTRAVENOUS; SUBCUTANEOUS ONCE AS NEEDED
Status: DISCONTINUED | OUTPATIENT
Start: 2021-06-01 | End: 2021-06-01

## 2021-06-01 RX ORDER — LIDOCAINE HYDROCHLORIDE 20 MG/ML
INJECTION INTRAVENOUS
Status: DISCONTINUED | OUTPATIENT
Start: 2021-06-01 | End: 2021-06-01

## 2021-06-01 RX ORDER — OXYCODONE AND ACETAMINOPHEN 5; 325 MG/1; MG/1
1 TABLET ORAL EVERY 4 HOURS PRN
Status: DISCONTINUED | OUTPATIENT
Start: 2021-06-01 | End: 2021-06-02

## 2021-06-01 RX ORDER — KETOROLAC TROMETHAMINE 30 MG/ML
INJECTION, SOLUTION INTRAMUSCULAR; INTRAVENOUS
Status: DISCONTINUED | OUTPATIENT
Start: 2021-06-01 | End: 2021-06-01

## 2021-06-01 RX ORDER — SODIUM CHLORIDE 0.9 % (FLUSH) 0.9 %
10 SYRINGE (ML) INJECTION
Status: DISCONTINUED | OUTPATIENT
Start: 2021-06-01 | End: 2021-06-04 | Stop reason: HOSPADM

## 2021-06-01 RX ORDER — DOCUSATE SODIUM 100 MG/1
100 CAPSULE, LIQUID FILLED ORAL EVERY 12 HOURS
Status: DISCONTINUED | OUTPATIENT
Start: 2021-06-01 | End: 2021-06-04 | Stop reason: HOSPADM

## 2021-06-01 RX ORDER — LOPERAMIDE HYDROCHLORIDE 2 MG/1
2 CAPSULE ORAL CONTINUOUS PRN
Status: DISCONTINUED | OUTPATIENT
Start: 2021-06-01 | End: 2021-06-04 | Stop reason: HOSPADM

## 2021-06-01 RX ORDER — TRANEXAMIC ACID 100 MG/ML
INJECTION, SOLUTION INTRAVENOUS
Status: DISCONTINUED | OUTPATIENT
Start: 2021-06-01 | End: 2021-06-01

## 2021-06-01 RX ORDER — PROCHLORPERAZINE EDISYLATE 5 MG/ML
5 INJECTION INTRAMUSCULAR; INTRAVENOUS EVERY 30 MIN PRN
Status: DISCONTINUED | OUTPATIENT
Start: 2021-06-01 | End: 2021-06-01

## 2021-06-01 RX ORDER — FENTANYL CITRATE 50 UG/ML
25 INJECTION, SOLUTION INTRAMUSCULAR; INTRAVENOUS EVERY 5 MIN PRN
Status: DISCONTINUED | OUTPATIENT
Start: 2021-06-01 | End: 2021-06-01

## 2021-06-01 RX ORDER — HYDROMORPHONE HYDROCHLORIDE 2 MG/ML
0.2 INJECTION, SOLUTION INTRAMUSCULAR; INTRAVENOUS; SUBCUTANEOUS EVERY 5 MIN PRN
Status: DISCONTINUED | OUTPATIENT
Start: 2021-06-01 | End: 2021-06-01

## 2021-06-01 RX ORDER — VANCOMYCIN HYDROCHLORIDE 1 G/20ML
INJECTION, POWDER, LYOPHILIZED, FOR SOLUTION INTRAVENOUS
Status: DISCONTINUED | OUTPATIENT
Start: 2021-06-01 | End: 2021-06-01 | Stop reason: HOSPADM

## 2021-06-01 RX ORDER — LIDOCAINE HYDROCHLORIDE 10 MG/ML
1 INJECTION, SOLUTION EPIDURAL; INFILTRATION; INTRACAUDAL; PERINEURAL ONCE
Status: DISCONTINUED | OUTPATIENT
Start: 2021-06-01 | End: 2021-06-01

## 2021-06-01 RX ORDER — NAPROXEN SODIUM 220 MG/1
81 TABLET, FILM COATED ORAL 2 TIMES DAILY
Status: DISCONTINUED | OUTPATIENT
Start: 2021-06-01 | End: 2021-06-04 | Stop reason: HOSPADM

## 2021-06-01 RX ORDER — CEFAZOLIN SODIUM 2 G/50ML
2 SOLUTION INTRAVENOUS
Status: COMPLETED | OUTPATIENT
Start: 2021-06-01 | End: 2021-06-03

## 2021-06-01 RX ORDER — MIDAZOLAM HYDROCHLORIDE 1 MG/ML
INJECTION, SOLUTION INTRAMUSCULAR; INTRAVENOUS
Status: DISCONTINUED | OUTPATIENT
Start: 2021-06-01 | End: 2021-06-01

## 2021-06-01 RX ADMIN — HYDROMORPHONE HYDROCHLORIDE 0.6 MG: 2 INJECTION, SOLUTION INTRAMUSCULAR; INTRAVENOUS; SUBCUTANEOUS at 01:06

## 2021-06-01 RX ADMIN — OXYCODONE 5 MG: 5 TABLET ORAL at 04:06

## 2021-06-01 RX ADMIN — MIDAZOLAM 1 MG: 1 INJECTION INTRAMUSCULAR; INTRAVENOUS at 12:06

## 2021-06-01 RX ADMIN — OXYCODONE HYDROCHLORIDE AND ACETAMINOPHEN 1 TABLET: 5; 325 TABLET ORAL at 05:06

## 2021-06-01 RX ADMIN — OXYCODONE 5 MG: 5 TABLET ORAL at 03:06

## 2021-06-01 RX ADMIN — PHENYLEPHRINE HYDROCHLORIDE 100 MCG: 10 INJECTION INTRAVENOUS at 12:06

## 2021-06-01 RX ADMIN — ONDANSETRON 4 MG: 2 INJECTION, SOLUTION INTRAMUSCULAR; INTRAVENOUS at 12:06

## 2021-06-01 RX ADMIN — PROPOFOL 50 MG: 10 INJECTION, EMULSION INTRAVENOUS at 01:06

## 2021-06-01 RX ADMIN — ACETAMINOPHEN 1000 MG: 10 INJECTION, SOLUTION INTRAVENOUS at 12:06

## 2021-06-01 RX ADMIN — SODIUM CHLORIDE, SODIUM GLUCONATE, SODIUM ACETATE, POTASSIUM CHLORIDE, MAGNESIUM CHLORIDE, SODIUM PHOSPHATE, DIBASIC, AND POTASSIUM PHOSPHATE: .53; .5; .37; .037; .03; .012; .00082 INJECTION, SOLUTION INTRAVENOUS at 10:06

## 2021-06-01 RX ADMIN — MIDAZOLAM 2 MG: 1 INJECTION INTRAMUSCULAR; INTRAVENOUS at 12:06

## 2021-06-01 RX ADMIN — HYDROMORPHONE HYDROCHLORIDE 0.4 MG: 2 INJECTION, SOLUTION INTRAMUSCULAR; INTRAVENOUS; SUBCUTANEOUS at 01:06

## 2021-06-01 RX ADMIN — LIDOCAINE HYDROCHLORIDE 75 MG: 20 INJECTION, SOLUTION INTRAVENOUS at 12:06

## 2021-06-01 RX ADMIN — TRANEXAMIC ACID 1000 MG: 100 INJECTION, SOLUTION INTRAVENOUS at 02:06

## 2021-06-01 RX ADMIN — VANCOMYCIN HYDROCHLORIDE 1000 MG: 1 INJECTION, POWDER, LYOPHILIZED, FOR SOLUTION INTRAVENOUS at 11:06

## 2021-06-01 RX ADMIN — MUPIROCIN 1 G: 20 OINTMENT TOPICAL at 10:06

## 2021-06-01 RX ADMIN — FAMOTIDINE 20 MG: 20 TABLET, FILM COATED ORAL at 10:06

## 2021-06-01 RX ADMIN — ASPIRIN 81 MG CHEWABLE TABLET 81 MG: 81 TABLET CHEWABLE at 10:06

## 2021-06-01 RX ADMIN — PROPOFOL 200 MG: 10 INJECTION, EMULSION INTRAVENOUS at 12:06

## 2021-06-01 RX ADMIN — KETOROLAC TROMETHAMINE 30 MG: 30 INJECTION, SOLUTION INTRAMUSCULAR; INTRAVENOUS at 02:06

## 2021-06-01 RX ADMIN — CEFAZOLIN SODIUM 2 G: 2 SOLUTION INTRAVENOUS at 10:06

## 2021-06-01 RX ADMIN — TRANEXAMIC ACID 1000 MG: 100 INJECTION, SOLUTION INTRAVENOUS at 12:06

## 2021-06-01 RX ADMIN — SODIUM CHLORIDE, SODIUM GLUCONATE, SODIUM ACETATE, POTASSIUM CHLORIDE, MAGNESIUM CHLORIDE, SODIUM PHOSPHATE, DIBASIC, AND POTASSIUM PHOSPHATE: .53; .5; .37; .037; .03; .012; .00082 INJECTION, SOLUTION INTRAVENOUS at 01:06

## 2021-06-01 RX ADMIN — ROPIVACAINE HYDROCHLORIDE: 5 INJECTION, SOLUTION EPIDURAL; INFILTRATION; PERINEURAL at 12:06

## 2021-06-01 RX ADMIN — DEXAMETHASONE SODIUM PHOSPHATE 4 MG: 4 INJECTION, SOLUTION INTRA-ARTICULAR; INTRALESIONAL; INTRAMUSCULAR; INTRAVENOUS; SOFT TISSUE at 12:06

## 2021-06-01 RX ADMIN — CEFAZOLIN SODIUM 2 G: 2 SOLUTION INTRAVENOUS at 12:06

## 2021-06-01 RX ADMIN — MUPIROCIN: 20 OINTMENT TOPICAL at 10:06

## 2021-06-01 RX ADMIN — OXYCODONE HYDROCHLORIDE AND ACETAMINOPHEN 1 TABLET: 5; 325 TABLET ORAL at 10:06

## 2021-06-01 RX ADMIN — FENTANYL CITRATE 100 MCG: 50 INJECTION, SOLUTION INTRAMUSCULAR; INTRAVENOUS at 12:06

## 2021-06-01 RX ADMIN — GLYCOPYRROLATE 0.2 MG: 0.2 INJECTION, SOLUTION INTRAMUSCULAR; INTRAVITREAL at 12:06

## 2021-06-02 ENCOUNTER — OUTSIDE PLACE OF SERVICE (OUTPATIENT)
Dept: INFECTIOUS DISEASES | Facility: CLINIC | Age: 56
End: 2021-06-02
Payer: OTHER GOVERNMENT

## 2021-06-02 DIAGNOSIS — Z96.659 INFECTION OF TOTAL KNEE REPLACEMENT: ICD-10-CM

## 2021-06-02 DIAGNOSIS — M17.10 ARTHRITIS OF KNEE: ICD-10-CM

## 2021-06-02 DIAGNOSIS — T84.59XA INFECTION OF TOTAL KNEE REPLACEMENT: ICD-10-CM

## 2021-06-02 LAB
ABO + RH BLD: NORMAL
ANION GAP SERPL CALC-SCNC: 8 MMOL/L (ref 8–16)
BASOPHILS # BLD AUTO: 0.01 K/UL (ref 0–0.2)
BASOPHILS NFR BLD: 0.2 % (ref 0–1.9)
BLD GP AB SCN CELLS X3 SERPL QL: NORMAL
BLD PROD TYP BPU: NORMAL
BLD PROD TYP BPU: NORMAL
BLOOD UNIT EXPIRATION DATE: NORMAL
BLOOD UNIT EXPIRATION DATE: NORMAL
BLOOD UNIT TYPE CODE: 6200
BLOOD UNIT TYPE CODE: 6200
BLOOD UNIT TYPE: NORMAL
BLOOD UNIT TYPE: NORMAL
BUN SERPL-MCNC: 8 MG/DL (ref 6–20)
CALCIUM SERPL-MCNC: 8 MG/DL (ref 8.7–10.5)
CHLORIDE SERPL-SCNC: 107 MMOL/L (ref 95–110)
CO2 SERPL-SCNC: 23 MMOL/L (ref 23–29)
CODING SYSTEM: NORMAL
CODING SYSTEM: NORMAL
CREAT SERPL-MCNC: 0.8 MG/DL (ref 0.5–1.4)
DIFFERENTIAL METHOD: ABNORMAL
DISPENSE STATUS: NORMAL
DISPENSE STATUS: NORMAL
EOSINOPHIL # BLD AUTO: 0 K/UL (ref 0–0.5)
EOSINOPHIL NFR BLD: 0 % (ref 0–8)
ERYTHROCYTE [DISTWIDTH] IN BLOOD BY AUTOMATED COUNT: 17.9 % (ref 11.5–14.5)
EST. GFR  (AFRICAN AMERICAN): >60 ML/MIN/1.73 M^2
EST. GFR  (NON AFRICAN AMERICAN): >60 ML/MIN/1.73 M^2
GLUCOSE SERPL-MCNC: 124 MG/DL (ref 70–110)
HCT VFR BLD AUTO: 23.8 % (ref 40–54)
HGB BLD-MCNC: 6.7 G/DL (ref 14–18)
IMM GRANULOCYTES # BLD AUTO: 0.02 K/UL (ref 0–0.04)
IMM GRANULOCYTES NFR BLD AUTO: 0.4 % (ref 0–0.5)
LYMPHOCYTES # BLD AUTO: 0.7 K/UL (ref 1–4.8)
LYMPHOCYTES NFR BLD: 13.4 % (ref 18–48)
MCH RBC QN AUTO: 20.8 PG (ref 27–31)
MCHC RBC AUTO-ENTMCNC: 28.2 G/DL (ref 32–36)
MCV RBC AUTO: 74 FL (ref 82–98)
MONOCYTES # BLD AUTO: 0.5 K/UL (ref 0.3–1)
MONOCYTES NFR BLD: 8.6 % (ref 4–15)
NEUTROPHILS # BLD AUTO: 4.2 K/UL (ref 1.8–7.7)
NEUTROPHILS NFR BLD: 77.4 % (ref 38–73)
NRBC BLD-RTO: 0 /100 WBC
NUM UNITS TRANS PACKED RBC: NORMAL
NUM UNITS TRANS PACKED RBC: NORMAL
PLATELET # BLD AUTO: 413 K/UL (ref 150–450)
PMV BLD AUTO: 8.3 FL (ref 9.2–12.9)
POTASSIUM SERPL-SCNC: 4.6 MMOL/L (ref 3.5–5.1)
RBC # BLD AUTO: 3.22 M/UL (ref 4.6–6.2)
SODIUM SERPL-SCNC: 138 MMOL/L (ref 136–145)
WBC # BLD AUTO: 5.44 K/UL (ref 3.9–12.7)

## 2021-06-02 PROCEDURE — 86900 BLOOD TYPING SEROLOGIC ABO: CPT | Performed by: NURSE PRACTITIONER

## 2021-06-02 PROCEDURE — 12000002 HC ACUTE/MED SURGE SEMI-PRIVATE ROOM

## 2021-06-02 PROCEDURE — 80048 BASIC METABOLIC PNL TOTAL CA: CPT | Performed by: ORTHOPAEDIC SURGERY

## 2021-06-02 PROCEDURE — 99223 1ST HOSP IP/OBS HIGH 75: CPT | Mod: S$GLB,,, | Performed by: INTERNAL MEDICINE

## 2021-06-02 PROCEDURE — 36430 TRANSFUSION BLD/BLD COMPNT: CPT

## 2021-06-02 PROCEDURE — P9016 RBC LEUKOCYTES REDUCED: HCPCS | Performed by: NURSE PRACTITIONER

## 2021-06-02 PROCEDURE — 85025 COMPLETE CBC W/AUTO DIFF WBC: CPT | Performed by: ORTHOPAEDIC SURGERY

## 2021-06-02 PROCEDURE — 25000003 PHARM REV CODE 250: Performed by: NURSE PRACTITIONER

## 2021-06-02 PROCEDURE — 25000003 PHARM REV CODE 250: Performed by: ORTHOPAEDIC SURGERY

## 2021-06-02 PROCEDURE — 99223 PR INITIAL HOSPITAL CARE,LEVL III: ICD-10-PCS | Mod: S$GLB,,, | Performed by: INTERNAL MEDICINE

## 2021-06-02 PROCEDURE — 63600175 PHARM REV CODE 636 W HCPCS: Performed by: ORTHOPAEDIC SURGERY

## 2021-06-02 PROCEDURE — 86920 COMPATIBILITY TEST SPIN: CPT | Performed by: NURSE PRACTITIONER

## 2021-06-02 PROCEDURE — 94761 N-INVAS EAR/PLS OXIMETRY MLT: CPT

## 2021-06-02 PROCEDURE — 27000221 HC OXYGEN, UP TO 24 HOURS

## 2021-06-02 PROCEDURE — 36415 COLL VENOUS BLD VENIPUNCTURE: CPT | Performed by: ORTHOPAEDIC SURGERY

## 2021-06-02 RX ORDER — HYDROCODONE BITARTRATE AND ACETAMINOPHEN 500; 5 MG/1; MG/1
TABLET ORAL
Status: DISCONTINUED | OUTPATIENT
Start: 2021-06-02 | End: 2021-06-04 | Stop reason: HOSPADM

## 2021-06-02 RX ORDER — OXYCODONE AND ACETAMINOPHEN 10; 325 MG/1; MG/1
1 TABLET ORAL EVERY 4 HOURS PRN
Status: DISCONTINUED | OUTPATIENT
Start: 2021-06-02 | End: 2021-06-03

## 2021-06-02 RX ORDER — OXYCODONE AND ACETAMINOPHEN 10; 325 MG/1; MG/1
1 TABLET ORAL EVERY 4 HOURS PRN
Status: DISCONTINUED | OUTPATIENT
Start: 2021-06-02 | End: 2021-06-02

## 2021-06-02 RX ADMIN — VANCOMYCIN HYDROCHLORIDE 1000 MG: 1 INJECTION, POWDER, LYOPHILIZED, FOR SOLUTION INTRAVENOUS at 11:06

## 2021-06-02 RX ADMIN — CEFAZOLIN SODIUM 2 G: 2 SOLUTION INTRAVENOUS at 12:06

## 2021-06-02 RX ADMIN — OXYCODONE AND ACETAMINOPHEN 1 TABLET: 10; 325 TABLET ORAL at 06:06

## 2021-06-02 RX ADMIN — FAMOTIDINE 20 MG: 20 TABLET, FILM COATED ORAL at 09:06

## 2021-06-02 RX ADMIN — MUPIROCIN 1 G: 20 OINTMENT TOPICAL at 09:06

## 2021-06-02 RX ADMIN — MUPIROCIN 1 G: 20 OINTMENT TOPICAL at 08:06

## 2021-06-02 RX ADMIN — OXYCODONE AND ACETAMINOPHEN 1 TABLET: 10; 325 TABLET ORAL at 02:06

## 2021-06-02 RX ADMIN — OXYCODONE AND ACETAMINOPHEN 1 TABLET: 10; 325 TABLET ORAL at 10:06

## 2021-06-02 RX ADMIN — CEFAZOLIN SODIUM 2 G: 2 SOLUTION INTRAVENOUS at 08:06

## 2021-06-02 RX ADMIN — ASPIRIN 81 MG CHEWABLE TABLET 81 MG: 81 TABLET CHEWABLE at 09:06

## 2021-06-02 RX ADMIN — DEXTROSE AND SODIUM CHLORIDE: 5; .9 INJECTION, SOLUTION INTRAVENOUS at 10:06

## 2021-06-02 RX ADMIN — FAMOTIDINE 20 MG: 20 TABLET, FILM COATED ORAL at 08:06

## 2021-06-02 RX ADMIN — DEXTROSE AND SODIUM CHLORIDE: 5; .9 INJECTION, SOLUTION INTRAVENOUS at 12:06

## 2021-06-02 RX ADMIN — ASPIRIN 81 MG CHEWABLE TABLET 81 MG: 81 TABLET CHEWABLE at 08:06

## 2021-06-02 RX ADMIN — OXYCODONE HYDROCHLORIDE AND ACETAMINOPHEN 1 TABLET: 5; 325 TABLET ORAL at 02:06

## 2021-06-02 RX ADMIN — LOSARTAN POTASSIUM 25 MG: 25 TABLET, FILM COATED ORAL at 09:06

## 2021-06-02 RX ADMIN — OXYCODONE HYDROCHLORIDE AND ACETAMINOPHEN 1 TABLET: 5; 325 TABLET ORAL at 07:06

## 2021-06-02 RX ADMIN — DOCUSATE SODIUM 100 MG: 100 CAPSULE, LIQUID FILLED ORAL at 09:06

## 2021-06-02 RX ADMIN — CEFAZOLIN SODIUM 2 G: 2 SOLUTION INTRAVENOUS at 04:06

## 2021-06-03 LAB
ANION GAP SERPL CALC-SCNC: 8 MMOL/L (ref 8–16)
BUN SERPL-MCNC: 6 MG/DL (ref 6–20)
CALCIUM SERPL-MCNC: 8.2 MG/DL (ref 8.7–10.5)
CHLORIDE SERPL-SCNC: 110 MMOL/L (ref 95–110)
CO2 SERPL-SCNC: 25 MMOL/L (ref 23–29)
CREAT SERPL-MCNC: 0.8 MG/DL (ref 0.5–1.4)
ERYTHROCYTE [DISTWIDTH] IN BLOOD BY AUTOMATED COUNT: 18.6 % (ref 11.5–14.5)
EST. GFR  (AFRICAN AMERICAN): >60 ML/MIN/1.73 M^2
EST. GFR  (NON AFRICAN AMERICAN): >60 ML/MIN/1.73 M^2
GLUCOSE SERPL-MCNC: 79 MG/DL (ref 70–110)
HCT VFR BLD AUTO: 28.1 % (ref 40–54)
HGB BLD-MCNC: 8.4 G/DL (ref 14–18)
MCH RBC QN AUTO: 23.5 PG (ref 27–31)
MCHC RBC AUTO-ENTMCNC: 29.9 G/DL (ref 32–36)
MCV RBC AUTO: 79 FL (ref 82–98)
PLATELET # BLD AUTO: 328 K/UL (ref 150–450)
PMV BLD AUTO: 8.5 FL (ref 9.2–12.9)
POTASSIUM SERPL-SCNC: 5 MMOL/L (ref 3.5–5.1)
RBC # BLD AUTO: 3.58 M/UL (ref 4.6–6.2)
SODIUM SERPL-SCNC: 143 MMOL/L (ref 136–145)
WBC # BLD AUTO: 4.8 K/UL (ref 3.9–12.7)

## 2021-06-03 PROCEDURE — 63600175 PHARM REV CODE 636 W HCPCS: Performed by: ORTHOPAEDIC SURGERY

## 2021-06-03 PROCEDURE — 25000003 PHARM REV CODE 250: Performed by: NURSE PRACTITIONER

## 2021-06-03 PROCEDURE — C1751 CATH, INF, PER/CENT/MIDLINE: HCPCS

## 2021-06-03 PROCEDURE — 36415 COLL VENOUS BLD VENIPUNCTURE: CPT | Performed by: NURSE PRACTITIONER

## 2021-06-03 PROCEDURE — 36573 INSJ PICC RS&I 5 YR+: CPT

## 2021-06-03 PROCEDURE — 25000003 PHARM REV CODE 250: Performed by: INTERNAL MEDICINE

## 2021-06-03 PROCEDURE — 63600175 PHARM REV CODE 636 W HCPCS: Performed by: INTERNAL MEDICINE

## 2021-06-03 PROCEDURE — 97116 GAIT TRAINING THERAPY: CPT

## 2021-06-03 PROCEDURE — A4216 STERILE WATER/SALINE, 10 ML: HCPCS | Performed by: INTERNAL MEDICINE

## 2021-06-03 PROCEDURE — 94761 N-INVAS EAR/PLS OXIMETRY MLT: CPT

## 2021-06-03 PROCEDURE — 80048 BASIC METABOLIC PNL TOTAL CA: CPT | Performed by: NURSE PRACTITIONER

## 2021-06-03 PROCEDURE — 85027 COMPLETE CBC AUTOMATED: CPT | Performed by: NURSE PRACTITIONER

## 2021-06-03 PROCEDURE — 99231 SBSQ HOSP IP/OBS SF/LOW 25: CPT | Mod: S$GLB,,, | Performed by: INTERNAL MEDICINE

## 2021-06-03 PROCEDURE — 97162 PT EVAL MOD COMPLEX 30 MIN: CPT

## 2021-06-03 PROCEDURE — 12000002 HC ACUTE/MED SURGE SEMI-PRIVATE ROOM

## 2021-06-03 PROCEDURE — 25000003 PHARM REV CODE 250: Performed by: ORTHOPAEDIC SURGERY

## 2021-06-03 PROCEDURE — 99231 PR SUBSEQUENT HOSPITAL CARE,LEVL I: ICD-10-PCS | Mod: S$GLB,,, | Performed by: INTERNAL MEDICINE

## 2021-06-03 RX ORDER — SODIUM CHLORIDE 0.9 % (FLUSH) 0.9 %
10 SYRINGE (ML) INJECTION EVERY 6 HOURS
Status: DISCONTINUED | OUTPATIENT
Start: 2021-06-03 | End: 2021-06-04 | Stop reason: HOSPADM

## 2021-06-03 RX ORDER — ONDANSETRON 4 MG/1
4 TABLET, ORALLY DISINTEGRATING ORAL EVERY 6 HOURS PRN
Status: DISCONTINUED | OUTPATIENT
Start: 2021-06-03 | End: 2021-06-04 | Stop reason: HOSPADM

## 2021-06-03 RX ORDER — TRAMADOL HYDROCHLORIDE 50 MG/1
50 TABLET ORAL EVERY 4 HOURS
Status: DISCONTINUED | OUTPATIENT
Start: 2021-06-03 | End: 2021-06-03

## 2021-06-03 RX ORDER — OXYCODONE AND ACETAMINOPHEN 10; 325 MG/1; MG/1
1 TABLET ORAL
Status: DISCONTINUED | OUTPATIENT
Start: 2021-06-03 | End: 2021-06-04 | Stop reason: HOSPADM

## 2021-06-03 RX ORDER — SODIUM CHLORIDE 0.9 % (FLUSH) 0.9 %
10 SYRINGE (ML) INJECTION
Status: DISCONTINUED | OUTPATIENT
Start: 2021-06-03 | End: 2021-06-04 | Stop reason: HOSPADM

## 2021-06-03 RX ORDER — OXYCODONE AND ACETAMINOPHEN 10; 325 MG/1; MG/1
1 TABLET ORAL
Status: DISCONTINUED | OUTPATIENT
Start: 2021-06-03 | End: 2021-06-03

## 2021-06-03 RX ADMIN — CEFAZOLIN SODIUM 2 G: 2 SOLUTION INTRAVENOUS at 03:06

## 2021-06-03 RX ADMIN — OXYCODONE AND ACETAMINOPHEN 1 TABLET: 10; 325 TABLET ORAL at 05:06

## 2021-06-03 RX ADMIN — OXYCODONE AND ACETAMINOPHEN 1 TABLET: 10; 325 TABLET ORAL at 06:06

## 2021-06-03 RX ADMIN — DOCUSATE SODIUM 100 MG: 100 CAPSULE, LIQUID FILLED ORAL at 09:06

## 2021-06-03 RX ADMIN — OXACILLIN SODIUM 12 G: 2 INJECTION, POWDER, FOR SOLUTION INTRAMUSCULAR; INTRAVENOUS at 09:06

## 2021-06-03 RX ADMIN — ASPIRIN 81 MG CHEWABLE TABLET 81 MG: 81 TABLET CHEWABLE at 09:06

## 2021-06-03 RX ADMIN — Medication 10 ML: at 11:06

## 2021-06-03 RX ADMIN — OXYCODONE AND ACETAMINOPHEN 1 TABLET: 10; 325 TABLET ORAL at 02:06

## 2021-06-03 RX ADMIN — VANCOMYCIN HYDROCHLORIDE 1000 MG: 1 INJECTION, POWDER, LYOPHILIZED, FOR SOLUTION INTRAVENOUS at 11:06

## 2021-06-03 RX ADMIN — FAMOTIDINE 20 MG: 20 TABLET, FILM COATED ORAL at 09:06

## 2021-06-03 RX ADMIN — LOSARTAN POTASSIUM 25 MG: 25 TABLET, FILM COATED ORAL at 09:06

## 2021-06-03 RX ADMIN — OXYCODONE AND ACETAMINOPHEN 1 TABLET: 10; 325 TABLET ORAL at 10:06

## 2021-06-03 RX ADMIN — MUPIROCIN 1 G: 20 OINTMENT TOPICAL at 09:06

## 2021-06-03 RX ADMIN — DEXTROSE AND SODIUM CHLORIDE: 5; .9 INJECTION, SOLUTION INTRAVENOUS at 06:06

## 2021-06-03 RX ADMIN — OXYCODONE AND ACETAMINOPHEN 1 TABLET: 10; 325 TABLET ORAL at 08:06

## 2021-06-03 RX ADMIN — Medication 10 ML: at 12:06

## 2021-06-03 RX ADMIN — VANCOMYCIN HYDROCHLORIDE 1000 MG: 1 INJECTION, POWDER, LYOPHILIZED, FOR SOLUTION INTRAVENOUS at 12:06

## 2021-06-03 RX ADMIN — OXYCODONE AND ACETAMINOPHEN 1 TABLET: 10; 325 TABLET ORAL at 11:06

## 2021-06-03 RX ADMIN — CEFAZOLIN SODIUM 2 G: 2 SOLUTION INTRAVENOUS at 01:06

## 2021-06-04 VITALS
RESPIRATION RATE: 18 BRPM | TEMPERATURE: 98 F | HEIGHT: 70 IN | HEART RATE: 75 BPM | OXYGEN SATURATION: 98 % | SYSTOLIC BLOOD PRESSURE: 150 MMHG | DIASTOLIC BLOOD PRESSURE: 87 MMHG | BODY MASS INDEX: 32.92 KG/M2 | WEIGHT: 229.94 LBS

## 2021-06-04 LAB
ANION GAP SERPL CALC-SCNC: 7 MMOL/L (ref 8–16)
BACTERIA SPEC AEROBE CULT: ABNORMAL
BUN SERPL-MCNC: 7 MG/DL (ref 6–20)
CALCIUM SERPL-MCNC: 8.1 MG/DL (ref 8.7–10.5)
CHLORIDE SERPL-SCNC: 107 MMOL/L (ref 95–110)
CO2 SERPL-SCNC: 24 MMOL/L (ref 23–29)
CREAT SERPL-MCNC: 0.7 MG/DL (ref 0.5–1.4)
ERYTHROCYTE [DISTWIDTH] IN BLOOD BY AUTOMATED COUNT: 19.3 % (ref 11.5–14.5)
EST. GFR  (AFRICAN AMERICAN): >60 ML/MIN/1.73 M^2
EST. GFR  (NON AFRICAN AMERICAN): >60 ML/MIN/1.73 M^2
GLUCOSE SERPL-MCNC: 76 MG/DL (ref 70–110)
HCT VFR BLD AUTO: 29.6 % (ref 40–54)
HGB BLD-MCNC: 8.5 G/DL (ref 14–18)
MCH RBC QN AUTO: 22.5 PG (ref 27–31)
MCHC RBC AUTO-ENTMCNC: 28.7 G/DL (ref 32–36)
MCV RBC AUTO: 79 FL (ref 82–98)
PLATELET # BLD AUTO: 349 K/UL (ref 150–450)
PMV BLD AUTO: 8.2 FL (ref 9.2–12.9)
POTASSIUM SERPL-SCNC: 4.2 MMOL/L (ref 3.5–5.1)
RBC # BLD AUTO: 3.77 M/UL (ref 4.6–6.2)
SODIUM SERPL-SCNC: 138 MMOL/L (ref 136–145)
WBC # BLD AUTO: 4.99 K/UL (ref 3.9–12.7)

## 2021-06-04 PROCEDURE — 85027 COMPLETE CBC AUTOMATED: CPT | Performed by: NURSE PRACTITIONER

## 2021-06-04 PROCEDURE — 25000003 PHARM REV CODE 250: Performed by: NURSE PRACTITIONER

## 2021-06-04 PROCEDURE — 80048 BASIC METABOLIC PNL TOTAL CA: CPT | Performed by: NURSE PRACTITIONER

## 2021-06-04 PROCEDURE — 97535 SELF CARE MNGMENT TRAINING: CPT

## 2021-06-04 PROCEDURE — 99900035 HC TECH TIME PER 15 MIN (STAT)

## 2021-06-04 PROCEDURE — 25000003 PHARM REV CODE 250: Performed by: ORTHOPAEDIC SURGERY

## 2021-06-04 PROCEDURE — 97165 OT EVAL LOW COMPLEX 30 MIN: CPT

## 2021-06-04 PROCEDURE — 36415 COLL VENOUS BLD VENIPUNCTURE: CPT | Performed by: NURSE PRACTITIONER

## 2021-06-04 PROCEDURE — 94761 N-INVAS EAR/PLS OXIMETRY MLT: CPT

## 2021-06-04 PROCEDURE — 25000003 PHARM REV CODE 250: Performed by: INTERNAL MEDICINE

## 2021-06-04 PROCEDURE — A4216 STERILE WATER/SALINE, 10 ML: HCPCS | Performed by: INTERNAL MEDICINE

## 2021-06-04 RX ORDER — OXYCODONE AND ACETAMINOPHEN 10; 325 MG/1; MG/1
1 TABLET ORAL EVERY 4 HOURS PRN
Qty: 60 TABLET | Refills: 0 | Status: SHIPPED | OUTPATIENT
Start: 2021-06-04 | End: 2021-06-14 | Stop reason: SDUPTHER

## 2021-06-04 RX ADMIN — LOSARTAN POTASSIUM 25 MG: 25 TABLET, FILM COATED ORAL at 08:06

## 2021-06-04 RX ADMIN — FAMOTIDINE 20 MG: 20 TABLET, FILM COATED ORAL at 08:06

## 2021-06-04 RX ADMIN — Medication 10 ML: at 05:06

## 2021-06-04 RX ADMIN — DOCUSATE SODIUM 100 MG: 100 CAPSULE, LIQUID FILLED ORAL at 08:06

## 2021-06-04 RX ADMIN — Medication 10 ML: at 11:06

## 2021-06-04 RX ADMIN — OXYCODONE AND ACETAMINOPHEN 1 TABLET: 10; 325 TABLET ORAL at 05:06

## 2021-06-04 RX ADMIN — ASPIRIN 81 MG CHEWABLE TABLET 81 MG: 81 TABLET CHEWABLE at 08:06

## 2021-06-04 RX ADMIN — ONDANSETRON 4 MG: 4 TABLET, ORALLY DISINTEGRATING ORAL at 08:06

## 2021-06-04 RX ADMIN — MUPIROCIN 1 G: 20 OINTMENT TOPICAL at 09:06

## 2021-06-04 RX ADMIN — OXYCODONE AND ACETAMINOPHEN 1 TABLET: 10; 325 TABLET ORAL at 07:06

## 2021-06-04 RX ADMIN — OXYCODONE AND ACETAMINOPHEN 1 TABLET: 10; 325 TABLET ORAL at 02:06

## 2021-06-04 RX ADMIN — OXYCODONE AND ACETAMINOPHEN 1 TABLET: 10; 325 TABLET ORAL at 04:06

## 2021-06-04 RX ADMIN — OXYCODONE AND ACETAMINOPHEN 1 TABLET: 10; 325 TABLET ORAL at 08:06

## 2021-06-04 RX ADMIN — MUPIROCIN 1 G: 20 OINTMENT TOPICAL at 08:06

## 2021-06-04 RX ADMIN — OXYCODONE AND ACETAMINOPHEN 1 TABLET: 10; 325 TABLET ORAL at 11:06

## 2021-06-05 LAB
BACTERIA SPEC AEROBE CULT: ABNORMAL
BACTERIA SPEC AEROBE CULT: ABNORMAL

## 2021-06-07 ENCOUNTER — TELEPHONE (OUTPATIENT)
Dept: ORTHOPEDICS | Facility: CLINIC | Age: 56
End: 2021-06-07

## 2021-06-07 LAB
BACTERIA SPEC ANAEROBE CULT: NORMAL

## 2021-06-10 ENCOUNTER — TELEPHONE (OUTPATIENT)
Dept: ORTHOPEDICS | Facility: CLINIC | Age: 56
End: 2021-06-10

## 2021-06-14 RX ORDER — OXYCODONE AND ACETAMINOPHEN 10; 325 MG/1; MG/1
1 TABLET ORAL EVERY 4 HOURS PRN
Qty: 60 TABLET | Refills: 0 | Status: SHIPPED | OUTPATIENT
Start: 2021-06-14 | End: 2021-06-22 | Stop reason: SDUPTHER

## 2021-06-17 ENCOUNTER — OFFICE VISIT (OUTPATIENT)
Dept: ORTHOPEDICS | Facility: CLINIC | Age: 56
End: 2021-06-17
Payer: OTHER GOVERNMENT

## 2021-06-17 VITALS — RESPIRATION RATE: 18 BRPM | BODY MASS INDEX: 32.78 KG/M2 | WEIGHT: 229 LBS | HEIGHT: 70 IN

## 2021-06-17 DIAGNOSIS — Z96.659 INFECTION OF TOTAL KNEE REPLACEMENT, SUBSEQUENT ENCOUNTER: Primary | ICD-10-CM

## 2021-06-17 DIAGNOSIS — T84.59XD INFECTION OF TOTAL KNEE REPLACEMENT, SUBSEQUENT ENCOUNTER: Primary | ICD-10-CM

## 2021-06-17 PROCEDURE — 99999 PR PBB SHADOW E&M-EST. PATIENT-LVL III: ICD-10-PCS | Mod: PBBFAC,,, | Performed by: ORTHOPAEDIC SURGERY

## 2021-06-17 PROCEDURE — 99213 OFFICE O/P EST LOW 20 MIN: CPT | Mod: PBBFAC,PN | Performed by: ORTHOPAEDIC SURGERY

## 2021-06-17 PROCEDURE — 99024 POSTOP FOLLOW-UP VISIT: CPT | Mod: ,,, | Performed by: ORTHOPAEDIC SURGERY

## 2021-06-17 PROCEDURE — 99999 PR PBB SHADOW E&M-EST. PATIENT-LVL III: CPT | Mod: PBBFAC,,, | Performed by: ORTHOPAEDIC SURGERY

## 2021-06-17 PROCEDURE — 99024 PR POST-OP FOLLOW-UP VISIT: ICD-10-PCS | Mod: ,,, | Performed by: ORTHOPAEDIC SURGERY

## 2021-06-21 ENCOUNTER — DOCUMENT SCAN (OUTPATIENT)
Dept: HOME HEALTH SERVICES | Facility: HOSPITAL | Age: 56
End: 2021-06-21
Payer: OTHER GOVERNMENT

## 2021-06-23 ENCOUNTER — DOCUMENT SCAN (OUTPATIENT)
Dept: HOME HEALTH SERVICES | Facility: HOSPITAL | Age: 56
End: 2021-06-23
Payer: OTHER GOVERNMENT

## 2021-06-23 RX ORDER — OXYCODONE AND ACETAMINOPHEN 10; 325 MG/1; MG/1
1 TABLET ORAL EVERY 4 HOURS PRN
Qty: 60 TABLET | Refills: 0 | Status: SHIPPED | OUTPATIENT
Start: 2021-06-23 | End: 2021-07-01 | Stop reason: SDUPTHER

## 2021-06-28 ENCOUNTER — LAB VISIT (OUTPATIENT)
Dept: LAB | Facility: HOSPITAL | Age: 56
End: 2021-06-28
Attending: INTERNAL MEDICINE
Payer: OTHER GOVERNMENT

## 2021-06-28 ENCOUNTER — OFFICE VISIT (OUTPATIENT)
Dept: INFECTIOUS DISEASES | Facility: CLINIC | Age: 56
End: 2021-06-28
Payer: OTHER GOVERNMENT

## 2021-06-28 VITALS
TEMPERATURE: 97 F | WEIGHT: 210.38 LBS | OXYGEN SATURATION: 95 % | HEIGHT: 70 IN | DIASTOLIC BLOOD PRESSURE: 91 MMHG | HEART RATE: 106 BPM | SYSTOLIC BLOOD PRESSURE: 143 MMHG | BODY MASS INDEX: 30.12 KG/M2

## 2021-06-28 DIAGNOSIS — Z79.2 ENCOUNTER FOR LONG-TERM (CURRENT) USE OF ANTIBIOTICS: ICD-10-CM

## 2021-06-28 DIAGNOSIS — R11.2 NON-INTRACTABLE VOMITING WITH NAUSEA, UNSPECIFIED VOMITING TYPE: ICD-10-CM

## 2021-06-28 DIAGNOSIS — Z96.659 INFECTION OF TOTAL KNEE REPLACEMENT, SUBSEQUENT ENCOUNTER: Primary | ICD-10-CM

## 2021-06-28 DIAGNOSIS — R63.4 WEIGHT LOSS: ICD-10-CM

## 2021-06-28 DIAGNOSIS — T84.53XA INFECTION OF TOTAL RIGHT KNEE REPLACEMENT: Primary | ICD-10-CM

## 2021-06-28 DIAGNOSIS — B95.8 STAPHYLOCOCCUS INFECTION: ICD-10-CM

## 2021-06-28 DIAGNOSIS — T84.59XD INFECTION OF TOTAL KNEE REPLACEMENT, SUBSEQUENT ENCOUNTER: Primary | ICD-10-CM

## 2021-06-28 LAB
ALBUMIN SERPL BCP-MCNC: 2.7 G/DL (ref 3.5–5.2)
ALP SERPL-CCNC: 154 U/L (ref 55–135)
ALT SERPL W/O P-5'-P-CCNC: 12 U/L (ref 10–44)
ANION GAP SERPL CALC-SCNC: 11 MMOL/L (ref 8–16)
AST SERPL-CCNC: 19 U/L (ref 10–40)
BILIRUB SERPL-MCNC: 0.2 MG/DL (ref 0.1–1)
BUN SERPL-MCNC: 7 MG/DL (ref 6–20)
CALCIUM SERPL-MCNC: 9.1 MG/DL (ref 8.7–10.5)
CHLORIDE SERPL-SCNC: 107 MMOL/L (ref 95–110)
CO2 SERPL-SCNC: 22 MMOL/L (ref 23–29)
CREAT SERPL-MCNC: 0.9 MG/DL (ref 0.5–1.4)
ERYTHROCYTE [DISTWIDTH] IN BLOOD BY AUTOMATED COUNT: 24.8 % (ref 11.5–14.5)
EST. GFR  (AFRICAN AMERICAN): >60 ML/MIN/1.73 M^2
EST. GFR  (NON AFRICAN AMERICAN): >60 ML/MIN/1.73 M^2
GLUCOSE SERPL-MCNC: 117 MG/DL (ref 70–110)
HCT VFR BLD AUTO: 35.4 % (ref 40–54)
HGB BLD-MCNC: 10.7 G/DL (ref 14–18)
MCH RBC QN AUTO: 24.4 PG (ref 27–31)
MCHC RBC AUTO-ENTMCNC: 30.2 G/DL (ref 32–36)
MCV RBC AUTO: 81 FL (ref 82–98)
PLATELET # BLD AUTO: 288 K/UL (ref 150–450)
PMV BLD AUTO: 8.7 FL (ref 9.2–12.9)
POTASSIUM SERPL-SCNC: 5.4 MMOL/L (ref 3.5–5.1)
PROT SERPL-MCNC: 7.2 G/DL (ref 6–8.4)
RBC # BLD AUTO: 4.39 M/UL (ref 4.6–6.2)
SODIUM SERPL-SCNC: 140 MMOL/L (ref 136–145)
WBC # BLD AUTO: 4.42 K/UL (ref 3.9–12.7)

## 2021-06-28 PROCEDURE — 36415 COLL VENOUS BLD VENIPUNCTURE: CPT | Performed by: INTERNAL MEDICINE

## 2021-06-28 PROCEDURE — 85027 COMPLETE CBC AUTOMATED: CPT | Performed by: INTERNAL MEDICINE

## 2021-06-28 PROCEDURE — 99214 OFFICE O/P EST MOD 30 MIN: CPT | Mod: S$GLB,,, | Performed by: INTERNAL MEDICINE

## 2021-06-28 PROCEDURE — 99214 PR OFFICE/OUTPT VISIT, EST, LEVL IV, 30-39 MIN: ICD-10-PCS | Mod: S$GLB,,, | Performed by: INTERNAL MEDICINE

## 2021-06-28 PROCEDURE — 80053 COMPREHEN METABOLIC PANEL: CPT | Performed by: INTERNAL MEDICINE

## 2021-06-29 LAB — FUNGUS SPEC CULT: NORMAL

## 2021-07-01 DIAGNOSIS — Z96.659 INFECTION OF TOTAL KNEE REPLACEMENT, SUBSEQUENT ENCOUNTER: Primary | ICD-10-CM

## 2021-07-01 DIAGNOSIS — T84.59XD INFECTION OF TOTAL KNEE REPLACEMENT, SUBSEQUENT ENCOUNTER: Primary | ICD-10-CM

## 2021-07-01 RX ORDER — OXYCODONE AND ACETAMINOPHEN 10; 325 MG/1; MG/1
1 TABLET ORAL EVERY 4 HOURS PRN
Qty: 60 TABLET | Refills: 0 | Status: SHIPPED | OUTPATIENT
Start: 2021-07-01 | End: 2021-07-08 | Stop reason: SDUPTHER

## 2021-07-05 ENCOUNTER — LAB VISIT (OUTPATIENT)
Dept: LAB | Facility: HOSPITAL | Age: 56
End: 2021-07-05
Attending: INTERNAL MEDICINE
Payer: OTHER GOVERNMENT

## 2021-07-05 DIAGNOSIS — T84.53XA INFECTION OF TOTAL RIGHT KNEE REPLACEMENT: Primary | ICD-10-CM

## 2021-07-05 DIAGNOSIS — I10 ESSENTIAL HYPERTENSION, MALIGNANT: ICD-10-CM

## 2021-07-05 LAB
ALBUMIN SERPL BCP-MCNC: 2.8 G/DL (ref 3.5–5.2)
ALP SERPL-CCNC: 134 U/L (ref 55–135)
ALT SERPL W/O P-5'-P-CCNC: 11 U/L (ref 10–44)
ANION GAP SERPL CALC-SCNC: 11 MMOL/L (ref 8–16)
AST SERPL-CCNC: 20 U/L (ref 10–40)
BILIRUB SERPL-MCNC: 0.3 MG/DL (ref 0.1–1)
BUN SERPL-MCNC: 8 MG/DL (ref 6–20)
CALCIUM SERPL-MCNC: 8.9 MG/DL (ref 8.7–10.5)
CHLORIDE SERPL-SCNC: 107 MMOL/L (ref 95–110)
CO2 SERPL-SCNC: 22 MMOL/L (ref 23–29)
CREAT SERPL-MCNC: 0.9 MG/DL (ref 0.5–1.4)
ERYTHROCYTE [DISTWIDTH] IN BLOOD BY AUTOMATED COUNT: ABNORMAL % (ref 11.5–14.5)
EST. GFR  (AFRICAN AMERICAN): >60 ML/MIN/1.73 M^2
EST. GFR  (NON AFRICAN AMERICAN): >60 ML/MIN/1.73 M^2
GLUCOSE SERPL-MCNC: 70 MG/DL (ref 70–110)
HCT VFR BLD AUTO: 35.3 % (ref 40–54)
HGB BLD-MCNC: 10.7 G/DL (ref 14–18)
MCH RBC QN AUTO: 24.4 PG (ref 27–31)
MCHC RBC AUTO-ENTMCNC: 30.3 G/DL (ref 32–36)
MCV RBC AUTO: 80 FL (ref 82–98)
PLATELET # BLD AUTO: 260 K/UL (ref 150–450)
PMV BLD AUTO: 8.7 FL (ref 9.2–12.9)
POTASSIUM SERPL-SCNC: 4.2 MMOL/L (ref 3.5–5.1)
PROT SERPL-MCNC: 6.9 G/DL (ref 6–8.4)
RBC # BLD AUTO: 4.39 M/UL (ref 4.6–6.2)
SODIUM SERPL-SCNC: 140 MMOL/L (ref 136–145)
WBC # BLD AUTO: 3.67 K/UL (ref 3.9–12.7)

## 2021-07-05 PROCEDURE — 85027 COMPLETE CBC AUTOMATED: CPT | Performed by: INTERNAL MEDICINE

## 2021-07-05 PROCEDURE — 36415 COLL VENOUS BLD VENIPUNCTURE: CPT | Performed by: INTERNAL MEDICINE

## 2021-07-05 PROCEDURE — 80053 COMPREHEN METABOLIC PANEL: CPT | Performed by: INTERNAL MEDICINE

## 2021-07-05 PROCEDURE — 86140 C-REACTIVE PROTEIN: CPT | Performed by: INTERNAL MEDICINE

## 2021-07-06 ENCOUNTER — TELEPHONE (OUTPATIENT)
Dept: INFECTIOUS DISEASES | Facility: CLINIC | Age: 56
End: 2021-07-06

## 2021-07-07 LAB — CRP SERPL-MCNC: 1.2 MG/L (ref 0–8.2)

## 2021-07-08 DIAGNOSIS — Z96.659 INFECTION OF TOTAL KNEE REPLACEMENT, SUBSEQUENT ENCOUNTER: ICD-10-CM

## 2021-07-08 DIAGNOSIS — T84.59XD INFECTION OF TOTAL KNEE REPLACEMENT, SUBSEQUENT ENCOUNTER: ICD-10-CM

## 2021-07-08 RX ORDER — OXYCODONE AND ACETAMINOPHEN 10; 325 MG/1; MG/1
1 TABLET ORAL EVERY 4 HOURS PRN
Qty: 60 TABLET | Refills: 0 | Status: SHIPPED | OUTPATIENT
Start: 2021-07-08 | End: 2021-07-15 | Stop reason: SDUPTHER

## 2021-07-12 ENCOUNTER — TELEPHONE (OUTPATIENT)
Dept: INFECTIOUS DISEASES | Facility: CLINIC | Age: 56
End: 2021-07-12

## 2021-07-12 ENCOUNTER — LAB VISIT (OUTPATIENT)
Dept: LAB | Facility: HOSPITAL | Age: 56
End: 2021-07-12
Attending: INTERNAL MEDICINE
Payer: OTHER GOVERNMENT

## 2021-07-12 DIAGNOSIS — T84.53XA INFECTION OF TOTAL RIGHT KNEE REPLACEMENT: Primary | ICD-10-CM

## 2021-07-12 LAB
ALBUMIN SERPL BCP-MCNC: 2.9 G/DL (ref 3.5–5.2)
ALP SERPL-CCNC: 127 U/L (ref 55–135)
ALT SERPL W/O P-5'-P-CCNC: 9 U/L (ref 10–44)
ANION GAP SERPL CALC-SCNC: 10 MMOL/L (ref 8–16)
AST SERPL-CCNC: 21 U/L (ref 10–40)
BASOPHILS # BLD AUTO: 0.02 K/UL (ref 0–0.2)
BASOPHILS NFR BLD: 0.3 % (ref 0–1.9)
BILIRUB SERPL-MCNC: 0.2 MG/DL (ref 0.1–1)
BUN SERPL-MCNC: 9 MG/DL (ref 6–20)
CALCIUM SERPL-MCNC: 9.2 MG/DL (ref 8.7–10.5)
CHLORIDE SERPL-SCNC: 108 MMOL/L (ref 95–110)
CO2 SERPL-SCNC: 23 MMOL/L (ref 23–29)
CREAT SERPL-MCNC: 0.8 MG/DL (ref 0.5–1.4)
DIFFERENTIAL METHOD: ABNORMAL
EOSINOPHIL # BLD AUTO: 0 K/UL (ref 0–0.5)
EOSINOPHIL NFR BLD: 0 % (ref 0–8)
ERYTHROCYTE [DISTWIDTH] IN BLOOD BY AUTOMATED COUNT: 25.9 % (ref 11.5–14.5)
EST. GFR  (AFRICAN AMERICAN): >60 ML/MIN/1.73 M^2
EST. GFR  (NON AFRICAN AMERICAN): >60 ML/MIN/1.73 M^2
GLUCOSE SERPL-MCNC: 92 MG/DL (ref 70–110)
HCT VFR BLD AUTO: 36.5 % (ref 40–54)
HGB BLD-MCNC: 11.3 G/DL (ref 14–18)
IMM GRANULOCYTES # BLD AUTO: 0.01 K/UL (ref 0–0.04)
IMM GRANULOCYTES NFR BLD AUTO: 0.2 % (ref 0–0.5)
LYMPHOCYTES # BLD AUTO: 1.6 K/UL (ref 1–4.8)
LYMPHOCYTES NFR BLD: 27 % (ref 18–48)
MCH RBC QN AUTO: 25.5 PG (ref 27–31)
MCHC RBC AUTO-ENTMCNC: 31 G/DL (ref 32–36)
MCV RBC AUTO: 82 FL (ref 82–98)
MONOCYTES # BLD AUTO: 0.5 K/UL (ref 0.3–1)
MONOCYTES NFR BLD: 8.2 % (ref 4–15)
NEUTROPHILS # BLD AUTO: 3.8 K/UL (ref 1.8–7.7)
NEUTROPHILS NFR BLD: 64.3 % (ref 38–73)
NRBC BLD-RTO: 0 /100 WBC
PLATELET # BLD AUTO: 284 K/UL (ref 150–450)
PMV BLD AUTO: 8.7 FL (ref 9.2–12.9)
POTASSIUM SERPL-SCNC: 5.8 MMOL/L (ref 3.5–5.1)
PROT SERPL-MCNC: 7 G/DL (ref 6–8.4)
RBC # BLD AUTO: 4.44 M/UL (ref 4.6–6.2)
SODIUM SERPL-SCNC: 141 MMOL/L (ref 136–145)
WBC # BLD AUTO: 5.85 K/UL (ref 3.9–12.7)

## 2021-07-12 PROCEDURE — 36415 COLL VENOUS BLD VENIPUNCTURE: CPT | Performed by: INTERNAL MEDICINE

## 2021-07-12 PROCEDURE — 85025 COMPLETE CBC W/AUTO DIFF WBC: CPT | Performed by: INTERNAL MEDICINE

## 2021-07-12 PROCEDURE — 80053 COMPREHEN METABOLIC PANEL: CPT | Performed by: INTERNAL MEDICINE

## 2021-07-14 ENCOUNTER — EXTERNAL HOME HEALTH (OUTPATIENT)
Dept: HOME HEALTH SERVICES | Facility: HOSPITAL | Age: 56
End: 2021-07-14

## 2021-07-15 ENCOUNTER — DOCUMENT SCAN (OUTPATIENT)
Dept: HOME HEALTH SERVICES | Facility: HOSPITAL | Age: 56
End: 2021-07-15

## 2021-07-15 DIAGNOSIS — T84.59XD INFECTION OF TOTAL KNEE REPLACEMENT, SUBSEQUENT ENCOUNTER: ICD-10-CM

## 2021-07-15 DIAGNOSIS — Z96.659 INFECTION OF TOTAL KNEE REPLACEMENT, SUBSEQUENT ENCOUNTER: ICD-10-CM

## 2021-07-15 RX ORDER — OXYCODONE AND ACETAMINOPHEN 10; 325 MG/1; MG/1
1 TABLET ORAL EVERY 4 HOURS PRN
Qty: 60 TABLET | Refills: 0 | Status: SHIPPED | OUTPATIENT
Start: 2021-07-15 | End: 2021-07-22 | Stop reason: SDUPTHER

## 2021-07-18 ENCOUNTER — DOCUMENT SCAN (OUTPATIENT)
Dept: HOME HEALTH SERVICES | Facility: HOSPITAL | Age: 56
End: 2021-07-18

## 2021-07-19 ENCOUNTER — OFFICE VISIT (OUTPATIENT)
Dept: ORTHOPEDICS | Facility: CLINIC | Age: 56
End: 2021-07-19
Payer: OTHER GOVERNMENT

## 2021-07-19 VITALS — RESPIRATION RATE: 18 BRPM | HEIGHT: 70 IN | WEIGHT: 210 LBS | BODY MASS INDEX: 30.06 KG/M2

## 2021-07-19 DIAGNOSIS — T84.59XD INFECTION OF TOTAL KNEE REPLACEMENT, SUBSEQUENT ENCOUNTER: Primary | ICD-10-CM

## 2021-07-19 DIAGNOSIS — Z96.659 INFECTION OF TOTAL KNEE REPLACEMENT, SUBSEQUENT ENCOUNTER: Primary | ICD-10-CM

## 2021-07-19 DIAGNOSIS — Z96.651 STATUS POST TOTAL KNEE REPLACEMENT USING CEMENT, RIGHT: Primary | ICD-10-CM

## 2021-07-19 PROCEDURE — 99999 PR PBB SHADOW E&M-EST. PATIENT-LVL III: ICD-10-PCS | Mod: PBBFAC,,, | Performed by: ORTHOPAEDIC SURGERY

## 2021-07-19 PROCEDURE — 99213 OFFICE O/P EST LOW 20 MIN: CPT | Mod: PBBFAC,PN | Performed by: ORTHOPAEDIC SURGERY

## 2021-07-19 PROCEDURE — 99024 POSTOP FOLLOW-UP VISIT: CPT | Mod: ,,, | Performed by: ORTHOPAEDIC SURGERY

## 2021-07-19 PROCEDURE — 99024 PR POST-OP FOLLOW-UP VISIT: ICD-10-PCS | Mod: ,,, | Performed by: ORTHOPAEDIC SURGERY

## 2021-07-19 PROCEDURE — 99999 PR PBB SHADOW E&M-EST. PATIENT-LVL III: CPT | Mod: PBBFAC,,, | Performed by: ORTHOPAEDIC SURGERY

## 2021-07-19 RX ORDER — AMOXICILLIN 500 MG/1
500 CAPSULE ORAL
Qty: 4 CAPSULE | Refills: 3 | Status: SHIPPED | OUTPATIENT
Start: 2021-07-19 | End: 2021-07-20 | Stop reason: SDUPTHER

## 2021-07-20 ENCOUNTER — DOCUMENT SCAN (OUTPATIENT)
Dept: HOME HEALTH SERVICES | Facility: HOSPITAL | Age: 56
End: 2021-07-20

## 2021-07-20 DIAGNOSIS — Z96.651 STATUS POST TOTAL KNEE REPLACEMENT USING CEMENT, RIGHT: ICD-10-CM

## 2021-07-21 RX ORDER — AMOXICILLIN 500 MG/1
500 CAPSULE ORAL
Qty: 4 CAPSULE | Refills: 3 | Status: ON HOLD | OUTPATIENT
Start: 2021-07-21 | End: 2021-09-08 | Stop reason: HOSPADM

## 2021-07-22 DIAGNOSIS — Z96.659 INFECTION OF TOTAL KNEE REPLACEMENT, SUBSEQUENT ENCOUNTER: ICD-10-CM

## 2021-07-22 DIAGNOSIS — T84.59XD INFECTION OF TOTAL KNEE REPLACEMENT, SUBSEQUENT ENCOUNTER: ICD-10-CM

## 2021-07-22 RX ORDER — OXYCODONE AND ACETAMINOPHEN 10; 325 MG/1; MG/1
1 TABLET ORAL EVERY 4 HOURS PRN
Qty: 60 TABLET | Refills: 0 | Status: SHIPPED | OUTPATIENT
Start: 2021-07-22 | End: 2021-07-29 | Stop reason: SDUPTHER

## 2021-07-29 DIAGNOSIS — Z96.659 INFECTION OF TOTAL KNEE REPLACEMENT, SUBSEQUENT ENCOUNTER: ICD-10-CM

## 2021-07-29 DIAGNOSIS — T84.59XD INFECTION OF TOTAL KNEE REPLACEMENT, SUBSEQUENT ENCOUNTER: ICD-10-CM

## 2021-07-29 RX ORDER — OXYCODONE AND ACETAMINOPHEN 10; 325 MG/1; MG/1
1 TABLET ORAL EVERY 4 HOURS PRN
Qty: 60 TABLET | Refills: 0 | Status: SHIPPED | OUTPATIENT
Start: 2021-07-29 | End: 2021-08-05 | Stop reason: SDUPTHER

## 2021-08-05 DIAGNOSIS — T84.59XD INFECTION OF TOTAL KNEE REPLACEMENT, SUBSEQUENT ENCOUNTER: ICD-10-CM

## 2021-08-05 DIAGNOSIS — Z96.659 INFECTION OF TOTAL KNEE REPLACEMENT, SUBSEQUENT ENCOUNTER: ICD-10-CM

## 2021-08-05 RX ORDER — OXYCODONE AND ACETAMINOPHEN 10; 325 MG/1; MG/1
1 TABLET ORAL EVERY 4 HOURS PRN
Qty: 60 TABLET | Refills: 0 | Status: SHIPPED | OUTPATIENT
Start: 2021-08-05 | End: 2021-08-12 | Stop reason: SDUPTHER

## 2021-08-12 DIAGNOSIS — T84.59XD INFECTION OF TOTAL KNEE REPLACEMENT, SUBSEQUENT ENCOUNTER: ICD-10-CM

## 2021-08-12 DIAGNOSIS — Z96.659 INFECTION OF TOTAL KNEE REPLACEMENT, SUBSEQUENT ENCOUNTER: ICD-10-CM

## 2021-08-12 RX ORDER — OXYCODONE AND ACETAMINOPHEN 10; 325 MG/1; MG/1
1 TABLET ORAL EVERY 4 HOURS PRN
Qty: 60 TABLET | Refills: 0 | Status: SHIPPED | OUTPATIENT
Start: 2021-08-12 | End: 2021-08-19 | Stop reason: SDUPTHER

## 2021-08-19 ENCOUNTER — OFFICE VISIT (OUTPATIENT)
Dept: ORTHOPEDICS | Facility: CLINIC | Age: 56
End: 2021-08-19
Payer: OTHER GOVERNMENT

## 2021-08-19 VITALS — BODY MASS INDEX: 30.06 KG/M2 | RESPIRATION RATE: 18 BRPM | HEIGHT: 70 IN | WEIGHT: 210 LBS

## 2021-08-19 DIAGNOSIS — T84.59XD INFECTION OF TOTAL KNEE REPLACEMENT, SUBSEQUENT ENCOUNTER: ICD-10-CM

## 2021-08-19 DIAGNOSIS — Z96.659 INFECTION OF TOTAL KNEE REPLACEMENT, SUBSEQUENT ENCOUNTER: ICD-10-CM

## 2021-08-19 DIAGNOSIS — Z01.818 PRE-OP TESTING: Primary | ICD-10-CM

## 2021-08-19 PROCEDURE — 99214 PR OFFICE/OUTPT VISIT, EST, LEVL IV, 30-39 MIN: ICD-10-PCS | Mod: 57,S$PBB,, | Performed by: ORTHOPAEDIC SURGERY

## 2021-08-19 PROCEDURE — 99999 PR PBB SHADOW E&M-EST. PATIENT-LVL III: CPT | Mod: PBBFAC,,, | Performed by: ORTHOPAEDIC SURGERY

## 2021-08-19 PROCEDURE — 99213 OFFICE O/P EST LOW 20 MIN: CPT | Mod: PBBFAC,PN | Performed by: ORTHOPAEDIC SURGERY

## 2021-08-19 PROCEDURE — 99214 OFFICE O/P EST MOD 30 MIN: CPT | Mod: 57,S$PBB,, | Performed by: ORTHOPAEDIC SURGERY

## 2021-08-19 PROCEDURE — 99999 PR PBB SHADOW E&M-EST. PATIENT-LVL III: ICD-10-PCS | Mod: PBBFAC,,, | Performed by: ORTHOPAEDIC SURGERY

## 2021-08-19 RX ORDER — OXYCODONE AND ACETAMINOPHEN 10; 325 MG/1; MG/1
1 TABLET ORAL EVERY 4 HOURS PRN
Qty: 60 TABLET | Refills: 0 | Status: SHIPPED | OUTPATIENT
Start: 2021-08-19 | End: 2021-08-26 | Stop reason: SDUPTHER

## 2021-08-20 PROBLEM — Z01.818 PRE-OP TESTING: Status: ACTIVE | Noted: 2021-08-20

## 2021-08-20 RX ORDER — TRANEXAMIC ACID 100 MG/ML
1000 INJECTION, SOLUTION INTRAVENOUS
Status: CANCELLED | OUTPATIENT
Start: 2021-08-20

## 2021-08-20 RX ORDER — CEFAZOLIN SODIUM/D5W 2 G/100 ML
2 PLASTIC BAG, INJECTION (ML) INTRAVENOUS
Status: CANCELLED | OUTPATIENT
Start: 2021-08-20

## 2021-08-20 RX ORDER — MUPIROCIN 20 MG/G
OINTMENT TOPICAL
Status: CANCELLED | OUTPATIENT
Start: 2021-08-20

## 2021-08-26 ENCOUNTER — TELEPHONE (OUTPATIENT)
Dept: ORTHOPEDICS | Facility: CLINIC | Age: 56
End: 2021-08-26

## 2021-08-26 DIAGNOSIS — Z96.659 INFECTION OF TOTAL KNEE REPLACEMENT, SUBSEQUENT ENCOUNTER: ICD-10-CM

## 2021-08-26 DIAGNOSIS — T84.59XD INFECTION OF TOTAL KNEE REPLACEMENT, SUBSEQUENT ENCOUNTER: ICD-10-CM

## 2021-08-26 RX ORDER — OXYCODONE AND ACETAMINOPHEN 10; 325 MG/1; MG/1
1 TABLET ORAL EVERY 4 HOURS PRN
Qty: 60 TABLET | Refills: 0 | Status: SHIPPED | OUTPATIENT
Start: 2021-08-26 | End: 2021-09-02 | Stop reason: SDUPTHER

## 2021-08-31 ENCOUNTER — HOSPITAL ENCOUNTER (OUTPATIENT)
Dept: RADIOLOGY | Facility: HOSPITAL | Age: 56
Discharge: HOME | End: 2021-08-31
Attending: INTERNAL MEDICINE
Payer: COMMERCIAL

## 2021-08-31 ENCOUNTER — LAB VISIT (OUTPATIENT)
Dept: LAB | Facility: HOSPITAL | Age: 56
End: 2021-08-31
Attending: ORTHOPAEDIC SURGERY
Payer: OTHER GOVERNMENT

## 2021-08-31 DIAGNOSIS — K21.9 ACID REFLUX: ICD-10-CM

## 2021-08-31 DIAGNOSIS — M24.231 DISORDER OF LIGAMENT OF RIGHT WRIST: ICD-10-CM

## 2021-08-31 DIAGNOSIS — S39.92XA INJURY OF BACK: ICD-10-CM

## 2021-08-31 DIAGNOSIS — Z96.653 HX OF TOTAL KNEE REPLACEMENT, BILATERAL: ICD-10-CM

## 2021-08-31 DIAGNOSIS — M75.100 TORN ROTATOR CUFF: ICD-10-CM

## 2021-08-31 DIAGNOSIS — Z01.818 PRE-OP TESTING: ICD-10-CM

## 2021-08-31 LAB
CRP SERPL-MCNC: 3.7 MG/L (ref 0–8.2)
ERYTHROCYTE [SEDIMENTATION RATE] IN BLOOD BY WESTERGREN METHOD: 10 MM/HR (ref 0–10)

## 2021-08-31 PROCEDURE — 73020 X-RAY EXAM OF SHOULDER: CPT | Mod: TC,FY,RT

## 2021-08-31 PROCEDURE — 86140 C-REACTIVE PROTEIN: CPT | Performed by: ORTHOPAEDIC SURGERY

## 2021-08-31 PROCEDURE — 73020 X-RAY EXAM OF SHOULDER: CPT | Mod: TC,FY,LT

## 2021-08-31 PROCEDURE — 73020 X-RAY EXAM OF SHOULDER: CPT | Mod: 26,LT,, | Performed by: RADIOLOGY

## 2021-08-31 PROCEDURE — 73020 X-RAY EXAM OF SHOULDER: CPT | Mod: 26,RT,, | Performed by: RADIOLOGY

## 2021-08-31 PROCEDURE — 72100 XR LUMBAR SPINE 2 OR 3 VIEWS: ICD-10-PCS | Mod: 26,,, | Performed by: RADIOLOGY

## 2021-08-31 PROCEDURE — 73020 XR SHOULDER 1 VIEW LEFT: ICD-10-PCS | Mod: 26,LT,, | Performed by: RADIOLOGY

## 2021-08-31 PROCEDURE — 73565 X-RAY EXAM OF KNEES: CPT | Mod: 26,,, | Performed by: RADIOLOGY

## 2021-08-31 PROCEDURE — 73565 X-RAY EXAM OF KNEES: CPT | Mod: TC,FY

## 2021-08-31 PROCEDURE — 72100 X-RAY EXAM L-S SPINE 2/3 VWS: CPT | Mod: TC,FY

## 2021-08-31 PROCEDURE — 36415 COLL VENOUS BLD VENIPUNCTURE: CPT | Performed by: ORTHOPAEDIC SURGERY

## 2021-08-31 PROCEDURE — 85651 RBC SED RATE NONAUTOMATED: CPT | Performed by: ORTHOPAEDIC SURGERY

## 2021-08-31 PROCEDURE — 72100 X-RAY EXAM L-S SPINE 2/3 VWS: CPT | Mod: 26,,, | Performed by: RADIOLOGY

## 2021-08-31 PROCEDURE — 73565 XR KNEE AP STANDING BILATERAL: ICD-10-PCS | Mod: 26,,, | Performed by: RADIOLOGY

## 2021-09-01 ENCOUNTER — HOSPITAL ENCOUNTER (OUTPATIENT)
Dept: RADIOLOGY | Facility: HOSPITAL | Age: 56
Discharge: HOME | End: 2021-09-01
Attending: ORTHOPAEDIC SURGERY
Payer: OTHER GOVERNMENT

## 2021-09-01 ENCOUNTER — HOSPITAL ENCOUNTER (OUTPATIENT)
Dept: PREADMISSION TESTING | Facility: HOSPITAL | Age: 56
Discharge: HOME | End: 2021-09-01
Attending: ORTHOPAEDIC SURGERY
Payer: OTHER GOVERNMENT

## 2021-09-01 VITALS — WEIGHT: 202 LBS | BODY MASS INDEX: 28.92 KG/M2 | HEIGHT: 70 IN

## 2021-09-01 DIAGNOSIS — Z01.818 PRE-OP TESTING: ICD-10-CM

## 2021-09-01 DIAGNOSIS — Z96.659 INFECTION OF TOTAL KNEE REPLACEMENT, SUBSEQUENT ENCOUNTER: Primary | ICD-10-CM

## 2021-09-01 DIAGNOSIS — T84.59XD INFECTION OF TOTAL KNEE REPLACEMENT, SUBSEQUENT ENCOUNTER: Primary | ICD-10-CM

## 2021-09-01 LAB
ABO + RH BLD: NORMAL
ANION GAP SERPL CALC-SCNC: 9 MMOL/L (ref 8–16)
BASOPHILS # BLD AUTO: 0.02 K/UL (ref 0–0.2)
BASOPHILS NFR BLD: 0.5 % (ref 0–1.9)
BLD GP AB SCN CELLS X3 SERPL QL: NORMAL
BUN SERPL-MCNC: 9 MG/DL (ref 6–20)
CALCIUM SERPL-MCNC: 9 MG/DL (ref 8.7–10.5)
CHLORIDE SERPL-SCNC: 111 MMOL/L (ref 95–110)
CO2 SERPL-SCNC: 22 MMOL/L (ref 23–29)
CREAT SERPL-MCNC: 0.9 MG/DL (ref 0.5–1.4)
DIFFERENTIAL METHOD: ABNORMAL
EOSINOPHIL # BLD AUTO: 0.1 K/UL (ref 0–0.5)
EOSINOPHIL NFR BLD: 2.2 % (ref 0–8)
ERYTHROCYTE [DISTWIDTH] IN BLOOD BY AUTOMATED COUNT: 15.6 % (ref 11.5–14.5)
EST. GFR  (AFRICAN AMERICAN): >60 ML/MIN/1.73 M^2
EST. GFR  (NON AFRICAN AMERICAN): >60 ML/MIN/1.73 M^2
GLUCOSE SERPL-MCNC: 85 MG/DL (ref 70–110)
HCT VFR BLD AUTO: 36.1 % (ref 40–54)
HGB BLD-MCNC: 11.1 G/DL (ref 14–18)
IMM GRANULOCYTES # BLD AUTO: 0 K/UL (ref 0–0.04)
IMM GRANULOCYTES NFR BLD AUTO: 0 % (ref 0–0.5)
LYMPHOCYTES # BLD AUTO: 1.1 K/UL (ref 1–4.8)
LYMPHOCYTES NFR BLD: 30.6 % (ref 18–48)
MCH RBC QN AUTO: 27.8 PG (ref 27–31)
MCHC RBC AUTO-ENTMCNC: 30.7 G/DL (ref 32–36)
MCV RBC AUTO: 90 FL (ref 82–98)
MONOCYTES # BLD AUTO: 0.3 K/UL (ref 0.3–1)
MONOCYTES NFR BLD: 8.5 % (ref 4–15)
NEUTROPHILS # BLD AUTO: 2.1 K/UL (ref 1.8–7.7)
NEUTROPHILS NFR BLD: 58.2 % (ref 38–73)
NRBC BLD-RTO: 0 /100 WBC
PLATELET # BLD AUTO: 270 K/UL (ref 150–450)
PMV BLD AUTO: 8.7 FL (ref 9.2–12.9)
POTASSIUM SERPL-SCNC: 4.9 MMOL/L (ref 3.5–5.1)
RBC # BLD AUTO: 4 M/UL (ref 4.6–6.2)
SODIUM SERPL-SCNC: 142 MMOL/L (ref 136–145)
WBC # BLD AUTO: 3.66 K/UL (ref 3.9–12.7)

## 2021-09-01 PROCEDURE — 71046 X-RAY EXAM CHEST 2 VIEWS: CPT | Mod: 26,,, | Performed by: RADIOLOGY

## 2021-09-01 PROCEDURE — 71046 XR CHEST PA AND LATERAL: ICD-10-PCS | Mod: 26,,, | Performed by: RADIOLOGY

## 2021-09-01 PROCEDURE — 99900103 DSU ONLY-NO CHARGE-INITIAL HR (STAT)

## 2021-09-01 PROCEDURE — 99900104 DSU ONLY-NO CHARGE-EA ADD'L HR (STAT)

## 2021-09-01 PROCEDURE — 71046 X-RAY EXAM CHEST 2 VIEWS: CPT | Mod: TC,FY

## 2021-09-01 PROCEDURE — 85025 COMPLETE CBC W/AUTO DIFF WBC: CPT | Performed by: ORTHOPAEDIC SURGERY

## 2021-09-01 PROCEDURE — 80048 BASIC METABOLIC PNL TOTAL CA: CPT | Performed by: ORTHOPAEDIC SURGERY

## 2021-09-01 PROCEDURE — 86900 BLOOD TYPING SEROLOGIC ABO: CPT | Performed by: ORTHOPAEDIC SURGERY

## 2021-09-01 PROCEDURE — 87081 CULTURE SCREEN ONLY: CPT | Performed by: ORTHOPAEDIC SURGERY

## 2021-09-01 PROCEDURE — 36415 COLL VENOUS BLD VENIPUNCTURE: CPT | Performed by: ORTHOPAEDIC SURGERY

## 2021-09-02 DIAGNOSIS — T84.59XD INFECTION OF TOTAL KNEE REPLACEMENT, SUBSEQUENT ENCOUNTER: ICD-10-CM

## 2021-09-02 DIAGNOSIS — Z96.659 INFECTION OF TOTAL KNEE REPLACEMENT, SUBSEQUENT ENCOUNTER: ICD-10-CM

## 2021-09-02 RX ORDER — OXYCODONE AND ACETAMINOPHEN 10; 325 MG/1; MG/1
1 TABLET ORAL EVERY 4 HOURS PRN
Qty: 60 TABLET | Refills: 0 | Status: SHIPPED | OUTPATIENT
Start: 2021-09-02 | End: 2021-10-04 | Stop reason: SDUPTHER

## 2021-09-02 RX ORDER — OXYCODONE AND ACETAMINOPHEN 10; 325 MG/1; MG/1
1 TABLET ORAL EVERY 4 HOURS PRN
Qty: 60 TABLET | Refills: 0 | Status: SHIPPED | OUTPATIENT
Start: 2021-09-02 | End: 2021-09-02 | Stop reason: SDUPTHER

## 2021-09-03 LAB — MRSA SPEC QL CULT: NORMAL

## 2021-09-05 ENCOUNTER — ANESTHESIA EVENT (OUTPATIENT)
Dept: SURGERY | Facility: HOSPITAL | Age: 56
End: 2021-09-05
Payer: OTHER GOVERNMENT

## 2021-09-07 ENCOUNTER — HOSPITAL ENCOUNTER (OUTPATIENT)
Facility: HOSPITAL | Age: 56
LOS: 1 days | Discharge: HOME HEALTH | End: 2021-09-08
Attending: ORTHOPAEDIC SURGERY | Admitting: ORTHOPAEDIC SURGERY
Payer: OTHER GOVERNMENT

## 2021-09-07 ENCOUNTER — TELEPHONE (OUTPATIENT)
Dept: ORTHOPEDICS | Facility: CLINIC | Age: 56
End: 2021-09-07

## 2021-09-07 ENCOUNTER — ANESTHESIA (OUTPATIENT)
Dept: SURGERY | Facility: HOSPITAL | Age: 56
End: 2021-09-07
Payer: OTHER GOVERNMENT

## 2021-09-07 DIAGNOSIS — Z96.659 INFECTION OF TOTAL KNEE REPLACEMENT, SUBSEQUENT ENCOUNTER: ICD-10-CM

## 2021-09-07 DIAGNOSIS — Z01.818 PRE-OP TESTING: ICD-10-CM

## 2021-09-07 DIAGNOSIS — Z96.651 STATUS POST TOTAL RIGHT KNEE REPLACEMENT: Primary | ICD-10-CM

## 2021-09-07 DIAGNOSIS — T84.59XD INFECTION OF TOTAL KNEE REPLACEMENT, SUBSEQUENT ENCOUNTER: ICD-10-CM

## 2021-09-07 LAB — SARS-COV-2 RDRP RESP QL NAA+PROBE: NEGATIVE

## 2021-09-07 PROCEDURE — C1713 ANCHOR/SCREW BN/BN,TIS/BN: HCPCS | Performed by: ORTHOPAEDIC SURGERY

## 2021-09-07 PROCEDURE — 27447 TOTAL KNEE ARTHROPLASTY: CPT | Mod: 22,RT,, | Performed by: ORTHOPAEDIC SURGERY

## 2021-09-07 PROCEDURE — 87206 SMEAR FLUORESCENT/ACID STAI: CPT | Performed by: ORTHOPAEDIC SURGERY

## 2021-09-07 PROCEDURE — 25000003 PHARM REV CODE 250: Performed by: ANESTHESIOLOGY

## 2021-09-07 PROCEDURE — 63600175 PHARM REV CODE 636 W HCPCS: Performed by: ANESTHESIOLOGY

## 2021-09-07 PROCEDURE — 87116 MYCOBACTERIA CULTURE: CPT | Mod: 59 | Performed by: ORTHOPAEDIC SURGERY

## 2021-09-07 PROCEDURE — 94761 N-INVAS EAR/PLS OXIMETRY MLT: CPT

## 2021-09-07 PROCEDURE — 76942 ECHO GUIDE FOR BIOPSY: CPT | Mod: 26,,, | Performed by: ANESTHESIOLOGY

## 2021-09-07 PROCEDURE — 87070 CULTURE OTHR SPECIMN AEROBIC: CPT | Mod: 59 | Performed by: ORTHOPAEDIC SURGERY

## 2021-09-07 PROCEDURE — 87075 CULTR BACTERIA EXCEPT BLOOD: CPT | Mod: 59 | Performed by: ORTHOPAEDIC SURGERY

## 2021-09-07 PROCEDURE — 63600175 PHARM REV CODE 636 W HCPCS: Performed by: ORTHOPAEDIC SURGERY

## 2021-09-07 PROCEDURE — U0002 COVID-19 LAB TEST NON-CDC: HCPCS | Performed by: ORTHOPAEDIC SURGERY

## 2021-09-07 PROCEDURE — 36000711: Performed by: ORTHOPAEDIC SURGERY

## 2021-09-07 PROCEDURE — D9220A PRA ANESTHESIA: Mod: ,,, | Performed by: ANESTHESIOLOGY

## 2021-09-07 PROCEDURE — 27201423 OPTIME MED/SURG SUP & DEVICES STERILE SUPPLY: Performed by: ORTHOPAEDIC SURGERY

## 2021-09-07 PROCEDURE — 64447 NJX AA&/STRD FEMORAL NRV IMG: CPT | Mod: 59,RT,, | Performed by: ANESTHESIOLOGY

## 2021-09-07 PROCEDURE — 63600175 PHARM REV CODE 636 W HCPCS: Performed by: NURSE ANESTHETIST, CERTIFIED REGISTERED

## 2021-09-07 PROCEDURE — 25000003 PHARM REV CODE 250: Performed by: ORTHOPAEDIC SURGERY

## 2021-09-07 PROCEDURE — 63600175 PHARM REV CODE 636 W HCPCS

## 2021-09-07 PROCEDURE — 37000009 HC ANESTHESIA EA ADD 15 MINS: Performed by: ORTHOPAEDIC SURGERY

## 2021-09-07 PROCEDURE — 37000008 HC ANESTHESIA 1ST 15 MINUTES: Performed by: ORTHOPAEDIC SURGERY

## 2021-09-07 PROCEDURE — 76942 PR U/S GUIDANCE FOR NEEDLE GUIDANCE: ICD-10-PCS | Mod: 26,,, | Performed by: ANESTHESIOLOGY

## 2021-09-07 PROCEDURE — 99900104 DSU ONLY-NO CHARGE-EA ADD'L HR (STAT): Performed by: ORTHOPAEDIC SURGERY

## 2021-09-07 PROCEDURE — C9290 INJ, BUPIVACAINE LIPOSOME: HCPCS | Performed by: ANESTHESIOLOGY

## 2021-09-07 PROCEDURE — 87176 TISSUE HOMOGENIZATION CULTR: CPT | Performed by: ORTHOPAEDIC SURGERY

## 2021-09-07 PROCEDURE — 99900103 DSU ONLY-NO CHARGE-INITIAL HR (STAT): Performed by: ORTHOPAEDIC SURGERY

## 2021-09-07 PROCEDURE — 27447 PR TOTAL KNEE ARTHROPLASTY: ICD-10-PCS | Mod: 22,RT,, | Performed by: ORTHOPAEDIC SURGERY

## 2021-09-07 PROCEDURE — 27200651 HC AIRWAY, LMA: Performed by: NURSE ANESTHETIST, CERTIFIED REGISTERED

## 2021-09-07 PROCEDURE — 25000003 PHARM REV CODE 250: Performed by: NURSE PRACTITIONER

## 2021-09-07 PROCEDURE — 01402 ANES OPN/ARTH TOT KNE ARTHRP: CPT | Performed by: ORTHOPAEDIC SURGERY

## 2021-09-07 PROCEDURE — 87102 FUNGUS ISOLATION CULTURE: CPT | Performed by: ORTHOPAEDIC SURGERY

## 2021-09-07 PROCEDURE — 25000003 PHARM REV CODE 250: Performed by: NURSE ANESTHETIST, CERTIFIED REGISTERED

## 2021-09-07 PROCEDURE — 36000710: Performed by: ORTHOPAEDIC SURGERY

## 2021-09-07 PROCEDURE — 71000033 HC RECOVERY, INTIAL HOUR: Performed by: ORTHOPAEDIC SURGERY

## 2021-09-07 PROCEDURE — 71000039 HC RECOVERY, EACH ADD'L HOUR: Performed by: ORTHOPAEDIC SURGERY

## 2021-09-07 PROCEDURE — 64447 PR NERVE BLOCK INJ, ANES/STEROID, FEMORAL, INCL IMAG GUIDANCE: ICD-10-PCS | Mod: 59,RT,, | Performed by: ANESTHESIOLOGY

## 2021-09-07 PROCEDURE — D9220A PRA ANESTHESIA: ICD-10-PCS | Mod: ,,, | Performed by: ANESTHESIOLOGY

## 2021-09-07 PROCEDURE — C1776 JOINT DEVICE (IMPLANTABLE): HCPCS | Performed by: ORTHOPAEDIC SURGERY

## 2021-09-07 DEVICE — IMPLANTABLE DEVICE: Type: IMPLANTABLE DEVICE | Site: KNEE | Status: FUNCTIONAL

## 2021-09-07 DEVICE — BASEPLATE TIBIAL TRIATHLON SZ6: Type: IMPLANTABLE DEVICE | Site: KNEE | Status: FUNCTIONAL

## 2021-09-07 DEVICE — CEMENT BONE ANTIBIO SIMPLEX P: Type: IMPLANTABLE DEVICE | Site: KNEE | Status: FUNCTIONAL

## 2021-09-07 DEVICE — AUGMENT FEM POST SZ 5 5MM: Type: IMPLANTABLE DEVICE | Site: KNEE | Status: FUNCTIONAL

## 2021-09-07 DEVICE — PATELLA TRIATHLON 36X10 SYMTRC: Type: IMPLANTABLE DEVICE | Site: KNEE | Status: FUNCTIONAL

## 2021-09-07 DEVICE — STEM EXT FEM KNEE TS 15X100MM: Type: IMPLANTABLE DEVICE | Site: KNEE | Status: FUNCTIONAL

## 2021-09-07 DEVICE — COMP FEM TOTAL STAB SZ 5 RIGHT: Type: IMPLANTABLE DEVICE | Site: KNEE | Status: FUNCTIONAL

## 2021-09-07 RX ORDER — OXYCODONE HYDROCHLORIDE 5 MG/1
5 TABLET ORAL
Status: DISCONTINUED | OUTPATIENT
Start: 2021-09-08 | End: 2021-09-08 | Stop reason: HOSPADM

## 2021-09-07 RX ORDER — VANCOMYCIN HCL IN 5 % DEXTROSE 1G/250ML
1000 PLASTIC BAG, INJECTION (ML) INTRAVENOUS
Status: DISCONTINUED | OUTPATIENT
Start: 2021-09-07 | End: 2021-09-08 | Stop reason: HOSPADM

## 2021-09-07 RX ORDER — LOPERAMIDE HYDROCHLORIDE 2 MG/1
2 CAPSULE ORAL CONTINUOUS PRN
Status: DISCONTINUED | OUTPATIENT
Start: 2021-09-07 | End: 2021-09-08 | Stop reason: HOSPADM

## 2021-09-07 RX ORDER — OXYCODONE AND ACETAMINOPHEN 10; 325 MG/1; MG/1
1 TABLET ORAL EVERY 4 HOURS PRN
Status: DISCONTINUED | OUTPATIENT
Start: 2021-09-07 | End: 2021-09-08 | Stop reason: HOSPADM

## 2021-09-07 RX ORDER — MUPIROCIN 20 MG/G
1 OINTMENT TOPICAL 2 TIMES DAILY
Status: DISCONTINUED | OUTPATIENT
Start: 2021-09-07 | End: 2021-09-08 | Stop reason: HOSPADM

## 2021-09-07 RX ORDER — OXYCODONE HYDROCHLORIDE 5 MG/1
5 TABLET ORAL ONCE AS NEEDED
Status: COMPLETED | OUTPATIENT
Start: 2021-09-07 | End: 2021-09-07

## 2021-09-07 RX ORDER — CEFAZOLIN SODIUM 2 G/50ML
2 SOLUTION INTRAVENOUS
Status: COMPLETED | OUTPATIENT
Start: 2021-09-07 | End: 2021-09-07

## 2021-09-07 RX ORDER — OXYCODONE HYDROCHLORIDE 10 MG/1
10 TABLET ORAL
Status: DISCONTINUED | OUTPATIENT
Start: 2021-09-08 | End: 2021-09-08 | Stop reason: HOSPADM

## 2021-09-07 RX ORDER — TRANEXAMIC ACID 100 MG/ML
1000 INJECTION, SOLUTION INTRAVENOUS
Status: DISCONTINUED | OUTPATIENT
Start: 2021-09-07 | End: 2021-09-07 | Stop reason: HOSPADM

## 2021-09-07 RX ORDER — TRANEXAMIC ACID 100 MG/ML
INJECTION, SOLUTION INTRAVENOUS
Status: DISCONTINUED | OUTPATIENT
Start: 2021-09-07 | End: 2021-09-07

## 2021-09-07 RX ORDER — NAPROXEN SODIUM 220 MG/1
81 TABLET, FILM COATED ORAL 2 TIMES DAILY
Status: DISCONTINUED | OUTPATIENT
Start: 2021-09-07 | End: 2021-09-08 | Stop reason: HOSPADM

## 2021-09-07 RX ORDER — LIDOCAINE HYDROCHLORIDE 10 MG/ML
1 INJECTION, SOLUTION EPIDURAL; INFILTRATION; INTRACAUDAL; PERINEURAL ONCE
Status: DISCONTINUED | OUTPATIENT
Start: 2021-09-07 | End: 2021-09-07 | Stop reason: HOSPADM

## 2021-09-07 RX ORDER — MUPIROCIN 20 MG/G
OINTMENT TOPICAL
Status: DISCONTINUED | OUTPATIENT
Start: 2021-09-07 | End: 2021-09-07 | Stop reason: HOSPADM

## 2021-09-07 RX ORDER — FENTANYL CITRATE 50 UG/ML
25 INJECTION, SOLUTION INTRAMUSCULAR; INTRAVENOUS EVERY 5 MIN PRN
Status: DISCONTINUED | OUTPATIENT
Start: 2021-09-07 | End: 2021-09-07 | Stop reason: HOSPADM

## 2021-09-07 RX ORDER — MIDAZOLAM HYDROCHLORIDE 1 MG/ML
INJECTION, SOLUTION INTRAMUSCULAR; INTRAVENOUS
Status: DISCONTINUED | OUTPATIENT
Start: 2021-09-07 | End: 2021-09-07

## 2021-09-07 RX ORDER — HYDROMORPHONE HYDROCHLORIDE 2 MG/ML
INJECTION, SOLUTION INTRAMUSCULAR; INTRAVENOUS; SUBCUTANEOUS
Status: DISCONTINUED | OUTPATIENT
Start: 2021-09-07 | End: 2021-09-07

## 2021-09-07 RX ORDER — FAMOTIDINE 20 MG/1
20 TABLET, FILM COATED ORAL 2 TIMES DAILY
Status: DISCONTINUED | OUTPATIENT
Start: 2021-09-07 | End: 2021-09-08 | Stop reason: HOSPADM

## 2021-09-07 RX ORDER — HYDROMORPHONE HYDROCHLORIDE 2 MG/ML
0.5 INJECTION, SOLUTION INTRAMUSCULAR; INTRAVENOUS; SUBCUTANEOUS
Status: DISCONTINUED | OUTPATIENT
Start: 2021-09-08 | End: 2021-09-08 | Stop reason: HOSPADM

## 2021-09-07 RX ORDER — ONDANSETRON 2 MG/ML
4 INJECTION INTRAMUSCULAR; INTRAVENOUS EVERY 12 HOURS PRN
Status: DISCONTINUED | OUTPATIENT
Start: 2021-09-07 | End: 2021-09-08 | Stop reason: HOSPADM

## 2021-09-07 RX ORDER — PHENYLEPHRINE HYDROCHLORIDE 10 MG/ML
INJECTION INTRAVENOUS
Status: DISCONTINUED | OUTPATIENT
Start: 2021-09-07 | End: 2021-09-07

## 2021-09-07 RX ORDER — CYCLOBENZAPRINE HCL 5 MG
10 TABLET ORAL 3 TIMES DAILY PRN
Status: DISCONTINUED | OUTPATIENT
Start: 2021-09-07 | End: 2021-09-08 | Stop reason: HOSPADM

## 2021-09-07 RX ORDER — ACETAMINOPHEN 500 MG
1000 TABLET ORAL EVERY 6 HOURS SCHEDULED
Status: DISCONTINUED | OUTPATIENT
Start: 2021-09-08 | End: 2021-09-08 | Stop reason: HOSPADM

## 2021-09-07 RX ORDER — BUPIVACAINE HYDROCHLORIDE 5 MG/ML
INJECTION, SOLUTION EPIDURAL; INTRACAUDAL
Status: COMPLETED | OUTPATIENT
Start: 2021-09-07 | End: 2021-09-07

## 2021-09-07 RX ORDER — KETAMINE HYDROCHLORIDE 10 MG/ML
INJECTION, SOLUTION INTRAMUSCULAR; INTRAVENOUS
Status: DISCONTINUED | OUTPATIENT
Start: 2021-09-07 | End: 2021-09-07

## 2021-09-07 RX ORDER — LIDOCAINE HYDROCHLORIDE 20 MG/ML
INJECTION INTRAVENOUS
Status: DISCONTINUED | OUTPATIENT
Start: 2021-09-07 | End: 2021-09-07

## 2021-09-07 RX ORDER — PROPOFOL 10 MG/ML
VIAL (ML) INTRAVENOUS
Status: DISCONTINUED | OUTPATIENT
Start: 2021-09-07 | End: 2021-09-07

## 2021-09-07 RX ORDER — NAPROXEN SODIUM 220 MG/1
81 TABLET, FILM COATED ORAL 2 TIMES DAILY
Status: DISCONTINUED | OUTPATIENT
Start: 2021-09-07 | End: 2021-09-07

## 2021-09-07 RX ORDER — OXYCODONE HYDROCHLORIDE 10 MG/1
10 TABLET ORAL ONCE
Status: COMPLETED | OUTPATIENT
Start: 2021-09-07 | End: 2021-09-07

## 2021-09-07 RX ORDER — HYDROMORPHONE HYDROCHLORIDE 2 MG/ML
0.2 INJECTION, SOLUTION INTRAMUSCULAR; INTRAVENOUS; SUBCUTANEOUS EVERY 5 MIN PRN
Status: COMPLETED | OUTPATIENT
Start: 2021-09-07 | End: 2021-09-07

## 2021-09-07 RX ORDER — PREGABALIN 75 MG/1
75 CAPSULE ORAL EVERY 12 HOURS
Status: DISCONTINUED | OUTPATIENT
Start: 2021-09-07 | End: 2021-09-08 | Stop reason: HOSPADM

## 2021-09-07 RX ORDER — DEXAMETHASONE SODIUM PHOSPHATE 4 MG/ML
INJECTION, SOLUTION INTRA-ARTICULAR; INTRALESIONAL; INTRAMUSCULAR; INTRAVENOUS; SOFT TISSUE
Status: DISCONTINUED | OUTPATIENT
Start: 2021-09-07 | End: 2021-09-07

## 2021-09-07 RX ORDER — TALC
6 POWDER (GRAM) TOPICAL NIGHTLY PRN
Status: DISCONTINUED | OUTPATIENT
Start: 2021-09-07 | End: 2021-09-08 | Stop reason: HOSPADM

## 2021-09-07 RX ORDER — DOCUSATE SODIUM 100 MG/1
100 CAPSULE, LIQUID FILLED ORAL EVERY 12 HOURS
Status: DISCONTINUED | OUTPATIENT
Start: 2021-09-07 | End: 2021-09-08 | Stop reason: HOSPADM

## 2021-09-07 RX ORDER — OXYCODONE HCL 10 MG/1
10 TABLET, FILM COATED, EXTENDED RELEASE ORAL EVERY 12 HOURS
Status: DISCONTINUED | OUTPATIENT
Start: 2021-09-07 | End: 2021-09-08 | Stop reason: HOSPADM

## 2021-09-07 RX ORDER — SODIUM CHLORIDE 0.9 % (FLUSH) 0.9 %
10 SYRINGE (ML) INJECTION
Status: DISCONTINUED | OUTPATIENT
Start: 2021-09-07 | End: 2021-09-08 | Stop reason: HOSPADM

## 2021-09-07 RX ORDER — LOSARTAN POTASSIUM 25 MG/1
25 TABLET ORAL DAILY
Status: DISCONTINUED | OUTPATIENT
Start: 2021-09-07 | End: 2021-09-08 | Stop reason: HOSPADM

## 2021-09-07 RX ORDER — PROMETHAZINE HYDROCHLORIDE 25 MG/1
25 TABLET ORAL EVERY 6 HOURS PRN
Status: DISCONTINUED | OUTPATIENT
Start: 2021-09-07 | End: 2021-09-08 | Stop reason: HOSPADM

## 2021-09-07 RX ORDER — CELECOXIB 100 MG/1
100 CAPSULE ORAL EVERY 12 HOURS
Status: DISCONTINUED | OUTPATIENT
Start: 2021-09-08 | End: 2021-09-08 | Stop reason: HOSPADM

## 2021-09-07 RX ORDER — DEXTROSE MONOHYDRATE AND SODIUM CHLORIDE 5; .9 G/100ML; G/100ML
INJECTION, SOLUTION INTRAVENOUS CONTINUOUS
Status: DISCONTINUED | OUTPATIENT
Start: 2021-09-07 | End: 2021-09-08 | Stop reason: HOSPADM

## 2021-09-07 RX ORDER — LANOLIN ALCOHOL/MO/W.PET/CERES
1000 CREAM (GRAM) TOPICAL DAILY
Status: DISCONTINUED | OUTPATIENT
Start: 2021-09-07 | End: 2021-09-08 | Stop reason: HOSPADM

## 2021-09-07 RX ORDER — CHOLECALCIFEROL (VITAMIN D3) 25 MCG
2000 TABLET ORAL DAILY
Status: DISCONTINUED | OUTPATIENT
Start: 2021-09-07 | End: 2021-09-08 | Stop reason: HOSPADM

## 2021-09-07 RX ORDER — ONDANSETRON 2 MG/ML
4 INJECTION INTRAMUSCULAR; INTRAVENOUS ONCE AS NEEDED
Status: COMPLETED | OUTPATIENT
Start: 2021-09-07 | End: 2021-09-07

## 2021-09-07 RX ORDER — FENTANYL CITRATE 50 UG/ML
INJECTION, SOLUTION INTRAMUSCULAR; INTRAVENOUS
Status: DISCONTINUED | OUTPATIENT
Start: 2021-09-07 | End: 2021-09-07

## 2021-09-07 RX ORDER — ACETAMINOPHEN 10 MG/ML
INJECTION, SOLUTION INTRAVENOUS
Status: DISCONTINUED | OUTPATIENT
Start: 2021-09-07 | End: 2021-09-07

## 2021-09-07 RX ADMIN — KETOROLAC TROMETHAMINE: 30 INJECTION, SOLUTION INTRAMUSCULAR; INTRAVENOUS at 08:09

## 2021-09-07 RX ADMIN — OXYCODONE HYDROCHLORIDE 10 MG: 10 TABLET, FILM COATED, EXTENDED RELEASE ORAL at 08:09

## 2021-09-07 RX ADMIN — PROPOFOL 200 MG: 10 INJECTION, EMULSION INTRAVENOUS at 07:09

## 2021-09-07 RX ADMIN — FAMOTIDINE 20 MG: 20 TABLET, FILM COATED ORAL at 08:09

## 2021-09-07 RX ADMIN — ASPIRIN 81 MG CHEWABLE TABLET 81 MG: 81 TABLET CHEWABLE at 08:09

## 2021-09-07 RX ADMIN — SODIUM CHLORIDE, SODIUM GLUCONATE, SODIUM ACETATE, POTASSIUM CHLORIDE, MAGNESIUM CHLORIDE, SODIUM PHOSPHATE, DIBASIC, AND POTASSIUM PHOSPHATE: .53; .5; .37; .037; .03; .012; .00082 INJECTION, SOLUTION INTRAVENOUS at 08:09

## 2021-09-07 RX ADMIN — OXYCODONE 5 MG: 5 TABLET ORAL at 10:09

## 2021-09-07 RX ADMIN — CHOLECALCIFEROL TAB 25 MCG (1000 UNIT) 2000 UNITS: 25 TAB at 03:09

## 2021-09-07 RX ADMIN — HYDROMORPHONE HYDROCHLORIDE 0.2 MG: 2 INJECTION, SOLUTION INTRAMUSCULAR; INTRAVENOUS; SUBCUTANEOUS at 11:09

## 2021-09-07 RX ADMIN — MIDAZOLAM 2 MG: 1 INJECTION INTRAMUSCULAR; INTRAVENOUS at 07:09

## 2021-09-07 RX ADMIN — DEXTROSE AND SODIUM CHLORIDE: 5; .9 INJECTION, SOLUTION INTRAVENOUS at 11:09

## 2021-09-07 RX ADMIN — CYANOCOBALAMIN TAB 1000 MCG 1000 MCG: 1000 TAB at 03:09

## 2021-09-07 RX ADMIN — LOSARTAN POTASSIUM 25 MG: 25 TABLET, FILM COATED ORAL at 03:09

## 2021-09-07 RX ADMIN — DEXAMETHASONE SODIUM PHOSPHATE 4 MG: 4 INJECTION, SOLUTION INTRA-ARTICULAR; INTRALESIONAL; INTRAMUSCULAR; INTRAVENOUS; SOFT TISSUE at 07:09

## 2021-09-07 RX ADMIN — CYCLOBENZAPRINE HYDROCHLORIDE 10 MG: 5 TABLET, FILM COATED ORAL at 10:09

## 2021-09-07 RX ADMIN — Medication 6 MG: at 10:09

## 2021-09-07 RX ADMIN — PHENYLEPHRINE HYDROCHLORIDE 100 MCG: 10 INJECTION INTRAVENOUS at 07:09

## 2021-09-07 RX ADMIN — DEXTROSE AND SODIUM CHLORIDE: 5; .9 INJECTION, SOLUTION INTRAVENOUS at 07:09

## 2021-09-07 RX ADMIN — HYDROMORPHONE HYDROCHLORIDE 0.2 MG: 2 INJECTION, SOLUTION INTRAMUSCULAR; INTRAVENOUS; SUBCUTANEOUS at 10:09

## 2021-09-07 RX ADMIN — MUPIROCIN: 20 OINTMENT TOPICAL at 06:09

## 2021-09-07 RX ADMIN — LIDOCAINE HYDROCHLORIDE 100 MG: 20 INJECTION, SOLUTION INTRAVENOUS at 07:09

## 2021-09-07 RX ADMIN — OXYCODONE HYDROCHLORIDE 10 MG: 10 TABLET ORAL at 11:09

## 2021-09-07 RX ADMIN — GLYCOPYRROLATE 0.2 MG: 0.2 INJECTION, SOLUTION INTRAMUSCULAR; INTRAVITREAL at 07:09

## 2021-09-07 RX ADMIN — ONDANSETRON 4 MG: 2 INJECTION INTRAMUSCULAR; INTRAVENOUS at 10:09

## 2021-09-07 RX ADMIN — CEFAZOLIN SODIUM 2 G: 2 SOLUTION INTRAVENOUS at 07:09

## 2021-09-07 RX ADMIN — VANCOMYCIN HYDROCHLORIDE 1000 MG: 1 INJECTION, POWDER, LYOPHILIZED, FOR SOLUTION INTRAVENOUS at 08:09

## 2021-09-07 RX ADMIN — FENTANYL CITRATE 100 MCG: 50 INJECTION, SOLUTION INTRAMUSCULAR; INTRAVENOUS at 07:09

## 2021-09-07 RX ADMIN — ACETAMINOPHEN 1000 MG: 10 INJECTION, SOLUTION INTRAVENOUS at 08:09

## 2021-09-07 RX ADMIN — PREGABALIN 75 MG: 75 CAPSULE ORAL at 08:09

## 2021-09-07 RX ADMIN — HYDROMORPHONE HYDROCHLORIDE 1 MG: 2 INJECTION INTRAMUSCULAR; INTRAVENOUS; SUBCUTANEOUS at 08:09

## 2021-09-07 RX ADMIN — SODIUM CHLORIDE, SODIUM GLUCONATE, SODIUM ACETATE, POTASSIUM CHLORIDE, MAGNESIUM CHLORIDE, SODIUM PHOSPHATE, DIBASIC, AND POTASSIUM PHOSPHATE: .53; .5; .37; .037; .03; .012; .00082 INJECTION, SOLUTION INTRAVENOUS at 06:09

## 2021-09-07 RX ADMIN — FENTANYL CITRATE 50 MCG: 50 INJECTION, SOLUTION INTRAMUSCULAR; INTRAVENOUS at 07:09

## 2021-09-07 RX ADMIN — OXYCODONE AND ACETAMINOPHEN 1 TABLET: 10; 325 TABLET ORAL at 07:09

## 2021-09-07 RX ADMIN — MUPIROCIN 1 G: 20 OINTMENT TOPICAL at 08:09

## 2021-09-07 RX ADMIN — OXYCODONE AND ACETAMINOPHEN 1 TABLET: 10; 325 TABLET ORAL at 03:09

## 2021-09-07 RX ADMIN — FENTANYL CITRATE 50 MCG: 50 INJECTION, SOLUTION INTRAMUSCULAR; INTRAVENOUS at 08:09

## 2021-09-07 RX ADMIN — BUPIVACAINE 20 ML: 13.3 INJECTION, SUSPENSION, LIPOSOMAL INFILTRATION at 07:09

## 2021-09-07 RX ADMIN — KETAMINE HYDROCHLORIDE 25 MG: 10 INJECTION, SOLUTION INTRAMUSCULAR; INTRAVENOUS at 07:09

## 2021-09-07 RX ADMIN — BUPIVACAINE HYDROCHLORIDE 10 ML: 5 INJECTION, SOLUTION EPIDURAL; INTRACAUDAL; PERINEURAL at 07:09

## 2021-09-07 RX ADMIN — TRANEXAMIC ACID 950 MG: 100 INJECTION, SOLUTION INTRAVENOUS at 08:09

## 2021-09-07 RX ADMIN — DOCUSATE SODIUM 100 MG: 100 CAPSULE, LIQUID FILLED ORAL at 08:09

## 2021-09-07 RX ADMIN — TRANEXAMIC ACID 950 MG: 100 INJECTION, SOLUTION INTRAVENOUS at 09:09

## 2021-09-08 VITALS
DIASTOLIC BLOOD PRESSURE: 79 MMHG | BODY MASS INDEX: 30.06 KG/M2 | OXYGEN SATURATION: 97 % | HEIGHT: 70 IN | TEMPERATURE: 97 F | RESPIRATION RATE: 20 BRPM | SYSTOLIC BLOOD PRESSURE: 148 MMHG | HEART RATE: 68 BPM | WEIGHT: 210 LBS

## 2021-09-08 LAB
ANION GAP SERPL CALC-SCNC: 7 MMOL/L (ref 8–16)
BASOPHILS # BLD AUTO: 0.02 K/UL (ref 0–0.2)
BASOPHILS NFR BLD: 0.3 % (ref 0–1.9)
BUN SERPL-MCNC: 8 MG/DL (ref 6–20)
CALCIUM SERPL-MCNC: 8.3 MG/DL (ref 8.7–10.5)
CHLORIDE SERPL-SCNC: 113 MMOL/L (ref 95–110)
CO2 SERPL-SCNC: 23 MMOL/L (ref 23–29)
CREAT SERPL-MCNC: 0.8 MG/DL (ref 0.5–1.4)
DIFFERENTIAL METHOD: ABNORMAL
EOSINOPHIL # BLD AUTO: 0 K/UL (ref 0–0.5)
EOSINOPHIL NFR BLD: 0 % (ref 0–8)
ERYTHROCYTE [DISTWIDTH] IN BLOOD BY AUTOMATED COUNT: 15.6 % (ref 11.5–14.5)
EST. GFR  (AFRICAN AMERICAN): >60 ML/MIN/1.73 M^2
EST. GFR  (NON AFRICAN AMERICAN): >60 ML/MIN/1.73 M^2
GLUCOSE SERPL-MCNC: 101 MG/DL (ref 70–110)
HCT VFR BLD AUTO: 30.9 % (ref 40–54)
HGB BLD-MCNC: 9.1 G/DL (ref 14–18)
IMM GRANULOCYTES # BLD AUTO: 0.01 K/UL (ref 0–0.04)
IMM GRANULOCYTES NFR BLD AUTO: 0.2 % (ref 0–0.5)
LYMPHOCYTES # BLD AUTO: 1.6 K/UL (ref 1–4.8)
LYMPHOCYTES NFR BLD: 24.4 % (ref 18–48)
MCH RBC QN AUTO: 27.1 PG (ref 27–31)
MCHC RBC AUTO-ENTMCNC: 29.4 G/DL (ref 32–36)
MCV RBC AUTO: 92 FL (ref 82–98)
MONOCYTES # BLD AUTO: 0.7 K/UL (ref 0.3–1)
MONOCYTES NFR BLD: 10.3 % (ref 4–15)
NEUTROPHILS # BLD AUTO: 4.3 K/UL (ref 1.8–7.7)
NEUTROPHILS NFR BLD: 64.8 % (ref 38–73)
NRBC BLD-RTO: 0 /100 WBC
PLATELET # BLD AUTO: 291 K/UL (ref 150–450)
PMV BLD AUTO: 8.7 FL (ref 9.2–12.9)
POTASSIUM SERPL-SCNC: 4.6 MMOL/L (ref 3.5–5.1)
RBC # BLD AUTO: 3.36 M/UL (ref 4.6–6.2)
SODIUM SERPL-SCNC: 143 MMOL/L (ref 136–145)
WBC # BLD AUTO: 6.63 K/UL (ref 3.9–12.7)

## 2021-09-08 PROCEDURE — 63600175 PHARM REV CODE 636 W HCPCS: Performed by: ORTHOPAEDIC SURGERY

## 2021-09-08 PROCEDURE — 25000003 PHARM REV CODE 250: Performed by: ORTHOPAEDIC SURGERY

## 2021-09-08 PROCEDURE — 94761 N-INVAS EAR/PLS OXIMETRY MLT: CPT

## 2021-09-08 PROCEDURE — 80048 BASIC METABOLIC PNL TOTAL CA: CPT | Performed by: ORTHOPAEDIC SURGERY

## 2021-09-08 PROCEDURE — 85025 COMPLETE CBC W/AUTO DIFF WBC: CPT | Performed by: ORTHOPAEDIC SURGERY

## 2021-09-08 PROCEDURE — 36415 COLL VENOUS BLD VENIPUNCTURE: CPT | Performed by: ORTHOPAEDIC SURGERY

## 2021-09-08 PROCEDURE — 97161 PT EVAL LOW COMPLEX 20 MIN: CPT

## 2021-09-08 PROCEDURE — 99900035 HC TECH TIME PER 15 MIN (STAT)

## 2021-09-08 RX ORDER — NAPROXEN SODIUM 220 MG/1
81 TABLET, FILM COATED ORAL 2 TIMES DAILY
Qty: 60 TABLET | Refills: 0
Start: 2021-09-08 | End: 2024-03-11 | Stop reason: ALTCHOICE

## 2021-09-08 RX ORDER — OXYCODONE AND ACETAMINOPHEN 10; 325 MG/1; MG/1
1 TABLET ORAL EVERY 4 HOURS PRN
Qty: 60 TABLET | Refills: 0 | Status: SHIPPED | OUTPATIENT
Start: 2021-09-08 | End: 2021-09-14 | Stop reason: SDUPTHER

## 2021-09-08 RX ORDER — CEFADROXIL 500 MG/1
1 CAPSULE ORAL EVERY 12 HOURS
Qty: 60 CAPSULE | Refills: 0 | Status: SHIPPED | OUTPATIENT
Start: 2021-09-08 | End: 2021-09-20

## 2021-09-08 RX ADMIN — ACETAMINOPHEN 1000 MG: 500 TABLET ORAL at 05:09

## 2021-09-08 RX ADMIN — OXYCODONE AND ACETAMINOPHEN 1 TABLET: 10; 325 TABLET ORAL at 12:09

## 2021-09-08 RX ADMIN — CYANOCOBALAMIN TAB 1000 MCG 1000 MCG: 1000 TAB at 09:09

## 2021-09-08 RX ADMIN — DEXTROSE AND SODIUM CHLORIDE: 5; .9 INJECTION, SOLUTION INTRAVENOUS at 05:09

## 2021-09-08 RX ADMIN — FAMOTIDINE 20 MG: 20 TABLET, FILM COATED ORAL at 09:09

## 2021-09-08 RX ADMIN — MUPIROCIN 1 G: 20 OINTMENT TOPICAL at 09:09

## 2021-09-08 RX ADMIN — OXYCODONE HYDROCHLORIDE 10 MG: 10 TABLET ORAL at 04:09

## 2021-09-08 RX ADMIN — OXYCODONE AND ACETAMINOPHEN 1 TABLET: 10; 325 TABLET ORAL at 07:09

## 2021-09-08 RX ADMIN — VANCOMYCIN HYDROCHLORIDE 1000 MG: 1 INJECTION, POWDER, LYOPHILIZED, FOR SOLUTION INTRAVENOUS at 07:09

## 2021-09-08 RX ADMIN — OXYCODONE HYDROCHLORIDE 10 MG: 10 TABLET, FILM COATED, EXTENDED RELEASE ORAL at 09:09

## 2021-09-08 RX ADMIN — PREGABALIN 75 MG: 75 CAPSULE ORAL at 09:09

## 2021-09-08 RX ADMIN — ASPIRIN 81 MG CHEWABLE TABLET 81 MG: 81 TABLET CHEWABLE at 09:09

## 2021-09-08 RX ADMIN — OXYCODONE AND ACETAMINOPHEN 1 TABLET: 10; 325 TABLET ORAL at 11:09

## 2021-09-08 RX ADMIN — CHOLECALCIFEROL TAB 25 MCG (1000 UNIT) 2000 UNITS: 25 TAB at 09:09

## 2021-09-08 RX ADMIN — ACETAMINOPHEN 1000 MG: 500 TABLET ORAL at 12:09

## 2021-09-08 RX ADMIN — DOCUSATE SODIUM 100 MG: 100 CAPSULE, LIQUID FILLED ORAL at 09:09

## 2021-09-08 RX ADMIN — LOSARTAN POTASSIUM 25 MG: 25 TABLET, FILM COATED ORAL at 09:09

## 2021-09-08 RX ADMIN — CELECOXIB 100 MG: 100 CAPSULE ORAL at 09:09

## 2021-09-09 ENCOUNTER — EXTERNAL HOME HEALTH (OUTPATIENT)
Dept: HOME HEALTH SERVICES | Facility: HOSPITAL | Age: 56
End: 2021-09-09

## 2021-09-09 PROCEDURE — G0180 PR HOME HEALTH MD CERTIFICATION: ICD-10-PCS | Mod: ,,, | Performed by: ORTHOPAEDIC SURGERY

## 2021-09-09 PROCEDURE — G0180 MD CERTIFICATION HHA PATIENT: HCPCS | Mod: ,,, | Performed by: ORTHOPAEDIC SURGERY

## 2021-09-11 LAB
BACTERIA SPEC AEROBE CULT: NO GROWTH
BACTERIA SPEC AEROBE CULT: NO GROWTH

## 2021-09-13 ENCOUNTER — TELEPHONE (OUTPATIENT)
Dept: ORTHOPEDICS | Facility: CLINIC | Age: 56
End: 2021-09-13

## 2021-09-14 DIAGNOSIS — T84.59XD INFECTION OF TOTAL KNEE REPLACEMENT, SUBSEQUENT ENCOUNTER: Primary | ICD-10-CM

## 2021-09-14 DIAGNOSIS — Z96.659 INFECTION OF TOTAL KNEE REPLACEMENT, SUBSEQUENT ENCOUNTER: Primary | ICD-10-CM

## 2021-09-14 LAB — BACTERIA SPEC AEROBE CULT: NO GROWTH

## 2021-09-14 RX ORDER — OXYCODONE AND ACETAMINOPHEN 10; 325 MG/1; MG/1
1 TABLET ORAL EVERY 4 HOURS PRN
Qty: 60 TABLET | Refills: 0 | Status: ON HOLD | OUTPATIENT
Start: 2021-09-14 | End: 2021-09-23 | Stop reason: SDUPTHER

## 2021-09-15 DIAGNOSIS — Z96.651 STATUS POST TOTAL KNEE REPLACEMENT USING CEMENT, RIGHT: Primary | ICD-10-CM

## 2021-09-16 LAB
BACTERIA SPEC ANAEROBE CULT: NORMAL

## 2021-09-20 ENCOUNTER — ANESTHESIA EVENT (OUTPATIENT)
Dept: SURGERY | Facility: HOSPITAL | Age: 56
DRG: 857 | End: 2021-09-20
Payer: OTHER GOVERNMENT

## 2021-09-20 ENCOUNTER — HOSPITAL ENCOUNTER (OUTPATIENT)
Dept: RADIOLOGY | Facility: HOSPITAL | Age: 56
Discharge: HOME | End: 2021-09-20
Attending: ORTHOPAEDIC SURGERY
Payer: OTHER GOVERNMENT

## 2021-09-20 ENCOUNTER — OFFICE VISIT (OUTPATIENT)
Dept: ORTHOPEDICS | Facility: CLINIC | Age: 56
End: 2021-09-20
Payer: OTHER GOVERNMENT

## 2021-09-20 ENCOUNTER — HOSPITAL ENCOUNTER (INPATIENT)
Facility: HOSPITAL | Age: 56
LOS: 8 days | Discharge: HOME HEALTH | DRG: 857 | End: 2021-09-28
Attending: EMERGENCY MEDICINE | Admitting: STUDENT IN AN ORGANIZED HEALTH CARE EDUCATION/TRAINING PROGRAM
Payer: OTHER GOVERNMENT

## 2021-09-20 VITALS — RESPIRATION RATE: 16 BRPM | WEIGHT: 210 LBS | HEIGHT: 70 IN | BODY MASS INDEX: 30.06 KG/M2

## 2021-09-20 DIAGNOSIS — Z96.651 STATUS POST TOTAL RIGHT KNEE REPLACEMENT: ICD-10-CM

## 2021-09-20 DIAGNOSIS — Z96.659 INFECTION OF TOTAL KNEE REPLACEMENT, SUBSEQUENT ENCOUNTER: ICD-10-CM

## 2021-09-20 DIAGNOSIS — M25.561 ACUTE PAIN OF RIGHT KNEE: ICD-10-CM

## 2021-09-20 DIAGNOSIS — T81.40XA POST-OPERATIVE INFECTION: Primary | ICD-10-CM

## 2021-09-20 DIAGNOSIS — Z96.651 STATUS POST TOTAL KNEE REPLACEMENT USING CEMENT, RIGHT: Primary | ICD-10-CM

## 2021-09-20 DIAGNOSIS — Z20.822 ENCOUNTER FOR LABORATORY TESTING FOR COVID-19 VIRUS: ICD-10-CM

## 2021-09-20 DIAGNOSIS — R07.9 CHEST PAIN: ICD-10-CM

## 2021-09-20 DIAGNOSIS — T84.59XD INFECTION OF TOTAL KNEE REPLACEMENT, SUBSEQUENT ENCOUNTER: ICD-10-CM

## 2021-09-20 DIAGNOSIS — Z96.651 STATUS POST TOTAL KNEE REPLACEMENT USING CEMENT, RIGHT: ICD-10-CM

## 2021-09-20 DIAGNOSIS — M00.9 POSTOPERATIVE INFECTION OF KNEE: ICD-10-CM

## 2021-09-20 DIAGNOSIS — T81.49XA POSTOPERATIVE INFECTION OF KNEE: ICD-10-CM

## 2021-09-20 LAB — SARS-COV-2 RDRP RESP QL NAA+PROBE: NEGATIVE

## 2021-09-20 PROCEDURE — 99999 PR PBB SHADOW E&M-EST. PATIENT-LVL III: CPT | Mod: PBBFAC,,, | Performed by: ORTHOPAEDIC SURGERY

## 2021-09-20 PROCEDURE — 96375 TX/PRO/DX INJ NEW DRUG ADDON: CPT

## 2021-09-20 PROCEDURE — 99024 PR POST-OP FOLLOW-UP VISIT: ICD-10-PCS | Mod: ,,, | Performed by: ORTHOPAEDIC SURGERY

## 2021-09-20 PROCEDURE — 73560 X-RAY EXAM OF KNEE 1 OR 2: CPT | Mod: TC,PN,RT

## 2021-09-20 PROCEDURE — 96374 THER/PROPH/DIAG INJ IV PUSH: CPT

## 2021-09-20 PROCEDURE — 63600175 PHARM REV CODE 636 W HCPCS: Performed by: STUDENT IN AN ORGANIZED HEALTH CARE EDUCATION/TRAINING PROGRAM

## 2021-09-20 PROCEDURE — 73560 X-RAY EXAM OF KNEE 1 OR 2: CPT | Mod: 26,RT,, | Performed by: RADIOLOGY

## 2021-09-20 PROCEDURE — 63600175 PHARM REV CODE 636 W HCPCS: Performed by: PHYSICIAN ASSISTANT

## 2021-09-20 PROCEDURE — 25000003 PHARM REV CODE 250: Performed by: STUDENT IN AN ORGANIZED HEALTH CARE EDUCATION/TRAINING PROGRAM

## 2021-09-20 PROCEDURE — 99999 PR PBB SHADOW E&M-EST. PATIENT-LVL III: ICD-10-PCS | Mod: PBBFAC,,, | Performed by: ORTHOPAEDIC SURGERY

## 2021-09-20 PROCEDURE — 73560 XR KNEE 1 OR 2 VIEW RIGHT: ICD-10-PCS | Mod: 26,RT,, | Performed by: RADIOLOGY

## 2021-09-20 PROCEDURE — 36415 COLL VENOUS BLD VENIPUNCTURE: CPT | Performed by: STUDENT IN AN ORGANIZED HEALTH CARE EDUCATION/TRAINING PROGRAM

## 2021-09-20 PROCEDURE — 12000002 HC ACUTE/MED SURGE SEMI-PRIVATE ROOM

## 2021-09-20 PROCEDURE — 99024 POSTOP FOLLOW-UP VISIT: CPT | Mod: ,,, | Performed by: ORTHOPAEDIC SURGERY

## 2021-09-20 PROCEDURE — 87040 BLOOD CULTURE FOR BACTERIA: CPT | Performed by: STUDENT IN AN ORGANIZED HEALTH CARE EDUCATION/TRAINING PROGRAM

## 2021-09-20 PROCEDURE — 94760 N-INVAS EAR/PLS OXIMETRY 1: CPT

## 2021-09-20 PROCEDURE — 85025 COMPLETE CBC W/AUTO DIFF WBC: CPT | Performed by: STUDENT IN AN ORGANIZED HEALTH CARE EDUCATION/TRAINING PROGRAM

## 2021-09-20 PROCEDURE — 99213 OFFICE O/P EST LOW 20 MIN: CPT | Mod: PBBFAC,PN,25 | Performed by: ORTHOPAEDIC SURGERY

## 2021-09-20 PROCEDURE — 99285 EMERGENCY DEPT VISIT HI MDM: CPT | Mod: 25,27

## 2021-09-20 PROCEDURE — U0002 COVID-19 LAB TEST NON-CDC: HCPCS | Performed by: PHYSICIAN ASSISTANT

## 2021-09-20 PROCEDURE — 80053 COMPREHEN METABOLIC PANEL: CPT | Performed by: STUDENT IN AN ORGANIZED HEALTH CARE EDUCATION/TRAINING PROGRAM

## 2021-09-20 RX ORDER — FERROUS SULFATE 325(65) MG
TABLET ORAL
COMMUNITY
Start: 2021-08-25 | End: 2023-03-03

## 2021-09-20 RX ORDER — HYDROMORPHONE HYDROCHLORIDE 2 MG/ML
1 INJECTION, SOLUTION INTRAMUSCULAR; INTRAVENOUS; SUBCUTANEOUS
Status: COMPLETED | OUTPATIENT
Start: 2021-09-20 | End: 2021-09-20

## 2021-09-20 RX ORDER — LOSARTAN POTASSIUM 25 MG/1
25 TABLET ORAL DAILY
Status: DISCONTINUED | OUTPATIENT
Start: 2021-09-21 | End: 2021-09-28 | Stop reason: HOSPADM

## 2021-09-20 RX ORDER — OXYCODONE AND ACETAMINOPHEN 10; 325 MG/1; MG/1
1 TABLET ORAL EVERY 4 HOURS PRN
Status: DISCONTINUED | OUTPATIENT
Start: 2021-09-20 | End: 2021-09-28 | Stop reason: HOSPADM

## 2021-09-20 RX ORDER — TRAZODONE HYDROCHLORIDE 100 MG/1
1 TABLET ORAL NIGHTLY PRN
COMMUNITY
Start: 2021-07-11 | End: 2023-03-13 | Stop reason: ALTCHOICE

## 2021-09-20 RX ORDER — LANOLIN ALCOHOL/MO/W.PET/CERES
1000 CREAM (GRAM) TOPICAL DAILY
Status: DISCONTINUED | OUTPATIENT
Start: 2021-09-21 | End: 2021-09-28 | Stop reason: HOSPADM

## 2021-09-20 RX ORDER — TADALAFIL 20 MG/1
TABLET ORAL
COMMUNITY
Start: 2021-04-18 | End: 2021-10-20

## 2021-09-20 RX ORDER — ACETAMINOPHEN 325 MG/1
650 TABLET ORAL EVERY 8 HOURS PRN
Status: DISCONTINUED | OUTPATIENT
Start: 2021-09-20 | End: 2021-09-28 | Stop reason: HOSPADM

## 2021-09-20 RX ORDER — TRAZODONE HYDROCHLORIDE 50 MG/1
100 TABLET ORAL NIGHTLY PRN
Status: DISCONTINUED | OUTPATIENT
Start: 2021-09-20 | End: 2021-09-28 | Stop reason: HOSPADM

## 2021-09-20 RX ORDER — OMEPRAZOLE 40 MG/1
CAPSULE, DELAYED RELEASE ORAL
COMMUNITY
Start: 2021-06-23 | End: 2021-10-20

## 2021-09-20 RX ORDER — SODIUM,POTASSIUM PHOSPHATES 280-250MG
2 POWDER IN PACKET (EA) ORAL
Status: DISCONTINUED | OUTPATIENT
Start: 2021-09-20 | End: 2021-09-28 | Stop reason: HOSPADM

## 2021-09-20 RX ORDER — PANTOPRAZOLE SODIUM 40 MG/1
40 TABLET, DELAYED RELEASE ORAL DAILY
Status: DISCONTINUED | OUTPATIENT
Start: 2021-09-21 | End: 2021-09-25

## 2021-09-20 RX ORDER — LIDOCAINE 50 MG/G
PATCH TOPICAL
COMMUNITY
Start: 2021-07-11 | End: 2021-10-20

## 2021-09-20 RX ORDER — SUCRALFATE 1 G/10ML
1 SUSPENSION ORAL
Status: DISCONTINUED | OUTPATIENT
Start: 2021-09-20 | End: 2021-09-25

## 2021-09-20 RX ORDER — SUCRALFATE 1 G/10ML
SUSPENSION ORAL
COMMUNITY
Start: 2021-02-18 | End: 2021-10-20

## 2021-09-20 RX ORDER — GABAPENTIN 300 MG/1
1 CAPSULE ORAL NIGHTLY
COMMUNITY
Start: 2021-07-11 | End: 2021-10-20

## 2021-09-20 RX ORDER — MORPHINE SULFATE 2 MG/ML
6 INJECTION, SOLUTION INTRAMUSCULAR; INTRAVENOUS
Status: COMPLETED | OUTPATIENT
Start: 2021-09-20 | End: 2021-09-20

## 2021-09-20 RX ORDER — CHOLECALCIFEROL (VITAMIN D3) 25 MCG
2000 TABLET ORAL DAILY
Status: DISCONTINUED | OUTPATIENT
Start: 2021-09-21 | End: 2021-09-28 | Stop reason: HOSPADM

## 2021-09-20 RX ORDER — SODIUM CHLORIDE 0.9 % (FLUSH) 0.9 %
10 SYRINGE (ML) INJECTION
Status: DISCONTINUED | OUTPATIENT
Start: 2021-09-20 | End: 2021-09-28 | Stop reason: HOSPADM

## 2021-09-20 RX ORDER — MORPHINE SULFATE 2 MG/ML
6 INJECTION, SOLUTION INTRAMUSCULAR; INTRAVENOUS EVERY 4 HOURS PRN
Status: DISCONTINUED | OUTPATIENT
Start: 2021-09-20 | End: 2021-09-28 | Stop reason: HOSPADM

## 2021-09-20 RX ORDER — POLYETHYLENE GLYCOL 400 AND PROPYLENE GLYCOL 4; 3 MG/ML; MG/ML
SOLUTION/ DROPS OPHTHALMIC
COMMUNITY
Start: 2021-07-13 | End: 2022-07-14

## 2021-09-20 RX ORDER — LIDOCAINE 50 MG/G
1 PATCH TOPICAL DAILY PRN
Status: DISCONTINUED | OUTPATIENT
Start: 2021-09-20 | End: 2021-09-28 | Stop reason: HOSPADM

## 2021-09-20 RX ORDER — FAMOTIDINE 20 MG/1
TABLET, FILM COATED ORAL
COMMUNITY
Start: 2020-12-04 | End: 2021-10-20

## 2021-09-20 RX ORDER — NALOXONE HCL 0.4 MG/ML
0.02 VIAL (ML) INJECTION
Status: DISCONTINUED | OUTPATIENT
Start: 2021-09-20 | End: 2021-09-28 | Stop reason: HOSPADM

## 2021-09-20 RX ADMIN — VANCOMYCIN HYDROCHLORIDE 1750 MG: 1 INJECTION, POWDER, LYOPHILIZED, FOR SOLUTION INTRAVENOUS at 10:09

## 2021-09-20 RX ADMIN — OXYCODONE AND ACETAMINOPHEN 1 TABLET: 10; 325 TABLET ORAL at 10:09

## 2021-09-20 RX ADMIN — TRAZODONE HYDROCHLORIDE 100 MG: 50 TABLET ORAL at 08:09

## 2021-09-20 RX ADMIN — MORPHINE SULFATE 6 MG: 2 INJECTION, SOLUTION INTRAMUSCULAR; INTRAVENOUS at 08:09

## 2021-09-20 RX ADMIN — HYDROMORPHONE HYDROCHLORIDE 1 MG: 2 INJECTION INTRAMUSCULAR; INTRAVENOUS; SUBCUTANEOUS at 06:09

## 2021-09-20 RX ADMIN — MORPHINE SULFATE 6 MG: 2 INJECTION, SOLUTION INTRAMUSCULAR; INTRAVENOUS at 03:09

## 2021-09-21 ENCOUNTER — ANESTHESIA (OUTPATIENT)
Dept: SURGERY | Facility: HOSPITAL | Age: 56
DRG: 857 | End: 2021-09-21
Payer: OTHER GOVERNMENT

## 2021-09-21 ENCOUNTER — TELEPHONE (OUTPATIENT)
Dept: ORTHOPEDICS | Facility: CLINIC | Age: 56
End: 2021-09-21

## 2021-09-21 PROBLEM — E87.5 HYPERKALEMIA: Status: RESOLVED | Noted: 2020-08-05 | Resolved: 2021-09-21

## 2021-09-21 PROBLEM — Z20.822 ENCOUNTER FOR LABORATORY TESTING FOR COVID-19 VIRUS: Status: ACTIVE | Noted: 2021-09-21

## 2021-09-21 LAB
ALBUMIN SERPL BCP-MCNC: 2.6 G/DL (ref 3.5–5.2)
ALBUMIN SERPL BCP-MCNC: 2.6 G/DL (ref 3.5–5.2)
ALP SERPL-CCNC: 88 U/L (ref 55–135)
ALP SERPL-CCNC: 90 U/L (ref 55–135)
ALT SERPL W/O P-5'-P-CCNC: 11 U/L (ref 10–44)
ALT SERPL W/O P-5'-P-CCNC: 13 U/L (ref 10–44)
ANION GAP SERPL CALC-SCNC: 10 MMOL/L (ref 8–16)
ANION GAP SERPL CALC-SCNC: 9 MMOL/L (ref 8–16)
AST SERPL-CCNC: 11 U/L (ref 10–40)
AST SERPL-CCNC: 14 U/L (ref 10–40)
BASOPHILS # BLD AUTO: 0.03 K/UL (ref 0–0.2)
BASOPHILS # BLD AUTO: 0.03 K/UL (ref 0–0.2)
BASOPHILS NFR BLD: 0.3 % (ref 0–1.9)
BASOPHILS NFR BLD: 0.4 % (ref 0–1.9)
BILIRUB SERPL-MCNC: 0.4 MG/DL (ref 0.1–1)
BILIRUB SERPL-MCNC: 0.5 MG/DL (ref 0.1–1)
BUN SERPL-MCNC: 10 MG/DL (ref 6–20)
BUN SERPL-MCNC: 9 MG/DL (ref 6–20)
CALCIUM SERPL-MCNC: 8.5 MG/DL (ref 8.7–10.5)
CALCIUM SERPL-MCNC: 8.6 MG/DL (ref 8.7–10.5)
CHLORIDE SERPL-SCNC: 107 MMOL/L (ref 95–110)
CHLORIDE SERPL-SCNC: 108 MMOL/L (ref 95–110)
CO2 SERPL-SCNC: 21 MMOL/L (ref 23–29)
CO2 SERPL-SCNC: 23 MMOL/L (ref 23–29)
CREAT SERPL-MCNC: 0.8 MG/DL (ref 0.5–1.4)
CREAT SERPL-MCNC: 0.9 MG/DL (ref 0.5–1.4)
CRP SERPL-MCNC: 217.4 MG/L (ref 0–8.2)
DIFFERENTIAL METHOD: ABNORMAL
DIFFERENTIAL METHOD: ABNORMAL
EOSINOPHIL # BLD AUTO: 0 K/UL (ref 0–0.5)
EOSINOPHIL # BLD AUTO: 0 K/UL (ref 0–0.5)
EOSINOPHIL NFR BLD: 0 % (ref 0–8)
EOSINOPHIL NFR BLD: 0 % (ref 0–8)
ERYTHROCYTE [DISTWIDTH] IN BLOOD BY AUTOMATED COUNT: 14.7 % (ref 11.5–14.5)
ERYTHROCYTE [DISTWIDTH] IN BLOOD BY AUTOMATED COUNT: 14.7 % (ref 11.5–14.5)
ERYTHROCYTE [SEDIMENTATION RATE] IN BLOOD BY WESTERGREN METHOD: 51 MM/HR (ref 0–10)
EST. GFR  (AFRICAN AMERICAN): >60 ML/MIN/1.73 M^2
EST. GFR  (AFRICAN AMERICAN): >60 ML/MIN/1.73 M^2
EST. GFR  (NON AFRICAN AMERICAN): >60 ML/MIN/1.73 M^2
EST. GFR  (NON AFRICAN AMERICAN): >60 ML/MIN/1.73 M^2
GLUCOSE SERPL-MCNC: 126 MG/DL (ref 70–110)
GLUCOSE SERPL-MCNC: 92 MG/DL (ref 70–110)
HCT VFR BLD AUTO: 29.3 % (ref 40–54)
HCT VFR BLD AUTO: 30.2 % (ref 40–54)
HGB BLD-MCNC: 8.9 G/DL (ref 14–18)
HGB BLD-MCNC: 9.1 G/DL (ref 14–18)
IMM GRANULOCYTES # BLD AUTO: 0.03 K/UL (ref 0–0.04)
IMM GRANULOCYTES # BLD AUTO: 0.05 K/UL (ref 0–0.04)
IMM GRANULOCYTES NFR BLD AUTO: 0.4 % (ref 0–0.5)
IMM GRANULOCYTES NFR BLD AUTO: 0.5 % (ref 0–0.5)
LYMPHOCYTES # BLD AUTO: 1.4 K/UL (ref 1–4.8)
LYMPHOCYTES # BLD AUTO: 1.9 K/UL (ref 1–4.8)
LYMPHOCYTES NFR BLD: 17.6 % (ref 18–48)
LYMPHOCYTES NFR BLD: 18.3 % (ref 18–48)
MAGNESIUM SERPL-MCNC: 2 MG/DL (ref 1.6–2.6)
MCH RBC QN AUTO: 27.4 PG (ref 27–31)
MCH RBC QN AUTO: 27.6 PG (ref 27–31)
MCHC RBC AUTO-ENTMCNC: 30.1 G/DL (ref 32–36)
MCHC RBC AUTO-ENTMCNC: 30.4 G/DL (ref 32–36)
MCV RBC AUTO: 91 FL (ref 82–98)
MCV RBC AUTO: 91 FL (ref 82–98)
MONOCYTES # BLD AUTO: 0.8 K/UL (ref 0.3–1)
MONOCYTES # BLD AUTO: 0.8 K/UL (ref 0.3–1)
MONOCYTES NFR BLD: 8 % (ref 4–15)
MONOCYTES NFR BLD: 9.6 % (ref 4–15)
NEUTROPHILS # BLD AUTO: 5.6 K/UL (ref 1.8–7.7)
NEUTROPHILS # BLD AUTO: 7.5 K/UL (ref 1.8–7.7)
NEUTROPHILS NFR BLD: 72 % (ref 38–73)
NEUTROPHILS NFR BLD: 72.9 % (ref 38–73)
NRBC BLD-RTO: 0 /100 WBC
NRBC BLD-RTO: 0 /100 WBC
PHOSPHATE SERPL-MCNC: 3.7 MG/DL (ref 2.7–4.5)
PLATELET # BLD AUTO: 287 K/UL (ref 150–450)
PLATELET # BLD AUTO: 342 K/UL (ref 150–450)
PMV BLD AUTO: 8.5 FL (ref 9.2–12.9)
PMV BLD AUTO: 8.7 FL (ref 9.2–12.9)
POTASSIUM SERPL-SCNC: 4.1 MMOL/L (ref 3.5–5.1)
POTASSIUM SERPL-SCNC: 4.4 MMOL/L (ref 3.5–5.1)
PROT SERPL-MCNC: 6 G/DL (ref 6–8.4)
PROT SERPL-MCNC: 6 G/DL (ref 6–8.4)
RBC # BLD AUTO: 3.23 M/UL (ref 4.6–6.2)
RBC # BLD AUTO: 3.32 M/UL (ref 4.6–6.2)
SODIUM SERPL-SCNC: 138 MMOL/L (ref 136–145)
SODIUM SERPL-SCNC: 140 MMOL/L (ref 136–145)
WBC # BLD AUTO: 10.22 K/UL (ref 3.9–12.7)
WBC # BLD AUTO: 7.79 K/UL (ref 3.9–12.7)

## 2021-09-21 PROCEDURE — 99223 PR INITIAL HOSPITAL CARE,LEVL III: ICD-10-PCS | Mod: ,,, | Performed by: STUDENT IN AN ORGANIZED HEALTH CARE EDUCATION/TRAINING PROGRAM

## 2021-09-21 PROCEDURE — 25000003 PHARM REV CODE 250: Performed by: ORTHOPAEDIC SURGERY

## 2021-09-21 PROCEDURE — 63600175 PHARM REV CODE 636 W HCPCS

## 2021-09-21 PROCEDURE — 63600175 PHARM REV CODE 636 W HCPCS: Performed by: STUDENT IN AN ORGANIZED HEALTH CARE EDUCATION/TRAINING PROGRAM

## 2021-09-21 PROCEDURE — 64447 NJX AA&/STRD FEMORAL NRV IMG: CPT | Performed by: ANESTHESIOLOGY

## 2021-09-21 PROCEDURE — 99900035 HC TECH TIME PER 15 MIN (STAT)

## 2021-09-21 PROCEDURE — D9220A PRA ANESTHESIA: ICD-10-PCS | Mod: ,,, | Performed by: ANESTHESIOLOGY

## 2021-09-21 PROCEDURE — 87040 BLOOD CULTURE FOR BACTERIA: CPT | Performed by: STUDENT IN AN ORGANIZED HEALTH CARE EDUCATION/TRAINING PROGRAM

## 2021-09-21 PROCEDURE — 83735 ASSAY OF MAGNESIUM: CPT | Performed by: STUDENT IN AN ORGANIZED HEALTH CARE EDUCATION/TRAINING PROGRAM

## 2021-09-21 PROCEDURE — 36000711: Performed by: ORTHOPAEDIC SURGERY

## 2021-09-21 PROCEDURE — 27200651 HC AIRWAY, LMA: Performed by: ANESTHESIOLOGY

## 2021-09-21 PROCEDURE — 63600175 PHARM REV CODE 636 W HCPCS: Performed by: ORTHOPAEDIC SURGERY

## 2021-09-21 PROCEDURE — 87077 CULTURE AEROBIC IDENTIFY: CPT | Mod: 59 | Performed by: ORTHOPAEDIC SURGERY

## 2021-09-21 PROCEDURE — 76942 PR U/S GUIDANCE FOR NEEDLE GUIDANCE: ICD-10-PCS | Mod: 26,,, | Performed by: ANESTHESIOLOGY

## 2021-09-21 PROCEDURE — 64447 NJX AA&/STRD FEMORAL NRV IMG: CPT | Mod: 59,RT,, | Performed by: ANESTHESIOLOGY

## 2021-09-21 PROCEDURE — 36415 COLL VENOUS BLD VENIPUNCTURE: CPT | Performed by: STUDENT IN AN ORGANIZED HEALTH CARE EDUCATION/TRAINING PROGRAM

## 2021-09-21 PROCEDURE — 87075 CULTR BACTERIA EXCEPT BLOOD: CPT | Performed by: ORTHOPAEDIC SURGERY

## 2021-09-21 PROCEDURE — 87205 SMEAR GRAM STAIN: CPT | Performed by: ORTHOPAEDIC SURGERY

## 2021-09-21 PROCEDURE — 87070 CULTURE OTHR SPECIMN AEROBIC: CPT | Mod: 59 | Performed by: ORTHOPAEDIC SURGERY

## 2021-09-21 PROCEDURE — 87102 FUNGUS ISOLATION CULTURE: CPT | Performed by: ORTHOPAEDIC SURGERY

## 2021-09-21 PROCEDURE — 25000003 PHARM REV CODE 250: Performed by: ANESTHESIOLOGY

## 2021-09-21 PROCEDURE — 85025 COMPLETE CBC W/AUTO DIFF WBC: CPT | Performed by: STUDENT IN AN ORGANIZED HEALTH CARE EDUCATION/TRAINING PROGRAM

## 2021-09-21 PROCEDURE — 27200750 HC INSULATED NEEDLE/ STIMUPLEX: Performed by: ANESTHESIOLOGY

## 2021-09-21 PROCEDURE — 76942 ECHO GUIDE FOR BIOPSY: CPT | Performed by: ANESTHESIOLOGY

## 2021-09-21 PROCEDURE — 64447 PR NERVE BLOCK INJ, ANES/STEROID, FEMORAL, INCL IMAG GUIDANCE: ICD-10-PCS | Mod: 59,RT,, | Performed by: ANESTHESIOLOGY

## 2021-09-21 PROCEDURE — 25000003 PHARM REV CODE 250: Performed by: NURSE ANESTHETIST, CERTIFIED REGISTERED

## 2021-09-21 PROCEDURE — 27310 PR EXPLOR/DRAIN KNEE,INFECTN: ICD-10-PCS | Mod: 58,RT,, | Performed by: ORTHOPAEDIC SURGERY

## 2021-09-21 PROCEDURE — D9220A PRA ANESTHESIA: Mod: ,,, | Performed by: ANESTHESIOLOGY

## 2021-09-21 PROCEDURE — 25000003 PHARM REV CODE 250: Performed by: STUDENT IN AN ORGANIZED HEALTH CARE EDUCATION/TRAINING PROGRAM

## 2021-09-21 PROCEDURE — 63600175 PHARM REV CODE 636 W HCPCS: Performed by: NURSE ANESTHETIST, CERTIFIED REGISTERED

## 2021-09-21 PROCEDURE — 87186 SC STD MICRODIL/AGAR DIL: CPT | Performed by: ORTHOPAEDIC SURGERY

## 2021-09-21 PROCEDURE — 36000710: Performed by: ORTHOPAEDIC SURGERY

## 2021-09-21 PROCEDURE — 99900104 DSU ONLY-NO CHARGE-EA ADD'L HR (STAT): Performed by: ORTHOPAEDIC SURGERY

## 2021-09-21 PROCEDURE — 27310 EXPLORATION OF KNEE JOINT: CPT | Mod: 58,RT,, | Performed by: ORTHOPAEDIC SURGERY

## 2021-09-21 PROCEDURE — 76942 ECHO GUIDE FOR BIOPSY: CPT | Mod: 26,,, | Performed by: ANESTHESIOLOGY

## 2021-09-21 PROCEDURE — 63600175 PHARM REV CODE 636 W HCPCS: Performed by: ANESTHESIOLOGY

## 2021-09-21 PROCEDURE — 86140 C-REACTIVE PROTEIN: CPT | Performed by: STUDENT IN AN ORGANIZED HEALTH CARE EDUCATION/TRAINING PROGRAM

## 2021-09-21 PROCEDURE — 27201423 OPTIME MED/SURG SUP & DEVICES STERILE SUPPLY: Performed by: ORTHOPAEDIC SURGERY

## 2021-09-21 PROCEDURE — 84100 ASSAY OF PHOSPHORUS: CPT | Performed by: STUDENT IN AN ORGANIZED HEALTH CARE EDUCATION/TRAINING PROGRAM

## 2021-09-21 PROCEDURE — 85651 RBC SED RATE NONAUTOMATED: CPT | Performed by: STUDENT IN AN ORGANIZED HEALTH CARE EDUCATION/TRAINING PROGRAM

## 2021-09-21 PROCEDURE — 94761 N-INVAS EAR/PLS OXIMETRY MLT: CPT

## 2021-09-21 PROCEDURE — 99223 1ST HOSP IP/OBS HIGH 75: CPT | Mod: ,,, | Performed by: STUDENT IN AN ORGANIZED HEALTH CARE EDUCATION/TRAINING PROGRAM

## 2021-09-21 PROCEDURE — 80053 COMPREHEN METABOLIC PANEL: CPT | Performed by: STUDENT IN AN ORGANIZED HEALTH CARE EDUCATION/TRAINING PROGRAM

## 2021-09-21 PROCEDURE — 12000002 HC ACUTE/MED SURGE SEMI-PRIVATE ROOM

## 2021-09-21 PROCEDURE — C1776 JOINT DEVICE (IMPLANTABLE): HCPCS | Performed by: ORTHOPAEDIC SURGERY

## 2021-09-21 PROCEDURE — 99900103 DSU ONLY-NO CHARGE-INITIAL HR (STAT): Performed by: ORTHOPAEDIC SURGERY

## 2021-09-21 PROCEDURE — 37000008 HC ANESTHESIA 1ST 15 MINUTES: Performed by: ORTHOPAEDIC SURGERY

## 2021-09-21 PROCEDURE — 71000039 HC RECOVERY, EACH ADD'L HOUR: Performed by: ORTHOPAEDIC SURGERY

## 2021-09-21 PROCEDURE — 71000033 HC RECOVERY, INTIAL HOUR: Performed by: ORTHOPAEDIC SURGERY

## 2021-09-21 PROCEDURE — 37000009 HC ANESTHESIA EA ADD 15 MINS: Performed by: ORTHOPAEDIC SURGERY

## 2021-09-21 PROCEDURE — C9290 INJ, BUPIVACAINE LIPOSOME: HCPCS | Performed by: ANESTHESIOLOGY

## 2021-09-21 DEVICE — IMPLANTABLE DEVICE: Type: IMPLANTABLE DEVICE | Site: KNEE | Status: FUNCTIONAL

## 2021-09-21 RX ORDER — OXYCODONE HCL 10 MG/1
10 TABLET, FILM COATED, EXTENDED RELEASE ORAL ONCE
Status: DISCONTINUED | OUTPATIENT
Start: 2021-09-21 | End: 2021-09-21

## 2021-09-21 RX ORDER — ACETAMINOPHEN 325 MG/1
650 TABLET ORAL EVERY 4 HOURS
Status: DISCONTINUED | OUTPATIENT
Start: 2021-09-21 | End: 2021-09-25

## 2021-09-21 RX ORDER — ONDANSETRON HYDROCHLORIDE 2 MG/ML
INJECTION, SOLUTION INTRAMUSCULAR; INTRAVENOUS
Status: DISCONTINUED | OUTPATIENT
Start: 2021-09-21 | End: 2021-09-21

## 2021-09-21 RX ORDER — MORPHINE SULFATE 2 MG/ML
2 INJECTION, SOLUTION INTRAMUSCULAR; INTRAVENOUS
Status: DISCONTINUED | OUTPATIENT
Start: 2021-09-21 | End: 2021-09-25

## 2021-09-21 RX ORDER — BUPIVACAINE HYDROCHLORIDE 5 MG/ML
INJECTION, SOLUTION EPIDURAL; INTRACAUDAL
Status: COMPLETED | OUTPATIENT
Start: 2021-09-21 | End: 2021-09-21

## 2021-09-21 RX ORDER — HYDROMORPHONE HYDROCHLORIDE 2 MG/ML
0.5 INJECTION, SOLUTION INTRAMUSCULAR; INTRAVENOUS; SUBCUTANEOUS EVERY 5 MIN PRN
Status: COMPLETED | OUTPATIENT
Start: 2021-09-21 | End: 2021-09-21

## 2021-09-21 RX ORDER — DOCUSATE SODIUM 100 MG/1
100 CAPSULE, LIQUID FILLED ORAL EVERY 12 HOURS
Status: DISCONTINUED | OUTPATIENT
Start: 2021-09-21 | End: 2021-09-25

## 2021-09-21 RX ORDER — CELECOXIB 100 MG/1
200 CAPSULE ORAL ONCE
Status: COMPLETED | OUTPATIENT
Start: 2021-09-21 | End: 2021-09-21

## 2021-09-21 RX ORDER — VANCOMYCIN HYDROCHLORIDE 1 G/20ML
INJECTION, POWDER, LYOPHILIZED, FOR SOLUTION INTRAVENOUS
Status: DISCONTINUED | OUTPATIENT
Start: 2021-09-21 | End: 2021-09-21 | Stop reason: HOSPADM

## 2021-09-21 RX ORDER — OXYCODONE HYDROCHLORIDE 10 MG/1
10 TABLET ORAL
Status: DISCONTINUED | OUTPATIENT
Start: 2021-09-21 | End: 2021-09-25

## 2021-09-21 RX ORDER — PREGABALIN 75 MG/1
75 CAPSULE ORAL ONCE
Status: DISCONTINUED | OUTPATIENT
Start: 2021-09-21 | End: 2021-09-21

## 2021-09-21 RX ORDER — DEXAMETHASONE SODIUM PHOSPHATE 4 MG/ML
INJECTION, SOLUTION INTRA-ARTICULAR; INTRALESIONAL; INTRAMUSCULAR; INTRAVENOUS; SOFT TISSUE
Status: DISCONTINUED | OUTPATIENT
Start: 2021-09-21 | End: 2021-09-21

## 2021-09-21 RX ORDER — SODIUM CHLORIDE 0.9 % (FLUSH) 0.9 %
10 SYRINGE (ML) INJECTION
Status: DISCONTINUED | OUTPATIENT
Start: 2021-09-21 | End: 2021-09-22

## 2021-09-21 RX ORDER — CELECOXIB 100 MG/1
100 CAPSULE ORAL 2 TIMES DAILY
Status: DISCONTINUED | OUTPATIENT
Start: 2021-09-21 | End: 2021-09-28 | Stop reason: HOSPADM

## 2021-09-21 RX ORDER — LOPERAMIDE HYDROCHLORIDE 2 MG/1
2 CAPSULE ORAL CONTINUOUS PRN
Status: DISCONTINUED | OUTPATIENT
Start: 2021-09-21 | End: 2021-09-25

## 2021-09-21 RX ORDER — OXYCODONE HYDROCHLORIDE 5 MG/1
5 TABLET ORAL
Status: DISCONTINUED | OUTPATIENT
Start: 2021-09-21 | End: 2021-09-25

## 2021-09-21 RX ORDER — PROPOFOL 10 MG/ML
VIAL (ML) INTRAVENOUS
Status: DISCONTINUED | OUTPATIENT
Start: 2021-09-21 | End: 2021-09-21

## 2021-09-21 RX ORDER — NAPROXEN SODIUM 220 MG/1
81 TABLET, FILM COATED ORAL 2 TIMES DAILY
Status: DISCONTINUED | OUTPATIENT
Start: 2021-09-21 | End: 2021-09-28 | Stop reason: HOSPADM

## 2021-09-21 RX ORDER — VANCOMYCIN HCL IN 5 % DEXTROSE 1G/250ML
1000 PLASTIC BAG, INJECTION (ML) INTRAVENOUS
Status: DISCONTINUED | OUTPATIENT
Start: 2021-09-22 | End: 2021-09-21 | Stop reason: SDUPTHER

## 2021-09-21 RX ORDER — KETAMINE HYDROCHLORIDE 100 MG/ML
INJECTION, SOLUTION INTRAMUSCULAR; INTRAVENOUS
Status: DISCONTINUED | OUTPATIENT
Start: 2021-09-21 | End: 2021-09-21

## 2021-09-21 RX ORDER — LIDOCAINE HCL/PF 100 MG/5ML
SYRINGE (ML) INTRAVENOUS
Status: DISCONTINUED | OUTPATIENT
Start: 2021-09-21 | End: 2021-09-21

## 2021-09-21 RX ORDER — MIDAZOLAM HYDROCHLORIDE 1 MG/ML
INJECTION INTRAMUSCULAR; INTRAVENOUS
Status: DISCONTINUED | OUTPATIENT
Start: 2021-09-21 | End: 2021-09-21

## 2021-09-21 RX ORDER — CELECOXIB 100 MG/1
200 CAPSULE ORAL ONCE
Status: DISCONTINUED | OUTPATIENT
Start: 2021-09-21 | End: 2021-09-21

## 2021-09-21 RX ORDER — MUPIROCIN 20 MG/G
1 OINTMENT TOPICAL 2 TIMES DAILY
Status: DISPENSED | OUTPATIENT
Start: 2021-09-21 | End: 2021-09-26

## 2021-09-21 RX ORDER — DEXTROSE MONOHYDRATE AND SODIUM CHLORIDE 5; .9 G/100ML; G/100ML
INJECTION, SOLUTION INTRAVENOUS CONTINUOUS
Status: DISCONTINUED | OUTPATIENT
Start: 2021-09-21 | End: 2021-09-24

## 2021-09-21 RX ORDER — ONDANSETRON 2 MG/ML
4 INJECTION INTRAMUSCULAR; INTRAVENOUS EVERY 12 HOURS PRN
Status: DISCONTINUED | OUTPATIENT
Start: 2021-09-21 | End: 2021-09-27

## 2021-09-21 RX ORDER — FENTANYL CITRATE 50 UG/ML
25 INJECTION, SOLUTION INTRAMUSCULAR; INTRAVENOUS EVERY 5 MIN PRN
Status: COMPLETED | OUTPATIENT
Start: 2021-09-21 | End: 2021-09-21

## 2021-09-21 RX ORDER — FENTANYL CITRATE 50 UG/ML
INJECTION, SOLUTION INTRAMUSCULAR; INTRAVENOUS
Status: DISCONTINUED | OUTPATIENT
Start: 2021-09-21 | End: 2021-09-21

## 2021-09-21 RX ORDER — FAMOTIDINE 20 MG/1
20 TABLET, FILM COATED ORAL 2 TIMES DAILY
Status: DISCONTINUED | OUTPATIENT
Start: 2021-09-21 | End: 2021-09-21 | Stop reason: SDUPTHER

## 2021-09-21 RX ORDER — SODIUM CHLORIDE, SODIUM LACTATE, POTASSIUM CHLORIDE, CALCIUM CHLORIDE 600; 310; 30; 20 MG/100ML; MG/100ML; MG/100ML; MG/100ML
1000 INJECTION, SOLUTION INTRAVENOUS ONCE
Status: DISCONTINUED | OUTPATIENT
Start: 2021-09-21 | End: 2021-09-21 | Stop reason: HOSPADM

## 2021-09-21 RX ORDER — CEFAZOLIN SODIUM 2 G/50ML
2 SOLUTION INTRAVENOUS
Status: DISCONTINUED | OUTPATIENT
Start: 2021-09-21 | End: 2021-09-23

## 2021-09-21 RX ADMIN — PROPOFOL 130 MG: 10 INJECTION, EMULSION INTRAVENOUS at 12:09

## 2021-09-21 RX ADMIN — BUPIVACAINE 20 ML: 13.3 INJECTION, SUSPENSION, LIPOSOMAL INFILTRATION at 11:09

## 2021-09-21 RX ADMIN — OXYCODONE AND ACETAMINOPHEN 1 TABLET: 10; 325 TABLET ORAL at 05:09

## 2021-09-21 RX ADMIN — OXYCODONE AND ACETAMINOPHEN 1 TABLET: 10; 325 TABLET ORAL at 06:09

## 2021-09-21 RX ADMIN — CEFAZOLIN SODIUM 2 G: 2 SOLUTION INTRAVENOUS at 04:09

## 2021-09-21 RX ADMIN — FENTANYL CITRATE 25 MCG: 50 INJECTION INTRAMUSCULAR; INTRAVENOUS at 02:09

## 2021-09-21 RX ADMIN — MIDAZOLAM HYDROCHLORIDE 2 MG: 1 INJECTION, SOLUTION INTRAMUSCULAR; INTRAVENOUS at 11:09

## 2021-09-21 RX ADMIN — DEXTROSE AND SODIUM CHLORIDE: 5; .9 INJECTION, SOLUTION INTRAVENOUS at 03:09

## 2021-09-21 RX ADMIN — SODIUM CHLORIDE, SODIUM GLUCONATE, SODIUM ACETATE, POTASSIUM CHLORIDE, MAGNESIUM CHLORIDE, SODIUM PHOSPHATE, DIBASIC, AND POTASSIUM PHOSPHATE: .53; .5; .37; .037; .03; .012; .00082 INJECTION, SOLUTION INTRAVENOUS at 01:09

## 2021-09-21 RX ADMIN — MORPHINE SULFATE 6 MG: 2 INJECTION, SOLUTION INTRAMUSCULAR; INTRAVENOUS at 08:09

## 2021-09-21 RX ADMIN — VANCOMYCIN HYDROCHLORIDE 1750 MG: 1 INJECTION, POWDER, LYOPHILIZED, FOR SOLUTION INTRAVENOUS at 11:09

## 2021-09-21 RX ADMIN — HYDROMORPHONE HYDROCHLORIDE 0.5 MG: 2 INJECTION, SOLUTION INTRAMUSCULAR; INTRAVENOUS; SUBCUTANEOUS at 02:09

## 2021-09-21 RX ADMIN — FENTANYL CITRATE 100 MCG: 50 INJECTION, SOLUTION INTRAMUSCULAR; INTRAVENOUS at 11:09

## 2021-09-21 RX ADMIN — LIDOCAINE HYDROCHLORIDE 100 MG: 20 INJECTION INTRAVENOUS at 12:09

## 2021-09-21 RX ADMIN — ACETAMINOPHEN 650 MG: 325 TABLET ORAL at 10:09

## 2021-09-21 RX ADMIN — CELECOXIB 200 MG: 100 CAPSULE ORAL at 04:09

## 2021-09-21 RX ADMIN — CYANOCOBALAMIN TAB 1000 MCG 1000 MCG: 1000 TAB at 08:09

## 2021-09-21 RX ADMIN — HYDROMORPHONE HYDROCHLORIDE 0.5 MG: 2 INJECTION, SOLUTION INTRAMUSCULAR; INTRAVENOUS; SUBCUTANEOUS at 03:09

## 2021-09-21 RX ADMIN — TRAZODONE HYDROCHLORIDE 100 MG: 50 TABLET ORAL at 08:09

## 2021-09-21 RX ADMIN — MUPIROCIN 1 G: 20 OINTMENT TOPICAL at 08:09

## 2021-09-21 RX ADMIN — ACETAMINOPHEN 650 MG: 325 TABLET ORAL at 05:09

## 2021-09-21 RX ADMIN — KETAMINE HYDROCHLORIDE 25 MG: 100 INJECTION, SOLUTION, CONCENTRATE INTRAMUSCULAR; INTRAVENOUS at 12:09

## 2021-09-21 RX ADMIN — ASPIRIN 81 MG CHEWABLE TABLET 81 MG: 81 TABLET CHEWABLE at 08:09

## 2021-09-21 RX ADMIN — CHOLECALCIFEROL TAB 25 MCG (1000 UNIT) 2000 UNITS: 25 TAB at 08:09

## 2021-09-21 RX ADMIN — CELECOXIB 100 MG: 100 CAPSULE ORAL at 08:09

## 2021-09-21 RX ADMIN — OXYCODONE HYDROCHLORIDE 15 MG: 10 TABLET ORAL at 02:09

## 2021-09-21 RX ADMIN — BUPIVACAINE HYDROCHLORIDE 10 ML: 5 INJECTION, SOLUTION EPIDURAL; INTRACAUDAL; PERINEURAL at 11:09

## 2021-09-21 RX ADMIN — SODIUM CHLORIDE, SODIUM GLUCONATE, SODIUM ACETATE, POTASSIUM CHLORIDE, MAGNESIUM CHLORIDE, SODIUM PHOSPHATE, DIBASIC, AND POTASSIUM PHOSPHATE: .53; .5; .37; .037; .03; .012; .00082 INJECTION, SOLUTION INTRAVENOUS at 12:09

## 2021-09-21 RX ADMIN — PANTOPRAZOLE SODIUM 40 MG: 40 TABLET, DELAYED RELEASE ORAL at 08:09

## 2021-09-21 RX ADMIN — MIDAZOLAM HYDROCHLORIDE 2 MG: 1 INJECTION, SOLUTION INTRAMUSCULAR; INTRAVENOUS at 12:09

## 2021-09-21 RX ADMIN — DEXAMETHASONE SODIUM PHOSPHATE 4 MG: 4 INJECTION, SOLUTION INTRA-ARTICULAR; INTRALESIONAL; INTRAMUSCULAR; INTRAVENOUS; SOFT TISSUE at 12:09

## 2021-09-21 RX ADMIN — ONDANSETRON 4 MG: 2 INJECTION, SOLUTION INTRAMUSCULAR; INTRAVENOUS at 12:09

## 2021-09-21 RX ADMIN — FENTANYL CITRATE 100 MCG: 50 INJECTION, SOLUTION INTRAMUSCULAR; INTRAVENOUS at 12:09

## 2021-09-21 RX ADMIN — OXYCODONE AND ACETAMINOPHEN 1 TABLET: 10; 325 TABLET ORAL at 11:09

## 2021-09-21 RX ADMIN — ONDANSETRON 4 MG: 2 INJECTION INTRAMUSCULAR; INTRAVENOUS at 05:09

## 2021-09-21 RX ADMIN — DOCUSATE SODIUM 100 MG: 100 CAPSULE, LIQUID FILLED ORAL at 08:09

## 2021-09-21 RX ADMIN — FENTANYL CITRATE 50 MCG: 50 INJECTION, SOLUTION INTRAMUSCULAR; INTRAVENOUS at 12:09

## 2021-09-21 RX ADMIN — MORPHINE SULFATE 6 MG: 2 INJECTION, SOLUTION INTRAMUSCULAR; INTRAVENOUS at 02:09

## 2021-09-21 RX ADMIN — MORPHINE SULFATE 6 MG: 2 INJECTION, SOLUTION INTRAMUSCULAR; INTRAVENOUS at 04:09

## 2021-09-22 PROBLEM — D64.9 ANEMIA: Status: ACTIVE | Noted: 2021-09-22

## 2021-09-22 LAB
ALBUMIN SERPL BCP-MCNC: 2.3 G/DL (ref 3.5–5.2)
ALP SERPL-CCNC: 87 U/L (ref 55–135)
ALT SERPL W/O P-5'-P-CCNC: 12 U/L (ref 10–44)
ANION GAP SERPL CALC-SCNC: 10 MMOL/L (ref 8–16)
ANION GAP SERPL CALC-SCNC: 7 MMOL/L (ref 8–16)
AST SERPL-CCNC: 14 U/L (ref 10–40)
BASOPHILS # BLD AUTO: 0.01 K/UL (ref 0–0.2)
BASOPHILS # BLD AUTO: 0.01 K/UL (ref 0–0.2)
BASOPHILS NFR BLD: 0.1 % (ref 0–1.9)
BASOPHILS NFR BLD: 0.1 % (ref 0–1.9)
BILIRUB SERPL-MCNC: 0.1 MG/DL (ref 0.1–1)
BUN SERPL-MCNC: 10 MG/DL (ref 6–20)
BUN SERPL-MCNC: 11 MG/DL (ref 6–20)
CALCIUM SERPL-MCNC: 8.3 MG/DL (ref 8.7–10.5)
CALCIUM SERPL-MCNC: 8.4 MG/DL (ref 8.7–10.5)
CHLORIDE SERPL-SCNC: 108 MMOL/L (ref 95–110)
CHLORIDE SERPL-SCNC: 109 MMOL/L (ref 95–110)
CO2 SERPL-SCNC: 21 MMOL/L (ref 23–29)
CO2 SERPL-SCNC: 24 MMOL/L (ref 23–29)
CREAT SERPL-MCNC: 0.8 MG/DL (ref 0.5–1.4)
CREAT SERPL-MCNC: 0.8 MG/DL (ref 0.5–1.4)
DIFFERENTIAL METHOD: ABNORMAL
DIFFERENTIAL METHOD: ABNORMAL
EOSINOPHIL # BLD AUTO: 0 K/UL (ref 0–0.5)
EOSINOPHIL # BLD AUTO: 0 K/UL (ref 0–0.5)
EOSINOPHIL NFR BLD: 0 % (ref 0–8)
EOSINOPHIL NFR BLD: 0 % (ref 0–8)
ERYTHROCYTE [DISTWIDTH] IN BLOOD BY AUTOMATED COUNT: 14.3 % (ref 11.5–14.5)
ERYTHROCYTE [DISTWIDTH] IN BLOOD BY AUTOMATED COUNT: 14.4 % (ref 11.5–14.5)
EST. GFR  (AFRICAN AMERICAN): >60 ML/MIN/1.73 M^2
EST. GFR  (AFRICAN AMERICAN): >60 ML/MIN/1.73 M^2
EST. GFR  (NON AFRICAN AMERICAN): >60 ML/MIN/1.73 M^2
EST. GFR  (NON AFRICAN AMERICAN): >60 ML/MIN/1.73 M^2
GLUCOSE SERPL-MCNC: 112 MG/DL (ref 70–110)
GLUCOSE SERPL-MCNC: 116 MG/DL (ref 70–110)
HCT VFR BLD AUTO: 25 % (ref 40–54)
HCT VFR BLD AUTO: 25.5 % (ref 40–54)
HGB BLD-MCNC: 7.6 G/DL (ref 14–18)
HGB BLD-MCNC: 7.7 G/DL (ref 14–18)
IMM GRANULOCYTES # BLD AUTO: 0.04 K/UL (ref 0–0.04)
IMM GRANULOCYTES # BLD AUTO: 0.04 K/UL (ref 0–0.04)
IMM GRANULOCYTES NFR BLD AUTO: 0.5 % (ref 0–0.5)
IMM GRANULOCYTES NFR BLD AUTO: 0.5 % (ref 0–0.5)
LYMPHOCYTES # BLD AUTO: 0.7 K/UL (ref 1–4.8)
LYMPHOCYTES # BLD AUTO: 0.7 K/UL (ref 1–4.8)
LYMPHOCYTES NFR BLD: 8.8 % (ref 18–48)
LYMPHOCYTES NFR BLD: 9.8 % (ref 18–48)
MAGNESIUM SERPL-MCNC: 2.3 MG/DL (ref 1.6–2.6)
MCH RBC QN AUTO: 27.6 PG (ref 27–31)
MCH RBC QN AUTO: 27.6 PG (ref 27–31)
MCHC RBC AUTO-ENTMCNC: 30.2 G/DL (ref 32–36)
MCHC RBC AUTO-ENTMCNC: 30.4 G/DL (ref 32–36)
MCV RBC AUTO: 91 FL (ref 82–98)
MCV RBC AUTO: 91 FL (ref 82–98)
MONOCYTES # BLD AUTO: 0.6 K/UL (ref 0.3–1)
MONOCYTES # BLD AUTO: 0.7 K/UL (ref 0.3–1)
MONOCYTES NFR BLD: 7.6 % (ref 4–15)
MONOCYTES NFR BLD: 9.1 % (ref 4–15)
NEUTROPHILS # BLD AUTO: 6.2 K/UL (ref 1.8–7.7)
NEUTROPHILS # BLD AUTO: 6.5 K/UL (ref 1.8–7.7)
NEUTROPHILS NFR BLD: 81.5 % (ref 38–73)
NEUTROPHILS NFR BLD: 82 % (ref 38–73)
NRBC BLD-RTO: 0 /100 WBC
NRBC BLD-RTO: 0 /100 WBC
PHOSPHATE SERPL-MCNC: 3.5 MG/DL (ref 2.7–4.5)
PLATELET # BLD AUTO: 347 K/UL (ref 150–450)
PLATELET # BLD AUTO: 355 K/UL (ref 150–450)
PMV BLD AUTO: 8.8 FL (ref 9.2–12.9)
PMV BLD AUTO: 9.2 FL (ref 9.2–12.9)
POTASSIUM SERPL-SCNC: 4.5 MMOL/L (ref 3.5–5.1)
POTASSIUM SERPL-SCNC: 4.9 MMOL/L (ref 3.5–5.1)
PROT SERPL-MCNC: 5.7 G/DL (ref 6–8.4)
RBC # BLD AUTO: 2.75 M/UL (ref 4.6–6.2)
RBC # BLD AUTO: 2.79 M/UL (ref 4.6–6.2)
SODIUM SERPL-SCNC: 139 MMOL/L (ref 136–145)
SODIUM SERPL-SCNC: 140 MMOL/L (ref 136–145)
VANCOMYCIN TROUGH SERPL-MCNC: 16 UG/ML (ref 10–22)
WBC # BLD AUTO: 7.54 K/UL (ref 3.9–12.7)
WBC # BLD AUTO: 8.03 K/UL (ref 3.9–12.7)

## 2021-09-22 PROCEDURE — 36415 COLL VENOUS BLD VENIPUNCTURE: CPT | Performed by: ORTHOPAEDIC SURGERY

## 2021-09-22 PROCEDURE — 63600175 PHARM REV CODE 636 W HCPCS: Performed by: STUDENT IN AN ORGANIZED HEALTH CARE EDUCATION/TRAINING PROGRAM

## 2021-09-22 PROCEDURE — 25000003 PHARM REV CODE 250: Performed by: ORTHOPAEDIC SURGERY

## 2021-09-22 PROCEDURE — 63600175 PHARM REV CODE 636 W HCPCS: Performed by: ORTHOPAEDIC SURGERY

## 2021-09-22 PROCEDURE — 85025 COMPLETE CBC W/AUTO DIFF WBC: CPT | Performed by: ORTHOPAEDIC SURGERY

## 2021-09-22 PROCEDURE — 80053 COMPREHEN METABOLIC PANEL: CPT | Performed by: ORTHOPAEDIC SURGERY

## 2021-09-22 PROCEDURE — 94799 UNLISTED PULMONARY SVC/PX: CPT

## 2021-09-22 PROCEDURE — 97161 PT EVAL LOW COMPLEX 20 MIN: CPT

## 2021-09-22 PROCEDURE — 25000003 PHARM REV CODE 250: Performed by: ANESTHESIOLOGY

## 2021-09-22 PROCEDURE — 12000002 HC ACUTE/MED SURGE SEMI-PRIVATE ROOM

## 2021-09-22 PROCEDURE — 80202 ASSAY OF VANCOMYCIN: CPT | Performed by: ORTHOPAEDIC SURGERY

## 2021-09-22 PROCEDURE — 97605 NEG PRS WND THER DME<=50SQCM: CPT

## 2021-09-22 PROCEDURE — 99900035 HC TECH TIME PER 15 MIN (STAT)

## 2021-09-22 PROCEDURE — 97116 GAIT TRAINING THERAPY: CPT

## 2021-09-22 PROCEDURE — 94761 N-INVAS EAR/PLS OXIMETRY MLT: CPT

## 2021-09-22 PROCEDURE — 36573 INSJ PICC RS&I 5 YR+: CPT

## 2021-09-22 PROCEDURE — 36569 INSJ PICC 5 YR+ W/O IMAGING: CPT

## 2021-09-22 PROCEDURE — 99233 PR SUBSEQUENT HOSPITAL CARE,LEVL III: ICD-10-PCS | Mod: ,,, | Performed by: STUDENT IN AN ORGANIZED HEALTH CARE EDUCATION/TRAINING PROGRAM

## 2021-09-22 PROCEDURE — 84100 ASSAY OF PHOSPHORUS: CPT | Performed by: ORTHOPAEDIC SURGERY

## 2021-09-22 PROCEDURE — 25000003 PHARM REV CODE 250: Performed by: STUDENT IN AN ORGANIZED HEALTH CARE EDUCATION/TRAINING PROGRAM

## 2021-09-22 PROCEDURE — C1751 CATH, INF, PER/CENT/MIDLINE: HCPCS

## 2021-09-22 PROCEDURE — 83735 ASSAY OF MAGNESIUM: CPT | Performed by: ORTHOPAEDIC SURGERY

## 2021-09-22 PROCEDURE — 99233 SBSQ HOSP IP/OBS HIGH 50: CPT | Mod: ,,, | Performed by: STUDENT IN AN ORGANIZED HEALTH CARE EDUCATION/TRAINING PROGRAM

## 2021-09-22 PROCEDURE — 80048 BASIC METABOLIC PNL TOTAL CA: CPT | Performed by: ORTHOPAEDIC SURGERY

## 2021-09-22 RX ORDER — SODIUM CHLORIDE 0.9 % (FLUSH) 0.9 %
10 SYRINGE (ML) INJECTION EVERY 6 HOURS
Status: DISCONTINUED | OUTPATIENT
Start: 2021-09-23 | End: 2021-09-28 | Stop reason: HOSPADM

## 2021-09-22 RX ORDER — MORPHINE SULFATE 2 MG/ML
6 INJECTION, SOLUTION INTRAMUSCULAR; INTRAVENOUS ONCE
Status: COMPLETED | OUTPATIENT
Start: 2021-09-22 | End: 2021-09-22

## 2021-09-22 RX ORDER — SODIUM CHLORIDE 0.9 % (FLUSH) 0.9 %
10 SYRINGE (ML) INJECTION
Status: DISCONTINUED | OUTPATIENT
Start: 2021-09-22 | End: 2021-09-28 | Stop reason: HOSPADM

## 2021-09-22 RX ORDER — SENNOSIDES 8.6 MG/1
8.6 TABLET ORAL 2 TIMES DAILY
Status: DISCONTINUED | OUTPATIENT
Start: 2021-09-22 | End: 2021-09-25

## 2021-09-22 RX ADMIN — MORPHINE SULFATE 6 MG: 2 INJECTION, SOLUTION INTRAMUSCULAR; INTRAVENOUS at 04:09

## 2021-09-22 RX ADMIN — OXYCODONE AND ACETAMINOPHEN 1 TABLET: 10; 325 TABLET ORAL at 06:09

## 2021-09-22 RX ADMIN — ASPIRIN 81 MG CHEWABLE TABLET 81 MG: 81 TABLET CHEWABLE at 08:09

## 2021-09-22 RX ADMIN — SENNOSIDES 8.6 MG: 8.6 TABLET, FILM COATED ORAL at 10:09

## 2021-09-22 RX ADMIN — MORPHINE SULFATE 6 MG: 2 INJECTION, SOLUTION INTRAMUSCULAR; INTRAVENOUS at 12:09

## 2021-09-22 RX ADMIN — MUPIROCIN 1 G: 20 OINTMENT TOPICAL at 08:09

## 2021-09-22 RX ADMIN — DOCUSATE SODIUM 100 MG: 100 CAPSULE, LIQUID FILLED ORAL at 08:09

## 2021-09-22 RX ADMIN — OXYCODONE AND ACETAMINOPHEN 1 TABLET: 10; 325 TABLET ORAL at 10:09

## 2021-09-22 RX ADMIN — TRAZODONE HYDROCHLORIDE 100 MG: 50 TABLET ORAL at 10:09

## 2021-09-22 RX ADMIN — MORPHINE SULFATE 6 MG: 2 INJECTION, SOLUTION INTRAMUSCULAR; INTRAVENOUS at 09:09

## 2021-09-22 RX ADMIN — PANTOPRAZOLE SODIUM 40 MG: 40 TABLET, DELAYED RELEASE ORAL at 08:09

## 2021-09-22 RX ADMIN — CHOLECALCIFEROL TAB 25 MCG (1000 UNIT) 2000 UNITS: 25 TAB at 08:09

## 2021-09-22 RX ADMIN — SUCRALFATE 1 G: 1 SUSPENSION ORAL at 10:09

## 2021-09-22 RX ADMIN — CEFAZOLIN SODIUM 2 G: 2 SOLUTION INTRAVENOUS at 10:09

## 2021-09-22 RX ADMIN — CEFAZOLIN SODIUM 2 G: 2 SOLUTION INTRAVENOUS at 12:09

## 2021-09-22 RX ADMIN — MORPHINE SULFATE 6 MG: 2 INJECTION, SOLUTION INTRAMUSCULAR; INTRAVENOUS at 08:09

## 2021-09-22 RX ADMIN — DEXTROSE AND SODIUM CHLORIDE: 5; .9 INJECTION, SOLUTION INTRAVENOUS at 03:09

## 2021-09-22 RX ADMIN — LOSARTAN POTASSIUM 25 MG: 25 TABLET, FILM COATED ORAL at 08:09

## 2021-09-22 RX ADMIN — SENNOSIDES 8.6 MG: 8.6 TABLET, FILM COATED ORAL at 02:09

## 2021-09-22 RX ADMIN — DOCUSATE SODIUM 100 MG: 100 CAPSULE, LIQUID FILLED ORAL at 10:09

## 2021-09-22 RX ADMIN — CELECOXIB 100 MG: 100 CAPSULE ORAL at 08:09

## 2021-09-22 RX ADMIN — CEFAZOLIN SODIUM 2 G: 2 SOLUTION INTRAVENOUS at 09:09

## 2021-09-22 RX ADMIN — MUPIROCIN 1 G: 20 OINTMENT TOPICAL at 10:09

## 2021-09-22 RX ADMIN — VANCOMYCIN HYDROCHLORIDE 1750 MG: 1 INJECTION, POWDER, LYOPHILIZED, FOR SOLUTION INTRAVENOUS at 10:09

## 2021-09-22 RX ADMIN — CYANOCOBALAMIN TAB 1000 MCG 1000 MCG: 1000 TAB at 08:09

## 2021-09-22 RX ADMIN — MORPHINE SULFATE 6 MG: 2 INJECTION, SOLUTION INTRAMUSCULAR; INTRAVENOUS at 05:09

## 2021-09-22 RX ADMIN — OXYCODONE HYDROCHLORIDE 10 MG: 10 TABLET ORAL at 02:09

## 2021-09-22 RX ADMIN — OXYCODONE AND ACETAMINOPHEN 1 TABLET: 10; 325 TABLET ORAL at 03:09

## 2021-09-22 RX ADMIN — ACETAMINOPHEN 650 MG: 325 TABLET ORAL at 03:09

## 2021-09-22 RX ADMIN — CELECOXIB 100 MG: 100 CAPSULE ORAL at 10:09

## 2021-09-22 RX ADMIN — ASPIRIN 81 MG CHEWABLE TABLET 81 MG: 81 TABLET CHEWABLE at 10:09

## 2021-09-23 DIAGNOSIS — T84.59XD INFECTION OF TOTAL KNEE REPLACEMENT, SUBSEQUENT ENCOUNTER: ICD-10-CM

## 2021-09-23 DIAGNOSIS — Z96.659 INFECTION OF TOTAL KNEE REPLACEMENT, SUBSEQUENT ENCOUNTER: ICD-10-CM

## 2021-09-23 LAB
ABO + RH BLD: NORMAL
ALBUMIN SERPL BCP-MCNC: 2.1 G/DL (ref 3.5–5.2)
ALP SERPL-CCNC: 66 U/L (ref 55–135)
ALT SERPL W/O P-5'-P-CCNC: 15 U/L (ref 10–44)
ANION GAP SERPL CALC-SCNC: 7 MMOL/L (ref 8–16)
AST SERPL-CCNC: 14 U/L (ref 10–40)
BASOPHILS NFR BLD: 1 % (ref 0–1.9)
BILIRUB SERPL-MCNC: 0.1 MG/DL (ref 0.1–1)
BLD GP AB SCN CELLS X3 SERPL QL: NORMAL
BUN SERPL-MCNC: 7 MG/DL (ref 6–20)
CALCIUM SERPL-MCNC: 8.2 MG/DL (ref 8.7–10.5)
CHLORIDE SERPL-SCNC: 112 MMOL/L (ref 95–110)
CO2 SERPL-SCNC: 24 MMOL/L (ref 23–29)
CREAT SERPL-MCNC: 0.8 MG/DL (ref 0.5–1.4)
DIFFERENTIAL METHOD: ABNORMAL
EOSINOPHIL NFR BLD: 2 % (ref 0–8)
ERYTHROCYTE [DISTWIDTH] IN BLOOD BY AUTOMATED COUNT: 14.6 % (ref 11.5–14.5)
EST. GFR  (AFRICAN AMERICAN): >60 ML/MIN/1.73 M^2
EST. GFR  (NON AFRICAN AMERICAN): >60 ML/MIN/1.73 M^2
GLUCOSE SERPL-MCNC: 98 MG/DL (ref 70–110)
HCT VFR BLD AUTO: 23 % (ref 40–54)
HGB BLD-MCNC: 6.9 G/DL (ref 14–18)
IMM GRANULOCYTES # BLD AUTO: ABNORMAL K/UL
IMM GRANULOCYTES NFR BLD AUTO: ABNORMAL %
LYMPHOCYTES NFR BLD: 43 % (ref 18–48)
MAGNESIUM SERPL-MCNC: 2 MG/DL (ref 1.6–2.6)
MCH RBC QN AUTO: 27.2 PG (ref 27–31)
MCHC RBC AUTO-ENTMCNC: 30 G/DL (ref 32–36)
MCV RBC AUTO: 91 FL (ref 82–98)
MONOCYTES NFR BLD: 8 % (ref 4–15)
NEUTROPHILS NFR BLD: 46 % (ref 38–73)
NRBC BLD-RTO: 0 /100 WBC
PHOSPHATE SERPL-MCNC: 3.6 MG/DL (ref 2.7–4.5)
PLATELET # BLD AUTO: 290 K/UL (ref 150–450)
PLATELET BLD QL SMEAR: ABNORMAL
PMV BLD AUTO: 9 FL (ref 9.2–12.9)
POTASSIUM SERPL-SCNC: 4.4 MMOL/L (ref 3.5–5.1)
PREALB SERPL-MCNC: 15 MG/DL (ref 20–43)
PROT SERPL-MCNC: 5.2 G/DL (ref 6–8.4)
RBC # BLD AUTO: 2.54 M/UL (ref 4.6–6.2)
SODIUM SERPL-SCNC: 143 MMOL/L (ref 136–145)
WBC # BLD AUTO: 4.5 K/UL (ref 3.9–12.7)

## 2021-09-23 PROCEDURE — 36415 COLL VENOUS BLD VENIPUNCTURE: CPT | Performed by: ORTHOPAEDIC SURGERY

## 2021-09-23 PROCEDURE — 25000003 PHARM REV CODE 250: Performed by: ANESTHESIOLOGY

## 2021-09-23 PROCEDURE — 99233 PR SUBSEQUENT HOSPITAL CARE,LEVL III: ICD-10-PCS | Mod: ,,, | Performed by: STUDENT IN AN ORGANIZED HEALTH CARE EDUCATION/TRAINING PROGRAM

## 2021-09-23 PROCEDURE — 94799 UNLISTED PULMONARY SVC/PX: CPT

## 2021-09-23 PROCEDURE — 36415 COLL VENOUS BLD VENIPUNCTURE: CPT | Performed by: STUDENT IN AN ORGANIZED HEALTH CARE EDUCATION/TRAINING PROGRAM

## 2021-09-23 PROCEDURE — 63600175 PHARM REV CODE 636 W HCPCS: Performed by: STUDENT IN AN ORGANIZED HEALTH CARE EDUCATION/TRAINING PROGRAM

## 2021-09-23 PROCEDURE — 25000003 PHARM REV CODE 250: Performed by: STUDENT IN AN ORGANIZED HEALTH CARE EDUCATION/TRAINING PROGRAM

## 2021-09-23 PROCEDURE — 94761 N-INVAS EAR/PLS OXIMETRY MLT: CPT

## 2021-09-23 PROCEDURE — 80053 COMPREHEN METABOLIC PANEL: CPT | Performed by: ORTHOPAEDIC SURGERY

## 2021-09-23 PROCEDURE — 85027 COMPLETE CBC AUTOMATED: CPT | Performed by: ORTHOPAEDIC SURGERY

## 2021-09-23 PROCEDURE — 86920 COMPATIBILITY TEST SPIN: CPT | Performed by: STUDENT IN AN ORGANIZED HEALTH CARE EDUCATION/TRAINING PROGRAM

## 2021-09-23 PROCEDURE — 84134 ASSAY OF PREALBUMIN: CPT | Performed by: ORTHOPAEDIC SURGERY

## 2021-09-23 PROCEDURE — 85007 BL SMEAR W/DIFF WBC COUNT: CPT | Performed by: ORTHOPAEDIC SURGERY

## 2021-09-23 PROCEDURE — 83735 ASSAY OF MAGNESIUM: CPT | Performed by: ORTHOPAEDIC SURGERY

## 2021-09-23 PROCEDURE — 12000002 HC ACUTE/MED SURGE SEMI-PRIVATE ROOM

## 2021-09-23 PROCEDURE — 86900 BLOOD TYPING SEROLOGIC ABO: CPT | Performed by: STUDENT IN AN ORGANIZED HEALTH CARE EDUCATION/TRAINING PROGRAM

## 2021-09-23 PROCEDURE — 25000003 PHARM REV CODE 250: Performed by: ORTHOPAEDIC SURGERY

## 2021-09-23 PROCEDURE — 97116 GAIT TRAINING THERAPY: CPT | Mod: CQ

## 2021-09-23 PROCEDURE — A4216 STERILE WATER/SALINE, 10 ML: HCPCS | Performed by: STUDENT IN AN ORGANIZED HEALTH CARE EDUCATION/TRAINING PROGRAM

## 2021-09-23 PROCEDURE — 99233 SBSQ HOSP IP/OBS HIGH 50: CPT | Mod: ,,, | Performed by: STUDENT IN AN ORGANIZED HEALTH CARE EDUCATION/TRAINING PROGRAM

## 2021-09-23 PROCEDURE — 84100 ASSAY OF PHOSPHORUS: CPT | Performed by: ORTHOPAEDIC SURGERY

## 2021-09-23 PROCEDURE — 63600175 PHARM REV CODE 636 W HCPCS: Performed by: ORTHOPAEDIC SURGERY

## 2021-09-23 RX ORDER — HYDROCODONE BITARTRATE AND ACETAMINOPHEN 500; 5 MG/1; MG/1
TABLET ORAL
Status: DISCONTINUED | OUTPATIENT
Start: 2021-09-23 | End: 2021-09-28 | Stop reason: HOSPADM

## 2021-09-23 RX ORDER — POLYETHYLENE GLYCOL 3350 17 G/17G
17 POWDER, FOR SOLUTION ORAL DAILY
Status: DISCONTINUED | OUTPATIENT
Start: 2021-09-23 | End: 2021-09-25

## 2021-09-23 RX ADMIN — ACETAMINOPHEN 650 MG: 325 TABLET ORAL at 10:09

## 2021-09-23 RX ADMIN — OXYCODONE AND ACETAMINOPHEN 1 TABLET: 10; 325 TABLET ORAL at 03:09

## 2021-09-23 RX ADMIN — CEFAZOLIN SODIUM 2 G: 2 SOLUTION INTRAVENOUS at 04:09

## 2021-09-23 RX ADMIN — ASPIRIN 81 MG CHEWABLE TABLET 81 MG: 81 TABLET CHEWABLE at 08:09

## 2021-09-23 RX ADMIN — DEXTROSE AND SODIUM CHLORIDE: 5; .9 INJECTION, SOLUTION INTRAVENOUS at 01:09

## 2021-09-23 RX ADMIN — OXYCODONE HYDROCHLORIDE 10 MG: 10 TABLET ORAL at 01:09

## 2021-09-23 RX ADMIN — SENNOSIDES 8.6 MG: 8.6 TABLET, FILM COATED ORAL at 08:09

## 2021-09-23 RX ADMIN — OXYCODONE HYDROCHLORIDE 10 MG: 10 TABLET ORAL at 06:09

## 2021-09-23 RX ADMIN — CYANOCOBALAMIN TAB 1000 MCG 1000 MCG: 1000 TAB at 08:09

## 2021-09-23 RX ADMIN — ACETAMINOPHEN 650 MG: 325 TABLET ORAL at 01:09

## 2021-09-23 RX ADMIN — SODIUM CHLORIDE, PRESERVATIVE FREE 10 ML: 5 INJECTION INTRAVENOUS at 08:09

## 2021-09-23 RX ADMIN — CEFAZOLIN SODIUM 2 G: 2 SOLUTION INTRAVENOUS at 08:09

## 2021-09-23 RX ADMIN — ACETAMINOPHEN 650 MG: 325 TABLET ORAL at 05:09

## 2021-09-23 RX ADMIN — MORPHINE SULFATE 6 MG: 2 INJECTION, SOLUTION INTRAMUSCULAR; INTRAVENOUS at 04:09

## 2021-09-23 RX ADMIN — SODIUM CHLORIDE, PRESERVATIVE FREE 10 ML: 5 INJECTION INTRAVENOUS at 06:09

## 2021-09-23 RX ADMIN — MORPHINE SULFATE 6 MG: 2 INJECTION, SOLUTION INTRAMUSCULAR; INTRAVENOUS at 10:09

## 2021-09-23 RX ADMIN — CHOLECALCIFEROL TAB 25 MCG (1000 UNIT) 2000 UNITS: 25 TAB at 08:09

## 2021-09-23 RX ADMIN — DOCUSATE SODIUM 100 MG: 100 CAPSULE, LIQUID FILLED ORAL at 08:09

## 2021-09-23 RX ADMIN — LOSARTAN POTASSIUM 25 MG: 25 TABLET, FILM COATED ORAL at 08:09

## 2021-09-23 RX ADMIN — MORPHINE SULFATE 6 MG: 2 INJECTION, SOLUTION INTRAMUSCULAR; INTRAVENOUS at 05:09

## 2021-09-23 RX ADMIN — DEXTROSE AND SODIUM CHLORIDE: 5; .9 INJECTION, SOLUTION INTRAVENOUS at 05:09

## 2021-09-23 RX ADMIN — SUCRALFATE 1 G: 1 SUSPENSION ORAL at 10:09

## 2021-09-23 RX ADMIN — MUPIROCIN 1 G: 20 OINTMENT TOPICAL at 08:09

## 2021-09-23 RX ADMIN — SODIUM CHLORIDE, PRESERVATIVE FREE 10 ML: 5 INJECTION INTRAVENOUS at 12:09

## 2021-09-23 RX ADMIN — MORPHINE SULFATE 6 MG: 2 INJECTION, SOLUTION INTRAMUSCULAR; INTRAVENOUS at 08:09

## 2021-09-23 RX ADMIN — PANTOPRAZOLE SODIUM 40 MG: 40 TABLET, DELAYED RELEASE ORAL at 08:09

## 2021-09-23 RX ADMIN — OXYCODONE AND ACETAMINOPHEN 1 TABLET: 10; 325 TABLET ORAL at 10:09

## 2021-09-23 RX ADMIN — MORPHINE SULFATE 6 MG: 2 INJECTION, SOLUTION INTRAMUSCULAR; INTRAVENOUS at 12:09

## 2021-09-23 RX ADMIN — VANCOMYCIN HYDROCHLORIDE 1750 MG: 1 INJECTION, POWDER, LYOPHILIZED, FOR SOLUTION INTRAVENOUS at 08:09

## 2021-09-23 RX ADMIN — SUCRALFATE 1 G: 1 SUSPENSION ORAL at 04:09

## 2021-09-23 RX ADMIN — POLYETHYLENE GLYCOL (3350) 17 G: 17 POWDER, FOR SOLUTION ORAL at 04:09

## 2021-09-23 RX ADMIN — SUCRALFATE 1 G: 1 SUSPENSION ORAL at 08:09

## 2021-09-23 RX ADMIN — CELECOXIB 100 MG: 100 CAPSULE ORAL at 08:09

## 2021-09-23 RX ADMIN — TRAZODONE HYDROCHLORIDE 100 MG: 50 TABLET ORAL at 10:09

## 2021-09-23 RX ADMIN — AMPICILLIN SODIUM AND SULBACTAM SODIUM 3 G: 2; 1 INJECTION, POWDER, FOR SOLUTION INTRAMUSCULAR; INTRAVENOUS at 08:09

## 2021-09-23 RX ADMIN — OXYCODONE HYDROCHLORIDE 10 MG: 10 TABLET ORAL at 08:09

## 2021-09-24 LAB
ALBUMIN SERPL BCP-MCNC: 2.1 G/DL (ref 3.5–5.2)
ALP SERPL-CCNC: 67 U/L (ref 55–135)
ALT SERPL W/O P-5'-P-CCNC: 13 U/L (ref 10–44)
ANION GAP SERPL CALC-SCNC: 8 MMOL/L (ref 8–16)
AST SERPL-CCNC: 14 U/L (ref 10–40)
BASOPHILS # BLD AUTO: 0.03 K/UL (ref 0–0.2)
BASOPHILS NFR BLD: 0.9 % (ref 0–1.9)
BILIRUB SERPL-MCNC: 0.1 MG/DL (ref 0.1–1)
BUN SERPL-MCNC: 6 MG/DL (ref 6–20)
CALCIUM SERPL-MCNC: 7.9 MG/DL (ref 8.7–10.5)
CHLORIDE SERPL-SCNC: 112 MMOL/L (ref 95–110)
CO2 SERPL-SCNC: 24 MMOL/L (ref 23–29)
CREAT SERPL-MCNC: 0.7 MG/DL (ref 0.5–1.4)
DIFFERENTIAL METHOD: ABNORMAL
EOSINOPHIL # BLD AUTO: 0 K/UL (ref 0–0.5)
EOSINOPHIL NFR BLD: 0 % (ref 0–8)
ERYTHROCYTE [DISTWIDTH] IN BLOOD BY AUTOMATED COUNT: 14.6 % (ref 11.5–14.5)
EST. GFR  (AFRICAN AMERICAN): >60 ML/MIN/1.73 M^2
EST. GFR  (NON AFRICAN AMERICAN): >60 ML/MIN/1.73 M^2
GLUCOSE SERPL-MCNC: 93 MG/DL (ref 70–110)
HCT VFR BLD AUTO: 22.5 % (ref 40–54)
HGB BLD-MCNC: 6.8 G/DL (ref 14–18)
IMM GRANULOCYTES # BLD AUTO: 0.02 K/UL (ref 0–0.04)
IMM GRANULOCYTES NFR BLD AUTO: 0.6 % (ref 0–0.5)
LYMPHOCYTES # BLD AUTO: 1.7 K/UL (ref 1–4.8)
LYMPHOCYTES NFR BLD: 51.2 % (ref 18–48)
MAGNESIUM SERPL-MCNC: 1.9 MG/DL (ref 1.6–2.6)
MCH RBC QN AUTO: 27.4 PG (ref 27–31)
MCHC RBC AUTO-ENTMCNC: 30.2 G/DL (ref 32–36)
MCV RBC AUTO: 91 FL (ref 82–98)
MONOCYTES # BLD AUTO: 0.2 K/UL (ref 0.3–1)
MONOCYTES NFR BLD: 7.4 % (ref 4–15)
NEUTROPHILS # BLD AUTO: 1.3 K/UL (ref 1.8–7.7)
NEUTROPHILS NFR BLD: 39.9 % (ref 38–73)
NRBC BLD-RTO: 0 /100 WBC
PHOSPHATE SERPL-MCNC: 4.2 MG/DL (ref 2.7–4.5)
PLATELET # BLD AUTO: 324 K/UL (ref 150–450)
PMV BLD AUTO: 8.6 FL (ref 9.2–12.9)
POTASSIUM SERPL-SCNC: 4.2 MMOL/L (ref 3.5–5.1)
PROT SERPL-MCNC: 5 G/DL (ref 6–8.4)
RBC # BLD AUTO: 2.48 M/UL (ref 4.6–6.2)
SODIUM SERPL-SCNC: 144 MMOL/L (ref 136–145)
WBC # BLD AUTO: 3.26 K/UL (ref 3.9–12.7)

## 2021-09-24 PROCEDURE — 25000003 PHARM REV CODE 250: Performed by: STUDENT IN AN ORGANIZED HEALTH CARE EDUCATION/TRAINING PROGRAM

## 2021-09-24 PROCEDURE — 99900035 HC TECH TIME PER 15 MIN (STAT)

## 2021-09-24 PROCEDURE — 83735 ASSAY OF MAGNESIUM: CPT | Performed by: ORTHOPAEDIC SURGERY

## 2021-09-24 PROCEDURE — 80053 COMPREHEN METABOLIC PANEL: CPT | Performed by: ORTHOPAEDIC SURGERY

## 2021-09-24 PROCEDURE — 94761 N-INVAS EAR/PLS OXIMETRY MLT: CPT

## 2021-09-24 PROCEDURE — 25000003 PHARM REV CODE 250: Performed by: ORTHOPAEDIC SURGERY

## 2021-09-24 PROCEDURE — 85025 COMPLETE CBC W/AUTO DIFF WBC: CPT | Performed by: ORTHOPAEDIC SURGERY

## 2021-09-24 PROCEDURE — A4216 STERILE WATER/SALINE, 10 ML: HCPCS | Performed by: STUDENT IN AN ORGANIZED HEALTH CARE EDUCATION/TRAINING PROGRAM

## 2021-09-24 PROCEDURE — 99233 PR SUBSEQUENT HOSPITAL CARE,LEVL III: ICD-10-PCS | Mod: ,,, | Performed by: STUDENT IN AN ORGANIZED HEALTH CARE EDUCATION/TRAINING PROGRAM

## 2021-09-24 PROCEDURE — 25000003 PHARM REV CODE 250: Performed by: ANESTHESIOLOGY

## 2021-09-24 PROCEDURE — 36415 COLL VENOUS BLD VENIPUNCTURE: CPT | Performed by: ORTHOPAEDIC SURGERY

## 2021-09-24 PROCEDURE — 99233 SBSQ HOSP IP/OBS HIGH 50: CPT | Mod: ,,, | Performed by: STUDENT IN AN ORGANIZED HEALTH CARE EDUCATION/TRAINING PROGRAM

## 2021-09-24 PROCEDURE — 12000002 HC ACUTE/MED SURGE SEMI-PRIVATE ROOM

## 2021-09-24 PROCEDURE — 63600175 PHARM REV CODE 636 W HCPCS: Performed by: STUDENT IN AN ORGANIZED HEALTH CARE EDUCATION/TRAINING PROGRAM

## 2021-09-24 PROCEDURE — 63600175 PHARM REV CODE 636 W HCPCS: Performed by: ORTHOPAEDIC SURGERY

## 2021-09-24 PROCEDURE — 97605 NEG PRS WND THER DME<=50SQCM: CPT

## 2021-09-24 PROCEDURE — 94799 UNLISTED PULMONARY SVC/PX: CPT

## 2021-09-24 PROCEDURE — 84100 ASSAY OF PHOSPHORUS: CPT | Performed by: ORTHOPAEDIC SURGERY

## 2021-09-24 RX ORDER — OXYCODONE AND ACETAMINOPHEN 10; 325 MG/1; MG/1
1 TABLET ORAL EVERY 4 HOURS PRN
Qty: 60 TABLET | Refills: 0 | Status: SHIPPED | OUTPATIENT
Start: 2021-09-24 | End: 2021-09-27 | Stop reason: HOSPADM

## 2021-09-24 RX ORDER — MUPIROCIN 20 MG/G
OINTMENT TOPICAL 2 TIMES DAILY
Status: DISCONTINUED | OUTPATIENT
Start: 2021-09-24 | End: 2021-09-28 | Stop reason: HOSPADM

## 2021-09-24 RX ADMIN — MORPHINE SULFATE 6 MG: 2 INJECTION, SOLUTION INTRAMUSCULAR; INTRAVENOUS at 08:09

## 2021-09-24 RX ADMIN — CELECOXIB 100 MG: 100 CAPSULE ORAL at 08:09

## 2021-09-24 RX ADMIN — AMPICILLIN SODIUM AND SULBACTAM SODIUM 3 G: 2; 1 INJECTION, POWDER, FOR SOLUTION INTRAMUSCULAR; INTRAVENOUS at 08:09

## 2021-09-24 RX ADMIN — ACETAMINOPHEN 650 MG: 325 TABLET ORAL at 06:09

## 2021-09-24 RX ADMIN — OXYCODONE HYDROCHLORIDE 10 MG: 10 TABLET ORAL at 09:09

## 2021-09-24 RX ADMIN — MUPIROCIN: 20 OINTMENT TOPICAL at 08:09

## 2021-09-24 RX ADMIN — MORPHINE SULFATE 6 MG: 2 INJECTION, SOLUTION INTRAMUSCULAR; INTRAVENOUS at 12:09

## 2021-09-24 RX ADMIN — ACETAMINOPHEN 650 MG: 325 TABLET ORAL at 09:09

## 2021-09-24 RX ADMIN — MORPHINE SULFATE 6 MG: 2 INJECTION, SOLUTION INTRAMUSCULAR; INTRAVENOUS at 11:09

## 2021-09-24 RX ADMIN — ASPIRIN 81 MG CHEWABLE TABLET 81 MG: 81 TABLET CHEWABLE at 08:09

## 2021-09-24 RX ADMIN — CYANOCOBALAMIN TAB 1000 MCG 1000 MCG: 1000 TAB at 08:09

## 2021-09-24 RX ADMIN — OXYCODONE HYDROCHLORIDE 10 MG: 10 TABLET ORAL at 02:09

## 2021-09-24 RX ADMIN — ACETAMINOPHEN 650 MG: 325 TABLET ORAL at 01:09

## 2021-09-24 RX ADMIN — DOCUSATE SODIUM 100 MG: 100 CAPSULE, LIQUID FILLED ORAL at 08:09

## 2021-09-24 RX ADMIN — OXYCODONE HYDROCHLORIDE 10 MG: 10 TABLET ORAL at 01:09

## 2021-09-24 RX ADMIN — MORPHINE SULFATE 6 MG: 2 INJECTION, SOLUTION INTRAMUSCULAR; INTRAVENOUS at 04:09

## 2021-09-24 RX ADMIN — OXYCODONE HYDROCHLORIDE 10 MG: 10 TABLET ORAL at 05:09

## 2021-09-24 RX ADMIN — CHOLECALCIFEROL TAB 25 MCG (1000 UNIT) 2000 UNITS: 25 TAB at 08:09

## 2021-09-24 RX ADMIN — MUPIROCIN 1 G: 20 OINTMENT TOPICAL at 08:09

## 2021-09-24 RX ADMIN — OXYCODONE HYDROCHLORIDE 10 MG: 10 TABLET ORAL at 06:09

## 2021-09-24 RX ADMIN — PANTOPRAZOLE SODIUM 40 MG: 40 TABLET, DELAYED RELEASE ORAL at 08:09

## 2021-09-24 RX ADMIN — AMPICILLIN SODIUM AND SULBACTAM SODIUM 3 G: 2; 1 INJECTION, POWDER, FOR SOLUTION INTRAMUSCULAR; INTRAVENOUS at 02:09

## 2021-09-24 RX ADMIN — AMPICILLIN SODIUM AND SULBACTAM SODIUM 3 G: 2; 1 INJECTION, POWDER, FOR SOLUTION INTRAMUSCULAR; INTRAVENOUS at 01:09

## 2021-09-24 RX ADMIN — POLYETHYLENE GLYCOL (3350) 17 G: 17 POWDER, FOR SOLUTION ORAL at 08:09

## 2021-09-24 RX ADMIN — SENNOSIDES 8.6 MG: 8.6 TABLET, FILM COATED ORAL at 08:09

## 2021-09-24 RX ADMIN — ACETAMINOPHEN 650 MG: 325 TABLET ORAL at 02:09

## 2021-09-24 RX ADMIN — OXYCODONE HYDROCHLORIDE 10 MG: 10 TABLET ORAL at 10:09

## 2021-09-24 RX ADMIN — SODIUM CHLORIDE, PRESERVATIVE FREE 10 ML: 5 INJECTION INTRAVENOUS at 12:09

## 2021-09-24 RX ADMIN — SODIUM CHLORIDE, PRESERVATIVE FREE 10 ML: 5 INJECTION INTRAVENOUS at 06:09

## 2021-09-24 RX ADMIN — LOSARTAN POTASSIUM 25 MG: 25 TABLET, FILM COATED ORAL at 08:09

## 2021-09-24 RX ADMIN — ACETAMINOPHEN 650 MG: 325 TABLET ORAL at 10:09

## 2021-09-24 RX ADMIN — ACETAMINOPHEN 650 MG: 325 TABLET ORAL at 05:09

## 2021-09-24 RX ADMIN — TRAZODONE HYDROCHLORIDE 100 MG: 50 TABLET ORAL at 08:09

## 2021-09-25 LAB
ALBUMIN SERPL BCP-MCNC: 2.2 G/DL (ref 3.5–5.2)
ALP SERPL-CCNC: 72 U/L (ref 55–135)
ALT SERPL W/O P-5'-P-CCNC: 13 U/L (ref 10–44)
ANION GAP SERPL CALC-SCNC: 5 MMOL/L (ref 8–16)
AST SERPL-CCNC: 15 U/L (ref 10–40)
BACTERIA SPEC AEROBE CULT: ABNORMAL
BASOPHILS # BLD AUTO: 0.03 K/UL (ref 0–0.2)
BASOPHILS NFR BLD: 0.8 % (ref 0–1.9)
BILIRUB SERPL-MCNC: 0.1 MG/DL (ref 0.1–1)
BUN SERPL-MCNC: 7 MG/DL (ref 6–20)
CALCIUM SERPL-MCNC: 8.1 MG/DL (ref 8.7–10.5)
CHLORIDE SERPL-SCNC: 111 MMOL/L (ref 95–110)
CO2 SERPL-SCNC: 26 MMOL/L (ref 23–29)
CREAT SERPL-MCNC: 0.7 MG/DL (ref 0.5–1.4)
DIFFERENTIAL METHOD: ABNORMAL
EOSINOPHIL # BLD AUTO: 0 K/UL (ref 0–0.5)
EOSINOPHIL NFR BLD: 0 % (ref 0–8)
ERYTHROCYTE [DISTWIDTH] IN BLOOD BY AUTOMATED COUNT: 14.5 % (ref 11.5–14.5)
EST. GFR  (AFRICAN AMERICAN): >60 ML/MIN/1.73 M^2
EST. GFR  (NON AFRICAN AMERICAN): >60 ML/MIN/1.73 M^2
GLUCOSE SERPL-MCNC: 84 MG/DL (ref 70–110)
HCT VFR BLD AUTO: 22.1 % (ref 40–54)
HGB BLD-MCNC: 6.7 G/DL (ref 14–18)
IMM GRANULOCYTES # BLD AUTO: 0.03 K/UL (ref 0–0.04)
IMM GRANULOCYTES NFR BLD AUTO: 0.8 % (ref 0–0.5)
LYMPHOCYTES # BLD AUTO: 1.7 K/UL (ref 1–4.8)
LYMPHOCYTES NFR BLD: 43.6 % (ref 18–48)
MAGNESIUM SERPL-MCNC: 2 MG/DL (ref 1.6–2.6)
MCH RBC QN AUTO: 27.5 PG (ref 27–31)
MCHC RBC AUTO-ENTMCNC: 30.3 G/DL (ref 32–36)
MCV RBC AUTO: 91 FL (ref 82–98)
MONOCYTES # BLD AUTO: 0.3 K/UL (ref 0.3–1)
MONOCYTES NFR BLD: 7.3 % (ref 4–15)
NEUTROPHILS # BLD AUTO: 1.8 K/UL (ref 1.8–7.7)
NEUTROPHILS NFR BLD: 47.5 % (ref 38–73)
NRBC BLD-RTO: 0 /100 WBC
PHOSPHATE SERPL-MCNC: 4.1 MG/DL (ref 2.7–4.5)
PLATELET # BLD AUTO: 363 K/UL (ref 150–450)
PMV BLD AUTO: 8.6 FL (ref 9.2–12.9)
POTASSIUM SERPL-SCNC: 4.3 MMOL/L (ref 3.5–5.1)
PROT SERPL-MCNC: 5.1 G/DL (ref 6–8.4)
RBC # BLD AUTO: 2.44 M/UL (ref 4.6–6.2)
SODIUM SERPL-SCNC: 142 MMOL/L (ref 136–145)
WBC # BLD AUTO: 3.81 K/UL (ref 3.9–12.7)

## 2021-09-25 PROCEDURE — 63600175 PHARM REV CODE 636 W HCPCS: Performed by: ORTHOPAEDIC SURGERY

## 2021-09-25 PROCEDURE — 25000003 PHARM REV CODE 250: Performed by: ANESTHESIOLOGY

## 2021-09-25 PROCEDURE — 12000002 HC ACUTE/MED SURGE SEMI-PRIVATE ROOM

## 2021-09-25 PROCEDURE — 25000003 PHARM REV CODE 250: Performed by: PHYSICIAN ASSISTANT

## 2021-09-25 PROCEDURE — 99900035 HC TECH TIME PER 15 MIN (STAT)

## 2021-09-25 PROCEDURE — 25000003 PHARM REV CODE 250: Performed by: ORTHOPAEDIC SURGERY

## 2021-09-25 PROCEDURE — 63600175 PHARM REV CODE 636 W HCPCS: Performed by: STUDENT IN AN ORGANIZED HEALTH CARE EDUCATION/TRAINING PROGRAM

## 2021-09-25 PROCEDURE — 25000003 PHARM REV CODE 250: Performed by: STUDENT IN AN ORGANIZED HEALTH CARE EDUCATION/TRAINING PROGRAM

## 2021-09-25 PROCEDURE — 80053 COMPREHEN METABOLIC PANEL: CPT | Performed by: ORTHOPAEDIC SURGERY

## 2021-09-25 PROCEDURE — 94761 N-INVAS EAR/PLS OXIMETRY MLT: CPT

## 2021-09-25 PROCEDURE — 25000003 PHARM REV CODE 250: Performed by: HOSPITALIST

## 2021-09-25 PROCEDURE — A4216 STERILE WATER/SALINE, 10 ML: HCPCS | Performed by: STUDENT IN AN ORGANIZED HEALTH CARE EDUCATION/TRAINING PROGRAM

## 2021-09-25 PROCEDURE — 85025 COMPLETE CBC W/AUTO DIFF WBC: CPT | Performed by: ORTHOPAEDIC SURGERY

## 2021-09-25 PROCEDURE — 83735 ASSAY OF MAGNESIUM: CPT | Performed by: ORTHOPAEDIC SURGERY

## 2021-09-25 PROCEDURE — 36415 COLL VENOUS BLD VENIPUNCTURE: CPT | Performed by: ORTHOPAEDIC SURGERY

## 2021-09-25 PROCEDURE — 84100 ASSAY OF PHOSPHORUS: CPT | Performed by: ORTHOPAEDIC SURGERY

## 2021-09-25 RX ORDER — LACTULOSE 10 G/15ML
20 SOLUTION ORAL 2 TIMES DAILY
Status: DISCONTINUED | OUTPATIENT
Start: 2021-09-25 | End: 2021-09-28 | Stop reason: HOSPADM

## 2021-09-25 RX ORDER — SYRING-NEEDL,DISP,INSUL,0.3 ML 29 G X1/2"
296 SYRINGE, EMPTY DISPOSABLE MISCELLANEOUS ONCE
Status: COMPLETED | OUTPATIENT
Start: 2021-09-25 | End: 2021-09-25

## 2021-09-25 RX ORDER — DIPHENHYDRAMINE HCL 25 MG
25 CAPSULE ORAL EVERY 6 HOURS PRN
Status: DISCONTINUED | OUTPATIENT
Start: 2021-09-25 | End: 2021-09-28 | Stop reason: HOSPADM

## 2021-09-25 RX ADMIN — MUPIROCIN 1 G: 20 OINTMENT TOPICAL at 09:09

## 2021-09-25 RX ADMIN — DOCUSATE SODIUM 100 MG: 100 CAPSULE, LIQUID FILLED ORAL at 09:09

## 2021-09-25 RX ADMIN — OXYCODONE HYDROCHLORIDE 10 MG: 10 TABLET ORAL at 06:09

## 2021-09-25 RX ADMIN — MORPHINE SULFATE 6 MG: 2 INJECTION, SOLUTION INTRAMUSCULAR; INTRAVENOUS at 09:09

## 2021-09-25 RX ADMIN — MORPHINE SULFATE 6 MG: 2 INJECTION, SOLUTION INTRAMUSCULAR; INTRAVENOUS at 08:09

## 2021-09-25 RX ADMIN — OXYCODONE HYDROCHLORIDE 10 MG: 10 TABLET ORAL at 02:09

## 2021-09-25 RX ADMIN — LOSARTAN POTASSIUM 25 MG: 25 TABLET, FILM COATED ORAL at 09:09

## 2021-09-25 RX ADMIN — ACETAMINOPHEN 650 MG: 325 TABLET ORAL at 02:09

## 2021-09-25 RX ADMIN — CELECOXIB 100 MG: 100 CAPSULE ORAL at 09:09

## 2021-09-25 RX ADMIN — OXYCODONE HYDROCHLORIDE 10 MG: 10 TABLET ORAL at 11:09

## 2021-09-25 RX ADMIN — ACETAMINOPHEN 650 MG: 325 TABLET ORAL at 09:09

## 2021-09-25 RX ADMIN — OXYCODONE AND ACETAMINOPHEN 1 TABLET: 10; 325 TABLET ORAL at 06:09

## 2021-09-25 RX ADMIN — SENNOSIDES 8.6 MG: 8.6 TABLET, FILM COATED ORAL at 09:09

## 2021-09-25 RX ADMIN — MUPIROCIN: 20 OINTMENT TOPICAL at 09:09

## 2021-09-25 RX ADMIN — MORPHINE SULFATE 6 MG: 2 INJECTION, SOLUTION INTRAMUSCULAR; INTRAVENOUS at 04:09

## 2021-09-25 RX ADMIN — MORPHINE SULFATE 6 MG: 2 INJECTION, SOLUTION INTRAMUSCULAR; INTRAVENOUS at 01:09

## 2021-09-25 RX ADMIN — AMPICILLIN SODIUM AND SULBACTAM SODIUM 3 G: 2; 1 INJECTION, POWDER, FOR SOLUTION INTRAMUSCULAR; INTRAVENOUS at 02:09

## 2021-09-25 RX ADMIN — OXYCODONE AND ACETAMINOPHEN 1 TABLET: 10; 325 TABLET ORAL at 10:09

## 2021-09-25 RX ADMIN — ACETAMINOPHEN 650 MG: 325 TABLET ORAL at 06:09

## 2021-09-25 RX ADMIN — AMPICILLIN SODIUM AND SULBACTAM SODIUM 3 G: 2; 1 INJECTION, POWDER, FOR SOLUTION INTRAMUSCULAR; INTRAVENOUS at 08:09

## 2021-09-25 RX ADMIN — ASPIRIN 81 MG CHEWABLE TABLET 81 MG: 81 TABLET CHEWABLE at 08:09

## 2021-09-25 RX ADMIN — OXYCODONE AND ACETAMINOPHEN 1 TABLET: 10; 325 TABLET ORAL at 03:09

## 2021-09-25 RX ADMIN — SODIUM CHLORIDE, PRESERVATIVE FREE 10 ML: 5 INJECTION INTRAVENOUS at 12:09

## 2021-09-25 RX ADMIN — ASPIRIN 81 MG CHEWABLE TABLET 81 MG: 81 TABLET CHEWABLE at 09:09

## 2021-09-25 RX ADMIN — CELECOXIB 100 MG: 100 CAPSULE ORAL at 08:09

## 2021-09-25 RX ADMIN — PANTOPRAZOLE SODIUM 40 MG: 40 TABLET, DELAYED RELEASE ORAL at 09:09

## 2021-09-25 RX ADMIN — MORPHINE SULFATE 6 MG: 2 INJECTION, SOLUTION INTRAMUSCULAR; INTRAVENOUS at 05:09

## 2021-09-25 RX ADMIN — LACTULOSE 20 G: 10 SOLUTION ORAL at 01:09

## 2021-09-25 RX ADMIN — CYANOCOBALAMIN TAB 1000 MCG 1000 MCG: 1000 TAB at 09:09

## 2021-09-25 RX ADMIN — DIPHENHYDRAMINE HYDROCHLORIDE 25 MG: 25 CAPSULE ORAL at 10:09

## 2021-09-25 RX ADMIN — SODIUM CHLORIDE, PRESERVATIVE FREE 10 ML: 5 INJECTION INTRAVENOUS at 06:09

## 2021-09-25 RX ADMIN — TRAZODONE HYDROCHLORIDE 100 MG: 50 TABLET ORAL at 08:09

## 2021-09-25 RX ADMIN — POLYETHYLENE GLYCOL (3350) 17 G: 17 POWDER, FOR SOLUTION ORAL at 09:09

## 2021-09-25 RX ADMIN — CHOLECALCIFEROL TAB 25 MCG (1000 UNIT) 2000 UNITS: 25 TAB at 09:09

## 2021-09-25 RX ADMIN — MAGNESIUM CITRATE 296 ML: 1.75 LIQUID ORAL at 01:09

## 2021-09-26 ENCOUNTER — OUTSIDE PLACE OF SERVICE (OUTPATIENT)
Dept: INFECTIOUS DISEASES | Facility: CLINIC | Age: 56
End: 2021-09-26
Payer: OTHER GOVERNMENT

## 2021-09-26 DIAGNOSIS — M17.10 ARTHRITIS OF KNEE: ICD-10-CM

## 2021-09-26 LAB
ALBUMIN SERPL BCP-MCNC: 2.5 G/DL (ref 3.5–5.2)
ALP SERPL-CCNC: 84 U/L (ref 55–135)
ALT SERPL W/O P-5'-P-CCNC: 17 U/L (ref 10–44)
ANION GAP SERPL CALC-SCNC: 6 MMOL/L (ref 8–16)
AST SERPL-CCNC: 18 U/L (ref 10–40)
BACTERIA BLD CULT: NORMAL
BACTERIA BLD CULT: NORMAL
BACTERIA TISS AEROBE CULT: ABNORMAL
BASOPHILS # BLD AUTO: 0.04 K/UL (ref 0–0.2)
BASOPHILS NFR BLD: 0.7 % (ref 0–1.9)
BILIRUB SERPL-MCNC: 0.1 MG/DL (ref 0.1–1)
BUN SERPL-MCNC: 10 MG/DL (ref 6–20)
CALCIUM SERPL-MCNC: 8.3 MG/DL (ref 8.7–10.5)
CHLORIDE SERPL-SCNC: 108 MMOL/L (ref 95–110)
CO2 SERPL-SCNC: 28 MMOL/L (ref 23–29)
CREAT SERPL-MCNC: 0.8 MG/DL (ref 0.5–1.4)
DIFFERENTIAL METHOD: ABNORMAL
EOSINOPHIL # BLD AUTO: 0.5 K/UL (ref 0–0.5)
EOSINOPHIL NFR BLD: 8.8 % (ref 0–8)
ERYTHROCYTE [DISTWIDTH] IN BLOOD BY AUTOMATED COUNT: 14.5 % (ref 11.5–14.5)
EST. GFR  (AFRICAN AMERICAN): >60 ML/MIN/1.73 M^2
EST. GFR  (NON AFRICAN AMERICAN): >60 ML/MIN/1.73 M^2
GLUCOSE SERPL-MCNC: 101 MG/DL (ref 70–110)
GRAM STN SPEC: ABNORMAL
GRAM STN SPEC: ABNORMAL
HCT VFR BLD AUTO: 24.6 % (ref 40–54)
HGB BLD-MCNC: 7.4 G/DL (ref 14–18)
IMM GRANULOCYTES # BLD AUTO: 0.05 K/UL (ref 0–0.04)
IMM GRANULOCYTES NFR BLD AUTO: 0.9 % (ref 0–0.5)
LYMPHOCYTES # BLD AUTO: 1.9 K/UL (ref 1–4.8)
LYMPHOCYTES NFR BLD: 35 % (ref 18–48)
MAGNESIUM SERPL-MCNC: 2.4 MG/DL (ref 1.6–2.6)
MCH RBC QN AUTO: 27.2 PG (ref 27–31)
MCHC RBC AUTO-ENTMCNC: 30.1 G/DL (ref 32–36)
MCV RBC AUTO: 90 FL (ref 82–98)
MONOCYTES # BLD AUTO: 0.4 K/UL (ref 0.3–1)
MONOCYTES NFR BLD: 6.8 % (ref 4–15)
NEUTROPHILS # BLD AUTO: 2.6 K/UL (ref 1.8–7.7)
NEUTROPHILS NFR BLD: 47.8 % (ref 38–73)
NRBC BLD-RTO: 0 /100 WBC
PHOSPHATE SERPL-MCNC: 4.1 MG/DL (ref 2.7–4.5)
PLATELET # BLD AUTO: 398 K/UL (ref 150–450)
PMV BLD AUTO: 8.3 FL (ref 9.2–12.9)
POTASSIUM SERPL-SCNC: 4.5 MMOL/L (ref 3.5–5.1)
PROT SERPL-MCNC: 5.7 G/DL (ref 6–8.4)
RBC # BLD AUTO: 2.72 M/UL (ref 4.6–6.2)
SODIUM SERPL-SCNC: 142 MMOL/L (ref 136–145)
WBC # BLD AUTO: 5.43 K/UL (ref 3.9–12.7)

## 2021-09-26 PROCEDURE — 25000003 PHARM REV CODE 250: Performed by: INTERNAL MEDICINE

## 2021-09-26 PROCEDURE — 84100 ASSAY OF PHOSPHORUS: CPT | Performed by: ORTHOPAEDIC SURGERY

## 2021-09-26 PROCEDURE — 63600175 PHARM REV CODE 636 W HCPCS: Performed by: INTERNAL MEDICINE

## 2021-09-26 PROCEDURE — 63600175 PHARM REV CODE 636 W HCPCS: Performed by: STUDENT IN AN ORGANIZED HEALTH CARE EDUCATION/TRAINING PROGRAM

## 2021-09-26 PROCEDURE — A4216 STERILE WATER/SALINE, 10 ML: HCPCS | Performed by: STUDENT IN AN ORGANIZED HEALTH CARE EDUCATION/TRAINING PROGRAM

## 2021-09-26 PROCEDURE — 25000003 PHARM REV CODE 250: Performed by: ORTHOPAEDIC SURGERY

## 2021-09-26 PROCEDURE — 25000003 PHARM REV CODE 250: Performed by: PHYSICIAN ASSISTANT

## 2021-09-26 PROCEDURE — 99231 PR SUBSEQUENT HOSPITAL CARE,LEVL I: ICD-10-PCS | Mod: S$GLB,,, | Performed by: INTERNAL MEDICINE

## 2021-09-26 PROCEDURE — 25000003 PHARM REV CODE 250: Performed by: ANESTHESIOLOGY

## 2021-09-26 PROCEDURE — 94799 UNLISTED PULMONARY SVC/PX: CPT

## 2021-09-26 PROCEDURE — 25000003 PHARM REV CODE 250: Performed by: STUDENT IN AN ORGANIZED HEALTH CARE EDUCATION/TRAINING PROGRAM

## 2021-09-26 PROCEDURE — 99231 SBSQ HOSP IP/OBS SF/LOW 25: CPT | Mod: S$GLB,,, | Performed by: INTERNAL MEDICINE

## 2021-09-26 PROCEDURE — 63600175 PHARM REV CODE 636 W HCPCS: Performed by: ANESTHESIOLOGY

## 2021-09-26 PROCEDURE — 63600175 PHARM REV CODE 636 W HCPCS: Performed by: ORTHOPAEDIC SURGERY

## 2021-09-26 PROCEDURE — 94761 N-INVAS EAR/PLS OXIMETRY MLT: CPT

## 2021-09-26 PROCEDURE — 83735 ASSAY OF MAGNESIUM: CPT | Performed by: ORTHOPAEDIC SURGERY

## 2021-09-26 PROCEDURE — 80053 COMPREHEN METABOLIC PANEL: CPT | Performed by: ORTHOPAEDIC SURGERY

## 2021-09-26 PROCEDURE — 36415 COLL VENOUS BLD VENIPUNCTURE: CPT | Performed by: ORTHOPAEDIC SURGERY

## 2021-09-26 PROCEDURE — 12000002 HC ACUTE/MED SURGE SEMI-PRIVATE ROOM

## 2021-09-26 PROCEDURE — 85025 COMPLETE CBC W/AUTO DIFF WBC: CPT | Performed by: ORTHOPAEDIC SURGERY

## 2021-09-26 RX ORDER — HYDROCORTISONE 25 MG/G
CREAM TOPICAL 2 TIMES DAILY
Status: DISCONTINUED | OUTPATIENT
Start: 2021-09-26 | End: 2021-09-28 | Stop reason: HOSPADM

## 2021-09-26 RX ADMIN — MUPIROCIN: 20 OINTMENT TOPICAL at 09:09

## 2021-09-26 RX ADMIN — OXYCODONE AND ACETAMINOPHEN 1 TABLET: 10; 325 TABLET ORAL at 08:09

## 2021-09-26 RX ADMIN — DIPHENHYDRAMINE HYDROCHLORIDE 25 MG: 25 CAPSULE ORAL at 09:09

## 2021-09-26 RX ADMIN — MUPIROCIN 1 G: 20 OINTMENT TOPICAL at 08:09

## 2021-09-26 RX ADMIN — AMPICILLIN SODIUM AND SULBACTAM SODIUM 3 G: 2; 1 INJECTION, POWDER, FOR SOLUTION INTRAMUSCULAR; INTRAVENOUS at 01:09

## 2021-09-26 RX ADMIN — MORPHINE SULFATE 6 MG: 2 INJECTION, SOLUTION INTRAMUSCULAR; INTRAVENOUS at 03:09

## 2021-09-26 RX ADMIN — MORPHINE SULFATE 6 MG: 2 INJECTION, SOLUTION INTRAMUSCULAR; INTRAVENOUS at 11:09

## 2021-09-26 RX ADMIN — SODIUM CHLORIDE, PRESERVATIVE FREE 10 ML: 5 INJECTION INTRAVENOUS at 11:09

## 2021-09-26 RX ADMIN — OXYCODONE AND ACETAMINOPHEN 1 TABLET: 10; 325 TABLET ORAL at 06:09

## 2021-09-26 RX ADMIN — SODIUM CHLORIDE, PRESERVATIVE FREE 10 ML: 5 INJECTION INTRAVENOUS at 06:09

## 2021-09-26 RX ADMIN — AMPICILLIN SODIUM AND SULBACTAM SODIUM 3 G: 2; 1 INJECTION, POWDER, FOR SOLUTION INTRAMUSCULAR; INTRAVENOUS at 02:09

## 2021-09-26 RX ADMIN — SODIUM CHLORIDE, PRESERVATIVE FREE 10 ML: 5 INJECTION INTRAVENOUS at 12:09

## 2021-09-26 RX ADMIN — MORPHINE SULFATE 6 MG: 2 INJECTION, SOLUTION INTRAMUSCULAR; INTRAVENOUS at 07:09

## 2021-09-26 RX ADMIN — DIPHENHYDRAMINE HYDROCHLORIDE 25 MG: 25 CAPSULE ORAL at 04:09

## 2021-09-26 RX ADMIN — MORPHINE SULFATE 6 MG: 2 INJECTION, SOLUTION INTRAMUSCULAR; INTRAVENOUS at 12:09

## 2021-09-26 RX ADMIN — DIPHENHYDRAMINE HYDROCHLORIDE 25 MG: 25 CAPSULE ORAL at 08:09

## 2021-09-26 RX ADMIN — CELECOXIB 100 MG: 100 CAPSULE ORAL at 08:09

## 2021-09-26 RX ADMIN — CHOLECALCIFEROL TAB 25 MCG (1000 UNIT) 2000 UNITS: 25 TAB at 08:09

## 2021-09-26 RX ADMIN — ASPIRIN 81 MG CHEWABLE TABLET 81 MG: 81 TABLET CHEWABLE at 08:09

## 2021-09-26 RX ADMIN — AMPICILLIN SODIUM AND SULBACTAM SODIUM 3 G: 2; 1 INJECTION, POWDER, FOR SOLUTION INTRAMUSCULAR; INTRAVENOUS at 07:09

## 2021-09-26 RX ADMIN — OXYCODONE AND ACETAMINOPHEN 1 TABLET: 10; 325 TABLET ORAL at 05:09

## 2021-09-26 RX ADMIN — SODIUM CHLORIDE, PRESERVATIVE FREE 10 ML: 5 INJECTION INTRAVENOUS at 05:09

## 2021-09-26 RX ADMIN — ONDANSETRON 4 MG: 2 INJECTION INTRAMUSCULAR; INTRAVENOUS at 08:09

## 2021-09-26 RX ADMIN — LOSARTAN POTASSIUM 25 MG: 25 TABLET, FILM COATED ORAL at 08:09

## 2021-09-26 RX ADMIN — HYDROCORTISONE: 25 CREAM TOPICAL at 08:09

## 2021-09-26 RX ADMIN — MORPHINE SULFATE 6 MG: 2 INJECTION, SOLUTION INTRAMUSCULAR; INTRAVENOUS at 04:09

## 2021-09-26 RX ADMIN — CYANOCOBALAMIN TAB 1000 MCG 1000 MCG: 1000 TAB at 08:09

## 2021-09-26 RX ADMIN — DEXTROSE 20 MILLION UNITS: 5 SOLUTION INTRAVENOUS at 03:09

## 2021-09-26 RX ADMIN — TRAZODONE HYDROCHLORIDE 100 MG: 50 TABLET ORAL at 08:09

## 2021-09-26 RX ADMIN — OXYCODONE AND ACETAMINOPHEN 1 TABLET: 10; 325 TABLET ORAL at 09:09

## 2021-09-26 RX ADMIN — HYDROCORTISONE: 25 CREAM TOPICAL at 02:09

## 2021-09-26 RX ADMIN — OXYCODONE AND ACETAMINOPHEN 1 TABLET: 10; 325 TABLET ORAL at 02:09

## 2021-09-26 RX ADMIN — OXYCODONE AND ACETAMINOPHEN 1 TABLET: 10; 325 TABLET ORAL at 01:09

## 2021-09-27 LAB
ALBUMIN SERPL BCP-MCNC: 2.7 G/DL (ref 3.5–5.2)
ALP SERPL-CCNC: 92 U/L (ref 55–135)
ALT SERPL W/O P-5'-P-CCNC: 23 U/L (ref 10–44)
ANION GAP SERPL CALC-SCNC: 9 MMOL/L (ref 8–16)
AST SERPL-CCNC: 27 U/L (ref 10–40)
BACTERIA SPEC AEROBE CULT: ABNORMAL
BACTERIA SPEC ANAEROBE CULT: NORMAL
BACTERIA SPEC ANAEROBE CULT: NORMAL
BASOPHILS # BLD AUTO: 0.05 K/UL (ref 0–0.2)
BASOPHILS NFR BLD: 0.9 % (ref 0–1.9)
BILIRUB SERPL-MCNC: 0.1 MG/DL (ref 0.1–1)
BLD PROD TYP BPU: NORMAL
BLOOD UNIT EXPIRATION DATE: NORMAL
BLOOD UNIT TYPE CODE: 6200
BLOOD UNIT TYPE: NORMAL
BUN SERPL-MCNC: 8 MG/DL (ref 6–20)
CALCIUM SERPL-MCNC: 8.4 MG/DL (ref 8.7–10.5)
CHLORIDE SERPL-SCNC: 106 MMOL/L (ref 95–110)
CO2 SERPL-SCNC: 25 MMOL/L (ref 23–29)
CODING SYSTEM: NORMAL
CREAT SERPL-MCNC: 0.8 MG/DL (ref 0.5–1.4)
DIFFERENTIAL METHOD: ABNORMAL
DISPENSE STATUS: NORMAL
EOSINOPHIL # BLD AUTO: 0.5 K/UL (ref 0–0.5)
EOSINOPHIL NFR BLD: 8.9 % (ref 0–8)
ERYTHROCYTE [DISTWIDTH] IN BLOOD BY AUTOMATED COUNT: 14.3 % (ref 11.5–14.5)
EST. GFR  (AFRICAN AMERICAN): >60 ML/MIN/1.73 M^2
EST. GFR  (NON AFRICAN AMERICAN): >60 ML/MIN/1.73 M^2
GLUCOSE SERPL-MCNC: 86 MG/DL (ref 70–110)
HCT VFR BLD AUTO: 25.9 % (ref 40–54)
HGB BLD-MCNC: 7.8 G/DL (ref 14–18)
IMM GRANULOCYTES # BLD AUTO: 0.07 K/UL (ref 0–0.04)
IMM GRANULOCYTES NFR BLD AUTO: 1.3 % (ref 0–0.5)
LYMPHOCYTES # BLD AUTO: 1.9 K/UL (ref 1–4.8)
LYMPHOCYTES NFR BLD: 35 % (ref 18–48)
MAGNESIUM SERPL-MCNC: 2.4 MG/DL (ref 1.6–2.6)
MCH RBC QN AUTO: 27.3 PG (ref 27–31)
MCHC RBC AUTO-ENTMCNC: 30.1 G/DL (ref 32–36)
MCV RBC AUTO: 91 FL (ref 82–98)
MONOCYTES # BLD AUTO: 0.5 K/UL (ref 0.3–1)
MONOCYTES NFR BLD: 8.4 % (ref 4–15)
NEUTROPHILS # BLD AUTO: 2.5 K/UL (ref 1.8–7.7)
NEUTROPHILS NFR BLD: 45.5 % (ref 38–73)
NRBC BLD-RTO: 0 /100 WBC
NUM UNITS TRANS PACKED RBC: NORMAL
PHOSPHATE SERPL-MCNC: 4.4 MG/DL (ref 2.7–4.5)
PLATELET # BLD AUTO: 416 K/UL (ref 150–450)
PMV BLD AUTO: 8.2 FL (ref 9.2–12.9)
POTASSIUM SERPL-SCNC: 4.6 MMOL/L (ref 3.5–5.1)
PROT SERPL-MCNC: 6 G/DL (ref 6–8.4)
RBC # BLD AUTO: 2.86 M/UL (ref 4.6–6.2)
SODIUM SERPL-SCNC: 140 MMOL/L (ref 136–145)
WBC # BLD AUTO: 5.48 K/UL (ref 3.9–12.7)

## 2021-09-27 PROCEDURE — 63600175 PHARM REV CODE 636 W HCPCS: Performed by: ANESTHESIOLOGY

## 2021-09-27 PROCEDURE — 25000003 PHARM REV CODE 250: Performed by: INTERNAL MEDICINE

## 2021-09-27 PROCEDURE — 83735 ASSAY OF MAGNESIUM: CPT | Performed by: ORTHOPAEDIC SURGERY

## 2021-09-27 PROCEDURE — 99900035 HC TECH TIME PER 15 MIN (STAT)

## 2021-09-27 PROCEDURE — 97605 NEG PRS WND THER DME<=50SQCM: CPT

## 2021-09-27 PROCEDURE — 84100 ASSAY OF PHOSPHORUS: CPT | Performed by: ORTHOPAEDIC SURGERY

## 2021-09-27 PROCEDURE — 36415 COLL VENOUS BLD VENIPUNCTURE: CPT | Performed by: ORTHOPAEDIC SURGERY

## 2021-09-27 PROCEDURE — A4216 STERILE WATER/SALINE, 10 ML: HCPCS | Performed by: STUDENT IN AN ORGANIZED HEALTH CARE EDUCATION/TRAINING PROGRAM

## 2021-09-27 PROCEDURE — 25000003 PHARM REV CODE 250: Performed by: ANESTHESIOLOGY

## 2021-09-27 PROCEDURE — 80053 COMPREHEN METABOLIC PANEL: CPT | Performed by: ORTHOPAEDIC SURGERY

## 2021-09-27 PROCEDURE — 94761 N-INVAS EAR/PLS OXIMETRY MLT: CPT

## 2021-09-27 PROCEDURE — 25000003 PHARM REV CODE 250: Performed by: ORTHOPAEDIC SURGERY

## 2021-09-27 PROCEDURE — 25000003 PHARM REV CODE 250: Performed by: PHYSICIAN ASSISTANT

## 2021-09-27 PROCEDURE — 99233 PR SUBSEQUENT HOSPITAL CARE,LEVL III: ICD-10-PCS | Mod: ,,, | Performed by: STUDENT IN AN ORGANIZED HEALTH CARE EDUCATION/TRAINING PROGRAM

## 2021-09-27 PROCEDURE — 63600175 PHARM REV CODE 636 W HCPCS: Performed by: HOSPITALIST

## 2021-09-27 PROCEDURE — 85025 COMPLETE CBC W/AUTO DIFF WBC: CPT | Performed by: ORTHOPAEDIC SURGERY

## 2021-09-27 PROCEDURE — 63600175 PHARM REV CODE 636 W HCPCS: Performed by: ORTHOPAEDIC SURGERY

## 2021-09-27 PROCEDURE — 25000003 PHARM REV CODE 250: Performed by: STUDENT IN AN ORGANIZED HEALTH CARE EDUCATION/TRAINING PROGRAM

## 2021-09-27 PROCEDURE — 99233 SBSQ HOSP IP/OBS HIGH 50: CPT | Mod: ,,, | Performed by: STUDENT IN AN ORGANIZED HEALTH CARE EDUCATION/TRAINING PROGRAM

## 2021-09-27 PROCEDURE — 97116 GAIT TRAINING THERAPY: CPT | Mod: CQ

## 2021-09-27 PROCEDURE — 63600175 PHARM REV CODE 636 W HCPCS: Performed by: INTERNAL MEDICINE

## 2021-09-27 PROCEDURE — 12000002 HC ACUTE/MED SURGE SEMI-PRIVATE ROOM

## 2021-09-27 RX ORDER — ONDANSETRON 2 MG/ML
8 INJECTION INTRAMUSCULAR; INTRAVENOUS EVERY 6 HOURS PRN
Status: DISCONTINUED | OUTPATIENT
Start: 2021-09-27 | End: 2021-09-28 | Stop reason: HOSPADM

## 2021-09-27 RX ORDER — ONDANSETRON 2 MG/ML
8 INJECTION INTRAMUSCULAR; INTRAVENOUS ONCE
Status: COMPLETED | OUTPATIENT
Start: 2021-09-27 | End: 2021-09-27

## 2021-09-27 RX ORDER — PROCHLORPERAZINE EDISYLATE 5 MG/ML
5 INJECTION INTRAMUSCULAR; INTRAVENOUS EVERY 6 HOURS PRN
Status: DISCONTINUED | OUTPATIENT
Start: 2021-09-27 | End: 2021-09-28 | Stop reason: HOSPADM

## 2021-09-27 RX ADMIN — PROCHLORPERAZINE EDISYLATE 5 MG: 5 INJECTION INTRAMUSCULAR; INTRAVENOUS at 11:09

## 2021-09-27 RX ADMIN — SODIUM CHLORIDE, PRESERVATIVE FREE 10 ML: 5 INJECTION INTRAVENOUS at 06:09

## 2021-09-27 RX ADMIN — DEXTROSE 20 MILLION UNITS: 5 SOLUTION INTRAVENOUS at 02:09

## 2021-09-27 RX ADMIN — TRAZODONE HYDROCHLORIDE 100 MG: 50 TABLET ORAL at 08:09

## 2021-09-27 RX ADMIN — ONDANSETRON 8 MG: 2 INJECTION INTRAMUSCULAR; INTRAVENOUS at 02:09

## 2021-09-27 RX ADMIN — ASPIRIN 81 MG CHEWABLE TABLET 81 MG: 81 TABLET CHEWABLE at 10:09

## 2021-09-27 RX ADMIN — OXYCODONE AND ACETAMINOPHEN 1 TABLET: 10; 325 TABLET ORAL at 10:09

## 2021-09-27 RX ADMIN — MORPHINE SULFATE 6 MG: 2 INJECTION, SOLUTION INTRAMUSCULAR; INTRAVENOUS at 11:09

## 2021-09-27 RX ADMIN — ASPIRIN 81 MG CHEWABLE TABLET 81 MG: 81 TABLET CHEWABLE at 09:09

## 2021-09-27 RX ADMIN — LOSARTAN POTASSIUM 25 MG: 25 TABLET, FILM COATED ORAL at 09:09

## 2021-09-27 RX ADMIN — CELECOXIB 100 MG: 100 CAPSULE ORAL at 08:09

## 2021-09-27 RX ADMIN — SODIUM CHLORIDE, PRESERVATIVE FREE 10 ML: 5 INJECTION INTRAVENOUS at 12:09

## 2021-09-27 RX ADMIN — MORPHINE SULFATE 6 MG: 2 INJECTION, SOLUTION INTRAMUSCULAR; INTRAVENOUS at 03:09

## 2021-09-27 RX ADMIN — OXYCODONE AND ACETAMINOPHEN 1 TABLET: 10; 325 TABLET ORAL at 02:09

## 2021-09-27 RX ADMIN — OXYCODONE AND ACETAMINOPHEN 1 TABLET: 10; 325 TABLET ORAL at 05:09

## 2021-09-27 RX ADMIN — MORPHINE SULFATE 6 MG: 2 INJECTION, SOLUTION INTRAMUSCULAR; INTRAVENOUS at 07:09

## 2021-09-27 RX ADMIN — SODIUM CHLORIDE, PRESERVATIVE FREE 10 ML: 5 INJECTION INTRAVENOUS at 11:09

## 2021-09-27 RX ADMIN — ONDANSETRON 4 MG: 2 INJECTION INTRAMUSCULAR; INTRAVENOUS at 07:09

## 2021-09-27 RX ADMIN — CYANOCOBALAMIN TAB 1000 MCG 1000 MCG: 1000 TAB at 09:09

## 2021-09-27 RX ADMIN — OXYCODONE AND ACETAMINOPHEN 1 TABLET: 10; 325 TABLET ORAL at 09:09

## 2021-09-27 RX ADMIN — DIPHENHYDRAMINE HYDROCHLORIDE 25 MG: 25 CAPSULE ORAL at 06:09

## 2021-09-27 RX ADMIN — MORPHINE SULFATE 6 MG: 2 INJECTION, SOLUTION INTRAMUSCULAR; INTRAVENOUS at 12:09

## 2021-09-27 RX ADMIN — OXYCODONE AND ACETAMINOPHEN 1 TABLET: 10; 325 TABLET ORAL at 06:09

## 2021-09-27 RX ADMIN — CHOLECALCIFEROL TAB 25 MCG (1000 UNIT) 2000 UNITS: 25 TAB at 09:09

## 2021-09-27 RX ADMIN — MORPHINE SULFATE 6 MG: 2 INJECTION, SOLUTION INTRAMUSCULAR; INTRAVENOUS at 04:09

## 2021-09-27 RX ADMIN — ONDANSETRON 8 MG: 2 INJECTION INTRAMUSCULAR; INTRAVENOUS at 08:09

## 2021-09-27 RX ADMIN — CELECOXIB 100 MG: 100 CAPSULE ORAL at 09:09

## 2021-09-27 RX ADMIN — MORPHINE SULFATE 6 MG: 2 INJECTION, SOLUTION INTRAMUSCULAR; INTRAVENOUS at 08:09

## 2021-09-27 RX ADMIN — OXYCODONE AND ACETAMINOPHEN 1 TABLET: 10; 325 TABLET ORAL at 01:09

## 2021-09-28 VITALS
SYSTOLIC BLOOD PRESSURE: 121 MMHG | HEIGHT: 70 IN | WEIGHT: 207.69 LBS | RESPIRATION RATE: 18 BRPM | TEMPERATURE: 97 F | BODY MASS INDEX: 29.73 KG/M2 | DIASTOLIC BLOOD PRESSURE: 64 MMHG | HEART RATE: 72 BPM | OXYGEN SATURATION: 99 %

## 2021-09-28 LAB
ALBUMIN SERPL BCP-MCNC: 2.6 G/DL (ref 3.5–5.2)
ALP SERPL-CCNC: 92 U/L (ref 55–135)
ALT SERPL W/O P-5'-P-CCNC: 17 U/L (ref 10–44)
ANION GAP SERPL CALC-SCNC: 7 MMOL/L (ref 8–16)
AST SERPL-CCNC: 18 U/L (ref 10–40)
BASOPHILS # BLD AUTO: 0.04 K/UL (ref 0–0.2)
BASOPHILS NFR BLD: 1 % (ref 0–1.9)
BILIRUB SERPL-MCNC: 0.1 MG/DL (ref 0.1–1)
BUN SERPL-MCNC: 9 MG/DL (ref 6–20)
CALCIUM SERPL-MCNC: 8.7 MG/DL (ref 8.7–10.5)
CHLORIDE SERPL-SCNC: 107 MMOL/L (ref 95–110)
CO2 SERPL-SCNC: 27 MMOL/L (ref 23–29)
CREAT SERPL-MCNC: 0.9 MG/DL (ref 0.5–1.4)
DIFFERENTIAL METHOD: ABNORMAL
EOSINOPHIL # BLD AUTO: 0.4 K/UL (ref 0–0.5)
EOSINOPHIL NFR BLD: 8.3 % (ref 0–8)
ERYTHROCYTE [DISTWIDTH] IN BLOOD BY AUTOMATED COUNT: 14.4 % (ref 11.5–14.5)
EST. GFR  (AFRICAN AMERICAN): >60 ML/MIN/1.73 M^2
EST. GFR  (NON AFRICAN AMERICAN): >60 ML/MIN/1.73 M^2
GLUCOSE SERPL-MCNC: 91 MG/DL (ref 70–110)
HCT VFR BLD AUTO: 24.7 % (ref 40–54)
HGB BLD-MCNC: 7.3 G/DL (ref 14–18)
IMM GRANULOCYTES # BLD AUTO: 0.06 K/UL (ref 0–0.04)
IMM GRANULOCYTES NFR BLD AUTO: 1.4 % (ref 0–0.5)
LYMPHOCYTES # BLD AUTO: 1.2 K/UL (ref 1–4.8)
LYMPHOCYTES NFR BLD: 28.8 % (ref 18–48)
MAGNESIUM SERPL-MCNC: 2.2 MG/DL (ref 1.6–2.6)
MCH RBC QN AUTO: 26.8 PG (ref 27–31)
MCHC RBC AUTO-ENTMCNC: 29.6 G/DL (ref 32–36)
MCV RBC AUTO: 91 FL (ref 82–98)
MONOCYTES # BLD AUTO: 0.3 K/UL (ref 0.3–1)
MONOCYTES NFR BLD: 6.4 % (ref 4–15)
NEUTROPHILS # BLD AUTO: 2.3 K/UL (ref 1.8–7.7)
NEUTROPHILS NFR BLD: 54.1 % (ref 38–73)
NRBC BLD-RTO: 0 /100 WBC
PHOSPHATE SERPL-MCNC: 4.8 MG/DL (ref 2.7–4.5)
PLATELET # BLD AUTO: 366 K/UL (ref 150–450)
PMV BLD AUTO: 8.4 FL (ref 9.2–12.9)
POTASSIUM SERPL-SCNC: 4.9 MMOL/L (ref 3.5–5.1)
PROT SERPL-MCNC: 5.7 G/DL (ref 6–8.4)
RBC # BLD AUTO: 2.72 M/UL (ref 4.6–6.2)
SODIUM SERPL-SCNC: 141 MMOL/L (ref 136–145)
WBC # BLD AUTO: 4.2 K/UL (ref 3.9–12.7)

## 2021-09-28 PROCEDURE — 94760 N-INVAS EAR/PLS OXIMETRY 1: CPT

## 2021-09-28 PROCEDURE — 94761 N-INVAS EAR/PLS OXIMETRY MLT: CPT

## 2021-09-28 PROCEDURE — 94799 UNLISTED PULMONARY SVC/PX: CPT

## 2021-09-28 PROCEDURE — 63600175 PHARM REV CODE 636 W HCPCS: Performed by: ORTHOPAEDIC SURGERY

## 2021-09-28 PROCEDURE — 36415 COLL VENOUS BLD VENIPUNCTURE: CPT | Performed by: ORTHOPAEDIC SURGERY

## 2021-09-28 PROCEDURE — 99900035 HC TECH TIME PER 15 MIN (STAT)

## 2021-09-28 PROCEDURE — 80053 COMPREHEN METABOLIC PANEL: CPT | Performed by: ORTHOPAEDIC SURGERY

## 2021-09-28 PROCEDURE — 83735 ASSAY OF MAGNESIUM: CPT | Performed by: ORTHOPAEDIC SURGERY

## 2021-09-28 PROCEDURE — 84100 ASSAY OF PHOSPHORUS: CPT | Performed by: ORTHOPAEDIC SURGERY

## 2021-09-28 PROCEDURE — 25000003 PHARM REV CODE 250: Performed by: ANESTHESIOLOGY

## 2021-09-28 PROCEDURE — 25000003 PHARM REV CODE 250: Performed by: ORTHOPAEDIC SURGERY

## 2021-09-28 PROCEDURE — 25000003 PHARM REV CODE 250: Performed by: STUDENT IN AN ORGANIZED HEALTH CARE EDUCATION/TRAINING PROGRAM

## 2021-09-28 PROCEDURE — 63600175 PHARM REV CODE 636 W HCPCS: Performed by: HOSPITALIST

## 2021-09-28 PROCEDURE — A4216 STERILE WATER/SALINE, 10 ML: HCPCS | Performed by: STUDENT IN AN ORGANIZED HEALTH CARE EDUCATION/TRAINING PROGRAM

## 2021-09-28 PROCEDURE — 85025 COMPLETE CBC W/AUTO DIFF WBC: CPT | Performed by: ORTHOPAEDIC SURGERY

## 2021-09-28 PROCEDURE — 25000003 PHARM REV CODE 250: Performed by: PHYSICIAN ASSISTANT

## 2021-09-28 RX ADMIN — OXYCODONE AND ACETAMINOPHEN 1 TABLET: 10; 325 TABLET ORAL at 06:09

## 2021-09-28 RX ADMIN — SODIUM CHLORIDE, PRESERVATIVE FREE 10 ML: 5 INJECTION INTRAVENOUS at 12:09

## 2021-09-28 RX ADMIN — ASPIRIN 81 MG CHEWABLE TABLET 81 MG: 81 TABLET CHEWABLE at 08:09

## 2021-09-28 RX ADMIN — DIPHENHYDRAMINE HYDROCHLORIDE 25 MG: 25 CAPSULE ORAL at 02:09

## 2021-09-28 RX ADMIN — SODIUM CHLORIDE, PRESERVATIVE FREE 10 ML: 5 INJECTION INTRAVENOUS at 06:09

## 2021-09-28 RX ADMIN — MORPHINE SULFATE 6 MG: 2 INJECTION, SOLUTION INTRAMUSCULAR; INTRAVENOUS at 04:09

## 2021-09-28 RX ADMIN — MUPIROCIN: 20 OINTMENT TOPICAL at 08:09

## 2021-09-28 RX ADMIN — MORPHINE SULFATE 6 MG: 2 INJECTION, SOLUTION INTRAMUSCULAR; INTRAVENOUS at 12:09

## 2021-09-28 RX ADMIN — ONDANSETRON 8 MG: 2 INJECTION INTRAMUSCULAR; INTRAVENOUS at 04:09

## 2021-09-28 RX ADMIN — LOSARTAN POTASSIUM 25 MG: 25 TABLET, FILM COATED ORAL at 08:09

## 2021-09-28 RX ADMIN — CELECOXIB 100 MG: 100 CAPSULE ORAL at 08:09

## 2021-09-28 RX ADMIN — MORPHINE SULFATE 6 MG: 2 INJECTION, SOLUTION INTRAMUSCULAR; INTRAVENOUS at 08:09

## 2021-09-28 RX ADMIN — CYANOCOBALAMIN TAB 1000 MCG 1000 MCG: 1000 TAB at 08:09

## 2021-09-28 RX ADMIN — CHOLECALCIFEROL TAB 25 MCG (1000 UNIT) 2000 UNITS: 25 TAB at 08:09

## 2021-09-28 RX ADMIN — HYDROCORTISONE: 25 CREAM TOPICAL at 08:09

## 2021-09-28 RX ADMIN — OXYCODONE AND ACETAMINOPHEN 1 TABLET: 10; 325 TABLET ORAL at 02:09

## 2021-09-29 ENCOUNTER — TELEPHONE (OUTPATIENT)
Dept: ORTHOPEDICS | Facility: CLINIC | Age: 56
End: 2021-09-29

## 2021-09-29 ENCOUNTER — PATIENT OUTREACH (OUTPATIENT)
Dept: ADMINISTRATIVE | Facility: CLINIC | Age: 56
End: 2021-09-29

## 2021-09-30 ENCOUNTER — PATIENT MESSAGE (OUTPATIENT)
Dept: ADMINISTRATIVE | Facility: CLINIC | Age: 56
End: 2021-09-30

## 2021-10-04 DIAGNOSIS — Z96.659 INFECTION OF TOTAL KNEE REPLACEMENT, SUBSEQUENT ENCOUNTER: ICD-10-CM

## 2021-10-04 DIAGNOSIS — T84.59XD INFECTION OF TOTAL KNEE REPLACEMENT, SUBSEQUENT ENCOUNTER: ICD-10-CM

## 2021-10-04 RX ORDER — OXYCODONE AND ACETAMINOPHEN 10; 325 MG/1; MG/1
1 TABLET ORAL EVERY 4 HOURS PRN
Qty: 60 TABLET | Refills: 0 | Status: SHIPPED | OUTPATIENT
Start: 2021-10-04 | End: 2021-10-11 | Stop reason: SDUPTHER

## 2021-10-11 ENCOUNTER — OFFICE VISIT (OUTPATIENT)
Dept: ORTHOPEDICS | Facility: CLINIC | Age: 56
End: 2021-10-11
Payer: OTHER GOVERNMENT

## 2021-10-11 DIAGNOSIS — T84.59XD INFECTION OF TOTAL KNEE REPLACEMENT, SUBSEQUENT ENCOUNTER: ICD-10-CM

## 2021-10-11 DIAGNOSIS — Z96.659 INFECTION OF TOTAL KNEE REPLACEMENT, SUBSEQUENT ENCOUNTER: ICD-10-CM

## 2021-10-11 DIAGNOSIS — Z96.651 STATUS POST TOTAL KNEE REPLACEMENT USING CEMENT, RIGHT: Primary | ICD-10-CM

## 2021-10-11 PROCEDURE — 99024 POSTOP FOLLOW-UP VISIT: CPT | Mod: ,,, | Performed by: ORTHOPAEDIC SURGERY

## 2021-10-11 PROCEDURE — 99211 OFF/OP EST MAY X REQ PHY/QHP: CPT | Mod: PBBFAC,PN | Performed by: ORTHOPAEDIC SURGERY

## 2021-10-11 PROCEDURE — 99024 PR POST-OP FOLLOW-UP VISIT: ICD-10-PCS | Mod: ,,, | Performed by: ORTHOPAEDIC SURGERY

## 2021-10-11 PROCEDURE — 99999 PR PBB SHADOW E&M-EST. PATIENT-LVL I: CPT | Mod: PBBFAC,,, | Performed by: ORTHOPAEDIC SURGERY

## 2021-10-11 PROCEDURE — 99999 PR PBB SHADOW E&M-EST. PATIENT-LVL I: ICD-10-PCS | Mod: PBBFAC,,, | Performed by: ORTHOPAEDIC SURGERY

## 2021-10-11 RX ORDER — OXYCODONE AND ACETAMINOPHEN 10; 325 MG/1; MG/1
1 TABLET ORAL EVERY 4 HOURS PRN
Qty: 60 TABLET | Refills: 0 | Status: SHIPPED | OUTPATIENT
Start: 2021-10-11 | End: 2021-10-19 | Stop reason: SDUPTHER

## 2021-10-13 ENCOUNTER — TELEPHONE (OUTPATIENT)
Dept: ORTHOPEDICS | Facility: CLINIC | Age: 56
End: 2021-10-13

## 2021-10-13 DIAGNOSIS — Z96.651 STATUS POST TOTAL KNEE REPLACEMENT USING CEMENT, RIGHT: Primary | ICD-10-CM

## 2021-10-14 ENCOUNTER — TELEPHONE (OUTPATIENT)
Dept: ORTHOPEDICS | Facility: CLINIC | Age: 56
End: 2021-10-14

## 2021-10-14 LAB
FUNGUS SPEC CULT: NORMAL

## 2021-10-18 ENCOUNTER — TELEPHONE (OUTPATIENT)
Dept: ORTHOPEDICS | Facility: CLINIC | Age: 56
End: 2021-10-18

## 2021-10-18 DIAGNOSIS — T84.59XD INFECTION OF TOTAL KNEE REPLACEMENT, SUBSEQUENT ENCOUNTER: ICD-10-CM

## 2021-10-18 DIAGNOSIS — Z96.659 INFECTION OF TOTAL KNEE REPLACEMENT, SUBSEQUENT ENCOUNTER: ICD-10-CM

## 2021-10-18 DIAGNOSIS — Z96.651 STATUS POST TOTAL KNEE REPLACEMENT USING CEMENT, RIGHT: Primary | ICD-10-CM

## 2021-10-19 ENCOUNTER — LAB VISIT (OUTPATIENT)
Dept: LAB | Facility: HOSPITAL | Age: 56
End: 2021-10-19
Attending: INTERNAL MEDICINE
Payer: OTHER GOVERNMENT

## 2021-10-19 DIAGNOSIS — T81.49XA POSTOPERATIVE SUBPHRENIC ABSCESS: Primary | ICD-10-CM

## 2021-10-19 DIAGNOSIS — Z96.659 INFECTION OF TOTAL KNEE REPLACEMENT, SUBSEQUENT ENCOUNTER: ICD-10-CM

## 2021-10-19 DIAGNOSIS — T84.59XD INFECTION OF TOTAL KNEE REPLACEMENT, SUBSEQUENT ENCOUNTER: ICD-10-CM

## 2021-10-19 LAB
ALBUMIN SERPL BCP-MCNC: 2.9 G/DL (ref 3.5–5.2)
ALP SERPL-CCNC: 78 U/L (ref 55–135)
ALT SERPL W/O P-5'-P-CCNC: 13 U/L (ref 10–44)
ANION GAP SERPL CALC-SCNC: 10 MMOL/L (ref 8–16)
AST SERPL-CCNC: 16 U/L (ref 10–40)
BASOPHILS # BLD AUTO: 0.03 K/UL (ref 0–0.2)
BASOPHILS NFR BLD: 0.9 % (ref 0–1.9)
BILIRUB SERPL-MCNC: 0.3 MG/DL (ref 0.1–1)
BUN SERPL-MCNC: 9 MG/DL (ref 6–20)
CALCIUM SERPL-MCNC: 8.5 MG/DL (ref 8.7–10.5)
CHLORIDE SERPL-SCNC: 111 MMOL/L (ref 95–110)
CO2 SERPL-SCNC: 19 MMOL/L (ref 23–29)
CREAT SERPL-MCNC: 0.9 MG/DL (ref 0.5–1.4)
CRP SERPL-MCNC: 0.5 MG/L (ref 0–8.2)
DIFFERENTIAL METHOD: ABNORMAL
EOSINOPHIL # BLD AUTO: 0.3 K/UL (ref 0–0.5)
EOSINOPHIL NFR BLD: 7.6 % (ref 0–8)
ERYTHROCYTE [DISTWIDTH] IN BLOOD BY AUTOMATED COUNT: 14.6 % (ref 11.5–14.5)
ERYTHROCYTE [SEDIMENTATION RATE] IN BLOOD BY WESTERGREN METHOD: 8 MM/HR (ref 0–10)
EST. GFR  (AFRICAN AMERICAN): >60 ML/MIN/1.73 M^2
EST. GFR  (NON AFRICAN AMERICAN): >60 ML/MIN/1.73 M^2
GLUCOSE SERPL-MCNC: 121 MG/DL (ref 70–110)
HCT VFR BLD AUTO: 27.9 % (ref 40–54)
HGB BLD-MCNC: 8.3 G/DL (ref 14–18)
IMM GRANULOCYTES # BLD AUTO: 0.01 K/UL (ref 0–0.04)
IMM GRANULOCYTES NFR BLD AUTO: 0.3 % (ref 0–0.5)
LYMPHOCYTES # BLD AUTO: 1.4 K/UL (ref 1–4.8)
LYMPHOCYTES NFR BLD: 42.7 % (ref 18–48)
MCH RBC QN AUTO: 25.8 PG (ref 27–31)
MCHC RBC AUTO-ENTMCNC: 29.7 G/DL (ref 32–36)
MCV RBC AUTO: 87 FL (ref 82–98)
MONOCYTES # BLD AUTO: 0.3 K/UL (ref 0.3–1)
MONOCYTES NFR BLD: 8.8 % (ref 4–15)
NEUTROPHILS # BLD AUTO: 1.3 K/UL (ref 1.8–7.7)
NEUTROPHILS NFR BLD: 39.7 % (ref 38–73)
NRBC BLD-RTO: 0 /100 WBC
PLATELET # BLD AUTO: 230 K/UL (ref 150–450)
PMV BLD AUTO: 8.6 FL (ref 9.2–12.9)
POTASSIUM SERPL-SCNC: 4.1 MMOL/L (ref 3.5–5.1)
PROT SERPL-MCNC: 6.1 G/DL (ref 6–8.4)
RBC # BLD AUTO: 3.22 M/UL (ref 4.6–6.2)
SODIUM SERPL-SCNC: 140 MMOL/L (ref 136–145)
WBC # BLD AUTO: 3.28 K/UL (ref 3.9–12.7)

## 2021-10-19 PROCEDURE — 86140 C-REACTIVE PROTEIN: CPT

## 2021-10-19 PROCEDURE — 80053 COMPREHEN METABOLIC PANEL: CPT

## 2021-10-19 PROCEDURE — 36415 COLL VENOUS BLD VENIPUNCTURE: CPT

## 2021-10-19 PROCEDURE — 85025 COMPLETE CBC W/AUTO DIFF WBC: CPT

## 2021-10-19 PROCEDURE — 85651 RBC SED RATE NONAUTOMATED: CPT

## 2021-10-19 RX ORDER — OXYCODONE AND ACETAMINOPHEN 10; 325 MG/1; MG/1
1 TABLET ORAL EVERY 6 HOURS PRN
Qty: 30 TABLET | Refills: 0 | Status: SHIPPED | OUTPATIENT
Start: 2021-10-19 | End: 2021-10-25 | Stop reason: SDUPTHER

## 2021-10-20 ENCOUNTER — OFFICE VISIT (OUTPATIENT)
Dept: INFECTIOUS DISEASES | Facility: CLINIC | Age: 56
End: 2021-10-20
Payer: OTHER GOVERNMENT

## 2021-10-20 ENCOUNTER — TELEPHONE (OUTPATIENT)
Dept: INFECTIOUS DISEASES | Facility: CLINIC | Age: 56
End: 2021-10-20

## 2021-10-20 VITALS
HEIGHT: 70 IN | WEIGHT: 213 LBS | TEMPERATURE: 98 F | DIASTOLIC BLOOD PRESSURE: 74 MMHG | HEART RATE: 101 BPM | BODY MASS INDEX: 30.49 KG/M2 | SYSTOLIC BLOOD PRESSURE: 109 MMHG | OXYGEN SATURATION: 93 %

## 2021-10-20 DIAGNOSIS — A49.8 ENTEROCOCCUS FAECALIS INFECTION: ICD-10-CM

## 2021-10-20 DIAGNOSIS — D70.2 OTHER DRUG-INDUCED NEUTROPENIA: ICD-10-CM

## 2021-10-20 DIAGNOSIS — M00.261 STREPTOCOCCAL ARTHRITIS OF RIGHT KNEE: ICD-10-CM

## 2021-10-20 DIAGNOSIS — Z79.2 ENCOUNTER FOR LONG-TERM (CURRENT) USE OF ANTIBIOTICS: ICD-10-CM

## 2021-10-20 DIAGNOSIS — Z96.659 INFECTION OF TOTAL KNEE REPLACEMENT, SUBSEQUENT ENCOUNTER: Primary | ICD-10-CM

## 2021-10-20 DIAGNOSIS — T84.59XD INFECTION OF TOTAL KNEE REPLACEMENT, SUBSEQUENT ENCOUNTER: Primary | ICD-10-CM

## 2021-10-20 PROCEDURE — 99214 OFFICE O/P EST MOD 30 MIN: CPT | Mod: S$GLB,,, | Performed by: INTERNAL MEDICINE

## 2021-10-20 PROCEDURE — 99214 PR OFFICE/OUTPT VISIT, EST, LEVL IV, 30-39 MIN: ICD-10-PCS | Mod: S$GLB,,, | Performed by: INTERNAL MEDICINE

## 2021-10-25 DIAGNOSIS — T84.59XD INFECTION OF TOTAL KNEE REPLACEMENT, SUBSEQUENT ENCOUNTER: ICD-10-CM

## 2021-10-25 DIAGNOSIS — Z96.659 INFECTION OF TOTAL KNEE REPLACEMENT, SUBSEQUENT ENCOUNTER: ICD-10-CM

## 2021-10-25 LAB
FUNGUS SPEC CULT: NORMAL
FUNGUS SPEC CULT: NORMAL

## 2021-10-25 RX ORDER — OXYCODONE AND ACETAMINOPHEN 10; 325 MG/1; MG/1
1 TABLET ORAL EVERY 6 HOURS PRN
Qty: 30 TABLET | Refills: 0 | Status: SHIPPED | OUTPATIENT
Start: 2021-10-25 | End: 2021-11-01 | Stop reason: SDUPTHER

## 2021-10-26 ENCOUNTER — LAB VISIT (OUTPATIENT)
Dept: LAB | Facility: HOSPITAL | Age: 56
End: 2021-10-26
Attending: INTERNAL MEDICINE
Payer: OTHER GOVERNMENT

## 2021-10-26 DIAGNOSIS — T81.49XA POSTOPERATIVE SUBPHRENIC ABSCESS: Primary | ICD-10-CM

## 2021-10-26 LAB
ACID FAST MOD KINY STN SPEC: NORMAL
ALBUMIN SERPL BCP-MCNC: 2.9 G/DL (ref 3.5–5.2)
ALP SERPL-CCNC: 75 U/L (ref 55–135)
ALT SERPL W/O P-5'-P-CCNC: 15 U/L (ref 10–44)
ANION GAP SERPL CALC-SCNC: 7 MMOL/L (ref 8–16)
AST SERPL-CCNC: 18 U/L (ref 10–40)
BILIRUB SERPL-MCNC: 0.2 MG/DL (ref 0.1–1)
BUN SERPL-MCNC: 13 MG/DL (ref 6–20)
CALCIUM SERPL-MCNC: 8.2 MG/DL (ref 8.7–10.5)
CHLORIDE SERPL-SCNC: 113 MMOL/L (ref 95–110)
CO2 SERPL-SCNC: 19 MMOL/L (ref 23–29)
CREAT SERPL-MCNC: 0.9 MG/DL (ref 0.5–1.4)
CRP SERPL-MCNC: 0.4 MG/L (ref 0–8.2)
ERYTHROCYTE [DISTWIDTH] IN BLOOD BY AUTOMATED COUNT: 15.3 % (ref 11.5–14.5)
ERYTHROCYTE [SEDIMENTATION RATE] IN BLOOD BY WESTERGREN METHOD: 6 MM/HR (ref 0–10)
EST. GFR  (AFRICAN AMERICAN): >60 ML/MIN/1.73 M^2
EST. GFR  (NON AFRICAN AMERICAN): >60 ML/MIN/1.73 M^2
GLUCOSE SERPL-MCNC: 78 MG/DL (ref 70–110)
HCT VFR BLD AUTO: 26.5 % (ref 40–54)
HGB BLD-MCNC: 8 G/DL (ref 14–18)
MCH RBC QN AUTO: 25.6 PG (ref 27–31)
MCHC RBC AUTO-ENTMCNC: 30.2 G/DL (ref 32–36)
MCV RBC AUTO: 85 FL (ref 82–98)
MYCOBACTERIUM SPEC QL CULT: NORMAL
PLATELET # BLD AUTO: 239 K/UL (ref 150–450)
PMV BLD AUTO: 9 FL (ref 9.2–12.9)
POTASSIUM SERPL-SCNC: 4.6 MMOL/L (ref 3.5–5.1)
PROT SERPL-MCNC: 5.9 G/DL (ref 6–8.4)
RBC # BLD AUTO: 3.12 M/UL (ref 4.6–6.2)
SODIUM SERPL-SCNC: 139 MMOL/L (ref 136–145)
WBC # BLD AUTO: 3.4 K/UL (ref 3.9–12.7)

## 2021-10-26 PROCEDURE — 85651 RBC SED RATE NONAUTOMATED: CPT | Performed by: INTERNAL MEDICINE

## 2021-10-26 PROCEDURE — 85027 COMPLETE CBC AUTOMATED: CPT | Performed by: INTERNAL MEDICINE

## 2021-10-26 PROCEDURE — 80053 COMPREHEN METABOLIC PANEL: CPT | Performed by: INTERNAL MEDICINE

## 2021-10-26 PROCEDURE — 36415 COLL VENOUS BLD VENIPUNCTURE: CPT | Performed by: INTERNAL MEDICINE

## 2021-10-26 PROCEDURE — 86140 C-REACTIVE PROTEIN: CPT | Performed by: INTERNAL MEDICINE

## 2021-10-27 ENCOUNTER — DOCUMENT SCAN (OUTPATIENT)
Dept: HOME HEALTH SERVICES | Facility: HOSPITAL | Age: 56
End: 2021-10-27
Payer: OTHER GOVERNMENT

## 2021-10-29 ENCOUNTER — DOCUMENT SCAN (OUTPATIENT)
Dept: HOME HEALTH SERVICES | Facility: HOSPITAL | Age: 56
End: 2021-10-29
Payer: OTHER GOVERNMENT

## 2021-10-29 ENCOUNTER — TELEPHONE (OUTPATIENT)
Dept: INFECTIOUS DISEASES | Facility: CLINIC | Age: 56
End: 2021-10-29
Payer: OTHER GOVERNMENT

## 2021-10-29 RX ORDER — TRIAMCINOLONE ACETONIDE 1 MG/G
CREAM TOPICAL 3 TIMES DAILY
Qty: 45 G | Refills: 2 | Status: SHIPPED | OUTPATIENT
Start: 2021-10-29 | End: 2023-03-13 | Stop reason: ALTCHOICE

## 2021-11-01 ENCOUNTER — OFFICE VISIT (OUTPATIENT)
Dept: INFECTIOUS DISEASES | Facility: CLINIC | Age: 56
End: 2021-11-01
Payer: OTHER GOVERNMENT

## 2021-11-01 VITALS
OXYGEN SATURATION: 94 % | DIASTOLIC BLOOD PRESSURE: 92 MMHG | BODY MASS INDEX: 30.35 KG/M2 | HEIGHT: 70 IN | TEMPERATURE: 97 F | HEART RATE: 83 BPM | SYSTOLIC BLOOD PRESSURE: 142 MMHG | WEIGHT: 212 LBS

## 2021-11-01 DIAGNOSIS — T84.59XD INFECTION OF TOTAL KNEE REPLACEMENT, SUBSEQUENT ENCOUNTER: Primary | ICD-10-CM

## 2021-11-01 DIAGNOSIS — T84.59XD INFECTION OF TOTAL KNEE REPLACEMENT, SUBSEQUENT ENCOUNTER: ICD-10-CM

## 2021-11-01 DIAGNOSIS — A49.8 ENTEROCOCCUS FAECALIS INFECTION: ICD-10-CM

## 2021-11-01 DIAGNOSIS — Z79.2 ENCOUNTER FOR LONG-TERM (CURRENT) USE OF ANTIBIOTICS: ICD-10-CM

## 2021-11-01 DIAGNOSIS — D70.2 OTHER DRUG-INDUCED NEUTROPENIA: ICD-10-CM

## 2021-11-01 DIAGNOSIS — Z96.659 INFECTION OF TOTAL KNEE REPLACEMENT, SUBSEQUENT ENCOUNTER: Primary | ICD-10-CM

## 2021-11-01 DIAGNOSIS — Z45.2 ENCOUNTER FOR REMOVAL OF PERIPHERALLY INSERTED CENTRAL CATHETER: ICD-10-CM

## 2021-11-01 DIAGNOSIS — L30.9 ECZEMA, UNSPECIFIED TYPE: ICD-10-CM

## 2021-11-01 DIAGNOSIS — Z96.659 INFECTION OF TOTAL KNEE REPLACEMENT, SUBSEQUENT ENCOUNTER: ICD-10-CM

## 2021-11-01 PROCEDURE — 99214 OFFICE O/P EST MOD 30 MIN: CPT | Mod: S$GLB,,, | Performed by: INTERNAL MEDICINE

## 2021-11-01 PROCEDURE — 99214 PR OFFICE/OUTPT VISIT, EST, LEVL IV, 30-39 MIN: ICD-10-PCS | Mod: S$GLB,,, | Performed by: INTERNAL MEDICINE

## 2021-11-01 RX ORDER — AMOXICILLIN 500 MG/1
500 CAPSULE ORAL EVERY 8 HOURS
Qty: 90 CAPSULE | Refills: 5 | Status: SHIPPED | OUTPATIENT
Start: 2021-11-01 | End: 2022-04-30

## 2021-11-01 RX ORDER — OXYCODONE AND ACETAMINOPHEN 10; 325 MG/1; MG/1
1 TABLET ORAL EVERY 6 HOURS PRN
Qty: 30 TABLET | Refills: 0 | Status: SHIPPED | OUTPATIENT
Start: 2021-11-01 | End: 2021-11-08 | Stop reason: SDUPTHER

## 2021-11-08 ENCOUNTER — DOCUMENT SCAN (OUTPATIENT)
Dept: HOME HEALTH SERVICES | Facility: HOSPITAL | Age: 56
End: 2021-11-08
Payer: OTHER GOVERNMENT

## 2021-11-08 RX ORDER — OXYCODONE AND ACETAMINOPHEN 10; 325 MG/1; MG/1
1 TABLET ORAL EVERY 6 HOURS PRN
Qty: 30 TABLET | Refills: 0 | Status: SHIPPED | OUTPATIENT
Start: 2021-11-08 | End: 2021-11-15 | Stop reason: SDUPTHER

## 2021-11-10 ENCOUNTER — DOCUMENT SCAN (OUTPATIENT)
Dept: HOME HEALTH SERVICES | Facility: HOSPITAL | Age: 56
End: 2021-11-10
Payer: OTHER GOVERNMENT

## 2021-11-15 ENCOUNTER — OFFICE VISIT (OUTPATIENT)
Dept: ORTHOPEDICS | Facility: CLINIC | Age: 56
End: 2021-11-15
Payer: OTHER GOVERNMENT

## 2021-11-15 VITALS — BODY MASS INDEX: 30.35 KG/M2 | HEIGHT: 70 IN | WEIGHT: 212 LBS | RESPIRATION RATE: 18 BRPM

## 2021-11-15 DIAGNOSIS — Z96.659 INFECTION OF TOTAL KNEE REPLACEMENT, SUBSEQUENT ENCOUNTER: ICD-10-CM

## 2021-11-15 DIAGNOSIS — Z96.651 STATUS POST TOTAL KNEE REPLACEMENT USING CEMENT, RIGHT: Primary | ICD-10-CM

## 2021-11-15 DIAGNOSIS — T84.59XD INFECTION OF TOTAL KNEE REPLACEMENT, SUBSEQUENT ENCOUNTER: ICD-10-CM

## 2021-11-15 PROCEDURE — 99024 POSTOP FOLLOW-UP VISIT: CPT | Mod: ,,, | Performed by: ORTHOPAEDIC SURGERY

## 2021-11-15 PROCEDURE — 99999 PR PBB SHADOW E&M-EST. PATIENT-LVL III: ICD-10-PCS | Mod: PBBFAC,,, | Performed by: ORTHOPAEDIC SURGERY

## 2021-11-15 PROCEDURE — 99999 PR PBB SHADOW E&M-EST. PATIENT-LVL III: CPT | Mod: PBBFAC,,, | Performed by: ORTHOPAEDIC SURGERY

## 2021-11-15 PROCEDURE — 99024 PR POST-OP FOLLOW-UP VISIT: ICD-10-PCS | Mod: ,,, | Performed by: ORTHOPAEDIC SURGERY

## 2021-11-15 PROCEDURE — 99213 OFFICE O/P EST LOW 20 MIN: CPT | Mod: PBBFAC,PN | Performed by: ORTHOPAEDIC SURGERY

## 2021-11-15 RX ORDER — OXYCODONE AND ACETAMINOPHEN 10; 325 MG/1; MG/1
1 TABLET ORAL EVERY 6 HOURS PRN
Qty: 30 TABLET | Refills: 0 | Status: SHIPPED | OUTPATIENT
Start: 2021-11-15 | End: 2021-11-22 | Stop reason: SDUPTHER

## 2021-11-22 DIAGNOSIS — T84.59XD INFECTION OF TOTAL KNEE REPLACEMENT, SUBSEQUENT ENCOUNTER: Primary | ICD-10-CM

## 2021-11-22 DIAGNOSIS — T84.59XD INFECTION OF TOTAL KNEE REPLACEMENT, SUBSEQUENT ENCOUNTER: ICD-10-CM

## 2021-11-22 DIAGNOSIS — Z96.659 INFECTION OF TOTAL KNEE REPLACEMENT, SUBSEQUENT ENCOUNTER: ICD-10-CM

## 2021-11-22 DIAGNOSIS — Z96.659 INFECTION OF TOTAL KNEE REPLACEMENT, SUBSEQUENT ENCOUNTER: Primary | ICD-10-CM

## 2021-11-22 RX ORDER — OXYCODONE AND ACETAMINOPHEN 10; 325 MG/1; MG/1
1 TABLET ORAL EVERY 6 HOURS PRN
Qty: 30 TABLET | Refills: 0 | Status: SHIPPED | OUTPATIENT
Start: 2021-11-22 | End: 2021-11-29 | Stop reason: SDUPTHER

## 2021-11-22 RX ORDER — OXYCODONE AND ACETAMINOPHEN 10; 325 MG/1; MG/1
1 TABLET ORAL EVERY 6 HOURS PRN
Qty: 30 TABLET | Refills: 0 | Status: SHIPPED | OUTPATIENT
Start: 2021-11-22 | End: 2021-11-22 | Stop reason: CLARIF

## 2021-11-29 ENCOUNTER — TELEPHONE (OUTPATIENT)
Dept: ORTHOPEDICS | Facility: CLINIC | Age: 56
End: 2021-11-29
Payer: OTHER GOVERNMENT

## 2021-11-29 DIAGNOSIS — Z96.659 INFECTION OF TOTAL KNEE REPLACEMENT, SUBSEQUENT ENCOUNTER: ICD-10-CM

## 2021-11-29 DIAGNOSIS — T84.59XD INFECTION OF TOTAL KNEE REPLACEMENT, SUBSEQUENT ENCOUNTER: ICD-10-CM

## 2021-11-29 RX ORDER — OXYCODONE AND ACETAMINOPHEN 10; 325 MG/1; MG/1
1 TABLET ORAL EVERY 6 HOURS PRN
Qty: 30 TABLET | Refills: 0 | Status: SHIPPED | OUTPATIENT
Start: 2021-11-29 | End: 2021-12-06 | Stop reason: SDUPTHER

## 2021-12-06 DIAGNOSIS — Z96.659 INFECTION OF TOTAL KNEE REPLACEMENT, SUBSEQUENT ENCOUNTER: ICD-10-CM

## 2021-12-06 DIAGNOSIS — T84.59XD INFECTION OF TOTAL KNEE REPLACEMENT, SUBSEQUENT ENCOUNTER: ICD-10-CM

## 2021-12-06 RX ORDER — OXYCODONE AND ACETAMINOPHEN 10; 325 MG/1; MG/1
1 TABLET ORAL EVERY 6 HOURS PRN
Qty: 30 TABLET | Refills: 0 | Status: SHIPPED | OUTPATIENT
Start: 2021-12-06 | End: 2021-12-13 | Stop reason: SDUPTHER

## 2021-12-13 DIAGNOSIS — T84.59XD INFECTION OF TOTAL KNEE REPLACEMENT, SUBSEQUENT ENCOUNTER: ICD-10-CM

## 2021-12-13 DIAGNOSIS — Z96.659 INFECTION OF TOTAL KNEE REPLACEMENT, SUBSEQUENT ENCOUNTER: ICD-10-CM

## 2021-12-13 RX ORDER — OXYCODONE AND ACETAMINOPHEN 10; 325 MG/1; MG/1
1 TABLET ORAL EVERY 6 HOURS PRN
Qty: 30 TABLET | Refills: 0 | Status: SHIPPED | OUTPATIENT
Start: 2021-12-13 | End: 2021-12-20 | Stop reason: SDUPTHER

## 2021-12-14 ENCOUNTER — TELEPHONE (OUTPATIENT)
Dept: ORTHOPEDICS | Facility: CLINIC | Age: 56
End: 2021-12-14
Payer: OTHER GOVERNMENT

## 2021-12-14 ENCOUNTER — OFFICE VISIT (OUTPATIENT)
Dept: INFECTIOUS DISEASES | Facility: CLINIC | Age: 56
End: 2021-12-14
Payer: OTHER GOVERNMENT

## 2021-12-14 VITALS
TEMPERATURE: 98 F | OXYGEN SATURATION: 98 % | WEIGHT: 218 LBS | HEART RATE: 83 BPM | HEIGHT: 70 IN | DIASTOLIC BLOOD PRESSURE: 84 MMHG | BODY MASS INDEX: 31.21 KG/M2 | SYSTOLIC BLOOD PRESSURE: 131 MMHG

## 2021-12-14 DIAGNOSIS — T84.59XD INFECTION OF TOTAL KNEE REPLACEMENT, SUBSEQUENT ENCOUNTER: Primary | ICD-10-CM

## 2021-12-14 DIAGNOSIS — D70.2 OTHER DRUG-INDUCED NEUTROPENIA: ICD-10-CM

## 2021-12-14 DIAGNOSIS — Z79.2 ENCOUNTER FOR LONG-TERM (CURRENT) USE OF ANTIBIOTICS: ICD-10-CM

## 2021-12-14 DIAGNOSIS — A49.8 ENTEROCOCCUS FAECALIS INFECTION: ICD-10-CM

## 2021-12-14 DIAGNOSIS — Z96.651 STATUS POST TOTAL KNEE REPLACEMENT USING CEMENT, RIGHT: Primary | ICD-10-CM

## 2021-12-14 DIAGNOSIS — Z96.659 INFECTION OF TOTAL KNEE REPLACEMENT, SUBSEQUENT ENCOUNTER: Primary | ICD-10-CM

## 2021-12-14 PROCEDURE — 99213 PR OFFICE/OUTPT VISIT, EST, LEVL III, 20-29 MIN: ICD-10-PCS | Mod: S$GLB,,, | Performed by: INTERNAL MEDICINE

## 2021-12-14 PROCEDURE — 99213 OFFICE O/P EST LOW 20 MIN: CPT | Mod: S$GLB,,, | Performed by: INTERNAL MEDICINE

## 2021-12-15 ENCOUNTER — TELEPHONE (OUTPATIENT)
Dept: FAMILY MEDICINE | Facility: CLINIC | Age: 56
End: 2021-12-15
Payer: OTHER GOVERNMENT

## 2021-12-20 DIAGNOSIS — Z96.659 INFECTION OF TOTAL KNEE REPLACEMENT, SUBSEQUENT ENCOUNTER: ICD-10-CM

## 2021-12-20 DIAGNOSIS — T84.59XD INFECTION OF TOTAL KNEE REPLACEMENT, SUBSEQUENT ENCOUNTER: ICD-10-CM

## 2021-12-20 RX ORDER — OXYCODONE AND ACETAMINOPHEN 10; 325 MG/1; MG/1
1 TABLET ORAL EVERY 6 HOURS PRN
Qty: 30 TABLET | Refills: 0 | Status: SHIPPED | OUTPATIENT
Start: 2021-12-20 | End: 2023-03-03

## 2022-01-03 ENCOUNTER — OFFICE VISIT (OUTPATIENT)
Dept: ORTHOPEDICS | Facility: CLINIC | Age: 57
End: 2022-01-03
Payer: OTHER GOVERNMENT

## 2022-01-03 VITALS — WEIGHT: 218 LBS | BODY MASS INDEX: 31.21 KG/M2 | RESPIRATION RATE: 18 BRPM | HEIGHT: 70 IN

## 2022-01-03 DIAGNOSIS — Z96.651 STATUS POST TOTAL KNEE REPLACEMENT USING CEMENT, RIGHT: Primary | ICD-10-CM

## 2022-01-03 PROCEDURE — 99999 PR PBB SHADOW E&M-EST. PATIENT-LVL III: CPT | Mod: PBBFAC,,, | Performed by: ORTHOPAEDIC SURGERY

## 2022-01-03 PROCEDURE — 99212 OFFICE O/P EST SF 10 MIN: CPT | Mod: S$PBB,,, | Performed by: ORTHOPAEDIC SURGERY

## 2022-01-03 PROCEDURE — 99213 OFFICE O/P EST LOW 20 MIN: CPT | Mod: PBBFAC,PN | Performed by: ORTHOPAEDIC SURGERY

## 2022-01-03 PROCEDURE — 99212 PR OFFICE/OUTPT VISIT, EST, LEVL II, 10-19 MIN: ICD-10-PCS | Mod: S$PBB,,, | Performed by: ORTHOPAEDIC SURGERY

## 2022-01-03 PROCEDURE — 99999 PR PBB SHADOW E&M-EST. PATIENT-LVL III: ICD-10-PCS | Mod: PBBFAC,,, | Performed by: ORTHOPAEDIC SURGERY

## 2022-01-03 NOTE — PROGRESS NOTES
Past Medical History:   Diagnosis Date    Alcohol abuse, unspecified     Allergic rhinitis, cause unspecified     Arthritis     Asthma attack     Carpal tunnel syndrome     Depressive disorder, not elsewhere classified     Diarrhea     Encounter for blood transfusion     GERD (gastroesophageal reflux disease)     Hypersomnia, unspecified     Hypertension     Hypoglycemia     Lumbago     Obesity, unspecified     JAH (obstructive sleep apnea)     uses BIPAP    Other and unspecified hyperlipidemia     Other ankle sprain and strain     Psychosexual dysfunction with inhibited sexual excitement     Pyogenic arthritis of knee 06/2021    staph pseudintermedius, TKA    Pyogenic arthritis of knee 09/21/2021    Enterococcus    Ventral hernia, unspecified, without mention of obstruction or gangrene        Past Surgical History:   Procedure Laterality Date    A-scope knees      Bilateral    ARTHROSCOPIC REPAIR OF ROTATOR CUFF OF SHOULDER Left 9/20/2019    Procedure: REPAIR, ROTATOR CUFF, ARTHROSCOPIC;  Surgeon: Felix Levin MD;  Location: [facility] OR;  Service: Orthopedics;  Laterality: Left;    ARTHROSCOPY OF SHOULDER WITH DECOMPRESSION OF SUBACROMIAL SPACE Left 9/20/2019    Procedure: ARTHROSCOPY, SHOULDER, WITH SUBACROMIAL SPACE DECOMPRESSION;  Surgeon: Felix Levin MD;  Location: [facility] OR;  Service: Orthopedics;  Laterality: Left;  Jaspreet- Arthrex notified of all case today 9/16-tcb    CARPAL TUNNEL RELEASE      Bilateral    EYE SURGERY      Lasik    HERNIA REPAIR      INCISION AND DRAINAGE OF HEMATOMA Right 9/21/2021    Procedure: INCISION AND DRAINAGE, HEMATOMA;  Surgeon: Felix Levin MD;  Location: [facility] OR;  Service: Orthopedics;  Laterality: Right;    INSERTION OF ANTIBIOTIC SPACER Right 6/1/2021    Procedure: INSERTION, ANTIBIOTIC SPACER;  Surgeon: Felix Levin MD;  Location: [facility] OR;  Service: Orthopedics;  Laterality: Right;    JOINT REPLACEMENT       KNEE ARTHROPLASTY Left 8/4/2020    Procedure: ARTHROPLASTY, KNEE;  Surgeon: Felix Levin MD;  Location: [facility] OR;  Service: Orthopedics;  Laterality: Left;    KNEE ARTHROPLASTY Right 3/23/2021    Procedure: ARTHROPLASTY, KNEE;  Surgeon: Felix Levin MD;  Location: [facility] OR;  Service: Orthopedics;  Laterality: Right;    KNEE ARTHROSCOPY W/ MENISCECTOMY Left 10/15/2019    Procedure: ARTHROSCOPY, KNEE, WITH MENISCECTOMY;  Surgeon: Felix Levin MD;  Location: [facility] OR;  Service: Orthopedics;  Laterality: Left;  Medial and Lateral Meniscectomy    KNEE ARTHROSCOPY W/ MENISCECTOMY Right 11/22/2019    Procedure: ARTHROSCOPY, KNEE, WITH MENISCECTOMY;  Surgeon: Felix Levin MD;  Location: [facility] OR;  Service: Orthopedics;  Laterality: Right;    KNEE SURGERY      osmar    NISSEN FUNDOPLICATION      REVERSE TOTAL SHOULDER ARTHROPLASTY Left 2/11/2020    Procedure: ARTHROPLASTY, SHOULDER, TOTAL, REVERSE;  Surgeon: Felix Levin MD;  Location: [facility] OR;  Service: Orthopedics;  Laterality: Left;  [place]    [facility] gastrojejunostomy  [facility]/10/2021    [facility] [place]    REVISION OF KNEE ARTHROPLASTY Right 9/7/2021    Procedure: REVISION, ARTHROPLASTY, KNEE;  Surgeon: Felix Levin MD;  Location: [facility] OR;  Service: Orthopedics;  Laterality: Right;    ROTATOR CUFF REPAIR      right    SLEEVE GASTROPLASTY  12/2013       Current Outpatient Medications   Medication Sig    amoxicillin (AMOXIL) 500 MG capsule Take 1 capsule (500 mg total) by mouth every 8 (eight) hours.    aspirin 81 MG Chew Take 1 tablet (81 mg total) by mouth 2 (two) times daily.    cyanocobalamin (VITAMIN B-12) 1000 MCG tablet Take 1,000 mcg by mouth once daily.     cyclobenzaprine (FLEXERIL) 10 MG tablet Take 10 mg by mouth 3 (three) times daily as needed for Muscle spasms.    diclofenac sodium (VOLTAREN) 1 % Gel Apply topically daily as needed.    ferrous sulfate (FEOSOL) 325 mg (65 mg iron) Tab  tablet TAKE ONE TABLET BY MOUTH ONCE DAILY AS AN IRON SUPPLEMENT    losartan (COZAAR) 50 MG tablet Take 50 mg by mouth once daily.     oxyCODONE-acetaminophen (PERCOCET)  mg per tablet Take 1 tablet by mouth every 6 (six) hours as needed for Pain.    peg 400-propylene glycol (SYSTANE, PROPYLENE GLYCOL,) 0.4-0.3 % Drop INSTILL ONE DROP IN EACH EYE FOUR TIMES A DAY AS NEEDED FOR DRY EYES    traZODone (DESYREL) 100 MG tablet Take 1 tablet by mouth nightly as needed.    triamcinolone acetonide 0.1% (KENALOG) 0.1 % cream Apply topically 3 (three) times daily. for 10 days    vitamin D (VITAMIN D3) 1000 units Tab Take 2,000 Units by mouth once daily.     No current facility-administered medications for this visit.       Review of patient's allergies indicates:  No Known Allergies    Family History   Problem Relation Age of Onset    Arthritis Mother     Hypertension Father     Kidney disease Father     Heart disease Father     COPD Father        Social History     Socioeconomic History    Marital status:    Tobacco Use    Smoking status: Never Smoker    Smokeless tobacco: Current User     Types: Chew   Substance and Sexual Activity    Alcohol use: Yes     Comment: occasional    Drug use: No       Chief Complaint:   Chief Complaint   Patient presents with    Post-op Evaluation     s/p right revision TKA 9/7/21 and I7D 9/21/21       Date of surgery:  June 1, 2021 right total knee,  September 7, 2021 I and D and antibiotic spacer placement, and 9/21/21 revision right total knee    History of present illness:  56-year-old male who underwent I and D with total knee explant for an infected right total knee arthroplasty followed by a revision total knee arthroplasty.  Patient is doing well.  Pain is a 4/10.  Still working on strength.  Only pain is with prolonged standing but feels like he is getting better.    Review of Systems:    Musculoskeletal:  See HPI        Physical Examination:    Vital  Signs:    Vitals:    01/03/22 1249   Resp: 18       Body mass index is 31.28 kg/m².    This a well-developed, well nourished patient in no acute distress.  They are alert and oriented and cooperative to examination.  Pt. walks with a moderate antalgic gait     Examination of the right knee shows healed midline surgical site.  No redness..  No dehiscence noted.  No fluid.  Range of motion is about 0-120 degrees.    X-rays:  Two views of the right knee is reviewed which show well-maintained components.  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  Follow-up in 3 months with four views of both knees.    This note was created using M Modal voice recognition software that occasionally misinterpreted phrases or words.

## 2022-02-21 ENCOUNTER — TELEPHONE (OUTPATIENT)
Dept: ORTHOPEDICS | Facility: CLINIC | Age: 57
End: 2022-02-21
Payer: OTHER GOVERNMENT

## 2022-02-21 ENCOUNTER — OFFICE VISIT (OUTPATIENT)
Dept: INFECTIOUS DISEASES | Facility: CLINIC | Age: 57
End: 2022-02-21
Payer: OTHER GOVERNMENT

## 2022-02-21 VITALS
TEMPERATURE: 98 F | BODY MASS INDEX: 31.59 KG/M2 | HEIGHT: 70 IN | DIASTOLIC BLOOD PRESSURE: 84 MMHG | OXYGEN SATURATION: 96 % | SYSTOLIC BLOOD PRESSURE: 132 MMHG | HEART RATE: 82 BPM | WEIGHT: 220.63 LBS

## 2022-02-21 DIAGNOSIS — A49.8 ENTEROCOCCUS FAECALIS INFECTION: ICD-10-CM

## 2022-02-21 DIAGNOSIS — Z79.2 ENCOUNTER FOR LONG-TERM (CURRENT) USE OF ANTIBIOTICS: ICD-10-CM

## 2022-02-21 DIAGNOSIS — Z96.659 INFECTION OF TOTAL KNEE REPLACEMENT, SUBSEQUENT ENCOUNTER: Primary | ICD-10-CM

## 2022-02-21 DIAGNOSIS — Z71.89 EDUCATED ABOUT COVID-19 VIRUS INFECTION: ICD-10-CM

## 2022-02-21 DIAGNOSIS — T84.59XD INFECTION OF TOTAL KNEE REPLACEMENT, SUBSEQUENT ENCOUNTER: Primary | ICD-10-CM

## 2022-02-21 PROCEDURE — 99213 OFFICE O/P EST LOW 20 MIN: CPT | Mod: S$GLB,,, | Performed by: INTERNAL MEDICINE

## 2022-02-21 PROCEDURE — 99213 PR OFFICE/OUTPT VISIT, EST, LEVL III, 20-29 MIN: ICD-10-PCS | Mod: S$GLB,,, | Performed by: INTERNAL MEDICINE

## 2022-02-21 NOTE — PATIENT INSTRUCTIONS
Continue amoxicillin for another 2 1/2 months  Take a probiotic if you can  I would recommend Vitamin D 52613 units per day for 3 months then decrease back to 4818-3129 IU per day  Same lab before next visit.

## 2022-02-21 NOTE — TELEPHONE ENCOUNTER
----- Message from Ariadna Maravilla MA sent at 2/21/2022  2:52 PM CST -----  Contact: pt  Wants medical records, for disability paperwork   Call back       Wants to  at office

## 2022-02-21 NOTE — PROGRESS NOTES
"Subjective:       Patient ID: Himanshu Connor II is a 56 y.o. male.    Chief Complaint:: Follow-up    Follow-up     Seen at [facility]   Himanshu Connor II is a   55 y.o. male who had a right total knee replacement on 03/23/2021.  On postoperative visit on 05/24 complained of or pain that expected.  He had some swelling around the knee and decreased range of motion he was sent for labs including a sedimentation rate which was 102 a CBC which had a normal white blood cell but an elevated platelet count and an elevated CRP of 111. He underwent an arthrocentesis on 05/27 and the fluid was described as bloody, cloudy and purulent.  The white blood cell count in the fluid was 77,000 and the culture has grown STaph pseudintermedius.  He was taken to surgery today where the prosthesis was explanted and antibiotic spacer was placed..     When I went to see him, he was sleeping soundly and I did not awaken him.      6/2:  Afebrile.  Had urinary retention which he import attributes to not being able to stand to void.  Hemoglobin is lower and he is receiving blood.  He tells me that the day after surgery he had a fall directly onto his knee which was followed by fair amount of bloody drainage.  The he also recalls 3 small areas on his incision that were attributed to sutures that might have been trying to come forth, 1 of which was removed on 04/12 at which time he was given prescription of Bactrim.  6/3:  The cultures from the OR all have the same Staph pseudintermedius as the fluid obtained as an outpatient.  He did walk in the hallway with physical therapy today.  He still has a Saha catheter in place.  Case management notes reviewed. "      6/28/21: has been home on oxacillin infusion. I have not received any of his weekly labs. He thinks that the antibiotic may be contributing to the nausea. He does not accept when I offer to change it.  He is having a very hard time holding food and liquids. Symptoms sound " like an esophageal stricture. Lost 20# this month. Taking zofran for nausea. He had recent bariatric surgery is going to have a swallow test in 3 days. [facility] [place]. Saw him last week as well. Drinks what he can when he can. Last EGD was 2 months ago. No fever or chills. No diarrhea.    10/20/21:  He was seen at [facility].  He had new knee placed 9/7, sounds like may have had a hematoma.  He was taken to surgery and the knee was washed out, and the polyethylene was exchanged, operative note reviewed.  Cultures from the operating room grew Enterococcus faecalis, vancomycin intermediate in 1 of the samples.   . Discharged on IV penicillin G, 20 million units per day, continuous  Went to stay with his mother in [place], [place] and was there for a week, received his IV antibiotics there and returned on 10/7.  I was able to get October 4th lab which showed a normal white blood cell count and a normal C-reactive protein.  [facility] are providing his care.  Labs obtained yesterday showed neutropenia with a normal platelet count and a stable hemoglobin of 8.3.   Skin irritated underneath the PICC dressing.  He has no pain, tenderness, axillary discomfort, chills, fever, sweats, upper respiratory illnesses.    11/1/21: see phone message last week. He has been applying TAC 0.1% cream. He has most comfort from rubbing rashes with normal saline. He has a history of eczema and so does his daughter. He does not believe that he was in contact with any substance that would cause the rash. The rash is present left bicep, tiny patch on chest, a bit under his picc bandage, both knees, R>L and lower legs/shins. He does not have fever, swelling or any other systemic illness with this. He takes benadryl at night. He has no history of penicillin allergy.     12/14/21: ran out of amoxicillin 2-3 days ago and has not been able [facility] fill [facility].  the cost is more that he can afford .  [facility].  No change in pain in knee. No escalation in pain.  Does not require assistive devices  Rash is gone and responded to the topical steroid. No diarrhea or thrush.  Not taking a probiotic    2/21/22: doing well, tolerating the amoxicillin. Pretty active but all of his joints are uncomfortable from OA and scar tissue. No diarrhea, thrush or rashes. [facility] and CRP is normal.     Review of patient's allergies indicates:  No Known Allergies  Past Medical History:   Diagnosis Date    Alcohol abuse, unspecified     Allergic rhinitis, cause unspecified     Arthritis     Asthma attack     Carpal tunnel syndrome     Depressive disorder, not elsewhere classified     Diarrhea     Encounter for blood transfusion     GERD (gastroesophageal reflux disease)     Hypersomnia, unspecified     Hypertension     Hypoglycemia     Lumbago     Obesity, unspecified     JAH (obstructive sleep apnea)     uses BIPAP    Other and unspecified hyperlipidemia     Other ankle sprain and strain     Psychosexual dysfunction with inhibited sexual excitement     Pyogenic arthritis of knee 06/2021    staph pseudintermedius, TKA    Pyogenic arthritis of knee 09/21/2021    Enterococcus    Ventral hernia, unspecified, without mention of obstruction or gangrene      Past Surgical History:   Procedure Laterality Date    A-scope knees      Bilateral    ARTHROSCOPIC REPAIR OF ROTATOR CUFF OF SHOULDER Left 9/20/2019    Procedure: REPAIR, ROTATOR CUFF, ARTHROSCOPIC;  Surgeon: Felix Levin MD;  Location: [facility] OR;  Service: Orthopedics;  Laterality: Left;    ARTHROSCOPY OF SHOULDER WITH DECOMPRESSION OF SUBACROMIAL SPACE Left 9/20/2019    Procedure: ARTHROSCOPY, SHOULDER, WITH SUBACROMIAL SPACE DECOMPRESSION;  Surgeon: Felix Levin MD;  Location: [facility] OR;  Service: Orthopedics;  Laterality: Left;  Syed Arthedith notified of all case today 9/16-tcb     CARPAL TUNNEL RELEASE      Bilateral    EYE SURGERY      Lasik    HERNIA REPAIR      INCISION AND DRAINAGE OF HEMATOMA Right 9/21/2021    Procedure: INCISION AND DRAINAGE, HEMATOMA;  Surgeon: Felix Levin MD;  Location: [facility] OR;  Service: Orthopedics;  Laterality: Right;    INSERTION OF ANTIBIOTIC SPACER Right 6/1/2021    Procedure: INSERTION, ANTIBIOTIC SPACER;  Surgeon: Felix Levin MD;  Location: [facility] OR;  Service: Orthopedics;  Laterality: Right;    JOINT REPLACEMENT      KNEE ARTHROPLASTY Left 8/4/2020    Procedure: ARTHROPLASTY, KNEE;  Surgeon: Felix Levin MD;  Location: [facility] OR;  Service: Orthopedics;  Laterality: Left;    KNEE ARTHROPLASTY Right 3/23/2021    Procedure: ARTHROPLASTY, KNEE;  Surgeon: Felix Levin MD;  Location: [facility] OR;  Service: Orthopedics;  Laterality: Right;    KNEE ARTHROSCOPY W/ MENISCECTOMY Left 10/15/2019    Procedure: ARTHROSCOPY, KNEE, WITH MENISCECTOMY;  Surgeon: Felix Levin MD;  Location: [facility] OR;  Service: Orthopedics;  Laterality: Left;  Medial and Lateral Meniscectomy    KNEE ARTHROSCOPY W/ MENISCECTOMY Right 11/22/2019    Procedure: ARTHROSCOPY, KNEE, WITH MENISCECTOMY;  Surgeon: Felix Levin MD;  Location: [facility] OR;  Service: Orthopedics;  Laterality: Right;    KNEE SURGERY      osmar    NISSEN FUNDOPLICATION      REVERSE TOTAL SHOULDER ARTHROPLASTY Left 2/11/2020    Procedure: ARTHROPLASTY, SHOULDER, TOTAL, REVERSE;  Surgeon: Felix Levin MD;  Location: [facility] OR;  Service: Orthopedics;  Laterality: Left;  [place]    [facility]  [facility] [place]    REVISION OF KNEE ARTHROPLASTY Right 9/7/2021    Procedure: REVISION, ARTHROPLASTY, KNEE;  Surgeon: Felix Levin MD;  Location: [facility] OR;  Service: Orthopedics;  Laterality: Right;    ROTATOR CUFF REPAIR      right    SLEEVE GASTROPLASTY  12/2013     Social History     Tobacco Use    Smoking status: Never  "Smoker    Smokeless tobacco: Current User     Types: Chew   Substance Use Topics    Alcohol use: Yes     Comment: occasional        Family History   Problem Relation Age of Onset    Arthritis Mother     Hypertension Father     Kidney disease Father     Heart disease Father     COPD Father          Review of Systems    Constitutional: No fever, chills, sweats, fatigue, weakness,      Eyes: No change in vision     ENT: No sore throat, mouth pains, or lesions    Cardiovascular: No chest pain,  , or pedal edema    Respiratory: No shortness of breath,       Gastrointestinal: No abdominal pain, nausea, vomiting, diarrhea    Genitourinary:      Musculoskeletal: No new pain, joint swelling, or injuries. His right knee is doing pretty well, does not need a brace, cane or walker.   Lots of joints hurt. No signs or symptoms of relapse    Integumentary:       Neurological: No dizziness, vertigo, , neuropathy, loss of vision, falls    Psychiatric:   anxiety, depression    Endocrine:      Lymphatic: No lymphadenopathy, blood loss , malignancy    VAD:     Objective:      Blood pressure 132/84, pulse 82, temperature 98.2 °F (36.8 °C), height 5' 10" (1.778 m), weight 100.1 kg (220 lb 9.6 oz), SpO2 96 %. Body mass index is 31.65 kg/m².  Physical Exam      General: Alert and attentive, cooperative and in no distress,      Eyes:   anicteric, EOMI    Neck: Supple   ENT: EAC patent,  , nares patent, no oral lesions, teeth in good condition, no thrush    Cardiovascular:      Respiratory:      Gastrointestinal:    Numerous scars from prior bariatric surgery    Genitourinary:   no flank tenderness    Integumentary:         11/01/2021:  No palmar or plant involvement. Somewhat psoriasiform  12/14/21 virtually no swelling around the knee and the rash is resolved. 2 small erosions on right knee incision which look self traumatized.  Extremely subtle difference between the knees in regards to warmth  2/21/22:  Knee is virtually a normal " size. No increased warmth or swelling. . No rash     Vascular: No peripheral edema or phlebitis, warm and well perfused    Musculoskeletal: Ambulates with mild difficulty, no acute arthritis, synovitis or myositis.     Lymphatic:     Neurological: Normal LOC, cranial nerves, speech, , gait    Psychiatric: Normal mood, speech,  Demeanor,      Wound: right knee incision well healed      VAD:         Recent Diagnostics:  10/4 and 10/19 reviewed   [facility] WBC 3.6, hgb 11.0, normal platelets, LFTs, Cr, and CRP from 2/2/22       Assessment and Plan:           Infection of total knee replacement, subsequent encounter    Enterococcus faecalis infection    Encounter for long-term (current) use of antibiotics    Educated about COVID-19 virus infection        Continue amoxicillin for another 2 1/2 months  Take a probiotic if you can  I would recommend Vitamin D 65882 units per day for 3 months then decrease back to 7057-3747 IU per day  Same lab before next visit.   This note was created using Dragon voice recognition software that occasionally misinterpreted phrases or words.

## 2022-03-03 DIAGNOSIS — Z96.651 STATUS POST TOTAL KNEE REPLACEMENT USING CEMENT, RIGHT: Primary | ICD-10-CM

## 2022-03-07 ENCOUNTER — OFFICE VISIT (OUTPATIENT)
Dept: ORTHOPEDICS | Facility: CLINIC | Age: 57
End: 2022-03-07
Payer: OTHER GOVERNMENT

## 2022-03-07 ENCOUNTER — EXTERNAL HOME HEALTH (OUTPATIENT)
Dept: HOME HEALTH SERVICES | Facility: HOSPITAL | Age: 57
End: 2022-03-07
Payer: OTHER GOVERNMENT

## 2022-03-07 ENCOUNTER — HOSPITAL ENCOUNTER (OUTPATIENT)
Dept: RADIOLOGY | Facility: HOSPITAL | Age: 57
Discharge: HOME | End: 2022-03-07
Attending: ORTHOPAEDIC SURGERY
Payer: OTHER GOVERNMENT

## 2022-03-07 VITALS — WEIGHT: 220 LBS | HEIGHT: 70 IN | BODY MASS INDEX: 31.5 KG/M2 | RESPIRATION RATE: 18 BRPM

## 2022-03-07 DIAGNOSIS — M76.892 HIP TENDINITIS, LEFT: ICD-10-CM

## 2022-03-07 DIAGNOSIS — Z96.651 STATUS POST TOTAL KNEE REPLACEMENT USING CEMENT, RIGHT: ICD-10-CM

## 2022-03-07 DIAGNOSIS — Z96.651 STATUS POST TOTAL KNEE REPLACEMENT USING CEMENT, RIGHT: Primary | ICD-10-CM

## 2022-03-07 PROCEDURE — 73502 X-RAY EXAM HIP UNI 2-3 VIEWS: CPT | Mod: TC,PN,LT

## 2022-03-07 PROCEDURE — 99999 PR PBB SHADOW E&M-EST. PATIENT-LVL III: ICD-10-PCS | Mod: PBBFAC,,, | Performed by: ORTHOPAEDIC SURGERY

## 2022-03-07 PROCEDURE — 73564 X-RAY EXAM KNEE 4 OR MORE: CPT | Mod: 26,50,, | Performed by: RADIOLOGY

## 2022-03-07 PROCEDURE — 99213 OFFICE O/P EST LOW 20 MIN: CPT | Mod: PBBFAC,PN | Performed by: ORTHOPAEDIC SURGERY

## 2022-03-07 PROCEDURE — 73502 XR HIP WITH PELVIS WHEN PERFORMED, 2 OR 3 VIEWS LEFT: ICD-10-PCS | Mod: 26,LT,, | Performed by: RADIOLOGY

## 2022-03-07 PROCEDURE — 73564 XR KNEE ORTHO BILAT WITH FLEXION: ICD-10-PCS | Mod: 26,50,, | Performed by: RADIOLOGY

## 2022-03-07 PROCEDURE — 73502 X-RAY EXAM HIP UNI 2-3 VIEWS: CPT | Mod: 26,LT,, | Performed by: RADIOLOGY

## 2022-03-07 PROCEDURE — 73564 X-RAY EXAM KNEE 4 OR MORE: CPT | Mod: TC,50,PN

## 2022-03-07 PROCEDURE — 99213 PR OFFICE/OUTPT VISIT, EST, LEVL III, 20-29 MIN: ICD-10-PCS | Mod: S$PBB,,, | Performed by: ORTHOPAEDIC SURGERY

## 2022-03-07 PROCEDURE — 99213 OFFICE O/P EST LOW 20 MIN: CPT | Mod: S$PBB,,, | Performed by: ORTHOPAEDIC SURGERY

## 2022-03-07 PROCEDURE — 99999 PR PBB SHADOW E&M-EST. PATIENT-LVL III: CPT | Mod: PBBFAC,,, | Performed by: ORTHOPAEDIC SURGERY

## 2022-03-07 NOTE — PROGRESS NOTES
Past Medical History:   Diagnosis Date    Alcohol abuse, unspecified     Allergic rhinitis, cause unspecified     Arthritis     Asthma attack     Carpal tunnel syndrome     Depressive disorder, not elsewhere classified     Diarrhea     Encounter for blood transfusion     GERD (gastroesophageal reflux disease)     Hypersomnia, unspecified     Hypertension     Hypoglycemia     Lumbago     Obesity, unspecified     JAH (obstructive sleep apnea)     uses BIPAP    Other and unspecified hyperlipidemia     Other ankle sprain and strain     Psychosexual dysfunction with inhibited sexual excitement     Pyogenic arthritis of knee 06/2021    staph pseudintermedius, TKA    Pyogenic arthritis of knee 09/21/2021    Enterococcus    Ventral hernia, unspecified, without mention of obstruction or gangrene        Past Surgical History:   Procedure Laterality Date    A-scope knees      Bilateral    ARTHROSCOPIC REPAIR OF ROTATOR CUFF OF SHOULDER Left 9/20/2019    Procedure: REPAIR, ROTATOR CUFF, ARTHROSCOPIC;  Surgeon: Felix Levin MD;  Location: [facility] OR;  Service: Orthopedics;  Laterality: Left;    ARTHROSCOPY OF SHOULDER WITH DECOMPRESSION OF SUBACROMIAL SPACE Left 9/20/2019    Procedure: ARTHROSCOPY, SHOULDER, WITH SUBACROMIAL SPACE DECOMPRESSION;  Surgeon: Felix Levin MD;  Location: [facility] OR;  Service: Orthopedics;  Laterality: Left;  Jaspreet- Arthrex notified of all case today 9/16-tcb    CARPAL TUNNEL RELEASE      Bilateral    EYE SURGERY      Lasik    HERNIA REPAIR      INCISION AND DRAINAGE OF HEMATOMA Right 9/21/2021    Procedure: INCISION AND DRAINAGE, HEMATOMA;  Surgeon: Felix Levin MD;  Location: [facility] OR;  Service: Orthopedics;  Laterality: Right;    INSERTION OF ANTIBIOTIC SPACER Right 6/1/2021    Procedure: INSERTION, ANTIBIOTIC SPACER;  Surgeon: Felix Levin MD;  Location: [facility] OR;  Service: Orthopedics;  Laterality: Right;    JOINT REPLACEMENT       KNEE ARTHROPLASTY Left 8/4/2020    Procedure: ARTHROPLASTY, KNEE;  Surgeon: Felix Levin MD;  Location: [facility] OR;  Service: Orthopedics;  Laterality: Left;    KNEE ARTHROPLASTY Right 3/23/2021    Procedure: ARTHROPLASTY, KNEE;  Surgeon: Felix Levin MD;  Location: [facility] OR;  Service: Orthopedics;  Laterality: Right;    KNEE ARTHROSCOPY W/ MENISCECTOMY Left 10/15/2019    Procedure: ARTHROSCOPY, KNEE, WITH MENISCECTOMY;  Surgeon: Felix Levin MD;  Location: [facility] OR;  Service: Orthopedics;  Laterality: Left;  Medial and Lateral Meniscectomy    KNEE ARTHROSCOPY W/ MENISCECTOMY Right 11/22/2019    Procedure: ARTHROSCOPY, KNEE, WITH MENISCECTOMY;  Surgeon: Felix Levin MD;  Location: [facility] OR;  Service: Orthopedics;  Laterality: Right;    KNEE SURGERY      osmar    NISSEN FUNDOPLICATION      REVERSE TOTAL SHOULDER ARTHROPLASTY Left 2/11/2020    Procedure: ARTHROPLASTY, SHOULDER, TOTAL, REVERSE;  Surgeon: Felix Levin MD;  Location: [facility] OR;  Service: Orthopedics;  Laterality: Left;  [place]    revision [facility] gastrojejunostomy  [facility]/10/2021    [facility] [place]    REVISION OF KNEE ARTHROPLASTY Right 9/7/2021    Procedure: REVISION, ARTHROPLASTY, KNEE;  Surgeon: Felix Levin MD;  Location: [facility] OR;  Service: Orthopedics;  Laterality: Right;    ROTATOR CUFF REPAIR      right    SLEEVE GASTROPLASTY  12/2013       Current Outpatient Medications   Medication Sig    amoxicillin (AMOXIL) 500 MG capsule Take 1 capsule (500 mg total) by mouth every 8 (eight) hours.    cyanocobalamin (VITAMIN B-12) 1000 MCG tablet Take 1,000 mcg by mouth once daily.     cyclobenzaprine (FLEXERIL) 10 MG tablet Take 10 mg by mouth 3 (three) times daily as needed for Muscle spasms.    diclofenac sodium (VOLTAREN) 1 % Gel Apply topically daily as needed.    ferrous sulfate (FEOSOL) 325 mg (65 mg iron) Tab tablet TAKE ONE TABLET BY MOUTH ONCE DAILY AS AN IRON SUPPLEMENT    losartan  (COZAAR) 50 MG tablet Take 50 mg by mouth once daily.     peg 400-propylene glycol (SYSTANE, PROPYLENE GLYCOL,) 0.4-0.3 % Drop INSTILL ONE DROP IN EACH EYE FOUR TIMES A DAY AS NEEDED FOR DRY EYES    traZODone (DESYREL) 100 MG tablet Take 1 tablet by mouth nightly as needed.    vitamin D (VITAMIN D3) 1000 units Tab Take 2,000 Units by mouth once daily.    aspirin 81 MG Chew Take 1 tablet (81 mg total) by mouth 2 (two) times daily.    oxyCODONE-acetaminophen (PERCOCET)  mg per tablet Take 1 tablet by mouth every 6 (six) hours as needed for Pain. (Patient not taking: Reported on 3/7/2022)    triamcinolone acetonide 0.1% (KENALOG) 0.1 % cream Apply topically 3 (three) times daily. for 10 days     No current facility-administered medications for this visit.       Review of patient's allergies indicates:  No Known Allergies    Family History   Problem Relation Age of Onset    Arthritis Mother     Hypertension Father     Kidney disease Father     Heart disease Father     COPD Father        Social History     Socioeconomic History    Marital status:    Tobacco Use    Smoking status: Never Smoker    Smokeless tobacco: Current User     Types: Chew   Substance and Sexual Activity    Alcohol use: Yes     Comment: occasional    Drug use: No       Chief Complaint:   Chief Complaint   Patient presents with    Right Knee - Post-op Evaluation       Date of surgery:  June 1, 2021 right total knee,  September 7, 2021 I and D and antibiotic spacer placement, and 9/21/21 revision right total knee    History of present illness:  56-year-old male who underwent I and D with total knee explant for an infected right total knee arthroplasty followed by a revision total knee arthroplasty.  Patient is doing well.  Pain is a 5/10.  Still working on strength.  Having more pain in the left hip area over the last few months.  Pain in the front of the hip near the ASIS and hip flexor.    Review of  Systems:    Musculoskeletal:  See HPI        Physical Examination:    Vital Signs:    Vitals:    03/07/22 1048   Resp: 18       Body mass index is 31.57 kg/m².    This a well-developed, well nourished patient in no acute distress.  They are alert and oriented and cooperative to examination.  Pt. walks with a moderate antalgic gait     Examination of the right knee shows healed midline surgical site.  No redness..  No dehiscence noted.  No fluid.  Range of motion is about 0-120 degrees.    Examination of the patient's left hip shows full range of motion with flexion to 160°, extension to 0, external rotation to 50°, internal rotation of 15°, abduction of 50°, adduction of 15°. Skin has no rashes or bruising. Patient has negative Stinchfield exam. Patient has negative straight leg raise.Negative internal impingement test. Negative PURA test. Negative Mayo's test. Patient has no pain with hip range of motion.  Mildly tender to palpation over the greater trochanteric bursa. Patient is 5 out of 5 motor strength, palpable distal pulses, and intact light touch sensation.       X-rays:  Four views of the right knee is ordered and reviewed which show well-maintained components.    X-rays left hip is ordered and reviewed which show well-maintained hip joint space.  There is an ossicle located near the hip flexors more proximally     Assessment::  Status post revision for infected TKA with poly exchange for acute infectoin in post-op period  Left hip tendinitis    Plan:  He is doing well.  I will see him back in 6 months with x-rays of both knees.  I gave him an exercise guide for the hip.    This note was created using [employer] voice recognition software that occasionally misinterpreted phrases or words.

## 2022-03-17 ENCOUNTER — EXTERNAL HOME HEALTH (OUTPATIENT)
Dept: HOME HEALTH SERVICES | Facility: HOSPITAL | Age: 57
End: 2022-03-17
Payer: OTHER GOVERNMENT

## 2022-03-22 ENCOUNTER — TELEPHONE (OUTPATIENT)
Dept: ORTHOPEDICS | Facility: CLINIC | Age: 57
End: 2022-03-22
Payer: OTHER GOVERNMENT

## 2022-03-22 NOTE — TELEPHONE ENCOUNTER
Patient needs all records from 2019 to now.  Patient stated that when he went to medical records he was only able to get the records from any procedure that was done at the hospital and was told that he would have to get any clinic note from the office itself. I told the patient that medical records would be the place to get any record he is needing that is apart of [facility]

## 2022-03-22 NOTE — TELEPHONE ENCOUNTER
----- Message from Ariadna Maravilla MA sent at 3/22/2022 12:56 PM CDT -----  Contact: pt  Wants medical records   States he cannot get them for [facility]   Call back

## 2022-06-13 ENCOUNTER — TELEPHONE (OUTPATIENT)
Dept: GASTROENTEROLOGY | Facility: CLINIC | Age: 57
End: 2022-06-13
Payer: OTHER GOVERNMENT

## 2022-06-14 ENCOUNTER — TELEPHONE (OUTPATIENT)
Dept: GASTROENTEROLOGY | Facility: CLINIC | Age: 57
End: 2022-06-14
Payer: OTHER GOVERNMENT

## 2022-06-14 NOTE — TELEPHONE ENCOUNTER
----- Message from Erick Trejo LPN sent at 6/13/2022  4:56 PM CDT -----  Contact: [phone]    ----- Message -----  From: Salud Fernandez  Sent: 6/13/2022   4:55 PM CDT  To: [facility] Clinical Support    Type: Needs Medical Advice  Who Called:  Pt    Best Call Back Number: [phone]    Additional Information: Pt is calling to schedule for colonoscopy and egd. Pls call back and advise

## 2022-06-27 ENCOUNTER — OFFICE VISIT (OUTPATIENT)
Dept: GASTROENTEROLOGY | Facility: CLINIC | Age: 57
End: 2022-06-27
Payer: OTHER GOVERNMENT

## 2022-06-27 VITALS
BODY MASS INDEX: 31.25 KG/M2 | SYSTOLIC BLOOD PRESSURE: 149 MMHG | HEART RATE: 71 BPM | DIASTOLIC BLOOD PRESSURE: 91 MMHG | HEIGHT: 70 IN | WEIGHT: 218.25 LBS

## 2022-06-27 DIAGNOSIS — Z98.84 HISTORY OF GASTRIC BYPASS: ICD-10-CM

## 2022-06-27 DIAGNOSIS — R53.83 FATIGUE, UNSPECIFIED TYPE: ICD-10-CM

## 2022-06-27 DIAGNOSIS — D50.9 IRON DEFICIENCY ANEMIA, UNSPECIFIED IRON DEFICIENCY ANEMIA TYPE: ICD-10-CM

## 2022-06-27 DIAGNOSIS — Z86.010 HISTORY OF COLON POLYPS: ICD-10-CM

## 2022-06-27 DIAGNOSIS — K21.9 GASTROESOPHAGEAL REFLUX DISEASE, UNSPECIFIED WHETHER ESOPHAGITIS PRESENT: ICD-10-CM

## 2022-06-27 DIAGNOSIS — Z80.0 FAMILY HISTORY OF PANCREATIC CANCER: ICD-10-CM

## 2022-06-27 DIAGNOSIS — Z01.818 PREOP TESTING: ICD-10-CM

## 2022-06-27 DIAGNOSIS — Z80.0 FAMILY HISTORY OF COLON CANCER: ICD-10-CM

## 2022-06-27 DIAGNOSIS — R11.0 NAUSEA: ICD-10-CM

## 2022-06-27 DIAGNOSIS — R13.10 DYSPHAGIA, UNSPECIFIED TYPE: Primary | ICD-10-CM

## 2022-06-27 DIAGNOSIS — F10.11 HISTORY OF ALCOHOL ABUSE: ICD-10-CM

## 2022-06-27 PROCEDURE — 99999 PR PBB SHADOW E&M-EST. PATIENT-LVL IV: CPT | Mod: PBBFAC,,,

## 2022-06-27 PROCEDURE — 99204 PR OFFICE/OUTPT VISIT, NEW, LEVL IV, 45-59 MIN: ICD-10-PCS | Mod: S$PBB,,,

## 2022-06-27 PROCEDURE — 99204 OFFICE O/P NEW MOD 45 MIN: CPT | Mod: S$PBB,,,

## 2022-06-27 PROCEDURE — 99214 OFFICE O/P EST MOD 30 MIN: CPT | Mod: PBBFAC,PN

## 2022-06-27 PROCEDURE — 99999 PR PBB SHADOW E&M-EST. PATIENT-LVL IV: ICD-10-PCS | Mod: PBBFAC,,,

## 2022-06-27 NOTE — PROGRESS NOTES
Subjective:       Patient ID: Himanshu Connor II is a 56 y.o. male Body mass index is 31.32 kg/m².    Chief Complaint: Anemia and Dysphagia    This patient is new to me.  [facility]     GI Problem  The primary symptoms include fatigue (recent changes in sleep medication) and nausea (occurs with eating). Primary symptoms do not include fever, weight loss, abdominal pain, vomiting, diarrhea, melena, hematemesis, jaundice, hematochezia or dysuria.   The illness is also significant for dysphagia (occurs with solid foods intermittently - feels like food & capsule pills get stuck; hx of esophageal diverticulum; currently on a wet diet; cannot eat and drink at the same time). The illness does not include chills, anorexia, odynophagia, bloating or constipation (reports having 1 bowel movement a day; sometimes every 1-2 days; today stool rated 6 on bristol scale, but is typically rated 4). Associated symptoms comments: History of iron deficiency anemia - currently taking iron every other day. Significant associated medical issues include GERD (well managed with lifestyle changes; denies taking any medications for management; denies heartburn; improved after nissen), alcohol abuse (denies drinking currently) and gastric bypass (past history of gastric sleeve and nitin-en-Y). Associated medical issues do not include inflammatory bowel disease, gallstones, liver disease, PUD, bowel resection, irritable bowel syndrome, hemorrhoids or diverticulitis. Associated medical issues comments: history of nissen.     Review of Systems   Constitutional: Positive for fatigue (recent changes in sleep medication). Negative for activity change, appetite change, chills, diaphoresis, fever and weight loss.   HENT: Negative for sore throat and trouble swallowing.    Respiratory: Negative for cough, choking and shortness of breath.    Cardiovascular: Negative for chest pain.   Gastrointestinal:  Positive for dysphagia (occurs with solid foods intermittently - feels like food & capsule pills get stuck; hx of esophageal diverticulum; currently on a wet diet; cannot eat and drink at the same time) and nausea (occurs with eating). Negative for abdominal distention, abdominal pain, anal bleeding, anorexia, bloating, blood in stool, constipation (reports having 1 bowel movement a day; sometimes every 1-2 days; today stool rated 6 on bristol scale, but is typically rated 4), diarrhea, hematemesis, hematochezia, jaundice, melena and vomiting.   Genitourinary: Negative for dysuria.       No LMP for male patient.  Past Medical History:   Diagnosis Date    Alcohol abuse, unspecified     Allergic rhinitis, cause unspecified     Arthritis     Asthma attack     Carpal tunnel syndrome     Depressive disorder, not elsewhere classified     Diarrhea     Encounter for blood transfusion     GERD (gastroesophageal reflux disease)     Hypersomnia, unspecified     Hypertension     Hypoglycemia     Lumbago     Obesity, unspecified     JAH (obstructive sleep apnea)     uses BIPAP    Other and unspecified hyperlipidemia     Other ankle sprain and strain     Psychosexual dysfunction with inhibited sexual excitement     Pyogenic arthritis of knee 06/2021    staph pseudintermedius, TKA    Pyogenic arthritis of knee 09/21/2021    Enterococcus    Ventral hernia, unspecified, without mention of obstruction or gangrene      Past Surgical History:   Procedure Laterality Date    A-scope knees      Bilateral    ARTHROSCOPIC REPAIR OF ROTATOR CUFF OF SHOULDER Left 9/20/2019    Procedure: REPAIR, ROTATOR CUFF, ARTHROSCOPIC;  Surgeon: Felix Levin MD;  [facility];  Service: Orthopedics;  Laterality: Left;    ARTHROSCOPY OF SHOULDER WITH DECOMPRESSION OF SUBACROMIAL SPACE Left 9/20/2019    Procedure: ARTHROSCOPY, SHOULDER, WITH SUBACROMIAL SPACE DECOMPRESSION;  Surgeon: Felix Levin MD;   Location: [facility] OR;  Service: Orthopedics;  Laterality: Left;  Jaspreet- Arthrex notified of all case today 9/16-tcb    CARPAL TUNNEL RELEASE      Bilateral    EYE SURGERY      Lasik    HERNIA REPAIR      INCISION AND DRAINAGE OF HEMATOMA Right 9/21/2021    Procedure: INCISION AND DRAINAGE, HEMATOMA;  Surgeon: Felix Levin MD;  Location: [facility] OR;  Service: Orthopedics;  Laterality: Right;    INSERTION OF ANTIBIOTIC SPACER Right 6/1/2021    Procedure: INSERTION, ANTIBIOTIC SPACER;  Surgeon: Felix Levin MD;  Location: [facility] OR;  Service: Orthopedics;  Laterality: Right;    JOINT REPLACEMENT      KNEE ARTHROPLASTY Left 8/4/2020    Procedure: ARTHROPLASTY, KNEE;  Surgeon: Felix Levin MD;  Location: [facility] OR;  Service: Orthopedics;  Laterality: Left;    KNEE ARTHROPLASTY Right 3/23/2021    Procedure: ARTHROPLASTY, KNEE;  Surgeon: Felix Levin MD;  Location: [facility] OR;  Service: Orthopedics;  Laterality: Right;    KNEE ARTHROSCOPY W/ MENISCECTOMY Left 10/15/2019    Procedure: ARTHROSCOPY, KNEE, WITH MENISCECTOMY;  Surgeon: Felix Levin MD;  Location: [facility] OR;  Service: Orthopedics;  Laterality: Left;  Medial and Lateral Meniscectomy    KNEE ARTHROSCOPY W/ MENISCECTOMY Right 11/22/2019    Procedure: ARTHROSCOPY, KNEE, WITH MENISCECTOMY;  Surgeon: Felix Levin MD;  Location: [facility] OR;  Service: Orthopedics;  Laterality: Right;    KNEE SURGERY      osmar    NISSEN FUNDOPLICATION      REVERSE TOTAL SHOULDER ARTHROPLASTY Left 2/11/2020    Procedure: ARTHROPLASTY, SHOULDER, TOTAL, REVERSE;  Surgeon: Felix Levin MD;  Location: [facility] OR;  Service: Orthopedics;  Laterality: Left;  [place]on    [facility] gastrojejunostomy  [facility]/20[facility] [place]    REVISION OF KNEE ARTHROPLASTY Right 9/7/2021    Procedure: REVISION, ARTHROPLASTY, KNEE;  Surgeon: Felix Levin MD;  Location: [facility] OR;  Service: Orthopedics;  Laterality: Right;    ROTATOR  CUFF REPAIR      right    SLEEVE GASTROPLASTY  12/2013     Family History   Problem Relation Age of Onset    Arthritis Mother     Hypertension Father     Kidney disease Father     Heart disease Father     COPD Father     Pancreatic cancer Maternal Grandfather     Colon cancer Paternal Grandfather     Colon polyps Neg Hx     Crohn's disease Neg Hx     Ulcerative colitis Neg Hx     Stomach cancer Neg Hx     Esophageal cancer Neg Hx      Social History     Tobacco Use    Smoking status: Never Smoker    Smokeless tobacco: Current User     Types: Chew   Substance Use Topics    Alcohol use: Not Currently    Drug use: No     Wt Readings from Last 10 Encounters:   06/27/22 99 kg (218 lb 4.1 oz)   03/07/22 99.8 kg (220 lb)   02/21/22 100.1 kg (220 lb 9.6 oz)   01/03/22 98.9 kg (218 lb)   12/14/21 98.9 kg (218 lb)   11/15/21 96.2 kg (212 lb)   11/01/21 96.2 kg (212 lb)   10/20/21 96.6 kg (213 lb)   09/21/21 94.2 kg (207 lb 10.8 oz)   09/20/21 95.3 kg (210 lb)     Lab Results   Component Value Date    WBC 3.40 (L) 10/26/2021    HGB 8.0 (L) 10/26/2021    HCT 26.5 (L) 10/26/2021    MCV 85 10/26/2021     10/26/2021     CMP  Sodium   Date Value Ref Range Status   10/26/2021 139 136 - 145 mmol/L Final     Potassium   Date Value Ref Range Status   10/26/2021 4.6 3.5 - 5.1 mmol/L Final     Chloride   Date Value Ref Range Status   10/26/2021 113 (H) 95 - 110 mmol/L Final     CO2   Date Value Ref Range Status   10/26/2021 19 (L) 23 - 29 mmol/L Final     Glucose   Date Value Ref Range Status   10/26/2021 78 70 - 110 mg/dL Final     BUN   Date Value Ref Range Status   10/26/2021 13 6 - 20 mg/dL Final     Creatinine   Date Value Ref Range Status   10/26/2021 0.9 0.5 - 1.4 mg/dL Final   05/24/2013 0.9 0.5 - 1.4 mg/dL Final     Calcium   Date Value Ref Range Status   10/26/2021 8.2 (L) 8.7 - 10.5 mg/dL Final   05/24/2013 9.7 8.7 - 10.5 mg/dL Final     Total Protein   Date Value Ref Range Status   10/26/2021 5.9 (L)  6.0 - 8.4 g/dL Final     Albumin   Date Value Ref Range Status   10/26/2021 2.9 (L) 3.5 - 5.2 g/dL Final     Total Bilirubin   Date Value Ref Range Status   10/26/2021 0.2 0.1 - 1.0 mg/dL Final     Comment:     For infants and newborns, interpretation of results should be based  on gestational age, weight and in agreement with clinical  observations.    Premature Infant recommended reference ranges:  Up to 24 hours.............<8.0 mg/dL  Up to 48 hours............<12.0 mg/dL  3-5 days..................<15.0 mg/dL  6-29 days.................<15.0 mg/dL       Alkaline Phosphatase   Date Value Ref Range Status   10/26/2021 75 55 - 135 U/L Final     AST   Date Value Ref Range Status   10/26/2021 18 10 - 40 U/L Final     ALT   Date Value Ref Range Status   10/26/2021 15 10 - 44 U/L Final     Anion Gap   Date Value Ref Range Status   10/26/2021 7 (L) 8 - 16 mmol/L Final   05/24/2013 11 5 - 15 meq/L Final     eGFR if    Date Value Ref Range Status   10/26/2021 >60.0 >60 mL/min/1.73 m^2 Final     eGFR if non    Date Value Ref Range Status   10/26/2021 >60.0 >60 mL/min/1.73 m^2 Final     Comment:     Calculation used to obtain the estimated glomerular filtration  rate (eGFR) is the CKD-EPI equation.        [facility] including [facility] & endoscopy history (see surgical history).    Objective:      Physical Exam  Vitals and nursing note reviewed.   Constitutional:       General: He is not in acute distress.     Appearance: Normal appearance. He is obese. He is not ill-appearing.   HENT:      Mouth/Throat:      Lips: Pink.      Mouth: Mucous membranes are moist.   Eyes:      Extraocular Movements: Extraocular movements intact.      Pupils: Pupils are equal, round, and reactive to light.   Cardiovascular:      Rate and Rhythm: Normal rate and regular rhythm.      Heart sounds: Normal heart sounds.   Pulmonary:      Effort: Pulmonary effort is  normal. No respiratory distress.      Breath sounds: Normal breath sounds.   Abdominal:      General: Abdomen is protuberant. A surgical scar is present. Bowel sounds are normal. There is no distension or abdominal bruit. There are no signs of injury.      Palpations: Abdomen is soft. There is no shifting dullness, fluid wave, hepatomegaly, splenomegaly or mass.      Tenderness: There is no abdominal tenderness. There is no guarding or rebound. Negative signs include Spencer's sign, Rovsing's sign, McBurney's sign and psoas sign.      Hernia: No hernia is present.      Comments: Well-healed scars from previous abdominal surgeries noted.   Skin:     General: Skin is warm and dry.      Coloration: Skin is not jaundiced or pale.   Neurological:      Mental Status: He is alert and oriented to person, place, and time.   Psychiatric:         Attention and Perception: Attention normal.         Mood and Affect: Mood normal.         Speech: Speech normal.         Behavior: Behavior normal.         Assessment:       1. Dysphagia, unspecified type    2. Gastroesophageal reflux disease, unspecified whether esophagitis present    3. Iron deficiency anemia, unspecified iron deficiency anemia type    4. Nausea    5. History of gastric bypass    6. Fatigue, unspecified type    7. History of colon polyps    8. Family history of colon cancer    9. Family history of pancreatic cancer    10. History of alcohol abuse    11. Preop testing        Plan:       Dysphagia, unspecified type  - schedule EGD, discussed procedure with patient and possible esophageal dilation may be performed during procedure if indicated, patient verbalized understanding  - educated patient to eat smaller more frequent meals and to eat slowly and advised to eat a soft diet.  - possible UGI/esophagram/esophageal manometry if symptoms persist  -     Case Request Endoscopy: EGD (ESOPHAGOGASTRODUODENOSCOPY), COLONOSCOPY    Gastroesophageal reflux disease, unspecified  whether esophagitis present  - schedule EGD, discussed procedure with patient, including risks and benefits, patient verbalized understanding  -lifestyle modifications to help control/reduce reflux/abdominal pain including: avoid large meals, avoid eating within 2-3 hours of bedtime (avoid late night eating & lying down soon after eating), elevate head of bed if nocturnal symptoms are present, smoking cessation (if current smoker), & weight loss (if overweight).   -Educated to avoid known foods which trigger reflux symptoms & to minimize/avoid high-fat foods, chocolate, caffeine, citrus, alcohol, & tomato products.  -Advised to avoid/limit use of NSAID's, since they can cause GI upset, bleeding, and/or ulcers. If needed, take with food.   -If symptoms return consider PPI    Iron deficiency anemia, unspecified iron deficiency anemia type  - schedule EGD, discussed procedure with patient, including risks and benefits, patient verbalized understanding  - schedule Colonoscopy, discussed procedure with the patient, including risks and benefits, patient verbalized understanding  - discussed with patient the different ways that anemia occurs: blood loss (such as from the gi tract), the body is not making enough, or the body is breaking down the rbcs too quickly; recommend EGD and colonoscopy to further evaluate gi tract for possible blood loss and pending results of endoscopies, possible UGI with Small Bowel Follow Through/video capsule study  -follow-up with PCP and/or hematology for continued evaluation and management  -     IRON AND TIBC; Future; Expected date: 06/27/2022  -     Ferritin; Future; Expected date: 06/27/2022  -     CBC Auto Differential; Future; Expected date: 06/27/2022    Nausea  - schedule EGD, discussed procedure with patient, including risks and benefits, patient verbalized understanding    History of gastric bypass  -Follow-up with bariatric surgeon for continued evaluation and management      Fatigue, unspecified type  -Follow-up with PCP for continued evaluation and management of sleep medication    History of colon polyps  - schedule Colonoscopy, discussed procedure with the patient, including risks and benefits, patient verbalized understanding    Family history of colon cancer  - schedule Colonoscopy, discussed procedure with the patient, including risks and benefits, patient verbalized understanding    Family history of pancreatic cancer    History of alcohol abuse  - schedule EGD, discussed procedure with patient, including risks and benefits, patient verbalized understanding  -Continue with alcohol cessation    Preop testing  -     COVID-19 Routine Screening; Future; Expected date: 06/27/2022    Follow up in about 4 weeks (around 7/25/2022), or if symptoms worsen or fail to improve.      If no improvement in symptoms or symptoms worsen, call/follow-up at clinic or go to ER.        45 minutes of total time spent on the encounter, which includes face to face time and non-face to face time preparing to see the patient (eg, review of tests), Obtaining and/or reviewing separately obtained history, Documenting clinical information in the electronic or other health record, Independently interpreting results (not separately reported) and communicating results to the patient/family/caregiver, or Care coordination (not separately reported).

## 2022-08-02 ENCOUNTER — LAB VISIT (OUTPATIENT)
Dept: PRIMARY CARE CLINIC | Facility: CLINIC | Age: 57
End: 2022-08-02
Payer: OTHER GOVERNMENT

## 2022-08-02 ENCOUNTER — LAB VISIT (OUTPATIENT)
Dept: LAB | Facility: HOSPITAL | Age: 57
End: 2022-08-02

## 2022-08-02 ENCOUNTER — PATIENT MESSAGE (OUTPATIENT)
Dept: ENDOSCOPY | Facility: HOSPITAL | Age: 57
End: 2022-08-02
Payer: OTHER GOVERNMENT

## 2022-08-02 DIAGNOSIS — D50.9 IRON DEFICIENCY ANEMIA, UNSPECIFIED IRON DEFICIENCY ANEMIA TYPE: ICD-10-CM

## 2022-08-02 DIAGNOSIS — Z01.818 PREOP TESTING: ICD-10-CM

## 2022-08-02 LAB
BASOPHILS # BLD AUTO: 0.02 K/UL (ref 0–0.2)
BASOPHILS NFR BLD: 0.6 % (ref 0–1.9)
DIFFERENTIAL METHOD: ABNORMAL
EOSINOPHIL # BLD AUTO: 0 K/UL (ref 0–0.5)
EOSINOPHIL NFR BLD: 0 % (ref 0–8)
ERYTHROCYTE [DISTWIDTH] IN BLOOD BY AUTOMATED COUNT: 16.2 % (ref 11.5–14.5)
FERRITIN SERPL-MCNC: 18 NG/ML (ref 20–300)
HCT VFR BLD AUTO: 36.5 % (ref 40–54)
HGB BLD-MCNC: 11.3 G/DL (ref 14–18)
IMM GRANULOCYTES # BLD AUTO: 0.01 K/UL (ref 0–0.04)
IMM GRANULOCYTES NFR BLD AUTO: 0.3 % (ref 0–0.5)
IRON SERPL-MCNC: 56 UG/DL (ref 45–160)
LYMPHOCYTES # BLD AUTO: 1.4 K/UL (ref 1–4.8)
LYMPHOCYTES NFR BLD: 40.2 % (ref 18–48)
MCH RBC QN AUTO: 27.6 PG (ref 27–31)
MCHC RBC AUTO-ENTMCNC: 31 G/DL (ref 32–36)
MCV RBC AUTO: 89 FL (ref 82–98)
MONOCYTES # BLD AUTO: 0.3 K/UL (ref 0.3–1)
MONOCYTES NFR BLD: 8.9 % (ref 4–15)
NEUTROPHILS # BLD AUTO: 1.8 K/UL (ref 1.8–7.7)
NEUTROPHILS NFR BLD: 50 % (ref 38–73)
NRBC BLD-RTO: 0 /100 WBC
PLATELET # BLD AUTO: 198 K/UL (ref 150–450)
PMV BLD AUTO: 8.9 FL (ref 9.2–12.9)
RBC # BLD AUTO: 4.1 M/UL (ref 4.6–6.2)
SARS-COV-2 RNA RESP QL NAA+PROBE: NOT DETECTED
SARS-COV-2- CYCLE NUMBER: NORMAL
SATURATED IRON: 11 % (ref 20–50)
TOTAL IRON BINDING CAPACITY: 490 UG/DL (ref 250–450)
TRANSFERRIN SERPL-MCNC: 331 MG/DL (ref 200–375)
WBC # BLD AUTO: 3.58 K/UL (ref 3.9–12.7)

## 2022-08-02 PROCEDURE — 36415 COLL VENOUS BLD VENIPUNCTURE: CPT

## 2022-08-02 PROCEDURE — 85025 COMPLETE CBC W/AUTO DIFF WBC: CPT

## 2022-08-02 PROCEDURE — U0005 INFEC AGEN DETEC AMPLI PROBE: HCPCS

## 2022-08-02 PROCEDURE — 84466 ASSAY OF TRANSFERRIN: CPT

## 2022-08-02 PROCEDURE — U0003 INFECTIOUS AGENT DETECTION BY NUCLEIC ACID (DNA OR RNA); SEVERE ACUTE RESPIRATORY SYNDROME CORONAVIRUS 2 (SARS-COV-2) (CORONAVIRUS DISEASE [COVID-19]), AMPLIFIED PROBE TECHNIQUE, MAKING USE OF HIGH THROUGHPUT TECHNOLOGIES AS DESCRIBED BY CMS-2020-01-R: HCPCS

## 2022-08-02 PROCEDURE — 82728 ASSAY OF FERRITIN: CPT

## 2022-08-04 ENCOUNTER — PATIENT MESSAGE (OUTPATIENT)
Dept: ENDOSCOPY | Facility: HOSPITAL | Age: 57
End: 2022-08-04
Payer: OTHER GOVERNMENT

## 2022-08-05 ENCOUNTER — HOSPITAL ENCOUNTER (OUTPATIENT)
Facility: HOSPITAL | Age: 57
Discharge: HOME | End: 2022-08-05
Attending: INTERNAL MEDICINE | Admitting: INTERNAL MEDICINE
Payer: OTHER GOVERNMENT

## 2022-08-05 ENCOUNTER — ANESTHESIA (OUTPATIENT)
Dept: ENDOSCOPY | Facility: HOSPITAL | Age: 57
End: 2022-08-05
Payer: OTHER GOVERNMENT

## 2022-08-05 ENCOUNTER — ANESTHESIA EVENT (OUTPATIENT)
Dept: ENDOSCOPY | Facility: HOSPITAL | Age: 57
End: 2022-08-05
Payer: OTHER GOVERNMENT

## 2022-08-05 VITALS
WEIGHT: 215 LBS | DIASTOLIC BLOOD PRESSURE: 88 MMHG | HEIGHT: 70 IN | HEART RATE: 69 BPM | SYSTOLIC BLOOD PRESSURE: 136 MMHG | BODY MASS INDEX: 30.78 KG/M2 | OXYGEN SATURATION: 100 % | TEMPERATURE: 98 F | RESPIRATION RATE: 18 BRPM

## 2022-08-05 DIAGNOSIS — D50.9 IRON (FE) DEFICIENCY ANEMIA: ICD-10-CM

## 2022-08-05 PROCEDURE — 45378 PR COLONOSCOPY,DIAGNOSTIC: ICD-10-PCS | Mod: ,,, | Performed by: INTERNAL MEDICINE

## 2022-08-05 PROCEDURE — 27201012 HC FORCEPS, HOT/COLD, DISP: Performed by: INTERNAL MEDICINE

## 2022-08-05 PROCEDURE — 37000008 HC ANESTHESIA 1ST 15 MINUTES: Performed by: INTERNAL MEDICINE

## 2022-08-05 PROCEDURE — 25000003 PHARM REV CODE 250: Performed by: NURSE ANESTHETIST, CERTIFIED REGISTERED

## 2022-08-05 PROCEDURE — 45378 DIAGNOSTIC COLONOSCOPY: CPT | Mod: ,,, | Performed by: INTERNAL MEDICINE

## 2022-08-05 PROCEDURE — 63600175 PHARM REV CODE 636 W HCPCS: Performed by: NURSE ANESTHETIST, CERTIFIED REGISTERED

## 2022-08-05 PROCEDURE — D9220A PRA ANESTHESIA: ICD-10-PCS | Mod: CRNA,,, | Performed by: NURSE ANESTHETIST, CERTIFIED REGISTERED

## 2022-08-05 PROCEDURE — 43249 PR EGD, FLEX, W/BALL DILATION, < 30MM: ICD-10-PCS | Mod: ,,, | Performed by: INTERNAL MEDICINE

## 2022-08-05 PROCEDURE — 43249 ESOPH EGD DILATION <30 MM: CPT | Performed by: INTERNAL MEDICINE

## 2022-08-05 PROCEDURE — 43249 ESOPH EGD DILATION <30 MM: CPT | Mod: ,,, | Performed by: INTERNAL MEDICINE

## 2022-08-05 PROCEDURE — D9220A PRA ANESTHESIA: Mod: ANES,,, | Performed by: ANESTHESIOLOGY

## 2022-08-05 PROCEDURE — 45378 DIAGNOSTIC COLONOSCOPY: CPT | Performed by: INTERNAL MEDICINE

## 2022-08-05 PROCEDURE — 88342 CHG IMMUNOCYTOCHEMISTRY: ICD-10-PCS | Mod: 26,,, | Performed by: STUDENT IN AN ORGANIZED HEALTH CARE EDUCATION/TRAINING PROGRAM

## 2022-08-05 PROCEDURE — 00813 ANES UPR LWR GI NDSC PX: CPT | Performed by: INTERNAL MEDICINE

## 2022-08-05 PROCEDURE — 43239 EGD BIOPSY SINGLE/MULTIPLE: CPT | Mod: 59 | Performed by: INTERNAL MEDICINE

## 2022-08-05 PROCEDURE — D9220A PRA ANESTHESIA: ICD-10-PCS | Mod: ANES,,, | Performed by: ANESTHESIOLOGY

## 2022-08-05 PROCEDURE — 25000003 PHARM REV CODE 250: Performed by: INTERNAL MEDICINE

## 2022-08-05 PROCEDURE — 43239 EGD BIOPSY SINGLE/MULTIPLE: CPT | Mod: 59,,, | Performed by: INTERNAL MEDICINE

## 2022-08-05 PROCEDURE — 88305 TISSUE EXAM BY PATHOLOGIST: ICD-10-PCS | Mod: 26,,, | Performed by: STUDENT IN AN ORGANIZED HEALTH CARE EDUCATION/TRAINING PROGRAM

## 2022-08-05 PROCEDURE — 88305 TISSUE EXAM BY PATHOLOGIST: CPT | Mod: 26,,, | Performed by: STUDENT IN AN ORGANIZED HEALTH CARE EDUCATION/TRAINING PROGRAM

## 2022-08-05 PROCEDURE — 88305 TISSUE EXAM BY PATHOLOGIST: CPT | Performed by: STUDENT IN AN ORGANIZED HEALTH CARE EDUCATION/TRAINING PROGRAM

## 2022-08-05 PROCEDURE — 88342 IMHCHEM/IMCYTCHM 1ST ANTB: CPT | Performed by: STUDENT IN AN ORGANIZED HEALTH CARE EDUCATION/TRAINING PROGRAM

## 2022-08-05 PROCEDURE — 37000009 HC ANESTHESIA EA ADD 15 MINS: Performed by: INTERNAL MEDICINE

## 2022-08-05 PROCEDURE — D9220A PRA ANESTHESIA: Mod: CRNA,,, | Performed by: NURSE ANESTHETIST, CERTIFIED REGISTERED

## 2022-08-05 PROCEDURE — 43239 PR EGD, FLEX, W/BIOPSY, SGL/MULTI: ICD-10-PCS | Mod: 59,,, | Performed by: INTERNAL MEDICINE

## 2022-08-05 PROCEDURE — C1726 CATH, BAL DIL, NON-VASCULAR: HCPCS | Performed by: INTERNAL MEDICINE

## 2022-08-05 PROCEDURE — 88342 IMHCHEM/IMCYTCHM 1ST ANTB: CPT | Mod: 26,,, | Performed by: STUDENT IN AN ORGANIZED HEALTH CARE EDUCATION/TRAINING PROGRAM

## 2022-08-05 RX ORDER — PROPOFOL 10 MG/ML
VIAL (ML) INTRAVENOUS
Status: DISCONTINUED | OUTPATIENT
Start: 2022-08-05 | End: 2022-08-05

## 2022-08-05 RX ORDER — SODIUM CHLORIDE 9 MG/ML
INJECTION, SOLUTION INTRAVENOUS CONTINUOUS
Status: DISCONTINUED | OUTPATIENT
Start: 2022-08-05 | End: 2022-08-05 | Stop reason: HOSPADM

## 2022-08-05 RX ORDER — PROPOFOL 10 MG/ML
INJECTION, EMULSION INTRAVENOUS
Status: COMPLETED
Start: 2022-08-05 | End: 2022-08-05

## 2022-08-05 RX ORDER — LIDOCAINE HCL/PF 100 MG/5ML
SYRINGE (ML) INTRAVENOUS
Status: DISCONTINUED | OUTPATIENT
Start: 2022-08-05 | End: 2022-08-05

## 2022-08-05 RX ORDER — LIDOCAINE HYDROCHLORIDE 20 MG/ML
INJECTION, SOLUTION EPIDURAL; INFILTRATION; INTRACAUDAL; PERINEURAL
Status: DISCONTINUED
Start: 2022-08-05 | End: 2022-08-05 | Stop reason: HOSPADM

## 2022-08-05 RX ORDER — ESCITALOPRAM OXALATE 10 MG/1
15 TABLET ORAL DAILY
COMMUNITY
End: 2023-03-03

## 2022-08-05 RX ADMIN — GLYCOPYRROLATE 0.2 MG: 0.2 INJECTION, SOLUTION INTRAMUSCULAR; INTRAVITREAL at 08:08

## 2022-08-05 RX ADMIN — PROPOFOL 50 MG: 10 INJECTION, EMULSION INTRAVENOUS at 09:08

## 2022-08-05 RX ADMIN — LIDOCAINE HYDROCHLORIDE 100 MG: 20 INJECTION INTRAVENOUS at 08:08

## 2022-08-05 RX ADMIN — SODIUM CHLORIDE: 0.9 INJECTION, SOLUTION INTRAVENOUS at 08:08

## 2022-08-05 RX ADMIN — PROPOFOL 100 MG: 10 INJECTION, EMULSION INTRAVENOUS at 08:08

## 2022-08-05 NOTE — PROVATION PATIENT INSTRUCTIONS
Discharge Summary/Instructions after an Endoscopic Procedure  Patient Name: Himanshu Connor  Patient MRN: [id number]  Patient YOB: 1965 Friday, August 5, 2022  Zaida Ansari MD  Dear patient,  As a result of recent federal legislation (The Federal Cures Act), you may   receive lab or pathology results from your procedure in your [facility]   account before your physician is able to contact you. Your physician or   their representative will relay the results to you with their   recommendations at their soonest availability.  Thank you,  RESTRICTIONS:  During your procedure today, you received medications for sedation.  These   medications may affect your judgment, balance and coordination.  Therefore,   for 24 hours, you have the following restrictions:   - DO NOT drive a car, operate machinery, make legal/financial decisions,   sign important papers or drink alcohol.    ACTIVITY:  Today: no heavy lifting, straining or running due to procedural   sedation/anesthesia.  The following day: return to full activity including work.  DIET:  Eat and drink normally unless instructed otherwise.     TREATMENT FOR COMMON SIDE EFFECTS:  - Mild abdominal pain, nausea, belching, bloating or excessive gas:  rest,   eat lightly and use a heating pad.  - Sore Throat: treat with throat lozenges and/or gargle with warm salt   water.  - Because air was used during the procedure, expelling large amounts of air   from your rectum or belching is normal.  - If a bowel prep was taken, you may not have a bowel movement for 1-3 days.    This is normal.  SYMPTOMS TO WATCH FOR AND REPORT TO YOUR PHYSICIAN:  1. Abdominal pain or bloating, other than gas cramps.  2. Chest pain.  3. Back pain.  4. Signs of infection such as: chills or fever occurring within 24 hours   after the procedure.  5. Rectal bleeding, which would show as bright red, maroon, or black stools.   (A tablespoon of blood from the rectum is not serious,  especially if   hemorrhoids are present.)  6. Vomiting.  7. Weakness or dizziness.  GO DIRECTLY TO THE NEAREST EMERGENCY ROOM IF YOU HAVE ANY OF THE FOLLOWING:      Difficulty breathing              Chills and/or fever over 101 F   Persistent vomiting and/or vomiting blood   Severe abdominal pain   Severe chest pain   Black, tarry stools   Bleeding- more than one tablespoon   Any other symptom or condition that you feel may need urgent attention  Your doctor recommends these additional instructions:  If any biopsies were taken, your doctors clinic will contact you in 1 to 2   weeks with any results.  - Discharge patient to home (with escort).   - Patient has a contact number available for emergencies.  The signs and   symptoms of potential delayed complications were discussed with the   patient.  Return to normal activities tomorrow.  Written discharge   instructions were provided to the patient.   - Resume previous diet.   - Continue present medications.   - Repeat colonoscopy in 10 years for screening purposes.   -Follow up with hematology for IV iron   - Return to nurse practitioner PRN.  For questions, problems or results please call your physician - Zaida Ansari MD at Work:  [phone].  [facility], EMERGENCY ROOM PHONE NUMBER: [phone]  IF A COMPLICATION OR EMERGENCY SITUATION ARISES AND YOU ARE UNABLE TO REACH   YOUR PHYSICIAN - GO DIRECTLY TO THE EMERGENCY ROOM.  Zaida Ansari MD  8/5/2022 9:45:07 AM  This report has been verified and signed electronically.  Dear patient,  As a result of recent federal legislation (The Federal Cures Act), you may   receive lab or pathology results from your procedure in your [facility]   account before your physician is able to contact you. Your physician or   their representative will relay the results to you with their   recommendations at their soonest availability.  Thank you,  PROVATION

## 2022-08-05 NOTE — TRANSFER OF CARE
"Anesthesia Transfer of Care Note    Patient: Himanshu Connor II    Procedure(s) Performed: Procedure(s) (LRB):  EGD (ESOPHAGOGASTRODUODENOSCOPY) (N/A)  COLONOSCOPY (N/A)    Patient location: PACU    Anesthesia Type: general    Transport from OR: Transported from OR on 2-3 L/min O2 by NC with adequate spontaneous ventilation    Post pain: adequate analgesia    Post assessment: no apparent anesthetic complications and tolerated procedure well    Post vital signs: stable    Level of consciousness: sedated and responds to stimulation    Nausea/Vomiting: no nausea/vomiting    Complications: none    Transfer of care protocol was followed      Last vitals:   Visit Vitals  BP (!) 149/87 (BP Location: Left arm, Patient Position: Lying)   Pulse 68   Temp 36.9 °C (98.4 °F) (Skin)   Resp 18   Ht 5' 10" (1.778 m)   Wt 97.5 kg (215 lb)   SpO2 100%   BMI 30.85 kg/m²     "

## 2022-08-05 NOTE — PROVATION PATIENT INSTRUCTIONS
Discharge Summary/Instructions after an Endoscopic Procedure  Patient Name: Himanshu Connor  Patient MRN: [id number]  Patient YOB: 1965 Friday, August 5, 2022  Zaida Ansari MD  Dear patient,  As a result of recent federal legislation (The Federal Cures Act), you may   receive lab or pathology results from your procedure in your [facility]   account before your physician is able to contact you. Your physician or   their representative will relay the results to you with their   recommendations at their soonest availability.  Thank you,  RESTRICTIONS:  During your procedure today, you received medications for sedation.  These   medications may affect your judgment, balance and coordination.  Therefore,   for 24 hours, you have the following restrictions:   - DO NOT drive a car, operate machinery, make legal/financial decisions,   sign important papers or drink alcohol.    ACTIVITY:  Today: no heavy lifting, straining or running due to procedural   sedation/anesthesia.  The following day: return to full activity including work.  DIET:  Eat and drink normally unless instructed otherwise.     TREATMENT FOR COMMON SIDE EFFECTS:  - Mild abdominal pain, nausea, belching, bloating or excessive gas:  rest,   eat lightly and use a heating pad.  - Sore Throat: treat with throat lozenges and/or gargle with warm salt   water.  - Because air was used during the procedure, expelling large amounts of air   from your rectum or belching is normal.  - If a bowel prep was taken, you may not have a bowel movement for 1-3 days.    This is normal.  SYMPTOMS TO WATCH FOR AND REPORT TO YOUR PHYSICIAN:  1. Abdominal pain or bloating, other than gas cramps.  2. Chest pain.  3. Back pain.  4. Signs of infection such as: chills or fever occurring within 24 hours   after the procedure.  5. Rectal bleeding, which would show as bright red, maroon, or black stools.   (A tablespoon of blood from the rectum is not serious,  especially if   hemorrhoids are present.)  6. Vomiting.  7. Weakness or dizziness.  GO DIRECTLY TO THE NEAREST EMERGENCY ROOM IF YOU HAVE ANY OF THE FOLLOWING:      Difficulty breathing              Chills and/or fever over 101 F   Persistent vomiting and/or vomiting blood   Severe abdominal pain   Severe chest pain   Black, tarry stools   Bleeding- more than one tablespoon   Any other symptom or condition that you feel may need urgent attention  Your doctor recommends these additional instructions:  If any biopsies were taken, your doctors clinic will contact you in 1 to 2   weeks with any results.  - Await pathology results.   - Discharge patient to home (with escort).   - Patient has a contact number available for emergencies.  The signs and   symptoms of potential delayed complications were discussed with the   patient.  Return to normal activities tomorrow.  Written discharge   instructions were provided to the patient.   - Resume previous diet.   - Continue present medications.   - Await pathology results.   -Follow up with hematology for IV iron infusion   -Daily PPI  -Modify diet to decrease symptoms  For questions, problems or results please call your physician - Zaida Ansari MD at Work:  [phone].  [facility], EMERGENCY ROOM PHONE NUMBER: [phone]  IF A COMPLICATION OR EMERGENCY SITUATION ARISES AND YOU ARE UNABLE TO REACH   YOUR PHYSICIAN - GO DIRECTLY TO THE EMERGENCY ROOM.  Zaida Ansari MD  8/5/2022 9:51:11 AM  This report has been verified and signed electronically.  Dear patient,  As a result of recent federal legislation (The Federal Cures Act), you may   receive lab or pathology results from your procedure in your [facility]   account before your physician is able to contact you. Your physician or   their representative will relay the results to you with their   recommendations at their soonest availability.  Thank you,  PROVATION

## 2022-08-05 NOTE — PLAN OF CARE
"Plan of care explained to patient. VSS; patient tolerating liquids, peripheral IV removed. patient states "ready to go home". Discharge instructions explained to patient. All questions and concerns addressed. Patient able to get dressed and in wheelchair. Wheeled down with nurse to friend .   "

## 2022-08-05 NOTE — ANESTHESIA POSTPROCEDURE EVALUATION
Anesthesia Post Evaluation    Patient: Himanshu Connor II    Procedure(s) Performed: Procedure(s) (LRB):  EGD (ESOPHAGOGASTRODUODENOSCOPY) (N/A)  COLONOSCOPY (N/A)    Final Anesthesia Type: general      Patient location during evaluation: PACU  Patient participation: Yes- Able to Participate  Level of consciousness: awake and alert and oriented  Post-procedure vital signs: reviewed and stable  Pain management: adequate  Airway patency: patent    PONV status at discharge: No PONV  Anesthetic complications: no      Cardiovascular status: blood pressure returned to baseline  Respiratory status: unassisted, spontaneous ventilation and room air  Hydration status: euvolemic  Follow-up not needed.          Vitals Value Taken Time   /80 08/05/22 0953   Temp  08/05/22 0954   Pulse 65 08/05/22 0953   Resp 18 08/05/22 0953   SpO2 100 % 08/05/22 0953         No case tracking events are documented in the log.      Pain/Yovany Score: Yovany Score: 10 (8/5/2022  9:53 AM)

## 2022-08-05 NOTE — ANESTHESIA PREPROCEDURE EVALUATION
08/05/2022  Himanshu Connor II is a 57 y.o., male.      Pre-op Assessment    I have reviewed the Patient Summary Reports.     I have reviewed the Nursing Notes. I have reviewed the NPO Status.   I have reviewed the Medications.     Review of Systems  Anesthesia Hx:  No problems with previous Anesthesia    Social:  Non-Smoker    Cardiovascular:   Hypertension, well controlled    Pulmonary:   Asthma asymptomatic Sleep Apnea    Renal/:  Renal/ Normal     Hepatic/GI:   GERD, well controlled    Musculoskeletal:   Arthritis   knee arthritis    Neurological:   Neuromuscular Disease,   Chronic Pain Syndrome   Endocrine:  Obesity / BMI > 30  Psych:   Psychiatric History depression          Physical Exam  General: Well nourished    Airway:  Mallampati: II   Mouth Opening: Normal  TM Distance: Normal  Tongue: Normal  Neck ROM: Normal ROM    Dental:  Intact    Chest/Lungs:  Clear to auscultation, Normal Respiratory Rate        Anesthesia Plan  Type of Anesthesia, risks & benefits discussed:    Anesthesia Type: Gen Natural Airway  Intra-op Monitoring Plan: Standard ASA Monitors  Induction:  IV  Informed Consent: Informed consent signed with the Patient and all parties understand the risks and agree with anesthesia plan.  All questions answered.   ASA Score: 3    Ready For Surgery From Anesthesia Perspective.     .

## 2022-08-05 NOTE — H&P
"[facility] Note    CC: STEVE  HPI 57 y.o. male presents for evaluation of STEVE    Past Medical History:   Diagnosis Date    Alcohol abuse, unspecified     Allergic rhinitis, cause unspecified     Arthritis     Asthma attack     Cancer 2022    skin cancer on nose    Carpal tunnel syndrome     Depressive disorder, not elsewhere classified     Diarrhea     Encounter for blood transfusion     GERD (gastroesophageal reflux disease)     Hypersomnia, unspecified     Hypertension     Hypoglycemia     Lumbago     Obesity, unspecified     JAH (obstructive sleep apnea)     uses BIPAP    Other and unspecified hyperlipidemia     Other ankle sprain and strain     Psychosexual dysfunction with inhibited sexual excitement     Pyogenic arthritis of knee 06/2021    staph pseudintermedius, TKA    Pyogenic arthritis of knee 09/21/2021    Enterococcus    Ventral hernia, unspecified, without mention of obstruction or gangrene        Allergies and Medications reviewed     Review of Systems  General ROS: negative for - chills, fever or weight loss  Cardiovascular ROS: no chest pain or dyspnea on exertion  Gastrointestinal ROS: no hematemesis    Physical Examination  BP (!) 149/87 (BP Location: Left arm, Patient Position: Lying)   Pulse 68   Temp 98.4 °F (36.9 °C) (Skin)   Resp 18   Ht 5' 10" (1.778 m)   Wt 97.5 kg (215 lb)   SpO2 100%   BMI 30.85 kg/m²   General appearance: alert, cooperative, no distress  HENT: Normocephalic, atraumatic, neck symmetrical, no nasal discharge, sclera anicteric   Lungs: clear to auscultation bilaterally, symmetric chest wall expansion bilaterally  Heart: regular rate and rhythm without rub; no displacement of the PMI   Abdomen: soft  Extremities: extremities symmetric; no clubbing, cyanosis, or edema        Labs:  Lab Results   Component Value Date    WBC 3.58 (L) 08/02/2022    HGB 11.3 (L) 08/02/2022    HCT 36.5 (L) 08/02/2022    MCV 89 08/02/2022     " 08/02/2022       Assessment:   57 y.o. male presents for evaluation of STEVE    Plan:  -PRoceed to EGD And colonoscopy     Zaida Ansari MD  [facility]  [address] [place], [address]  [place] [address]  Office: [phone]  Fax: [phone]

## 2022-08-08 ENCOUNTER — OFFICE VISIT (OUTPATIENT)
Dept: ORTHOPEDICS | Facility: CLINIC | Age: 57
End: 2022-08-08
Payer: OTHER GOVERNMENT

## 2022-08-08 VITALS — HEIGHT: 70 IN | WEIGHT: 215 LBS | BODY MASS INDEX: 30.78 KG/M2 | RESPIRATION RATE: 18 BRPM

## 2022-08-08 DIAGNOSIS — Z96.652 STATUS POST TOTAL LEFT KNEE REPLACEMENT USING CEMENT: ICD-10-CM

## 2022-08-08 DIAGNOSIS — Z96.651 STATUS POST TOTAL KNEE REPLACEMENT USING CEMENT, RIGHT: Primary | ICD-10-CM

## 2022-08-08 PROCEDURE — 99999 PR PBB SHADOW E&M-EST. PATIENT-LVL III: CPT | Mod: PBBFAC,,, | Performed by: ORTHOPAEDIC SURGERY

## 2022-08-08 PROCEDURE — 99213 OFFICE O/P EST LOW 20 MIN: CPT | Mod: PBBFAC,PN | Performed by: ORTHOPAEDIC SURGERY

## 2022-08-08 PROCEDURE — 99999 PR PBB SHADOW E&M-EST. PATIENT-LVL III: ICD-10-PCS | Mod: PBBFAC,,, | Performed by: ORTHOPAEDIC SURGERY

## 2022-08-08 PROCEDURE — 99213 PR OFFICE/OUTPT VISIT, EST, LEVL III, 20-29 MIN: ICD-10-PCS | Mod: S$PBB,,, | Performed by: ORTHOPAEDIC SURGERY

## 2022-08-08 PROCEDURE — 99213 OFFICE O/P EST LOW 20 MIN: CPT | Mod: S$PBB,,, | Performed by: ORTHOPAEDIC SURGERY

## 2022-08-08 NOTE — PROGRESS NOTES
Past Medical History:   Diagnosis Date    Alcohol abuse, unspecified     Allergic rhinitis, cause unspecified     Arthritis     Asthma attack     Cancer 2022    skin cancer on nose    Carpal tunnel syndrome     Depressive disorder, not elsewhere classified     Diarrhea     Encounter for blood transfusion     GERD (gastroesophageal reflux disease)     Hypersomnia, unspecified     Hypertension     Hypoglycemia     Lumbago     Obesity, unspecified     JAH (obstructive sleep apnea)     uses BIPAP    Other and unspecified hyperlipidemia     Other ankle sprain and strain     Psychosexual dysfunction with inhibited sexual excitement     Pyogenic arthritis of knee 06/2021    staph pseudintermedius, TKA    Pyogenic arthritis of knee 09/21/2021    Enterococcus    Ventral hernia, unspecified, without mention of obstruction or gangrene        Past Surgical History:   Procedure Laterality Date    A-scope knees      Bilateral    ARTHROSCOPIC REPAIR OF ROTATOR CUFF OF SHOULDER Left 09/20/2019    Procedure: REPAIR, ROTATOR CUFF, ARTHROSCOPIC;  Surgeon: Felix Levin MD;  Location: [facility];  Service: Orthopedics;  Laterality: Left;    ARTHROSCOPY OF SHOULDER WITH DECOMPRESSION OF SUBACROMIAL SPACE Left 09/20/2019    Procedure: ARTHROSCOPY, SHOULDER, WITH SUBACROMIAL SPACE DECOMPRESSION;  Surgeon: Felix Levin MD;  Location: [facility] OR;  Service: Orthopedics;  Laterality: Left;  [employer] notified of all case today 9/16-tcb    CARPAL TUNNEL RELEASE      Bilateral    COLONOSCOPY N/A 8/5/2022    Procedure: COLONOSCOPY;  Surgeon: Zaida Ansari MD;  Location: [facility];  Service: Endoscopy;  Laterality: N/A;    ESOPHAGOGASTRODUODENOSCOPY N/A 8/5/2022    Procedure: EGD (ESOPHAGOGASTRODUODENOSCOPY);  Surgeon: Zaida Ansari MD;  Location: [facility] ENDO;  Service: Endoscopy;  Laterality: N/A;    EYE SURGERY      Lasik    HERNIA REPAIR      INCISION AND DRAINAGE OF HEMATOMA  Right 09/21/2021    Procedure: INCISION AND DRAINAGE, HEMATOMA;  Surgeon: Felix Levin MD;  Location: [facility] OR;  Service: Orthopedics;  Laterality: Right;    INSERTION OF ANTIBIOTIC SPACER Right 06/01/2021    Procedure: INSERTION, ANTIBIOTIC SPACER;  Surgeon: Felix Levin MD;  Location: [facility] OR;  Service: Orthopedics;  Laterality: Right;    JOINT REPLACEMENT      KNEE ARTHROPLASTY Left 08/04/2020    Procedure: ARTHROPLASTY, KNEE;  Surgeon: Felix Levin MD;  Location: [facility] OR;  Service: Orthopedics;  Laterality: Left;    KNEE ARTHROPLASTY Right 03/23/2021    Procedure: ARTHROPLASTY, KNEE;  Surgeon: Felix Levin MD;  Location: [facility] OR;  Service: Orthopedics;  Laterality: Right;    KNEE ARTHROSCOPY W/ MENISCECTOMY Left 10/15/2019    Procedure: ARTHROSCOPY, KNEE, WITH MENISCECTOMY;  Surgeon: Felix Levin MD;  Location: [facility] OR;  Service: Orthopedics;  Laterality: Left;  Medial and Lateral Meniscectomy    KNEE ARTHROSCOPY W/ MENISCECTOMY Right 11/22/2019    Procedure: ARTHROSCOPY, KNEE, WITH MENISCECTOMY;  Surgeon: Felix Levin MD;  Location: [facility] OR;  Service: Orthopedics;  Laterality: Right;    KNEE SURGERY      osmar    NISSEN FUNDOPLICATION      REVERSE TOTAL SHOULDER ARTHROPLASTY Left 02/11/2020    Procedure: ARTHROPLASTY, SHOULDER, TOTAL, REVERSE;  Surgeon: Felix Levin MD;  Location: [facility] OR;  Service: Orthopedics;  Laterality: Left;  [place]    [facility]  [facility] [place]    REVISION OF KNEE ARTHROPLASTY Right 09/07/2021    Procedure: REVISION, ARTHROPLASTY, KNEE;  Surgeon: Felix Levin MD;  Location: [facility] OR;  Service: Orthopedics;  Laterality: Right;    ROTATOR CUFF REPAIR      right    RJ-EN-Y PROCEDURE N/A 05/2021    SLEEVE GASTROPLASTY  12/2013       Current Outpatient Medications   Medication Sig    aspirin 81 MG Chew Take 1 tablet (81 mg total) by mouth 2 (two) times daily.     cyanocobalamin (VITAMIN B-12) 1000 MCG tablet Take 1,000 mcg by mouth once daily.     cyclobenzaprine (FLEXERIL) 10 MG tablet Take 10 mg by mouth 3 (three) times daily as needed for Muscle spasms.    diclofenac sodium (VOLTAREN) 1 % Gel Apply topically daily as needed.    EScitalopram oxalate (LEXAPRO) 10 MG tablet Take 15 mg by mouth once daily.    ferrous sulfate (FEOSOL) 325 mg (65 mg iron) Tab tablet TAKE ONE TABLET BY MOUTH ONCE DAILY AS AN IRON SUPPLEMENT    losartan (COZAAR) 50 MG tablet Take 50 mg by mouth once daily.     oxyCODONE-acetaminophen (PERCOCET)  mg per tablet Take 1 tablet by mouth every 6 (six) hours as needed for Pain.    traZODone (DESYREL) 100 MG tablet Take 1 tablet by mouth nightly as needed.    triamcinolone acetonide 0.1% (KENALOG) 0.1 % cream Apply topically 3 (three) times daily. for 10 days    vitamin D (VITAMIN D3) 1000 units Tab Take 2,000 Units by mouth once daily.     No current facility-administered medications for this visit.       Review of patient's allergies indicates:  No Known Allergies    Family History   Problem Relation Age of Onset    Arthritis Mother     Hypertension Father     Kidney disease Father     Heart disease Father     COPD Father     Pancreatic cancer Maternal Grandfather     Colon cancer Paternal Grandfather     Colon polyps Neg Hx     Crohn's disease Neg Hx     Ulcerative colitis Neg Hx     Stomach cancer Neg Hx     Esophageal cancer Neg Hx        Social History     Socioeconomic History    Marital status:    Tobacco Use    Smoking status: Never Smoker    Smokeless tobacco: Current User     Types: Chew   Substance and Sexual Activity    Alcohol use: Yes     Comment: occasionally    Drug use: No       Chief Complaint:   Chief Complaint   Patient presents with    Left Knee - Pain    Right Knee - Pain       Date of surgery:  June 1, 2021 right total knee,  September 7, 2021 I and D and antibiotic spacer placement, and  9/21/21 revision right total knee    History of present illness:  57-year-old male who underwent 2 staged left revision total knee arthroplasty.  He is doing pretty well.  Just has more pain in both knees particularly when he 1st gets up out of a chair and with start up.  Pain is 6/10.    Review of Systems:    Musculoskeletal:  See HPI        Physical Examination:    Vital Signs:    Vitals:    08/08/22 1103   Resp: 18       Body mass index is 30.85 kg/m².    This a well-developed, well nourished patient in no acute distress.  They are alert and oriented and cooperative to examination.  Pt. walks with a moderate antalgic gait     Examination of bilateral knees shows healed midline surgical site.  No redness..  No dehiscence noted.  No fluid.  Range of motion is about 0-120 degrees.  Has the fair amount of quad weakness.      X-rays:  Four views of the right knee is  reviewed which show well-maintained components.    X-rays left hip isreviewed which show well-maintained hip joint space.  There is an ossicle located near the hip flexors more proximally     Assessment::  Status post right two-stage revision for infected TKA   Status post left total knee arthroplasty      Plan:  He is doing pretty well.  I think if we can get his quadriceps stronger than had a lot of his start-up pain and sitting to standing pain will get better.  We will get a referral in to pain management for his chronic pain issues.  Gave him a list of quad exercises.  Follow-up in 1 year with x-rays of both knees.    This note was created using [employer] voice recognition software that occasionally misinterpreted phrases or words.

## 2022-08-12 ENCOUNTER — TELEPHONE (OUTPATIENT)
Dept: ORTHOPEDICS | Facility: CLINIC | Age: 57
End: 2022-08-12
Payer: OTHER GOVERNMENT

## 2022-08-12 NOTE — TELEPHONE ENCOUNTER
Faxed community care provider request for service from to [facility] at [phone] to request approval for referral to pain management.       Asa

## 2022-08-16 LAB
FINAL PATHOLOGIC DIAGNOSIS: NORMAL
GROSS: NORMAL
Lab: NORMAL
SUPPLEMENTAL DIAGNOSIS: NORMAL

## 2022-09-01 ENCOUNTER — TELEPHONE (OUTPATIENT)
Dept: HEMATOLOGY/ONCOLOGY | Facility: CLINIC | Age: 57
End: 2022-09-01
Payer: OTHER GOVERNMENT

## 2022-09-01 NOTE — TELEPHONE ENCOUNTER
Spoke to Mr. Connor regarding referral to Hematology he stated he is waiting for the referring [facility] to [facility] message sent to Dr. Ansari/staff for assistance

## 2022-12-16 ENCOUNTER — TELEPHONE (OUTPATIENT)
Dept: GASTROENTEROLOGY | Facility: CLINIC | Age: 57
End: 2022-12-16
Payer: COMMERCIAL

## 2022-12-16 NOTE — TELEPHONE ENCOUNTER
WENDI faxed to Dr Bettencourt @ [phone]  Requesting surgical reports for gastric sleeve and esophagus stents    Successful @ 3:38pm

## 2022-12-16 NOTE — TELEPHONE ENCOUNTER
[facility]ed [facility]:      records needed for recent referral for Mr Himanshu Reed last 4 ss# [id number]    Shwetha Walker  To:Agustin Pretty (MARY ELLEN) [email or web address]>  Fri 12/16/2022 3:26 PM  Shan Velez,  I just received a referral for Mr Connor from Dr Olu Najera.  We did not receive any records. I spoke with the patient : we need last  GI OV note, EGD w/ path reports for past 5 yrs, esophagram report 7/2021, all surgical reports related to his esophagus and stomach (Nissen fundo 1989 and 1994, gastric bypass 5/2021)  thank you,  Pat     fax # [phone]

## 2022-12-16 NOTE — TELEPHONE ENCOUNTER
Called patient, no ans  [facility] re: referral received from [facility]  Asked pt to call the clinic, ph  no provided.        Called [phone], spoke with pt.        Discussed with pt that Dr. Walker is a consultant that does not accept patients as a primary GI provider.  Discussed that she will send them back to their primary GI provider once their symptoms improved or the plan of care is established.     Pt was told the logistics of the appointment (arrival time, length of visit, total time spent at facility).     Pt was also told that Dr. Walker will review their previous workup but may order additional test and perform her own procedures and that this may require an overnight stay at a local hotel to complete the workup. Patient agreed and verbalized understanding.      Explained will need to request records from [facility] and [facility] Mendoza.    NP apt is ~ 14-16 wks

## 2023-01-01 NOTE — TELEPHONE ENCOUNTER
Last fill 08/13/20 # 60. DOS 08/04/20. Please review/sign if agreeable. Syeda Watts LPN     Statement Selected

## 2023-01-08 ENCOUNTER — HOSPITAL ENCOUNTER (EMERGENCY)
Facility: HOSPITAL | Age: 58
Discharge: HOME | End: 2023-01-08
Attending: EMERGENCY MEDICINE
Payer: OTHER GOVERNMENT

## 2023-01-08 VITALS
BODY MASS INDEX: 32.93 KG/M2 | HEART RATE: 100 BPM | TEMPERATURE: 99 F | OXYGEN SATURATION: 97 % | RESPIRATION RATE: 20 BRPM | WEIGHT: 230 LBS | HEIGHT: 70 IN

## 2023-01-08 DIAGNOSIS — Z48.89 ENCOUNTER FOR POST SURGICAL WOUND CHECK: Primary | ICD-10-CM

## 2023-01-08 DIAGNOSIS — T81.31XA DEHISCENCE OF OPERATIVE WOUND, INITIAL ENCOUNTER: ICD-10-CM

## 2023-01-08 PROCEDURE — 99283 EMERGENCY DEPT VISIT LOW MDM: CPT

## 2023-01-08 RX ORDER — CEPHALEXIN 500 MG/1
500 CAPSULE ORAL EVERY 12 HOURS
Qty: 10 CAPSULE | Refills: 0 | Status: SHIPPED | OUTPATIENT
Start: 2023-01-08 | End: 2023-01-13

## 2023-01-08 NOTE — ED PROVIDER NOTES
Encounter Date: 1/8/2023       History     Chief Complaint   Patient presents with    Post-op Problem     Carpal Tunnel sx on 1/3/23 to R wrist. Changed bandage last night and sutures had come out with wound dehiscence. [facility]      Pt is POD 5 s/p CTR here with wound dehiscence last night. No drainage or bleeding. Mild pain.    The history is provided by the patient.   Review of patient's allergies indicates:  No Known Allergies  Past Medical History:   Diagnosis Date    Alcohol abuse, unspecified     Allergic rhinitis, cause unspecified     Arthritis     Asthma attack     Cancer 2022    skin cancer on nose    Carpal tunnel syndrome     Depressive disorder, not elsewhere classified     Diarrhea     Encounter for blood transfusion     GERD (gastroesophageal reflux disease)     Hypersomnia, unspecified     Hypertension     Hypoglycemia     Lumbago     Obesity, unspecified     JAH (obstructive sleep apnea)     uses BIPAP    Other and unspecified hyperlipidemia     Other ankle sprain and strain     Psychosexual dysfunction with inhibited sexual excitement     Pyogenic arthritis of knee 06/2021    staph pseudintermedius, TKA    Pyogenic arthritis of knee 09/21/2021    Enterococcus    Ventral hernia, unspecified, without mention of obstruction or gangrene      Past Surgical History:   Procedure Laterality Date    A-scope knees      Bilateral    ARTHROSCOPIC REPAIR OF ROTATOR CUFF OF SHOULDER Left 09/20/2019    Procedure: REPAIR, ROTATOR CUFF, ARTHROSCOPIC;  Surgeon: Felix Levin MD;  Location: [facility] OR;  Service: Orthopedics;  Laterality: Left;    ARTHROSCOPY OF SHOULDER WITH DECOMPRESSION OF SUBACROMIAL SPACE Left 09/20/2019    Procedure: ARTHROSCOPY, SHOULDER, WITH SUBACROMIAL SPACE DECOMPRESSION;  Surgeon: Felix Levin MD;  Location: [facility] OR;  Service: Orthopedics;  Laterality: Left;  [employer] notified of all case today 9/16-tcb    CARPAL TUNNEL RELEASE      Bilateral    COLONOSCOPY  N/A 8/5/2022    Procedure: COLONOSCOPY;  Surgeon: Zaida Ansari MD;  Location: [facility] ENDO;  Service: Endoscopy;  Laterality: N/A;    ESOPHAGOGASTRODUODENOSCOPY N/A 8/5/2022    Procedure: EGD (ESOPHAGOGASTRODUODENOSCOPY);  Surgeon: Zaida Ansari MD;  Location: [facility] ENDO;  Service: Endoscopy;  Laterality: N/A;    EYE SURGERY      Lasik    HERNIA REPAIR      INCISION AND DRAINAGE OF HEMATOMA Right 09/21/2021    Procedure: INCISION AND DRAINAGE, HEMATOMA;  Surgeon: Felix Levin MD;  Location: [facility] OR;  Service: Orthopedics;  Laterality: Right;    INSERTION OF ANTIBIOTIC SPACER Right 06/01/2021    Procedure: INSERTION, ANTIBIOTIC SPACER;  Surgeon: Felix Levin MD;  Location: [facility] OR;  Service: Orthopedics;  Laterality: Right;    JOINT REPLACEMENT      KNEE ARTHROPLASTY Left 08/04/2020    Procedure: ARTHROPLASTY, KNEE;  Surgeon: Felix Levin MD;  Location: [facility] OR;  Service: Orthopedics;  Laterality: Left;    KNEE ARTHROPLASTY Right 03/23/2021    Procedure: ARTHROPLASTY, KNEE;  Surgeon: Felix Levin MD;  Location: [facility] OR;  Service: Orthopedics;  Laterality: Right;    KNEE ARTHROSCOPY W/ MENISCECTOMY Left 10/15/2019    Procedure: ARTHROSCOPY, KNEE, WITH MENISCECTOMY;  Surgeon: Felix Levin MD;  Location: [facility] OR;  Service: Orthopedics;  Laterality: Left;  Medial and Lateral Meniscectomy    KNEE ARTHROSCOPY W/ MENISCECTOMY Right 11/22/2019    Procedure: ARTHROSCOPY, KNEE, WITH MENISCECTOMY;  Surgeon: Felix Levin MD;  Location: [facility] OR;  Service: Orthopedics;  Laterality: Right;    KNEE SURGERY      osmar    NISSEN FUNDOPLICATION      REVERSE TOTAL SHOULDER ARTHROPLASTY Left 02/11/2020    Procedure: ARTHROPLASTY, SHOULDER, TOTAL, REVERSE;  Surgeon: Felix Levin MD;  Location: [facility] OR;  Service: Orthopedics;  Laterality: Left;  [place]    revision of gastrojejunostomy  [facility]/10/2021    [facility] [place]    REVISION OF KNEE ARTHROPLASTY Right  09/07/2021    Procedure: REVISION, ARTHROPLASTY, KNEE;  Surgeon: Felix Levin MD;  Location: [facility] OR;  Service: Orthopedics;  Laterality: Right;    ROTATOR CUFF REPAIR      right    RJ-EN-Y PROCEDURE N/A 05/2021    SLEEVE GASTROPLASTY  12/2013     Family History   Problem Relation Age of Onset    Arthritis Mother     Hypertension Father     Kidney disease Father     Heart disease Father     COPD Father     Pancreatic cancer Maternal Grandfather     Colon cancer Paternal Grandfather     Colon polyps Neg Hx     Crohn's disease Neg Hx     Ulcerative colitis Neg Hx     Stomach cancer Neg Hx     Esophageal cancer Neg Hx      Social History     Tobacco Use    Smoking status: Never    Smokeless tobacco: Current     Types: Chew   Substance Use Topics    Alcohol use: Yes     Comment: occasionally    Drug use: No     Review of Systems   Constitutional:  Negative for fever.   Skin:  Negative for rash.   Neurological:  Negative for weakness and numbness.     Physical Exam     Initial Vitals [01/08/23 1733]   BP Pulse Resp Temp SpO2   -- 100 20 98.5 °F (36.9 °C) 97 %      MAP       --         Physical Exam    Nursing note and vitals reviewed.  Constitutional: He appears well-developed and well-nourished. He is not diaphoretic. No distress.   Cardiovascular:  Normal rate, regular rhythm, normal heart sounds and intact distal pulses.           Pulmonary/Chest: Breath sounds normal.   Abdominal: Abdomen is soft. There is no abdominal tenderness.   Musculoskeletal:         General: Tenderness present. No edema. Normal range of motion.      Comments: Dehisced wound right palm with mild erythema. No purulence. Wound clean. No streaking. Mild ttp and swelling.      Neurological: He is alert and oriented to person, place, and time.   Skin: Skin is warm and dry. Capillary refill takes less than 2 seconds. No rash and no abscess noted. There is erythema. No pallor.       ED Course   Procedures  Labs Reviewed - No data to  display       Imaging Results    None          Medications - No data to display  Medical Decision Making:   ED Management:  Pt with [facility]. Wound irrigated and WTD dressing placed. Pt advised to call his surgeon tomorrow. Dressing supplies given. Rx keflex.                         Clinical Impression:   Final diagnoses:  [Z48.89] Encounter for post surgical wound check (Primary)  [T81.31XA] Dehiscence of operative wound, initial encounter        ED Disposition Condition    Discharge Stable          ED Prescriptions       Medication Sig Dispense Start Date End Date Auth. Provider    cephALEXin (KEFLEX) 500 MG capsule Take 1 capsule (500 mg total) by mouth every 12 (twelve) hours. for 5 days 10 capsule 1/8/2023 1/13/2023 John Neri Jr., MD          Follow-up Information       Follow up With Specialties Details Why Contact Info    your surgeon  In 1 day               John Neri Jr., MD  01/08/23 3719

## 2023-01-24 ENCOUNTER — TELEPHONE (OUTPATIENT)
Dept: GASTROENTEROLOGY | Facility: CLINIC | Age: 58
End: 2023-01-24
Payer: OTHER GOVERNMENT

## 2023-01-24 DIAGNOSIS — M25.511 RIGHT SHOULDER PAIN, UNSPECIFIED CHRONICITY: Primary | ICD-10-CM

## 2023-01-24 NOTE — TELEPHONE ENCOUNTER
Email sent to [facility]:    Shwetha Medina;Sawyre Conrad <[email or web address]>  Thank you Sawyer for faxing us Mr. Cabral's operative report.   I am sorry but we forgot to ask for the last GI office visit note, esophagram report ~ 7/2021 and all EGD w/ path reports for the past 5 years.  We will be able to schedule Mr. Cabral after we receive this information.  thank you for your prompt assistance.  Shwetha Walker RN  fax:  [phone]    phone (direct)  [phone]  to leave a message:   [phone]

## 2023-01-24 NOTE — TELEPHONE ENCOUNTER
Called pt's mobile ph no, no voice mail set up    Called pt's home ph no, spoke with pt  Pt explained has been scoped a few times at the [facility] and also had and esophagram 2021  Does not remember last ov with GI provider.  Explained will email [facility] and request records

## 2023-01-25 ENCOUNTER — TELEPHONE (OUTPATIENT)
Dept: GASTROENTEROLOGY | Facility: CLINIC | Age: 58
End: 2023-01-25
Payer: OTHER GOVERNMENT

## 2023-01-25 NOTE — TELEPHONE ENCOUNTER
[facility] ( [facility] -last GI OV note)    Called pt to schedule NP apt, no ans  Lm re: calling to offer NP apt  Asked pt to call the clinic, ph no provided    Called home ph  no- voice mail box not set up

## 2023-01-30 ENCOUNTER — HOSPITAL ENCOUNTER (OUTPATIENT)
Dept: RADIOLOGY | Facility: HOSPITAL | Age: 58
Discharge: HOME | End: 2023-01-30
Attending: ORTHOPAEDIC SURGERY
Payer: OTHER GOVERNMENT

## 2023-01-30 ENCOUNTER — OFFICE VISIT (OUTPATIENT)
Dept: ORTHOPEDICS | Facility: CLINIC | Age: 58
End: 2023-01-30
Payer: OTHER GOVERNMENT

## 2023-01-30 DIAGNOSIS — G89.29 CHRONIC RIGHT SHOULDER PAIN: Primary | ICD-10-CM

## 2023-01-30 DIAGNOSIS — M25.511 RIGHT SHOULDER PAIN, UNSPECIFIED CHRONICITY: ICD-10-CM

## 2023-01-30 DIAGNOSIS — M75.121 NONTRAUMATIC COMPLETE TEAR OF RIGHT ROTATOR CUFF: Primary | ICD-10-CM

## 2023-01-30 DIAGNOSIS — M25.511 CHRONIC RIGHT SHOULDER PAIN: Primary | ICD-10-CM

## 2023-01-30 PROCEDURE — 99999 PR PBB SHADOW E&M-EST. PATIENT-LVL II: CPT | Mod: PBBFAC,,, | Performed by: ORTHOPAEDIC SURGERY

## 2023-01-30 PROCEDURE — 99999 PR PBB SHADOW E&M-EST. PATIENT-LVL II: ICD-10-PCS | Mod: PBBFAC,,, | Performed by: ORTHOPAEDIC SURGERY

## 2023-01-30 PROCEDURE — 99214 OFFICE O/P EST MOD 30 MIN: CPT | Mod: 25,S$PBB,, | Performed by: ORTHOPAEDIC SURGERY

## 2023-01-30 PROCEDURE — 20610 LARGE JOINT ASPIRATION/INJECTION: R SUBACROMIAL BURSA: ICD-10-PCS | Mod: S$PBB,RT,, | Performed by: ORTHOPAEDIC SURGERY

## 2023-01-30 PROCEDURE — 73030 X-RAY EXAM OF SHOULDER: CPT | Mod: 26,RT,, | Performed by: RADIOLOGY

## 2023-01-30 PROCEDURE — 99212 OFFICE O/P EST SF 10 MIN: CPT | Mod: PBBFAC,PN | Performed by: ORTHOPAEDIC SURGERY

## 2023-01-30 PROCEDURE — 20610 DRAIN/INJ JOINT/BURSA W/O US: CPT | Mod: PBBFAC,PN | Performed by: ORTHOPAEDIC SURGERY

## 2023-01-30 PROCEDURE — 99214 PR OFFICE/OUTPT VISIT, EST, LEVL IV, 30-39 MIN: ICD-10-PCS | Mod: 25,S$PBB,, | Performed by: ORTHOPAEDIC SURGERY

## 2023-01-30 PROCEDURE — 73030 XR SHOULDER TRAUMA 3 VIEW RIGHT: ICD-10-PCS | Mod: 26,RT,, | Performed by: RADIOLOGY

## 2023-01-30 PROCEDURE — 73030 X-RAY EXAM OF SHOULDER: CPT | Mod: TC,PN,RT

## 2023-01-30 RX ORDER — TRIAMCINOLONE ACETONIDE 40 MG/ML
40 INJECTION, SUSPENSION INTRA-ARTICULAR; INTRAMUSCULAR
Status: DISCONTINUED | OUTPATIENT
Start: 2023-01-30 | End: 2023-01-30 | Stop reason: HOSPADM

## 2023-01-30 RX ADMIN — TRIAMCINOLONE ACETONIDE 40 MG: 40 INJECTION, SUSPENSION INTRA-ARTICULAR; INTRAMUSCULAR at 02:01

## 2023-01-30 NOTE — PROGRESS NOTES
Past Medical History:   Diagnosis Date    Alcohol abuse, unspecified     Allergic rhinitis, cause unspecified     Arthritis     Asthma attack     Cancer 2022    skin cancer on nose    Carpal tunnel syndrome     Depressive disorder, not elsewhere classified     Diarrhea     Encounter for blood transfusion     GERD (gastroesophageal reflux disease)     Hypersomnia, unspecified     Hypertension     Hypoglycemia     Lumbago     Obesity, unspecified     JAH (obstructive sleep apnea)     uses BIPAP    Other and unspecified hyperlipidemia     Other ankle sprain and strain     Psychosexual dysfunction with inhibited sexual excitement     Pyogenic arthritis of knee 06/2021    staph pseudintermedius, TKA    Pyogenic arthritis of knee 09/21/2021    Enterococcus    Ventral hernia, unspecified, without mention of obstruction or gangrene        Past Surgical History:   Procedure Laterality Date    A-scope knees      Bilateral    ARTHROSCOPIC REPAIR OF ROTATOR CUFF OF SHOULDER Left 09/20/2019    Procedure: REPAIR, ROTATOR CUFF, ARTHROSCOPIC;  Surgeon: Felix Levin MD;  Location: [facility];  Service: Orthopedics;  Laterality: Left;    ARTHROSCOPY OF SHOULDER WITH DECOMPRESSION OF SUBACROMIAL SPACE Left 09/20/2019    Procedure: ARTHROSCOPY, SHOULDER, WITH SUBACROMIAL SPACE DECOMPRESSION;  Surgeon: Felix Levin MD;  Location: [facility] OR;  Service: Orthopedics;  Laterality: Left;  [employer] notified of all case today 9/16-tcb    CARPAL TUNNEL RELEASE      Bilateral    COLONOSCOPY N/A 8/5/2022    Procedure: COLONOSCOPY;  Surgeon: Zaida Ansari MD;  Location: [facility];  Service: Endoscopy;  Laterality: N/A;    ESOPHAGOGASTRODUODENOSCOPY N/A 8/5/2022    Procedure: EGD (ESOPHAGOGASTRODUODENOSCOPY);  Surgeon: Zaida Ansari MD;  Location: [facility];  Service: Endoscopy;  Laterality: N/A;    EYE SURGERY      Lasik    HERNIA REPAIR      INCISION AND DRAINAGE OF HEMATOMA Right 09/21/2021    Procedure:  INCISION AND DRAINAGE, HEMATOMA;  Surgeon: Felix Levin MD;  Location: [facility] OR;  Service: Orthopedics;  Laterality: Right;    INSERTION OF ANTIBIOTIC SPACER Right 06/01/2021    Procedure: INSERTION, ANTIBIOTIC SPACER;  Surgeon: Felix Levin MD;  Location: [facility] OR;  Service: Orthopedics;  Laterality: Right;    JOINT REPLACEMENT      KNEE ARTHROPLASTY Left 08/04/2020    Procedure: ARTHROPLASTY, KNEE;  Surgeon: Felix Levin MD;  Location: [facility] OR;  Service: Orthopedics;  Laterality: Left;    KNEE ARTHROPLASTY Right 03/23/2021    Procedure: ARTHROPLASTY, KNEE;  Surgeon: Felix Levin MD;  Location: [facility] OR;  Service: Orthopedics;  Laterality: Right;    KNEE ARTHROSCOPY W/ MENISCECTOMY Left 10/15/2019    Procedure: ARTHROSCOPY, KNEE, WITH MENISCECTOMY;  Surgeon: Felix Levin MD;  Location: [facility] OR;  Service: Orthopedics;  Laterality: Left;  Medial and Lateral Meniscectomy    KNEE ARTHROSCOPY W/ MENISCECTOMY Right 11/22/2019    Procedure: ARTHROSCOPY, KNEE, WITH MENISCECTOMY;  Surgeon: Felix Levin MD;  Location: [facility] OR;  Service: Orthopedics;  Laterality: Right;    KNEE SURGERY      osmar    NISSEN FUNDOPLICATION      REVERSE TOTAL SHOULDER ARTHROPLASTY Left 02/11/2020    Procedure: ARTHROPLASTY, SHOULDER, TOTAL, REVERSE;  Surgeon: Felix Levin MD;  Location: [facility] OR;  Service: Orthopedics;  Laterality: Left;  [place]    revision of gastrojejunostomy  05/10/2021    [facility] [place]    REVISION OF KNEE ARTHROPLASTY Right 09/07/2021    Procedure: REVISION, ARTHROPLASTY, KNEE;  Surgeon: Felix Levin MD;  Location: [facility] OR;  Service: Orthopedics;  Laterality: Right;    ROTATOR CUFF REPAIR      right    RJ-EN-Y PROCEDURE N/A 05/2021    SLEEVE GASTROPLASTY  12/2013       Current Outpatient Medications   Medication Sig    aspirin 81 MG Chew Take 1 tablet (81 mg total) by mouth 2 (two) times daily.    cyanocobalamin (VITAMIN B-12) 1000 MCG tablet  Take 1,000 mcg by mouth once daily.     cyclobenzaprine (FLEXERIL) 10 MG tablet Take 10 mg by mouth 3 (three) times daily as needed for Muscle spasms.    diclofenac sodium (VOLTAREN) 1 % Gel Apply topically daily as needed.    EScitalopram oxalate (LEXAPRO) 10 MG tablet Take 15 mg by mouth once daily.    ferrous sulfate (FEOSOL) 325 mg (65 mg iron) Tab tablet TAKE ONE TABLET BY MOUTH ONCE DAILY AS AN IRON SUPPLEMENT    losartan (COZAAR) 50 MG tablet Take 50 mg by mouth once daily.     oxyCODONE-acetaminophen (PERCOCET)  mg per tablet Take 1 tablet by mouth every 6 (six) hours as needed for Pain.    traZODone (DESYREL) 100 MG tablet Take 1 tablet by mouth nightly as needed.    triamcinolone acetonide 0.1% (KENALOG) 0.1 % cream Apply topically 3 (three) times daily. for 10 days    vitamin D (VITAMIN D3) 1000 units Tab Take 2,000 Units by mouth once daily.     No current facility-administered medications for this visit.       Review of patient's allergies indicates:  No Known Allergies    Family History   Problem Relation Age of Onset    Arthritis Mother     Hypertension Father     Kidney disease Father     Heart disease Father     COPD Father     Pancreatic cancer Maternal Grandfather     Colon cancer Paternal Grandfather     Colon polyps Neg Hx     Crohn's disease Neg Hx     Ulcerative colitis Neg Hx     Stomach cancer Neg Hx     Esophageal cancer Neg Hx        Social History     Socioeconomic History    Marital status:    Tobacco Use    Smoking status: Never    Smokeless tobacco: Current     Types: Chew   Substance and Sexual Activity    Alcohol use: Yes     Comment: occasionally    Drug use: No       Chief Complaint:   No chief complaint on file.      Date of surgery:  February 11, 2020    History of present illness:  57-year-old male underwent left reverse total shoulder arthroplasty 3 years ago.  Started to have worsening right shoulder pain back in September.  History of right rotator cuff repair  by Dr. Patterson back in 2004.  Shoulder is continued to worsen with pain and weakness.  Feels like it is torn again.  Pain is 7/10.  Burning pain.  Can not lift his arm up above his head.  Pain at night.      Review of Systems:    Musculoskeletal:  See HPI        Physical Examination:    Vital Signs:  There were no vitals filed for this visit.    There is no height or weight on file to calculate BMI.    This a well-developed, well nourished patient in no acute distress.  They are alert and oriented and cooperative to examination.  Pt. walks without an antalgic gait.      Examination of the right shoulder shows no rashes or erythema.  Surgical scars noted.  There are no masses, ecchymosis, or atrophy. The patient has decreased active range of motion in forward flexion, external rotation, and internal rotation to the mid T-spine. The patient has positive Aleman Neer and Grant's test.  Nontender to palpation over a.c. joint. Normal stability anteriorly, posteriorly, and negative sulcus sign. Passive range of motion: Forward flexion of 180°, external rotation at 90° of 90°, internal rotation of 50°, and external rotation at 0° of 50°. 2+ radial pulse. Intact axillary, radial, median and ulnar sensation. 4- out of 5 resisted forward flexion, external rotation, and negative lift off test.        X-rays:  X-rays of the right shoulder ordered and reviewed which show previous suture anchors from rotator cuff repair.     Assessment::  Recurrent right rotator cuff tear    Plan:  Reviewed the findings with him today.  Recommended an MRI to evaluate the integrity of his rotator cuff.  We will get an MRI with metal suppression.  Agreed to inject his shoulder just to help with pain while we get it worked up.    This note was created using [employer] voice recognition software that occasionally misinterpreted phrases or words.

## 2023-01-30 NOTE — PROCEDURES
Large Joint Aspiration/Injection: R subacromial bursa    Date/Time: 1/30/2023 2:30 PM  Performed by: Felix Levin MD  Authorized by: Felix Levin MD     Consent Done?:  Yes (Verbal)  Indications:  Pain  Site marked: the procedure site was marked    Timeout: prior to procedure the correct patient, procedure, and site was verified    Local anesthetic:  Lidocaine 1% without epinephrine and bupivacaine 0.25% without epinephrine  Anesthetic total (ml):  6      Details:  Needle Size:  20 G  Ultrasonic Guidance for needle placement?: No    Approach:  Posterior  Location:  Shoulder  Site:  R subacromial bursa  Medications:  40 mg triamcinolone acetonide 40 mg/mL  Patient tolerance:  Patient tolerated the procedure well with no immediate complications

## 2023-02-01 ENCOUNTER — TELEPHONE (OUTPATIENT)
Dept: GASTROENTEROLOGY | Facility: CLINIC | Age: 58
End: 2023-02-01
Payer: OTHER GOVERNMENT

## 2023-02-01 NOTE — TELEPHONE ENCOUNTER
[facility]:   today    Sawyer De La Rosa <[email or web address]>  Hi Mr. Jacques,  Are you able to fax us patient's last gastro office visit note ~ 12/15/22?    Any path report available from EGD on 10/30/17?  Dr Walker does like to have this information prior to seeing our patient in clinic.  PLEASE let me know if you do not have this information.  thank you,  Shwetha   phone:   [phone]  (direct line)  phone:   [phone]  ( to leave a message with the phone staff)  FAX:    [phone]

## 2023-02-02 ENCOUNTER — HOSPITAL ENCOUNTER (EMERGENCY)
Facility: HOSPITAL | Age: 58
Discharge: HOME | End: 2023-02-02
Attending: EMERGENCY MEDICINE
Payer: OTHER GOVERNMENT

## 2023-02-02 VITALS
TEMPERATURE: 99 F | DIASTOLIC BLOOD PRESSURE: 101 MMHG | OXYGEN SATURATION: 100 % | HEIGHT: 70 IN | RESPIRATION RATE: 20 BRPM | WEIGHT: 226 LBS | HEART RATE: 77 BPM | SYSTOLIC BLOOD PRESSURE: 154 MMHG | BODY MASS INDEX: 32.35 KG/M2

## 2023-02-02 DIAGNOSIS — S39.012A STRAIN OF LUMBAR REGION, INITIAL ENCOUNTER: Primary | ICD-10-CM
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
## 2023-02-02 PROCEDURE — 99284 EMERGENCY DEPT VISIT MOD MDM: CPT | Mod: 25
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
## 2023-02-09 ENCOUNTER — HOSPITAL ENCOUNTER (OUTPATIENT)
Dept: RADIOLOGY | Facility: HOSPITAL | Age: 58
Discharge: HOME | End: 2023-02-09
Attending: ORTHOPAEDIC SURGERY
Payer: OTHER GOVERNMENT

## 2023-02-09 DIAGNOSIS — M25.511 CHRONIC RIGHT SHOULDER PAIN: ICD-10-CM

## 2023-02-09 DIAGNOSIS — G89.29 CHRONIC RIGHT SHOULDER PAIN: ICD-10-CM

## 2023-02-09 PROCEDURE — 73221 MRI JOINT UPR EXTREM W/O DYE: CPT | Mod: TC,RT

## 2023-02-09 PROCEDURE — 73221 MRI SHOULDER WITHOUT CONTRAST RIGHT: ICD-10-PCS | Mod: 26,RT,, | Performed by: RADIOLOGY

## 2023-02-09 PROCEDURE — 73221 MRI JOINT UPR EXTREM W/O DYE: CPT | Mod: 26,RT,, | Performed by: RADIOLOGY

## 2023-02-13 ENCOUNTER — OFFICE VISIT (OUTPATIENT)
Dept: ORTHOPEDICS | Facility: CLINIC | Age: 58
End: 2023-02-13
Payer: OTHER GOVERNMENT

## 2023-02-13 VITALS — HEIGHT: 70 IN | WEIGHT: 226 LBS | BODY MASS INDEX: 32.35 KG/M2

## 2023-02-13 DIAGNOSIS — M75.21 BICEPS TENDINITIS, RIGHT: Primary | ICD-10-CM

## 2023-02-13 PROCEDURE — 99999 PR PBB SHADOW E&M-EST. PATIENT-LVL III: CPT | Mod: PBBFAC,,, | Performed by: ORTHOPAEDIC SURGERY

## 2023-02-13 PROCEDURE — 99213 OFFICE O/P EST LOW 20 MIN: CPT | Mod: PBBFAC,PN | Performed by: ORTHOPAEDIC SURGERY

## 2023-02-13 PROCEDURE — 99214 OFFICE O/P EST MOD 30 MIN: CPT | Mod: 57,S$PBB,, | Performed by: ORTHOPAEDIC SURGERY

## 2023-02-13 PROCEDURE — 99999 PR PBB SHADOW E&M-EST. PATIENT-LVL III: ICD-10-PCS | Mod: PBBFAC,,, | Performed by: ORTHOPAEDIC SURGERY

## 2023-02-13 PROCEDURE — 99214 PR OFFICE/OUTPT VISIT, EST, LEVL IV, 30-39 MIN: ICD-10-PCS | Mod: 57,S$PBB,, | Performed by: ORTHOPAEDIC SURGERY

## 2023-02-13 NOTE — PROGRESS NOTES
Past Medical History:   Diagnosis Date    Alcohol abuse, unspecified     Allergic rhinitis, cause unspecified     Arthritis     Asthma attack     Cancer 2022    skin cancer on nose    Carpal tunnel syndrome     Depressive disorder, not elsewhere classified     Diarrhea     Encounter for blood transfusion     GERD (gastroesophageal reflux disease)     Hypersomnia, unspecified     Hypertension     Hypoglycemia     Lumbago     Obesity, unspecified     JAH (obstructive sleep apnea)     uses BIPAP    Other and unspecified hyperlipidemia     Other ankle sprain and strain     Psychosexual dysfunction with inhibited sexual excitement     Pyogenic arthritis of knee 06/2021    staph pseudintermedius, TKA    Pyogenic arthritis of knee 09/21/2021    Enterococcus    Ventral hernia, unspecified, without mention of obstruction or gangrene        Past Surgical History:   Procedure Laterality Date    A-scope knees      Bilateral    ARTHROSCOPIC REPAIR OF ROTATOR CUFF OF SHOULDER Left 09/20/2019    Procedure: REPAIR, ROTATOR CUFF, ARTHROSCOPIC;  Surgeon: Felix Levin MD;  Location: [facility];  Service: Orthopedics;  Laterality: Left;    ARTHROSCOPY OF SHOULDER WITH DECOMPRESSION OF SUBACROMIAL SPACE Left 09/20/2019    Procedure: ARTHROSCOPY, SHOULDER, WITH SUBACROMIAL SPACE DECOMPRESSION;  Surgeon: Felix Levin MD;  Location: [facility] OR;  Service: Orthopedics;  Laterality: Left;  [employer] notified of all case today 9/16-tcb    CARPAL TUNNEL RELEASE      Bilateral    COLONOSCOPY N/A 8/5/2022    Procedure: COLONOSCOPY;  Surgeon: Zaida Ansari MD;  Location: [facility];  Service: Endoscopy;  Laterality: N/A;    ESOPHAGOGASTRODUODENOSCOPY N/A 8/5/2022    Procedure: EGD (ESOPHAGOGASTRODUODENOSCOPY);  Surgeon: Zaida Ansari MD;  Location: [facility];  Service: Endoscopy;  Laterality: N/A;    EYE SURGERY      Lasik    HERNIA REPAIR      INCISION AND DRAINAGE OF HEMATOMA Right 09/21/2021    Procedure:  INCISION AND DRAINAGE, HEMATOMA;  Surgeon: Felix Levin MD;  Location: [facility] OR;  Service: Orthopedics;  Laterality: Right;    INSERTION OF ANTIBIOTIC SPACER Right 06/01/2021    Procedure: INSERTION, ANTIBIOTIC SPACER;  Surgeon: Felix Levin MD;  Location: [facility] OR;  Service: Orthopedics;  Laterality: Right;    JOINT REPLACEMENT      KNEE ARTHROPLASTY Left 08/04/2020    Procedure: ARTHROPLASTY, KNEE;  Surgeon: Felix Levin MD;  Location: [facility] OR;  Service: Orthopedics;  Laterality: Left;    KNEE ARTHROPLASTY Right 03/23/2021    Procedure: ARTHROPLASTY, KNEE;  Surgeon: Felix Levin MD;  Location: [facility] OR;  Service: Orthopedics;  Laterality: Right;    KNEE ARTHROSCOPY W/ MENISCECTOMY Left 10/15/2019    Procedure: ARTHROSCOPY, KNEE, WITH MENISCECTOMY;  Surgeon: Felix Levin MD;  Location: [facility] OR;  Service: Orthopedics;  Laterality: Left;  Medial and Lateral Meniscectomy    KNEE ARTHROSCOPY W/ MENISCECTOMY Right 11/22/2019    Procedure: ARTHROSCOPY, KNEE, WITH MENISCECTOMY;  Surgeon: Felix Levin MD;  Location: [facility] OR;  Service: Orthopedics;  Laterality: Right;    KNEE SURGERY      osmar    NISSEN FUNDOPLICATION      REVERSE TOTAL SHOULDER ARTHROPLASTY Left 02/11/2020    Procedure: ARTHROPLASTY, SHOULDER, TOTAL, REVERSE;  Surgeon: Felix Levin MD;  Location: [facility] OR;  Service: Orthopedics;  Laterality: Left;  Loren    revision of gastrojejunostomy  05/10/2021    [facility] [place]    REVISION OF KNEE ARTHROPLASTY Right 09/07/2021    Procedure: REVISION, ARTHROPLASTY, KNEE;  Surgeon: Felix Levin MD;  Location: [facility] OR;  Service: Orthopedics;  Laterality: Right;    ROTATOR CUFF REPAIR      right    RJ-EN-Y PROCEDURE N/A 05/2021    SLEEVE GASTROPLASTY  12/2013       Current Outpatient Medications   Medication Sig    cyanocobalamin (VITAMIN B-12) 1000 MCG tablet Take 1,000 mcg by mouth once daily.     cyclobenzaprine (FLEXERIL) 10 MG tablet  Take 10 mg by mouth 3 (three) times daily as needed for Muscle spasms.    diclofenac sodium (VOLTAREN) 1 % Gel Apply topically daily as needed.    EScitalopram oxalate (LEXAPRO) 10 MG tablet Take 15 mg by mouth once daily.    ferrous sulfate (FEOSOL) 325 mg (65 mg iron) Tab tablet TAKE ONE TABLET BY MOUTH ONCE DAILY AS AN IRON SUPPLEMENT    losartan (COZAAR) 50 MG tablet Take 50 mg by mouth once daily.     oxyCODONE-acetaminophen (PERCOCET)  mg per tablet Take 1 tablet by mouth every 6 (six) hours as needed for Pain.    traZODone (DESYREL) 100 MG tablet Take 1 tablet by mouth nightly as needed.    vitamin D (VITAMIN D3) 1000 units Tab Take 2,000 Units by mouth once daily.    aspirin 81 MG Chew Take 1 tablet (81 mg total) by mouth 2 (two) times daily.    triamcinolone acetonide 0.1% (KENALOG) 0.1 % cream Apply topically 3 (three) times daily. for 10 days     No current facility-administered medications for this visit.       Review of patient's allergies indicates:  No Known Allergies    Family History   Problem Relation Age of Onset    Arthritis Mother     Hypertension Father     Kidney disease Father     Heart disease Father     COPD Father     Pancreatic cancer Maternal Grandfather     Colon cancer Paternal Grandfather     Colon polyps Neg Hx     Crohn's disease Neg Hx     Ulcerative colitis Neg Hx     Stomach cancer Neg Hx     Esophageal cancer Neg Hx        Social History     Socioeconomic History    Marital status:    Tobacco Use    Smoking status: Never    Smokeless tobacco: Current     Types: Chew   Substance and Sexual Activity    Alcohol use: Yes     Comment: occasionally    Drug use: No       Chief Complaint:   Chief Complaint   Patient presents with    Right Shoulder - Pain, Results     Here for Review of MRI Right Shoulder        Date of surgery:  February 11, 2020    History of present illness:  57-year-old male underwent left reverse total shoulder arthroplasty 3 years ago.  Started to  have worsening right shoulder pain back in September.  History of right rotator cuff repair by Dr. Leonardo ibarra in 2004.  Shoulder is continued to worsen with pain and weakness.  Feels like it is torn again.  Pain is 7/10.  Burning pain.  Can not lift his arm up above his head.  Pain at night.      Review of Systems:    Musculoskeletal:  See HPI        Physical Examination:    Vital Signs:  There were no vitals filed for this visit.    Body mass index is 32.42 kg/m².    This a well-developed, well nourished patient in no acute distress.  They are alert and oriented and cooperative to examination.  Pt. walks without an antalgic gait.      Examination of the right shoulder shows no rashes or erythema.  Surgical scars noted.  There are no masses, ecchymosis, or atrophy. The patient has decreased active range of motion in forward flexion, external rotation, and internal rotation to the mid T-spine. The patient has positive Aleman Neer and Meridian's test.  Nontender to palpation over a.c. joint. Normal stability anteriorly, posteriorly, and negative sulcus sign. Passive range of motion: Forward flexion of 180°, external rotation at 90° of 90°, internal rotation of 50°, and external rotation at 0° of 50°. 2+ radial pulse. Intact axillary, radial, median and ulnar sensation. 4- out of 5 resisted forward flexion, external rotation, and negative lift off test.    Heart is regular rate without obvious murmurs   Normal respiratory effort without audible wheezing  Abdomen is soft and nontender         X-rays:  X-rays of the right shoulder reviewed which show previous suture anchors from rotator cuff repair.    MRI of the right shoulder is reviewed and interpreted:1. Interstitial delaminating tear involving the superior insertional subscapularis.  2. Tendinosis and tenosynovitis of the proximal biceps tendon.  3. Mild AC joint degenerative change.     Assessment::  Right proximal biceps tendinitis with possible  subluxation    Plan:  Reviewed the findings with him today.  Talked about treatment options.  Patient has rotator cuff looks good at least.  Plan will be for right shoulder arthroscopy with biceps tenodesis and evaluation of the subscapularis.  Risks, benefits, and alternatives to the procedure were explained to the patient including but not limited to damage to nerves, arteries, blood vessels, bones, tendons, ligaments, stiffness, instability, infection, permanent limb dysfunction, DVT, PE, as well as general anesthetic complications including seizure, stroke, heart attack and even death. The patient understood these risks and wished to proceed and signed the informed consent.       This note was created using [employer] voice recognition software that occasionally misinterpreted phrases or words.

## 2023-02-15 DIAGNOSIS — Z01.818 PRE-OP TESTING: ICD-10-CM

## 2023-02-15 DIAGNOSIS — M75.21 BICEPS TENDINITIS, RIGHT: Primary | ICD-10-CM

## 2023-02-15 DIAGNOSIS — M75.21 BICIPITAL TENDINITIS OF RIGHT SHOULDER: ICD-10-CM

## 2023-02-15 RX ORDER — MUPIROCIN 20 MG/G
OINTMENT TOPICAL
Status: CANCELLED | OUTPATIENT
Start: 2023-02-15

## 2023-03-03 ENCOUNTER — OFFICE VISIT (OUTPATIENT)
Dept: GASTROENTEROLOGY | Facility: CLINIC | Age: 58
End: 2023-03-03
Payer: OTHER GOVERNMENT

## 2023-03-03 ENCOUNTER — TELEPHONE (OUTPATIENT)
Dept: GASTROENTEROLOGY | Facility: CLINIC | Age: 58
End: 2023-03-03
Payer: OTHER GOVERNMENT

## 2023-03-03 VITALS
SYSTOLIC BLOOD PRESSURE: 119 MMHG | WEIGHT: 231.69 LBS | OXYGEN SATURATION: 98 % | HEART RATE: 110 BPM | DIASTOLIC BLOOD PRESSURE: 82 MMHG | HEIGHT: 70 IN | BODY MASS INDEX: 33.17 KG/M2

## 2023-03-03 DIAGNOSIS — R13.10 DYSPHAGIA, UNSPECIFIED TYPE: Primary | ICD-10-CM

## 2023-03-03 DIAGNOSIS — R11.0 NAUSEA: ICD-10-CM

## 2023-03-03 DIAGNOSIS — R11.2 NAUSEA AND VOMITING, UNSPECIFIED VOMITING TYPE: ICD-10-CM

## 2023-03-03 DIAGNOSIS — R68.81 EARLY SATIETY: ICD-10-CM

## 2023-03-03 DIAGNOSIS — K21.9 GASTROESOPHAGEAL REFLUX DISEASE, UNSPECIFIED WHETHER ESOPHAGITIS PRESENT: ICD-10-CM

## 2023-03-03 PROCEDURE — 99215 PR OFFICE/OUTPT VISIT, EST, LEVL V, 40-54 MIN: ICD-10-PCS | Mod: S$PBB,,, | Performed by: INTERNAL MEDICINE

## 2023-03-03 PROCEDURE — 99215 OFFICE O/P EST HI 40 MIN: CPT | Mod: S$PBB,,, | Performed by: INTERNAL MEDICINE

## 2023-03-03 PROCEDURE — 99999 PR PBB SHADOW E&M-EST. PATIENT-LVL III: ICD-10-PCS | Mod: PBBFAC,,, | Performed by: INTERNAL MEDICINE

## 2023-03-03 PROCEDURE — 99999 PR PBB SHADOW E&M-EST. PATIENT-LVL III: CPT | Mod: PBBFAC,,, | Performed by: INTERNAL MEDICINE

## 2023-03-03 PROCEDURE — 99213 OFFICE O/P EST LOW 20 MIN: CPT | Mod: PBBFAC | Performed by: INTERNAL MEDICINE

## 2023-03-03 RX ORDER — ONDANSETRON 4 MG/1
TABLET, FILM COATED ORAL
COMMUNITY
Start: 2022-10-28 | End: 2023-03-16 | Stop reason: ALTCHOICE

## 2023-03-03 RX ORDER — OMEPRAZOLE 40 MG/1
40 CAPSULE, DELAYED RELEASE ORAL
COMMUNITY
Start: 2023-02-16

## 2023-03-03 RX ORDER — LOSARTAN POTASSIUM AND HYDROCHLOROTHIAZIDE 25; 100 MG/1; MG/1
1 TABLET ORAL
COMMUNITY
Start: 2023-02-13

## 2023-03-03 RX ORDER — ESCITALOPRAM OXALATE 20 MG/1
20 TABLET ORAL
COMMUNITY
Start: 2023-02-17

## 2023-03-03 RX ORDER — LOSARTAN POTASSIUM 100 MG/1
1 TABLET ORAL DAILY
COMMUNITY
Start: 2022-06-13 | End: 2023-03-03

## 2023-03-03 RX ORDER — OXYCODONE AND ACETAMINOPHEN 5; 325 MG/1; MG/1
TABLET ORAL
COMMUNITY
Start: 2023-02-16 | End: 2023-03-03

## 2023-03-03 RX ORDER — PROMETHAZINE HYDROCHLORIDE 12.5 MG/1
12.5 TABLET ORAL
COMMUNITY
Start: 2022-12-15 | End: 2023-03-16 | Stop reason: ALTCHOICE

## 2023-03-03 RX ORDER — ASCORBIC ACID 500 MG
500 TABLET ORAL
COMMUNITY
Start: 2022-08-25

## 2023-03-03 RX ORDER — TADALAFIL 20 MG/1
TABLET ORAL
COMMUNITY
Start: 2022-08-09 | End: 2023-08-10

## 2023-03-03 RX ORDER — OXYCODONE HYDROCHLORIDE 5 MG/1
1 TABLET ORAL
COMMUNITY
Start: 2023-02-13 | End: 2023-03-03

## 2023-03-03 RX ORDER — DOXEPIN HYDROCHLORIDE 50 MG/1
50 CAPSULE ORAL NIGHTLY
COMMUNITY
Start: 2023-02-17 | End: 2024-02-05

## 2023-03-03 RX ORDER — GABAPENTIN 300 MG/1
600 CAPSULE ORAL
COMMUNITY
Start: 2023-01-13 | End: 2023-10-23

## 2023-03-03 RX ORDER — BACLOFEN 20 MG/1
20 TABLET ORAL
COMMUNITY
Start: 2023-01-13 | End: 2023-03-16 | Stop reason: ALTCHOICE

## 2023-03-03 RX ORDER — MENTHOL, UNSPECIFIED FORM AND METHYL SALICYLATE 10; 150 MG/ML; MG/ML
CREAM TOPICAL
COMMUNITY
Start: 2022-10-12 | End: 2023-03-13 | Stop reason: SDUPTHER

## 2023-03-03 RX ORDER — ALBUTEROL SULFATE 90 UG/1
AEROSOL, METERED RESPIRATORY (INHALATION)
COMMUNITY
Start: 2022-08-25

## 2023-03-03 RX ORDER — LIDOCAINE 50 MG/G
PATCH TOPICAL
COMMUNITY
Start: 2023-01-13

## 2023-03-03 RX ORDER — SODIUM FLUORIDE 5 MG/G
GEL, DENTIFRICE DENTAL
COMMUNITY
Start: 2022-12-27 | End: 2023-04-27

## 2023-03-03 RX ORDER — NALOXONE HYDROCHLORIDE 4 MG/.1ML
SPRAY NASAL
COMMUNITY
Start: 2022-10-12

## 2023-03-03 RX ORDER — FERROUS SULFATE 325(65) MG
65 TABLET ORAL EVERY OTHER DAY
COMMUNITY
Start: 2022-10-28 | End: 2023-03-03

## 2023-03-03 NOTE — TELEPHONE ENCOUNTER
AVS reviewed withpt, copy given.    TBS pre-procedure instructions reviewed with pt and copy given    Asked patient to call the endoscopy  and leave a message that he/is interested in scheduling their procedure(s)  Ph no provided.    Explained it may take up to 2 wks to receive a call back to schedule.     Tbs am of fu visit.

## 2023-03-03 NOTE — H&P (VIEW-ONLY)
[facility] Consultation Note    Reason for Consult:  No chief complaint on file.        PCP:   [facility]   [address] / [place] [address]    Referring MD:  Dr. Olu Najera and Dr. Acharya     [facility]  [address] [place] [address]    Dr. Yuen (Foregut Surg)    HPI:  Himanshu Connor II is a 57 y.o. male referred to motility clinic for second opinion regarding the following problems:    GI note 12/15/2022:  57-year-old male with esophageal diverticulum, Nissen x2 1989 in 1994, gastric sleeve 2013, Kinjal-en-Y 05/2021 presents for follow-up of dysphagia.  Patient initially had severe GERD after gastric sleeve, but developed dysphagia symptoms after Kinjal-en-Y.  EGD since 2017 noted distal diverticulum.  He reports only solid food dysphagia food getting stuck in mid chest prior to regurgitation of most meals.  Reflux no absent.  Since last visit again underwent EGD with bariatric surgery which revealed large, distal esophageal diverticulum but no evidence of stenosis.  Was continued to attempt to stick to small meals and liquids as means to tolerate diet.  After often vomiting comes after initial bites of food.  He states that a large portion of his meals are now followed by vomiting and he is unable to eat in public to these symptoms.  Prior attempts at obtaining manometry unsuccessful due to inability to pass the probe likely secondary to distal diverticulum but this was before Kinjal-en-Y bypass.  Esophageal dilation performed did not improve symptoms.  He is not reporting worsening nausea compared to previously.    GERD.   Retrosternal pyrosis: none  Regurgitation: few per week  Onset: years  Upright symptoms: yes  Nocturnal symptoms: few per week     Caffeine intake: 1 drink per day     Sleeps with head of the bed elevated.    Avoids eating prior to bedtime.  yes    MEDS:  Protonix 40mg BID w/o improvement   Pickle juice    Apple cider vinegar     Nissen Fundoplication x 2 (1989, 1994)  ANTONIA, Sleeve gastrectomy, Nissen takedown 12/2012  EGD w glue and stent to repair fistula 1/2014   RnY gastric bypass 5/2021  GERD became severe w sleeve and nissen takedown   GERD improved significantly w bypass    Dysphagia.    Problems with solids: almost daily   Problems with liquids: no    Choking while eating no  Coughing while eating: no     Frequency:    Location: mid chest   Onset of symptoms: after RnY    History of food impactions requiring ED visit:no   History of allergies/eczema. Yes         Esophageal diverticul[facility].  PT thinks it was present since 2014     No chest pain     Eckardt Symptom Score  Dysphagia: 2  Regurgitation: 1  Retrosternal pain: (P) 0  Weight Loss: (P) 0  Total: (P) 3    EGD w dilation w/o improvement    Unable to get manometry placed     DIET: able to tolerate mostly solid diet     MEDs:   Hyzaar/oxycodone- BID for years  Baclofen BID (previously on flexeril) for years    Marijuana none    Nausea few per week  Early satiety: daily  Vomiting: almost w every meal      MEDS:   Zofran prn   Phenergan prn     Rumination Syndrome  Symptoms occur within 30 minutes of finishing a meal: yes  Regurgitate lacks sour or bitter taste: yes  Regurgitate described as tasting similar to the food recently ingested: yes  Little or no improvement after GERD or nausea directed treatment: yes  No symptoms during sleep: yes   No symptoms during fasting: yes  Weight loss (40% of patients): at some point, but now gaining   Related to belching: yes    Diarrhea     GIM     PGF CRC at ? Age  MGF w pancreatic cancer in 80s  Colonosocpy negative 8/2022.  Repeat 8/2032    STEVE  -on Iron     B12 deficincy   -B12 po     Ventral hernias s/o many surgeries   Fistula, required TPN     Denies  abdominal pain, bloating, constipation, BRBPR, melena, weight loss.       Total visit time was 60   minutes, more than 50% of which  was spent in face-to-face counseling with patient regarding symptoms, diagnostic results, prognosis, risks and benefits of treatment options, instructions for management, importance of compliance with chosen treatment options and coping strategies.      Previous Studies:   CT cervical spine without contrast 02/02/2023: Cervical spondylolysis.  EGD 08/05/2022: Zenker's diverticulum.  Benign-appearing esophageal stenosis TTS 15 mm.  Evidence of Kinjal-en-Y.  Gastric erythema (-).  Normal jejunum  Colonoscopy 08/05/2022: Diverticulosis in sigmoid colon and descending colon.  Internal hemorrhoids.  Exam otherwise normal.  Esophagram 07/01/2021: Postoperative appearance of distal esophagus and proximal stomach.  There was grossly unchanged from prior exam.  With posterior distal esophageal outpouching.  Persistent.  Esophageal dysmotility including reflux to the level of mid esophagus, delayed emptying, mild residual contrast in the distal esophagus with tertiary contractions of the esophagus  EGD 01/27/2021:  Normal esophagus.  Questionable esophageal diverticulum just adjacent to GE junction with mild ulceration proximal to it.  GE junction widely patent.  No narrowing or stricture.  Subsequently dilated empirically to 12-20 CRE balloon.  No resistance to balloon dilation and no mucosal tear.  Altered anatomy secondary to previous surgery questionable sleeve anatomy.  Pylorus intact.  Unable to retroflex due to surgical anatomy.  Duodenum normal  Upper GI 09/21/2018: Small amount of GERD.  Abnormal appearance of distal esophagus and visualized stomach in this patient with known history of multiple Nissen fundoplication in gastric sleeve surgery with multiple additional surgeries for complications.  In the distal esophagus near the GE junction there was irregular appearance with filling defect that does not resolve and could possibly represent postsurgical change, distal esophageal diverticulum seen on recent EGD or less  likely other etiology.  Continue GI consultation and correlation with recent EGDs recommended  EGD 10/30/2017:  Single diverticulum was found in distal 3rd of the esophagus.  Z-line was irregular ().  Erythema in the stomach.  Presumed postsurgical changes noted in the stomach.  Proximal suture material with a narrow narrowed gastric lumen possibly consistent with gastric sleeve.  The pylorus was patulous.  Normal duodenum pathology:  1 duodenum.  Unremarkable to stomach moderate severe chronic gastritis scattered eosinophils regenerative epithelium negative for intestinal metaplasia dysplasia or malignancy.  No HP.  GE junction.  Focal active chronic gastritis moderate to severe chronic gastritis negative for intestinal metaplasia esophagus: Benign.  Acanthosis increased rate high-grade, increased eosinophils and lymphocytes suggestive of reflux.  Although eosinophils I increase the do not reach eosinophilic esophagitis level.  Colonoscopy 10/30/2017: Moderately severe diverticulosis found in descending colon and sigmoid colon.  Path:  Right left unremarkable  02/13/2014:  EGD with removal of stent.  Dr. Iker Mata  EGD 01/23/2014:  EGD with fibrin glue application and stent placement.  Patient with history of Nissen fundoplication x2 and ventral hernia repair.  He underwent laparoscopic lysis of adhesion with sleeve gastrectomy in Nissen takedown about a month ago.  He developed a hematoma that was partially drained laparoscopically.  He went back for open washout which showed no signs of leak at the time either on endoscopy or radiology less than fluid coming out of his drains and presented to the hospital.  Was found to have fistula on upper GI this seems to be controlled with his drain.  In an effort to allow oral intake he will perform a stent and fibrin glue placement.  Dr. Noel Bryant    Relevant Surgical History:    05/10/2021: Laparoscopic robotic assisted Kinjal-en-Y gastric bypass   [place]  12/19/2013:  Laparoscopic sleeve gastrectomy, hiatal hernia repair, lysis of adhesions, takedown of Nissen fundoplication and EGD  Nissen Fund 1994  Nissen Fund 1989      ROS:  ROS   Constitutional: + fevers, no chills, +night sweats, no weight loss  ENT: No congestion, + rhinorrhea, no chronic sinus problems  CV: No chest pain, no palpitations  Pulm:+cough, no shortness of breath  Ophtho: No blurry vision, no eye redness  GI: see HPI  Derm: No rash  Heme: No lymphadenopathy, no bruising  MSK: + arthritis, + joint swelling, no Raynauds  : No dysuria, no frequent urination, no blood in urine  Endo: No hot or cold intolerance  Neuro: No dizziness, no syncope, no seizure  Psych: + anxiety, + depression  Complete ROS negative except as above    Medical History:   Past Medical History:   Diagnosis Date    Alcohol abuse, unspecified     Allergic rhinitis, cause unspecified     Arthritis     Asthma attack     Cancer 2022    skin cancer on nose    Carpal tunnel syndrome     Depressive disorder, not elsewhere classified     Diarrhea     Encounter for blood transfusion     GERD (gastroesophageal reflux disease)     Hypersomnia, unspecified     Hypertension     Hypoglycemia     Lumbago     Obesity, unspecified     JAH (obstructive sleep apnea)     uses BIPAP    Other and unspecified hyperlipidemia     Other ankle sprain and strain     Psychosexual dysfunction with inhibited sexual excitement     Pyogenic arthritis of knee 06/2021    staph pseudintermedius, TKA    Pyogenic arthritis of knee 09/21/2021    Enterococcus    Ventral hernia, unspecified, without mention of obstruction or gangrene         Surgical History:   Past Surgical History:   Procedure Laterality Date    A-scope knees      Bilateral    ARTHROSCOPIC REPAIR OF ROTATOR CUFF OF SHOULDER Left 09/20/2019    Procedure: REPAIR, ROTATOR CUFF, ARTHROSCOPIC;  Surgeon: Felix Levin MD;  Location: [facility];  Service: Orthopedics;  Laterality: Left;     ARTHROSCOPY OF SHOULDER WITH DECOMPRESSION OF SUBACROMIAL SPACE Left 09/20/2019    Procedure: ARTHROSCOPY, SHOULDER, WITH SUBACROMIAL SPACE DECOMPRESSION;  Surgeon: Felix Levin MD;  Location: [facility] OR;  Service: Orthopedics;  Laterality: Left;  Jaspreet- Arthrex notified of all case today 9/16-tcb    CARPAL TUNNEL RELEASE      Bilateral    COLONOSCOPY N/A 8/5/2022    Procedure: COLONOSCOPY;  Surgeon: Zaida Ansari MD;  Location: [facility] ENDO;  Service: Endoscopy;  Laterality: N/A;    ESOPHAGOGASTRODUODENOSCOPY N/A 8/5/2022    Procedure: EGD (ESOPHAGOGASTRODUODENOSCOPY);  Surgeon: Zaida Ansari MD;  Location: [facility] ENDO;  Service: Endoscopy;  Laterality: N/A;    EYE SURGERY      Lasik    HERNIA REPAIR      INCISION AND DRAINAGE OF HEMATOMA Right 09/21/2021    Procedure: INCISION AND DRAINAGE, HEMATOMA;  Surgeon: Felix Levin MD;  Location: [facility] OR;  Service: Orthopedics;  Laterality: Right;    INSERTION OF ANTIBIOTIC SPACER Right 06/01/2021    Procedure: INSERTION, ANTIBIOTIC SPACER;  Surgeon: Felix Levin MD;  Location: [facility] OR;  Service: Orthopedics;  Laterality: Right;    JOINT REPLACEMENT      KNEE ARTHROPLASTY Left 08/04/2020    Procedure: ARTHROPLASTY, KNEE;  Surgeon: Felix Levin MD;  Location: [facility] OR;  Service: Orthopedics;  Laterality: Left;    KNEE ARTHROPLASTY Right 03/23/2021    Procedure: ARTHROPLASTY, KNEE;  Surgeon: Felix Levin MD;  Location: [facility] OR;  Service: Orthopedics;  Laterality: Right;    KNEE ARTHROSCOPY W/ MENISCECTOMY Left 10/15/2019    Procedure: ARTHROSCOPY, KNEE, WITH MENISCECTOMY;  Surgeon: Felix Levin MD;  Location: [facility] OR;  Service: Orthopedics;  Laterality: Left;  Medial and Lateral Meniscectomy    KNEE ARTHROSCOPY W/ MENISCECTOMY Right 11/22/2019    Procedure: ARTHROSCOPY, KNEE, WITH MENISCECTOMY;  Surgeon: Felix Levin MD;  Location: [facility] OR;  Service: Orthopedics;  Laterality: Right;    KNEE SURGERY       osmar    NISSEN FUNDOPLICATION      REVERSE TOTAL SHOULDER ARTHROPLASTY Left 02/11/2020    Procedure: ARTHROPLASTY, SHOULDER, TOTAL, REVERSE;  Surgeon: Felix Levin MD;  Location: [facility] OR;  Service: Orthopedics;  Laterality: Left;  [place]    revision of gastrojejunostomy  [facility]/10/[facility] [place]    REVISION OF KNEE ARTHROPLASTY Right 09/07/2021    Procedure: REVISION, ARTHROPLASTY, KNEE;  Surgeon: Felix Levin MD;  Location: [facility] OR;  Service: Orthopedics;  Laterality: Right;    ROTATOR CUFF REPAIR      right    RJ-EN-Y PROCEDURE N/A 05/2021    SLEEVE GASTROPLASTY  12/2013        Family History:   Family History   Problem Relation Age of Onset    Arthritis Mother     Hypertension Father     Kidney disease Father     Heart disease Father     COPD Father     Pancreatic cancer Maternal Grandfather     Colon cancer Paternal Grandfather     Colon polyps Neg Hx     Crohn's disease Neg Hx     Ulcerative colitis Neg Hx     Stomach cancer Neg Hx     Esophageal cancer Neg Hx         Social History:   Social History     Socioeconomic History    Marital status:    Tobacco Use    Smoking status: Never    Smokeless tobacco: Current     Types: Chew    Tobacco comments:     rarely   Substance and Sexual Activity    Alcohol use: Yes     Comment: occasionally    Drug use: No        Review of patient's allergies indicates:   Allergen Reactions    Hydrocodone Hives       Current Outpatient Medications   Medication Sig Dispense Refill    albuterol (PROVENTIL/VENTOLIN HFA) 90 mcg/actuation inhaler INHALE 2 PUFFS BY MOUTH FOUR TIMES A DAY AS NEEDED FOR BREATHING      ascorbic acid, vitamin C, (VITAMIN C) 500 MG tablet 500 mg.      baclofen (LIORESAL) 20 MG tablet 20 mg.      doxepin (SINEQUAN) 50 MG capsule 50 mg.      EScitalopram oxalate (LEXAPRO) 20 MG tablet 20 mg.      fluoride, sodium, (PREVIDENT) 1.1 % Gel APPLY PEA-SIZED AMOUNT TO MOUTH TWICE A DAY FOR DENTAL HEALTH      gabapentin (NEURONTIN)  "300 MG capsule 600 mg.      LIDOcaine (LIDODERM) 5 % APPLY 1 PATCH TOPICALLY EVERY DAY FOR PAIN. WEAR FOR 12 HOURS, THEN REMOVE. DO NOT APPLY NEW PATCH FOR AT LEAST 12 HOURS.      losartan-hydrochlorothiazide 100-25 mg (HYZAAR) 100-25 mg per tablet 1 tablet.      methyl salicylate-menthol 15-10% 15-10 % Crea APPLY LIBERAL AMOUNT TOPICALLY FOUR TIMES A DAY AS NEEDED      omeprazole (PRILOSEC) 40 MG capsule 40 mg.      ondansetron (ZOFRAN) 4 MG tablet TAKE ONE TABLET BY MOUTH EVERY SIX HOURS AS NEEDED NAUSEA      peg 400-propylene glycol 0.4-0.3 % Drop INSTILL ONE DROP IN EACH EYE FOUR TIMES A DAY AS NEEDED FOR DRY EYES      promethazine (PHENERGAN) 12.5 MG Tab 12.5 mg.      tadalafiL (CIALIS) 20 MG Tab TAKE ONE TABLET BY MOUTH EVERY WEEK AS NEEDED      aspirin 81 MG Chew Take 1 tablet (81 mg total) by mouth 2 (two) times daily. 60 tablet 0    cyanocobalamin (VITAMIN B-12) 1000 MCG tablet Take 1,000 mcg by mouth once daily.       cyclobenzaprine (FLEXERIL) 10 MG tablet Take 10 mg by mouth 3 (three) times daily as needed for Muscle spasms.      diclofenac sodium (VOLTAREN) 1 % Gel Apply topically daily as needed.      naloxone (NARCAN) 4 mg/actuation Spry SMARTSIG:Spray(s) Both Nares      PAIN RELIEVING, M-SALIC-MEN, 15-1 % Crea Apply topically.      traZODone (DESYREL) 100 MG tablet Take 1 tablet by mouth nightly as needed.      triamcinolone acetonide 0.1% (KENALOG) 0.1 % cream Apply topically 3 (three) times daily. for 10 days 45 g 2    vitamin D (VITAMIN D3) 1000 units Tab Take 2,000 Units by mouth once daily.       No current facility-administered medications for this visit.        Objective Findings:  Vital Signs:  /82 (BP Location: Right arm, Patient Position: Sitting, BP Method: Large (Automatic))   Pulse 110   Ht 5' 10" (1.778 m)   Wt 105.1 kg (231 lb 11.3 oz)   SpO2 98%   BMI 33.25 kg/m²   Body mass index is 33.25 kg/m².    Physical Exam:  General appearance: alert, cooperative, no distress, " obese  HENT: Normocephalic, atraumatic, neck symmetrical, no nasal discharge  Eyes: conjunctivae/corneas clear, PERRL, EOM's intact  Lungs: clear to auscultation bilaterally, no dullness to percussion bilaterally  Heart: regular rate and rhythm without rub; no displacement of the PMI  Abdomen: soft, non-tender; bowel sounds normoactive; no organomegaly  Extremities: extremities symmetric; no clubbing, cyanosis, or edema  Integument: Skin color, texture, turgor normal; no rashes; hair distrubution normal  Neurologic: Alert and oriented X 3, normal strength, normal coordination and gait  Psychiatric: no pressured speech; normal affect; no evidence of impaired cognition    Labs:   Reviewed in Epic/record      Assessment and Plan:  Himanshu Connor II is a 57 y.o. male with referred to Esophageal Motility Clinic for 2nd opinion regarding following problems:    GERD.   Nissen Fundoplication x 2 (1989, 1994)  ANTONIA, Sleeve gastrectomy, Nissen takedown 12/2012 (complicated by a leak and fistula)  EGD w glue and stent to repair fistula 1/2014 (required TPN)  GERD became severe after sleeve and nissen takedown   RnY gastric bypass 5/2021  GERD improved significantly w bypass, but still w bothersome regurgitation   -On protonix 40mg BID  -EGD w EoE, FLIP.  Higher than avg risk procedure     Dysphagia to solids  Started after RnY  Esophageal diverticulum on imaging since 2017.  PT thinks it was present since 2014   Eckardt 3/12  EGD w dilation w/o improvement    On Hyzaar/oxycodone for years  On baclofen (previously on flexeril)  -EGD w EoE, FLIP  -Manometry r/o dysphagia, r/o rumination/belching, endoscopically placed   -TBS    Nausea. Early satiety. Vomiting  -Zofran prn   -Phenergan prn   -Will consider GES  -Def to primary GI     Rumination Syndrome ROS+  -manometry    Diarrhea   -Def to primary GI     GIM   -Def to primary GI     PGF CRC at ? Age  MGF w pancreatic cancer in 80s  Colonosocpy negative 8/2022.  Repeat due  8/2032  -Def to primary GI     STEVE/G-bypass  -on Iron   -Def to primary GI     B12 deficincy   -B12 po   -Def to primary GI     Ventral hernias s/p numerous surgeries     Follow up in about 3 months (around 6/3/2023).    1. Dysphagia, unspecified type    2. Gastroesophageal reflux disease, unspecified whether esophagitis present    3. Nausea and vomiting, unspecified vomiting type    4. Nausea    5. Early satiety          Order summary:  Orders Placed This Encounter    FL Esophagram Complete       Discussed with PT that I act as a consult service and do not accept patients to be their primary GI provider. Discussed that the goal of our visits is to address relevant motility problems while deferring other GI problems as well as screening and surveillance to his/her primary GI provider.   Discussed that he/she needs to continue to follow with his local primary GI provider.  Discussed that we will complete his/her workup, clarify diagnosis and attempt to optimize his/her symptoms with intention of him/her returning to referring GI provider for long term GI care.   Pt verbalized understanding.        Thank you so much for allowing me to participate in the care of Himanshu Walker MD      This note was created using voice recognition software, and may contain some unrecognized transcriptional errors.

## 2023-03-03 NOTE — TELEPHONE ENCOUNTER
MOTILITY CLINIC PROCEDURE ORDERS    CLEARANCE FOR PROCEDURES:  [x] Not needed   [] Cardiology   [] Pulmonary  [] PCP    PROCEDURES  [] EGD   [] Colonoscopy   [x] EGD with EndoFlip   [x] Esophageal manometry with impedance - dysphagia   [x] Esophageal manometry with impedance - rumination  [x] Esophageal manometry with impedance - belching   [] EGD with BRAVO 48 hours   [] EGD with BRAVO 96 hours   [] pH Impedance 24 hours   [] Anorectal manometry   [] Rectal Ultrasound     Does manometry need to be placed endoscopically:   [] Yes    FLOOR:  [] 4th Floor   [x] 2nd Floor    Reason for 2nd Floor:   [] Gastroparesis   [] End stage achalasia  [] Pre lung transplant   [] Pre hear transplant   [] Pulmonary HTN (PAP>50 or on meds)   [] Severe pulmonary Disease   [] Complex medical problems   [] BMI>50  [] History of anesthesia issues  [] Other: esoph diverticulum/regurgitation    PREP  [] Standard Prep  [] Severe Constipation Prep (Low residue diet 7 days.  1.5 prep the day prior, 0.5 prep the day of procedure)  [] Full liquid diet 5 days   [x] Full liquid diet 3 days   [] Full liquid diet 2 days   [] Full liquid diet 1 day   [] Other:     MEDICATIONS    pH Studies  [] ON PPI/H2 Blocker   [] OFF PPI/H2 Blocker     Metal allergy (stainless steal w chromium, nickel, copper, cobalt and iron).:   []  Yes    Pacemaker/defibrillator:  [] Yes    Motility Studies (esophageal manometry/anorectal manometry)  Hold narcotics x 1 days if able   Hold TCA x 1 days if able  Propofol/lidocaine only during sedation.  Discuss with Dr. Walker if additional sedation needed.   Hold baclofen for thee days (at least one day) if able   Hold muscle relaxants x 1 day if able     Anticoagulation/antiplt agents:   []  Yes    ORDER OF TESTING:  Day 1: EGD/FLIP/mano  Day 2: GES     [] No special requirements - pt lives locally     SCHEDULING PRIORITY  [] Urgent  (<7 days)  [] Lung Transplant Workup  [] Priority (<30 days)  [x] Routine 1 (schedule  ASAP)  [] Routine 2 (next available, < 3 months)   [] Routine 3 (ok if > 3 months)      URGENCY   [] Medically Urgent   [x] Medically NOT Urgent      DIAGNOSIS   [x] Dysphagia   [] EoE  [x] GERD   [] Nausea  [] Nausea/vomiting   [] Abd pain   [] Weight loss  [] Diarrhea  [] Melena  [] Hematochezia    [] CRC screening   [] Colon polyps  [] Other:       PHYSICIAN  []  Ok with Dr. Ash   []  Ok with any GI MD

## 2023-03-03 NOTE — PROGRESS NOTES
[facility] Consultation Note    Reason for Consult:  No chief complaint on file.        PCP:   [facility] ADMINISTRATION   [address] / [place] [address]    Referring MD:  Dr. Olu Najera and Dr. Acharya     [facility] - Gi  [address] [place] [address]    Dr. Yuen (Foregut Surg)    HPI:  Himanshu Connor II is a 57 y.o. male referred to motility clinic for second opinion regarding the following problems:    GI note 12/15/2022:  57-year-old male with esophageal diverticulum, Nissen x2 1989 in 1994, gastric sleeve 2013, Kinjal-en-Y 05/2021 presents for follow-up of dysphagia.  Patient initially had severe GERD after gastric sleeve, but developed dysphagia symptoms after Kinjal-en-Y.  EGD since 2017 noted distal diverticulum.  He reports only solid food dysphagia food getting stuck in mid chest prior to regurgitation of most meals.  Reflux no absent.  Since last visit again underwent EGD with bariatric surgery which revealed large, distal esophageal diverticulum but no evidence of stenosis.  Was continued to attempt to stick to small meals and liquids as means to tolerate diet.  After often vomiting comes after initial bites of food.  He states that a large portion of his meals are now followed by vomiting and he is unable to eat in public to these symptoms.  Prior attempts at obtaining manometry unsuccessful due to inability to pass the probe likely secondary to distal diverticulum but this was before Kinjal-en-Y bypass.  Esophageal dilation performed did not improve symptoms.  He is not reporting worsening nausea compared to previously.    GERD.   Retrosternal pyrosis: none  Regurgitation: few per week  Onset: years  Upright symptoms: yes  Nocturnal symptoms: few per week     Caffeine intake: 1 drink per day     Sleeps with head of the bed elevated.    Avoids eating prior to bedtime.  yes    MEDS:  Protonix 40mg BID w/o improvement   Pickle juice    Apple cider vinegar     Nissen Fundoplication x 2 (1989, 1994)  ANTONIA, Sleeve gastrectomy, Nissen takedown 12/2012  EGD w glue and stent to repair fistula 1/2014   RnY gastric bypass 5/2021  GERD became severe w sleeve and nissen takedown   GERD improved significantly w bypass    Dysphagia.    Problems with solids: almost daily   Problems with liquids: no    Choking while eating no  Coughing while eating: no     Frequency:    Location: mid chest   Onset of symptoms: after RnY    History of food impactions requiring ED visit:no   History of allergies/eczema. Yes         Esophageal diverticul[facility].  PT thinks it was present since 2014     No chest pain     Eckardt Symptom Score  Dysphagia: 2  Regurgitation: 1  Retrosternal pain: (P) 0  Weight Loss: (P) 0  Total: (P) 3    EGD w dilation w/o improvement    Unable to get manometry placed     DIET: able to tolerate mostly solid diet     MEDs:   Hyzaar/oxycodone- BID for years  Baclofen BID (previously on flexeril) for years    Marijuana none    Nausea few per week  Early satiety: daily  Vomiting: almost w every meal      MEDS:   Zofran prn   Phenergan prn     Rumination Syndrome  Symptoms occur within 30 minutes of finishing a meal: yes  Regurgitate lacks sour or bitter taste: yes  Regurgitate described as tasting similar to the food recently ingested: yes  Little or no improvement after GERD or nausea directed treatment: yes  No symptoms during sleep: yes   No symptoms during fasting: yes  Weight loss (40% of patients): at some point, but now gaining   Related to belching: yes    Diarrhea     GIM     PGF CRC at ? Age  MGF w pancreatic cancer in 80s  Colonosocpy negative 8/2022.  Repeat 8/2032    STEVE  -on Iron     B12 deficincy   -B12 po     Ventral hernias s/o many surgeries   Fistula, required TPN     Denies  abdominal pain, bloating, constipation, BRBPR, melena, weight loss.       Total visit time was 60   minutes, more than 50% of which  was spent in face-to-face counseling with patient regarding symptoms, diagnostic results, prognosis, risks and benefits of treatment options, instructions for management, importance of compliance with chosen treatment options and coping strategies.      Previous Studies:   CT cervical spine without contrast 02/02/2023: Cervical spondylolysis.  EGD 08/05/2022: Zenker's diverticulum.  Benign-appearing esophageal stenosis TTS 15 mm.  Evidence of Kinjal-en-Y.  Gastric erythema (-).  Normal jejunum  Colonoscopy 08/05/2022: Diverticulosis in sigmoid colon and descending colon.  Internal hemorrhoids.  Exam otherwise normal.  Esophagram 07/01/2021: Postoperative appearance of distal esophagus and proximal stomach.  There was grossly unchanged from prior exam.  With posterior distal esophageal outpouching.  Persistent.  Esophageal dysmotility including reflux to the level of mid esophagus, delayed emptying, mild residual contrast in the distal esophagus with tertiary contractions of the esophagus  EGD 01/27/2021:  Normal esophagus.  Questionable esophageal diverticulum just adjacent to GE junction with mild ulceration proximal to it.  GE junction widely patent.  No narrowing or stricture.  Subsequently dilated empirically to 12-20 CRE balloon.  No resistance to balloon dilation and no mucosal tear.  Altered anatomy secondary to previous surgery questionable sleeve anatomy.  Pylorus intact.  Unable to retroflex due to surgical anatomy.  Duodenum normal  Upper GI 09/21/2018: Small amount of GERD.  Abnormal appearance of distal esophagus and visualized stomach in this patient with known history of multiple Nissen fundoplication in gastric sleeve surgery with multiple additional surgeries for complications.  In the distal esophagus near the GE junction there was irregular appearance with filling defect that does not resolve and could possibly represent postsurgical change, distal esophageal diverticulum seen on recent EGD or less  likely other etiology.  Continue GI consultation and correlation with recent EGDs recommended  EGD 10/30/2017:  Single diverticulum was found in distal 3rd of the esophagus.  Z-line was irregular ().  Erythema in the stomach.  Presumed postsurgical changes noted in the stomach.  Proximal suture material with a narrow narrowed gastric lumen possibly consistent with gastric sleeve.  The pylorus was patulous.  Normal duodenum pathology:  1 duodenum.  Unremarkable to stomach moderate severe chronic gastritis scattered eosinophils regenerative epithelium negative for intestinal metaplasia dysplasia or malignancy.  No HP.  GE junction.  Focal active chronic gastritis moderate to severe chronic gastritis negative for intestinal metaplasia esophagus: Benign.  Acanthosis increased rate high-grade, increased eosinophils and lymphocytes suggestive of reflux.  Although eosinophils I increase the do not reach eosinophilic esophagitis level.  Colonoscopy 10/30/2017: Moderately severe diverticulosis found in descending colon and sigmoid colon.  Path:  Right left unremarkable  02/13/2014:  EGD with removal of stent.  Dr. Iker Mata  EGD 01/23/2014:  EGD with fibrin glue application and stent placement.  Patient with history of Nissen fundoplication x2 and ventral hernia repair.  He underwent laparoscopic lysis of adhesion with sleeve gastrectomy in Nissen takedown about a month ago.  He developed a hematoma that was partially drained laparoscopically.  He went back for open washout which showed no signs of leak at the time either on endoscopy or radiology less than fluid coming out of his drains and presented to the hospital.  Was found to have fistula on upper GI this seems to be controlled with his drain.  In an effort to allow oral intake he will perform a stent and fibrin glue placement.  Dr. Noel Bryant    Relevant Surgical History:    05/10/2021: Laparoscopic robotic assisted Kinjal-en-Y gastric bypass   [place]  12/19/2013:  Laparoscopic sleeve gastrectomy, hiatal hernia repair, lysis of adhesions, takedown of Nissen fundoplication and EGD  Nissen Fund 1994  Nissen Fund 1989      ROS:  ROS   Constitutional: + fevers, no chills, +night sweats, no weight loss  ENT: No congestion, + rhinorrhea, no chronic sinus problems  CV: No chest pain, no palpitations  Pulm:+cough, no shortness of breath  Ophtho: No blurry vision, no eye redness  GI: see HPI  Derm: No rash  Heme: No lymphadenopathy, no bruising  MSK: + arthritis, + joint swelling, no Raynauds  : No dysuria, no frequent urination, no blood in urine  Endo: No hot or cold intolerance  Neuro: No dizziness, no syncope, no seizure  Psych: + anxiety, + depression  Complete ROS negative except as above    Medical History:   Past Medical History:   Diagnosis Date    Alcohol abuse, unspecified     Allergic rhinitis, cause unspecified     Arthritis     Asthma attack     Cancer 2022    skin cancer on nose    Carpal tunnel syndrome     Depressive disorder, not elsewhere classified     Diarrhea     Encounter for blood transfusion     GERD (gastroesophageal reflux disease)     Hypersomnia, unspecified     Hypertension     Hypoglycemia     Lumbago     Obesity, unspecified     JAH (obstructive sleep apnea)     uses BIPAP    Other and unspecified hyperlipidemia     Other ankle sprain and strain     Psychosexual dysfunction with inhibited sexual excitement     Pyogenic arthritis of knee 06/2021    staph pseudintermedius, TKA    Pyogenic arthritis of knee 09/21/2021    Enterococcus    Ventral hernia, unspecified, without mention of obstruction or gangrene         Surgical History:   Past Surgical History:   Procedure Laterality Date    A-scope knees      Bilateral    ARTHROSCOPIC REPAIR OF ROTATOR CUFF OF SHOULDER Left 09/20/2019    Procedure: REPAIR, ROTATOR CUFF, ARTHROSCOPIC;  Surgeon: Felix Levin MD;  Location: [facility];  Service: Orthopedics;  Laterality: Left;     ARTHROSCOPY OF SHOULDER WITH DECOMPRESSION OF SUBACROMIAL SPACE Left 09/20/2019    Procedure: ARTHROSCOPY, SHOULDER, WITH SUBACROMIAL SPACE DECOMPRESSION;  Surgeon: Felix Levin MD;  Location: [facility] OR;  Service: Orthopedics;  Laterality: Left;  Jaspreet- Arthrex notified of all case today 9/16-tcb    CARPAL TUNNEL RELEASE      Bilateral    COLONOSCOPY N/A 8/5/2022    Procedure: COLONOSCOPY;  Surgeon: Zaida Ansari MD;  Location: [facility] ENDO;  Service: Endoscopy;  Laterality: N/A;    ESOPHAGOGASTRODUODENOSCOPY N/A 8/5/2022    Procedure: EGD (ESOPHAGOGASTRODUODENOSCOPY);  Surgeon: Zaida Ansari MD;  Location: [facility] ENDO;  Service: Endoscopy;  Laterality: N/A;    EYE SURGERY      Lasik    HERNIA REPAIR      INCISION AND DRAINAGE OF HEMATOMA Right 09/21/2021    Procedure: INCISION AND DRAINAGE, HEMATOMA;  Surgeon: Felix Levin MD;  Location: [facility] OR;  Service: Orthopedics;  Laterality: Right;    INSERTION OF ANTIBIOTIC SPACER Right 06/01/2021    Procedure: INSERTION, ANTIBIOTIC SPACER;  Surgeon: Felix Levin MD;  Location: [facility] OR;  Service: Orthopedics;  Laterality: Right;    JOINT REPLACEMENT      KNEE ARTHROPLASTY Left 08/04/2020    Procedure: ARTHROPLASTY, KNEE;  Surgeon: Felix Levin MD;  Location: [facility] OR;  Service: Orthopedics;  Laterality: Left;    KNEE ARTHROPLASTY Right 03/23/2021    Procedure: ARTHROPLASTY, KNEE;  Surgeon: Felix Levin MD;  Location: [facility] OR;  Service: Orthopedics;  Laterality: Right;    KNEE ARTHROSCOPY W/ MENISCECTOMY Left 10/15/2019    Procedure: ARTHROSCOPY, KNEE, WITH MENISCECTOMY;  Surgeon: Felix Levin MD;  Location: [facility] OR;  Service: Orthopedics;  Laterality: Left;  Medial and Lateral Meniscectomy    KNEE ARTHROSCOPY W/ MENISCECTOMY Right 11/22/2019    Procedure: ARTHROSCOPY, KNEE, WITH MENISCECTOMY;  Surgeon: Felix Levin MD;  Location: [facility] OR;  Service: Orthopedics;  Laterality: Right;    KNEE SURGERY       osmar    NISSEN FUNDOPLICATION      REVERSE TOTAL SHOULDER ARTHROPLASTY Left 02/11/2020    Procedure: ARTHROPLASTY, SHOULDER, TOTAL, REVERSE;  Surgeon: Felix Levin MD;  Location: [facility] OR;  Service: Orthopedics;  Laterality: Left;  [place]    revision of gastrojejunostomy  [facility]/10/[facility] [place]    REVISION OF KNEE ARTHROPLASTY Right 09/07/2021    Procedure: REVISION, ARTHROPLASTY, KNEE;  Surgeon: Felix Levin MD;  Location: [facility] OR;  Service: Orthopedics;  Laterality: Right;    ROTATOR CUFF REPAIR      right    RJ-EN-Y PROCEDURE N/A 05/2021    SLEEVE GASTROPLASTY  12/2013        Family History:   Family History   Problem Relation Age of Onset    Arthritis Mother     Hypertension Father     Kidney disease Father     Heart disease Father     COPD Father     Pancreatic cancer Maternal Grandfather     Colon cancer Paternal Grandfather     Colon polyps Neg Hx     Crohn's disease Neg Hx     Ulcerative colitis Neg Hx     Stomach cancer Neg Hx     Esophageal cancer Neg Hx         Social History:   Social History     Socioeconomic History    Marital status:    Tobacco Use    Smoking status: Never    Smokeless tobacco: Current     Types: Chew    Tobacco comments:     rarely   Substance and Sexual Activity    Alcohol use: Yes     Comment: occasionally    Drug use: No        Review of patient's allergies indicates:   Allergen Reactions    Hydrocodone Hives       Current Outpatient Medications   Medication Sig Dispense Refill    albuterol (PROVENTIL/VENTOLIN HFA) 90 mcg/actuation inhaler INHALE 2 PUFFS BY MOUTH FOUR TIMES A DAY AS NEEDED FOR BREATHING      ascorbic acid, vitamin C, (VITAMIN C) 500 MG tablet 500 mg.      baclofen (LIORESAL) 20 MG tablet 20 mg.      doxepin (SINEQUAN) 50 MG capsule 50 mg.      EScitalopram oxalate (LEXAPRO) 20 MG tablet 20 mg.      fluoride, sodium, (PREVIDENT) 1.1 % Gel APPLY PEA-SIZED AMOUNT TO MOUTH TWICE A DAY FOR DENTAL HEALTH      gabapentin (NEURONTIN)  "300 MG capsule 600 mg.      LIDOcaine (LIDODERM) 5 % APPLY 1 PATCH TOPICALLY EVERY DAY FOR PAIN. WEAR FOR 12 HOURS, THEN REMOVE. DO NOT APPLY NEW PATCH FOR AT LEAST 12 HOURS.      losartan-hydrochlorothiazide 100-25 mg (HYZAAR) 100-25 mg per tablet 1 tablet.      methyl salicylate-menthol 15-10% 15-10 % Crea APPLY LIBERAL AMOUNT TOPICALLY FOUR TIMES A DAY AS NEEDED      omeprazole (PRILOSEC) 40 MG capsule 40 mg.      ondansetron (ZOFRAN) 4 MG tablet TAKE ONE TABLET BY MOUTH EVERY SIX HOURS AS NEEDED NAUSEA      peg 400-propylene glycol 0.4-0.3 % Drop INSTILL ONE DROP IN EACH EYE FOUR TIMES A DAY AS NEEDED FOR DRY EYES      promethazine (PHENERGAN) 12.5 MG Tab 12.5 mg.      tadalafiL (CIALIS) 20 MG Tab TAKE ONE TABLET BY MOUTH EVERY WEEK AS NEEDED      aspirin 81 MG Chew Take 1 tablet (81 mg total) by mouth 2 (two) times daily. 60 tablet 0    cyanocobalamin (VITAMIN B-12) 1000 MCG tablet Take 1,000 mcg by mouth once daily.       cyclobenzaprine (FLEXERIL) 10 MG tablet Take 10 mg by mouth 3 (three) times daily as needed for Muscle spasms.      diclofenac sodium (VOLTAREN) 1 % Gel Apply topically daily as needed.      naloxone (NARCAN) 4 mg/actuation Spry SMARTSIG:Spray(s) Both Nares      PAIN RELIEVING, M-SALIC-MEN, 15-1 % Crea Apply topically.      traZODone (DESYREL) 100 MG tablet Take 1 tablet by mouth nightly as needed.      triamcinolone acetonide 0.1% (KENALOG) 0.1 % cream Apply topically 3 (three) times daily. for 10 days 45 g 2    vitamin D (VITAMIN D3) 1000 units Tab Take 2,000 Units by mouth once daily.       No current facility-administered medications for this visit.        Objective Findings:  Vital Signs:  /82 (BP Location: Right arm, Patient Position: Sitting, BP Method: Large (Automatic))   Pulse 110   Ht 5' 10" (1.778 m)   Wt 105.1 kg (231 lb 11.3 oz)   SpO2 98%   BMI 33.25 kg/m²   Body mass index is 33.25 kg/m².    Physical Exam:  General appearance: alert, cooperative, no distress, " obese  HENT: Normocephalic, atraumatic, neck symmetrical, no nasal discharge  Eyes: conjunctivae/corneas clear, PERRL, EOM's intact  Lungs: clear to auscultation bilaterally, no dullness to percussion bilaterally  Heart: regular rate and rhythm without rub; no displacement of the PMI  Abdomen: soft, non-tender; bowel sounds normoactive; no organomegaly  Extremities: extremities symmetric; no clubbing, cyanosis, or edema  Integument: Skin color, texture, turgor normal; no rashes; hair distrubution normal  Neurologic: Alert and oriented X 3, normal strength, normal coordination and gait  Psychiatric: no pressured speech; normal affect; no evidence of impaired cognition    Labs:   Reviewed in Epic/record      Assessment and Plan:  Himanshu Connor II is a 57 y.o. male with referred to Esophageal Motility Clinic for 2nd opinion regarding following problems:    GERD.   Nissen Fundoplication x 2 (1989, 1994)  ANTONIA, Sleeve gastrectomy, Nissen takedown 12/2012 (complicated by a leak and fistula)  EGD w glue and stent to repair fistula 1/2014 (required TPN)  GERD became severe after sleeve and nissen takedown   RnY gastric bypass 5/2021  GERD improved significantly w bypass, but still w bothersome regurgitation   -On protonix 40mg BID  -EGD w EoE, FLIP.  Higher than avg risk procedure     Dysphagia to solids  Started after RnY  Esophageal diverticulum on imaging since 2017.  PT thinks it was present since 2014   Eckardt 3/12  EGD w dilation w/o improvement    On Hyzaar/oxycodone for years  On baclofen (previously on flexeril)  -EGD w EoE, FLIP  -Manometry r/o dysphagia, r/o rumination/belching, endoscopically placed   -TBS    Nausea. Early satiety. Vomiting  -Zofran prn   -Phenergan prn   -Will consider GES  -Def to primary GI     Rumination Syndrome ROS+  -manometry    Diarrhea   -Def to primary GI     GIM   -Def to primary GI     PGF CRC at ? Age  MGF w pancreatic cancer in 80s  Colonosocpy negative 8/2022.  Repeat due  8/2032  -Def to primary GI     STEVE/G-bypass  -on Iron   -Def to primary GI     B12 deficincy   -B12 po   -Def to primary GI     Ventral hernias s/p numerous surgeries     Follow up in about 3 months (around 6/3/2023).    1. Dysphagia, unspecified type    2. Gastroesophageal reflux disease, unspecified whether esophagitis present    3. Nausea and vomiting, unspecified vomiting type    4. Nausea    5. Early satiety          Order summary:  Orders Placed This Encounter    FL Esophagram Complete       Discussed with PT that I act as a consult service and do not accept patients to be their primary GI provider. Discussed that the goal of our visits is to address relevant motility problems while deferring other GI problems as well as screening and surveillance to his/her primary GI provider.   Discussed that he/she needs to continue to follow with his local primary GI provider.  Discussed that we will complete his/her workup, clarify diagnosis and attempt to optimize his/her symptoms with intention of him/her returning to referring GI provider for long term GI care.   Pt verbalized understanding.        Thank you so much for allowing me to participate in the care of Himanshu Walker MD      This note was created using voice recognition software, and may contain some unrecognized transcriptional errors.

## 2023-03-03 NOTE — PATIENT INSTRUCTIONS
These lifestyle modifications may help with acid reflux at night   -Elevate the head of the bed.  This can be achieved by purchasing an adjustable bed frame or by putting six- to eight-inch blocks under the legs at the head of the bed or a styrofoam wedge under the mattress.   -Refrain from laying down after meals  -Avoid meals two to three hours before bedtime    -Complete EGD with EndoFlip  EGD is an endoscopic procedure that allows your doctor to examine your esophagus, stomach and duodenum (part of your small intestine). EGD is an outpatient procedure, meaning you can go home that same day. It takes approximately 30 to 60 minutes to perform.  EndoFLIP is a technology that simultaneously measures the area across the inside of a gastrointestinal organ (for example, the esophagus) and the pressure inside that organ. The ratio of the two measurements is called distensibility (stiffness).    -Complete esophageal manometry   During esophageal manometry, a thin, flexible tube (catheter) that contains pressure sensors is passed through your nose, down your esophagus and into your stomach. Esophageal manometry can be helpful in diagnosing certain disorders that can affect your esophagus.  Esophageal manometry provides information about the movement of food through the esophagus into the stomach. The test measures how well the muscles at the top and bottom of your esophagus (sphincter muscles) open and close, as well as the pressure, speed and pattern of the wave of esophageal muscle contractions that moves food along.    -Complete timed barium esophagogram   Barium esophagram is an X-ray of the esophagus. The patient drinks a liquid that contains barium (a silver-white metallic compound). The liquid coats the esophagus and X-rays are taken.

## 2023-03-07 ENCOUNTER — TELEPHONE (OUTPATIENT)
Dept: ENDOSCOPY | Facility: HOSPITAL | Age: 58
End: 2023-03-07
Payer: OTHER GOVERNMENT

## 2023-03-07 VITALS — BODY MASS INDEX: 32.93 KG/M2 | HEIGHT: 70 IN | WEIGHT: 230 LBS

## 2023-03-07 DIAGNOSIS — R13.10 DYSPHAGIA, UNSPECIFIED TYPE: Primary | ICD-10-CM

## 2023-03-07 DIAGNOSIS — K21.9 GASTROESOPHAGEAL REFLUX DISEASE, UNSPECIFIED WHETHER ESOPHAGITIS PRESENT: ICD-10-CM

## 2023-03-07 NOTE — TELEPHONE ENCOUNTER
Contacted patient to schedule procedure. As per our conversation,  patient is scheduled for EGD/Endoflip and Esophageal Manometry on 3/24/23 at 8:00 am and 10:00 am. Instructions reviewed with patient and informed instructions will be emailed to [email or web address] and on patient portal for review.

## 2023-03-13 ENCOUNTER — HOSPITAL ENCOUNTER (OUTPATIENT)
Dept: PREADMISSION TESTING | Facility: HOSPITAL | Age: 58
Discharge: HOME | End: 2023-03-13
Attending: ORTHOPAEDIC SURGERY
Payer: OTHER GOVERNMENT

## 2023-03-13 VITALS — BODY MASS INDEX: 32.93 KG/M2 | HEIGHT: 70 IN | WEIGHT: 230 LBS

## 2023-03-13 DIAGNOSIS — Z01.818 PREOP TESTING: Primary | ICD-10-CM

## 2023-03-13 DIAGNOSIS — Z01.818 PRE-OP TESTING: ICD-10-CM

## 2023-03-13 DIAGNOSIS — Z01.818 PRE-OP EXAM: ICD-10-CM

## 2023-03-13 DIAGNOSIS — M75.21 BICEPS TENDINITIS, RIGHT: ICD-10-CM

## 2023-03-13 LAB
ANION GAP SERPL CALC-SCNC: 8 MMOL/L (ref 8–16)
BASOPHILS # BLD AUTO: 0.03 K/UL (ref 0–0.2)
BASOPHILS NFR BLD: 0.8 % (ref 0–1.9)
BUN SERPL-MCNC: 13 MG/DL (ref 6–20)
CALCIUM SERPL-MCNC: 8.9 MG/DL (ref 8.7–10.5)
CHLORIDE SERPL-SCNC: 109 MMOL/L (ref 95–110)
CO2 SERPL-SCNC: 23 MMOL/L (ref 23–29)
CREAT SERPL-MCNC: 0.9 MG/DL (ref 0.5–1.4)
DIFFERENTIAL METHOD: ABNORMAL
EOSINOPHIL # BLD AUTO: 0.1 K/UL (ref 0–0.5)
EOSINOPHIL NFR BLD: 2.3 % (ref 0–8)
ERYTHROCYTE [DISTWIDTH] IN BLOOD BY AUTOMATED COUNT: 14.5 % (ref 11.5–14.5)
EST. GFR  (NO RACE VARIABLE): >60 ML/MIN/1.73 M^2
GLUCOSE SERPL-MCNC: 81 MG/DL (ref 70–110)
HCT VFR BLD AUTO: 40.7 % (ref 40–54)
HGB BLD-MCNC: 12.4 G/DL (ref 14–18)
IMM GRANULOCYTES # BLD AUTO: 0.02 K/UL (ref 0–0.04)
IMM GRANULOCYTES NFR BLD AUTO: 0.5 % (ref 0–0.5)
LYMPHOCYTES # BLD AUTO: 1.4 K/UL (ref 1–4.8)
LYMPHOCYTES NFR BLD: 37.2 % (ref 18–48)
MCH RBC QN AUTO: 29.2 PG (ref 27–31)
MCHC RBC AUTO-ENTMCNC: 30.5 G/DL (ref 32–36)
MCV RBC AUTO: 96 FL (ref 82–98)
MONOCYTES # BLD AUTO: 0.3 K/UL (ref 0.3–1)
MONOCYTES NFR BLD: 8.6 % (ref 4–15)
NEUTROPHILS # BLD AUTO: 1.9 K/UL (ref 1.8–7.7)
NEUTROPHILS NFR BLD: 50.6 % (ref 38–73)
NRBC BLD-RTO: 0 /100 WBC
PLATELET # BLD AUTO: 183 K/UL (ref 150–450)
PMV BLD AUTO: 9.7 FL (ref 9.2–12.9)
POTASSIUM SERPL-SCNC: 5 MMOL/L (ref 3.5–5.1)
RBC # BLD AUTO: 4.25 M/UL (ref 4.6–6.2)
SODIUM SERPL-SCNC: 140 MMOL/L (ref 136–145)
WBC # BLD AUTO: 3.84 K/UL (ref 3.9–12.7)

## 2023-03-13 PROCEDURE — 85025 COMPLETE CBC W/AUTO DIFF WBC: CPT | Performed by: ORTHOPAEDIC SURGERY

## 2023-03-13 PROCEDURE — 99900104 DSU ONLY-NO CHARGE-EA ADD'L HR (STAT)

## 2023-03-13 PROCEDURE — 99900103 DSU ONLY-NO CHARGE-INITIAL HR (STAT)

## 2023-03-13 PROCEDURE — 36415 COLL VENOUS BLD VENIPUNCTURE: CPT | Performed by: ORTHOPAEDIC SURGERY

## 2023-03-13 PROCEDURE — 80048 BASIC METABOLIC PNL TOTAL CA: CPT | Performed by: ORTHOPAEDIC SURGERY

## 2023-03-13 PROCEDURE — 93005 ELECTROCARDIOGRAM TRACING: CPT

## 2023-03-13 PROCEDURE — 93010 EKG 12-LEAD: ICD-10-PCS | Mod: ,,, | Performed by: INTERNAL MEDICINE

## 2023-03-13 PROCEDURE — 93010 ELECTROCARDIOGRAM REPORT: CPT | Mod: ,,, | Performed by: INTERNAL MEDICINE

## 2023-03-16 ENCOUNTER — ANESTHESIA EVENT (OUTPATIENT)
Dept: SURGERY | Facility: HOSPITAL | Age: 58
End: 2023-03-16
Payer: OTHER GOVERNMENT

## 2023-03-16 DIAGNOSIS — M75.21 BICEPS TENDINITIS, RIGHT: Primary | ICD-10-CM

## 2023-03-16 RX ORDER — ONDANSETRON 4 MG/1
4 TABLET, FILM COATED ORAL EVERY 6 HOURS PRN
Qty: 30 TABLET | Refills: 0 | Status: SHIPPED | OUTPATIENT
Start: 2023-03-16 | End: 2024-02-05

## 2023-03-16 RX ORDER — OXYCODONE AND ACETAMINOPHEN 5; 325 MG/1; MG/1
1 TABLET ORAL EVERY 6 HOURS PRN
Qty: 14 TABLET | Refills: 0 | Status: SHIPPED | OUTPATIENT
Start: 2023-03-16 | End: 2023-03-20

## 2023-03-16 RX ORDER — CYCLOBENZAPRINE HCL 10 MG
10 TABLET ORAL 3 TIMES DAILY PRN
Qty: 30 TABLET | Refills: 0 | Status: SHIPPED | OUTPATIENT
Start: 2023-03-16 | End: 2023-03-26

## 2023-03-16 RX ORDER — ACETAMINOPHEN 500 MG
1000 TABLET ORAL EVERY 8 HOURS PRN
Qty: 30 TABLET | Refills: 0 | Status: SHIPPED | OUTPATIENT
Start: 2023-03-16 | End: 2023-04-27

## 2023-03-17 ENCOUNTER — ANESTHESIA (OUTPATIENT)
Dept: SURGERY | Facility: HOSPITAL | Age: 58
End: 2023-03-17
Payer: OTHER GOVERNMENT

## 2023-03-17 ENCOUNTER — HOSPITAL ENCOUNTER (OUTPATIENT)
Facility: HOSPITAL | Age: 58
Discharge: HOME | End: 2023-03-17
Attending: ORTHOPAEDIC SURGERY | Admitting: ORTHOPAEDIC SURGERY
Payer: OTHER GOVERNMENT

## 2023-03-17 VITALS
HEIGHT: 70 IN | RESPIRATION RATE: 18 BRPM | OXYGEN SATURATION: 98 % | SYSTOLIC BLOOD PRESSURE: 124 MMHG | TEMPERATURE: 98 F | BODY MASS INDEX: 33.64 KG/M2 | DIASTOLIC BLOOD PRESSURE: 73 MMHG | HEART RATE: 68 BPM | WEIGHT: 235 LBS

## 2023-03-17 DIAGNOSIS — Z01.818 PRE-OP TESTING: ICD-10-CM

## 2023-03-17 DIAGNOSIS — M75.21 BICEPS TENDINITIS, RIGHT: ICD-10-CM

## 2023-03-17 DIAGNOSIS — M75.21 BICIPITAL TENDINITIS OF RIGHT SHOULDER: ICD-10-CM

## 2023-03-17 PROCEDURE — 99900103 DSU ONLY-NO CHARGE-INITIAL HR (STAT): Performed by: ORTHOPAEDIC SURGERY

## 2023-03-17 PROCEDURE — D9220A PRA ANESTHESIA: Mod: ANES,,, | Performed by: ANESTHESIOLOGY

## 2023-03-17 PROCEDURE — 01630 ANES OPN/ARTHR PX SHO JT NOS: CPT | Performed by: ORTHOPAEDIC SURGERY

## 2023-03-17 PROCEDURE — 36000711: Performed by: ORTHOPAEDIC SURGERY

## 2023-03-17 PROCEDURE — 29827 SHO ARTHRS SRG RT8TR CUF RPR: CPT | Mod: RT,,, | Performed by: ORTHOPAEDIC SURGERY

## 2023-03-17 PROCEDURE — 63600175 PHARM REV CODE 636 W HCPCS: Performed by: ANESTHESIOLOGY

## 2023-03-17 PROCEDURE — 37000009 HC ANESTHESIA EA ADD 15 MINS: Performed by: ORTHOPAEDIC SURGERY

## 2023-03-17 PROCEDURE — 25000003 PHARM REV CODE 250: Performed by: ANESTHESIOLOGY

## 2023-03-17 PROCEDURE — 63600175 PHARM REV CODE 636 W HCPCS: Performed by: NURSE ANESTHETIST, CERTIFIED REGISTERED

## 2023-03-17 PROCEDURE — 36000710: Performed by: ORTHOPAEDIC SURGERY

## 2023-03-17 PROCEDURE — 64415 NJX AA&/STRD BRCH PLXS IMG: CPT | Performed by: ANESTHESIOLOGY

## 2023-03-17 PROCEDURE — 25000003 PHARM REV CODE 250: Performed by: NURSE ANESTHETIST, CERTIFIED REGISTERED

## 2023-03-17 PROCEDURE — 94799 UNLISTED PULMONARY SVC/PX: CPT

## 2023-03-17 PROCEDURE — 71000015 HC POSTOP RECOV 1ST HR: Performed by: ORTHOPAEDIC SURGERY

## 2023-03-17 PROCEDURE — D9220A PRA ANESTHESIA: Mod: CRNA,,, | Performed by: NURSE ANESTHETIST, CERTIFIED REGISTERED

## 2023-03-17 PROCEDURE — 63600175 PHARM REV CODE 636 W HCPCS

## 2023-03-17 PROCEDURE — 99900104 DSU ONLY-NO CHARGE-EA ADD'L HR (STAT): Performed by: ORTHOPAEDIC SURGERY

## 2023-03-17 PROCEDURE — 63600175 PHARM REV CODE 636 W HCPCS: Performed by: ORTHOPAEDIC SURGERY

## 2023-03-17 PROCEDURE — 27201423 OPTIME MED/SURG SUP & DEVICES STERILE SUPPLY: Performed by: ORTHOPAEDIC SURGERY

## 2023-03-17 PROCEDURE — C1713 ANCHOR/SCREW BN/BN,TIS/BN: HCPCS | Performed by: ORTHOPAEDIC SURGERY

## 2023-03-17 PROCEDURE — 29827 PR SHLDR ARTHROSCOP,SURG,W/ROTAT CUFF REPR: ICD-10-PCS | Mod: RT,,, | Performed by: ORTHOPAEDIC SURGERY

## 2023-03-17 PROCEDURE — 29828 PR ARTHROSCOPY SHOULDER SURGICAL BICEPS TENODESIS: ICD-10-PCS | Mod: 51,RT,, | Performed by: ORTHOPAEDIC SURGERY

## 2023-03-17 PROCEDURE — D9220A PRA ANESTHESIA: ICD-10-PCS | Mod: CRNA,,, | Performed by: NURSE ANESTHETIST, CERTIFIED REGISTERED

## 2023-03-17 PROCEDURE — 71000033 HC RECOVERY, INTIAL HOUR: Performed by: ORTHOPAEDIC SURGERY

## 2023-03-17 PROCEDURE — 71000039 HC RECOVERY, EACH ADD'L HOUR: Performed by: ORTHOPAEDIC SURGERY

## 2023-03-17 PROCEDURE — 37000008 HC ANESTHESIA 1ST 15 MINUTES: Performed by: ORTHOPAEDIC SURGERY

## 2023-03-17 PROCEDURE — 29828 SHO ARTHRS SRG BICP TENODSIS: CPT | Mod: 51,RT,, | Performed by: ORTHOPAEDIC SURGERY

## 2023-03-17 PROCEDURE — 27200750 HC INSULATED NEEDLE/ STIMUPLEX: Performed by: ANESTHESIOLOGY

## 2023-03-17 PROCEDURE — 64415 PERIPHERAL BLOCK: ICD-10-PCS | Mod: 59,RT,, | Performed by: ANESTHESIOLOGY

## 2023-03-17 PROCEDURE — C9290 INJ, BUPIVACAINE LIPOSOME: HCPCS | Performed by: ANESTHESIOLOGY

## 2023-03-17 PROCEDURE — 94761 N-INVAS EAR/PLS OXIMETRY MLT: CPT

## 2023-03-17 PROCEDURE — 25000003 PHARM REV CODE 250: Performed by: ORTHOPAEDIC SURGERY

## 2023-03-17 PROCEDURE — D9220A PRA ANESTHESIA: ICD-10-PCS | Mod: ANES,,, | Performed by: ANESTHESIOLOGY

## 2023-03-17 DEVICE — ANCHOR SWIVELOCK KNTLS BLUE #2: Type: IMPLANTABLE DEVICE | Site: SHOULDER | Status: FUNCTIONAL

## 2023-03-17 DEVICE — SYS SWIVELOCK LNT 4.75BC: Type: IMPLANTABLE DEVICE | Site: SHOULDER | Status: FUNCTIONAL

## 2023-03-17 RX ORDER — ONDANSETRON 2 MG/ML
4 INJECTION INTRAMUSCULAR; INTRAVENOUS ONCE
Status: DISCONTINUED | OUTPATIENT
Start: 2023-03-17 | End: 2023-03-17 | Stop reason: HOSPADM

## 2023-03-17 RX ORDER — PROPOFOL 10 MG/ML
VIAL (ML) INTRAVENOUS
Status: DISCONTINUED | OUTPATIENT
Start: 2023-03-17 | End: 2023-03-17

## 2023-03-17 RX ORDER — ACETAMINOPHEN 10 MG/ML
INJECTION, SOLUTION INTRAVENOUS
Status: DISCONTINUED | OUTPATIENT
Start: 2023-03-17 | End: 2023-03-17

## 2023-03-17 RX ORDER — BUPIVACAINE HYDROCHLORIDE 5 MG/ML
INJECTION, SOLUTION EPIDURAL; INTRACAUDAL
Status: DISCONTINUED | OUTPATIENT
Start: 2023-03-17 | End: 2023-03-17

## 2023-03-17 RX ORDER — HYDROMORPHONE HYDROCHLORIDE 2 MG/ML
0.2 INJECTION, SOLUTION INTRAMUSCULAR; INTRAVENOUS; SUBCUTANEOUS EVERY 5 MIN PRN
Status: DISCONTINUED | OUTPATIENT
Start: 2023-03-17 | End: 2023-03-17 | Stop reason: HOSPADM

## 2023-03-17 RX ORDER — FENTANYL CITRATE 50 UG/ML
INJECTION, SOLUTION INTRAMUSCULAR; INTRAVENOUS
Status: DISCONTINUED | OUTPATIENT
Start: 2023-03-17 | End: 2023-03-17

## 2023-03-17 RX ORDER — EPHEDRINE SULFATE 50 MG/ML
INJECTION, SOLUTION INTRAVENOUS
Status: DISCONTINUED | OUTPATIENT
Start: 2023-03-17 | End: 2023-03-17

## 2023-03-17 RX ORDER — DIPHENHYDRAMINE HYDROCHLORIDE 50 MG/ML
25 INJECTION INTRAMUSCULAR; INTRAVENOUS EVERY 6 HOURS PRN
Status: DISCONTINUED | OUTPATIENT
Start: 2023-03-17 | End: 2023-03-17 | Stop reason: HOSPADM

## 2023-03-17 RX ORDER — LIDOCAINE HCL/PF 100 MG/5ML
SYRINGE (ML) INTRAVENOUS
Status: DISCONTINUED | OUTPATIENT
Start: 2023-03-17 | End: 2023-03-17

## 2023-03-17 RX ORDER — PHENYLEPHRINE HYDROCHLORIDE 10 MG/ML
INJECTION INTRAVENOUS
Status: DISCONTINUED | OUTPATIENT
Start: 2023-03-17 | End: 2023-03-17

## 2023-03-17 RX ORDER — DEXAMETHASONE SODIUM PHOSPHATE 4 MG/ML
INJECTION, SOLUTION INTRA-ARTICULAR; INTRALESIONAL; INTRAMUSCULAR; INTRAVENOUS; SOFT TISSUE
Status: DISCONTINUED | OUTPATIENT
Start: 2023-03-17 | End: 2023-03-17

## 2023-03-17 RX ORDER — MUPIROCIN 20 MG/G
OINTMENT TOPICAL
Status: DISCONTINUED | OUTPATIENT
Start: 2023-03-17 | End: 2023-03-17 | Stop reason: HOSPADM

## 2023-03-17 RX ORDER — HYDROCODONE BITARTRATE AND ACETAMINOPHEN 5; 325 MG/1; MG/1
1 TABLET ORAL EVERY 4 HOURS PRN
Status: DISCONTINUED | OUTPATIENT
Start: 2023-03-17 | End: 2023-03-17 | Stop reason: HOSPADM

## 2023-03-17 RX ORDER — ROCURONIUM BROMIDE 10 MG/ML
INJECTION, SOLUTION INTRAVENOUS
Status: DISCONTINUED | OUTPATIENT
Start: 2023-03-17 | End: 2023-03-17

## 2023-03-17 RX ORDER — MIDAZOLAM HYDROCHLORIDE 1 MG/ML
INJECTION, SOLUTION INTRAMUSCULAR; INTRAVENOUS
Status: DISCONTINUED | OUTPATIENT
Start: 2023-03-17 | End: 2023-03-17

## 2023-03-17 RX ORDER — MEPERIDINE HYDROCHLORIDE 50 MG/ML
12.5 INJECTION INTRAMUSCULAR; INTRAVENOUS; SUBCUTANEOUS ONCE
Status: DISCONTINUED | OUTPATIENT
Start: 2023-03-17 | End: 2023-03-17 | Stop reason: HOSPADM

## 2023-03-17 RX ORDER — EPINEPHRINE 1 MG/ML
INJECTION, SOLUTION, CONCENTRATE INTRAVENOUS
Status: DISCONTINUED | OUTPATIENT
Start: 2023-03-17 | End: 2023-03-17 | Stop reason: HOSPADM

## 2023-03-17 RX ORDER — ONDANSETRON 2 MG/ML
INJECTION INTRAMUSCULAR; INTRAVENOUS
Status: DISCONTINUED | OUTPATIENT
Start: 2023-03-17 | End: 2023-03-17

## 2023-03-17 RX ORDER — SODIUM CHLORIDE, SODIUM LACTATE, POTASSIUM CHLORIDE, CALCIUM CHLORIDE 600; 310; 30; 20 MG/100ML; MG/100ML; MG/100ML; MG/100ML
INJECTION, SOLUTION INTRAVENOUS CONTINUOUS
Status: DISCONTINUED | OUTPATIENT
Start: 2023-03-17 | End: 2023-03-17 | Stop reason: HOSPADM

## 2023-03-17 RX ORDER — SUCCINYLCHOLINE CHLORIDE 20 MG/ML
INJECTION INTRAMUSCULAR; INTRAVENOUS
Status: DISCONTINUED | OUTPATIENT
Start: 2023-03-17 | End: 2023-03-17

## 2023-03-17 RX ORDER — LIDOCAINE HYDROCHLORIDE 10 MG/ML
0.5 INJECTION, SOLUTION EPIDURAL; INFILTRATION; INTRACAUDAL; PERINEURAL ONCE
Status: DISCONTINUED | OUTPATIENT
Start: 2023-03-17 | End: 2023-03-17 | Stop reason: HOSPADM

## 2023-03-17 RX ORDER — OXYCODONE HYDROCHLORIDE 5 MG/1
5 TABLET ORAL
Status: DISCONTINUED | OUTPATIENT
Start: 2023-03-17 | End: 2023-03-17 | Stop reason: HOSPADM

## 2023-03-17 RX ORDER — CEFAZOLIN SODIUM 2 G/50ML
2 SOLUTION INTRAVENOUS
Status: COMPLETED | OUTPATIENT
Start: 2023-03-17 | End: 2023-03-17

## 2023-03-17 RX ORDER — FENTANYL CITRATE 50 UG/ML
25 INJECTION, SOLUTION INTRAMUSCULAR; INTRAVENOUS EVERY 5 MIN PRN
Status: DISCONTINUED | OUTPATIENT
Start: 2023-03-17 | End: 2023-03-17 | Stop reason: HOSPADM

## 2023-03-17 RX ADMIN — EPHEDRINE SULFATE 15 MG: 50 INJECTION, SOLUTION INTRAMUSCULAR; INTRAVENOUS; SUBCUTANEOUS at 08:03

## 2023-03-17 RX ADMIN — GLYCOPYRROLATE 0.2 MG: 0.2 INJECTION, SOLUTION INTRAMUSCULAR; INTRAVITREAL at 08:03

## 2023-03-17 RX ADMIN — ONDANSETRON 4 MG: 2 INJECTION INTRAMUSCULAR; INTRAVENOUS at 08:03

## 2023-03-17 RX ADMIN — PHENYLEPHRINE HYDROCHLORIDE 100 MCG: 10 INJECTION INTRAVENOUS at 08:03

## 2023-03-17 RX ADMIN — EPHEDRINE SULFATE 10 MG: 50 INJECTION, SOLUTION INTRAMUSCULAR; INTRAVENOUS; SUBCUTANEOUS at 08:03

## 2023-03-17 RX ADMIN — SODIUM CHLORIDE, SODIUM GLUCONATE, SODIUM ACETATE, POTASSIUM CHLORIDE AND MAGNESIUM CHLORIDE: 526; 502; 368; 37; 30 INJECTION, SOLUTION INTRAVENOUS at 09:03

## 2023-03-17 RX ADMIN — MIDAZOLAM 1 MG: 1 INJECTION INTRAMUSCULAR; INTRAVENOUS at 08:03

## 2023-03-17 RX ADMIN — FENTANYL CITRATE 25 MCG: 50 INJECTION, SOLUTION INTRAMUSCULAR; INTRAVENOUS at 10:03

## 2023-03-17 RX ADMIN — PROPOFOL 200 MG: 10 INJECTION, EMULSION INTRAVENOUS at 08:03

## 2023-03-17 RX ADMIN — SUCCINYLCHOLINE CHLORIDE 60 MG: 20 INJECTION, SOLUTION INTRAMUSCULAR; INTRAVENOUS at 08:03

## 2023-03-17 RX ADMIN — MUPIROCIN: 20 OINTMENT TOPICAL at 07:03

## 2023-03-17 RX ADMIN — BUPIVACAINE 10 ML: 13.3 INJECTION, SUSPENSION, LIPOSOMAL INFILTRATION at 08:03

## 2023-03-17 RX ADMIN — ROCURONIUM BROMIDE 10 MG: 10 INJECTION, SOLUTION INTRAVENOUS at 08:03

## 2023-03-17 RX ADMIN — ACETAMINOPHEN 1000 MG: 10 INJECTION, SOLUTION INTRAVENOUS at 08:03

## 2023-03-17 RX ADMIN — BUPIVACAINE HYDROCHLORIDE 5 ML: 5 INJECTION, SOLUTION EPIDURAL; INTRACAUDAL; PERINEURAL at 08:03

## 2023-03-17 RX ADMIN — OXYCODONE 5 MG: 5 TABLET ORAL at 09:03

## 2023-03-17 RX ADMIN — LIDOCAINE HYDROCHLORIDE 100 MG: 20 INJECTION INTRAVENOUS at 08:03

## 2023-03-17 RX ADMIN — SODIUM CHLORIDE, SODIUM GLUCONATE, SODIUM ACETATE, POTASSIUM CHLORIDE AND MAGNESIUM CHLORIDE: 526; 502; 368; 37; 30 INJECTION, SOLUTION INTRAVENOUS at 07:03

## 2023-03-17 RX ADMIN — FENTANYL CITRATE 50 MCG: 50 INJECTION, SOLUTION INTRAMUSCULAR; INTRAVENOUS at 08:03

## 2023-03-17 RX ADMIN — CEFAZOLIN SODIUM 2 G: 2 SOLUTION INTRAVENOUS at 08:03

## 2023-03-17 RX ADMIN — DEXAMETHASONE SODIUM PHOSPHATE 4 MG: 4 INJECTION, SOLUTION INTRA-ARTICULAR; INTRALESIONAL; INTRAMUSCULAR; INTRAVENOUS; SOFT TISSUE at 08:03

## 2023-03-17 NOTE — ANESTHESIA PROCEDURE NOTES
Peripheral Block    Patient location during procedure: pre-op   Block not for primary anesthetic.  Reason for block: at surgeon's request and post-op pain management   Post-op Pain Location: right shoulder   Start time: 3/17/2023 8:05 AM  Timeout: 3/17/2023 8:05 AM   End time: 3/17/2023 8:11 AM    Staffing  Authorizing Provider: Guevara Reid MD  Performing Provider: Guevara Reid MD    Preanesthetic Checklist  Completed: patient identified, IV checked, site marked, risks and benefits discussed, surgical consent, monitors and equipment checked, pre-op evaluation and timeout performed  Peripheral Block  Patient position: sitting  Prep: ChloraPrep  Patient monitoring: heart rate, cardiac monitor, continuous pulse ox, continuous capnometry and frequent blood pressure checks  Block type: interscalene  Laterality: right  Injection technique: single shot  Needle  Needle type: Stimuplex   Needle gauge: 22 G  Needle length: 4 in  Needle localization: anatomical landmarks and ultrasound guidance   -ultrasound image captured on disc.  Assessment  Injection assessment: negative aspiration, negative parasthesia, local visualized surrounding nerve and positive parasthesia  Paresthesia pain: immediately resolved  Heart rate change: no  Slow fractionated injection: yes  Pain Tolerance: comfortable throughout block and no complaints      Additional Notes  VSS.  [facility] RN monitoring vitals throughout procedure.  Patient tolerated procedure well.

## 2023-03-17 NOTE — ANESTHESIA PROCEDURE NOTES
Intubation    Date/Time: 3/17/2023 8:29 AM  Performed by: Chester Gibson CRNA  Authorized by: Guevara Reid MD     Intubation:     Induction:  Intravenous    Intubated:  Postinduction    Mask Ventilation:  Easy with oral airway    Attempts:  1    Attempted By:  CRNA    Method of Intubation:  Direct    Blade:  Shaikh 4    Laryngeal View Grade: Grade I - full view of cords      Difficult Airway Encountered?: No      Complications:  None    Airway Device:  Oral endotracheal tube    Airway Device Size:  7.5    Style/Cuff Inflation:  Cuffed (inflated to minimal occlusive pressure)    Inflation Amount (mL):  6    Tube secured:  23    Secured at:  The teeth    Placement Verified By:  Capnometry    Complicating Factors:  None    Findings Post-Intubation:  BS equal bilateral

## 2023-03-17 NOTE — OP NOTE
[facility]  Orthopedic Surgery  Operative Note    SUMMARY     Date of Procedure: 3/17/2023     Procedure: Procedure(s) (LRB):  ARTHROSCOPY,SHOULDER,WITH BICEPS TENODESIS (Right)  REPAIR, ROTATOR CUFF, ARTHROSCOPIC (Right)       Surgeon(s) and Role:     * Felix Levin MD - Primary    Assistant: Abel OLIVAS    Pre-Operative Diagnosis: Biceps tendinitis, right [M75.21]  Pre-op testing [Z01.818]    Post-Operative Diagnosis: Post-Op Diagnosis Codes:     * Biceps tendinitis, right [M75.21]  Small recurrent tear of the right rotator cuff    Anesthesia: General/Regional      Description of the Findings of the Procedure: Diagnostic arthroscopy findings on the patient's Right shoulder showed some frayed labral tissue.  Patient had high-grade intra-articular tearing of the biceps tendon that was incomplete.  Patient had some upper border fraying of the subscapularis without detachment or retraction.  The patient had an intact rotator cuff looking from the articular side.  Patient did have a visible previous metal anchor from his prior surgical repair.  Tissue did not look perfect but was not detached from the bone.  Articular surface show just minimal arthritic change In the subacromial space, the patient had bursitis in the subacromial space with a type 2 acromion. Ac joint showed minimal arthritic changes. Bursal surface of the rotator cuff did show what looked to be a small near complete rotator cuff tear that was about a centimeter in width without retraction.      Complications: No    Estimated Blood Loss (EBL): 15 mL           Implants:   Implant Name Type Inv. Item Serial No.  Lot No. LRB No. Used Action   ANCHOR SWIVELOCK KNTLS BLUE #2 - [id number]  ANCHOR SWIVELOCK KNTLS BLUE #2  [employer] [id number] Right 1 Implanted   SYS SWIVELOCK LNT 4.75BC - [id number]  SYS SWIVELOCK LNT 4.75BC  [employer] [id number] Right 1 Implanted       Specimens:   Specimen (24h ago, onward)       None                    Condition: Good    Disposition: PACU - hemodynamically stable.    Attestation: I was present and scrubbed for the entire procedure.      Indications for the procedure:A 57 year old male with a history ofRight shoulder pain that failed to resolve with physical therapy, activity modification, injections, and rest.  Patient desired the procedure listed above after MRI was obtained.    PROCEDURE IN DETAIL: Risks, benefits and alternatives of the procedure were   explained to the patient including, but not limited to damage to nerves,   arteries and blood vessels. Also explained risk of re-rupture, nonhealing, infection, DVT,   PE as well as anesthetic complications including seizure, stroke, heart attack   and death. They understood this, signed informed consent. The patient's Right  shoulder was marked prior to coming to the Operating Room. Once there a formal   timeout was done in which correct patient, procedure and operative site were all   correctly identified and confirmed by the entire operating team.  Appropriate preoperative antibiotics was   given prior to surgical incision. General anesthesia was induced. The   patient was placed in lateral decubitus position on a bean bag with his Right upper extremity   hanging in balanced suspension.The arm was suspended with 10 lbs of weight for balanced traction. The extremity was prepped and draped in normal   sterile fashion.A standard posterior portal was then made. A spinal   needle was used to localize an anterior portal within the rotator interval and   this was made as well. We then performed a diagnostic arthroscopy of the   glenohumeral joint through both the anterior and posterior viewing portals,with the findings listed above.  Shaver was used to perform debridement within the joint.  Labral tissue that was frayed was debrided as well as some of the subscapularis.  We did not get aggressive with debriding the rotator cuff as the tissue  quality was not great.  We then released the biceps tendon from its labral attachment using an ablator for later tenodesis.    After this was done, we then proceeded up in the subacromial space   where a spinal needle was used to localize a lateral portal and then this was   made as well. A 4.0 shaver was used to perform a subtotal bursectomy. We then   used the ablator to remove the soft tissue off the undersurface of the acromion.We then thoroughly inspected the cuff on the rotator cuff side. We shaved away any additional adhesions in the anterior, lateral on posterior gutters.  Old sutures from the prior repair were seen.  Tissue quality again of the supraspinatus and infraspinatus was not phenomenal but there was only a small area with exposed footprint that was near complete.  We debrided the tear in this small area.  We then prepped the footprint using a ablator and a bur to get down to good bleeding bone.  We then made an accessory lateral portal and placed a passport cannula in both of our lateral portals.  We then passed a FiberTape in a horizontal mattress fashion across the tear we then punched and secured our FiberTape with a SwiveLock anchor and a speed fix type repair.  We then externally rotated the arm to bring the bicipital groove closer to us.  We were then able to release the biceps from its groove.  We then got down into good healthy-appearing biceps tissue just above the Lew major tendon.  We then performed a loop intact biceps tenodesis with a SwiveLock anchor right above the Lew major tendon.  Remainder of the proximal tendon that was torn and frayed was then debrided with the shaver back to the tenodesis site.      All excess fluid was removed from the shoulder. The portals were closed using nylon. Sterile dressing applied.  They were then placed in a cryotherapy cuff with   UltraSling, extubated, awakened and transferred from the Operating Room to   Recovery Room in stable condition.      Postoperative rehab course will be for rotator cuff repair and biceps tenodesis

## 2023-03-17 NOTE — DISCHARGE SUMMARY
[facility]  Discharge Note  Short Stay    Procedure(s) (LRB):  ARTHROSCOPY,SHOULDER,WITH BICEPS TENODESIS (Right)  REPAIR, ROTATOR CUFF, ARTHROSCOPIC (Right)      OUTCOME: Patient tolerated treatment/procedure well without complication and is now ready for discharge.    DISPOSITION: Home or Self Care    FINAL DIAGNOSIS:  <principal problem not specified>    FOLLOWUP: In clinic    DISCHARGE INSTRUCTIONS:    Discharge Procedure Orders   Diet general     Ice to affected area     Lifting restrictions     No driving, operating heavy equipment or signing legal documents while taking pain medication     Remove dressing in 48 hours     Change dressing (specify)   Order Comments: Dressing change: 1 times per day using waterproof bandaids.     Call MD for:  temperature >100.4     Call MD for:  persistent nausea and vomiting     Call MD for:  severe uncontrolled pain     Call MD for:  difficulty breathing, headache or visual disturbances     Call MD for:  redness, tenderness, or signs of infection (pain, swelling, redness, odor or green/yellow discharge around incision site)     Call MD for:  hives     Call MD for:  persistent dizziness or light-headedness     Call MD for:  extreme fatigue        TIME SPENT ON DISCHARGE: 5 minutes

## 2023-03-17 NOTE — PATIENT INSTRUCTIONS
1.Diet: Ice chips, clear liquids, and then diet as tolerated. Drink plenty of liquids.  2.Ice the area at least three times a day (20 minutes per session).  3.Elevate the extremity above the level of the heart to help reduce swelling.  4.Pain medication can be taken every four to six hours as needed. It is helpful to take pain medication prior to physical therapy.  Use Tylenol or anti-inflammatories for pain as much as possible.  Pain medication is for pain not responding to over-the-counter medications only.  5.Any activity that requires precise thinking or accuracy should be avoided for a minimum of 72 hours after surgery and while on narcotic pain medication. This includes operating machinery and/or driving a vehicle.  6.All sutures/staples will be removed approximately 14 days from the time of surgery. Leave steri-strips (skin tapes) in place until sutures are removed.  7. If skin glue is used instead of stitches, do not apply ointments or solutions to the incision. Keep the incision dry. The skin glue will peel off in 3-4 weeks.  8. Change dressing on the 2nd post-op day. Use gauze for the first 3 days, then start using Band-Aids over the incision sites.   9. All casts, splints, braces, slings, crutches, abduction pillows, etc... Are to be worn as instructed. Use sling at all times except for showering and exercises.  10. Keep the incision dry for 10-14 days. A waterproof dressing (purchase at [employer], [employer], etc) can be used to shower. No bath, pool, hot tub until instructed.  11. Start PT in 14 days. Call office to help with scheduling.  12. Call [phone] with any questions or concerns.

## 2023-03-17 NOTE — ANESTHESIA PREPROCEDURE EVALUATION
03/17/2023  Himanshu Connor II is a 57 y.o., male.      Pre-op Assessment    I have reviewed the Patient Summary Reports.     I have reviewed the Nursing Notes. I have reviewed the NPO Status.   I have reviewed the Medications.     Review of Systems  Anesthesia Hx:  No problems with previous Anesthesia    Social:  Non-Smoker    Cardiovascular:   Hypertension, well controlled ECG has been reviewed.    Pulmonary:   Asthma asymptomatic Sleep Apnea    Renal/:  Renal/ Normal     Hepatic/GI:   GERD, well controlled Gastric bypass   Musculoskeletal:   Arthritis   knee arthritis, shoulder    Neurological:   Neuromuscular Disease,   Chronic Pain Syndrome   Endocrine:  Obesity / BMI > 30  Psych:   Psychiatric History depression          Physical Exam  General: Well nourished    Airway:  Mallampati: II   Mouth Opening: Normal  TM Distance: Normal  Tongue: Normal  Neck ROM: Normal ROM    Dental:  Intact    Chest/Lungs:  Clear to auscultation, Normal Respiratory Rate        Anesthesia Plan  Type of Anesthesia, risks & benefits discussed:    Anesthesia Type: Gen ETT  Intra-op Monitoring Plan: Standard ASA Monitors  Post Op Pain Control Plan: multimodal analgesia, IV/PO Opioids PRN and peripheral nerve block  Induction:  IV and Inhalation  Informed Consent: Informed consent signed with the Patient and all parties understand the risks and agree with anesthesia plan.  All questions answered.   ASA Score: 3    Ready For Surgery From Anesthesia Perspective.     .

## 2023-03-17 NOTE — TRANSFER OF CARE
"Anesthesia Transfer of Care Note    Patient: Himanshu Connor II    Procedure(s) Performed: Procedure(s) (LRB):  ARTHROSCOPY,SHOULDER,WITH BICEPS TENODESIS (Right)  REPAIR, ROTATOR CUFF, ARTHROSCOPIC (Right)    Patient location: PACU    Anesthesia Type: general    Transport from OR: Transported from OR on 2-3 L/min O2 by NC with adequate spontaneous ventilation    Post pain: adequate analgesia    Post assessment: no apparent anesthetic complications    Post vital signs: stable    Level of consciousness: awake    Nausea/Vomiting: no nausea/vomiting    Complications: none    Transfer of care protocol was followed      Last vitals:   Visit Vitals  /75   Pulse 68   Temp 36.7 °C (98 °F) (Oral)   Resp 16   Ht 5' 10" (1.778 m)   Wt 106.6 kg (235 lb)   SpO2 98%   BMI 33.72 kg/m²     "

## 2023-03-17 NOTE — PLAN OF CARE
Released from Pacu when criteria met pain controlled skin w+d No nausea No emesis dsg dry intact sleepy but ox4   encouraged deep breaths  reviewed Is with pt did well encouraged use at home     Pt has all belongings  in post op     pt with block from anesthesia R arm Exparell bracelet on  wrist  reports numbness and tingle and burning at incisional site   R arm in immobilizer ice pk to r shoulder dsg intact dry

## 2023-03-17 NOTE — H&P
Past Medical History:   Diagnosis Date    Alcohol abuse, unspecified      Allergic rhinitis, cause unspecified      Arthritis      Asthma attack      Cancer 2022     skin cancer on nose    Carpal tunnel syndrome      Depressive disorder, not elsewhere classified      Diarrhea      Encounter for blood transfusion      GERD (gastroesophageal reflux disease)      Hypersomnia, unspecified      Hypertension      Hypoglycemia      Lumbago      Obesity, unspecified      JAH (obstructive sleep apnea)       uses BIPAP    Other and unspecified hyperlipidemia      Other ankle sprain and strain      Psychosexual dysfunction with inhibited sexual excitement      Pyogenic arthritis of knee 06/2021     staph pseudintermedius, TKA    Pyogenic arthritis of knee 09/21/2021     Enterococcus    Ventral hernia, unspecified, without mention of obstruction or gangrene                 Past Surgical History:   Procedure Laterality Date    A-scope knees         Bilateral    ARTHROSCOPIC REPAIR OF ROTATOR CUFF OF SHOULDER Left 09/20/2019     Procedure: REPAIR, ROTATOR CUFF, ARTHROSCOPIC;  Surgeon: Felix Levin MD;  Location: [facility];  Service: Orthopedics;  Laterality: Left;    ARTHROSCOPY OF SHOULDER WITH DECOMPRESSION OF SUBACROMIAL SPACE Left 09/20/2019     Procedure: ARTHROSCOPY, SHOULDER, WITH SUBACROMIAL SPACE DECOMPRESSION;  Surgeon: Felix Levin MD;  Location: [facility] OR;  Service: Orthopedics;  Laterality: Left;  [employer] notified of all case today 9/16-tcb    CARPAL TUNNEL RELEASE         Bilateral    COLONOSCOPY N/A 8/5/2022     Procedure: COLONOSCOPY;  Surgeon: Zaida Ansari MD;  Location: [facility];  Service: Endoscopy;  Laterality: N/A;    ESOPHAGOGASTRODUODENOSCOPY N/A 8/5/2022     Procedure: EGD (ESOPHAGOGASTRODUODENOSCOPY);  Surgeon: Zaida Ansari MD;  Location: [facility] ENDO;  Service: Endoscopy;  Laterality: N/A;    EYE SURGERY         Lasik    HERNIA REPAIR        INCISION AND DRAINAGE OF  HEMATOMA Right 09/21/2021     Procedure: INCISION AND DRAINAGE, HEMATOMA;  Surgeon: Felix Levin MD;  Location: [facility] OR;  Service: Orthopedics;  Laterality: Right;    INSERTION OF ANTIBIOTIC SPACER Right 06/01/2021     Procedure: INSERTION, ANTIBIOTIC SPACER;  Surgeon: Felix Levin MD;  Location: [facility] OR;  Service: Orthopedics;  Laterality: Right;    JOINT REPLACEMENT        KNEE ARTHROPLASTY Left 08/04/2020     Procedure: ARTHROPLASTY, KNEE;  Surgeon: Felix Levin MD;  Location: [facility] OR;  Service: Orthopedics;  Laterality: Left;    KNEE ARTHROPLASTY Right 03/23/2021     Procedure: ARTHROPLASTY, KNEE;  Surgeon: Felix Levin MD;  Location: [facility] OR;  Service: Orthopedics;  Laterality: Right;    KNEE ARTHROSCOPY W/ MENISCECTOMY Left 10/15/2019     Procedure: ARTHROSCOPY, KNEE, WITH MENISCECTOMY;  Surgeon: Felix Levin MD;  Location: [facility] OR;  Service: Orthopedics;  Laterality: Left;  Medial and Lateral Meniscectomy    KNEE ARTHROSCOPY W/ MENISCECTOMY Right 11/22/2019     Procedure: ARTHROSCOPY, KNEE, WITH MENISCECTOMY;  Surgeon: Felix Levin MD;  Location: [facility] OR;  Service: Orthopedics;  Laterality: Right;    KNEE SURGERY         osmar    NISSEN FUNDOPLICATION        REVERSE TOTAL SHOULDER ARTHROPLASTY Left 02/11/2020     Procedure: ARTHROPLASTY, SHOULDER, TOTAL, REVERSE;  Surgeon: Felix Levin MD;  Location: [facility] OR;  Service: Orthopedics;  Laterality: Left;  [place]    revision of gastrojejunostomy   05/10/2021     [facility] [place]    REVISION OF KNEE ARTHROPLASTY Right 09/07/2021     Procedure: REVISION, ARTHROPLASTY, KNEE;  Surgeon: Felix Levin MD;  Location: [facility] OR;  Service: Orthopedics;  Laterality: Right;    ROTATOR CUFF REPAIR         right    RJ-EN-Y PROCEDURE N/A 05/2021    SLEEVE GASTROPLASTY   12/2013              Current Outpatient Medications   Medication Sig    cyanocobalamin (VITAMIN B-12) 1000 MCG tablet Take 1,000 mcg  by mouth once daily.     cyclobenzaprine (FLEXERIL) 10 MG tablet Take 10 mg by mouth 3 (three) times daily as needed for Muscle spasms.    diclofenac sodium (VOLTAREN) 1 % Gel Apply topically daily as needed.    EScitalopram oxalate (LEXAPRO) 10 MG tablet Take 15 mg by mouth once daily.    ferrous sulfate (FEOSOL) 325 mg (65 mg iron) Tab tablet TAKE ONE TABLET BY MOUTH ONCE DAILY AS AN IRON SUPPLEMENT    losartan (COZAAR) 50 MG tablet Take 50 mg by mouth once daily.     oxyCODONE-acetaminophen (PERCOCET)  mg per tablet Take 1 tablet by mouth every 6 (six) hours as needed for Pain.    traZODone (DESYREL) 100 MG tablet Take 1 tablet by mouth nightly as needed.    vitamin D (VITAMIN D3) 1000 units Tab Take 2,000 Units by mouth once daily.    aspirin 81 MG Chew Take 1 tablet (81 mg total) by mouth 2 (two) times daily.    triamcinolone acetonide 0.1% (KENALOG) 0.1 % cream Apply topically 3 (three) times daily. for 10 days      No current facility-administered medications for this visit.         Review of patient's allergies indicates:  No Known Allergies           Family History   Problem Relation Age of Onset    Arthritis Mother      Hypertension Father      Kidney disease Father      Heart disease Father      COPD Father      Pancreatic cancer Maternal Grandfather      Colon cancer Paternal Grandfather      Colon polyps Neg Hx      Crohn's disease Neg Hx      Ulcerative colitis Neg Hx      Stomach cancer Neg Hx      Esophageal cancer Neg Hx           Social History               Socioeconomic History    Marital status:    Tobacco Use    Smoking status: Never    Smokeless tobacco: Current       Types: Chew   Substance and Sexual Activity    Alcohol use: Yes       Comment: occasionally    Drug use: No            Chief Complaint:        Chief Complaint   Patient presents with    Right Shoulder - Pain, Results       Here for Review of MRI Right Shoulder          Date of surgery:  February 11, 2020     History  of present illness:  57-year-old male underwent left reverse total shoulder arthroplasty 3 years ago.  Started to have worsening right shoulder pain back in September.  History of right rotator cuff repair by Dr. Patterson back in 2004.  Shoulder is continued to worsen with pain and weakness.  Feels like it is torn again.  Pain is 7/10.  Burning pain.  Can not lift his arm up above his head.  Pain at night.        Review of Systems:     Musculoskeletal:  See HPI           Physical Examination:     Vital Signs:  There were no vitals filed for this visit.     Body mass index is 32.42 kg/m².     This a well-developed, well nourished patient in no acute distress.  They are alert and oriented and cooperative to examination.  Pt. walks without an antalgic gait.       Examination of the right shoulder shows no rashes or erythema.  Surgical scars noted.  There are no masses, ecchymosis, or atrophy. The patient has decreased active range of motion in forward flexion, external rotation, and internal rotation to the mid T-spine. The patient has positive Aleman Neer and Chandler's test.  Nontender to palpation over a.c. joint. Normal stability anteriorly, posteriorly, and negative sulcus sign. Passive range of motion: Forward flexion of 180°, external rotation at 90° of 90°, internal rotation of 50°, and external rotation at 0° of 50°. 2+ radial pulse. Intact axillary, radial, median and ulnar sensation. 4- out of 5 resisted forward flexion, external rotation, and negative lift off test.     Heart is regular rate without obvious murmurs   Normal respiratory effort without audible wheezing  Abdomen is soft and nontender            X-rays:  X-rays of the right shoulder reviewed which show previous suture anchors from rotator cuff repair.     MRI of the right shoulder is reviewed and interpreted:1. Interstitial delaminating tear involving the superior insertional subscapularis.  2. Tendinosis and tenosynovitis of the proximal biceps  tendon.  3. Mild AC joint degenerative change.     Assessment::  Right proximal biceps tendinitis with possible subluxation     Plan:  Reviewed the findings with him today.  Talked about treatment options.  Patient has rotator cuff looks good at least.  Plan will be for right shoulder arthroscopy with biceps tenodesis and evaluation of the subscapularis.  Risks, benefits, and alternatives to the procedure were explained to the patient including but not limited to damage to nerves, arteries, blood vessels, bones, tendons, ligaments, stiffness, instability, infection, permanent limb dysfunction, DVT, PE, as well as general anesthetic complications including seizure, stroke, heart attack and even death. The patient understood these risks and wished to proceed and signed the informed consent.         This note was created using M Mod[employer] voice recognition software that occasionally misinterpreted phrases or words.

## 2023-03-17 NOTE — ANESTHESIA POSTPROCEDURE EVALUATION
Anesthesia Post Evaluation    Patient: Himanshu Connor II    Procedure(s) Performed: Procedure(s) (LRB):  ARTHROSCOPY,SHOULDER,WITH BICEPS TENODESIS (Right)  REPAIR, ROTATOR CUFF, ARTHROSCOPIC (Right)    Final Anesthesia Type: general      Patient location during evaluation: PACU  Patient participation: Yes- Able to Participate  Level of consciousness: sedated and awake  Post-procedure vital signs: reviewed and stable  Pain management: adequate  Airway patency: patent    PONV status at discharge: No PONV  Anesthetic complications: no      Cardiovascular status: blood pressure returned to baseline  Respiratory status: spontaneous ventilation  Hydration status: euvolemic  Follow-up not needed.          Vitals Value Taken Time   /73 03/17/23 1110   Temp 36.9 °C (98.4 °F) 03/17/23 1110   Pulse 68 03/17/23 1110   Resp 18 03/17/23 1110   SpO2 98 % 03/17/23 1110         Event Time   Out of Recovery 11:06:00         Pain/Yovany Score: Pain Rating Prior to Med Admin: 6 (3/17/2023 10:30 AM)  Pain Rating Post Med Admin: 3 (3/17/2023 11:01 AM)  Yovany Score: 10 (3/17/2023 11:20 AM)  Modified Yovany Score: 19 (3/17/2023 11:20 AM)

## 2023-03-17 NOTE — PLAN OF CARE
1105 Assumed care, vss, see assess. Nadn. Will monitor.    1140 Pt tolerating po fluids, pain controlled. All belongings returned to pt. D/c instructions given and explained to pt, pt v/u. D/c'd out to pov via wc per staff with nadn.

## 2023-03-20 ENCOUNTER — TELEPHONE (OUTPATIENT)
Dept: ORTHOPEDICS | Facility: CLINIC | Age: 58
End: 2023-03-20
Payer: OTHER GOVERNMENT

## 2023-03-20 DIAGNOSIS — M75.21 BICEPS TENDINITIS, RIGHT: Primary | ICD-10-CM

## 2023-03-20 RX ORDER — OXYCODONE AND ACETAMINOPHEN 10; 325 MG/1; MG/1
1 TABLET ORAL EVERY 6 HOURS PRN
Qty: 28 TABLET | Refills: 0 | Status: SHIPPED | OUTPATIENT
Start: 2023-03-20 | End: 2023-04-04 | Stop reason: SDUPTHER

## 2023-03-20 NOTE — TELEPHONE ENCOUNTER
----- Message from Hemanth Burroughs sent at 3/20/2023 12:05 PM CDT -----  Regarding: Pharmacy has multiple Rx from multiple Dr for Percocet in the last few days, call Juliane   Contact: Juliane Mccarty   Pharmacy has multiple Rx from multiple Dr for Percocet in the last few days, call Juliane

## 2023-03-20 NOTE — TELEPHONE ENCOUNTER
Pharmacy advised that he has picked up over 100 tablets in the last 5 days. Pt advised that he can increase what he is taking to 10mg, but he is to go back to 7.5mg in about a week then wean from here a week after that. Pt verbalized understanding. Pt advised that the 10 mgs that were sent in are cancelled as he has a lot of medication at home already.

## 2023-03-23 ENCOUNTER — TELEPHONE (OUTPATIENT)
Dept: GASTROENTEROLOGY | Facility: CLINIC | Age: 58
End: 2023-03-23
Payer: OTHER GOVERNMENT

## 2023-03-23 NOTE — TELEPHONE ENCOUNTER
----- Message from Susan Brooks sent at 3/23/2023  1:40 PM CDT -----  Regarding: Pt called to speak to the nurse to find out his report location and time for his procedure appt on tomorrow 3/24/23 and pt would like a call back asap  Patient Advice:    Pt called to speak to the nurse to find out his report location and time for his procedure appt on tomorrow 3/24/23 and pt would like a call back asap    Pt can be reached at [phone]

## 2023-03-24 ENCOUNTER — HOSPITAL ENCOUNTER (OUTPATIENT)
Facility: HOSPITAL | Age: 58
Discharge: HOME | End: 2023-03-24
Attending: INTERNAL MEDICINE | Admitting: INTERNAL MEDICINE
Payer: OTHER GOVERNMENT

## 2023-03-24 ENCOUNTER — ANESTHESIA (OUTPATIENT)
Dept: ENDOSCOPY | Facility: HOSPITAL | Age: 58
End: 2023-03-24
Payer: OTHER GOVERNMENT

## 2023-03-24 ENCOUNTER — ANESTHESIA EVENT (OUTPATIENT)
Dept: ENDOSCOPY | Facility: HOSPITAL | Age: 58
End: 2023-03-24
Payer: OTHER GOVERNMENT

## 2023-03-24 VITALS
WEIGHT: 235 LBS | DIASTOLIC BLOOD PRESSURE: 65 MMHG | OXYGEN SATURATION: 95 % | HEIGHT: 70 IN | BODY MASS INDEX: 33.64 KG/M2 | RESPIRATION RATE: 16 BRPM | HEART RATE: 63 BPM | SYSTOLIC BLOOD PRESSURE: 137 MMHG | TEMPERATURE: 98 F

## 2023-03-24 DIAGNOSIS — R13.10 DYSPHAGIA, UNSPECIFIED TYPE: Primary | ICD-10-CM

## 2023-03-24 DIAGNOSIS — R13.10 DYSPHAGIA: ICD-10-CM

## 2023-03-24 PROCEDURE — 91040 PR ESOPH BALLOON DISTENSION TST: ICD-10-PCS | Mod: 26,,, | Performed by: INTERNAL MEDICINE

## 2023-03-24 PROCEDURE — 27201012 HC FORCEPS, HOT/COLD, DISP: Performed by: INTERNAL MEDICINE

## 2023-03-24 PROCEDURE — 25000003 PHARM REV CODE 250: Performed by: INTERNAL MEDICINE

## 2023-03-24 PROCEDURE — 88305 TISSUE EXAM BY PATHOLOGIST: ICD-10-PCS | Mod: 26,,, | Performed by: PATHOLOGY

## 2023-03-24 PROCEDURE — 91040 ESOPH BALLOON DISTENSION TST: CPT | Mod: TC | Performed by: INTERNAL MEDICINE

## 2023-03-24 PROCEDURE — D9220A PRA ANESTHESIA: ICD-10-PCS | Mod: CRNA,,, | Performed by: NURSE ANESTHETIST, CERTIFIED REGISTERED

## 2023-03-24 PROCEDURE — 37000009 HC ANESTHESIA EA ADD 15 MINS: Performed by: INTERNAL MEDICINE

## 2023-03-24 PROCEDURE — 37000008 HC ANESTHESIA 1ST 15 MINUTES: Performed by: INTERNAL MEDICINE

## 2023-03-24 PROCEDURE — 63600175 PHARM REV CODE 636 W HCPCS: Performed by: NURSE ANESTHETIST, CERTIFIED REGISTERED

## 2023-03-24 PROCEDURE — D9220A PRA ANESTHESIA: ICD-10-PCS | Mod: ANES,,, | Performed by: ANESTHESIOLOGY

## 2023-03-24 PROCEDURE — 00731 ANES UPR GI NDSC PX NOS: CPT | Performed by: INTERNAL MEDICINE

## 2023-03-24 PROCEDURE — 91040 ESOPH BALLOON DISTENSION TST: CPT | Mod: 26,,, | Performed by: INTERNAL MEDICINE

## 2023-03-24 PROCEDURE — 25000003 PHARM REV CODE 250: Performed by: NURSE ANESTHETIST, CERTIFIED REGISTERED

## 2023-03-24 PROCEDURE — D9220A PRA ANESTHESIA: Mod: ANES,,, | Performed by: ANESTHESIOLOGY

## 2023-03-24 PROCEDURE — D9220A PRA ANESTHESIA: Mod: CRNA,,, | Performed by: NURSE ANESTHETIST, CERTIFIED REGISTERED

## 2023-03-24 PROCEDURE — 88305 TISSUE EXAM BY PATHOLOGIST: CPT | Mod: 26,,, | Performed by: PATHOLOGY

## 2023-03-24 PROCEDURE — 91037 ESOPH IMPED FUNCTION TEST: CPT | Mod: TC | Performed by: INTERNAL MEDICINE

## 2023-03-24 PROCEDURE — 43239 EGD BIOPSY SINGLE/MULTIPLE: CPT | Performed by: INTERNAL MEDICINE

## 2023-03-24 PROCEDURE — 88305 TISSUE EXAM BY PATHOLOGIST: CPT | Performed by: PATHOLOGY

## 2023-03-24 PROCEDURE — C1726 CATH, BAL DIL, NON-VASCULAR: HCPCS | Performed by: INTERNAL MEDICINE

## 2023-03-24 PROCEDURE — 91010 ESOPHAGUS MOTILITY STUDY: CPT | Mod: TC | Performed by: INTERNAL MEDICINE

## 2023-03-24 PROCEDURE — 43239 PR EGD, FLEX, W/BIOPSY, SGL/MULTI: ICD-10-PCS | Mod: ,,, | Performed by: INTERNAL MEDICINE

## 2023-03-24 PROCEDURE — 43239 EGD BIOPSY SINGLE/MULTIPLE: CPT | Mod: ,,, | Performed by: INTERNAL MEDICINE

## 2023-03-24 RX ORDER — LIDOCAINE HYDROCHLORIDE 20 MG/ML
JELLY TOPICAL ONCE
Status: DISCONTINUED | OUTPATIENT
Start: 2023-03-24 | End: 2023-03-24 | Stop reason: HOSPADM

## 2023-03-24 RX ORDER — PROPOFOL 10 MG/ML
VIAL (ML) INTRAVENOUS CONTINUOUS PRN
Status: DISCONTINUED | OUTPATIENT
Start: 2023-03-24 | End: 2023-03-24

## 2023-03-24 RX ORDER — SODIUM CHLORIDE 9 MG/ML
INJECTION, SOLUTION INTRAVENOUS CONTINUOUS
Status: DISCONTINUED | OUTPATIENT
Start: 2023-03-24 | End: 2023-03-24 | Stop reason: HOSPADM

## 2023-03-24 RX ORDER — LIDOCAINE HYDROCHLORIDE 20 MG/ML
INJECTION, SOLUTION EPIDURAL; INFILTRATION; INTRACAUDAL; PERINEURAL
Status: DISCONTINUED | OUTPATIENT
Start: 2023-03-24 | End: 2023-03-24

## 2023-03-24 RX ORDER — PROPOFOL 10 MG/ML
VIAL (ML) INTRAVENOUS
Status: DISCONTINUED | OUTPATIENT
Start: 2023-03-24 | End: 2023-03-24

## 2023-03-24 RX ORDER — ONDANSETRON 2 MG/ML
4 INJECTION INTRAMUSCULAR; INTRAVENOUS ONCE AS NEEDED
Status: DISCONTINUED | OUTPATIENT
Start: 2023-03-24 | End: 2023-03-24 | Stop reason: HOSPADM

## 2023-03-24 RX ORDER — SODIUM CHLORIDE 0.9 % (FLUSH) 0.9 %
10 SYRINGE (ML) INJECTION
Status: DISCONTINUED | OUTPATIENT
Start: 2023-03-24 | End: 2023-03-24 | Stop reason: HOSPADM

## 2023-03-24 RX ADMIN — PROPOFOL 100 MG: 10 INJECTION, EMULSION INTRAVENOUS at 08:03

## 2023-03-24 RX ADMIN — PROPOFOL 40 MG: 10 INJECTION, EMULSION INTRAVENOUS at 08:03

## 2023-03-24 RX ADMIN — PROPOFOL 125 MCG/KG/MIN: 10 INJECTION, EMULSION INTRAVENOUS at 08:03

## 2023-03-24 RX ADMIN — LIDOCAINE HYDROCHLORIDE 100 MG: 20 INJECTION, SOLUTION EPIDURAL; INFILTRATION; INTRACAUDAL; PERINEURAL at 08:03

## 2023-03-24 RX ADMIN — PROPOFOL 50 MG: 10 INJECTION, EMULSION INTRAVENOUS at 08:03

## 2023-03-24 RX ADMIN — SODIUM CHLORIDE: 0.9 INJECTION, SOLUTION INTRAVENOUS at 08:03

## 2023-03-24 NOTE — ANESTHESIA POSTPROCEDURE EVALUATION
Anesthesia Post Evaluation    Patient: Himanshu Connor II    Procedure(s) Performed: Procedure(s) (LRB):  EGD (ESOPHAGOGASTRODUODENOSCOPY) (N/A)  MANOMETRY, ESOPHAGUS, WITH IMPEDANCE MEASUREMENT (N/A)    Final Anesthesia Type: general      Patient location during evaluation: PACU  Patient participation: Yes- Able to Participate  Level of consciousness: awake and alert  Post-procedure vital signs: reviewed and stable  Pain management: adequate  Airway patency: patent    PONV status at discharge: No PONV  Anesthetic complications: no      Cardiovascular status: blood pressure returned to baseline  Respiratory status: unassisted  Hydration status: euvolemic  Follow-up not needed.          Vitals Value Taken Time   /92 03/24/23 0933   Temp 36.7 °C (98.1 °F) 03/24/23 0910   Pulse 63 03/24/23 0946   Resp 17 03/24/23 0946   SpO2 93 % 03/24/23 0946   Vitals shown include unvalidated device data.      No case tracking events are documented in the log.      Pain/Yovany Score: Yovany Score: 8 (3/24/2023  9:10 AM)

## 2023-03-24 NOTE — ANESTHESIA PREPROCEDURE EVALUATION
03/24/2023  Himanshu Connor II is a 57 y.o., male.  Pre-operative evaluation for EGD (ESOPHAGOGASTRODUODENOSCOPY) (N/A), MANOMETRY, ESOPHAGUS, WITH IMPEDANCE MEASUREMENT (N/A)    Chief Complaint:GERD    PMH:  obestiy  HTN  JAH  Asthma  TKR  Past Surgical History:   Procedure Laterality Date    A-scope knees      Bilateral    ARTHROSCOPIC REPAIR OF ROTATOR CUFF OF SHOULDER Left 09/20/2019    Procedure: REPAIR, ROTATOR CUFF, ARTHROSCOPIC;  Surgeon: Felix Levin MD;  Location: [facility] OR;  Service: Orthopedics;  Laterality: Left;    ARTHROSCOPIC REPAIR OF ROTATOR CUFF OF SHOULDER Right 3/17/2023    Procedure: REPAIR, ROTATOR CUFF, ARTHROSCOPIC;  Surgeon: Felix Levin MD;  Location: [facility] OR;  Service: Orthopedics;  Laterality: Right;    ARTHROSCOPY OF SHOULDER WITH DECOMPRESSION OF SUBACROMIAL SPACE Left 09/20/2019    Procedure: ARTHROSCOPY, SHOULDER, WITH SUBACROMIAL SPACE DECOMPRESSION;  Surgeon: Felix Levin MD;  Location: [facility] OR;  Service: Orthopedics;  Laterality: Left;  Virtual DBS Arthrex notified of all case today 9/16-tcb    ARTHROSCOPY,SHOULDER,WITH BICEPS TENODESIS Right 3/17/2023    Procedure: ARTHROSCOPY,SHOULDER,WITH BICEPS TENODESIS;  Surgeon: Felix Levin MD;  Location: [facility] OR;  Service: Orthopedics;  Laterality: Right;    CARPAL TUNNEL RELEASE      Bilateral    COLONOSCOPY N/A 08/05/2022    Procedure: COLONOSCOPY;  Surgeon: Zaida Ansari MD;  Location: [facility] ENDO;  Service: Endoscopy;  Laterality: N/A;    ESOPHAGOGASTRODUODENOSCOPY N/A 08/05/2022    Procedure: EGD (ESOPHAGOGASTRODUODENOSCOPY);  Surgeon: Zaida Ansari MD;  Location: [facility] ENDO;  Service: Endoscopy;  Laterality: N/A;    EYE SURGERY      Lasik    HERNIA REPAIR      INCISION AND DRAINAGE OF HEMATOMA Right 09/21/2021    Procedure: INCISION AND DRAINAGE, HEMATOMA;  Surgeon:  Felix Levin MD;  Location: [facility] OR;  Service: Orthopedics;  Laterality: Right;    INSERTION OF ANTIBIOTIC SPACER Right 06/01/2021    Procedure: INSERTION, ANTIBIOTIC SPACER;  Surgeon: Felix Levin MD;  Location: [facility] OR;  Service: Orthopedics;  Laterality: Right;    JOINT REPLACEMENT      KNEE ARTHROPLASTY Left 08/04/2020    Procedure: ARTHROPLASTY, KNEE;  Surgeon: Felix Levin MD;  Location: [facility] OR;  Service: Orthopedics;  Laterality: Left;    KNEE ARTHROPLASTY Right 03/23/2021    Procedure: ARTHROPLASTY, KNEE;  Surgeon: Felix Levin MD;  Location: [facility] OR;  Service: Orthopedics;  Laterality: Right;    KNEE ARTHROSCOPY W/ MENISCECTOMY Left 10/15/2019    Procedure: ARTHROSCOPY, KNEE, WITH MENISCECTOMY;  Surgeon: Felix Levin MD;  Location: [facility] OR;  Service: Orthopedics;  Laterality: Left;  Medial and Lateral Meniscectomy    KNEE ARTHROSCOPY W/ MENISCECTOMY Right 11/22/2019    Procedure: ARTHROSCOPY, KNEE, WITH MENISCECTOMY;  Surgeon: Felix Levin MD;  Location: [facility] OR;  Service: Orthopedics;  Laterality: Right;    KNEE SURGERY      osmar    NISSEN FUNDOPLICATION      X 2    REVERSE TOTAL SHOULDER ARTHROPLASTY Left 02/11/2020    Procedure: ARTHROPLASTY, SHOULDER, TOTAL, REVERSE;  Surgeon: Felix Levin MD;  Location: [facility] OR;  Service: Orthopedics;  Laterality: Left;  Loren    [facility] gastrojejunostomy  [facility] [place]    REVISION OF KNEE ARTHROPLASTY Right 09/07/2021    Procedure: REVISION, ARTHROPLASTY, KNEE;  Surgeon: Felix Levin MD;  Location: [facility] OR;  Service: Orthopedics;  Laterality: Right;    ROTATOR CUFF REPAIR      right    RJ-EN-Y PROCEDURE N/A 05/2021    SLEEVE GASTROPLASTY  12/2013         Vital Signs Range (Last 24H):         CBC:     Recent Labs   Lab 03/13/23  1229   WBC 3.84*   RBC 4.25*   HGB 12.4*   HCT 40.7      MCV 96   MCH 29.2   MCHC 30.5*       CMP:   Recent Labs    Lab 23  1229      K 5.0      CO2 23   BUN 13   CREATININE 0.9   GLU 81   CALCIUM 8.9       INR:  No results for input(s): PT, INR, PROTIME, APTT in the last 720 hours.      Diagnostic Studies:      EKD Echo:    Pre-op Assessment    I have reviewed the Patient Summary Reports.     I have reviewed the Nursing Notes. I have reviewed the NPO Status.   I have reviewed the Medications.     Review of Systems  Anesthesia Hx:  No problems with previous Anesthesia    Social:  Former Smoker, Alcohol Use    Pulmonary:  Pulmonary Normal        Physical Exam  General: Well nourished, Cooperative, Alert and Oriented    Airway:  Mallampati: III / II  Mouth Opening: Normal  TM Distance: Normal  Tongue: Normal  Neck ROM: Normal ROM    Dental:  Intact    Chest/Lungs:  Normal Respiratory Rate        Anesthesia Plan  Type of Anesthesia, risks & benefits discussed:    Anesthesia Type: Gen Natural Airway  Intra-op Monitoring Plan: Standard ASA Monitors  Post Op Pain Control Plan: multimodal analgesia  Induction:  IV  Informed Consent: Informed consent signed with the Patient and all parties understand the risks and agree with anesthesia plan.  All questions answered.   ASA Score: 3  Day of Surgery Review of History & Physical: H&P Update referred to the surgeon/provider.  Anesthesia Plan Notes: Topical LA    Ready For Surgery From Anesthesia Perspective.     .

## 2023-03-24 NOTE — INTERVAL H&P NOTE
The patient has been examined and the H&P has been reviewed:    I concur with the findings and no changes have occurred since H&P was written.    Anesthesia risks, benefits and alternative options discussed and understood by patient/family.    Pre-Procedure H and P Addendum    Patient seen and examined.  History and exam unchanged from prior history and physical.      Procedure: EGD/FLIP/Mano  Indication: dysphagia, regurgitation  ASA Class: per anesthesiology  Airway: per anesthesia  Neck Mobility: full range of motion  Mallampatti score: per anesthesia  History of anesthesia problems: no  Family history of anesthesia problems: no  Anesthesia Plan: per anesthesia        There are no hospital problems to display for this patient.

## 2023-03-24 NOTE — TRANSFER OF CARE
"Anesthesia Transfer of Care Note    Patient: Himanshu Connor II    Procedure(s) Performed: Procedure(s) (LRB):  EGD (ESOPHAGOGASTRODUODENOSCOPY) (N/A)  MANOMETRY, ESOPHAGUS, WITH IMPEDANCE MEASUREMENT (N/A)    Patient location: PACU    Anesthesia Type: general    Transport from OR: Transported from OR on room air with adequate spontaneous ventilation    Post pain: adequate analgesia    Post assessment: no apparent anesthetic complications    Post vital signs: stable    Level of consciousness: awake    Nausea/Vomiting: no nausea/vomiting    Complications: none    Transfer of care protocol was followed      Last vitals:   Visit Vitals  BP (!) 155/82 (BP Location: Right arm, Patient Position: Lying)   Pulse 69   Temp 36.7 °C (98 °F)   Resp 18   Ht 5' 10" (1.778 m)   Wt 106.6 kg (235 lb)   SpO2 98%   BMI 33.72 kg/m²     "

## 2023-03-24 NOTE — PROVATION PATIENT INSTRUCTIONS
Discharge Summary/Instructions after an Endoscopic Procedure  Patient Name: Himanshu Connor  Patient MRN: [id number]  Patient YOB: 1965 Friday, March 24, 2023  Sofie Walker MD  Dear patient,  As a result of recent federal legislation (The Federal Cures Act), you may   receive lab or pathology results from your procedure in your [facility]   account before your physician is able to contact you. Your physician or   their representative will relay the results to you with their   recommendations at their soonest availability.  Thank you,  RESTRICTIONS:  During your procedure today, you received medications for sedation.  These   medications may affect your judgment, balance and coordination.  Therefore,   for 24 hours, you have the following restrictions:   - DO NOT drive a car, operate machinery, make legal/financial decisions,   sign important papers or drink alcohol.    ACTIVITY:  Today: no heavy lifting, straining or running due to procedural   sedation/anesthesia.  The following day: return to full activity including work.  DIET:  Eat and drink normally unless instructed otherwise.     TREATMENT FOR COMMON SIDE EFFECTS:  - Mild abdominal pain, nausea, belching, bloating or excessive gas:  rest,   eat lightly and use a heating pad.  - Sore Throat: treat with throat lozenges and/or gargle with warm salt   water.  - Because air was used during the procedure, expelling large amounts of air   from your rectum or belching is normal.  - If a bowel prep was taken, you may not have a bowel movement for 1-3 days.    This is normal.  SYMPTOMS TO WATCH FOR AND REPORT TO YOUR PHYSICIAN:  1. Abdominal pain or bloating, other than gas cramps.  2. Chest pain.  3. Back pain.  4. Signs of infection such as: chills or fever occurring within 24 hours   after the procedure.  5. Rectal bleeding, which would show as bright red, maroon, or black stools.   (A tablespoon of blood from the rectum is not serious, especially if    hemorrhoids are present.)  6. Vomiting.  7. Weakness or dizziness.  GO DIRECTLY TO THE NEAREST EMERGENCY ROOM IF YOU HAVE ANY OF THE FOLLOWING:      Difficulty breathing              Chills and/or fever over 101 F   Persistent vomiting and/or vomiting blood   Severe abdominal pain   Severe chest pain   Black, tarry stools   Bleeding- more than one tablespoon   Any other symptom or condition that you feel may need urgent attention  Your doctor recommends these additional instructions:  If any biopsies were taken, your doctors clinic will contact you in 1 to 2   weeks with any results.  - Discharge patient to home (with escort).   - Resume previous diet.   - Continue present medications.   - The findings and recommendations were discussed with the patient.   - Await pathology results.   - The findings and recommendations were discussed with the patient.   - Patient has a contact number available for emergencies.  The signs and   symptoms of potential delayed complications were discussed with the   patient.  Return to normal activities tomorrow.  Written discharge   instructions were provided to the patient.   For questions, problems or results please call your physician - Sofie Walker MD at Work:  [phone].  [facility], EMERGENCY ROOM PHONE NUMBER: [phone]  IF A COMPLICATION OR EMERGENCY SITUATION ARISES AND YOU ARE UNABLE TO REACH   YOUR PHYSICIAN - GO DIRECTLY TO THE EMERGENCY ROOM.  Sofie Walker MD  3/24/2023 9:06:40 AM  This report has been verified and signed electronically.  Dear patient,  As a result of recent federal legislation (The Federal Cures Act), you may   receive lab or pathology results from your procedure in your [facility]   account before your physician is able to contact you. Your physician or   their representative will relay the results to you with their   recommendations at their soonest availability.  Thank you,  PROVATION

## 2023-03-27 ENCOUNTER — TELEPHONE (OUTPATIENT)
Dept: GASTROENTEROLOGY | Facility: CLINIC | Age: 58
End: 2023-03-27
Payer: OTHER GOVERNMENT

## 2023-03-27 ENCOUNTER — OFFICE VISIT (OUTPATIENT)
Dept: ORTHOPEDICS | Facility: CLINIC | Age: 58
End: 2023-03-27
Payer: OTHER GOVERNMENT

## 2023-03-27 VITALS — BODY MASS INDEX: 33.64 KG/M2 | HEIGHT: 70 IN | RESPIRATION RATE: 18 BRPM | WEIGHT: 235 LBS

## 2023-03-27 DIAGNOSIS — M75.21 BICEPS TENDINITIS, RIGHT: Primary | ICD-10-CM

## 2023-03-27 DIAGNOSIS — R13.10 DYSPHAGIA, UNSPECIFIED TYPE: Primary | ICD-10-CM

## 2023-03-27 PROCEDURE — 91037 ESOPH IMPED FUNCTION TEST: CPT | Mod: 26,,, | Performed by: INTERNAL MEDICINE

## 2023-03-27 PROCEDURE — 99024 POSTOP FOLLOW-UP VISIT: CPT | Mod: ,,, | Performed by: ORTHOPAEDIC SURGERY

## 2023-03-27 PROCEDURE — 91010 PR ESOPHAGEAL MOTILITY STUDY, MA2METRY: ICD-10-PCS | Mod: 26,,, | Performed by: INTERNAL MEDICINE

## 2023-03-27 PROCEDURE — 91037 PR GERD TST W/ NASAL IMPEDENCE ELECTROD: ICD-10-PCS | Mod: 26,,, | Performed by: INTERNAL MEDICINE

## 2023-03-27 PROCEDURE — 99024 PR POST-OP FOLLOW-UP VISIT: ICD-10-PCS | Mod: ,,, | Performed by: ORTHOPAEDIC SURGERY

## 2023-03-27 PROCEDURE — 99999 PR PBB SHADOW E&M-EST. PATIENT-LVL III: CPT | Mod: PBBFAC,,, | Performed by: ORTHOPAEDIC SURGERY

## 2023-03-27 PROCEDURE — 99213 OFFICE O/P EST LOW 20 MIN: CPT | Mod: PBBFAC,PN | Performed by: ORTHOPAEDIC SURGERY

## 2023-03-27 PROCEDURE — 91010 ESOPHAGUS MOTILITY STUDY: CPT | Mod: 26,,, | Performed by: INTERNAL MEDICINE

## 2023-03-27 PROCEDURE — 99999 PR PBB SHADOW E&M-EST. PATIENT-LVL III: ICD-10-PCS | Mod: PBBFAC,,, | Performed by: ORTHOPAEDIC SURGERY

## 2023-03-27 NOTE — PROVATION PATIENT INSTRUCTIONS
Discharge Summary/Instructions after an Endoscopic Procedure  Patient Name: Himanshu Connor  Patient MRN: [id number]  Patient YOB: 1965 Monday, March 27, 2023  Sofie Walker MD  Dear patient,  As a result of recent federal legislation (The Federal Cures Act), you may   receive lab or pathology results from your procedure in your [facility]   account before your physician is able to contact you. Your physician or   their representative will relay the results to you with their   recommendations at their soonest availability.  Thank you,  RESTRICTIONS:  During your procedure today, you received medications for sedation.  These   medications may affect your judgment, balance and coordination.  Therefore,   for 24 hours, you have the following restrictions:   - DO NOT drive a car, operate machinery, make legal/financial decisions,   sign important papers or drink alcohol.    ACTIVITY:  Today: no heavy lifting, straining or running due to procedural   sedation/anesthesia.  The following day: return to full activity including work.  DIET:  Eat and drink normally unless instructed otherwise.     TREATMENT FOR COMMON SIDE EFFECTS:  - Mild abdominal pain, nausea, belching, bloating or excessive gas:  rest,   eat lightly and use a heating pad.  - Sore Throat: treat with throat lozenges and/or gargle with warm salt   water.  - Because air was used during the procedure, expelling large amounts of air   from your rectum or belching is normal.  - If a bowel prep was taken, you may not have a bowel movement for 1-3 days.    This is normal.  SYMPTOMS TO WATCH FOR AND REPORT TO YOUR PHYSICIAN:  1. Abdominal pain or bloating, other than gas cramps.  2. Chest pain.  3. Back pain.  4. Signs of infection such as: chills or fever occurring within 24 hours   after the procedure.  5. Rectal bleeding, which would show as bright red, maroon, or black stools.   (A tablespoon of blood from the rectum is not serious, especially if    hemorrhoids are present.)  6. Vomiting.  7. Weakness or dizziness.  GO DIRECTLY TO THE NEAREST EMERGENCY ROOM IF YOU HAVE ANY OF THE FOLLOWING:      Difficulty breathing              Chills and/or fever over 101 F   Persistent vomiting and/or vomiting blood   Severe abdominal pain   Severe chest pain   Black, tarry stools   Bleeding- more than one tablespoon   Any other symptom or condition that you feel may need urgent attention  Your doctor recommends these additional instructions:  If any biopsies were taken, your doctors clinic will contact you in 1 to 2   weeks with any results.  - Return to my office as previously scheduled.  For questions, problems or results please call your physician - Sofie Walker MD at Work:  [phone].  [facility], EMERGENCY ROOM PHONE NUMBER: [phone]  IF A COMPLICATION OR EMERGENCY SITUATION ARISES AND YOU ARE UNABLE TO REACH   YOUR PHYSICIAN - GO DIRECTLY TO THE EMERGENCY ROOM.  Sofie Walker MD  3/27/2023 5:08:57 PM  This report has been verified and signed electronically.  Dear patient,  As a result of recent federal legislation (The Federal Cures Act), you may   receive lab or pathology results from your procedure in your [facility]   account before your physician is able to contact you. Your physician or   their representative will relay the results to you with their   recommendations at their soonest availability.  Thank you,  PROVATION

## 2023-03-27 NOTE — TELEPHONE ENCOUNTER
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 Walker after all studies completed   Pt should see surg - dr. Jerez or jaclyn or jermaine  on the same day

## 2023-03-27 NOTE — PROGRESS NOTES
Past Medical History:   Diagnosis Date    Alcohol abuse, unspecified     Allergic rhinitis, cause unspecified     Arthritis     Asthma attack     Cancer 2022    skin cancer on nose    Carpal tunnel syndrome     Depressive disorder, not elsewhere classified     Diarrhea     Encounter for blood transfusion     GERD (gastroesophageal reflux disease)     Hypersomnia, unspecified     Hypertension     Hypoglycemia     Lumbago     Obesity, unspecified     JAH (obstructive sleep apnea)     uses BIPAP    Other and unspecified hyperlipidemia     Other ankle sprain and strain     Psychosexual dysfunction with inhibited sexual excitement     Pyogenic arthritis of knee 06/2021    staph pseudintermedius, TKA    Pyogenic arthritis of knee 09/21/2021    Enterococcus    Ventral hernia, unspecified, without mention of obstruction or gangrene        Past Surgical History:   Procedure Laterality Date    A-scope knees      Bilateral    ARTHROSCOPIC REPAIR OF ROTATOR CUFF OF SHOULDER Left 09/20/2019    Procedure: REPAIR, ROTATOR CUFF, ARTHROSCOPIC;  Surgeon: Felix Levin MD;  Location: [facility] OR;  Service: Orthopedics;  Laterality: Left;    ARTHROSCOPIC REPAIR OF ROTATOR CUFF OF SHOULDER Right 3/17/2023    Procedure: REPAIR, ROTATOR CUFF, ARTHROSCOPIC;  Surgeon: Felix Levin MD;  Location: [facility] OR;  Service: Orthopedics;  Laterality: Right;    ARTHROSCOPY OF SHOULDER WITH DECOMPRESSION OF SUBACROMIAL SPACE Left 09/20/2019    Procedure: ARTHROSCOPY, SHOULDER, WITH SUBACROMIAL SPACE DECOMPRESSION;  Surgeon: Felix Levin MD;  Location: [facility] OR;  Service: Orthopedics;  Laterality: Left;  Jaspreet- Arthrex notified of all case today 9/16-tcb    ARTHROSCOPY,SHOULDER,WITH BICEPS TENODESIS Right 3/17/2023    Procedure: ARTHROSCOPY,SHOULDER,WITH BICEPS TENODESIS;  Surgeon: Felix Levin MD;  Location: [facility] OR;  Service: Orthopedics;  Laterality: Right;    CARPAL TUNNEL RELEASE      Bilateral    COLONOSCOPY  N/A 08/05/2022    Procedure: COLONOSCOPY;  Surgeon: Zaida Ansari MD;  Location: [facility] ENDO;  Service: Endoscopy;  Laterality: N/A;    ESOPHAGEAL MANOMETRY WITH MEASUREMENT OF IMPEDANCE N/A 3/24/2023    Procedure: MANOMETRY, ESOPHAGUS, WITH IMPEDANCE MEASUREMENT;  Surgeon: Sofie Walker MD;  Location: [facility] ENDO (2ND FLR);  Service: Endoscopy;  Laterality: N/A;  r/o rumination and supragastric belching  hold baclofen x3 days, hold doxepin x24 hours prior to procedure    ESOPHAGOGASTRODUODENOSCOPY N/A 08/05/2022    Procedure: EGD (ESOPHAGOGASTRODUODENOSCOPY);  Surgeon: Zaida Ansari MD;  Location: [facility] ENDO;  Service: Endoscopy;  Laterality: N/A;    ESOPHAGOGASTRODUODENOSCOPY N/A 3/24/2023    Procedure: EGD (ESOPHAGOGASTRODUODENOSCOPY);  Surgeon: Sofie Walker MD;  Location: [facility] ENDO (2ND FLR);  Service: Endoscopy;  Laterality: N/A;  Endoflip/Endoscopically placed manometry probe  2nd floor-per Dr. Walker (higher than average risk procedure)  propofol only  full liquid diet x3 days, clear liquid diet x1 day prior to procedure  [facility] emailed to [email or web address]    EYE SURGERY      Lasik    HERNIA REPAIR      INCISION AND DRAINAGE OF HEMATOMA Right 09/21/2021    Procedure: INCISION AND DRAINAGE, HEMATOMA;  Surgeon: Felix Levin MD;  Location: [facility] OR;  Service: Orthopedics;  Laterality: Right;    INSERTION OF ANTIBIOTIC SPACER Right 06/01/2021    Procedure: INSERTION, ANTIBIOTIC SPACER;  Surgeon: Felix Levin MD;  Location: [facility] OR;  Service: Orthopedics;  Laterality: Right;    JOINT REPLACEMENT      KNEE ARTHROPLASTY Left 08/04/2020    Procedure: ARTHROPLASTY, KNEE;  Surgeon: Felix Levin MD;  Location: [facility] OR;  Service: Orthopedics;  Laterality: Left;    KNEE ARTHROPLASTY Right 03/23/2021    Procedure: ARTHROPLASTY, KNEE;  Surgeon: Felix Levin MD;  Location: [facility] OR;  Service: Orthopedics;  Laterality: Right;    KNEE ARTHROSCOPY W/  MENISCECTOMY Left 10/15/2019    Procedure: ARTHROSCOPY, KNEE, WITH MENISCECTOMY;  Surgeon: Felix Levin MD;  Location: [facility] OR;  Service: Orthopedics;  Laterality: Left;  Medial and Lateral Meniscectomy    KNEE ARTHROSCOPY W/ MENISCECTOMY Right 11/22/2019    Procedure: ARTHROSCOPY, KNEE, WITH MENISCECTOMY;  Surgeon: Felix Levin MD;  Location: [facility] OR;  Service: Orthopedics;  Laterality: Right;    KNEE SURGERY      osmar    NISSEN FUNDOPLICATION      X 2    REVERSE TOTAL SHOULDER ARTHROPLASTY Left 02/11/2020    Procedure: ARTHROPLASTY, SHOULDER, TOTAL, REVERSE;  Surgeon: Felix Levin MD;  Location: [facility];  Service: Orthopedics;  Laterality: Left;  Loren    revision of gastrojejunostomy  05/10/2021    [facility] [place]    REVISION OF KNEE ARTHROPLASTY Right 09/07/2021    Procedure: REVISION, ARTHROPLASTY, KNEE;  Surgeon: Felix Levin MD;  Location: [facility] OR;  Service: Orthopedics;  Laterality: Right;    ROTATOR CUFF REPAIR      right    RJ-EN-Y PROCEDURE N/A 05/2021    SLEEVE GASTROPLASTY  12/2013       Current Outpatient Medications   Medication Sig    acetaminophen (TYLENOL) 500 MG tablet Take 2 tablets (1,000 mg total) by mouth every 8 (eight) hours as needed for Pain.    albuterol (PROVENTIL/VENTOLIN HFA) 90 mcg/actuation inhaler INHALE 2 PUFFS BY MOUTH FOUR TIMES A DAY AS NEEDED FOR BREATHING    ascorbic acid, vitamin C, (VITAMIN C) 500 MG tablet 500 mg.    aspirin 81 MG Chew Take 1 tablet (81 mg total) by mouth 2 (two) times daily.    cyanocobalamin (VITAMIN B-12) 1000 MCG tablet Take 1,000 mcg by mouth once daily.     diclofenac sodium (VOLTAREN) 1 % Gel Apply topically daily as needed.    doxepin (SINEQUAN) 50 MG capsule 50 mg.    EScitalopram oxalate (LEXAPRO) 20 MG tablet 20 mg.    fluoride, sodium, (PREVIDENT) 1.1 % Gel APPLY PEA-SIZED AMOUNT TO MOUTH TWICE A DAY FOR DENTAL HEALTH    gabapentin (NEURONTIN) 300 MG capsule 600 mg.    LIDOcaine (LIDODERM) 5 % APPLY 1  PATCH TOPICALLY EVERY DAY FOR PAIN. WEAR FOR 12 HOURS, THEN REMOVE. DO NOT APPLY NEW PATCH FOR AT LEAST 12 HOURS.    losartan-hydrochlorothiazide 100-25 mg (HYZAAR) 100-25 mg per tablet 1 tablet.    methyl salicylate-menthol 15-10% 15-10 % Crea APPLY LIBERAL AMOUNT TOPICALLY FOUR TIMES A DAY AS NEEDED    naloxone (NARCAN) 4 mg/actuation Spry SMARTSIG:Spray(s) Both Nares    omeprazole (PRILOSEC) 40 MG capsule 40 mg.    ondansetron (ZOFRAN) 4 MG tablet Take 1 tablet (4 mg total) by mouth every 6 (six) hours as needed for Nausea.    oxyCODONE-acetaminophen (PERCOCET)  mg per tablet Take 1 tablet by mouth every 6 (six) hours as needed for Pain.    peg 400-propylene glycol 0.4-0.3 % Drop INSTILL ONE DROP IN EACH EYE FOUR TIMES A DAY AS NEEDED FOR DRY EYES    tadalafiL (CIALIS) 20 MG Tab TAKE ONE TABLET BY MOUTH EVERY WEEK AS NEEDED    vitamin D (VITAMIN D3) 1000 units Tab Take 2,000 Units by mouth once daily.     No current facility-administered medications for this visit.       Review of patient's allergies indicates:   Allergen Reactions    Hydrocodone Hives       Family History   Problem Relation Age of Onset    Arthritis Mother     Hypertension Father     Kidney disease Father     Heart disease Father     COPD Father     Pancreatic cancer Maternal Grandfather     Colon cancer Paternal Grandfather     Colon polyps Neg Hx     Crohn's disease Neg Hx     Ulcerative colitis Neg Hx     Stomach cancer Neg Hx     Esophageal cancer Neg Hx        Social History     Socioeconomic History    Marital status:    Tobacco Use    Smoking status: Never    Smokeless tobacco: Current     Types: Chew    Tobacco comments:     rarely   Substance and Sexual Activity    Alcohol use: Yes     Comment: occasionally    Drug use: No    Sexual activity: Not Currently       Chief Complaint:   Chief Complaint   Patient presents with    Post-op Evaluation       Date of surgery:  March 17, 2023    History of present illness:  57-year-old  male who underwent right arthroscopic biceps tenodesis and revision rotator cuff repair.  Patient is doing well.  Pain is 6/10.  Compliant with the sling.      Review of Systems:    Musculoskeletal:  See HPI        Physical Examination:    Vital Signs:    Vitals:    03/27/23 1511   Resp: 18       Body mass index is 33.72 kg/m².    This a well-developed, well nourished patient in no acute distress.  They are alert and oriented and cooperative to examination.  Pt. walks without an antalgic gait.      Examination of the right shoulder shows well-healed surgical portals.  No erythema or drainage.  Patient is neurovascular intact.  Minimal bruising    X-rays:  None     Assessment::  Status post right revision rotator cuff repair and biceps tenodesis     Plan:  I reviewed the arthroscopic photos with him today.  We took out the stitches.  Gave me pendulum and table slide handout.  Follow up in 4 weeks.    This note was created using M Modal voice recognition software that occasionally misinterpreted phrases or words.

## 2023-03-28 ENCOUNTER — TELEPHONE (OUTPATIENT)
Dept: GASTROENTEROLOGY | Facility: CLINIC | Age: 58
End: 2023-03-28
Payer: OTHER GOVERNMENT

## 2023-03-28 DIAGNOSIS — M25.561 PAIN IN BOTH KNEES, UNSPECIFIED CHRONICITY: Primary | ICD-10-CM

## 2023-03-28 DIAGNOSIS — M25.562 PAIN IN BOTH KNEES, UNSPECIFIED CHRONICITY: Primary | ICD-10-CM

## 2023-03-28 NOTE — TELEPHONE ENCOUNTER
Ma called nuc to schedule ges   No answer message left on vm    Ma called pt as well and sent portal message

## 2023-03-29 LAB
FINAL PATHOLOGIC DIAGNOSIS: NORMAL
GROSS: NORMAL
Lab: NORMAL

## 2023-03-30 ENCOUNTER — TELEPHONE (OUTPATIENT)
Dept: ORTHOPEDICS | Facility: CLINIC | Age: 58
End: 2023-03-30
Payer: OTHER GOVERNMENT

## 2023-03-30 NOTE — TELEPHONE ENCOUNTER
----- Message from Lula Soria MA sent at 3/30/2023  9:09 AM CDT -----  Contact: patient    ----- Message -----  From: Lula Soria MA  Sent: 3/29/2023   4:13 PM CDT  To: Lula Soria MA      ----- Message -----  From: Axel Mendez MA  Sent: 3/29/2023   2:55 PM CDT  To: Ollie Martin Staff    Needs letter faxed to [facility] stating he has been released so he can continue with his [facility] [facility].    Fax number is: [phone]    Patient call back number is [phone]

## 2023-03-30 NOTE — TELEPHONE ENCOUNTER
Letter faxed to return to aquatherapy in 2 weeks once surgical incisions have completely healed.     Asa

## 2023-04-06 ENCOUNTER — OFFICE VISIT (OUTPATIENT)
Dept: ORTHOPEDICS | Facility: CLINIC | Age: 58
End: 2023-04-06
Payer: OTHER GOVERNMENT

## 2023-04-06 ENCOUNTER — HOSPITAL ENCOUNTER (OUTPATIENT)
Dept: RADIOLOGY | Facility: HOSPITAL | Age: 58
Discharge: HOME | End: 2023-04-06
Attending: ORTHOPAEDIC SURGERY
Payer: OTHER GOVERNMENT

## 2023-04-06 VITALS — BODY MASS INDEX: 33.64 KG/M2 | HEIGHT: 70 IN | RESPIRATION RATE: 18 BRPM | WEIGHT: 235 LBS

## 2023-04-06 DIAGNOSIS — Z96.651 PRESENCE OF RIGHT ARTIFICIAL KNEE JOINT: Primary | ICD-10-CM

## 2023-04-06 DIAGNOSIS — M25.561 PAIN IN BOTH KNEES, UNSPECIFIED CHRONICITY: ICD-10-CM

## 2023-04-06 DIAGNOSIS — Z96.651 STATUS POST TOTAL KNEE REPLACEMENT USING CEMENT, RIGHT: ICD-10-CM

## 2023-04-06 DIAGNOSIS — Z96.659 INFECTION OF TOTAL KNEE REPLACEMENT, SUBSEQUENT ENCOUNTER: ICD-10-CM

## 2023-04-06 DIAGNOSIS — Z96.652 STATUS POST TOTAL LEFT KNEE REPLACEMENT USING CEMENT: ICD-10-CM

## 2023-04-06 DIAGNOSIS — T84.59XD INFECTION OF TOTAL KNEE REPLACEMENT, SUBSEQUENT ENCOUNTER: ICD-10-CM

## 2023-04-06 DIAGNOSIS — M25.562 PAIN IN BOTH KNEES, UNSPECIFIED CHRONICITY: ICD-10-CM

## 2023-04-06 DIAGNOSIS — M25.562 PAIN IN BOTH KNEES, UNSPECIFIED CHRONICITY: Primary | ICD-10-CM

## 2023-04-06 DIAGNOSIS — M25.561 PAIN IN BOTH KNEES, UNSPECIFIED CHRONICITY: Primary | ICD-10-CM

## 2023-04-06 PROCEDURE — 99214 OFFICE O/P EST MOD 30 MIN: CPT | Mod: S$PBB,24,ICN, | Performed by: ORTHOPAEDIC SURGERY

## 2023-04-06 PROCEDURE — 73564 X-RAY EXAM KNEE 4 OR MORE: CPT | Mod: 26,50,, | Performed by: RADIOLOGY

## 2023-04-06 PROCEDURE — 99214 PR OFFICE/OUTPT VISIT, EST, LEVL IV, 30-39 MIN: ICD-10-PCS | Mod: S$PBB,24,ICN, | Performed by: ORTHOPAEDIC SURGERY

## 2023-04-06 PROCEDURE — 99999 PR PBB SHADOW E&M-EST. PATIENT-LVL IV: CPT | Mod: PBBFAC,,, | Performed by: ORTHOPAEDIC SURGERY

## 2023-04-06 PROCEDURE — 99214 OFFICE O/P EST MOD 30 MIN: CPT | Mod: PBBFAC,PN | Performed by: ORTHOPAEDIC SURGERY

## 2023-04-06 PROCEDURE — 73564 X-RAY EXAM KNEE 4 OR MORE: CPT | Mod: TC,50,PN

## 2023-04-06 PROCEDURE — 99999 PR PBB SHADOW E&M-EST. PATIENT-LVL IV: ICD-10-PCS | Mod: PBBFAC,,, | Performed by: ORTHOPAEDIC SURGERY

## 2023-04-06 PROCEDURE — 73564 XR KNEE ORTHO BILAT WITH FLEXION: ICD-10-PCS | Mod: 26,50,, | Performed by: RADIOLOGY

## 2023-04-06 NOTE — PROGRESS NOTES
Past Medical History:   Diagnosis Date    Alcohol abuse, unspecified     Allergic rhinitis, cause unspecified     Arthritis     Asthma attack     Cancer 2022    skin cancer on nose    Carpal tunnel syndrome     Depressive disorder, not elsewhere classified     Diarrhea     Encounter for blood transfusion     GERD (gastroesophageal reflux disease)     Hypersomnia, unspecified     Hypertension     Hypoglycemia     Lumbago     Obesity, unspecified     JAH (obstructive sleep apnea)     uses BIPAP    Other and unspecified hyperlipidemia     Other ankle sprain and strain     Psychosexual dysfunction with inhibited sexual excitement     Pyogenic arthritis of knee 06/2021    staph pseudintermedius, TKA    Pyogenic arthritis of knee 09/21/2021    Enterococcus    Ventral hernia, unspecified, without mention of obstruction or gangrene        Past Surgical History:   Procedure Laterality Date    A-scope knees      Bilateral    ARTHROSCOPIC REPAIR OF ROTATOR CUFF OF SHOULDER Left 09/20/2019    Procedure: REPAIR, ROTATOR CUFF, ARTHROSCOPIC;  Surgeon: Felix Levin MD;  Location: [facility] OR;  Service: Orthopedics;  Laterality: Left;    ARTHROSCOPIC REPAIR OF ROTATOR CUFF OF SHOULDER Right 3/17/2023    Procedure: REPAIR, ROTATOR CUFF, ARTHROSCOPIC;  Surgeon: Felix Levin MD;  Location: [facility] OR;  Service: Orthopedics;  Laterality: Right;    ARTHROSCOPY OF SHOULDER WITH DECOMPRESSION OF SUBACROMIAL SPACE Left 09/20/2019    Procedure: ARTHROSCOPY, SHOULDER, WITH SUBACROMIAL SPACE DECOMPRESSION;  Surgeon: Felix Levin MD;  Location: [facility] OR;  Service: Orthopedics;  Laterality: Left;  Jaspreet- Arthrex notified of all case today 9/16-tcb    ARTHROSCOPY,SHOULDER,WITH BICEPS TENODESIS Right 3/17/2023    Procedure: ARTHROSCOPY,SHOULDER,WITH BICEPS TENODESIS;  Surgeon: Felix Levin MD;  Location: [facility] OR;  Service: Orthopedics;  Laterality: Right;    CARPAL TUNNEL RELEASE      Bilateral     COLONOSCOPY N/A 08/05/2022    Procedure: COLONOSCOPY;  Surgeon: Zaida Ansari MD;  Location: [facility] ENDO;  Service: Endoscopy;  Laterality: N/A;    ESOPHAGEAL MANOMETRY WITH MEASUREMENT OF IMPEDANCE N/A 3/24/2023    Procedure: MANOMETRY, ESOPHAGUS, WITH IMPEDANCE MEASUREMENT;  Surgeon: Sofie Walker MD;  Location: [facility] ENDO (2ND FLR);  Service: Endoscopy;  Laterality: N/A;  r/o rumination and supragastric belching  hold baclofen x3 days, hold doxepin x24 hours prior to procedure    ESOPHAGOGASTRODUODENOSCOPY N/A 08/05/2022    Procedure: EGD (ESOPHAGOGASTRODUODENOSCOPY);  Surgeon: Zaida Ansari MD;  Location: [facility] ENDO;  Service: Endoscopy;  Laterality: N/A;    ESOPHAGOGASTRODUODENOSCOPY N/A 3/24/2023    Procedure: EGD (ESOPHAGOGASTRODUODENOSCOPY);  Surgeon: Sofie Walker MD;  Location: [facility] (2ND FLR);  Service: Endoscopy;  Laterality: N/A;  Endoflip/Endoscopically placed manometry probe  2nd floor-per Dr. Walker (higher than average risk procedure)  propofol only  full liquid diet x3 days, clear liquid diet x1 day prior to procedure  [facility] emailed to [email or web address]    EYE SURGERY      Lasik    HERNIA REPAIR      INCISION AND DRAINAGE OF HEMATOMA Right 09/21/2021    Procedure: INCISION AND DRAINAGE, HEMATOMA;  Surgeon: Felix Levin MD;  Location: [facility] OR;  Service: Orthopedics;  Laterality: Right;    INSERTION OF ANTIBIOTIC SPACER Right 06/01/2021    Procedure: INSERTION, ANTIBIOTIC SPACER;  Surgeon: Felix Levin MD;  Location: [facility] OR;  Service: Orthopedics;  Laterality: Right;    JOINT REPLACEMENT      KNEE ARTHROPLASTY Left 08/04/2020    Procedure: ARTHROPLASTY, KNEE;  Surgeon: Felix Levin MD;  Location: [facility] OR;  Service: Orthopedics;  Laterality: Left;    KNEE ARTHROPLASTY Right 03/23/2021    Procedure: ARTHROPLASTY, KNEE;  Surgeon: Felix Levin MD;  Location: [facility] OR;  Service: Orthopedics;  Laterality: Right;    KNEE  ARTHROSCOPY W/ MENISCECTOMY Left 10/15/2019    Procedure: ARTHROSCOPY, KNEE, WITH MENISCECTOMY;  Surgeon: Felix Levin MD;  Location: [facility] OR;  Service: Orthopedics;  Laterality: Left;  Medial and Lateral Meniscectomy    KNEE ARTHROSCOPY W/ MENISCECTOMY Right 11/22/2019    Procedure: ARTHROSCOPY, KNEE, WITH MENISCECTOMY;  Surgeon: Felix Levin MD;  Location: [facility] OR;  Service: Orthopedics;  Laterality: Right;    KNEE SURGERY      osmar    NISSEN FUNDOPLICATION      X 2    REVERSE TOTAL SHOULDER ARTHROPLASTY Left 02/11/2020    Procedure: ARTHROPLASTY, SHOULDER, TOTAL, REVERSE;  Surgeon: Felix Levin MD;  Location: [facility] OR;  Service: Orthopedics;  Laterality: Left;  Loren    revision of gastrojejunostomy  05/10/2021    [facility] [place]    REVISION OF KNEE ARTHROPLASTY Right 09/07/2021    Procedure: REVISION, ARTHROPLASTY, KNEE;  Surgeon: Felix Levin MD;  Location: [facility] OR;  Service: Orthopedics;  Laterality: Right;    ROTATOR CUFF REPAIR      right    RJ-EN-Y PROCEDURE N/A 05/2021    SLEEVE GASTROPLASTY  12/2013       Current Outpatient Medications   Medication Sig    albuterol (PROVENTIL/VENTOLIN HFA) 90 mcg/actuation inhaler INHALE 2 PUFFS BY MOUTH FOUR TIMES A DAY AS NEEDED FOR BREATHING    ascorbic acid, vitamin C, (VITAMIN C) 500 MG tablet 500 mg.    cyanocobalamin (VITAMIN B-12) 1000 MCG tablet Take 1,000 mcg by mouth once daily.     diclofenac sodium (VOLTAREN) 1 % Gel Apply topically daily as needed.    doxepin (SINEQUAN) 50 MG capsule 50 mg.    EScitalopram oxalate (LEXAPRO) 20 MG tablet 20 mg.    fluoride, sodium, (PREVIDENT) 1.1 % Gel APPLY PEA-SIZED AMOUNT TO MOUTH TWICE A DAY FOR DENTAL HEALTH    gabapentin (NEURONTIN) 300 MG capsule 600 mg.    LIDOcaine (LIDODERM) 5 % APPLY 1 PATCH TOPICALLY EVERY DAY FOR PAIN. WEAR FOR 12 HOURS, THEN REMOVE. DO NOT APPLY NEW PATCH FOR AT LEAST 12 HOURS.    losartan-hydrochlorothiazide 100-25 mg (HYZAAR) 100-25 mg per tablet 1  tablet.    methyl salicylate-menthol 15-10% 15-10 % Crea APPLY LIBERAL AMOUNT TOPICALLY FOUR TIMES A DAY AS NEEDED    naloxone (NARCAN) 4 mg/actuation Spry SMARTSIG:Spray(s) Both Nares    omeprazole (PRILOSEC) 40 MG capsule 40 mg.    oxyCODONE-acetaminophen (PERCOCET)  mg per tablet Take 1 tablet by mouth every 6 (six) hours as needed for Pain.    peg 400-propylene glycol 0.4-0.3 % Drop INSTILL ONE DROP IN EACH EYE FOUR TIMES A DAY AS NEEDED FOR DRY EYES    tadalafiL (CIALIS) 20 MG Tab TAKE ONE TABLET BY MOUTH EVERY WEEK AS NEEDED    vitamin D (VITAMIN D3) 1000 units Tab Take 2,000 Units by mouth once daily.    acetaminophen (TYLENOL) 500 MG tablet Take 2 tablets (1,000 mg total) by mouth every 8 (eight) hours as needed for Pain. (Patient not taking: Reported on 4/6/2023)    aspirin 81 MG Chew Take 1 tablet (81 mg total) by mouth 2 (two) times daily.    ondansetron (ZOFRAN) 4 MG tablet Take 1 tablet (4 mg total) by mouth every 6 (six) hours as needed for Nausea. (Patient not taking: Reported on 4/6/2023)     No current facility-administered medications for this visit.       Review of patient's allergies indicates:   Allergen Reactions    Hydrocodone Hives       Family History   Problem Relation Age of Onset    Arthritis Mother     Hypertension Father     Kidney disease Father     Heart disease Father     COPD Father     Pancreatic cancer Maternal Grandfather     Colon cancer Paternal Grandfather     Colon polyps Neg Hx     Crohn's disease Neg Hx     Ulcerative colitis Neg Hx     Stomach cancer Neg Hx     Esophageal cancer Neg Hx        Social History     Socioeconomic History    Marital status:    Tobacco Use    Smoking status: Never    Smokeless tobacco: Current     Types: Chew    Tobacco comments:     rarely   Substance and Sexual Activity    Alcohol use: Yes     Comment: occasionally    Drug use: No    Sexual activity: Not Currently       Chief Complaint:   Chief Complaint   Patient presents with     Knee Pain     eval B knees       Date of surgery:  June 1, 2021 right total knee,  September 7, 2021 I and D and antibiotic spacer placement, and 9/21/21 revision right total knee    History of present illness:  57-year-old male who underwent 2 staged left revision total knee arthroplasty.  He is doing pretty well.  Just has more pain in both knees particularly when he 1st gets up out of a chair and with start up.  Pain is 6/10.    Review of Systems:    Musculoskeletal:  See HPI        Physical Examination:    Vital Signs:    Vitals:    04/06/23 1257   Resp: 18       Body mass index is 33.72 kg/m².    This a well-developed, well nourished patient in no acute distress.  They are alert and oriented and cooperative to examination.  Pt. walks with a moderate antalgic gait     Examination of bilateral knees shows healed midline surgical site.  No redness..  No dehiscence noted.  No fluid.  Range of motion is about 0-120 degrees.  Has the fair amount of quad weakness.      X-rays:  X-rays of both knees are ordered and reviewed which show primary left total knee replacement and revision right total knee replacement without obvious complication or concerns.    Assessment::  Status post right two-stage revision for infected TKA   Status post left total knee arthroplasty      Plan:  I reviewed the findings with him today.  I do not see overly concerning but there is [facility] that hinted at possible lucency on the right.  Recommended a CT scan of his right knee to look for possible lucency.  We will also get some infection labs to make sure those are normal as well.    This note was created using M Modal voice recognition software that occasionally misinterpreted phrases or words.

## 2023-04-10 ENCOUNTER — HOSPITAL ENCOUNTER (OUTPATIENT)
Dept: RADIOLOGY | Facility: HOSPITAL | Age: 58
Discharge: HOME | End: 2023-04-10
Attending: ORTHOPAEDIC SURGERY
Payer: OTHER GOVERNMENT

## 2023-04-10 DIAGNOSIS — Z96.651 PRESENCE OF RIGHT ARTIFICIAL KNEE JOINT: ICD-10-CM

## 2023-04-10 PROCEDURE — 73700 CT KNEE WITHOUT CONTRAST RIGHT: ICD-10-PCS | Mod: 26,RT,, | Performed by: RADIOLOGY

## 2023-04-10 PROCEDURE — 73700 CT LOWER EXTREMITY W/O DYE: CPT | Mod: TC,RT

## 2023-04-10 PROCEDURE — 73700 CT LOWER EXTREMITY W/O DYE: CPT | Mod: 26,RT,, | Performed by: RADIOLOGY

## 2023-04-12 ENCOUNTER — PATIENT MESSAGE (OUTPATIENT)
Dept: ORTHOPEDICS | Facility: CLINIC | Age: 58
End: 2023-04-12
Payer: OTHER GOVERNMENT

## 2023-04-12 DIAGNOSIS — Z96.651 PRESENCE OF RIGHT ARTIFICIAL KNEE JOINT: Primary | ICD-10-CM

## 2023-04-12 RX ORDER — OXYCODONE AND ACETAMINOPHEN 5; 325 MG/1; MG/1
1 TABLET ORAL EVERY 6 HOURS PRN
Qty: 28 TABLET | Refills: 0 | Status: SHIPPED | OUTPATIENT
Start: 2023-04-12 | End: 2023-04-12 | Stop reason: SDUPTHER

## 2023-04-13 RX ORDER — OXYCODONE AND ACETAMINOPHEN 5; 325 MG/1; MG/1
1 TABLET ORAL EVERY 6 HOURS PRN
Qty: 28 TABLET | Refills: 0 | Status: SHIPPED | OUTPATIENT
Start: 2023-04-13 | End: 2024-02-05

## 2023-04-24 ENCOUNTER — OFFICE VISIT (OUTPATIENT)
Dept: ORTHOPEDICS | Facility: CLINIC | Age: 58
End: 2023-04-24
Payer: OTHER GOVERNMENT

## 2023-04-24 VITALS — HEIGHT: 70 IN | WEIGHT: 235 LBS | RESPIRATION RATE: 18 BRPM | BODY MASS INDEX: 33.64 KG/M2

## 2023-04-24 DIAGNOSIS — M75.21 BICEPS TENDINITIS, RIGHT: Primary | ICD-10-CM

## 2023-04-24 DIAGNOSIS — Z96.651 STATUS POST TOTAL KNEE REPLACEMENT USING CEMENT, RIGHT: ICD-10-CM

## 2023-04-24 DIAGNOSIS — Z96.652 STATUS POST TOTAL LEFT KNEE REPLACEMENT USING CEMENT: ICD-10-CM

## 2023-04-24 DIAGNOSIS — M75.121 NONTRAUMATIC COMPLETE TEAR OF RIGHT ROTATOR CUFF: ICD-10-CM

## 2023-04-24 DIAGNOSIS — Z96.651 PRESENCE OF RIGHT ARTIFICIAL KNEE JOINT: ICD-10-CM

## 2023-04-24 PROCEDURE — 99214 OFFICE O/P EST MOD 30 MIN: CPT | Mod: PBBFAC,PN | Performed by: ORTHOPAEDIC SURGERY

## 2023-04-24 PROCEDURE — 99024 POSTOP FOLLOW-UP VISIT: CPT | Mod: POP,,, | Performed by: ORTHOPAEDIC SURGERY

## 2023-04-24 PROCEDURE — 99213 OFFICE O/P EST LOW 20 MIN: CPT | Mod: 24,S$PBB,, | Performed by: ORTHOPAEDIC SURGERY

## 2023-04-24 PROCEDURE — 99999 PR PBB SHADOW E&M-EST. PATIENT-LVL IV: ICD-10-PCS | Mod: PBBFAC,,, | Performed by: ORTHOPAEDIC SURGERY

## 2023-04-24 PROCEDURE — 99999 PR PBB SHADOW E&M-EST. PATIENT-LVL IV: CPT | Mod: PBBFAC,,, | Performed by: ORTHOPAEDIC SURGERY

## 2023-04-24 PROCEDURE — 99024 PR POST-OP FOLLOW-UP VISIT: ICD-10-PCS | Mod: POP,,, | Performed by: ORTHOPAEDIC SURGERY

## 2023-04-24 PROCEDURE — 99213 PR OFFICE/OUTPT VISIT, EST, LEVL III, 20-29 MIN: ICD-10-PCS | Mod: 24,S$PBB,, | Performed by: ORTHOPAEDIC SURGERY

## 2023-04-24 NOTE — PROGRESS NOTES
Past Medical History:   Diagnosis Date    Alcohol abuse, unspecified     Allergic rhinitis, cause unspecified     Arthritis     Asthma attack     Cancer 2022    skin cancer on nose    Carpal tunnel syndrome     Depressive disorder, not elsewhere classified     Diarrhea     Encounter for blood transfusion     GERD (gastroesophageal reflux disease)     Hypersomnia, unspecified     Hypertension     Hypoglycemia     Lumbago     Obesity, unspecified     JAH (obstructive sleep apnea)     uses BIPAP    Other and unspecified hyperlipidemia     Other ankle sprain and strain     Psychosexual dysfunction with inhibited sexual excitement     Pyogenic arthritis of knee 06/2021    staph pseudintermedius, TKA    Pyogenic arthritis of knee 09/21/2021    Enterococcus    Ventral hernia, unspecified, without mention of obstruction or gangrene        Past Surgical History:   Procedure Laterality Date    A-scope knees      Bilateral    ARTHROSCOPIC REPAIR OF ROTATOR CUFF OF SHOULDER Left 09/20/2019    Procedure: REPAIR, ROTATOR CUFF, ARTHROSCOPIC;  Surgeon: Felix Levin MD;  Location: [facility] OR;  Service: Orthopedics;  Laterality: Left;    ARTHROSCOPIC REPAIR OF ROTATOR CUFF OF SHOULDER Right 3/17/2023    Procedure: REPAIR, ROTATOR CUFF, ARTHROSCOPIC;  Surgeon: Felix Levin MD;  Location: [facility] OR;  Service: Orthopedics;  Laterality: Right;    ARTHROSCOPY OF SHOULDER WITH DECOMPRESSION OF SUBACROMIAL SPACE Left 09/20/2019    Procedure: ARTHROSCOPY, SHOULDER, WITH SUBACROMIAL SPACE DECOMPRESSION;  Surgeon: Felix Levin MD;  Location: [facility] OR;  Service: Orthopedics;  Laterality: Left;  Jaspreet- Arthrex notified of all case today 9/16-tcb    ARTHROSCOPY,SHOULDER,WITH BICEPS TENODESIS Right 3/17/2023    Procedure: ARTHROSCOPY,SHOULDER,WITH BICEPS TENODESIS;  Surgeon: Felix Levin MD;  Location: [facility] OR;  Service: Orthopedics;  Laterality: Right;    CARPAL TUNNEL RELEASE      Bilateral    COLONOSCOPY  N/A 08/05/2022    Procedure: COLONOSCOPY;  Surgeon: Zaida Ansari MD;  Location: [facility] ENDO;  Service: Endoscopy;  Laterality: N/A;    ESOPHAGEAL MANOMETRY WITH MEASUREMENT OF IMPEDANCE N/A 3/24/2023    Procedure: MANOMETRY, ESOPHAGUS, WITH IMPEDANCE MEASUREMENT;  Surgeon: Sofie Walker MD;  Location: [facility] ENDO (2ND FLR);  Service: Endoscopy;  Laterality: N/A;  r/o rumination and supragastric belching  hold baclofen x3 days, hold doxepin x24 hours prior to procedure    ESOPHAGOGASTRODUODENOSCOPY N/A 08/05/2022    Procedure: EGD (ESOPHAGOGASTRODUODENOSCOPY);  Surgeon: Zaida Ansari MD;  Location: [facility] ENDO;  Service: Endoscopy;  Laterality: N/A;    ESOPHAGOGASTRODUODENOSCOPY N/A 3/24/2023    Procedure: EGD (ESOPHAGOGASTRODUODENOSCOPY);  Surgeon: Sofie Walker MD;  Location: [facility] ENDO (2ND FLR);  Service: Endoscopy;  Laterality: N/A;  Endoflip/Endoscopically placed manometry probe  2nd floor-per Dr. Walker (higher than average risk procedure)  propofol only  full liquid diet x3 days, clear liquid diet x1 day prior to procedure  [facility] to [email or web address]    EYE SURGERY      Lasik    HERNIA REPAIR      INCISION AND DRAINAGE OF HEMATOMA Right 09/21/2021    Procedure: INCISION AND DRAINAGE, HEMATOMA;  Surgeon: Felix Levin MD;  Location: [facility] OR;  Service: Orthopedics;  Laterality: Right;    INSERTION OF ANTIBIOTIC SPACER Right 06/01/2021    Procedure: INSERTION, ANTIBIOTIC SPACER;  Surgeon: Felix Levin MD;  Location: [facility] OR;  Service: Orthopedics;  Laterality: Right;    JOINT REPLACEMENT      KNEE ARTHROPLASTY Left 08/04/2020    Procedure: ARTHROPLASTY, KNEE;  Surgeon: Felix Levin MD;  Location: [facility] OR;  Service: Orthopedics;  Laterality: Left;    KNEE ARTHROPLASTY Right 03/23/2021    Procedure: ARTHROPLASTY, KNEE;  Surgeon: Felix Levin MD;  Location: [facility] OR;  Service: Orthopedics;  Laterality: Right;    KNEE ARTHROSCOPY W/  MENISCECTOMY Left 10/15/2019    Procedure: ARTHROSCOPY, KNEE, WITH MENISCECTOMY;  Surgeon: Felix Levin MD;  Location: [facility] OR;  Service: Orthopedics;  Laterality: Left;  Medial and Lateral Meniscectomy    KNEE ARTHROSCOPY W/ MENISCECTOMY Right 11/22/2019    Procedure: ARTHROSCOPY, KNEE, WITH MENISCECTOMY;  Surgeon: Felix Levin MD;  Location: [facility] OR;  Service: Orthopedics;  Laterality: Right;    KNEE SURGERY      osmar    NISSEN FUNDOPLICATION      X 2    REVERSE TOTAL SHOULDER ARTHROPLASTY Left 02/11/2020    Procedure: ARTHROPLASTY, SHOULDER, TOTAL, REVERSE;  Surgeon: Felix Levin MD;  Location: [facility] OR;  Service: Orthopedics;  Laterality: Left;  Loren    revision of gastrojejunostomy  05/10/2021    [facility] [place]    REVISION OF KNEE ARTHROPLASTY Right 09/07/2021    Procedure: REVISION, ARTHROPLASTY, KNEE;  Surgeon: Felix Levin MD;  Location: [facility] OR;  Service: Orthopedics;  Laterality: Right;    ROTATOR CUFF REPAIR      right    RJ-EN-Y PROCEDURE N/A 05/2021    SLEEVE GASTROPLASTY  12/2013       Current Outpatient Medications   Medication Sig    acetaminophen (TYLENOL) 500 MG tablet Take 2 tablets (1,000 mg total) by mouth every 8 (eight) hours as needed for Pain. (Patient not taking: Reported on 4/6/2023)    albuterol (PROVENTIL/VENTOLIN HFA) 90 mcg/actuation inhaler INHALE 2 PUFFS BY MOUTH FOUR TIMES A DAY AS NEEDED FOR BREATHING    ascorbic acid, vitamin C, (VITAMIN C) 500 MG tablet 500 mg.    aspirin 81 MG Chew Take 1 tablet (81 mg total) by mouth 2 (two) times daily.    cyanocobalamin (VITAMIN B-12) 1000 MCG tablet Take 1,000 mcg by mouth once daily.     diclofenac sodium (VOLTAREN) 1 % Gel Apply topically daily as needed.    doxepin (SINEQUAN) 50 MG capsule 50 mg.    EScitalopram oxalate (LEXAPRO) 20 MG tablet 20 mg.    fluoride, sodium, (PREVIDENT) 1.1 % Gel APPLY PEA-SIZED AMOUNT TO MOUTH TWICE A DAY FOR DENTAL HEALTH    gabapentin (NEURONTIN) 300 MG capsule  600 mg.    LIDOcaine (LIDODERM) 5 % APPLY 1 PATCH TOPICALLY EVERY DAY FOR PAIN. WEAR FOR 12 HOURS, THEN REMOVE. DO NOT APPLY NEW PATCH FOR AT LEAST 12 HOURS.    losartan-hydrochlorothiazide 100-25 mg (HYZAAR) 100-25 mg per tablet 1 tablet.    methyl salicylate-menthol 15-10% 15-10 % Crea APPLY LIBERAL AMOUNT TOPICALLY FOUR TIMES A DAY AS NEEDED    naloxone (NARCAN) 4 mg/actuation Spry SMARTSIG:Spray(s) Both Nares    omeprazole (PRILOSEC) 40 MG capsule 40 mg.    ondansetron (ZOFRAN) 4 MG tablet Take 1 tablet (4 mg total) by mouth every 6 (six) hours as needed for Nausea. (Patient not taking: Reported on 4/6/2023)    oxyCODONE-acetaminophen (PERCOCET) 5-325 mg per tablet Take 1 tablet by mouth every 6 (six) hours as needed for Pain.    peg 400-propylene glycol 0.4-0.3 % Drop INSTILL ONE DROP IN EACH EYE FOUR TIMES A DAY AS NEEDED FOR DRY EYES    tadalafiL (CIALIS) 20 MG Tab TAKE ONE TABLET BY MOUTH EVERY WEEK AS NEEDED    vitamin D (VITAMIN D3) 1000 units Tab Take 2,000 Units by mouth once daily.     No current facility-administered medications for this visit.       Review of patient's allergies indicates:   Allergen Reactions    Hydrocodone Hives       Family History   Problem Relation Age of Onset    Arthritis Mother     Hypertension Father     Kidney disease Father     Heart disease Father     COPD Father     Pancreatic cancer Maternal Grandfather     Colon cancer Paternal Grandfather     Colon polyps Neg Hx     Crohn's disease Neg Hx     Ulcerative colitis Neg Hx     Stomach cancer Neg Hx     Esophageal cancer Neg Hx        Social History     Socioeconomic History    Marital status:    Tobacco Use    Smoking status: Never    Smokeless tobacco: Current     Types: Chew    Tobacco comments:     rarely   Substance and Sexual Activity    Alcohol use: Yes     Comment: occasionally    Drug use: No    Sexual activity: Not Currently       Chief Complaint:   No chief complaint on file.      Date of surgery:  March  17, 2023    History of present illness:  57-year-old male who underwent right arthroscopic biceps tenodesis and revision rotator cuff repair.  Patient is doing well.  Pain is 6/10.  CT scan of his revision right total knee was negative for any sign of loosening or osteolysis.  Shoulder is doing well.  He is not in the sling.  Has been moving it pretty well on his own.  Rates pain is a 5/10.      Review of Systems:    Musculoskeletal:  See HPI        Physical Examination:    Vital Signs:    There were no vitals filed for this visit.      There is no height or weight on file to calculate BMI.    This a well-developed, well nourished patient in no acute distress.  They are alert and oriented and cooperative to examination.  Pt. walks without an antalgic gait.      Examination of the right shoulder shows well-healed surgical portals.  No erythema or drainage.  Patient is neurovascular intact.  Full range of motion with just a little tightness in the biceps and front of his shoulder.    CT scan of the right knee is reviewed and interpreted:Status post revision right total knee arthroplasty.  Minimal joint effusion.  Otherwise unremarkable exam.     Assessment::  Status post right revision rotator cuff repair and biceps tenodesis   Status post right revision total knee arthroplasty without concerning signs of loosening or infection    Plan:  I reviewed the CT scan with him today.  I do not see any concern for loosening.  The lucent lines were normal on the x-ray.  I gave him the physical therapy exercises for his shoulder to start for a rotator cuff repair protocol.  Follow-up in 6 weeks.    This note was created using [employer] voice recognition software that occasionally misinterpreted phrases or words.

## 2023-04-27 ENCOUNTER — OFFICE VISIT (OUTPATIENT)
Dept: SURGERY | Facility: CLINIC | Age: 58
End: 2023-04-27
Payer: OTHER GOVERNMENT

## 2023-04-27 VITALS
DIASTOLIC BLOOD PRESSURE: 84 MMHG | BODY MASS INDEX: 34.15 KG/M2 | HEART RATE: 81 BPM | WEIGHT: 238.56 LBS | HEIGHT: 70 IN | SYSTOLIC BLOOD PRESSURE: 151 MMHG

## 2023-04-27 DIAGNOSIS — R13.10 DYSPHAGIA, UNSPECIFIED TYPE: ICD-10-CM

## 2023-04-27 PROCEDURE — 99204 OFFICE O/P NEW MOD 45 MIN: CPT | Mod: S$PBB,,, | Performed by: SURGERY

## 2023-04-27 PROCEDURE — 99999 PR PBB SHADOW E&M-EST. PATIENT-LVL IV: ICD-10-PCS | Mod: PBBFAC,,, | Performed by: SURGERY

## 2023-04-27 PROCEDURE — 99204 PR OFFICE/OUTPT VISIT, NEW, LEVL IV, 45-59 MIN: ICD-10-PCS | Mod: S$PBB,,, | Performed by: SURGERY

## 2023-04-27 PROCEDURE — 99214 OFFICE O/P EST MOD 30 MIN: CPT | Mod: PBBFAC | Performed by: SURGERY

## 2023-04-27 PROCEDURE — 99999 PR PBB SHADOW E&M-EST. PATIENT-LVL IV: CPT | Mod: PBBFAC,,, | Performed by: SURGERY

## 2023-04-27 NOTE — PROGRESS NOTES
I have seen the patient, reviewed the Resident's history and physical, assessment and plan. I have personally interviewed and examined the patient at bedside and: agree with the findings.     58y/o with bmi 34.23, essential htn, derrick on cpap, gerd and dysphagia.  He is s/p Nissen '89 open, redo Nissen open '94,  laparoscopic sleeve (nissen takedown, complicated by leak) 2013 and laparoscopic revision to bypass 2021.  He rarely eats solid food due to dyaphagia but has dysphagia every time he solid foods.  He has a lot of regurgitation.    Of note he has had many incisional hernia repairs.     shows chronic narcotics rx.    Cbc, cmp, iron studies show iron deficiency anemia and s/p blood transfusion  Egd reviewed    - Normal proximal esophagus and mid esophagus.     - Four biopsies were obtained in the proximal          esophagus and in the mid esophagus.     - Trace of food and liquid in the esophagus.     - Diverticulum in the lower third of the esophagus          at 37cm.     - Z-line regular, 39 cm from the incisors.     - Normal gastroesophageal junction. Puckering, no          resistance or pop. Abnormal angulation at GEJ.     - FLIP catheter placed with endoscopic guidance          using rat tooth. Very difficult placement due to          altered anatomy at GEJ and within the pouch.     - FLIP imaging performed, diagnostic of normal          esophageal distensibility and diminsihed          contractility.     - A gastric bypass was found, characterized by          healthy appearing mucosa. It appeared somewhat          twisted.     - Mild stenosis was found at the G-J anastomosis.     - Normal examined jejunum.     - Manometry catheter placed with endoscopic          guidance using rat tooth. Difficult placement due          to altered anatomy at GEJ and within the pouch.     - Manometry catheter was misplaced due to cough as         patient was waking up from sedation.     - Patient was resedated and  manometry catheter         placement was verified.     - Extra time needed due to abnormal anatomy. Total         time: 50 min.   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 Walker's note   - Postoperative appearance of distal esophagus and proximal stomach.  There was grossly unchanged from prior exam.     -With posterior distal esophageal outpouching.  Persistent.     -Esophageal dysmotility including reflux to the level of mid esophagus, delayed emptying, mild residual contrast in the distal esophagus with tertiary contractions of the esophagus    Dysphagia due to achalsia type 3 in the setting of s/p many other forgut procedures and many ventral hernia repairs.  Obtain timed bas with barium tablet.  Consider POEM and transthoracic myotomy.  Will discuss in swallowing conference after bas.

## 2023-04-27 NOTE — PROGRESS NOTES
History & Physical    SUBJECTIVE:     History of Present Illness:  Patient is a 57 y.o. male presents with dysphagia and regurgitation. Patient has an extensive surgical history, open HH repair x2 with Nissen, open sleeve with Nissen takedown complicated by leak and ECF, and laparoscopic conversion to RNY. Patient states he has had dysphagia since his RNY in 2021. He also underwent several incisional and ventral hernia repairs. He only has solid food dysphagia and is only able to eat solid foods a few times per week. He also endorses regurgitation with every meal or if he drinks too much liquid. Patient denies any pain or acid reflux.        GERD Questionnaire   + PPI - pantoprazole 40mg daily   - Typical heartburn  + Regurgitation  + Dysphagia solids  - Dysphagia liquids  + Hoarseness  + Sore throat  + Cough during regurgitation  - Asthma  + Chest pain  + Water brash when lying down  +Globus  + Nausea  + Vomiting    Eckardt Score (none =0, occ=1, daily=2, every meal=3, weight: 0=0, <5=1, 5-10=2, >10=3)  Dysphagia=1 only when eat solids  Regurgitation= 3  Chest pain= 1  Weight loss (kg)= none  Total= 5      Review of patient's allergies indicates:   Allergen Reactions    Hydrocodone Hives       Current Outpatient Medications   Medication Sig Dispense Refill    albuterol (PROVENTIL/VENTOLIN HFA) 90 mcg/actuation inhaler INHALE 2 PUFFS BY MOUTH FOUR TIMES A DAY AS NEEDED FOR BREATHING      ascorbic acid, vitamin C, (VITAMIN C) 500 MG tablet 500 mg.      cyanocobalamin (VITAMIN B-12) 1000 MCG tablet Take 1,000 mcg by mouth once daily.       diclofenac sodium (VOLTAREN) 1 % Gel Apply topically daily as needed.      doxepin (SINEQUAN) 50 MG capsule 50 mg.      EScitalopram oxalate (LEXAPRO) 20 MG tablet 20 mg.      gabapentin (NEURONTIN) 300 MG capsule 600 mg.      LIDOcaine (LIDODERM) 5 % APPLY 1 PATCH TOPICALLY EVERY DAY FOR PAIN. WEAR FOR 12 HOURS, THEN REMOVE. DO NOT APPLY NEW PATCH FOR AT LEAST 12 HOURS.       losartan-hydrochlorothiazide 100-25 mg (HYZAAR) 100-25 mg per tablet 1 tablet.      methyl salicylate-menthol 15-10% 15-10 % Crea APPLY LIBERAL AMOUNT TOPICALLY FOUR TIMES A DAY AS NEEDED      naloxone (NARCAN) 4 mg/actuation Spry SMARTSIG:Spray(s) Both Nares      omeprazole (PRILOSEC) 40 MG capsule 40 mg.      ondansetron (ZOFRAN) 4 MG tablet Take 1 tablet (4 mg total) by mouth every 6 (six) hours as needed for Nausea. 30 tablet 0    oxyCODONE-acetaminophen (PERCOCET) 5-325 mg per tablet Take 1 tablet by mouth every 6 (six) hours as needed for Pain. 28 tablet 0    peg 400-propylene glycol 0.4-0.3 % Drop INSTILL ONE DROP IN EACH EYE FOUR TIMES A DAY AS NEEDED FOR DRY EYES      tadalafiL (CIALIS) 20 MG Tab TAKE ONE TABLET BY MOUTH EVERY WEEK AS NEEDED      vitamin D (VITAMIN D3) 1000 units Tab Take 2,000 Units by mouth once daily.      acetaminophen (TYLENOL) 500 MG tablet Take 2 tablets (1,000 mg total) by mouth every 8 (eight) hours as needed for Pain. (Patient not taking: Reported on 4/6/2023) 30 tablet 0    aspirin 81 MG Chew Take 1 tablet (81 mg total) by mouth 2 (two) times daily. 60 tablet 0    fluoride, sodium, (PREVIDENT) 1.1 % Gel APPLY PEA-SIZED AMOUNT TO MOUTH TWICE A DAY FOR DENTAL HEALTH       No current facility-administered medications for this visit.       Past Medical History:   Diagnosis Date    Alcohol abuse, unspecified     Allergic rhinitis, cause unspecified     Arthritis     Asthma attack     Cancer 2022    skin cancer on nose    Carpal tunnel syndrome     Depressive disorder, not elsewhere classified     Diarrhea     Encounter for blood transfusion     GERD (gastroesophageal reflux disease)     Hypersomnia, unspecified     Hypertension     Hypoglycemia     Lumbago     Obesity, unspecified     JAH (obstructive sleep apnea)     uses BIPAP    Other and unspecified hyperlipidemia     Other ankle sprain and strain     Psychosexual dysfunction with inhibited sexual excitement     Pyogenic arthritis  of knee 06/2021    staph pseudintermedius, TKA    Pyogenic arthritis of knee 09/21/2021    Enterococcus    Ventral hernia, unspecified, without mention of obstruction or gangrene      Past Surgical History:   Procedure Laterality Date    A-scope knees      Bilateral    ARTHROSCOPIC REPAIR OF ROTATOR CUFF OF SHOULDER Left 09/20/2019    Procedure: REPAIR, ROTATOR CUFF, ARTHROSCOPIC;  Surgeon: Felix Levin MD;  Location: [facility] OR;  Service: Orthopedics;  Laterality: Left;    ARTHROSCOPIC REPAIR OF ROTATOR CUFF OF SHOULDER Right 3/17/2023    Procedure: REPAIR, ROTATOR CUFF, ARTHROSCOPIC;  Surgeon: Felix Levin MD;  Location: [facility] OR;  Service: Orthopedics;  Laterality: Right;    ARTHROSCOPY OF SHOULDER WITH DECOMPRESSION OF SUBACROMIAL SPACE Left 09/20/2019    Procedure: ARTHROSCOPY, SHOULDER, WITH SUBACROMIAL SPACE DECOMPRESSION;  Surgeon: Felix Levin MD;  Location: [facility] OR;  Service: Orthopedics;  Laterality: Left;  Jaspreet- Arthrex notified of all case today 9/16-tcb    ARTHROSCOPY,SHOULDER,WITH BICEPS TENODESIS Right 3/17/2023    Procedure: ARTHROSCOPY,SHOULDER,WITH BICEPS TENODESIS;  Surgeon: Felix Levin MD;  Location: [facility] OR;  Service: Orthopedics;  Laterality: Right;    CARPAL TUNNEL RELEASE      Bilateral    COLONOSCOPY N/A 08/05/2022    Procedure: COLONOSCOPY;  Surgeon: Zaida Ansari MD;  Location: [facility];  Service: Endoscopy;  Laterality: N/A;    ESOPHAGEAL MANOMETRY WITH MEASUREMENT OF IMPEDANCE N/A 3/24/2023    Procedure: MANOMETRY, ESOPHAGUS, WITH IMPEDANCE MEASUREMENT;  Surgeon: Sofie Walker MD;  Location: [facility] ([address] [place] [address]);  Service: Endoscopy;  Laterality: N/A;  r/o rumination and supragastric belching  hold baclofen x3 days, hold doxepin x24 hours prior to procedure    ESOPHAGOGASTRODUODENOSCOPY N/A 08/05/2022    Procedure: EGD (ESOPHAGOGASTRODUODENOSCOPY);  Surgeon: Zaida Ansari MD;  Location: [facility];  Service: Endoscopy;  Laterality:  N/A;    ESOPHAGOGASTRODUODENOSCOPY N/A 3/24/2023    Procedure: EGD (ESOPHAGOGASTRODUODENOSCOPY);  Surgeon: Sofie Walker MD;  Location: [facility] (2ND FLR);  Service: Endoscopy;  Laterality: N/A;  Endoflip/Endoscopically placed manometry probe  2nd floor-per Dr. Walker (higher than average risk procedure)  propofol only  full liquid diet x3 days, clear liquid diet x1 day prior to procedure  [email or web address]    EYE SURGERY      Lasik    HERNIA REPAIR      INCISION AND DRAINAGE OF HEMATOMA Right 09/21/2021    Procedure: INCISION AND DRAINAGE, HEMATOMA;  Surgeon: Felix Levin MD;  Location: [facility] OR;  Service: Orthopedics;  Laterality: Right;    INSERTION OF ANTIBIOTIC SPACER Right 06/01/2021    Procedure: INSERTION, ANTIBIOTIC SPACER;  Surgeon: Felix Levin MD;  Location: [facility] OR;  Service: Orthopedics;  Laterality: Right;    JOINT REPLACEMENT      KNEE ARTHROPLASTY Left 08/04/2020    Procedure: ARTHROPLASTY, KNEE;  Surgeon: Felix Levin MD;  Location: [facility] OR;  Service: Orthopedics;  Laterality: Left;    KNEE ARTHROPLASTY Right 03/23/2021    Procedure: ARTHROPLASTY, KNEE;  Surgeon: Felix Levin MD;  Location: [facility] OR;  Service: Orthopedics;  Laterality: Right;    KNEE ARTHROSCOPY W/ MENISCECTOMY Left 10/15/2019    Procedure: ARTHROSCOPY, KNEE, WITH MENISCECTOMY;  Surgeon: Felix Levin MD;  Location: [facility] OR;  Service: Orthopedics;  Laterality: Left;  Medial and Lateral Meniscectomy    KNEE ARTHROSCOPY W/ MENISCECTOMY Right 11/22/2019    Procedure: ARTHROSCOPY, KNEE, WITH MENISCECTOMY;  Surgeon: Felix Levin MD;  Location: [facility] OR;  Service: Orthopedics;  Laterality: Right;    KNEE SURGERY      osmar    NISSEN FUNDOPLICATION      X 2    REVERSE TOTAL SHOULDER ARTHROPLASTY Left 02/11/2020    Procedure: ARTHROPLASTY, SHOULDER, TOTAL, REVERSE;  Surgeon: Felix Levin MD;  Location: [facility] OR;  Service: Orthopedics;   "Laterality: Left;  Loren    revision of gastrojejunostomy  05/10/2021    [facility] [place]    REVISION OF KNEE ARTHROPLASTY Right 09/07/2021    Procedure: REVISION, ARTHROPLASTY, KNEE;  Surgeon: Felix Levin MD;  Location: [facility];  Service: Orthopedics;  Laterality: Right;    ROTATOR CUFF REPAIR      right    RJ-EN-Y PROCEDURE N/A 05/2021    SLEEVE GASTROPLASTY  12/2013     Family History   Problem Relation Age of Onset    Arthritis Mother     Hypertension Father     Kidney disease Father     Heart disease Father     COPD Father     Pancreatic cancer Maternal Grandfather     Colon cancer Paternal Grandfather     Colon polyps Neg Hx     Crohn's disease Neg Hx     Ulcerative colitis Neg Hx     Stomach cancer Neg Hx     Esophageal cancer Neg Hx      Social History     Tobacco Use    Smoking status: Never    Smokeless tobacco: Current     Types: Chew    Tobacco comments:     rarely   Substance Use Topics    Alcohol use: Yes     Comment: occasionally    Drug use: No        Review of Systems:  Review of Systems   Constitutional:  Negative for activity change and chills.   HENT: Negative.     Respiratory:  Negative for shortness of breath.    Cardiovascular:  Positive for chest pain.   Gastrointestinal:  Positive for nausea and vomiting. Negative for abdominal pain and constipation.   Endocrine: Negative.    Genitourinary: Negative.    Musculoskeletal: Negative.    Allergic/Immunologic: Negative.    Neurological: Negative.    Hematological: Negative.    Psychiatric/Behavioral: Negative.       OBJECTIVE:     Vital Signs (Most Recent)  Pulse: 81 (04/27/23 1357)  BP: (!) 151/84 (04/27/23 1357)  5' 10" (1.778 m)  108.2 kg (238 lb 8.6 oz)     Physical Exam:  Physical Exam  HENT:      Head: Normocephalic.      Nose: Nose normal.   Eyes:      Pupils: Pupils are equal, round, and reactive to light.   Cardiovascular:      Rate and Rhythm: Normal rate.      Pulses: Normal pulses.   Pulmonary:      Effort: Pulmonary " effort is normal.   Abdominal:      Palpations: Abdomen is soft.      Comments: Large midline scar, well healed   Large left abdominal scar, well healed   Lap scars, well healed    Musculoskeletal:      Cervical back: Normal range of motion.   Neurological:      General: No focal deficit present.      Mental Status: He is alert.   Psychiatric:         Mood and Affect: Mood normal.       Laboratory  CBC: Reviewed  BMP: Reviewed      Diagnostic Results:  EGD: Films Reviewed  Motility Study: Films Reviewed    ASSESSMENT/PLAN:     Himanshu Connor is a 57 y.o. M with very complex surgical history with compounding esophageal pathologies, however likely Type 3 achalasia.     PLAN:  - Present at swallow conference   - UGI with barium tablet   - RTC following studies     Camryn Clarke MD  General Surgery   PGY-1

## 2023-05-04 ENCOUNTER — TELEPHONE (OUTPATIENT)
Dept: SURGERY | Facility: CLINIC | Age: 58
End: 2023-05-04
Payer: OTHER GOVERNMENT

## 2023-05-04 NOTE — TELEPHONE ENCOUNTER
I called and left a phone message that I scheduled the Upper GI on Monday, 5-8 at 0800.  NPO instructions and check in location given.  I explained that his case will be discussed at the Tuesday, 5-9 Swallow Conference.  I left the Office phone number for any questions.

## 2023-05-08 ENCOUNTER — HOSPITAL ENCOUNTER (OUTPATIENT)
Dept: RADIOLOGY | Facility: HOSPITAL | Age: 58
Discharge: HOME | End: 2023-05-08
Attending: INTERNAL MEDICINE
Payer: COMMERCIAL

## 2023-05-08 DIAGNOSIS — R13.10 DYSPHAGIA, UNSPECIFIED TYPE: ICD-10-CM

## 2023-05-08 PROCEDURE — 25500020 PHARM REV CODE 255: Performed by: INTERNAL MEDICINE

## 2023-05-08 PROCEDURE — 74220 X-RAY XM ESOPHAGUS 1CNTRST: CPT | Mod: 26,,, | Performed by: STUDENT IN AN ORGANIZED HEALTH CARE EDUCATION/TRAINING PROGRAM

## 2023-05-08 PROCEDURE — 74220 FL ESOPHAGRAM COMPLETE: ICD-10-PCS | Mod: 26,,, | Performed by: STUDENT IN AN ORGANIZED HEALTH CARE EDUCATION/TRAINING PROGRAM

## 2023-05-08 PROCEDURE — 74220 X-RAY XM ESOPHAGUS 1CNTRST: CPT | Mod: TC

## 2023-05-08 PROCEDURE — A9698 NON-RAD CONTRAST MATERIALNOC: HCPCS | Performed by: INTERNAL MEDICINE

## 2023-05-08 RX ADMIN — BARIUM SULFATE 200 ML: 0.81 POWDER, FOR SUSPENSION ORAL at 08:05

## 2023-05-08 RX ADMIN — BARIUM SULFATE 240 ML: 0.6 SUSPENSION ORAL at 08:05

## 2023-06-07 ENCOUNTER — PATIENT MESSAGE (OUTPATIENT)
Dept: SURGERY | Facility: CLINIC | Age: 58
End: 2023-06-07
Payer: OTHER GOVERNMENT

## 2023-06-13 ENCOUNTER — DOCUMENTATION ONLY (OUTPATIENT)
Dept: SURGERY | Facility: CLINIC | Age: 58
End: 2023-06-13
Payer: OTHER GOVERNMENT

## 2023-06-13 NOTE — PROGRESS NOTES
[facility]   [facility] MULTIDISCIPLINARY CONFERENCE  PATIENT REVIEW FORM  ____________________________________________________________    CLINIC #: [id number]    DATE: 06/13/2023    DIAGNOSIS:     [] Dysphagia [] GERD                                        [] Slipped Nissen                      [] Gastroparesis                     [] LP Reflux     [] Achalasia        [] Hypercontractile esophagus      [] Hiatal Hernia                         [] Rumination Syndrome    [] CP Bar     [] EGJOO            [] Diffuse esophageal spasm        [] Para-esophageal Hernia       [] Cyclic Vomiting                  [] Zenker's Diverticulum     [] Dysmotility     [] Pseudoachalasia                       [] Esophageal diverticulum       [] Dumping Syndrome            [] Oropharyngeal dysphagia     [] Hugo's              [] OTHER:     PRESENTER:      [] Dr. Walker    [] Dr. Méndez   [] Dr. May   [] Dr. Goodman           [] Dr. Herring  [x] Dr. Harper   [] Dr. Ash    [] Other:       PATIENT SUMMARY:   58y/o with bmi 34.23, essential htn, derrick on cpap, gerd and dysphagia.  He is s/p Nissen '89 open, redo Nissen open '94,  laparoscopic sleeve (nissen takedown, complicated by leak) 2013 and laparoscopic revision to bypass 2021.  He rarely eats solid food due to dyaphagia but has dysphagia every time he solid foods.  He has a lot of regurgitation.     Of note he has had many incisional hernia repairs.      shows chronic narcotics rx.     Cbc, cmp, iron studies show iron deficiency anemia and s/p blood transfusion  Egd reviewed    - Normal proximal esophagus and mid esophagus.     - Four biopsies were obtained in the proximal          esophagus and in the mid esophagus.     - Trace of food and liquid in the esophagus.     - Diverticulum in the lower third of the esophagus          at 37cm.     - Z-line regular, 39 cm from the incisors.     - Normal gastroesophageal junction. Puckering, no          resistance  or pop. Abnormal angulation at GEJ.     - FLIP catheter placed with endoscopic guidance          using rat tooth. Very difficult placement due to          altered anatomy at GEJ and within the pouch.     - FLIP imaging performed, diagnostic of normal          esophageal distensibility and diminsihed          contractility.     - A gastric bypass was found, characterized by          healthy appearing mucosa. It appeared somewhat          twisted.     - Mild stenosis was found at the G-J anastomosis.     - Normal examined jejunum.     - Manometry catheter placed with endoscopic          guidance using rat tooth. Difficult placement due          to altered anatomy at GEJ and within the pouch.     - Manometry catheter was misplaced due to cough as         patient was waking up from sedation.     - Patient was resedated and manometry catheter         placement was verified.     - Extra time needed due to abnormal anatomy. Total         time: 50 min.   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 Walker's note   - Postoperative appearance of distal esophagus and proximal stomach.  There was grossly unchanged from prior exam.     -With posterior distal esophageal outpouching.  Persistent.     -Esophageal dysmotility including reflux to the level of mid esophagus, delayed emptying, mild residual contrast in the distal esophagus with tertiary contractions of the esophagus  Timed bas read by Aaron 2023    -Esophagram shows that tablet remained at the bottom of the  esophagus but liquid emptied within 2 minute    RECOMMENDATIONS:   Consider POEM.    CONSULT NEEDED:         [] Foregut Surg   [] ENT                [] GI            [] CT Surg         [] Psychiatry        [] Psychology     [] Speech       IMAGING:       [] TBS      [] MBS    [] CT ABD/PELVIS       [] GES      [] MRI     [] CT chest    [] EKG      [] U/S     [] UGI w/SBFT        PROCEDURES:    [] EGD       [] EGD w dilation  [] EGD w Botox        [] BRAVO  [] Ph Impedance  [] Manometry    [] Endoflip  [] EUS                     [] OTHER:     SURGERY  [] Heller Myotomy      [x] POEM                 []  Diverticulectomy  [] Nissen Fundoplication       [] Paul Fundoplication      [] Toupet Fundoplication  [] Hiatal Hernia Repair   [] Paraesophageal Hernia Repair           I left a phone message that we would set him up for a consultation for this.

## 2023-06-22 ENCOUNTER — PATIENT MESSAGE (OUTPATIENT)
Dept: SURGERY | Facility: CLINIC | Age: 58
End: 2023-06-22
Payer: OTHER GOVERNMENT

## 2023-06-22 ENCOUNTER — OFFICE VISIT (OUTPATIENT)
Dept: ORTHOPEDICS | Facility: CLINIC | Age: 58
End: 2023-06-22
Payer: OTHER GOVERNMENT

## 2023-06-22 VITALS — WEIGHT: 238 LBS | BODY MASS INDEX: 34.07 KG/M2 | HEIGHT: 70 IN | RESPIRATION RATE: 18 BRPM

## 2023-06-22 DIAGNOSIS — M75.121 NONTRAUMATIC COMPLETE TEAR OF RIGHT ROTATOR CUFF: ICD-10-CM

## 2023-06-22 DIAGNOSIS — M75.21 BICEPS TENDINITIS, RIGHT: Primary | ICD-10-CM

## 2023-06-22 PROCEDURE — 99213 OFFICE O/P EST LOW 20 MIN: CPT | Mod: S$PBB,,, | Performed by: ORTHOPAEDIC SURGERY

## 2023-06-22 PROCEDURE — 99213 PR OFFICE/OUTPT VISIT, EST, LEVL III, 20-29 MIN: ICD-10-PCS | Mod: S$PBB,,, | Performed by: ORTHOPAEDIC SURGERY

## 2023-06-22 PROCEDURE — 99214 OFFICE O/P EST MOD 30 MIN: CPT | Mod: PBBFAC,PO | Performed by: ORTHOPAEDIC SURGERY

## 2023-06-22 PROCEDURE — 99999 PR PBB SHADOW E&M-EST. PATIENT-LVL IV: CPT | Mod: PBBFAC,,, | Performed by: ORTHOPAEDIC SURGERY

## 2023-06-22 PROCEDURE — 99999 PR PBB SHADOW E&M-EST. PATIENT-LVL IV: ICD-10-PCS | Mod: PBBFAC,,, | Performed by: ORTHOPAEDIC SURGERY

## 2023-06-22 NOTE — PROGRESS NOTES
Past Medical History:   Diagnosis Date    Alcohol abuse, unspecified     Allergic rhinitis, cause unspecified     Arthritis     Asthma attack     Cancer 2022    skin cancer on nose    Carpal tunnel syndrome     Depressive disorder, not elsewhere classified     Diarrhea     Encounter for blood transfusion     GERD (gastroesophageal reflux disease)     Hypersomnia, unspecified     Hypertension     Hypoglycemia     Lumbago     Obesity, unspecified     JAH (obstructive sleep apnea)     uses BIPAP    Other and unspecified hyperlipidemia     Other ankle sprain and strain     Psychosexual dysfunction with inhibited sexual excitement     Pyogenic arthritis of knee 06/2021    staph pseudintermedius, TKA    Pyogenic arthritis of knee 09/21/2021    Enterococcus    Ventral hernia, unspecified, without mention of obstruction or gangrene        Past Surgical History:   Procedure Laterality Date    A-scope knees      Bilateral    ARTHROSCOPIC REPAIR OF ROTATOR CUFF OF SHOULDER Left 09/20/2019    Procedure: REPAIR, ROTATOR CUFF, ARTHROSCOPIC;  Surgeon: Felix Levin MD;  Location: [facility] OR;  Service: Orthopedics;  Laterality: Left;    ARTHROSCOPIC REPAIR OF ROTATOR CUFF OF SHOULDER Right 3/17/2023    Procedure: REPAIR, ROTATOR CUFF, ARTHROSCOPIC;  Surgeon: Felix Levin MD;  Location: [facility] OR;  Service: Orthopedics;  Laterality: Right;    ARTHROSCOPY OF SHOULDER WITH DECOMPRESSION OF SUBACROMIAL SPACE Left 09/20/2019    Procedure: ARTHROSCOPY, SHOULDER, WITH SUBACROMIAL SPACE DECOMPRESSION;  Surgeon: Felix Levin MD;  Location: [facility] OR;  Service: Orthopedics;  Laterality: Left;  Jaspreet- Arthrex notified of all case today 9/16-tcb    ARTHROSCOPY,SHOULDER,WITH BICEPS TENODESIS Right 3/17/2023    Procedure: ARTHROSCOPY,SHOULDER,WITH BICEPS TENODESIS;  Surgeon: Felix Levin MD;  Location: [facility] OR;  Service: Orthopedics;  Laterality: Right;    CARPAL TUNNEL RELEASE      Bilateral    COLONOSCOPY  N/A 08/05/2022    Procedure: COLONOSCOPY;  Surgeon: Zaida Ansari MD;  Location: [facility] ENDO;  Service: Endoscopy;  Laterality: N/A;    ESOPHAGEAL MANOMETRY WITH MEASUREMENT OF IMPEDANCE N/A 3/24/2023    Procedure: MANOMETRY, ESOPHAGUS, WITH IMPEDANCE MEASUREMENT;  Surgeon: Sofie Walker MD;  Location: [facility] ENDO (2ND FLR);  Service: Endoscopy;  Laterality: N/A;  r/o rumination and supragastric belching  hold baclofen x3 days, hold doxepin x24 hours prior to procedure    ESOPHAGOGASTRODUODENOSCOPY N/A 08/05/2022    Procedure: EGD (ESOPHAGOGASTRODUODENOSCOPY);  Surgeon: Zaida Ansari MD;  Location: [facility] ENDO;  Service: Endoscopy;  Laterality: N/A;    ESOPHAGOGASTRODUODENOSCOPY N/A 3/24/2023    Procedure: EGD (ESOPHAGOGASTRODUODENOSCOPY);  Surgeon: Sofie Walker MD;  Location: [facility] ENDO (2ND FLR);  Service: Endoscopy;  Laterality: N/A;  Endoflip/Endoscopically placed manometry probe  2nd floor-per Dr. Walker (higher than average risk procedure)  propofol only  full liquid diet x3 days, clear liquid diet x1 day prior to procedure  [facility] and emailed to [email or web address]    EYE SURGERY      Lasik    HERNIA REPAIR      INCISION AND DRAINAGE OF HEMATOMA Right 09/21/2021    Procedure: INCISION AND DRAINAGE, HEMATOMA;  Surgeon: Felix Levin MD;  Location: [facility] OR;  Service: Orthopedics;  Laterality: Right;    INSERTION OF ANTIBIOTIC SPACER Right 06/01/2021    Procedure: INSERTION, ANTIBIOTIC SPACER;  Surgeon: Felix Levin MD;  Location: [facility] OR;  Service: Orthopedics;  Laterality: Right;    JOINT REPLACEMENT      KNEE ARTHROPLASTY Left 08/04/2020    Procedure: ARTHROPLASTY, KNEE;  Surgeon: Felix Levin MD;  Location: [facility] OR;  Service: Orthopedics;  Laterality: Left;    KNEE ARTHROPLASTY Right 03/23/2021    Procedure: ARTHROPLASTY, KNEE;  Surgeon: Felix Levin MD;  Location: [facility] OR;  Service: Orthopedics;  Laterality: Right;    KNEE ARTHROSCOPY W/  MENISCECTOMY Left 10/15/2019    Procedure: ARTHROSCOPY, KNEE, WITH MENISCECTOMY;  Surgeon: Felix Levin MD;  Location: [facility] OR;  Service: Orthopedics;  Laterality: Left;  Medial and Lateral Meniscectomy    KNEE ARTHROSCOPY W/ MENISCECTOMY Right 11/22/2019    Procedure: ARTHROSCOPY, KNEE, WITH MENISCECTOMY;  Surgeon: Felix Levin MD;  Location: [facility] OR;  Service: Orthopedics;  Laterality: Right;    KNEE SURGERY      osmar    NISSEN FUNDOPLICATION      X 2    REVERSE TOTAL SHOULDER ARTHROPLASTY Left 02/11/2020    Procedure: ARTHROPLASTY, SHOULDER, TOTAL, REVERSE;  Surgeon: Felix Levin MD;  Location: [facility] OR;  Service: Orthopedics;  Laterality: Left;  Loren    revision of gastrojejunostomy  05/10/2021    [facility] [place]    REVISION OF KNEE ARTHROPLASTY Right 09/07/2021    Procedure: REVISION, ARTHROPLASTY, KNEE;  Surgeon: Felix Levin MD;  Location: [facility] OR;  Service: Orthopedics;  Laterality: Right;    ROTATOR CUFF REPAIR      right    RJ-EN-Y PROCEDURE N/A 05/2021    SLEEVE GASTROPLASTY  12/2013       Current Outpatient Medications   Medication Sig    albuterol (PROVENTIL/VENTOLIN HFA) 90 mcg/actuation inhaler INHALE 2 PUFFS BY MOUTH FOUR TIMES A DAY AS NEEDED FOR BREATHING    ascorbic acid, vitamin C, (VITAMIN C) 500 MG tablet 500 mg.    cyanocobalamin (VITAMIN B-12) 1000 MCG tablet Take 1,000 mcg by mouth once daily.     diclofenac sodium (VOLTAREN) 1 % Gel Apply topically daily as needed.    doxepin (SINEQUAN) 50 MG capsule 50 mg.    EScitalopram oxalate (LEXAPRO) 20 MG tablet 20 mg.    gabapentin (NEURONTIN) 300 MG capsule 600 mg.    LIDOcaine (LIDODERM) 5 % APPLY 1 PATCH TOPICALLY EVERY DAY FOR PAIN. WEAR FOR 12 HOURS, THEN REMOVE. DO NOT APPLY NEW PATCH FOR AT LEAST 12 HOURS.    losartan-hydrochlorothiazide 100-25 mg (HYZAAR) 100-25 mg per tablet 1 tablet.    methyl salicylate-menthol 15-10% 15-10 % Crea APPLY LIBERAL AMOUNT TOPICALLY FOUR TIMES A DAY AS NEEDED     naloxone (NARCAN) 4 mg/actuation Spry SMARTSIG:Spray(s) Both Nares    omeprazole (PRILOSEC) 40 MG capsule 40 mg.    ondansetron (ZOFRAN) 4 MG tablet Take 1 tablet (4 mg total) by mouth every 6 (six) hours as needed for Nausea.    oxyCODONE-acetaminophen (PERCOCET) 5-325 mg per tablet Take 1 tablet by mouth every 6 (six) hours as needed for Pain.    peg 400-propylene glycol 0.4-0.3 % Drop INSTILL ONE DROP IN EACH EYE FOUR TIMES A DAY AS NEEDED FOR DRY EYES    tadalafiL (CIALIS) 20 MG Tab TAKE ONE TABLET BY MOUTH EVERY WEEK AS NEEDED    vitamin D (VITAMIN D3) 1000 units Tab Take 2,000 Units by mouth once daily.    aspirin 81 MG Chew Take 1 tablet (81 mg total) by mouth 2 (two) times daily.     No current facility-administered medications for this visit.       Review of patient's allergies indicates:   Allergen Reactions    Hydrocodone Hives       Family History   Problem Relation Age of Onset    Arthritis Mother     Hypertension Father     Kidney disease Father     Heart disease Father     COPD Father     Pancreatic cancer Maternal Grandfather     Colon cancer Paternal Grandfather     Colon polyps Neg Hx     Crohn's disease Neg Hx     Ulcerative colitis Neg Hx     Stomach cancer Neg Hx     Esophageal cancer Neg Hx        Social History     Socioeconomic History    Marital status:    Tobacco Use    Smoking status: Never    Smokeless tobacco: Current     Types: Chew    Tobacco comments:     rarely   Substance and Sexual Activity    Alcohol use: Yes     Comment: occasionally    Drug use: No    Sexual activity: Not Currently       Chief Complaint:   Chief Complaint   Patient presents with    Follow-up     right arthroscopic biceps tenodesis and revision rotator cuff repair, 3/17/23       Date of surgery:  March 17, 2023    History of present illness:  57-year-old male who underwent right arthroscopic biceps tenodesis and revision rotator cuff repair.  Patient is doing well.  Pain is 0/10.  Feels like he is  improving every day.  Doing physical therapy on his own.  Just recently had his wrist replaced.      Review of Systems:    Musculoskeletal:  See HPI        Physical Examination:    Vital Signs:    Vitals:    06/22/23 1535   Resp: 18         Body mass index is 34.15 kg/m².    This a well-developed, well nourished patient in no acute distress.  They are alert and oriented and cooperative to examination.  Pt. walks without an antalgic gait.      Examination of the right shoulder shows well-healed surgical portals.  No erythema or drainage.  Patient is neurovascular intact.  Full range of motion with just a little tightness in the biceps and front of his shoulder.  Positive Gerardo deformity.      Assessment::  Status post right revision rotator cuff repair and biceps tenodesis       Plan:  Continue with rotator cuff protocol.  Follow-up in 2 months.  Be careful with heavy lifting.    This note was created using [employer] voice recognition software that occasionally misinterpreted phrases or words.

## 2023-06-27 ENCOUNTER — HOSPITAL ENCOUNTER (EMERGENCY)
Facility: HOSPITAL | Age: 58
Discharge: HOME | End: 2023-06-28
Attending: EMERGENCY MEDICINE
Payer: OTHER GOVERNMENT

## 2023-06-27 DIAGNOSIS — T67.9XXA HEAT EXPOSURE, INITIAL ENCOUNTER: ICD-10-CM

## 2023-06-27 DIAGNOSIS — E86.0 DEHYDRATION: Primary | ICD-10-CM

## 2023-06-27 LAB
ALBUMIN SERPL BCP-MCNC: 3.5 G/DL (ref 3.5–5.2)
ALP SERPL-CCNC: 95 U/L (ref 55–135)
ALT SERPL W/O P-5'-P-CCNC: 29 U/L (ref 10–44)
ANION GAP SERPL CALC-SCNC: 12 MMOL/L (ref 8–16)
AST SERPL-CCNC: 29 U/L (ref 10–40)
BASOPHILS # BLD AUTO: 0.03 K/UL (ref 0–0.2)
BASOPHILS NFR BLD: 0.5 % (ref 0–1.9)
BILIRUB SERPL-MCNC: 0.2 MG/DL (ref 0.1–1)
BUN SERPL-MCNC: 9 MG/DL (ref 6–20)
CALCIUM SERPL-MCNC: 8.2 MG/DL (ref 8.7–10.5)
CHLORIDE SERPL-SCNC: 103 MMOL/L (ref 95–110)
CK SERPL-CCNC: 118 U/L (ref 20–200)
CO2 SERPL-SCNC: 16 MMOL/L (ref 23–29)
CREAT SERPL-MCNC: 0.8 MG/DL (ref 0.5–1.4)
DIFFERENTIAL METHOD: ABNORMAL
EOSINOPHIL # BLD AUTO: 0.1 K/UL (ref 0–0.5)
EOSINOPHIL NFR BLD: 2.3 % (ref 0–8)
ERYTHROCYTE [DISTWIDTH] IN BLOOD BY AUTOMATED COUNT: 14.4 % (ref 11.5–14.5)
EST. GFR  (NO RACE VARIABLE): >60 ML/MIN/1.73 M^2
GLUCOSE SERPL-MCNC: 86 MG/DL (ref 70–110)
HCT VFR BLD AUTO: 34.9 % (ref 40–54)
HGB BLD-MCNC: 11.3 G/DL (ref 14–18)
IMM GRANULOCYTES # BLD AUTO: 0.03 K/UL (ref 0–0.04)
IMM GRANULOCYTES NFR BLD AUTO: 0.5 % (ref 0–0.5)
LYMPHOCYTES # BLD AUTO: 2.2 K/UL (ref 1–4.8)
LYMPHOCYTES NFR BLD: 37.3 % (ref 18–48)
MCH RBC QN AUTO: 29.4 PG (ref 27–31)
MCHC RBC AUTO-ENTMCNC: 32.4 G/DL (ref 32–36)
MCV RBC AUTO: 91 FL (ref 82–98)
MONOCYTES # BLD AUTO: 0.3 K/UL (ref 0.3–1)
MONOCYTES NFR BLD: 4.9 % (ref 4–15)
NEUTROPHILS # BLD AUTO: 3.2 K/UL (ref 1.8–7.7)
NEUTROPHILS NFR BLD: 54.5 % (ref 38–73)
NRBC BLD-RTO: 0 /100 WBC
PLATELET # BLD AUTO: 186 K/UL (ref 150–450)
PMV BLD AUTO: 9.4 FL (ref 9.2–12.9)
POTASSIUM SERPL-SCNC: 4.4 MMOL/L (ref 3.5–5.1)
PROT SERPL-MCNC: 6.5 G/DL (ref 6–8.4)
RBC # BLD AUTO: 3.84 M/UL (ref 4.6–6.2)
SODIUM SERPL-SCNC: 131 MMOL/L (ref 136–145)
WBC # BLD AUTO: 5.77 K/UL (ref 3.9–12.7)

## 2023-06-27 PROCEDURE — 96361 HYDRATE IV INFUSION ADD-ON: CPT

## 2023-06-27 PROCEDURE — 85025 COMPLETE CBC W/AUTO DIFF WBC: CPT | Performed by: EMERGENCY MEDICINE

## 2023-06-27 PROCEDURE — 96360 HYDRATION IV INFUSION INIT: CPT

## 2023-06-27 PROCEDURE — 82550 ASSAY OF CK (CPK): CPT | Performed by: EMERGENCY MEDICINE

## 2023-06-27 PROCEDURE — 99284 EMERGENCY DEPT VISIT MOD MDM: CPT | Mod: 25

## 2023-06-27 PROCEDURE — 36415 COLL VENOUS BLD VENIPUNCTURE: CPT | Performed by: EMERGENCY MEDICINE

## 2023-06-27 PROCEDURE — 80053 COMPREHEN METABOLIC PANEL: CPT | Performed by: EMERGENCY MEDICINE

## 2023-06-27 PROCEDURE — 25000003 PHARM REV CODE 250: Performed by: EMERGENCY MEDICINE

## 2023-06-27 RX ADMIN — SODIUM CHLORIDE 1000 ML: 9 INJECTION, SOLUTION INTRAVENOUS at 11:06

## 2023-06-27 RX ADMIN — SODIUM CHLORIDE 1000 ML: 9 INJECTION, SOLUTION INTRAVENOUS at 10:06

## 2023-06-28 VITALS
OXYGEN SATURATION: 97 % | HEART RATE: 64 BPM | RESPIRATION RATE: 16 BRPM | DIASTOLIC BLOOD PRESSURE: 53 MMHG | SYSTOLIC BLOOD PRESSURE: 92 MMHG | TEMPERATURE: 99 F

## 2023-06-28 NOTE — ED NOTES
Pt noted to be hypotensive. Pt evaluated. Aroused from sleep. Reports feeling fine. Cuff repositioned and Bp rechecked. Pt remains hypotensive lying on left side. Pt voiced need to void. Pt stood at bedside as I was in room. Pt denied feeling dizzy or lightheaded. Tolerated being up without difficulty. Pt Bp re-evaluated after he sat down on the edge of the bed and no longer hypotensive and in normal range for pt according to pt. MD made aware.

## 2023-06-28 NOTE — ED PROVIDER NOTES
Encounter Date: 6/27/2023    SCRIBE #1 NOTE: I, Ree Woodard, am scribing for, and in the presence of,  Marlon Blackburn MD.     History     Chief Complaint   Patient presents with    Loss of Consciousness     Pt brought to ED via EMS after a syncopal episode.     Time seen by provider: 9:48 PM on 06/27/2023    Himanshu Connor II is a 57 y.o. male who presents to the ED via EMS after a syncopal episode shortly PTA. The patient states he was outside mowing his yard, walked inside of his house, and the syncopal episode occurred shortly after. Patient states a friend activated EMS. He denies feeling light-headed prior to the episode. Patient believes he hydrated appropriately today. Patient admits to drinking 4 beers today, and he reports drinking occasionally.  He reports chronic back and right hand pain.  Patient states he follows with Orthopedics tomorrow. He also c/o mild low back pain. He reports urinating prior to arrival. Patient states his blood pressure is normally around 130/80. The patient denies chest pain, SOB, abdominal pain, or any other symptoms at this time. She denies Hx of DM. PMHx of asthma, HTN, hypoglycemia, GERD, and obesity.PSHx of multiple orthopedic surgeries, sleeve gastroplasty, revision of gastrojejunostomy, and EGD.       The history is provided by the patient.   Review of patient's allergies indicates:   Allergen Reactions    Hydrocodone Hives     Past Medical History:   Diagnosis Date    Alcohol abuse, unspecified     Allergic rhinitis, cause unspecified     Arthritis     Asthma attack     Cancer 2022    skin cancer on nose    Carpal tunnel syndrome     Depressive disorder, not elsewhere classified     Diarrhea     Encounter for blood transfusion     GERD (gastroesophageal reflux disease)     Hypersomnia, unspecified     Hypertension     Hypoglycemia     Lumbago     Obesity, unspecified     JAH (obstructive sleep apnea)     uses BIPAP    Other and unspecified hyperlipidemia     Other  ankle sprain and strain     Psychosexual dysfunction with inhibited sexual excitement     Pyogenic arthritis of knee 06/2021    staph pseudintermedius, TKA    Pyogenic arthritis of knee 09/21/2021    Enterococcus    Ventral hernia, unspecified, without mention of obstruction or gangrene      Past Surgical History:   Procedure Laterality Date    A-scope knees      Bilateral    ARTHROSCOPIC REPAIR OF ROTATOR CUFF OF SHOULDER Left 09/20/2019    Procedure: REPAIR, ROTATOR CUFF, ARTHROSCOPIC;  Surgeon: Felix Levin MD;  Location: [facility] OR;  Service: Orthopedics;  Laterality: Left;    ARTHROSCOPIC REPAIR OF ROTATOR CUFF OF SHOULDER Right 3/17/2023    Procedure: REPAIR, ROTATOR CUFF, ARTHROSCOPIC;  Surgeon: Felix Levin MD;  Location: [facility] OR;  Service: Orthopedics;  Laterality: Right;    ARTHROSCOPY OF SHOULDER WITH DECOMPRESSION OF SUBACROMIAL SPACE Left 09/20/2019    Procedure: ARTHROSCOPY, SHOULDER, WITH SUBACROMIAL SPACE DECOMPRESSION;  Surgeon: Felix Levin MD;  Location: [facility] OR;  Service: Orthopedics;  Laterality: Left;  Jaspreet- Arthrex notified of all case today 9/16-tcb    ARTHROSCOPY,SHOULDER,WITH BICEPS TENODESIS Right 3/17/2023    Procedure: ARTHROSCOPY,SHOULDER,WITH BICEPS TENODESIS;  Surgeon: Felix Levin MD;  Location: [facility] OR;  Service: Orthopedics;  Laterality: Right;    CARPAL TUNNEL RELEASE      Bilateral    COLONOSCOPY N/A 08/05/2022    Procedure: COLONOSCOPY;  Surgeon: Zaida Ansari MD;  Location: [facility] ENDO;  Service: Endoscopy;  Laterality: N/A;    ESOPHAGEAL MANOMETRY WITH MEASUREMENT OF IMPEDANCE N/A 3/24/2023    Procedure: MANOMETRY, ESOPHAGUS, WITH IMPEDANCE MEASUREMENT;  Surgeon: Sofie Walker MD;  Location: [facility] ([address] [place] [address]);  Service: Endoscopy;  Laterality: N/A;  r/o rumination and supragastric belching  hold baclofen x3 days, hold doxepin x24 hours prior to procedure    ESOPHAGOGASTRODUODENOSCOPY N/A 08/05/2022    Procedure: EGD  (ESOPHAGOGASTRODUODENOSCOPY);  Surgeon: Zaida Ansari MD;  Location: [facility] ENDO;  Service: Endoscopy;  Laterality: N/A;    ESOPHAGOGASTRODUODENOSCOPY N/A 3/24/2023    Procedure: EGD (ESOPHAGOGASTRODUODENOSCOPY);  Surgeon: Sofie Walker MD;  Location: [facility] ENDO (2ND FLR);  Service: Endoscopy;  Laterality: N/A;  Endoflip/Endoscopically placed manometry probe  2nd floor-per Dr. Walker (higher than average risk procedure)  propofol only  full liquid diet x3 days, clear liquid diet x1 day prior to procedure  instructions sent to [facility] and emailed to [facility]    EYE SURGERY      Lasik    HERNIA REPAIR      INCISION AND DRAINAGE OF HEMATOMA Right 09/21/2021    Procedure: INCISION AND DRAINAGE, HEMATOMA;  Surgeon: Felix Levin MD;  Location: [facility] OR;  Service: Orthopedics;  Laterality: Right;    INSERTION OF ANTIBIOTIC SPACER Right 06/01/2021    Procedure: INSERTION, ANTIBIOTIC SPACER;  Surgeon: Felix Leivn MD;  Location: [facility] OR;  Service: Orthopedics;  Laterality: Right;    JOINT REPLACEMENT      KNEE ARTHROPLASTY Left 08/04/2020    Procedure: ARTHROPLASTY, KNEE;  Surgeon: Felix Levin MD;  Location: [facility] OR;  Service: Orthopedics;  Laterality: Left;    KNEE ARTHROPLASTY Right 03/23/2021    Procedure: ARTHROPLASTY, KNEE;  Surgeon: Felix Levin MD;  Location: [facility] OR;  Service: Orthopedics;  Laterality: Right;    KNEE ARTHROSCOPY W/ MENISCECTOMY Left 10/15/2019    Procedure: ARTHROSCOPY, KNEE, WITH MENISCECTOMY;  Surgeon: Felix Levin MD;  Location: [facility] OR;  Service: Orthopedics;  Laterality: Left;  Medial and Lateral Meniscectomy    KNEE ARTHROSCOPY W/ MENISCECTOMY Right 11/22/2019    Procedure: ARTHROSCOPY, KNEE, WITH MENISCECTOMY;  Surgeon: Felix Levin MD;  Location: [facility] OR;  Service: Orthopedics;  Laterality: Right;    KNEE SURGERY      osmar    NISSEN FUNDOPLICATION      X 2    REVERSE TOTAL SHOULDER ARTHROPLASTY Left 02/11/2020    Procedure:  ARTHROPLASTY, SHOULDER, TOTAL, REVERSE;  Surgeon: Felix Levin MD;  Location: [facility] OR;  Service: Orthopedics;  Laterality: Left;  Loren    revision of gastrojejunostomy  05/10/2021    [facility] [place]    REVISION OF KNEE ARTHROPLASTY Right 09/07/2021    Procedure: REVISION, ARTHROPLASTY, KNEE;  Surgeon: Felix Levin MD;  Location: [facility] OR;  Service: Orthopedics;  Laterality: Right;    ROTATOR CUFF REPAIR      right    RJ-EN-Y PROCEDURE N/A 05/2021    SLEEVE GASTROPLASTY  12/2013     Family History   Problem Relation Age of Onset    Arthritis Mother     Hypertension Father     Kidney disease Father     Heart disease Father     COPD Father     Pancreatic cancer Maternal Grandfather     Colon cancer Paternal Grandfather     Colon polyps Neg Hx     Crohn's disease Neg Hx     Ulcerative colitis Neg Hx     Stomach cancer Neg Hx     Esophageal cancer Neg Hx      Social History     Tobacco Use    Smoking status: Never    Smokeless tobacco: Current     Types: Chew    Tobacco comments:     rarely   Substance Use Topics    Alcohol use: Yes     Comment: occasionally    Drug use: No     Review of Systems   Respiratory:  Negative for shortness of breath.    Cardiovascular:  Negative for chest pain.   Gastrointestinal:  Negative for abdominal pain.   Genitourinary:  Negative for difficulty urinating.   Musculoskeletal:  Positive for arthralgias (R hand) and back pain.   Neurological:  Positive for syncope.   All other systems reviewed and are negative.    Physical Exam     Initial Vitals [06/27/23 2135]   BP Pulse Resp Temp SpO2   (!) 110/49 64 17 98.9 °F (37.2 °C) 100 %      MAP       --         Physical Exam    Nursing note and vitals reviewed.  Constitutional: He appears well-developed and well-nourished. He is not diaphoretic.  Non-toxic appearance. He does not have a sickly appearance. He does not appear ill. No distress.   HENT:   Head: Normocephalic and atraumatic.   Eyes: EOM are normal.   Neck:  Neck supple.   Normal range of motion.  Cardiovascular:  Normal rate, regular rhythm and normal heart sounds.     Exam reveals no gallop and no friction rub.       No murmur heard.  Pulmonary/Chest: Breath sounds normal. No respiratory distress. He has no wheezes. He has no rhonchi. He has no rales.   Abdominal: Abdomen is soft. He exhibits no distension. There is no abdominal tenderness.   Multiple old abdominal surgical scars noted.  There is no rebound.   Musculoskeletal:         General: Normal range of motion.      Cervical back: Normal range of motion and neck supple. No rigidity. Normal range of motion.      Comments: Right hand and wrist bandage noted on exam.      Neurological: He is alert and oriented to person, place, and time.   Skin: Skin is warm and dry. No rash noted.   Psychiatric: He has a normal mood and affect. His behavior is normal. Judgment and thought content normal.       ED Course   Procedures  Labs Reviewed   CBC W/ AUTO DIFFERENTIAL - Abnormal; Notable for the following components:       Result Value    RBC 3.84 (*)     Hemoglobin 11.3 (*)     Hematocrit 34.9 (*)     All other components within normal limits   COMPREHENSIVE METABOLIC PANEL - Abnormal; Notable for the following components:    Sodium 131 (*)     CO2 16 (*)     Calcium 8.2 (*)     All other components within normal limits   CK          Imaging Results    None          Medications   sodium chloride 0.9% bolus 1,000 mL 1,000 mL (0 mLs Intravenous Stopped 6/27/23 2303)   sodium chloride 0.9% bolus 1,000 mL 1,000 mL (0 mLs Intravenous Stopped 6/27/23 2353)     Medical Decision Making:   History:   Old Medical Records: I decided to obtain old medical records.  Initial Assessment:   Syncope  Differential Diagnosis:   Intoxication, dehydration, acute kidney injury, arrhythmia  Independently Interpreted Test(s):   I have ordered and independently interpreted EKG Reading(s) - see prior notes  Clinical Tests:   Lab Tests: Ordered and  Reviewed  Medical Tests: Ordered and Reviewed  ED Management:  Labs, fluids  57-year-old male presents to the emergency room with syncope after working outside today and drinking multiple beers.  He states he fainted when he walked inside.  In the emergency room he is completely asymptomatic except for some chronic hand and back pain.  He did have some hypotension but mostly when he was lying on his side.  He was given 2 L of IV fluid and his hypotension has resolved.  He has had about 1 L of urine output and he reports that he feels fine at this time.  He had no chest pain and shortness of breath I see no reason to admit him to the hospital for any further testing.  Low risk from a syncope standpoint in my opinion and I think certainly his syncope can be attributed to alcohol intake and working outside in the heat.  Bicarb is down some but this was before he received 2 L of IV fluid.  I see no reason to repeat a metabolic panel before discharge.         Scribe Attestation:   Scribe #1: I performed the above scribed service and the documentation accurately describes the services I performed. I attest to the accuracy of the note.      ED Course as of 06/28/23 0208 Tue Jun 27, 2023 2143 BP(!): 110/49 [EF]   2143 Temp: 98.9 °F (37.2 °C) [EF]   2143 Temp Source: Oral [EF]   2143 Pulse: 64 [EF]   2143 Resp: 17 [EF]   2143 SpO2: 100 % [EF]   2145 Sinus rhythm 62 beats per minute normal axis no ST elevation or depression or T-wave inversion [EF]   2348 123/73 sbp [EF]   Wed Jun 28, 2023   0019 BP: 123/73 [EF]   0051 57-year-old male presents to the emergency room with syncope after working outside today and drinking multiple beers.  He states he fainted when he walked inside.  In the emergency room he is completely asymptomatic except for some chronic hand and back pain.  He did have some hypotension but mostly when he was lying on his side.  He was given 2 L of IV fluid and his hypotension has resolved.  He has had  about 1 L of urine output and he reports that he feels fine at this time.  He had no chest pain and shortness of breath I see no reason to admit him to the hospital for any further testing.  Low risk from a syncope standpoint in my opinion and I think certainly his syncope can be attributed to alcohol intake and working outside in the heat.  Bicarb is down some but this was before he received 2 L of IV fluid.  I see no reason to repeat a metabolic panel before discharge. [EF]      ED Course User Index  [EF] Marlon Blackburn MD               I, Dr. Blackburn, personally performed the services described in this documentation. All medical record entries made by the scribe were at my direction and in my presence.  I have reviewed the chart and agree that the record reflects my personal performance and is accurate and complete.2:08 AM 06/28/2023    Clinical Impression:   Final diagnoses:  [E86.0] Dehydration (Primary)  [T67.9XXA] Heat exposure, initial encounter        ED Disposition Condition    Discharge Stable          ED Prescriptions    None       Follow-up Information       Follow up With Specialties Details Why Contact Info    [facility] Emergency Dept Emergency Medicine  As needed, If symptoms worsen [address] [place] [id number] [phone]             Marlon Blackburn MD  06/28/23 0928

## 2023-06-28 NOTE — ED NOTES
EMS pt having syncopal episode. Pt cannot remember if he passed out. Pt reports he was cutting grass and only remembers going inside. Pt reports drinking four beers tonight. Pt also recently had surgery on right wrist due to staph, and reports last dose of oxycodone 10 mg being taken yesterday. Pt denies chest pain, shortness of breath.

## 2023-07-08 ENCOUNTER — PATIENT MESSAGE (OUTPATIENT)
Dept: SURGERY | Facility: CLINIC | Age: 58
End: 2023-07-08
Payer: OTHER GOVERNMENT

## 2023-07-08 ENCOUNTER — TELEPHONE (OUTPATIENT)
Dept: SURGERY | Facility: CLINIC | Age: 58
End: 2023-07-08
Payer: OTHER GOVERNMENT

## 2023-07-08 NOTE — TELEPHONE ENCOUNTER
Patient message to confirm that he would like the appt 7/28 at 830.  Appt made.  LVM to patient that appt was made and the appt slip will be mailed

## 2023-07-28 ENCOUNTER — OFFICE VISIT (OUTPATIENT)
Dept: SURGERY | Facility: CLINIC | Age: 58
End: 2023-07-28
Payer: OTHER GOVERNMENT

## 2023-07-28 VITALS
WEIGHT: 236.75 LBS | DIASTOLIC BLOOD PRESSURE: 80 MMHG | HEIGHT: 70 IN | HEART RATE: 66 BPM | SYSTOLIC BLOOD PRESSURE: 124 MMHG | BODY MASS INDEX: 33.89 KG/M2

## 2023-07-28 DIAGNOSIS — K22.0 ACHALASIA: Primary | ICD-10-CM

## 2023-07-28 PROCEDURE — 99214 OFFICE O/P EST MOD 30 MIN: CPT | Mod: S$PBB,,, | Performed by: SURGERY

## 2023-07-28 PROCEDURE — 99214 PR OFFICE/OUTPT VISIT, EST, LEVL IV, 30-39 MIN: ICD-10-PCS | Mod: S$PBB,,, | Performed by: SURGERY

## 2023-07-28 PROCEDURE — 99215 OFFICE O/P EST HI 40 MIN: CPT | Mod: PBBFAC | Performed by: SURGERY

## 2023-07-28 PROCEDURE — 99999 PR PBB SHADOW E&M-EST. PATIENT-LVL V: ICD-10-PCS | Mod: PBBFAC,,, | Performed by: SURGERY

## 2023-07-28 PROCEDURE — 99999 PR PBB SHADOW E&M-EST. PATIENT-LVL V: CPT | Mod: PBBFAC,,, | Performed by: SURGERY

## 2023-07-28 RX ORDER — CEFAZOLIN SODIUM 2 G/50ML
2 SOLUTION INTRAVENOUS
Status: CANCELLED | OUTPATIENT
Start: 2023-07-28

## 2023-07-28 NOTE — PROGRESS NOTES
Surgery Clinic Note - H and P    Subjective:     Himanshu Connor II is a 58 y.o. male with h/o of HTN, JAH, arthritis, GERD who presents with for dysphagia and regurgitation. Patient has an extensive surgical history, open HH repair x2 with Nissen, open sleeve with Nissen takedown complicated by leak and ECF, and laparoscopic conversion to RNY. Patient states he has had dysphagia since his RNY in 2021. Was seen by Dr. Harper in April and wanted to present him at swallow conference and get an upper GI with barium . At conference it was suggested to consider POEM. No changes since last visit, and gets short of breath while swallowing.  Discussed at length that POEM may have limited success.  Pt stated he would like to proceed.    GERD Questionnaire     + PPI - pantoprazole 40mg daily   - Typical heartburn  + Regurgitation  + Dysphagia solids  - Dysphagia liquids  + Hoarseness  + Sore throat  + Cough during regurgitation  - Asthma  + Chest pain  + Water brash when lying down  +Globus  + Nausea  + Vomiting     Eckardt Score (none =0, occ=1, daily=2, every meal=3, weight: 0=0, <5=1, 5-10=2, >10=3)  Dysphagia=1 only when eat solids  Regurgitation= 3  Chest pain= 1  Weight loss (kg)= none  Total= 5    Review of Systems   Respiratory:  Positive for cough.    Cardiovascular:  Positive for chest pain.   Gastrointestinal:  Positive for nausea and vomiting. Negative for constipation, diarrhea and heartburn.      PMH:   Past Medical History:   Diagnosis Date    Alcohol abuse, unspecified     Allergic rhinitis, cause unspecified     Arthritis     Asthma attack     Cancer 2022    skin cancer on nose    Carpal tunnel syndrome     Depressive disorder, not elsewhere classified     Diarrhea     Encounter for blood transfusion     GERD (gastroesophageal reflux disease)     Hypersomnia, unspecified     Hypertension     Hypoglycemia     Lumbago     Obesity, unspecified     JAH (obstructive sleep apnea)     uses BIPAP    Other and  unspecified hyperlipidemia     Other ankle sprain and strain     Psychosexual dysfunction with inhibited sexual excitement     Pyogenic arthritis of knee 06/2021    staph pseudintermedius, TKA    Pyogenic arthritis of knee 09/21/2021    Enterococcus    Ventral hernia, unspecified, without mention of obstruction or gangrene        Past Surgical History:   Past Surgical History:   Procedure Laterality Date    A-scope knees      Bilateral    ARTHROSCOPIC REPAIR OF ROTATOR CUFF OF SHOULDER Left 09/20/2019    Procedure: REPAIR, ROTATOR CUFF, ARTHROSCOPIC;  Surgeon: Felix Levin MD;  Location: [facility] OR;  Service: Orthopedics;  Laterality: Left;    ARTHROSCOPIC REPAIR OF ROTATOR CUFF OF SHOULDER Right 3/17/2023    Procedure: REPAIR, ROTATOR CUFF, ARTHROSCOPIC;  Surgeon: Felix Levin MD;  Location: [facility] OR;  Service: Orthopedics;  Laterality: Right;    ARTHROSCOPY OF SHOULDER WITH DECOMPRESSION OF SUBACROMIAL SPACE Left 09/20/2019    Procedure: ARTHROSCOPY, SHOULDER, WITH SUBACROMIAL SPACE DECOMPRESSION;  Surgeon: Felix Levin MD;  Location: [facility] OR;  Service: Orthopedics;  Laterality: Left;  Jaspreet- Arthrex notified of all case today 9/16-tcb    ARTHROSCOPY,SHOULDER,WITH BICEPS TENODESIS Right 3/17/2023    Procedure: ARTHROSCOPY,SHOULDER,WITH BICEPS TENODESIS;  Surgeon: Felix Levin MD;  Location: [facility] OR;  Service: Orthopedics;  Laterality: Right;    CARPAL TUNNEL RELEASE      Bilateral    COLONOSCOPY N/A 08/05/2022    Procedure: COLONOSCOPY;  Surgeon: Zaida Ansari MD;  Location: [facility] ENDO;  Service: Endoscopy;  Laterality: N/A;    ESOPHAGEAL MANOMETRY WITH MEASUREMENT OF IMPEDANCE N/A 3/24/2023    Procedure: MANOMETRY, ESOPHAGUS, WITH IMPEDANCE MEASUREMENT;  Surgeon: Sofie Walker MD;  Location: [facility] ENDO (2ND FLR);  Service: Endoscopy;  Laterality: N/A;  r/o rumination and supragastric belching  hold baclofen x3 days, hold doxepin x24 hours prior to procedure     ESOPHAGOGASTRODUODENOSCOPY N/A 08/05/2022    Procedure: EGD (ESOPHAGOGASTRODUODENOSCOPY);  Surgeon: Zaida Ansari MD;  Location: [facility] ENDO;  Service: Endoscopy;  Laterality: N/A;    ESOPHAGOGASTRODUODENOSCOPY N/A 3/24/2023    Procedure: EGD (ESOPHAGOGASTRODUODENOSCOPY);  Surgeon: Sofie Walker MD;  Location: [facility] ENDO (2ND FLR);  Service: Endoscopy;  Laterality: N/A;  Endoflip/Endoscopically placed manometry probe  2nd floor-per Dr. Walker (higher than average risk procedure)  propofol only  full liquid diet x3 days, clear liquid diet x1 day prior to procedure  instructions sent to [email or web address] and emailed to [email or web address]    EYE SURGERY      Lasik    HERNIA REPAIR      INCISION AND DRAINAGE OF HEMATOMA Right 09/21/2021    Procedure: INCISION AND DRAINAGE, HEMATOMA;  Surgeon: Felix Levin MD;  Location: [facility] OR;  Service: Orthopedics;  Laterality: Right;    INSERTION OF ANTIBIOTIC SPACER Right 06/01/2021    Procedure: INSERTION, ANTIBIOTIC SPACER;  Surgeon: Felix Levin MD;  Location: [facility] OR;  Service: Orthopedics;  Laterality: Right;    JOINT REPLACEMENT      KNEE ARTHROPLASTY Left 08/04/2020    Procedure: ARTHROPLASTY, KNEE;  Surgeon: Felix Levin MD;  Location: [facility] OR;  Service: Orthopedics;  Laterality: Left;    KNEE ARTHROPLASTY Right 03/23/2021    Procedure: ARTHROPLASTY, KNEE;  Surgeon: Felix Levin MD;  Location: [facility] OR;  Service: Orthopedics;  Laterality: Right;    KNEE ARTHROSCOPY W/ MENISCECTOMY Left 10/15/2019    Procedure: ARTHROSCOPY, KNEE, WITH MENISCECTOMY;  Surgeon: Felix Levin MD;  Location: [facility] OR;  Service: Orthopedics;  Laterality: Left;  Medial and Lateral Meniscectomy    KNEE ARTHROSCOPY W/ MENISCECTOMY Right 11/22/2019    Procedure: ARTHROSCOPY, KNEE, WITH MENISCECTOMY;  Surgeon: Felix Levin MD;  Location: [facility] OR;  Service: Orthopedics;  Laterality: Right;    KNEE SURGERY      osmar    NISSEN FUNDOPLICATION      X 2    REVERSE  TOTAL SHOULDER ARTHROPLASTY Left 02/11/2020    Procedure: ARTHROPLASTY, SHOULDER, TOTAL, REVERSE;  Surgeon: Felix Levin MD;  Location: [facility] OR;  Service: Orthopedics;  Laterality: Left;  Loren    revision of gastrojejunostomy  05/10/2021    [facility] [place]    REVISION OF KNEE ARTHROPLASTY Right 09/07/2021    Procedure: REVISION, ARTHROPLASTY, KNEE;  Surgeon: Felix Levin MD;  Location: [facility] OR;  Service: Orthopedics;  Laterality: Right;    ROTATOR CUFF REPAIR      right    RJ-EN-Y PROCEDURE N/A 05/2021    SLEEVE GASTROPLASTY  12/2013       Social History:  Social History     Socioeconomic History    Marital status:    Tobacco Use    Smoking status: Never    Smokeless tobacco: Current     Types: Chew    Tobacco comments:     rarely   Substance and Sexual Activity    Alcohol use: Yes     Comment: occasionally    Drug use: No    Sexual activity: Not Currently       Allergies:   Review of patient's allergies indicates:   Allergen Reactions    Hydrocodone Hives       Medications:  Current Outpatient Medications:     albuterol (PROVENTIL/VENTOLIN HFA) 90 mcg/actuation inhaler, INHALE 2 PUFFS BY MOUTH FOUR TIMES A DAY AS NEEDED FOR BREATHING, Disp: , Rfl:     ascorbic acid, vitamin C, (VITAMIN C) 500 MG tablet, 500 mg., Disp: , Rfl:     cyanocobalamin (VITAMIN B-12) 1000 MCG tablet, Take 1,000 mcg by mouth once daily. , Disp: , Rfl:     diclofenac sodium (VOLTAREN) 1 % Gel, Apply topically daily as needed., Disp: , Rfl:     doxepin (SINEQUAN) 50 MG capsule, 50 mg., Disp: , Rfl:     EScitalopram oxalate (LEXAPRO) 20 MG tablet, 20 mg., Disp: , Rfl:     gabapentin (NEURONTIN) 300 MG capsule, 600 mg., Disp: , Rfl:     LIDOcaine (LIDODERM) 5 %, APPLY 1 PATCH TOPICALLY EVERY DAY FOR PAIN. WEAR FOR 12 HOURS, THEN REMOVE. DO NOT APPLY NEW PATCH FOR AT LEAST 12 HOURS., Disp: , Rfl:     losartan-hydrochlorothiazide 100-25 mg (HYZAAR) 100-25 mg per tablet, 1 tablet., Disp: , Rfl:     methyl  salicylate-menthol 15-10% 15-10 % Crea, APPLY LIBERAL AMOUNT TOPICALLY FOUR TIMES A DAY AS NEEDED, Disp: , Rfl:     naloxone (NARCAN) 4 mg/actuation Spry, SMARTSIG:Spray(s) Both Nares, Disp: , Rfl:     omeprazole (PRILOSEC) 40 MG capsule, 40 mg., Disp: , Rfl:     ondansetron (ZOFRAN) 4 MG tablet, Take 1 tablet (4 mg total) by mouth every 6 (six) hours as needed for Nausea., Disp: 30 tablet, Rfl: 0    oxyCODONE-acetaminophen (PERCOCET) 5-325 mg per tablet, Take 1 tablet by mouth every 6 (six) hours as needed for Pain., Disp: 28 tablet, Rfl: 0    peg 400-propylene glycol 0.4-0.3 % Drop, INSTILL ONE DROP IN EACH EYE FOUR TIMES A DAY AS NEEDED FOR DRY EYES, Disp: , Rfl:     tadalafiL (CIALIS) 20 MG Tab, TAKE ONE TABLET BY MOUTH EVERY WEEK AS NEEDED, Disp: , Rfl:     vitamin D (VITAMIN D3) 1000 units Tab, Take 2,000 Units by mouth once daily., Disp: , Rfl:     aspirin 81 MG Chew, Take 1 tablet (81 mg total) by mouth 2 (two) times daily., Disp: 60 tablet, Rfl: 0    Current Outpatient Medications on File Prior to Visit   Medication Sig Dispense Refill    albuterol (PROVENTIL/VENTOLIN HFA) 90 mcg/actuation inhaler INHALE 2 PUFFS BY MOUTH FOUR TIMES A DAY AS NEEDED FOR BREATHING      ascorbic acid, vitamin C, (VITAMIN C) 500 MG tablet 500 mg.      cyanocobalamin (VITAMIN B-12) 1000 MCG tablet Take 1,000 mcg by mouth once daily.       diclofenac sodium (VOLTAREN) 1 % Gel Apply topically daily as needed.      doxepin (SINEQUAN) 50 MG capsule 50 mg.      EScitalopram oxalate (LEXAPRO) 20 MG tablet 20 mg.      gabapentin (NEURONTIN) 300 MG capsule 600 mg.      LIDOcaine (LIDODERM) 5 % APPLY 1 PATCH TOPICALLY EVERY DAY FOR PAIN. WEAR FOR 12 HOURS, THEN REMOVE. DO NOT APPLY NEW PATCH FOR AT LEAST 12 HOURS.      losartan-hydrochlorothiazide 100-25 mg (HYZAAR) 100-25 mg per tablet 1 tablet.      methyl salicylate-menthol 15-10% 15-10 % Crea APPLY LIBERAL AMOUNT TOPICALLY FOUR TIMES A DAY AS NEEDED      naloxone (NARCAN) 4  mg/actuation Spry SMARTSIG:Spray(s) Both Nares      omeprazole (PRILOSEC) 40 MG capsule 40 mg.      ondansetron (ZOFRAN) 4 MG tablet Take 1 tablet (4 mg total) by mouth every 6 (six) hours as needed for Nausea. 30 tablet 0    oxyCODONE-acetaminophen (PERCOCET) 5-325 mg per tablet Take 1 tablet by mouth every 6 (six) hours as needed for Pain. 28 tablet 0    peg 400-propylene glycol 0.4-0.3 % Drop INSTILL ONE DROP IN EACH EYE FOUR TIMES A DAY AS NEEDED FOR DRY EYES      tadalafiL (CIALIS) 20 MG Tab TAKE ONE TABLET BY MOUTH EVERY WEEK AS NEEDED      vitamin D (VITAMIN D3) 1000 units Tab Take 2,000 Units by mouth once daily.      aspirin 81 MG Chew Take 1 tablet (81 mg total) by mouth 2 (two) times daily. 60 tablet 0     No current facility-administered medications on file prior to visit.         Objective:     PHYSICAL EXAM:  Vital Signs (Most Recent)  Pulse: 66 (07/28/23 0823)  BP: 124/80 (07/28/23 0823)      Physical Exam  Constitutional:       Appearance: Normal appearance.   Cardiovascular:      Rate and Rhythm: Normal rate.      Pulses: Normal pulses.   Pulmonary:      Effort: Pulmonary effort is normal.   Abdominal:      General: Bowel sounds are normal.      Palpations: Abdomen is soft.   Skin:     Capillary Refill: Capillary refill takes less than 2 seconds.              Pertinent imaging/labs:   FL Esophagram 2023: Mild circumferential narrowing at the level of the GE junction noting incomplete relaxation of lower esophageal sphincter.  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           Normal gastroesophageal junction. Puckering, no resistance or  pop. Abnormal angulation at GEJ.           FLIP catheter placed with endoscopic guidance using rat tooth. Very difficult placement due to altered anatomy at GEJ and within the pouch.           FLIP imaging performed, diagnostic of normal esophageal distensibility and diminsihed contractility.     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 male with  h/o of HTN, JAH, arthritis, GERD who comes in for evaluation for POEM    Plan:     - consents signed for POEM, all questions answered and risk talked about  - Lengthy discussion about limits of surgery with previous surgeries.  Possible anatomic issue causing some of his dysphagia as well.  Pt stated understanding and wished to proceed.  - schedule surgery for POEM

## 2023-07-31 ENCOUNTER — HOSPITAL ENCOUNTER (EMERGENCY)
Facility: HOSPITAL | Age: 58
Discharge: HOME | End: 2023-08-01
Attending: EMERGENCY MEDICINE
Payer: OTHER GOVERNMENT

## 2023-07-31 DIAGNOSIS — N17.9 AKI (ACUTE KIDNEY INJURY): Primary | ICD-10-CM

## 2023-07-31 DIAGNOSIS — I95.9 HYPOTENSION: ICD-10-CM

## 2023-07-31 DIAGNOSIS — R41.82 ALTERED MENTAL STATUS: ICD-10-CM

## 2023-07-31 PROCEDURE — 93005 ELECTROCARDIOGRAM TRACING: CPT | Performed by: INTERNAL MEDICINE

## 2023-07-31 PROCEDURE — 93010 ELECTROCARDIOGRAM REPORT: CPT | Mod: ,,, | Performed by: INTERNAL MEDICINE

## 2023-07-31 PROCEDURE — 99285 EMERGENCY DEPT VISIT HI MDM: CPT | Mod: 25

## 2023-07-31 PROCEDURE — 93010 EKG 12-LEAD: ICD-10-PCS | Mod: ,,, | Performed by: INTERNAL MEDICINE

## 2023-08-01 VITALS
OXYGEN SATURATION: 98 % | TEMPERATURE: 98 F | RESPIRATION RATE: 16 BRPM | DIASTOLIC BLOOD PRESSURE: 60 MMHG | HEART RATE: 63 BPM | BODY MASS INDEX: 37.74 KG/M2 | SYSTOLIC BLOOD PRESSURE: 105 MMHG | WEIGHT: 263 LBS

## 2023-08-01 LAB
ALBUMIN SERPL BCP-MCNC: 3.6 G/DL (ref 3.5–5.2)
ALP SERPL-CCNC: 83 U/L (ref 55–135)
ALT SERPL W/O P-5'-P-CCNC: 40 U/L (ref 10–44)
AMMONIA PLAS-SCNC: 31 UMOL/L (ref 10–50)
AMPHET+METHAMPHET UR QL: NEGATIVE
ANION GAP SERPL CALC-SCNC: 7 MMOL/L (ref 8–16)
AST SERPL-CCNC: 35 U/L (ref 10–40)
BACTERIA #/AREA URNS HPF: NEGATIVE /HPF
BARBITURATES UR QL SCN>200 NG/ML: NEGATIVE
BASOPHILS # BLD AUTO: 0.04 K/UL (ref 0–0.2)
BASOPHILS NFR BLD: 0.5 % (ref 0–1.9)
BENZODIAZ UR QL SCN>200 NG/ML: ABNORMAL
BILIRUB SERPL-MCNC: 0.5 MG/DL (ref 0.1–1)
BILIRUB UR QL STRIP: NEGATIVE
BNP SERPL-MCNC: 56 PG/ML (ref 0–99)
BUN SERPL-MCNC: 21 MG/DL (ref 6–20)
BZE UR QL SCN: NEGATIVE
CALCIUM SERPL-MCNC: 8.5 MG/DL (ref 8.7–10.5)
CANNABINOIDS UR QL SCN: NEGATIVE
CHLORIDE SERPL-SCNC: 115 MMOL/L (ref 95–110)
CK SERPL-CCNC: 155 U/L (ref 20–200)
CLARITY UR: ABNORMAL
CO2 SERPL-SCNC: 19 MMOL/L (ref 23–29)
COLOR UR: YELLOW
CREAT SERPL-MCNC: 1.8 MG/DL (ref 0.5–1.4)
CREAT UR-MCNC: 214 MG/DL (ref 23–375)
DIFFERENTIAL METHOD: ABNORMAL
EOSINOPHIL # BLD AUTO: 0 K/UL (ref 0–0.5)
EOSINOPHIL NFR BLD: 0.1 % (ref 0–8)
ERYTHROCYTE [DISTWIDTH] IN BLOOD BY AUTOMATED COUNT: 15.1 % (ref 11.5–14.5)
EST. GFR  (NO RACE VARIABLE): 43.4 ML/MIN/1.73 M^2
ETHANOL SERPL-MCNC: <5 MG/DL
GLUCOSE SERPL-MCNC: 112 MG/DL (ref 70–110)
GLUCOSE UR QL STRIP: NEGATIVE
HCT VFR BLD AUTO: 37.9 % (ref 40–54)
HGB BLD-MCNC: 11.8 G/DL (ref 14–18)
HGB UR QL STRIP: NEGATIVE
HYALINE CASTS #/AREA URNS LPF: 77 /LPF
IMM GRANULOCYTES # BLD AUTO: 0.05 K/UL (ref 0–0.04)
IMM GRANULOCYTES NFR BLD AUTO: 0.6 % (ref 0–0.5)
KETONES UR QL STRIP: NEGATIVE
LACTATE SERPL-SCNC: 1.4 MMOL/L (ref 0.5–1.9)
LEUKOCYTE ESTERASE UR QL STRIP: NEGATIVE
LYMPHOCYTES # BLD AUTO: 1.3 K/UL (ref 1–4.8)
LYMPHOCYTES NFR BLD: 14.9 % (ref 18–48)
MCH RBC QN AUTO: 29.1 PG (ref 27–31)
MCHC RBC AUTO-ENTMCNC: 31.1 G/DL (ref 32–36)
MCV RBC AUTO: 93 FL (ref 82–98)
MICROSCOPIC COMMENT: ABNORMAL
MONOCYTES # BLD AUTO: 0.6 K/UL (ref 0.3–1)
MONOCYTES NFR BLD: 7.1 % (ref 4–15)
NEUTROPHILS # BLD AUTO: 6.6 K/UL (ref 1.8–7.7)
NEUTROPHILS NFR BLD: 76.8 % (ref 38–73)
NITRITE UR QL STRIP: NEGATIVE
NRBC BLD-RTO: 0 /100 WBC
OPIATES UR QL SCN: ABNORMAL
PCP UR QL SCN>25 NG/ML: NEGATIVE
PH UR STRIP: 6 [PH] (ref 5–8)
PLATELET # BLD AUTO: 196 K/UL (ref 150–450)
PMV BLD AUTO: 9.7 FL (ref 9.2–12.9)
POTASSIUM SERPL-SCNC: 4.6 MMOL/L (ref 3.5–5.1)
PROCALCITONIN SERPL IA-MCNC: <0.05 NG/ML (ref 0–0.5)
PROT SERPL-MCNC: 6.5 G/DL (ref 6–8.4)
PROT UR QL STRIP: ABNORMAL
RBC # BLD AUTO: 4.06 M/UL (ref 4.6–6.2)
RBC #/AREA URNS HPF: 4 /HPF (ref 0–4)
SODIUM SERPL-SCNC: 141 MMOL/L (ref 136–145)
SP GR UR STRIP: 1.02 (ref 1–1.03)
SQUAMOUS #/AREA URNS HPF: 11 /HPF
TOXICOLOGY INFORMATION: ABNORMAL
TROPONIN I SERPL HS-MCNC: 5.8 PG/ML (ref 0–14.9)
URN SPEC COLLECT METH UR: ABNORMAL
UROBILINOGEN UR STRIP-ACNC: NEGATIVE EU/DL
WBC # BLD AUTO: 8.55 K/UL (ref 3.9–12.7)
WBC #/AREA URNS HPF: 10 /HPF (ref 0–5)

## 2023-08-01 PROCEDURE — 93010 ELECTROCARDIOGRAM REPORT: CPT | Mod: ,,, | Performed by: INTERNAL MEDICINE

## 2023-08-01 PROCEDURE — 93005 ELECTROCARDIOGRAM TRACING: CPT | Performed by: INTERNAL MEDICINE

## 2023-08-01 PROCEDURE — 82077 ASSAY SPEC XCP UR&BREATH IA: CPT | Performed by: EMERGENCY MEDICINE

## 2023-08-01 PROCEDURE — 84484 ASSAY OF TROPONIN QUANT: CPT | Performed by: EMERGENCY MEDICINE

## 2023-08-01 PROCEDURE — 80307 DRUG TEST PRSMV CHEM ANLYZR: CPT | Performed by: EMERGENCY MEDICINE

## 2023-08-01 PROCEDURE — 84145 PROCALCITONIN (PCT): CPT | Performed by: EMERGENCY MEDICINE

## 2023-08-01 PROCEDURE — 83605 ASSAY OF LACTIC ACID: CPT | Performed by: EMERGENCY MEDICINE

## 2023-08-01 PROCEDURE — 80053 COMPREHEN METABOLIC PANEL: CPT | Performed by: EMERGENCY MEDICINE

## 2023-08-01 PROCEDURE — 85025 COMPLETE CBC W/AUTO DIFF WBC: CPT | Performed by: EMERGENCY MEDICINE

## 2023-08-01 PROCEDURE — 81001 URINALYSIS AUTO W/SCOPE: CPT | Mod: 59 | Performed by: EMERGENCY MEDICINE

## 2023-08-01 PROCEDURE — 96360 HYDRATION IV INFUSION INIT: CPT

## 2023-08-01 PROCEDURE — 83880 ASSAY OF NATRIURETIC PEPTIDE: CPT | Performed by: EMERGENCY MEDICINE

## 2023-08-01 PROCEDURE — 93010 EKG 12-LEAD: ICD-10-PCS | Mod: ,,, | Performed by: INTERNAL MEDICINE

## 2023-08-01 PROCEDURE — 25000003 PHARM REV CODE 250: Performed by: EMERGENCY MEDICINE

## 2023-08-01 PROCEDURE — 82550 ASSAY OF CK (CPK): CPT | Performed by: EMERGENCY MEDICINE

## 2023-08-01 PROCEDURE — 82140 ASSAY OF AMMONIA: CPT | Performed by: EMERGENCY MEDICINE

## 2023-08-01 RX ADMIN — SODIUM CHLORIDE 1000 ML: 0.9 INJECTION, SOLUTION INTRAVENOUS at 02:08

## 2023-08-01 RX ADMIN — SODIUM CHLORIDE 1000 ML: 0.9 INJECTION, SOLUTION INTRAVENOUS at 01:08

## 2023-08-01 NOTE — ED PROVIDER NOTES
Encounter Date: 7/31/2023       History     Chief Complaint   Patient presents with    Altered Mental Status     Per EMS, wife stated pt was sitting on couch and not responding appropriately. Pt has chronic pain and takes Oxycodone. Pt states he only took one today and drank an alcoholic beverage. Pt oriented to self and place. Not answering some questions appropriately and slow to respond to questions     This is a 58-year-old male presenting via EMS for evaluation of altered mental status.  They were called by the patient's girlfriend.  She told them that the patient had been confused and acting unusual all day.  The patient admits to taking a Percocet and drinking a Ivory earlier in the day.  He is oriented to person and place but does not know why he is here, also does not know the year.  He denies any acute complaints.    The history is provided by the patient and the EMS personnel.     Review of patient's allergies indicates:   Allergen Reactions    Hydrocodone Hives     Past Medical History:   Diagnosis Date    Alcohol abuse, unspecified     Allergic rhinitis, cause unspecified     Arthritis     Asthma attack     Cancer 2022    skin cancer on nose    Carpal tunnel syndrome     Depressive disorder, not elsewhere classified     Diarrhea     Encounter for blood transfusion     GERD (gastroesophageal reflux disease)     Hypersomnia, unspecified     Hypertension     Hypoglycemia     Lumbago     Obesity, unspecified     JAH (obstructive sleep apnea)     uses BIPAP    Other and unspecified hyperlipidemia     Other ankle sprain and strain     Psychosexual dysfunction with inhibited sexual excitement     Pyogenic arthritis of knee 06/2021    staph pseudintermedius, TKA    Pyogenic arthritis of knee 09/21/2021    Enterococcus    Ventral hernia, unspecified, without mention of obstruction or gangrene      Past Surgical History:   Procedure Laterality Date    A-scope knees      Bilateral    ARTHROSCOPIC REPAIR OF  ROTATOR CUFF OF SHOULDER Left 09/20/2019    Procedure: REPAIR, ROTATOR CUFF, ARTHROSCOPIC;  Surgeon: Felix Levin MD;  Location: [facility] OR;  Service: Orthopedics;  Laterality: Left;    ARTHROSCOPIC REPAIR OF ROTATOR CUFF OF SHOULDER Right 3/17/2023    Procedure: REPAIR, ROTATOR CUFF, ARTHROSCOPIC;  Surgeon: Felix Levin MD;  Location: [facility] OR;  Service: Orthopedics;  Laterality: Right;    ARTHROSCOPY OF SHOULDER WITH DECOMPRESSION OF SUBACROMIAL SPACE Left 09/20/2019    Procedure: ARTHROSCOPY, SHOULDER, WITH SUBACROMIAL SPACE DECOMPRESSION;  Surgeon: Felix Levin MD;  Location: [facility] OR;  Service: Orthopedics;  Laterality: Left;  Jaspreet- Arthrex notified of all case today 9/16-tcb    ARTHROSCOPY,SHOULDER,WITH BICEPS TENODESIS Right 3/17/2023    Procedure: ARTHROSCOPY,SHOULDER,WITH BICEPS TENODESIS;  Surgeon: Felix Levin MD;  Location: [facility] OR;  Service: Orthopedics;  Laterality: Right;    CARPAL TUNNEL RELEASE      Bilateral    COLONOSCOPY N/A 08/05/2022    Procedure: COLONOSCOPY;  Surgeon: Zaida Ansari MD;  Location: [facility] ENDO;  Service: Endoscopy;  Laterality: N/A;    ESOPHAGEAL MANOMETRY WITH MEASUREMENT OF IMPEDANCE N/A 3/24/2023    Procedure: MANOMETRY, ESOPHAGUS, WITH IMPEDANCE MEASUREMENT;  Surgeon: Sofie Walker MD;  Location: [facility] (2ND FLR);  Service: Endoscopy;  Laterality: N/A;  r/o rumination and supragastric belching  hold baclofen x3 days, hold doxepin x24 hours prior to procedure    ESOPHAGOGASTRODUODENOSCOPY N/A 08/05/2022    Procedure: EGD (ESOPHAGOGASTRODUODENOSCOPY);  Surgeon: Zaida Ansari MD;  Location: [facility] ENDO;  Service: Endoscopy;  Laterality: N/A;    ESOPHAGOGASTRODUODENOSCOPY N/A 3/24/2023    Procedure: EGD (ESOPHAGOGASTRODUODENOSCOPY);  Surgeon: Sofie Walker MD;  Location: [facility] (2ND FLR);  Service: Endoscopy;  Laterality: N/A;  Endoflip/Endoscopically placed manometry probe  2nd floor-per Dr. Walker (higher than average  risk procedure)  propofol only  full liquid diet x3 days, clear liquid diet x1 day prior to procedure  [facility] and emailed to [email or web address]    EYE SURGERY      Lasik    HERNIA REPAIR      INCISION AND DRAINAGE OF HEMATOMA Right 09/21/2021    Procedure: INCISION AND DRAINAGE, HEMATOMA;  Surgeon: Felix Levin MD;  Location: [facility] OR;  Service: Orthopedics;  Laterality: Right;    INSERTION OF ANTIBIOTIC SPACER Right 06/01/2021    Procedure: INSERTION, ANTIBIOTIC SPACER;  Surgeon: Felix Levin MD;  Location: [facility] OR;  Service: Orthopedics;  Laterality: Right;    JOINT REPLACEMENT      KNEE ARTHROPLASTY Left 08/04/2020    Procedure: ARTHROPLASTY, KNEE;  Surgeon: Felix Levin MD;  Location: [facility] OR;  Service: Orthopedics;  Laterality: Left;    KNEE ARTHROPLASTY Right 03/23/2021    Procedure: ARTHROPLASTY, KNEE;  Surgeon: Felix Levin MD;  Location: [facility] OR;  Service: Orthopedics;  Laterality: Right;    KNEE ARTHROSCOPY W/ MENISCECTOMY Left 10/15/2019    Procedure: ARTHROSCOPY, KNEE, WITH MENISCECTOMY;  Surgeon: Felix Levin MD;  Location: [facility] OR;  Service: Orthopedics;  Laterality: Left;  Medial and Lateral Meniscectomy    KNEE ARTHROSCOPY W/ MENISCECTOMY Right 11/22/2019    Procedure: ARTHROSCOPY, KNEE, WITH MENISCECTOMY;  Surgeon: Felix Levin MD;  Location: [facility] OR;  Service: Orthopedics;  Laterality: Right;    KNEE SURGERY      osmar    NISSEN FUNDOPLICATION      X 2    REVERSE TOTAL SHOULDER ARTHROPLASTY Left 02/11/2020    Procedure: ARTHROPLASTY, SHOULDER, TOTAL, REVERSE;  Surgeon: Felix Levin MD;  Location: [facility] OR;  Service: Orthopedics;  Laterality: Left;  [place]    [facility]jejun[facility]  [facility] [place]    REVISION OF KNEE ARTHROPLASTY Right 09/07/2021    Procedure: REVISION, ARTHROPLASTY, KNEE;  Surgeon: Felix Levin MD;  Location: [facility] OR;  Service: Orthopedics;  Laterality: Right;     ROTATOR CUFF REPAIR      right    RJ-EN-Y PROCEDURE N/A 05/2021    SLEEVE GASTROPLASTY  12/2013     Family History   Problem Relation Age of Onset    Arthritis Mother     Hypertension Father     Kidney disease Father     Heart disease Father     COPD Father     Pancreatic cancer Maternal Grandfather     Colon cancer Paternal Grandfather     Colon polyps Neg Hx     Crohn's disease Neg Hx     Ulcerative colitis Neg Hx     Stomach cancer Neg Hx     Esophageal cancer Neg Hx      Social History     Tobacco Use    Smoking status: Never    Smokeless tobacco: Current     Types: Chew    Tobacco comments:     rarely   Substance Use Topics    Alcohol use: Yes     Comment: occasionally    Drug use: No     Review of Systems   Psychiatric/Behavioral:  Positive for confusion.    All other systems reviewed and are negative.      Physical Exam     Initial Vitals [07/31/23 2317]   BP Pulse Resp Temp SpO2   107/64 74 16 98.2 °F (36.8 °C) 95 %      MAP       --         Physical Exam    Nursing note and vitals reviewed.  Constitutional: He appears well-developed and well-nourished. He is not diaphoretic. No distress.   HENT:   Head: Normocephalic and atraumatic.   Eyes: Conjunctivae are normal.   Neck: Neck supple.   Normal range of motion.  Cardiovascular:  Normal rate.           Pulmonary/Chest: No respiratory distress.   Abdominal: He exhibits no distension.   Musculoskeletal:         General: No edema.      Cervical back: Normal range of motion and neck supple.     Neurological: He has normal strength.   Somnolent but arouses to voice, oriented to person and place, mildly slurred speech, cranial nerves intact, moving all extremities, no focal weakness   Skin: Skin is warm and dry. No rash noted. No erythema.   Psychiatric: He has a normal mood and affect.         ED Course   Procedures  Labs Reviewed   CBC W/ AUTO DIFFERENTIAL - Abnormal; Notable for the following components:       Result Value    RBC 4.06 (*)     Hemoglobin 11.8  (*)     Hematocrit 37.9 (*)     MCHC 31.1 (*)     RDW 15.1 (*)     Immature Granulocytes 0.6 (*)     Immature Grans (Abs) 0.05 (*)     Gran % 76.8 (*)     Lymph % 14.9 (*)     All other components within normal limits   COMPREHENSIVE METABOLIC PANEL - Abnormal; Notable for the following components:    Chloride 115 (*)     CO2 19 (*)     Glucose 112 (*)     BUN 21 (*)     Creatinine 1.8 (*)     Calcium 8.5 (*)     eGFR 43.4 (*)     Anion Gap 7 (*)     All other components within normal limits   URINALYSIS, REFLEX TO URINE CULTURE - Abnormal; Notable for the following components:    Appearance, UA Hazy (*)     Protein, UA 1+ (*)     All other components within normal limits    Narrative:     Specimen Source->Urine   DRUG SCREEN PANEL, URINE EMERGENCY - Abnormal; Notable for the following components:    Benzodiazepines Presumptive Positive (*)     Opiate Scrn, Ur Presumptive Positive (*)     All other components within normal limits    Narrative:     Specimen Source->Urine   URINALYSIS MICROSCOPIC - Abnormal; Notable for the following components:    WBC, UA 10 (*)     Hyaline Casts, UA 77 (*)     All other components within normal limits    Narrative:     Specimen Source->Urine   TROPONIN I HIGH SENSITIVITY   ALCOHOL,MEDICAL (ETHANOL)   B-TYPE NATRIURETIC PEPTIDE   AMMONIA   CK   LACTIC ACID, PLASMA   PROCALCITONIN     EKG Readings: (Independently Interpreted)   Sinus rhythm.  Seventy beats/minute.  Normal axis.  No ST elevation.       Imaging Results              X-Ray Chest AP Portable (In process)                      CT Head Without Contrast (Final result)  Result time 08/01/23 00:46:50      Final result by Teodora Duggan MD (08/01/23 00:46:50)                   Narrative:    EXAM:  CT Head Without Intravenous Contrast    CLINICAL HISTORY:  The patient is 58 years old and is Male; Mental status change, unknown cause    TECHNIQUE:  Axial computed tomography images of the head/brain without intravenous contrast.   Sagittal and coronal reformatted images were created and reviewed.  This CT exam was performed using one or more of the following dose reduction techniques:  automated exposure control, adjustment of the mA and/or kV according to patient size, and/or use of iterative reconstruction technique.    COMPARISON:  No relevant prior studies available.    FINDINGS:  Brain:  No intracranial hemorrhage, extraaxial fluid collection or edema.  Ventricles:  No hydrocephalus.  Bones/joints:  Intact calvarium.  Soft tissues:  Unremarkable.  Sinuses:  Clear.  Mastoid air cells:  Clear.    IMPRESSION:  Normal head/brain CT.    Electronically signed by:  Teodora Lewis MD  8/1/2023 12:46 AM CDT Workstation: [id number]                                     Medications   sodium chloride 0.9% bolus 1,000 mL 1,000 mL (0 mLs Intravenous Stopped 8/1/23 0133)   sodium chloride 0.9% bolus 1,000 mL 1,000 mL (0 mLs Intravenous Stopped 8/1/23 0305)     Medical Decision Making:   Initial Assessment:   58-year-old male with confusion.  He has no focal neurologic deficits on exam.  EKG shows sinus rhythm with no ischemic changes.  CT head obtained is negative for acute process.  WBC, H&H are normal.  He does have ANA ROSA with a creatinine of 1.8.  CPK, troponin, BNP are normal.  Ammonia is normal.  Patient did briefly drop his SBP into the 80s.  This resolved after 1 L IV fluid bolus.  It was given a 2nd L IV fluid bolus.  Lactic acid and procalcitonin are normal.  CXR appears clear, UA shows no obvious infection.  UDS is positive for opiates and benzodiazepine.  Ethanol is 13.  Patient was observed in the ED for 4 hours.  His mental status gradually normalized.  He is now completely alert and oriented with normal mental status, ambulating without difficulty.  Patient says that he took the oxycodone earlier in the afternoon.  He also says that he took a Valium early in the morning to help him sleep and that he usually does not take Valium.  Suspect  these to in combination with the alcohol as the cause of his mental status change which again has resolved.  He is appropriate for discharge at this time.  Advised he follow up with his PCP for repeat labs in the next 1-2 weeks.                          Clinical Impression:   Final diagnoses:  [R41.82] Altered mental status  [I95.9] Hypotension        ED Disposition Condition    Discharge Stable          ED Prescriptions    None       Follow-up Information       Follow up With Specialties Details Why Contact Info Additional Information    [facility] - Emergency Dept Emergency Medicine  As needed, If symptoms worsen [address] [id number] [phone] 1st floor             Erasmo Ramos MD  08/01/23 6067

## 2023-08-01 NOTE — DISCHARGE INSTRUCTIONS
Return to the ER for worsening symptoms, or for any other concerns.  Follow-up with your PCP for re-evaluation.

## 2023-09-11 ENCOUNTER — TELEPHONE (OUTPATIENT)
Dept: SURGERY | Facility: CLINIC | Age: 58
End: 2023-09-11
Payer: OTHER GOVERNMENT

## 2023-09-11 NOTE — TELEPHONE ENCOUNTER
Left VM for pt to return phone call in reference to putting his upcoming surgery on hold with Dr. Méndez.

## 2023-09-11 NOTE — TELEPHONE ENCOUNTER
----- Message from Lydia Cohen sent at 9/11/2023  1:51 PM CDT -----  Regarding: PROCEDURE  Contact: Pt [phone]  Pt is calling to cancel procedure and states he will call back to reschedule once approved please call

## 2023-09-20 ENCOUNTER — TELEPHONE (OUTPATIENT)
Dept: SURGERY | Facility: CLINIC | Age: 58
End: 2023-09-20
Payer: OTHER GOVERNMENT

## 2023-09-22 ENCOUNTER — PATIENT MESSAGE (OUTPATIENT)
Dept: SURGERY | Facility: HOSPITAL | Age: 58
End: 2023-09-22
Payer: OTHER GOVERNMENT

## 2023-09-22 ENCOUNTER — TELEPHONE (OUTPATIENT)
Dept: SURGERY | Facility: CLINIC | Age: 58
End: 2023-09-22
Payer: OTHER GOVERNMENT

## 2023-09-22 NOTE — TELEPHONE ENCOUNTER
Left VM for pt to return phone call in reference to r/s his POEM procedure for 10/2.  Also email was sent to pt.

## 2023-09-28 ENCOUNTER — OFFICE VISIT (OUTPATIENT)
Dept: ORTHOPEDICS | Facility: CLINIC | Age: 58
End: 2023-09-28
Payer: OTHER GOVERNMENT

## 2023-09-28 VITALS — WEIGHT: 263 LBS | BODY MASS INDEX: 37.65 KG/M2 | HEIGHT: 70 IN | RESPIRATION RATE: 18 BRPM

## 2023-09-28 DIAGNOSIS — M75.121 NONTRAUMATIC COMPLETE TEAR OF RIGHT ROTATOR CUFF: ICD-10-CM

## 2023-09-28 DIAGNOSIS — M25.511 RIGHT SHOULDER PAIN, UNSPECIFIED CHRONICITY: Primary | ICD-10-CM

## 2023-09-28 DIAGNOSIS — M75.21 BICEPS TENDINITIS, RIGHT: Primary | ICD-10-CM

## 2023-09-28 PROCEDURE — 99214 OFFICE O/P EST MOD 30 MIN: CPT | Mod: S$PBB,,, | Performed by: ORTHOPAEDIC SURGERY

## 2023-09-28 PROCEDURE — 99213 OFFICE O/P EST LOW 20 MIN: CPT | Mod: PBBFAC,PO | Performed by: ORTHOPAEDIC SURGERY

## 2023-09-28 PROCEDURE — 99999 PR PBB SHADOW E&M-EST. PATIENT-LVL III: ICD-10-PCS | Mod: PBBFAC,,, | Performed by: ORTHOPAEDIC SURGERY

## 2023-09-28 PROCEDURE — 99999 PR PBB SHADOW E&M-EST. PATIENT-LVL III: CPT | Mod: PBBFAC,,, | Performed by: ORTHOPAEDIC SURGERY

## 2023-09-28 PROCEDURE — 99214 PR OFFICE/OUTPT VISIT, EST, LEVL IV, 30-39 MIN: ICD-10-PCS | Mod: S$PBB,,, | Performed by: ORTHOPAEDIC SURGERY

## 2023-09-28 NOTE — PROGRESS NOTES
Past Medical History:   Diagnosis Date    Alcohol abuse, unspecified     Allergic rhinitis, cause unspecified     Arthritis     Asthma attack     Cancer 2022    skin cancer on nose    Carpal tunnel syndrome     Depressive disorder, not elsewhere classified     Diarrhea     Encounter for blood transfusion     GERD (gastroesophageal reflux disease)     Hypersomnia, unspecified     Hypertension     Hypoglycemia     Lumbago     Obesity, unspecified     JAH (obstructive sleep apnea)     uses BIPAP    Other and unspecified hyperlipidemia     Other ankle sprain and strain     Psychosexual dysfunction with inhibited sexual excitement     Pyogenic arthritis of knee 06/2021    staph pseudintermedius, TKA    Pyogenic arthritis of knee 09/21/2021    Enterococcus    Ventral hernia, unspecified, without mention of obstruction or gangrene        Past Surgical History:   Procedure Laterality Date    A-scope knees      Bilateral    ARTHROSCOPIC REPAIR OF ROTATOR CUFF OF SHOULDER Left 09/20/2019    Procedure: REPAIR, ROTATOR CUFF, ARTHROSCOPIC;  Surgeon: Felix Levin MD;  Location: [facility] OR;  Service: Orthopedics;  Laterality: Left;    ARTHROSCOPIC REPAIR OF ROTATOR CUFF OF SHOULDER Right 3/17/2023    Procedure: REPAIR, ROTATOR CUFF, ARTHROSCOPIC;  Surgeon: Felix Levin MD;  Location: [facility] OR;  Service: Orthopedics;  Laterality: Right;    ARTHROSCOPY OF SHOULDER WITH DECOMPRESSION OF SUBACROMIAL SPACE Left 09/20/2019    Procedure: ARTHROSCOPY, SHOULDER, WITH SUBACROMIAL SPACE DECOMPRESSION;  Surgeon: Felix Levin MD;  Location: [facility] OR;  Service: Orthopedics;  Laterality: Left;  Jaspreet- Arthrex notified of all case today 9/16-tcb    ARTHROSCOPY,SHOULDER,WITH BICEPS TENODESIS Right 3/17/2023    Procedure: ARTHROSCOPY,SHOULDER,WITH BICEPS TENODESIS;  Surgeon: Felix Levin MD;  Location: [facility] OR;  Service: Orthopedics;  Laterality: Right;    CARPAL TUNNEL RELEASE      Bilateral    COLONOSCOPY  N/A 08/05/2022    Procedure: COLONOSCOPY;  Surgeon: Zaida Ansari MD;  Location: [facility] ENDO;  Service: Endoscopy;  Laterality: N/A;    ESOPHAGEAL MANOMETRY WITH MEASUREMENT OF IMPEDANCE N/A 3/24/2023    Procedure: MANOMETRY, ESOPHAGUS, WITH IMPEDANCE MEASUREMENT;  Surgeon: Sofie Walker MD;  Location: [facility] ENDO (2ND FLR);  Service: Endoscopy;  Laterality: N/A;  r/o rumination and supragastric belching  hold baclofen x3 days, hold doxepin x24 hours prior to procedure    ESOPHAGOGASTRODUODENOSCOPY N/A 08/05/2022    Procedure: EGD (ESOPHAGOGASTRODUODENOSCOPY);  Surgeon: Zaida Ansari MD;  Location: [facility] ENDO;  Service: Endoscopy;  Laterality: N/A;    ESOPHAGOGASTRODUODENOSCOPY N/A 3/24/2023    Procedure: EGD (ESOPHAGOGASTRODUODENOSCOPY);  Surgeon: Sofie Walker MD;  Location: [facility] ENDO (2ND FLR);  Service: Endoscopy;  Laterality: N/A;  Endoflip/Endoscopically placed manometry probe  2nd floor-per Dr. Walker (higher than average risk procedure)  propofol only  full liquid diet x3 days, clear liquid diet x1 day prior to procedure  [facility] and emailed to [email or web address]    EYE SURGERY      Lasik    HERNIA REPAIR      INCISION AND DRAINAGE OF HEMATOMA Right 09/21/2021    Procedure: INCISION AND DRAINAGE, HEMATOMA;  Surgeon: Felix Levin MD;  Location: [facility] OR;  Service: Orthopedics;  Laterality: Right;    INSERTION OF ANTIBIOTIC SPACER Right 06/01/2021    Procedure: INSERTION, ANTIBIOTIC SPACER;  Surgeon: Felix Levin MD;  Location: [facility] OR;  Service: Orthopedics;  Laterality: Right;    JOINT REPLACEMENT      KNEE ARTHROPLASTY Left 08/04/2020    Procedure: ARTHROPLASTY, KNEE;  Surgeon: Felix Levin MD;  Location: [facility] OR;  Service: Orthopedics;  Laterality: Left;    KNEE ARTHROPLASTY Right 03/23/2021    Procedure: ARTHROPLASTY, KNEE;  Surgeon: Felix Levin MD;  Location: [facility] OR;  Service: Orthopedics;  Laterality: Right;    KNEE ARTHROSCOPY W/  MENISCECTOMY Left 10/15/2019    Procedure: ARTHROSCOPY, KNEE, WITH MENISCECTOMY;  Surgeon: Felix Levin MD;  Location: [facility] OR;  Service: Orthopedics;  Laterality: Left;  Medial and Lateral Meniscectomy    KNEE ARTHROSCOPY W/ MENISCECTOMY Right 11/22/2019    Procedure: ARTHROSCOPY, KNEE, WITH MENISCECTOMY;  Surgeon: Felix Levin MD;  Location: [facility] OR;  Service: Orthopedics;  Laterality: Right;    KNEE SURGERY      osmar    NISSEN FUNDOPLICATION      X 2    REVERSE TOTAL SHOULDER ARTHROPLASTY Left 02/11/2020    Procedure: ARTHROPLASTY, SHOULDER, TOTAL, REVERSE;  Surgeon: Felix Levin MD;  Location: [facility] OR;  Service: Orthopedics;  Laterality: Left;  [place]    revision of gastrojejunostomy  [facility]/10/20[facility] [place]    REVISION OF KNEE ARTHROPLASTY Right 09/07/2021    Procedure: REVISION, ARTHROPLASTY, KNEE;  Surgeon: Felix Levin MD;  Location: [facility] OR;  Service: Orthopedics;  Laterality: Right;    ROTATOR CUFF REPAIR      right    RJ-EN-Y PROCEDURE N/A 05/2021    SLEEVE GASTROPLASTY  12/2013       Current Outpatient Medications   Medication Sig    albuterol (PROVENTIL/VENTOLIN HFA) 90 mcg/actuation inhaler INHALE 2 PUFFS BY MOUTH FOUR TIMES A DAY AS NEEDED FOR BREATHING    ascorbic acid, vitamin C, (VITAMIN C) 500 MG tablet 500 mg.    aspirin 81 MG Chew Take 1 tablet (81 mg total) by mouth 2 (two) times daily.    cyanocobalamin (VITAMIN B-12) 1000 MCG tablet Take 1,000 mcg by mouth once daily.     diclofenac sodium (VOLTAREN) 1 % Gel Apply topically daily as needed.    doxepin (SINEQUAN) 50 MG capsule 50 mg.    EScitalopram oxalate (LEXAPRO) 20 MG tablet 20 mg.    gabapentin (NEURONTIN) 300 MG capsule 600 mg.    LIDOcaine (LIDODERM) 5 % APPLY 1 PATCH TOPICALLY EVERY DAY FOR PAIN. WEAR FOR 12 HOURS, THEN REMOVE. DO NOT APPLY NEW PATCH FOR AT LEAST 12 HOURS.    losartan-hydrochlorothiazide 100-25 mg (HYZAAR) 100-25 mg per tablet 1 tablet.    methyl salicylate-menthol  15-10% 15-10 % Crea APPLY LIBERAL AMOUNT TOPICALLY FOUR TIMES A DAY AS NEEDED    naloxone (NARCAN) 4 mg/actuation Spry SMARTSIG:Spray(s) Both Nares    omeprazole (PRILOSEC) 40 MG capsule 40 mg.    ondansetron (ZOFRAN) 4 MG tablet Take 1 tablet (4 mg total) by mouth every 6 (six) hours as needed for Nausea.    oxyCODONE-acetaminophen (PERCOCET) 5-325 mg per tablet Take 1 tablet by mouth every 6 (six) hours as needed for Pain.    peg 400-propylene glycol 0.4-0.3 % Drop INSTILL ONE DROP IN EACH EYE FOUR TIMES A DAY AS NEEDED FOR DRY EYES    vitamin D (VITAMIN D3) 1000 units Tab Take 2,000 Units by mouth once daily.     No current facility-administered medications for this visit.       Review of patient's allergies indicates:   Allergen Reactions    Hydrocodone Hives       Family History   Problem Relation Age of Onset    Arthritis Mother     Hypertension Father     Kidney disease Father     Heart disease Father     COPD Father     Pancreatic cancer Maternal Grandfather     Colon cancer Paternal Grandfather     Colon polyps Neg Hx     Crohn's disease Neg Hx     Ulcerative colitis Neg Hx     Stomach cancer Neg Hx     Esophageal cancer Neg Hx        Social History     Socioeconomic History    Marital status:    Tobacco Use    Smoking status: Never    Smokeless tobacco: Current     Types: Chew    Tobacco comments:     rarely   Substance and Sexual Activity    Alcohol use: Yes     Comment: occasionally    Drug use: No    Sexual activity: Not Currently       Chief Complaint:   No chief complaint on file.      Date of surgery:  March 17, 2023    History of present illness:  57-year-old male who underwent right arthroscopic biceps tenodesis and revision rotator cuff repair.  Patient is doing terribly now.  It has gotten much worse over last 3 months.  Pain is worse than it was before surgery.  Feels like it gets worse every day.  Lot of pain up in the top of his shoulder.  Pain is 7/10.      Review of  Systems:    Musculoskeletal:  See HPI        Physical Examination:    Vital Signs:    There were no vitals filed for this visit.        There is no height or weight on file to calculate BMI.    This a well-developed, well nourished patient in no acute distress.  They are alert and oriented and cooperative to examination.  Pt. walks without an antalgic gait.      Examination of the right shoulder shows well-healed surgical portals.  No erythema or drainage.  Patient is neurovascular intact.  Full range of motion with just a little tightness in the biceps and front of his shoulder.  Positive Gerardo deformity.      Assessment::  Status post right revision rotator cuff repair and biceps tenodesis       Plan:  Patient never went to physical therapy.  He has a lot more pain than he should 6 months out from shoulder surgery.  Recommended an MRI of his right shoulder to further evaluate the repair.    This note was created using M Modal voice recognition software that occasionally misinterpreted phrases or words.

## 2023-09-29 ENCOUNTER — TELEPHONE (OUTPATIENT)
Dept: SURGERY | Facility: CLINIC | Age: 58
End: 2023-09-29
Payer: OTHER GOVERNMENT

## 2023-09-29 ENCOUNTER — PATIENT MESSAGE (OUTPATIENT)
Dept: SURGERY | Facility: CLINIC | Age: 58
End: 2023-09-29
Payer: OTHER GOVERNMENT

## 2023-09-29 NOTE — TELEPHONE ENCOUNTER
Reached out to pt number goes straight to voicemail. Left message with surgery arrival time and pre-op instructions. As well as the number and operating hours of the office in case he has any questions.

## 2023-09-29 NOTE — TELEPHONE ENCOUNTER
I have made several attempts through out the day to contact pt in regards to their arrival time for their procedure on Monday with . Pt primary number goes straight to voice mail, and there is no answer form any of their alternate contact numbers. I have left several voice mails on pt's primary number and alternate numbers for the pt to give the office a call back so that we may let him know of his arrival time as well as his pre-op interactions.Have yet to hear back from pt a this time. I have also attempted to reach out to pt via portal message.

## 2023-09-29 NOTE — TELEPHONE ENCOUNTER
Attempted to reach out to pt in regards to arrival time and pre-op instructions for surgery on Monday. Call went straight to  left message

## 2023-10-01 ENCOUNTER — ANESTHESIA EVENT (OUTPATIENT)
Dept: SURGERY | Facility: HOSPITAL | Age: 58
End: 2023-10-01
Payer: OTHER GOVERNMENT

## 2023-10-01 NOTE — ANESTHESIA PREPROCEDURE EVALUATION
[facility]  Anesthesia Pre-Operative Evaluation         Patient Name: Himanshu Connor II  YOB: 1965  MRN: [id number]    SUBJECTIVE:     Pre-operative evaluation for Procedure(s) (LRB):  MYOTOMY, PERORAL, ENDOSCOPIC POEM (N/A)     10/01/2023    Himanshu Connor II is a 58 y.o. male w/ a significant PMHx of HTN, JAH, arthritis, obesity, ETOH abuse, GERD who presents with for dysphagia and regurgitation. Patient has an extensive surgical history, open HH repair x2 with Nissen, open sleeve with Nissen takedown complicated by leak and ECF, and laparoscopic conversion to RNY. Patient states he has had dysphagia since his RNY in 2021. Now undergoing peroral endoscopic myotomy.    Patient now presents for the above procedure(s).    NO PREVIOUS ECHOs.    Prev airway: Intubation:     Induction:  Intravenous    Intubated:  Postinduction    Mask Ventilation:  Easy with oral airway    Attempts:  1    Attempted By:  CRNA    Method of Intubation:  Direct    Blade:  Shaikh 4    Laryngeal View Grade: Grade I - full view of cords      Difficult Airway Encountered?: No      Complications:  None    Airway Device:  Oral endotracheal tube    Airway Device Size:  7.5    Style/Cuff Inflation:  Cuffed (inflated to minimal occlusive pressure)    Inflation Amount (mL):  6    Tube secured:  23    Secured at:  The teeth    Placement Verified By:  Capnometry    Complicating Factors:  None    Findings Post-Intubation:  BS equal bilateral      Patient Active Problem List   Diagnosis    Rotator cuff tear    Complete tear of left rotator cuff    Acute lateral meniscal tear, left, sequela    Acute lateral meniscal tear, right, subsequent encounter    Nontraumatic complete tear of left rotator cuff    Essential hypertension    Mild episode of recurrent major depressive disorder    Gastroesophageal reflux disease without esophagitis    Status post reverse arthroplasty of shoulder, left    Arthritis of knee     Class 1 obesity due to excess calories in adult    Status post total left knee replacement using cement    Status post total knee replacement using cement, right    Postoperative stiffness of total knee replacement    Pain and swelling of knee, right    Gait instability    Infection of total knee replacement    Pre-op testing    Status post total right knee replacement (revision)    JAH (obstructive sleep apnea)    Postoperative infection of knee    Anemia       Review of patient's allergies indicates:   Allergen Reactions    Hydrocodone Hives       Current Inpatient Medications:      No current facility-administered medications on file prior to encounter.     Current Outpatient Medications on File Prior to Encounter   Medication Sig Dispense Refill    albuterol (PROVENTIL/VENTOLIN HFA) 90 mcg/actuation inhaler INHALE 2 PUFFS BY MOUTH FOUR TIMES A DAY AS NEEDED FOR BREATHING      ascorbic acid, vitamin C, (VITAMIN C) 500 MG tablet 500 mg.      aspirin 81 MG Chew Take 1 tablet (81 mg total) by mouth 2 (two) times daily. 60 tablet 0    cyanocobalamin (VITAMIN B-12) 1000 MCG tablet Take 1,000 mcg by mouth once daily.       diclofenac sodium (VOLTAREN) 1 % Gel Apply topically daily as needed.      doxepin (SINEQUAN) 50 MG capsule 50 mg.      EScitalopram oxalate (LEXAPRO) 20 MG tablet 20 mg.      gabapentin (NEURONTIN) 300 MG capsule 600 mg.      LIDOcaine (LIDODERM) 5 % APPLY 1 PATCH TOPICALLY EVERY DAY FOR PAIN. WEAR FOR 12 HOURS, THEN REMOVE. DO NOT APPLY NEW PATCH FOR AT LEAST 12 HOURS.      losartan-hydrochlorothiazide 100-25 mg (HYZAAR) 100-25 mg per tablet 1 tablet.      methyl salicylate-menthol 15-10% 15-10 % Crea APPLY LIBERAL AMOUNT TOPICALLY FOUR TIMES A DAY AS NEEDED      naloxone (NARCAN) 4 mg/actuation Spry SMARTSIG:Spray(s) Both Nares      omeprazole (PRILOSEC) 40 MG capsule 40 mg.      ondansetron (ZOFRAN) 4 MG tablet Take 1 tablet (4 mg total) by mouth every 6 (six) hours  as needed for Nausea. 30 tablet 0    oxyCODONE-acetaminophen (PERCOCET) 5-325 mg per tablet Take 1 tablet by mouth every 6 (six) hours as needed for Pain. 28 tablet 0    peg 400-propylene glycol 0.4-0.3 % Drop INSTILL ONE DROP IN EACH EYE FOUR TIMES A DAY AS NEEDED FOR DRY EYES      vitamin D (VITAMIN D3) 1000 units Tab Take 2,000 Units by mouth once daily.         Past Surgical History:   Procedure Laterality Date    A-scope knees      Bilateral    ARTHROSCOPIC REPAIR OF ROTATOR CUFF OF SHOULDER Left 09/20/2019    Procedure: REPAIR, ROTATOR CUFF, ARTHROSCOPIC;  Surgeon: Felix Levin MD;  Location: [facility] OR;  Service: Orthopedics;  Laterality: Left;    ARTHROSCOPIC REPAIR OF ROTATOR CUFF OF SHOULDER Right 3/17/2023    Procedure: REPAIR, ROTATOR CUFF, ARTHROSCOPIC;  Surgeon: Felix Levin MD;  Location: [facility] OR;  Service: Orthopedics;  Laterality: Right;    ARTHROSCOPY OF SHOULDER WITH DECOMPRESSION OF SUBACROMIAL SPACE Left 09/20/2019    Procedure: ARTHROSCOPY, SHOULDER, WITH SUBACROMIAL SPACE DECOMPRESSION;  Surgeon: Felix Levin MD;  Location: [facility] OR;  Service: Orthopedics;  Laterality: Left;  [employer] notified of all case today 9/16-tcb    ARTHROSCOPY,SHOULDER,WITH BICEPS TENODESIS Right 3/17/2023    Procedure: ARTHROSCOPY,SHOULDER,WITH BICEPS TENODESIS;  Surgeon: Felix Levin MD;  Location: [facility] OR;  Service: Orthopedics;  Laterality: Right;    CARPAL TUNNEL RELEASE      Bilateral    COLONOSCOPY N/A 08/05/2022    Procedure: COLONOSCOPY;  Surgeon: Zaida Ansari MD;  Location: [facility] ENDO;  Service: Endoscopy;  Laterality: N/A;    ESOPHAGEAL MANOMETRY WITH MEASUREMENT OF IMPEDANCE N/A 3/24/2023    Procedure: MANOMETRY, ESOPHAGUS, WITH IMPEDANCE MEASUREMENT;  Surgeon: Sofie Walker MD;  Location: [facility] ENDO ([address] [place] [address]);  Service: Endoscopy;  Laterality: N/A;  r/o rumination and supragastric belching  hold baclofen x3 days, hold doxepin x24 hours prior to  procedure    ESOPHAGOGASTRODUODENOSCOPY N/A 08/05/2022    Procedure: EGD (ESOPHAGOGASTRODUODENOSCOPY);  Surgeon: Zaida Ansari MD;  Location: [facility] ENDO;  Service: Endoscopy;  Laterality: N/A;    ESOPHAGOGASTRODUODENOSCOPY N/A 3/24/2023    Procedure: EGD (ESOPHAGOGASTRODUODENOSCOPY);  Surgeon: Sofie Walker MD;  Location: [facility] ENDO (2ND FLR);  Service: Endoscopy;  Laterality: N/A;  Endoflip/Endoscopically placed manometry probe  2nd floor-per Dr. Walker (higher than average risk procedure)  propofol only  full liquid diet x3 days, clear liquid diet x1 day prior to procedure  instructions [facility] to [facility] and emailed to [email or web address]    EYE SURGERY      Lasik    HERNIA REPAIR      INCISION AND DRAINAGE OF HEMATOMA Right 09/21/2021    Procedure: INCISION AND DRAINAGE, HEMATOMA;  Surgeon: Felix Levin MD;  Location: [facility] OR;  Service: Orthopedics;  Laterality: Right;    INSERTION OF ANTIBIOTIC SPACER Right 06/01/2021    Procedure: INSERTION, ANTIBIOTIC SPACER;  Surgeon: Felix Levin MD;  Location: [facility] OR;  Service: Orthopedics;  Laterality: Right;    JOINT REPLACEMENT      KNEE ARTHROPLASTY Left 08/04/2020    Procedure: ARTHROPLASTY, KNEE;  Surgeon: Felix Levin MD;  Location: [facility] OR;  Service: Orthopedics;  Laterality: Left;    KNEE ARTHROPLASTY Right 03/23/2021    Procedure: ARTHROPLASTY, KNEE;  Surgeon: Felix Levin MD;  Location: [facility] OR;  Service: Orthopedics;  Laterality: Right;    KNEE ARTHROSCOPY W/ MENISCECTOMY Left 10/15/2019    Procedure: ARTHROSCOPY, KNEE, WITH MENISCECTOMY;  Surgeon: Felix Levin MD;  Location: [facility] OR;  Service: Orthopedics;  Laterality: Left;  Medial and Lateral Meniscectomy    KNEE ARTHROSCOPY W/ MENISCECTOMY Right 11/22/2019    Procedure: ARTHROSCOPY, KNEE, WITH MENISCECTOMY;  Surgeon: Felix Levin MD;  Location: [facility] OR;  Service: Orthopedics;  Laterality: Right;    KNEE SURGERY      osmar    NISSEN  FUNDOPLICATION      X 2    REVERSE TOTAL SHOULDER ARTHROPLASTY Left 02/11/2020    Procedure: ARTHROPLASTY, SHOULDER, TOTAL, REVERSE;  Surgeon: Felix Levin MD;  Location: [facility] OR;  Service: Orthopedics;  Laterality: Left;  [place]    [facility] gastrojejunostomy  [facility] [place]    REVISION OF KNEE ARTHROPLASTY Right 09/07/2021    Procedure: REVISION, ARTHROPLASTY, KNEE;  Surgeon: Felix Levin MD;  Location: [facility] OR;  Service: Orthopedics;  Laterality: Right;    ROTATOR CUFF REPAIR      right    RJ-EN-Y PROCEDURE N/A 05/2021    SLEEVE GASTROPLASTY  12/2013       Social History     Socioeconomic History    Marital status:    Tobacco Use    Smoking status: Never    Smokeless tobacco: Current     Types: Chew    Tobacco comments:     rarely   Substance and Sexual Activity    Alcohol use: Yes     Comment: occasionally    Drug use: No    Sexual activity: Not Currently       OBJECTIVE:     Vital Signs Range (Last 24H):         Significant Labs:  Lab Results   Component Value Date    WBC 8.55 08/01/2023    HGB 11.8 (L) 08/01/2023    HCT 37.9 (L) 08/01/2023     08/01/2023    ALT 40 08/01/2023    AST 35 08/01/2023     08/01/2023    K 4.6 08/01/2023     (H) 08/01/2023    CREATININE 1.8 (H) 08/01/2023    BUN 21 (H) 08/01/2023    CO2 19 (L) 08/01/2023    INR 0.93 05/24/2013       Diagnostic Studies: No relevant studies.    EKG:   Results for orders placed or performed during the hospital encounter of 07/31/23   EKG 12-lead    Collection Time: 08/01/23  1:07 AM    Narrative    Test Reason : R41.82,    Vent. Rate : 063 BPM     Atrial Rate : 063 BPM     P-R Int : 158 ms          QRS Dur : 088 ms      QT Int : 444 ms       P-R-T Axes : 027 023 048 degrees     QTc Int : 454 ms    Sinus rhythm with frequent Premature ventricular complexes  Otherwise normal ECG  When compared with ECG of 31-JUL-2023 23:21,  No significant change was found  Confirmed by  Dayday Sanders MD ([id number]) on 8/5/2023 4:38:00 PM    Referred By: KYLEE   SELF           Confirmed By:Dayday Sanders MD       2D ECHO:  TTE:  No results found for this or any previous visit.    SOL:  No results found for this or any previous visit.    ASSESSMENT/PLAN:                                                                                                         Pre-op Assessment    I have reviewed the Patient Summary Reports.     I have reviewed the Nursing Notes. I have reviewed the NPO Status.   I have reviewed the Medications.     Review of Systems  Anesthesia Hx:  Denies Family Hx of Anesthesia complications.   Denies Personal Hx of Anesthesia complications.   Social:  Alcohol Use    Hematology/Oncology:  Hematology Normal   Oncology Normal     EENT/Dental:EENT/Dental Normal   Cardiovascular:   Hypertension Denies MI.      Pulmonary:   Sleep Apnea    Renal/:  Renal/ Normal     Hepatic/GI:   GERD    Musculoskeletal:  Musculoskeletal Normal    Neurological:  Neurology Normal    Endocrine:  Endocrine Normal    Dermatological:  Skin Normal    Psych:  Psychiatric Normal           Physical Exam  General: Well nourished, Cooperative, Alert and Oriented    Airway:  Mallampati: II   Mouth Opening: Normal  TM Distance: Normal  Tongue: Normal  Neck ROM: Normal ROM    Dental:  Intact        Anesthesia Plan  Type of Anesthesia, risks & benefits discussed:    Anesthesia Type: Gen ETT  Intra-op Monitoring Plan: Standard ASA Monitors  Post Op Pain Control Plan: multimodal analgesia and IV/PO Opioids PRN  Induction:  IV  Airway Plan: Direct and Video, Post-Induction  Informed Consent: Informed consent signed with the Patient and all parties understand the risks and agree with anesthesia plan.  All questions answered.   ASA Score: 3  Day of Surgery Review of History & Physical: H&P Update referred to the surgeon/provider.    Ready For Surgery From Anesthesia Perspective.     .

## 2023-10-02 ENCOUNTER — TELEPHONE (OUTPATIENT)
Dept: ORTHOPEDICS | Facility: CLINIC | Age: 58
End: 2023-10-02
Payer: OTHER GOVERNMENT

## 2023-10-02 ENCOUNTER — TELEPHONE (OUTPATIENT)
Dept: SURGERY | Facility: CLINIC | Age: 58
End: 2023-10-02
Payer: OTHER GOVERNMENT

## 2023-10-02 ENCOUNTER — HOSPITAL ENCOUNTER (OUTPATIENT)
Facility: HOSPITAL | Age: 58
Discharge: HOME | End: 2023-10-03
Attending: SURGERY | Admitting: SURGERY
Payer: OTHER GOVERNMENT

## 2023-10-02 ENCOUNTER — ANESTHESIA (OUTPATIENT)
Dept: SURGERY | Facility: HOSPITAL | Age: 58
End: 2023-10-02
Payer: OTHER GOVERNMENT

## 2023-10-02 DIAGNOSIS — K22.0 ACHALASIA: ICD-10-CM

## 2023-10-02 PROCEDURE — 27200997: Performed by: SURGERY

## 2023-10-02 PROCEDURE — 25000003 PHARM REV CODE 250

## 2023-10-02 PROCEDURE — 27202299 HC NEEDLE KNIFE, TISSUE RESECTION: Performed by: SURGERY

## 2023-10-02 PROCEDURE — 43497 TRANSORL LWR ESOPHGL MYOTOMY: CPT | Mod: ,,, | Performed by: SURGERY

## 2023-10-02 PROCEDURE — 63600175 PHARM REV CODE 636 W HCPCS

## 2023-10-02 PROCEDURE — D9220A PRA ANESTHESIA: ICD-10-PCS | Mod: ANES,,, | Performed by: ANESTHESIOLOGY

## 2023-10-02 PROCEDURE — D9220A PRA ANESTHESIA: Mod: CRNA,,, | Performed by: NURSE ANESTHETIST, CERTIFIED REGISTERED

## 2023-10-02 PROCEDURE — 27202363 HC INJECTION AGENT, SUBMUCOSAL, ANY: Performed by: SURGERY

## 2023-10-02 PROCEDURE — 27201028 HC NEEDLE, SCLERO: Performed by: SURGERY

## 2023-10-02 PROCEDURE — 25000003 PHARM REV CODE 250: Performed by: SURGERY

## 2023-10-02 PROCEDURE — 63600175 PHARM REV CODE 636 W HCPCS: Performed by: NURSE ANESTHETIST, CERTIFIED REGISTERED

## 2023-10-02 PROCEDURE — 63600175 PHARM REV CODE 636 W HCPCS: Mod: JW,JG | Performed by: SURGERY

## 2023-10-02 PROCEDURE — 43497 PR MYOTOMY, LOWER ESOPH, TRANSORAL: ICD-10-PCS | Mod: ,,, | Performed by: SURGERY

## 2023-10-02 PROCEDURE — 27201243: Performed by: SURGERY

## 2023-10-02 PROCEDURE — 25000003 PHARM REV CODE 250: Performed by: NURSE ANESTHETIST, CERTIFIED REGISTERED

## 2023-10-02 PROCEDURE — 94761 N-INVAS EAR/PLS OXIMETRY MLT: CPT

## 2023-10-02 PROCEDURE — 36000706: Performed by: SURGERY

## 2023-10-02 PROCEDURE — C1889 IMPLANT/INSERT DEVICE, NOC: HCPCS | Performed by: SURGERY

## 2023-10-02 PROCEDURE — 36000707: Performed by: SURGERY

## 2023-10-02 PROCEDURE — 37000009 HC ANESTHESIA EA ADD 15 MINS: Performed by: SURGERY

## 2023-10-02 PROCEDURE — 37000008 HC ANESTHESIA 1ST 15 MINUTES: Performed by: SURGERY

## 2023-10-02 PROCEDURE — 27200967 HC COAGRASPER: Performed by: SURGERY

## 2023-10-02 PROCEDURE — 71000016 HC POSTOP RECOV ADDL HR: Performed by: SURGERY

## 2023-10-02 PROCEDURE — 63600175 PHARM REV CODE 636 W HCPCS: Performed by: STUDENT IN AN ORGANIZED HEALTH CARE EDUCATION/TRAINING PROGRAM

## 2023-10-02 PROCEDURE — 71000015 HC POSTOP RECOV 1ST HR: Performed by: SURGERY

## 2023-10-02 PROCEDURE — D9220A PRA ANESTHESIA: Mod: ANES,,, | Performed by: ANESTHESIOLOGY

## 2023-10-02 PROCEDURE — D9220A PRA ANESTHESIA: ICD-10-PCS | Mod: CRNA,,, | Performed by: NURSE ANESTHETIST, CERTIFIED REGISTERED

## 2023-10-02 PROCEDURE — 71000033 HC RECOVERY, INTIAL HOUR: Performed by: SURGERY

## 2023-10-02 RX ORDER — LIDOCAINE HYDROCHLORIDE 10 MG/ML
1 INJECTION, SOLUTION EPIDURAL; INFILTRATION; INTRACAUDAL; PERINEURAL ONCE
Status: COMPLETED | OUTPATIENT
Start: 2023-10-02 | End: 2023-10-02

## 2023-10-02 RX ORDER — ACETAMINOPHEN 500 MG
1000 TABLET ORAL
Status: COMPLETED | OUTPATIENT
Start: 2023-10-02 | End: 2023-10-02

## 2023-10-02 RX ORDER — KETOROLAC TROMETHAMINE 30 MG/ML
15 INJECTION, SOLUTION INTRAMUSCULAR; INTRAVENOUS EVERY 6 HOURS PRN
Status: DISCONTINUED | OUTPATIENT
Start: 2023-10-02 | End: 2023-10-03 | Stop reason: HOSPADM

## 2023-10-02 RX ORDER — KETAMINE HCL IN 0.9 % NACL 50 MG/5 ML
SYRINGE (ML) INTRAVENOUS
Status: DISCONTINUED | OUTPATIENT
Start: 2023-10-02 | End: 2023-10-02

## 2023-10-02 RX ORDER — HALOPERIDOL 5 MG/ML
0.5 INJECTION INTRAMUSCULAR EVERY 10 MIN PRN
Status: DISCONTINUED | OUTPATIENT
Start: 2023-10-02 | End: 2023-10-02 | Stop reason: HOSPADM

## 2023-10-02 RX ORDER — SUCCINYLCHOLINE CHLORIDE 20 MG/ML
INJECTION INTRAMUSCULAR; INTRAVENOUS
Status: DISCONTINUED | OUTPATIENT
Start: 2023-10-02 | End: 2023-10-02

## 2023-10-02 RX ORDER — DEXMEDETOMIDINE HYDROCHLORIDE 100 UG/ML
INJECTION, SOLUTION INTRAVENOUS
Status: DISCONTINUED | OUTPATIENT
Start: 2023-10-02 | End: 2023-10-02

## 2023-10-02 RX ORDER — FENTANYL CITRATE 50 UG/ML
25 INJECTION, SOLUTION INTRAMUSCULAR; INTRAVENOUS EVERY 5 MIN PRN
Status: DISCONTINUED | OUTPATIENT
Start: 2023-10-02 | End: 2023-10-02 | Stop reason: HOSPADM

## 2023-10-02 RX ORDER — LIDOCAINE HYDROCHLORIDE 20 MG/ML
INJECTION, SOLUTION EPIDURAL; INFILTRATION; INTRACAUDAL; PERINEURAL
Status: DISCONTINUED | OUTPATIENT
Start: 2023-10-02 | End: 2023-10-02

## 2023-10-02 RX ORDER — METHYLENE BLUE 5 MG/ML
INJECTION INTRAVENOUS
Status: DISCONTINUED | OUTPATIENT
Start: 2023-10-02 | End: 2023-10-02 | Stop reason: HOSPADM

## 2023-10-02 RX ORDER — SODIUM CHLORIDE 9 MG/ML
INJECTION, SOLUTION INTRAVENOUS ONCE
Status: COMPLETED | OUTPATIENT
Start: 2023-10-02 | End: 2023-10-02

## 2023-10-02 RX ORDER — ROCURONIUM BROMIDE 10 MG/ML
INJECTION, SOLUTION INTRAVENOUS
Status: DISCONTINUED | OUTPATIENT
Start: 2023-10-02 | End: 2023-10-02

## 2023-10-02 RX ORDER — PROPOFOL 10 MG/ML
VIAL (ML) INTRAVENOUS
Status: DISCONTINUED | OUTPATIENT
Start: 2023-10-02 | End: 2023-10-02

## 2023-10-02 RX ORDER — HALOPERIDOL 5 MG/ML
INJECTION INTRAMUSCULAR
Status: DISCONTINUED | OUTPATIENT
Start: 2023-10-02 | End: 2023-10-02

## 2023-10-02 RX ORDER — ONDANSETRON 2 MG/ML
4 INJECTION INTRAMUSCULAR; INTRAVENOUS EVERY 8 HOURS PRN
Status: DISCONTINUED | OUTPATIENT
Start: 2023-10-02 | End: 2023-10-03 | Stop reason: HOSPADM

## 2023-10-02 RX ORDER — LABETALOL HYDROCHLORIDE 5 MG/ML
INJECTION, SOLUTION INTRAVENOUS
Status: DISCONTINUED | OUTPATIENT
Start: 2023-10-02 | End: 2023-10-02

## 2023-10-02 RX ORDER — ONDANSETRON 2 MG/ML
INJECTION INTRAMUSCULAR; INTRAVENOUS
Status: DISCONTINUED | OUTPATIENT
Start: 2023-10-02 | End: 2023-10-02

## 2023-10-02 RX ORDER — MIDAZOLAM HYDROCHLORIDE 1 MG/ML
INJECTION, SOLUTION INTRAMUSCULAR; INTRAVENOUS
Status: DISCONTINUED | OUTPATIENT
Start: 2023-10-02 | End: 2023-10-02

## 2023-10-02 RX ORDER — CEFAZOLIN SODIUM 1 G/3ML
INJECTION, POWDER, FOR SOLUTION INTRAMUSCULAR; INTRAVENOUS
Status: DISCONTINUED | OUTPATIENT
Start: 2023-10-02 | End: 2023-10-02

## 2023-10-02 RX ORDER — FENTANYL CITRATE 50 UG/ML
INJECTION, SOLUTION INTRAMUSCULAR; INTRAVENOUS
Status: DISCONTINUED | OUTPATIENT
Start: 2023-10-02 | End: 2023-10-02

## 2023-10-02 RX ORDER — DEXAMETHASONE SODIUM PHOSPHATE 4 MG/ML
INJECTION, SOLUTION INTRA-ARTICULAR; INTRALESIONAL; INTRAMUSCULAR; INTRAVENOUS; SOFT TISSUE
Status: DISCONTINUED | OUTPATIENT
Start: 2023-10-02 | End: 2023-10-02

## 2023-10-02 RX ORDER — SODIUM CHLORIDE, SODIUM LACTATE, POTASSIUM CHLORIDE, CALCIUM CHLORIDE 600; 310; 30; 20 MG/100ML; MG/100ML; MG/100ML; MG/100ML
INJECTION, SOLUTION INTRAVENOUS CONTINUOUS
Status: DISCONTINUED | OUTPATIENT
Start: 2023-10-02 | End: 2023-10-03

## 2023-10-02 RX ADMIN — GLYCOPYRROLATE 0.2 MG: 0.2 INJECTION, SOLUTION INTRAMUSCULAR; INTRAVENOUS at 03:10

## 2023-10-02 RX ADMIN — KETOROLAC TROMETHAMINE 15 MG: 30 INJECTION, SOLUTION INTRAMUSCULAR; INTRAVENOUS at 09:10

## 2023-10-02 RX ADMIN — FENTANYL CITRATE 50 MCG: 50 INJECTION, SOLUTION INTRAMUSCULAR; INTRAVENOUS at 03:10

## 2023-10-02 RX ADMIN — LIDOCAINE HYDROCHLORIDE 100 MG: 20 INJECTION, SOLUTION EPIDURAL; INFILTRATION; INTRACAUDAL; PERINEURAL at 02:10

## 2023-10-02 RX ADMIN — SUCCINYLCHOLINE CHLORIDE 160 MG: 20 INJECTION, SOLUTION INTRAMUSCULAR; INTRAVENOUS at 02:10

## 2023-10-02 RX ADMIN — SUGAMMADEX 400 MG: 100 INJECTION, SOLUTION INTRAVENOUS at 05:10

## 2023-10-02 RX ADMIN — SODIUM CHLORIDE: 9 INJECTION, SOLUTION INTRAVENOUS at 01:10

## 2023-10-02 RX ADMIN — Medication 20 MG: at 02:10

## 2023-10-02 RX ADMIN — ROCURONIUM BROMIDE 20 MG: 10 INJECTION INTRAVENOUS at 04:10

## 2023-10-02 RX ADMIN — MIDAZOLAM HYDROCHLORIDE 2 MG: 1 INJECTION, SOLUTION INTRAMUSCULAR; INTRAVENOUS at 02:10

## 2023-10-02 RX ADMIN — ACETAMINOPHEN 1000 MG: 500 TABLET ORAL at 01:10

## 2023-10-02 RX ADMIN — HALOPERIDOL LACTATE 0.5 MG: 5 INJECTION, SOLUTION INTRAMUSCULAR at 05:10

## 2023-10-02 RX ADMIN — ROCURONIUM BROMIDE 30 MG: 10 INJECTION INTRAVENOUS at 02:10

## 2023-10-02 RX ADMIN — SODIUM CHLORIDE, POTASSIUM CHLORIDE, SODIUM LACTATE AND CALCIUM CHLORIDE: 600; 310; 30; 20 INJECTION, SOLUTION INTRAVENOUS at 06:10

## 2023-10-02 RX ADMIN — CEFAZOLIN 2 G: 330 INJECTION, POWDER, FOR SOLUTION INTRAMUSCULAR; INTRAVENOUS at 02:10

## 2023-10-02 RX ADMIN — ONDANSETRON 4 MG: 2 INJECTION INTRAMUSCULAR; INTRAVENOUS at 03:10

## 2023-10-02 RX ADMIN — LIDOCAINE HYDROCHLORIDE 1 MG: 10 INJECTION, SOLUTION EPIDURAL; INFILTRATION; INTRACAUDAL; PERINEURAL at 01:10

## 2023-10-02 RX ADMIN — FENTANYL CITRATE 100 MCG: 50 INJECTION, SOLUTION INTRAMUSCULAR; INTRAVENOUS at 02:10

## 2023-10-02 RX ADMIN — ROCURONIUM BROMIDE 30 MG: 10 INJECTION INTRAVENOUS at 03:10

## 2023-10-02 RX ADMIN — SODIUM CHLORIDE, SODIUM GLUCONATE, SODIUM ACETATE, POTASSIUM CHLORIDE, MAGNESIUM CHLORIDE, SODIUM PHOSPHATE, DIBASIC, AND POTASSIUM PHOSPHATE: .53; .5; .37; .037; .03; .012; .00082 INJECTION, SOLUTION INTRAVENOUS at 03:10

## 2023-10-02 RX ADMIN — LABETALOL HYDROCHLORIDE 10 MG: 5 INJECTION, SOLUTION INTRAVENOUS at 04:10

## 2023-10-02 RX ADMIN — DEXAMETHASONE SODIUM PHOSPHATE 8 MG: 4 INJECTION, SOLUTION INTRAMUSCULAR; INTRAVENOUS at 04:10

## 2023-10-02 RX ADMIN — SODIUM CHLORIDE: 0.9 INJECTION, SOLUTION INTRAVENOUS at 02:10

## 2023-10-02 RX ADMIN — METHOCARBAMOL 500 MG: 100 INJECTION, SOLUTION INTRAMUSCULAR; INTRAVENOUS at 09:10

## 2023-10-02 RX ADMIN — Medication 10 MG: at 04:10

## 2023-10-02 RX ADMIN — DEXAMETHASONE SODIUM PHOSPHATE 4 MG: 4 INJECTION, SOLUTION INTRAMUSCULAR; INTRAVENOUS at 02:10

## 2023-10-02 RX ADMIN — ROCURONIUM BROMIDE 20 MG: 10 INJECTION INTRAVENOUS at 03:10

## 2023-10-02 RX ADMIN — PROPOFOL 200 MG: 10 INJECTION, EMULSION INTRAVENOUS at 02:10

## 2023-10-02 RX ADMIN — DEXMEDETOMIDINE 12 MCG: 100 INJECTION, SOLUTION, CONCENTRATE INTRAVENOUS at 03:10

## 2023-10-02 RX ADMIN — ROCURONIUM BROMIDE 20 MG: 10 INJECTION INTRAVENOUS at 02:10

## 2023-10-02 NOTE — ANESTHESIA PROCEDURE NOTES
Intubation    Date/Time: 10/2/2023 2:15 PM    Performed by: Kalia Clark DO  Authorized by: Marcelino Reilly Jr., MD    Intubation:     Induction:  Intravenous    Intubated:  Postinduction    Mask Ventilation:  Easy mask    Attempts:  1    Attempted By:  Resident anesthesiologist    Method of Intubation:  Direct    Blade:  Kendell 3    Laryngeal View Grade: Grade I - full view of cords      Difficult Airway Encountered?: No      Complications:  None    Airway Device:  Oral endotracheal tube    Airway Device Size:  7.5    Style/Cuff Inflation:  Cuffed    Tube secured:  23    Secured at:  The lips    Placement Verified By:  Capnometry    Complicating Factors:  None    Findings Post-Intubation:  BS equal bilateral and atraumatic/condition of teeth unchanged

## 2023-10-02 NOTE — BRIEF OP NOTE
[facility] - Surgery ([facility])  Brief Operative Note    Surgery Date: 10/2/2023     Surgeon(s) and Role:     * Iker Méndez Jr., MD - Primary     * Jan Funk MD - Resident, Assisting    Assisting Surgeon: None    Pre-op Diagnosis:  Dysphagia, unspecified type [R13.10]    Post-op Diagnosis:  Post-Op Diagnosis Codes:     * Dysphagia, unspecified type [R13.10]    Procedure(s) (LRB):  MYOTOMY, PERORAL, ENDOSCOPIC POEM (N/A)    Anesthesia: General    Operative Findings: Per Oral EGD myotomy. Placement of RUQ Veress needle for abdominal decompression    Estimated Blood Loss: <20ml         Specimens:   Specimen (24h ago, onward)      None              Discharge Note    OUTCOME: Patient tolerated treatment/procedure well without complication and is now ready for discharge.    DISPOSITION: Admitted as an Inpatient    FINAL DIAGNOSIS:  Achalasia    FOLLOWUP: In clinic

## 2023-10-02 NOTE — OP NOTE
DATE OF PROCEDURE: 10/02/2023   SERVICE: General Surgery.   Surgeon(s) and Role:     * Iker Méndez Jr., MD - Primary  PREOPERATIVE DIAGNOSES: Achalasia   POSTOPERATIVE DIAGNOSES:Same  PROCEDURE: Per Oral Endoscopic Myotomy  ANESTHESIA: General endotracheal.   DESCRIPTION OF PROCEDURE:  Patient was taken to the operating room and placed under general anesthesia. At this time we began with an EGD.  The scope was placed into the oropharynx and guided down the esophagus into the stomach without difficulty is then guided through the stomach and into the 1st portion of the duodenum. There were no abnormalities noted in the duodenum or the stomach. The GE junction was able to be passed without significant difficulty or tension. The Z-line was then identified and measured at 35 cm.  Mucosotomy followed by myotomy were performed in an anterior orientation. First, a submucosal injection of a solution of methylene blue and saline was used to lift the mucosa at the site of the initial mucosotomy. The initial mucosal incision was made longitudinally starting at 25 cm from the incisors using an ERBE knife. Next, the endoscope with a clear cap was used to enter into the submucosal tunnel. The submucosal tunnel was then further created by continued dissection. The submucosal tunnel was extended passing the LES into the cardia.  During this there was noted to be a significant amount of air in the abdominal cavity.  We felt it would be best to decompress the abdominal cavity.  An area in the RUQ was prepped with chlorhexidine.  An 11 blade was then used to make a small stab incision in the skin.  A Veress needle was inserted.  Air was noted to come from the needle.  We suction the needle, no signs of blood or bowel contents.  Water was dropped into the syringe and it flowed easily into the abdomen.  An open syringe with saline was attached to the needle and bubbles were noted.  The abdomen was significantly less tense.  The  myotomy was started at 27 cm from the incisors using a an ERBE knife. to perform a circular myotomy (in an anterior orientation). The myotomy was extended passing the LES to 39cm. Intra procedure bleeding was minimal. A small Coagrasper was used effectively for hemostasis as needed. Hemostasis was successfully accomplished throughout the procedure. After completion of the myotomy, there was no evidence of bleeding noted on the inspection of the myotomy edges and submucosal tunnel. The myotomy was successfully performed to 39cm. The muscular division was complete. The mucosal entrance to the submucosal tunnel was closed using endoscopic clips (7).  COMPLICATIONS: None.   SPONGE COUNT: Correct.   BLOOD LOSS: 15 mL.   FLUIDS: Per Anesthesia.   BLOOD GIVEN: None.   DRAINS: None.   SPECIMENS: None  CONDITION OF THE PATIENT: Good.   I was present for the entire procedure.

## 2023-10-02 NOTE — NURSING TRANSFER
Nursing Transfer Note      10/2/2023   5:49 PM    Nurse giving handoff:Gopi LIAO    Nurse receiving handoff:Omid LIAO    Reason patient is being transferred: post procedure    Transfer To: Room 508    Transfer via stretcher    Transfer with None    Transported by PCT    Order for Tele Monitor? No    Medicines sent: None    Any special needs or follow-up needed: Routine    Patient belongings transferred with patient: Yes Soto backpack    Chart send with patient: Yes    Notified: Mother    Patient reassessed at: 10/2/2023 1930   (date, time)

## 2023-10-02 NOTE — H&P
Surgery Clinic Note - H and P     Subjective:      Interval History 10/2/23: no significant interval changes since last seen in clinic    Himanshu Connor II is a 58 y.o. male with h/o of HTN, JAH, arthritis, GERD who presents with for dysphagia and regurgitation. Patient has an extensive surgical history, open HH repair x2 with Nissen, open sleeve with Nissen takedown complicated by leak and ECF, and laparoscopic conversion to RNY. Patient states he has had dysphagia since his RNY in 2021. Was seen by Dr. Harper in April and wanted to present him at swallow conference and get an upper GI with barium . At conference it was suggested to consider POEM. No changes since last visit, and gets short of breath while swallowing.  Discussed at length that POEM may have limited success.  Pt stated he would like to proceed.     GERD Questionnaire      + PPI - pantoprazole 40mg daily   - Typical heartburn  + Regurgitation  + Dysphagia solids  - Dysphagia liquids  + Hoarseness  + Sore throat  + Cough during regurgitation  - Asthma  + Chest pain  + Water brash when lying down  +Globus  + Nausea  + Vomiting     Eckardt Score (none =0, occ=1, daily=2, every meal=3, weight: 0=0, <5=1, 5-10=2, >10=3)  Dysphagia=1 only when eat solids  Regurgitation= 3  Chest pain= 1  Weight loss (kg)= none  Total= 5     Review of Systems   Respiratory:  Positive for cough.    Cardiovascular:  Positive for chest pain.   Gastrointestinal:  Positive for nausea and vomiting. Negative for constipation, diarrhea and heartburn.       PMH:        Past Medical History:   Diagnosis Date    Alcohol abuse, unspecified      Allergic rhinitis, cause unspecified      Arthritis      Asthma attack      Cancer 2022     skin cancer on nose    Carpal tunnel syndrome      Depressive disorder, not elsewhere classified      Diarrhea      Encounter for blood transfusion      GERD (gastroesophageal reflux disease)      Hypersomnia, unspecified      Hypertension       Hypoglycemia      Lumbago      Obesity, unspecified      JAH (obstructive sleep apnea)       uses BIPAP    Other and unspecified hyperlipidemia      Other ankle sprain and strain      Psychosexual dysfunction with inhibited sexual excitement      Pyogenic arthritis of knee 06/2021     staph pseudintermedius, TKA    Pyogenic arthritis of knee 09/21/2021     Enterococcus    Ventral hernia, unspecified, without mention of obstruction or gangrene           Past Surgical History:         Past Surgical History:   Procedure Laterality Date    A-scope knees         Bilateral    ARTHROSCOPIC REPAIR OF ROTATOR CUFF OF SHOULDER Left 09/20/2019     Procedure: REPAIR, ROTATOR CUFF, ARTHROSCOPIC;  Surgeon: Felix Levin MD;  Location: [facility] OR;  Service: Orthopedics;  Laterality: Left;    ARTHROSCOPIC REPAIR OF ROTATOR CUFF OF SHOULDER Right 3/17/2023     Procedure: REPAIR, ROTATOR CUFF, ARTHROSCOPIC;  Surgeon: Felix Levin MD;  Location: [facility] OR;  Service: Orthopedics;  Laterality: Right;    ARTHROSCOPY OF SHOULDER WITH DECOMPRESSION OF SUBACROMIAL SPACE Left 09/20/2019     Procedure: ARTHROSCOPY, SHOULDER, WITH SUBACROMIAL SPACE DECOMPRESSION;  Surgeon: Felix Levin MD;  Location: [facility] OR;  Service: Orthopedics;  Laterality: Left;  Jaspreet- Arthrex notified of all case today 9/16-tcb    ARTHROSCOPY,SHOULDER,WITH BICEPS TENODESIS Right 3/17/2023     Procedure: ARTHROSCOPY,SHOULDER,WITH BICEPS TENODESIS;  Surgeon: Felix Levin MD;  Location: [facility] OR;  Service: Orthopedics;  Laterality: Right;    CARPAL TUNNEL RELEASE         Bilateral    COLONOSCOPY N/A 08/05/2022     Procedure: COLONOSCOPY;  Surgeon: Zaida Ansari MD;  Location: [facility];  Service: Endoscopy;  Laterality: N/A;    ESOPHAGEAL MANOMETRY WITH MEASUREMENT OF IMPEDANCE N/A 3/24/2023     Procedure: MANOMETRY, ESOPHAGUS, WITH IMPEDANCE MEASUREMENT;  Surgeon: Sofie Walker MD;  Location: [facility] ENDO ([address] [place] [address]);  Service:  Endoscopy;  Laterality: N/A;  r/o rumination and supragastric belching  hold baclofen x3 days, hold doxepin x24 hours prior to procedure    ESOPHAGOGASTRODUODENOSCOPY N/A 08/05/2022     Procedure: EGD (ESOPHAGOGASTRODUODENOSCOPY);  Surgeon: Zaida Ansari MD;  Location: [facility] ENDO;  Service: Endoscopy;  Laterality: N/A;    ESOPHAGOGASTRODUODENOSCOPY N/A 3/24/2023     Procedure: EGD (ESOPHAGOGASTRODUODENOSCOPY);  Surgeon: Sofie Walker MD;  Location: [facility] ENDO (2ND FLR);  Service: Endoscopy;  Laterality: N/A;  Endoflip/Endoscopically placed manometry probe  2nd floor-per Dr. Walker (higher than average risk procedure)  propofol only  full liquid diet x3 days, clear liquid diet x1 day prior to procedure  instructions sent to [email or web address] and emailed to [email or web address]    EYE SURGERY         Lasik    HERNIA REPAIR        INCISION AND DRAINAGE OF HEMATOMA Right 09/21/2021     Procedure: INCISION AND DRAINAGE, HEMATOMA;  Surgeon: Felix Levin MD;  Location: [facility] OR;  Service: Orthopedics;  Laterality: Right;    INSERTION OF ANTIBIOTIC SPACER Right 06/01/2021     Procedure: INSERTION, ANTIBIOTIC SPACER;  Surgeon: Felix Levin MD;  Location: [facility] OR;  Service: Orthopedics;  Laterality: Right;    JOINT REPLACEMENT        KNEE ARTHROPLASTY Left 08/04/2020     Procedure: ARTHROPLASTY, KNEE;  Surgeon: Felix Levin MD;  Location: [facility] OR;  Service: Orthopedics;  Laterality: Left;    KNEE ARTHROPLASTY Right 03/23/2021     Procedure: ARTHROPLASTY, KNEE;  Surgeon: Felix Levin MD;  Location: [facility] OR;  Service: Orthopedics;  Laterality: Right;    KNEE ARTHROSCOPY W/ MENISCECTOMY Left 10/15/2019     Procedure: ARTHROSCOPY, KNEE, WITH MENISCECTOMY;  Surgeon: Felix Levin MD;  Location: [facility] OR;  Service: Orthopedics;  Laterality: Left;  Medial and Lateral Meniscectomy    KNEE ARTHROSCOPY W/ MENISCECTOMY Right 11/22/2019     Procedure: ARTHROSCOPY, KNEE, WITH MENISCECTOMY;  Surgeon:  Felix Levin MD;  Location: [facility] OR;  Service: Orthopedics;  Laterality: Right;    KNEE SURGERY         osmar    NISSEN FUNDOPLICATION         X 2    REVERSE TOTAL SHOULDER ARTHROPLASTY Left 02/11/2020     Procedure: ARTHROPLASTY, SHOULDER, TOTAL, REVERSE;  Surgeon: Felix Levin MD;  Location: [facility] OR;  Service: Orthopedics;  Laterality: Left;  Loren    revision of gastrojejunostomy   05/10/2021     [facility] [place]    REVISION OF KNEE ARTHROPLASTY Right 09/07/2021     Procedure: REVISION, ARTHROPLASTY, KNEE;  Surgeon: Felix Levin MD;  Location: [facility] OR;  Service: Orthopedics;  Laterality: Right;    ROTATOR CUFF REPAIR         right    RJ-EN-Y PROCEDURE N/A 05/2021    SLEEVE GASTROPLASTY   12/2013         Social History:  Social History               Socioeconomic History    Marital status:    Tobacco Use    Smoking status: Never    Smokeless tobacco: Current       Types: Chew    Tobacco comments:       rarely   Substance and Sexual Activity    Alcohol use: Yes       Comment: occasionally    Drug use: No    Sexual activity: Not Currently            Allergies:        Review of patient's allergies indicates:   Allergen Reactions    Hydrocodone Hives         Medications:  Current Outpatient Medications:     albuterol (PROVENTIL/VENTOLIN HFA) 90 mcg/actuation inhaler, INHALE 2 PUFFS BY MOUTH FOUR TIMES A DAY AS NEEDED FOR BREATHING, Disp: , Rfl:     ascorbic acid, vitamin C, (VITAMIN C) 500 MG tablet, 500 mg., Disp: , Rfl:     cyanocobalamin (VITAMIN B-12) 1000 MCG tablet, Take 1,000 mcg by mouth once daily. , Disp: , Rfl:     diclofenac sodium (VOLTAREN) 1 % Gel, Apply topically daily as needed., Disp: , Rfl:     doxepin (SINEQUAN) 50 MG capsule, 50 mg., Disp: , Rfl:     EScitalopram oxalate (LEXAPRO) 20 MG tablet, 20 mg., Disp: , Rfl:     gabapentin (NEURONTIN) 300 MG capsule, 600 mg., Disp: , Rfl:     LIDOcaine (LIDODERM) 5 %, APPLY 1 PATCH TOPICALLY EVERY DAY FOR PAIN. WEAR  FOR 12 HOURS, THEN REMOVE. DO NOT APPLY NEW PATCH FOR AT LEAST 12 HOURS., Disp: , Rfl:     losartan-hydrochlorothiazide 100-25 mg (HYZAAR) 100-25 mg per tablet, 1 tablet., Disp: , Rfl:     methyl salicylate-menthol 15-10% 15-10 % Crea, APPLY LIBERAL AMOUNT TOPICALLY FOUR TIMES A DAY AS NEEDED, Disp: , Rfl:     naloxone (NARCAN) 4 mg/actuation Spry, SMARTSIG:Spray(s) Both Nares, Disp: , Rfl:     omeprazole (PRILOSEC) 40 MG capsule, 40 mg., Disp: , Rfl:     ondansetron (ZOFRAN) 4 MG tablet, Take 1 tablet (4 mg total) by mouth every 6 (six) hours as needed for Nausea., Disp: 30 tablet, Rfl: 0    oxyCODONE-acetaminophen (PERCOCET) 5-325 mg per tablet, Take 1 tablet by mouth every 6 (six) hours as needed for Pain., Disp: 28 tablet, Rfl: 0    peg 400-propylene glycol 0.4-0.3 % Drop, INSTILL ONE DROP IN EACH EYE FOUR TIMES A DAY AS NEEDED FOR DRY EYES, Disp: , Rfl:     tadalafiL (CIALIS) 20 MG Tab, TAKE ONE TABLET BY MOUTH EVERY WEEK AS NEEDED, Disp: , Rfl:     vitamin D (VITAMIN D3) 1000 units Tab, Take 2,000 Units by mouth once daily., Disp: , Rfl:     aspirin 81 MG Chew, Take 1 tablet (81 mg total) by mouth 2 (two) times daily., Disp: 60 tablet, Rfl: 0            Current Outpatient Medications on File Prior to Visit   Medication Sig Dispense Refill    albuterol (PROVENTIL/VENTOLIN HFA) 90 mcg/actuation inhaler INHALE 2 PUFFS BY MOUTH FOUR TIMES A DAY AS NEEDED FOR BREATHING        ascorbic acid, vitamin C, (VITAMIN C) 500 MG tablet 500 mg.        cyanocobalamin (VITAMIN B-12) 1000 MCG tablet Take 1,000 mcg by mouth once daily.         diclofenac sodium (VOLTAREN) 1 % Gel Apply topically daily as needed.        doxepin (SINEQUAN) 50 MG capsule 50 mg.        EScitalopram oxalate (LEXAPRO) 20 MG tablet 20 mg.        gabapentin (NEURONTIN) 300 MG capsule 600 mg.        LIDOcaine (LIDODERM) 5 % APPLY 1 PATCH TOPICALLY EVERY DAY FOR PAIN. WEAR FOR 12 HOURS, THEN REMOVE. DO NOT APPLY NEW PATCH FOR AT LEAST 12 HOURS.         losartan-hydrochlorothiazide 100-25 mg (HYZAAR) 100-25 mg per tablet 1 tablet.        methyl salicylate-menthol 15-10% 15-10 % Crea APPLY LIBERAL AMOUNT TOPICALLY FOUR TIMES A DAY AS NEEDED        naloxone (NARCAN) 4 mg/actuation Spry SMARTSIG:Spray(s) Both Nares        omeprazole (PRILOSEC) 40 MG capsule 40 mg.        ondansetron (ZOFRAN) 4 MG tablet Take 1 tablet (4 mg total) by mouth every 6 (six) hours as needed for Nausea. 30 tablet 0    oxyCODONE-acetaminophen (PERCOCET) 5-325 mg per tablet Take 1 tablet by mouth every 6 (six) hours as needed for Pain. 28 tablet 0    peg 400-propylene glycol 0.4-0.3 % Drop INSTILL ONE DROP IN EACH EYE FOUR TIMES A DAY AS NEEDED FOR DRY EYES        tadalafiL (CIALIS) 20 MG Tab TAKE ONE TABLET BY MOUTH EVERY WEEK AS NEEDED        vitamin D (VITAMIN D3) 1000 units Tab Take 2,000 Units by mouth once daily.        aspirin 81 MG Chew Take 1 tablet (81 mg total) by mouth 2 (two) times daily. 60 tablet 0      No current facility-administered medications on file prior to visit.            Objective:      PHYSICAL EXAM:  Vital Signs (Most Recent)  Pulse: 66 (07/28/23 0823)  BP: 124/80 (07/28/23 0823)        Physical Exam  Constitutional:       Appearance: Normal appearance.   Cardiovascular:      Rate and Rhythm: Normal rate.      Pulses: Normal pulses.   Pulmonary:      Effort: Pulmonary effort is normal.   Abdominal:      General: Bowel sounds are normal.      Palpations: Abdomen is soft.   Skin:     Capillary Refill: Capillary refill takes less than 2 seconds.                  Pertinent imaging/labs:   FL Esophagram 2023: Mild circumferential narrowing at the level of the GE junction noting incomplete relaxation of lower esophageal sphincter.  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           Normal gastroesophageal junction. Puckering, no resistance or pop. Abnormal angulation at GEJ.           FLIP catheter placed with endoscopic guidance using rat tooth. Very difficult placement due to altered anatomy at GEJ and within the pouch.           FLIP imaging performed, diagnostic of normal esophageal distensibility and diminsihed contractility.      Manometry: 3/2023: Normal LES pressure with incomplete relaxation and 50% premature contractions                                    EGJ outflow obstruction (achalasia variant vs mechanical obstruction) with Yamile vs Achalasia Type III                                    Incomplete bolus clearance                                    Normal multiple rapid swallows test                                    No significant difference with provocative maneuvers                                   Evidence of residual liquid at the end of 200cc bolus                                   No evidence of Rumination Syndrome         Assessment:      58 y.o. male with  h/o of HTN, JAH, arthritis, GERD who comes in for evaluation for POEM     Plan:      - consents signed for POEM, all questions answered and risk talked about  - Lengthy discussion about limits of surgery with previous surgeries.  Possible anatomic issue causing some of his dysphagia as well.  Pt stated understanding and wished to proceed.  - schedule surgery for POEM

## 2023-10-02 NOTE — TRANSFER OF CARE
"Anesthesia Transfer of Care Note    Patient: Himanshu Connor II    Procedure(s) Performed: Procedure(s) (LRB):  MYOTOMY, PERORAL, ENDOSCOPIC POEM (N/A)    Patient location: PACU    Anesthesia Type: general    Transport from OR: Transported from OR on 6-10 L/min O2 by face mask with adequate spontaneous ventilation    Post pain: adequate analgesia    Post assessment: no apparent anesthetic complications and tolerated procedure well    Post vital signs: stable    Level of consciousness: awake and alert    Nausea/Vomiting: no nausea/vomiting    Complications: none    Transfer of care protocol was followed      Last vitals:   Visit Vitals  /72   Pulse 69   Temp 37.1 °C (98.8 °F) (Temporal)   Resp 16   Ht 5' 10" (1.778 m)   Wt 119.3 kg (263 lb 0.1 oz)   SpO2 96%   BMI 37.74 kg/m²     "

## 2023-10-03 VITALS
SYSTOLIC BLOOD PRESSURE: 138 MMHG | RESPIRATION RATE: 16 BRPM | WEIGHT: 263 LBS | OXYGEN SATURATION: 99 % | HEART RATE: 69 BPM | HEIGHT: 70 IN | BODY MASS INDEX: 37.65 KG/M2 | TEMPERATURE: 99 F | DIASTOLIC BLOOD PRESSURE: 63 MMHG

## 2023-10-03 PROBLEM — E66.9 CLASS 2 OBESITY IN ADULT: Chronic | Status: ACTIVE | Noted: 2023-10-03

## 2023-10-03 PROBLEM — E66.9 CLASS 2 OBESITY IN ADULT: Status: ACTIVE | Noted: 2023-10-03

## 2023-10-03 PROBLEM — I10 ESSENTIAL HYPERTENSION: Chronic | Status: ACTIVE | Noted: 2020-02-11

## 2023-10-03 PROBLEM — E66.812 CLASS 2 OBESITY IN ADULT: Status: ACTIVE | Noted: 2023-10-03

## 2023-10-03 PROBLEM — E66.812 CLASS 2 OBESITY IN ADULT: Chronic | Status: ACTIVE | Noted: 2023-10-03

## 2023-10-03 PROBLEM — D64.9 NORMOCYTIC ANEMIA: Chronic | Status: ACTIVE | Noted: 2023-10-03

## 2023-10-03 PROBLEM — E83.39 HYPOPHOSPHATEMIA: Status: ACTIVE | Noted: 2023-10-03

## 2023-10-03 LAB
ANION GAP SERPL CALC-SCNC: 7 MMOL/L (ref 8–16)
BASOPHILS # BLD AUTO: 0.01 K/UL (ref 0–0.2)
BASOPHILS NFR BLD: 0.2 % (ref 0–1.9)
BUN SERPL-MCNC: 15 MG/DL (ref 6–20)
CALCIUM SERPL-MCNC: 8.2 MG/DL (ref 8.7–10.5)
CHLORIDE SERPL-SCNC: 111 MMOL/L (ref 95–110)
CO2 SERPL-SCNC: 21 MMOL/L (ref 23–29)
CREAT SERPL-MCNC: 1 MG/DL (ref 0.5–1.4)
DIFFERENTIAL METHOD: ABNORMAL
EOSINOPHIL # BLD AUTO: 0 K/UL (ref 0–0.5)
EOSINOPHIL NFR BLD: 0 % (ref 0–8)
ERYTHROCYTE [DISTWIDTH] IN BLOOD BY AUTOMATED COUNT: 15.1 % (ref 11.5–14.5)
EST. GFR  (NO RACE VARIABLE): >60 ML/MIN/1.73 M^2
GLUCOSE SERPL-MCNC: 119 MG/DL (ref 70–110)
HCT VFR BLD AUTO: 31.9 % (ref 40–54)
HGB BLD-MCNC: 10.3 G/DL (ref 14–18)
IMM GRANULOCYTES # BLD AUTO: 0.02 K/UL (ref 0–0.04)
IMM GRANULOCYTES NFR BLD AUTO: 0.4 % (ref 0–0.5)
LYMPHOCYTES # BLD AUTO: 0.3 K/UL (ref 1–4.8)
LYMPHOCYTES NFR BLD: 6.2 % (ref 18–48)
MAGNESIUM SERPL-MCNC: 1.7 MG/DL (ref 1.6–2.6)
MCH RBC QN AUTO: 29.1 PG (ref 27–31)
MCHC RBC AUTO-ENTMCNC: 32.3 G/DL (ref 32–36)
MCV RBC AUTO: 90 FL (ref 82–98)
MONOCYTES # BLD AUTO: 0.3 K/UL (ref 0.3–1)
MONOCYTES NFR BLD: 5.3 % (ref 4–15)
NEUTROPHILS # BLD AUTO: 4.8 K/UL (ref 1.8–7.7)
NEUTROPHILS NFR BLD: 87.9 % (ref 38–73)
NRBC BLD-RTO: 0 /100 WBC
PHOSPHATE SERPL-MCNC: 1.3 MG/DL (ref 2.7–4.5)
PLATELET # BLD AUTO: 186 K/UL (ref 150–450)
PMV BLD AUTO: 8.8 FL (ref 9.2–12.9)
POTASSIUM SERPL-SCNC: 5.4 MMOL/L (ref 3.5–5.1)
RBC # BLD AUTO: 3.54 M/UL (ref 4.6–6.2)
SODIUM SERPL-SCNC: 139 MMOL/L (ref 136–145)
WBC # BLD AUTO: 5.47 K/UL (ref 3.9–12.7)

## 2023-10-03 PROCEDURE — 25000003 PHARM REV CODE 250

## 2023-10-03 PROCEDURE — 83735 ASSAY OF MAGNESIUM: CPT

## 2023-10-03 PROCEDURE — 63600175 PHARM REV CODE 636 W HCPCS

## 2023-10-03 PROCEDURE — 85025 COMPLETE CBC W/AUTO DIFF WBC: CPT

## 2023-10-03 PROCEDURE — 84100 ASSAY OF PHOSPHORUS: CPT

## 2023-10-03 PROCEDURE — 25000003 PHARM REV CODE 250: Performed by: STUDENT IN AN ORGANIZED HEALTH CARE EDUCATION/TRAINING PROGRAM

## 2023-10-03 PROCEDURE — 36415 COLL VENOUS BLD VENIPUNCTURE: CPT

## 2023-10-03 PROCEDURE — 80048 BASIC METABOLIC PNL TOTAL CA: CPT

## 2023-10-03 PROCEDURE — 25500020 PHARM REV CODE 255: Performed by: SURGERY

## 2023-10-03 RX ORDER — OXYCODONE HYDROCHLORIDE 5 MG/1
5 TABLET ORAL EVERY 4 HOURS PRN
Qty: 8 TABLET | Refills: 0 | Status: SHIPPED | OUTPATIENT
Start: 2023-10-03 | End: 2023-10-23

## 2023-10-03 RX ORDER — HYDROMORPHONE HYDROCHLORIDE 1 MG/ML
0.5 INJECTION, SOLUTION INTRAMUSCULAR; INTRAVENOUS; SUBCUTANEOUS EVERY 4 HOURS PRN
Status: DISCONTINUED | OUTPATIENT
Start: 2023-10-03 | End: 2023-10-03

## 2023-10-03 RX ORDER — OXYCODONE HYDROCHLORIDE 5 MG/1
5 TABLET ORAL EVERY 4 HOURS PRN
Status: DISCONTINUED | OUTPATIENT
Start: 2023-10-03 | End: 2023-10-03 | Stop reason: HOSPADM

## 2023-10-03 RX ADMIN — METHOCARBAMOL 500 MG: 100 INJECTION, SOLUTION INTRAMUSCULAR; INTRAVENOUS at 05:10

## 2023-10-03 RX ADMIN — KETOROLAC TROMETHAMINE 15 MG: 30 INJECTION, SOLUTION INTRAMUSCULAR; INTRAVENOUS at 02:10

## 2023-10-03 RX ADMIN — METHOCARBAMOL 500 MG: 100 INJECTION, SOLUTION INTRAMUSCULAR; INTRAVENOUS at 02:10

## 2023-10-03 RX ADMIN — IOHEXOL 100 ML: 350 INJECTION, SOLUTION INTRAVENOUS at 11:10

## 2023-10-03 RX ADMIN — HYDROMORPHONE HYDROCHLORIDE 0.5 MG: 1 INJECTION, SOLUTION INTRAMUSCULAR; INTRAVENOUS; SUBCUTANEOUS at 10:10

## 2023-10-03 RX ADMIN — SODIUM PHOSPHATE, MONOBASIC, MONOHYDRATE AND SODIUM PHOSPHATE, DIBASIC, ANHYDROUS 20.01 MMOL: 142; 276 INJECTION, SOLUTION INTRAVENOUS at 08:10

## 2023-10-03 RX ADMIN — KETOROLAC TROMETHAMINE 15 MG: 30 INJECTION, SOLUTION INTRAMUSCULAR; INTRAVENOUS at 03:10

## 2023-10-03 RX ADMIN — OXYCODONE HYDROCHLORIDE 5 MG: 5 TABLET ORAL at 02:10

## 2023-10-03 NOTE — SUBJECTIVE & OBJECTIVE
Interval History: NAEON. AF, HDS. Reporting generalized pain 2/2 not taking his daily oxycodone 10mg q6 prn. Denies N/V, chest pain, fever/chills, SOB, and abdominal pain. Will get UGI this am.    Medications:  Continuous Infusions:   lactated ringers 125 mL/hr at 10/02/23 1805     Scheduled Meds:   methocarbamol (ROBAXIN) IVPB  500 mg Intravenous Q8H     PRN Meds:ketorolac, ondansetron     Review of patient's allergies indicates:   Allergen Reactions    Hydrocodone Hives     Objective:     Vital Signs (Most Recent):  Temp: 98 °F (36.7 °C) (10/03/23 0006)  Pulse: 72 (10/03/23 0006)  Resp: 17 (10/03/23 0006)  BP: 114/75 (10/03/23 0006)  SpO2: 98 % (10/03/23 0006) Vital Signs (24h Range):  Temp:  [96.8 °F (36 °C)-98.8 °F (37.1 °C)] 98 °F (36.7 °C)  Pulse:  [60-74] 72  Resp:  [13-20] 17  SpO2:  [95 %-100 %] 98 %  BP: (106-141)/(63-82) 114/75     Weight: 119.3 kg (263 lb 0.1 oz)  Body mass index is 37.74 kg/m².    Intake/Output - Last 3 Shifts         10/01 0700  10/02 0659 10/02 0700  10/03 0659 10/03 0700  10/04 0659    IV Piggyback  1650     Total Intake(mL/kg)  1650 (13.8)     Net  +1650                     Physical Exam  Constitutional:       General: He is not in acute distress.     Appearance: He is not ill-appearing.   HENT:      Head: Normocephalic and atraumatic.      Nose: Nose normal.      Mouth/Throat:      Mouth: Mucous membranes are moist.      Pharynx: Oropharynx is clear.   Eyes:      Extraocular Movements: Extraocular movements intact.      Conjunctiva/sclera: Conjunctivae normal.      Comments: Bilateral eyelid swelling, decreased from immediate postop   Cardiovascular:      Rate and Rhythm: Normal rate and regular rhythm.      Pulses: Normal pulses.   Pulmonary:      Effort: Pulmonary effort is normal. No respiratory distress.      Comments: Crepitus along clavicles 2/2 subcutaneous emphysema  Abdominal:      General: Abdomen is flat. There is no distension.      Palpations: Abdomen is soft.       Tenderness: There is no abdominal tenderness.      Comments: Small RUQ Veress needle incision cdi with scab  Multiple well healed surgical scars   Musculoskeletal:         General: Normal range of motion.      Cervical back: Normal range of motion.   Skin:     General: Skin is warm and dry.      Capillary Refill: Capillary refill takes less than 2 seconds.   Neurological:      Mental Status: He is alert and oriented to person, place, and time.          Significant Labs:  I have reviewed all pertinent lab results within the past 24 hours.  CBC:   Recent Labs   Lab 10/03/23  0421   WBC 5.47   RBC 3.54*   HGB 10.3*   HCT 31.9*      MCV 90   MCH 29.1   MCHC 32.3     CMP:   Recent Labs   Lab 10/03/23  0421   *   CALCIUM 8.2*      K 5.4*   CO2 21*   *   BUN 15   CREATININE 1.0       Significant Diagnostics:  I have reviewed all pertinent imaging results/findings within the past 24 hours.

## 2023-10-03 NOTE — ASSESSMENT & PLAN NOTE
58M with achalasia s/p POEM, now POD1. Subcutaneous emphysema is improving. Will obtain UGI today.    -UGI this am  -NPO, advance to CLD if UGI normal  -IV robaxin and toradol while NPO. Will resume home oxy if passes UGI  -Nutrition consult placed for post op nutrition    Dispo: progressing medical stability for dc

## 2023-10-03 NOTE — PLAN OF CARE
[facility] - [facility]  Discharge Final Note    Primary Care Provider: [facility], [facility]    Expected Discharge Date: 10/3/2023    Final Discharge Note (most recent)       Final Note - 10/03/23 1518          Final Note    Assessment Type Final Discharge Note     Anticipated Discharge Disposition Home or Self Care     What phone number can be called within the next 1-3 days to see how you are doing after discharge? --   [phone]                    Important Message from Medicare             Contact Info       Iker Méndez Jr., MD   Specialty: General Surgery, Bariatrics    [address]  [facility] [address]   Phone: [phone]       Next Steps: Follow up in 2 week(s)    Instructions: Postop Visit            Future Appointments   Date Time Provider Department Center   10/6/2023  3:30 PM [facility] MRI1 500 LB LIMIT EH MRI [facility]   10/12/2023  3:15 PM Felix Levin MD [facility]THO [facility]   10/18/2023 10:30 AM Iker Méndez Jr., MD [facility]     Patient discharged home to care of self on 10/3/23.    Lara Chavis RNCM  Case Management  [facility]  [phone]

## 2023-10-03 NOTE — HOSPITAL COURSE
Himanshu Connor II underwent POEM on 10/2/23. There were no complications and the surgery was tolerated well. Himanshu Connor II recovered in the PACU and was transferred to the floor. On POD 1 he underwent UGI study, this showed no leak. He was advanced to a clear liquid diet, and seen by nutrition with instructions for diet at home. The patient was ambulating without difficulty. Pain was well-controlled with prn meds. Follow up information was provided and Himanshu Connor II was deemed ready for discharge.

## 2023-10-03 NOTE — CONSULTS
Food & Nutrition  Education    Diet Education: Post-op POEM diet  Time Spent: 5  Learners: patient    Nutrition Education provided with handouts: Diet After Nissen Fundoplication    Comments: Pt agreed to review education materials - encouraged full liquid diet x 6 days followed by post-nissen diet x 1 week. Discussed ways to prevent stomach stretching and excess gas to avoid possible complications. All questions and concerns answered. Dietitian's contact information provided.       Please Re-consult as needed.    Thanks!    Neeta Chambers RD, LDN

## 2023-10-03 NOTE — PLAN OF CARE
[facility] - [facility]  Discharge Assessment    Primary Care Provider: [facility], [facility]     Discharge Assessment (most recent)       BRIEF DISCHARGE ASSESSMENT - 10/03/23 1049          Discharge Planning    Assessment Type Discharge Planning Brief Assessment     Resource/Environmental Concerns none     Support Systems Parent;Friends/neighbors     Equipment Currently Used at Home none     Current Living Arrangements home     Patient/Family Anticipates Transition to home     Patient/Family Anticipated Services at Transition none     DME Needed Upon Discharge  none     Discharge Plan A Home                       Spoke with patient at bedside to complete d/c planning assessment. Patient lives alone in a single story home without steps to enter. Independent with ADL's. No DME in home. Verified PCP, Pharmacy and health insurance. Patient expects to d/c later today. Will have help at home from Mother and friends.    Lara ROBIN  Case Management  [facility]  [phone]

## 2023-10-03 NOTE — PLAN OF CARE
I have reviewed the chart of Himanshu Connor II and collaborated with Iker Méndez Jr.* in the care of the patient who is hospitalized for the following:    Active Hospital Problems    Diagnosis    *Achalasia    Class 2 obesity in adult    Hypophosphatemia    Normocytic anemia    Essential hypertension          I have reviewed the Himanshu Connor II with the multidisciplinary team during discharge huddle.       Hoa Rodrigues PA-C  Unit Based [facility]

## 2023-10-03 NOTE — DISCHARGE SUMMARY
[facility]  General Surgery  Discharge Summary      Patient Name: Himanshu Connor II  MRN: [id number]  Admission Date: 10/2/2023  Hospital Length of Stay: 0 days  Discharge Date and Time:  10/03/2023 2:14 PM  Attending Physician: Iker Méndez Jr.*   Discharging Provider: Jose Juan Spicer MD  Primary Care Provider: Administration, [facility]    HPI:   Himanshu Connor II is a 58 y.o. male with h/o of HTN, JAH, arthritis, GERD who presents with for dysphagia and regurgitation. Patient has an extensive surgical history, open HH repair x2 with Nissen, open sleeve with Nissen takedown complicated by leak and ECF, and laparoscopic conversion to RNY. Patient states he has had dysphagia since his RNY in 2021. Was seen by Dr. Harper in April and wanted to present him at swallow conference and get an upper GI with barium . At conference it was suggested to consider POEM. No changes since last visit, and gets short of breath while swallowing.  Discussed at length that POEM may have limited success.  Pt stated he would like to proceed.      Procedure(s) (LRB):  MYOTOMY, PERORAL, ENDOSCOPIC POEM (N/A)      Indwelling Lines/Drains at time of discharge:   Lines/Drains/Airways     None               Hospital Course: Himanshu Connor II underwent POEM on 10/2/23. There were no complications and the surgery was tolerated well. Himanshu Connor II recovered in the PACU and was transferred to the floor. On POD 1 he underwent UGI study, this showed no leak. He was advanced to a clear liquid diet, and seen by nutrition with instructions for diet at home. The patient was ambulating without difficulty. Pain was well-controlled with prn meds. Follow up information was provided and Himanshu Connor II was deemed ready for discharge.         Goals of Care Treatment Preferences:  Code Status: Full Code      Consults:   Consults (From admission, onward)        Status Ordering Provider     Inpatient consult to  Registered Dietitian/Nutritionist  Once        Provider:  (Not yet assigned)    Completed AR ALFORD          Significant Diagnostic Studies: Labs:   CMP   Recent Labs   Lab 10/03/23  0421      K 5.4*   *   CO2 21*   *   BUN 15   CREATININE 1.0   CALCIUM 8.2*   ANIONGAP 7*    and CBC   Recent Labs   Lab 10/03/23  0421   WBC 5.47   HGB 10.3*   HCT 31.9*          Pending Diagnostic Studies:     None        Final Active Diagnoses:    Diagnosis Date Noted POA    PRINCIPAL PROBLEM:  Achalasia [K22.0] 10/02/2023 Yes    Class 2 obesity in adult [E66.9] 10/03/2023 Yes     Chronic    Hypophosphatemia [E83.39] 10/03/2023 No    Normocytic anemia [D64.9] 10/03/2023 Yes     Chronic    Essential hypertension [I10] 02/11/2020 Yes     Chronic      Problems Resolved During this Admission:      Discharged Condition: good    Disposition: Home or Self Care    Follow Up:    Patient Instructions:   No discharge procedures on file.  Medications:  Reconciled Home Medications:      Medication List      ASK your doctor about these medications    albuterol 90 mcg/actuation inhaler  Commonly known as: PROVENTIL/VENTOLIN HFA  INHALE 2 PUFFS BY MOUTH FOUR TIMES A DAY AS NEEDED FOR BREATHING     ascorbic acid (vitamin C) 500 MG tablet  Commonly known as: VITAMIN C  500 mg.     aspirin 81 MG Chew  Take 1 tablet (81 mg total) by mouth 2 (two) times daily.     cyanocobalamin 1000 MCG tablet  Commonly known as: VITAMIN B-12  Take 1,000 mcg by mouth once daily.     diclofenac sodium 1 % Gel  Commonly known as: VOLTAREN  Apply topically daily as needed.     doxepin 50 MG capsule  Commonly known as: SINEQUAN  50 mg.     EScitalopram oxalate 20 MG tablet  Commonly known as: LEXAPRO  20 mg.     gabapentin 300 MG capsule  Commonly known as: NEURONTIN  600 mg.     LIDOcaine 5 %  Commonly known as: LIDODERM  APPLY 1 PATCH TOPICALLY EVERY DAY FOR PAIN. WEAR FOR 12 HOURS, THEN REMOVE. DO NOT APPLY NEW PATCH FOR AT LEAST 12  HOURS.     losartan-hydrochlorothiazide 100-25 mg 100-25 mg per tablet  Commonly known as: HYZAAR  1 tablet.     methyl salicylate-menthol 15-10% 15-10 % Crea  APPLY LIBERAL AMOUNT TOPICALLY FOUR TIMES A DAY AS NEEDED     naloxone 4 mg/actuation Spry  Commonly known as: NARCAN  SMARTSIG:Spray(s) Both Nares     omeprazole 40 MG capsule  Commonly known as: PRILOSEC  40 mg.     ondansetron 4 MG tablet  Commonly known as: ZOFRAN  Take 1 tablet (4 mg total) by mouth every 6 (six) hours as needed for Nausea.     oxyCODONE-acetaminophen 5-325 mg per tablet  Commonly known as: PERCOCET  Take 1 tablet by mouth every 6 (six) hours as needed for Pain.     peg 400-propylene glycol 0.4-0.3 % Drop  INSTILL ONE DROP IN EACH EYE FOUR TIMES A DAY AS NEEDED FOR DRY EYES     vitamin D 1000 units Tab  Commonly known as: VITAMIN D3  Take 2,000 Units by mouth once daily.            Jose Juan Spicer MD  General Surgery  [facility] - Surgery

## 2023-10-03 NOTE — NURSING
Nurses Note -- 4 Eyes      10/3/2023   12:18 AM      Skin assessed during:Transfer      [x] No Altered Skin Integrity Present    []Prevention Measures Documented      [] Yes- Altered Skin Integrity Present or Discovered   [] LDA Added if Not in Epic (Describe Wound)   [] New Altered Skin Integrity was Present on Admit and Documented in LDA   [] Wound Image Taken    Wound Care Consulted? No    Attending Nurse:  YANNI Anne      Second RN/Staff Member:   KATHARINA Gonsales

## 2023-10-03 NOTE — PLAN OF CARE
Problem: Adult Inpatient Plan of Care  Goal: Plan of Care Review  Outcome: Ongoing, Progressing  Flowsheets (Taken 10/2/2023 2239)  Plan of Care Reviewed With: patient  Goal: Patient-Specific Goal (Individualized)  Outcome: Ongoing, Progressing  Flowsheets (Taken 10/2/2023 2239)  Anxieties, Fears or Concerns: Surgery 103  Individualized Care Needs: manage pain  Goal: Absence of Hospital-Acquired Illness or Injury  Outcome: Ongoing, Progressing  Intervention: Identify and Manage Fall Risk  Flowsheets (Taken 10/2/2023 2239)  Safety Promotion/Fall Prevention:   side rails raised x 3   assistive device/personal item within reach   bed alarm set   medications reviewed   high risk medications identified  Intervention: Prevent Skin Injury  Flowsheets (Taken 10/2/2023 2239)  Body Position:   30 degrees   position changed independently  Skin Protection: adhesive use limited  Intervention: Prevent and Manage VTE (Venous Thromboembolism) Risk  Flowsheets (Taken 10/2/2023 2239)  Activity Management:   Rolling - L1   Arm raise - L1  VTE Prevention/Management:   bleeding risk assessed   bleeding precations maintained   remove, assess skin, and reapply sequential compression device  Range of Motion: active ROM (range of motion) encouraged  Goal: Optimal Comfort and Wellbeing  Outcome: Ongoing, Progressing  Intervention: Monitor Pain and Promote Comfort  Flowsheets (Taken 10/2/2023 2239)  Pain Management Interventions:   quiet environment facilitated   pillow support provided   pain management plan reviewed with patient/caregiver  Intervention: Provide Person-Centered Care  Flowsheets (Taken 10/2/2023 2239)  Trust Relationship/Rapport: care explained  Goal: Readiness for Transition of Care  Outcome: Ongoing, Progressing     Problem: Fall Injury Risk  Goal: Absence of Fall and Fall-Related Injury  Outcome: Ongoing, Progressing  Intervention: Identify and Manage Contributors  Flowsheets (Taken 10/2/2023 2239)  Self-Care  Promotion:   independence encouraged   BADL personal objects within reach   BADL personal routines maintained  Medication Review/Management:   medications reviewed   high-risk medications identified  Intervention: Promote Injury-Free Environment  Flowsheets (Taken 10/2/2023 2239)  Safety Promotion/Fall Prevention:   side rails raised x 3   assistive device/personal item within reach   bed alarm set   medications reviewed   high risk medications identified     Problem: Pain Acute  Goal: Acceptable Pain Control and Functional Ability  Outcome: Ongoing, Progressing  Intervention: Develop Pain Management Plan  Flowsheets (Taken 10/2/2023 2239)  Pain Management Interventions:   quiet environment facilitated   pillow support provided   pain management plan reviewed with patient/caregiver  Intervention: Prevent or Manage Pain  Flowsheets (Taken 10/2/2023 2239)  Sleep/Rest Enhancement:   relaxation techniques promoted   noise level reduced   room darkened  Medication Review/Management:   medications reviewed   high-risk medications identified

## 2023-10-03 NOTE — PROGRESS NOTES
[facility]  General Surgery  Progress Note    Subjective:     History of Present Illness:  No notes on file    Post-Op Info:  Procedure(s) (LRB):  MYOTOMY, PERORAL, ENDOSCOPIC POEM (N/A)   1 Day Post-Op     Interval History: NAEON. AF, HDS. Reporting generalized pain 2/2 not taking his daily oxycodone 10mg q6 prn. Denies N/V, chest pain, fever/chills, SOB, and abdominal pain. Will get UGI this am.    Medications:  Continuous Infusions:   lactated ringers 125 mL/hr at 10/02/23 1805     Scheduled Meds:   methocarbamol (ROBAXIN) IVPB  500 mg Intravenous Q8H     PRN Meds:ketorolac, ondansetron     Review of patient's allergies indicates:   Allergen Reactions    Hydrocodone Hives     Objective:     Vital Signs (Most Recent):  Temp: 98 °F (36.7 °C) (10/03/23 0006)  Pulse: 72 (10/03/23 0006)  Resp: 17 (10/03/23 0006)  BP: 114/75 (10/03/23 0006)  SpO2: 98 % (10/03/23 0006) Vital Signs (24h Range):  Temp:  [96.8 °F (36 °C)-98.8 °F (37.1 °C)] 98 °F (36.7 °C)  Pulse:  [60-74] 72  Resp:  [13-20] 17  SpO2:  [95 %-100 %] 98 %  BP: (106-141)/(63-82) 114/75     Weight: 119.3 kg (263 lb 0.1 oz)  Body mass index is 37.74 kg/m².    Intake/Output - Last 3 Shifts         10/01 0700  10/02 0659 10/02 0700  10/03 0659 10/03 0700  10/04 0659    IV Piggyback  1650     Total Intake(mL/kg)  1650 (13.8)     Net  +1650                     Physical Exam  Constitutional:       General: He is not in acute distress.     Appearance: He is not ill-appearing.   HENT:      Head: Normocephalic and atraumatic.      Nose: Nose normal.      Mouth/Throat:      Mouth: Mucous membranes are moist.      Pharynx: Oropharynx is clear.   Eyes:      Extraocular Movements: Extraocular movements intact.      Conjunctiva/sclera: Conjunctivae normal.      Comments: Bilateral eyelid swelling, decreased from immediate postop   Cardiovascular:      Rate and Rhythm: Normal rate and regular rhythm.      Pulses: Normal pulses.   Pulmonary:      Effort: Pulmonary  effort is normal. No respiratory distress.      Comments: Crepitus along clavicles 2/2 subcutaneous emphysema  Abdominal:      General: Abdomen is flat. There is no distension.      Palpations: Abdomen is soft.      Tenderness: There is no abdominal tenderness.      Comments: Small RUQ Veress needle incision cdi with scab  Multiple well healed surgical scars   Musculoskeletal:         General: Normal range of motion.      Cervical back: Normal range of motion.   Skin:     General: Skin is warm and dry.      Capillary Refill: Capillary refill takes less than 2 seconds.   Neurological:      Mental Status: He is alert and oriented to person, place, and time.          Significant Labs:  I have reviewed all pertinent lab results within the past 24 hours.  CBC:   Recent Labs   Lab 10/03/23  0421   WBC 5.47   RBC 3.54*   HGB 10.3*   HCT 31.9*      MCV 90   MCH 29.1   MCHC 32.3     CMP:   Recent Labs   Lab 10/03/23  0421   *   CALCIUM 8.2*      K 5.4*   CO2 21*   *   BUN 15   CREATININE 1.0       Significant Diagnostics:  I have reviewed all pertinent imaging results/findings within the past 24 hours.    Assessment/Plan:     * Achalasia  58M with achalasia s/p POEM, now POD1. Subcutaneous emphysema is improving. Will obtain UGI today.    -UGI this am  -NPO, advance to CLD if UGI normal  -IV robaxin and toradol while NPO. Will resume home oxy if passes UGI  -Nutrition consult placed for post op nutrition    Dispo: progressing medical stability for dc        Jan Funk MD  General Surgery  [facility]

## 2023-10-03 NOTE — HPI
Himanshu Connor II is a 58 y.o. male with h/o of HTN, JAH, arthritis, GERD who presents with for dysphagia and regurgitation. Patient has an extensive surgical history, open HH repair x2 with Nissen, open sleeve with Nissen takedown complicated by leak and ECF, and laparoscopic conversion to RNY. Patient states he has had dysphagia since his RNY in 2021. Was seen by Dr. Harper in April and wanted to present him at swallow conference and get an upper GI with barium . At conference it was suggested to consider POEM. No changes since last visit, and gets short of breath while swallowing.  Discussed at length that POEM may have limited success.  Pt stated he would like to proceed.

## 2023-10-05 NOTE — ANESTHESIA POSTPROCEDURE EVALUATION
Anesthesia Post Evaluation    Patient: Himanshu Connor II    Procedure(s) Performed: Procedure(s) (LRB):  MYOTOMY, PERORAL, ENDOSCOPIC POEM (N/A)    Final Anesthesia Type: general      Patient location during evaluation: PACU  Patient participation: Yes- Able to Participate  Level of consciousness: awake and alert and oriented  Post-procedure vital signs: reviewed and stable  Pain management: adequate  Airway patency: patent    PONV status at discharge: No PONV  Anesthetic complications: no      Cardiovascular status: blood pressure returned to baseline, hemodynamically stable and stable  Respiratory status: unassisted, room air and spontaneous ventilation  Hydration status: euvolemic  Follow-up not needed.          Vitals Value Taken Time   /63 10/03/23 1147   Temp 36.9 °C (98.5 °F) 10/03/23 1147   Pulse 69 10/03/23 1147   Resp 16 10/03/23 1423   SpO2 99 % 10/03/23 1147         Event Time   Out of Recovery 17:45:00         Pain/Yovany Score: No data recorded

## 2023-10-09 ENCOUNTER — TELEPHONE (OUTPATIENT)
Dept: ORTHOPEDICS | Facility: CLINIC | Age: 58
End: 2023-10-09
Payer: OTHER GOVERNMENT

## 2023-10-09 NOTE — TELEPHONE ENCOUNTER
----- Message from Teresa Reynolds LPN sent at 10/9/2023  2:38 PM CDT -----  Contact: pt    ----- Message -----  From: Ariadna Maravilla MA  Sent: 10/9/2023   2:10 PM CDT  To: Ollie Martin Staff    Pt calling on auth for MRI   Call back

## 2023-10-09 NOTE — TELEPHONE ENCOUNTER
R/s mri due to needing auth from [facility]. New RFS has been submitted.  MRI r/s to 10/16/23 and pre service staff notified.     Asa

## 2023-10-13 ENCOUNTER — PATIENT MESSAGE (OUTPATIENT)
Dept: ORTHOPEDICS | Facility: CLINIC | Age: 58
End: 2023-10-13
Payer: OTHER GOVERNMENT

## 2023-10-13 ENCOUNTER — TELEPHONE (OUTPATIENT)
Dept: SURGERY | Facility: CLINIC | Age: 58
End: 2023-10-13
Payer: OTHER GOVERNMENT

## 2023-10-13 NOTE — TELEPHONE ENCOUNTER
----- Message from Nisha Parra RN sent at 10/12/2023  4:55 PM CDT -----  Regarding: FW: appt access  Contact: pt [phone]    ----- Message -----  From: Maile Larkin  Sent: 10/12/2023   4:53 PM CDT  To: Dev Porras Staff  Subject: appt access                                      Pt calling to reschedule POST OP visit. Pls call

## 2023-10-20 ENCOUNTER — HOSPITAL ENCOUNTER (OUTPATIENT)
Dept: RADIOLOGY | Facility: HOSPITAL | Age: 58
Discharge: HOME | End: 2023-10-20
Attending: ORTHOPAEDIC SURGERY
Payer: OTHER GOVERNMENT

## 2023-10-20 DIAGNOSIS — M75.121 NONTRAUMATIC COMPLETE TEAR OF RIGHT ROTATOR CUFF: ICD-10-CM

## 2023-10-20 PROCEDURE — 73221 MRI SHOULDER WITHOUT CONTRAST RIGHT: ICD-10-PCS | Mod: 26,RT,, | Performed by: RADIOLOGY

## 2023-10-20 PROCEDURE — 73221 MRI JOINT UPR EXTREM W/O DYE: CPT | Mod: TC,RT

## 2023-10-20 PROCEDURE — 73221 MRI JOINT UPR EXTREM W/O DYE: CPT | Mod: 26,RT,, | Performed by: RADIOLOGY

## 2023-10-23 ENCOUNTER — OFFICE VISIT (OUTPATIENT)
Dept: ORTHOPEDICS | Facility: CLINIC | Age: 58
End: 2023-10-23
Payer: OTHER GOVERNMENT

## 2023-10-23 VITALS — WEIGHT: 263 LBS | HEIGHT: 70 IN | BODY MASS INDEX: 37.65 KG/M2 | RESPIRATION RATE: 18 BRPM

## 2023-10-23 DIAGNOSIS — M75.21 BICEPS TENDINITIS, RIGHT: ICD-10-CM

## 2023-10-23 DIAGNOSIS — M75.100 ROTATOR CUFF SYNDROME, UNSPECIFIED LATERALITY: Primary | ICD-10-CM

## 2023-10-23 PROCEDURE — 99999 PR PBB SHADOW E&M-EST. PATIENT-LVL III: CPT | Mod: PBBFAC,,, | Performed by: ORTHOPAEDIC SURGERY

## 2023-10-23 PROCEDURE — 99214 OFFICE O/P EST MOD 30 MIN: CPT | Mod: 25,S$PBB,, | Performed by: ORTHOPAEDIC SURGERY

## 2023-10-23 PROCEDURE — 99213 OFFICE O/P EST LOW 20 MIN: CPT | Mod: PBBFAC,PO,25 | Performed by: ORTHOPAEDIC SURGERY

## 2023-10-23 PROCEDURE — 99999 PR PBB SHADOW E&M-EST. PATIENT-LVL III: ICD-10-PCS | Mod: PBBFAC,,, | Performed by: ORTHOPAEDIC SURGERY

## 2023-10-23 PROCEDURE — 99214 PR OFFICE/OUTPT VISIT, EST, LEVL IV, 30-39 MIN: ICD-10-PCS | Mod: 25,S$PBB,, | Performed by: ORTHOPAEDIC SURGERY

## 2023-10-23 PROCEDURE — 99999PBSHW PR PBB SHADOW TECHNICAL ONLY FILED TO HB: Mod: PBBFAC,,,

## 2023-10-23 PROCEDURE — 20610 LARGE JOINT ASPIRATION/INJECTION: R SUBACROMIAL BURSA: ICD-10-PCS | Mod: S$PBB,RT,, | Performed by: ORTHOPAEDIC SURGERY

## 2023-10-23 PROCEDURE — 99999PBSHW PR PBB SHADOW TECHNICAL ONLY FILED TO HB: ICD-10-PCS | Mod: PBBFAC,,,

## 2023-10-23 PROCEDURE — 20610 DRAIN/INJ JOINT/BURSA W/O US: CPT | Mod: PBBFAC,PO | Performed by: ORTHOPAEDIC SURGERY

## 2023-10-23 RX ORDER — TRIAMCINOLONE ACETONIDE 40 MG/ML
40 INJECTION, SUSPENSION INTRA-ARTICULAR; INTRAMUSCULAR
Status: DISCONTINUED | OUTPATIENT
Start: 2023-10-23 | End: 2023-10-23 | Stop reason: HOSPADM

## 2023-10-23 RX ADMIN — TRIAMCINOLONE ACETONIDE 40 MG: 40 INJECTION, SUSPENSION INTRA-ARTICULAR; INTRAMUSCULAR at 02:10

## 2023-10-23 NOTE — PROCEDURES
Large Joint Aspiration/Injection: R subacromial bursa    Date/Time: 10/23/2023 2:00 PM    Performed by: Felix Levin MD  Authorized by: Felix Levin MD    Consent Done?:  Yes (Verbal)  Indications:  Pain  Site marked: the procedure site was marked    Timeout: prior to procedure the correct patient, procedure, and site was verified    Local anesthetic: Ropivicaine.  Anesthetic total (ml):  3      Details:  Needle Size:  20 G  Ultrasonic Guidance for needle placement?: No    Approach:  Posterior  Location:  Shoulder  Site:  R subacromial bursa  Medications:  40 mg triamcinolone acetonide 40 mg/mL  Patient tolerance:  Patient tolerated the procedure well with no immediate complications

## 2023-10-23 NOTE — PROGRESS NOTES
Past Medical History:   Diagnosis Date    Alcohol abuse, unspecified     Allergic rhinitis, cause unspecified     Arthritis     Asthma attack     Cancer 2022    skin cancer on nose    Carpal tunnel syndrome     Depressive disorder, not elsewhere classified     Diarrhea     Encounter for blood transfusion     GERD (gastroesophageal reflux disease)     Hypersomnia, unspecified     Hypertension     Hypoglycemia     Lumbago     Obesity, unspecified     JAH (obstructive sleep apnea)     uses BIPAP    Other and unspecified hyperlipidemia     Other ankle sprain and strain     Psychosexual dysfunction with inhibited sexual excitement     Pyogenic arthritis of knee 06/2021    staph pseudintermedius, TKA    Pyogenic arthritis of knee 09/21/2021    Enterococcus    Ventral hernia, unspecified, without mention of obstruction or gangrene        Past Surgical History:   Procedure Laterality Date    A-scope knees      Bilateral    ARTHROSCOPIC REPAIR OF ROTATOR CUFF OF SHOULDER Left 09/20/2019    Procedure: REPAIR, ROTATOR CUFF, ARTHROSCOPIC;  Surgeon: Felix Levin MD;  Location: [facility] [place] OR;  Service: Orthopedics;  Laterality: Left;    ARTHROSCOPIC REPAIR OF ROTATOR CUFF OF SHOULDER Right 3/17/2023    Procedure: REPAIR, ROTATOR CUFF, ARTHROSCOPIC;  Surgeon: Felix Levin MD;  Location: [facility] [place] OR;  Service: Orthopedics;  Laterality: Right;    ARTHROSCOPY OF SHOULDER WITH DECOMPRESSION OF SUBACROMIAL SPACE Left 09/20/2019    Procedure: ARTHROSCOPY, SHOULDER, WITH SUBACROMIAL SPACE DECOMPRESSION;  Surgeon: Felix Levin MD;  Location: [facility] [place] OR;  Service: Orthopedics;  Laterality: Left;  Jaspreet- Arthrex notified of all case today 9/16-tcb    ARTHROSCOPY,SHOULDER,WITH BICEPS TENODESIS Right 3/17/2023    Procedure: ARTHROSCOPY,SHOULDER,WITH BICEPS TENODESIS;  Surgeon: Felix Levin MD;  Location: [facility] [place] OR;  Service: Orthopedics;  Laterality: Right;    CARPAL TUNNEL RELEASE      Bilateral    COLONOSCOPY  N/A 08/05/2022    Procedure: COLONOSCOPY;  Surgeon: Zaida Ansari MD;  Location: [facility] ENDO;  Service: Endoscopy;  Laterality: N/A;    ESOPHAGEAL MANOMETRY WITH MEASUREMENT OF IMPEDANCE N/A 3/24/2023    Procedure: MANOMETRY, ESOPHAGUS, WITH IMPEDANCE MEASUREMENT;  Surgeon: Sofie Walker MD;  Location: [facility] ENDO (2ND FLR);  Service: Endoscopy;  Laterality: N/A;  r/o rumination and supragastric belching  hold baclofen x3 days, hold doxepin x24 hours prior to procedure    ESOPHAGOGASTRODUODENOSCOPY N/A 08/05/2022    Procedure: EGD (ESOPHAGOGASTRODUODENOSCOPY);  Surgeon: Zaida Ansari MD;  Location: [facility] ENDO;  Service: Endoscopy;  Laterality: N/A;    ESOPHAGOGASTRODUODENOSCOPY N/A 3/24/2023    Procedure: EGD (ESOPHAGOGASTRODUODENOSCOPY);  Surgeon: Sofie Walker MD;  Location: [facility] ENDO (2ND FLR);  Service: Endoscopy;  Laterality: N/A;  Endoflip/Endoscopically placed manometry probe  2nd floor-per Dr. Walker (higher than average risk procedure)  propofol only  full liquid diet x3 days, clear liquid diet x1 day prior to procedure  [facility] emailed to [email or web address]    EYE SURGERY      Lasik    HERNIA REPAIR      INCISION AND DRAINAGE OF HEMATOMA Right 09/21/2021    Procedure: INCISION AND DRAINAGE, HEMATOMA;  Surgeon: Felix Levin MD;  Location: [facility] OR;  Service: Orthopedics;  Laterality: Right;    INSERTION OF ANTIBIOTIC SPACER Right 06/01/2021    Procedure: INSERTION, ANTIBIOTIC SPACER;  Surgeon: Felix Levin MD;  Location: [facility] OR;  Service: Orthopedics;  Laterality: Right;    JOINT REPLACEMENT      KNEE ARTHROPLASTY Left 08/04/2020    Procedure: ARTHROPLASTY, KNEE;  Surgeon: Felix Levin MD;  Location: [facility] OR;  Service: Orthopedics;  Laterality: Left;    KNEE ARTHROPLASTY Right 03/23/2021    Procedure: ARTHROPLASTY, KNEE;  Surgeon: Felix Levin MD;  Location: [facility] OR;  Service: Orthopedics;  Laterality: Right;    KNEE ARTHROSCOPY W/  MENISCECTOMY Left 10/15/2019    Procedure: ARTHROSCOPY, KNEE, WITH MENISCECTOMY;  Surgeon: Felix Levin MD;  Location: [facility] OR;  Service: Orthopedics;  Laterality: Left;  Medial and Lateral Meniscectomy    KNEE ARTHROSCOPY W/ MENISCECTOMY Right 11/22/2019    Procedure: ARTHROSCOPY, KNEE, WITH MENISCECTOMY;  Surgeon: Felix Levin MD;  Location: [facility] OR;  Service: Orthopedics;  Laterality: Right;    KNEE SURGERY      osmar    MYOTOMY, PERORAL, ENDOSCOPIC N/A 10/2/2023    Procedure: MYOTOMY, PERORAL, ENDOSCOPIC POEM;  Surgeon: Iker Méndez Jr., MD;  Location: [facility] OR [address] [place] [address];  Service: General;  Laterality: N/A;    NISSEN FUNDOPLICATION      X 2    REVERSE TOTAL SHOULDER ARTHROPLASTY Left 02/11/2020    Procedure: ARTHROPLASTY, SHOULDER, TOTAL, REVERSE;  Surgeon: Felix Levin MD;  Location: [facility] OR;  Service: Orthopedics;  Laterality: Left;  [place]    revision of gastrojejunostomy  05/10/2021    [facility] [place]    REVISION OF KNEE ARTHROPLASTY Right 09/07/2021    Procedure: REVISION, ARTHROPLASTY, KNEE;  Surgeon: Felix Levin MD;  Location: [facility] OR;  Service: Orthopedics;  Laterality: Right;    ROTATOR CUFF REPAIR      right    RJ-EN-Y PROCEDURE N/A 05/2021    SLEEVE GASTROPLASTY  12/2013       Current Outpatient Medications   Medication Sig    albuterol (PROVENTIL/VENTOLIN HFA) 90 mcg/actuation inhaler INHALE 2 PUFFS BY MOUTH FOUR TIMES A DAY AS NEEDED FOR BREATHING    ascorbic acid, vitamin C, (VITAMIN C) 500 MG tablet 500 mg.    aspirin 81 MG Chew Take 1 tablet (81 mg total) by mouth 2 (two) times daily.    cyanocobalamin (VITAMIN B-12) 1000 MCG tablet Take 1,000 mcg by mouth once daily.     diclofenac sodium (VOLTAREN) 1 % Gel Apply topically daily as needed.    doxepin (SINEQUAN) 50 MG capsule 50 mg.    EScitalopram oxalate (LEXAPRO) 20 MG tablet 20 mg.    LIDOcaine (LIDODERM) 5 % APPLY 1 PATCH TOPICALLY EVERY DAY FOR PAIN. WEAR FOR 12 HOURS, THEN REMOVE. DO NOT  APPLY NEW PATCH FOR AT LEAST 12 HOURS.    losartan-hydrochlorothiazide 100-25 mg (HYZAAR) 100-25 mg per tablet 1 tablet.    methyl salicylate-menthol 15-10% 15-10 % Crea APPLY LIBERAL AMOUNT TOPICALLY FOUR TIMES A DAY AS NEEDED    naloxone (NARCAN) 4 mg/actuation Spry SMARTSIG:Spray(s) Both Nares    omeprazole (PRILOSEC) 40 MG capsule 40 mg.    ondansetron (ZOFRAN) 4 MG tablet Take 1 tablet (4 mg total) by mouth every 6 (six) hours as needed for Nausea.    oxyCODONE-acetaminophen (PERCOCET) 5-325 mg per tablet Take 1 tablet by mouth every 6 (six) hours as needed for Pain.    peg 400-propylene glycol 0.4-0.3 % Drop INSTILL ONE DROP IN EACH EYE FOUR TIMES A DAY AS NEEDED FOR DRY EYES    vitamin D (VITAMIN D3) 1000 units Tab Take 2,000 Units by mouth once daily.     No current facility-administered medications for this visit.       Review of patient's allergies indicates:   Allergen Reactions    Hydrocodone Hives       Family History   Problem Relation Age of Onset    Arthritis Mother     Hypertension Father     Kidney disease Father     Heart disease Father     COPD Father     Pancreatic cancer Maternal Grandfather     Colon cancer Paternal Grandfather     Colon polyps Neg Hx     Crohn's disease Neg Hx     Ulcerative colitis Neg Hx     Stomach cancer Neg Hx     Esophageal cancer Neg Hx        Social History     Socioeconomic History    Marital status: Single   Tobacco Use    Smoking status: Never    Smokeless tobacco: Current     Types: Chew    Tobacco comments:     rarely   Substance and Sexual Activity    Alcohol use: Yes     Comment: occasionally    Drug use: No    Sexual activity: Not Currently       Chief Complaint:   No chief complaint on file.      Date of surgery:  March 17, 2023    History of present illness:  57-year-old male who underwent right arthroscopic biceps tenodesis and revision rotator cuff repair.  Patient is doing terribly now.  It has gotten much worse over last 3 months.  Pain is worse than it  was before surgery.  Feels like it gets worse every day.  Lot of pain up in the top of his shoulder.  Pain is 7/10.  MRI did not show recurrent tearing of the rotator cuff.      Review of Systems:    Musculoskeletal:  See HPI        Physical Examination:    Vital Signs:    There were no vitals filed for this visit.        There is no height or weight on file to calculate BMI.    This a well-developed, well nourished patient in no acute distress.  They are alert and oriented and cooperative to examination.  Pt. walks without an antalgic gait.      Examination of the right shoulder shows well-healed surgical portals.  No erythema or drainage.  Patient is neurovascular intact.  Full range of motion with just a little tightness in the biceps and front of his shoulder.  Positive Gerardo deformity.    MRI of the right shoulder is reviewed and interpreted:Status post rotator cuff repair.  Tendinosis and extensive undersurface fraying of the supraspinatus and infraspinatus tendons without recurrent high-grade tear.  Moderate subscapularis tendinosis and interstitial tearing.  Status post biceps tenodesis.      Assessment::  Status post right revision rotator cuff repair and biceps tenodesis       Plan:  Reviewed the MRI with him.  Do not see anything that would require another surgery at this time.  Recommended a subacromial injection to see if it can help with some of the pain and soreness.  Follow up in 6 weeks.  Talked about possible arthroscopic cleanup if still bothering him at that time.    This note was created using [employer] voice recognition software that occasionally misinterpreted phrases or words.

## 2023-10-25 ENCOUNTER — OFFICE VISIT (OUTPATIENT)
Dept: SURGERY | Facility: CLINIC | Age: 58
End: 2023-10-25
Payer: OTHER GOVERNMENT

## 2023-10-25 VITALS
HEIGHT: 72 IN | HEART RATE: 71 BPM | WEIGHT: 242.81 LBS | SYSTOLIC BLOOD PRESSURE: 171 MMHG | DIASTOLIC BLOOD PRESSURE: 89 MMHG | BODY MASS INDEX: 32.89 KG/M2

## 2023-10-25 DIAGNOSIS — Z09 POSTOP CHECK: Primary | ICD-10-CM

## 2023-10-25 PROCEDURE — 99999 PR PBB SHADOW E&M-EST. PATIENT-LVL IV: CPT | Mod: PBBFAC,,, | Performed by: SURGERY

## 2023-10-25 PROCEDURE — 99024 POSTOP FOLLOW-UP VISIT: CPT | Mod: ,,, | Performed by: SURGERY

## 2023-10-25 PROCEDURE — 99024 PR POST-OP FOLLOW-UP VISIT: ICD-10-PCS | Mod: ,,, | Performed by: SURGERY

## 2023-10-25 PROCEDURE — 99214 OFFICE O/P EST MOD 30 MIN: CPT | Mod: PBBFAC | Performed by: SURGERY

## 2023-10-25 PROCEDURE — 99999 PR PBB SHADOW E&M-EST. PATIENT-LVL IV: ICD-10-PCS | Mod: PBBFAC,,, | Performed by: SURGERY

## 2023-10-25 NOTE — PROGRESS NOTES
[facility]  General Surgery  Post-Operative Note    Subjective:       Himanshu Connor II is a 58M w/ PMH of HTN, JAH, arthritis, GERD, and Achalasia  who presents to the clinic 3 weeks following POEM on 10/2. Has been tolerating soft diet, but attempted to eat chicken yesterday and felt it get caught in his throat. He is able to tolerate chili with ground beef.     Objective:      BP (!) 171/89   Pulse 71   Ht 6' (1.829 m)   Wt 110.2 kg (242 lb 13.4 oz)   BMI 32.93 kg/m²     General:  alert, appears stated age, cooperative, and no distress   Abdomen: soft, bowel sounds active, non-tender   Incision:   RUQ Verress needle site cdi        Assessment:     Himanshu Connor II is a 58M w/ PMH of HTN, JAH, arthritis, GERD, and Achalasia s/p POEM. Discussed that chicken is especially dry and hard to tolerate due to his underlying esophageal motility. Discussed continued slow reintroduction of regular diet and less dry meats as tolerated.     Plan:     1. Continue any current medications.  2. Wound care discussed.  3. Pt is to increase activities as tolerated.  4. Follow up in 1 month  5. Repeat EGD in 6 months    Jan Funk MD  General Surgery  10/25/2023    I have personally taken the history and examined this patient and agree with the resident's note as stated above.         Iker Méndez MD

## 2023-10-28 ENCOUNTER — PATIENT MESSAGE (OUTPATIENT)
Dept: SURGERY | Facility: CLINIC | Age: 58
End: 2023-10-28
Payer: OTHER GOVERNMENT

## 2023-12-04 ENCOUNTER — OFFICE VISIT (OUTPATIENT)
Dept: ORTHOPEDICS | Facility: CLINIC | Age: 58
End: 2023-12-04
Payer: OTHER GOVERNMENT

## 2023-12-04 VITALS — BODY MASS INDEX: 32.78 KG/M2 | HEIGHT: 72 IN | WEIGHT: 242 LBS | RESPIRATION RATE: 18 BRPM

## 2023-12-04 DIAGNOSIS — M75.21 BICEPS TENDINITIS, RIGHT: Primary | ICD-10-CM

## 2023-12-04 DIAGNOSIS — M75.100 ROTATOR CUFF SYNDROME, UNSPECIFIED LATERALITY: ICD-10-CM

## 2023-12-04 PROCEDURE — 99999 PR PBB SHADOW E&M-EST. PATIENT-LVL IV: CPT | Mod: PBBFAC,,, | Performed by: ORTHOPAEDIC SURGERY

## 2023-12-04 PROCEDURE — 99999 PR PBB SHADOW E&M-EST. PATIENT-LVL IV: ICD-10-PCS | Mod: PBBFAC,,, | Performed by: ORTHOPAEDIC SURGERY

## 2023-12-04 PROCEDURE — 99214 OFFICE O/P EST MOD 30 MIN: CPT | Mod: PBBFAC,PO | Performed by: ORTHOPAEDIC SURGERY

## 2023-12-04 PROCEDURE — 99214 PR OFFICE/OUTPT VISIT, EST, LEVL IV, 30-39 MIN: ICD-10-PCS | Mod: 57,S$PBB,, | Performed by: ORTHOPAEDIC SURGERY

## 2023-12-04 PROCEDURE — 99214 OFFICE O/P EST MOD 30 MIN: CPT | Mod: 57,S$PBB,, | Performed by: ORTHOPAEDIC SURGERY

## 2023-12-04 NOTE — H&P (VIEW-ONLY)
Past Medical History:   Diagnosis Date    Alcohol abuse, unspecified     Allergic rhinitis, cause unspecified     Arthritis     Asthma attack     Cancer 2022    skin cancer on nose    Carpal tunnel syndrome     Depressive disorder, not elsewhere classified     Diarrhea     Encounter for blood transfusion     GERD (gastroesophageal reflux disease)     Hypersomnia, unspecified     Hypertension     Hypoglycemia     Lumbago     Obesity, unspecified     JAH (obstructive sleep apnea)     uses BIPAP    Other and unspecified hyperlipidemia     Other ankle sprain and strain     Psychosexual dysfunction with inhibited sexual excitement     Pyogenic arthritis of knee 06/2021    staph pseudintermedius, TKA    Pyogenic arthritis of knee 09/21/2021    Enterococcus    Ventral hernia, unspecified, without mention of obstruction or gangrene        Past Surgical History:   Procedure Laterality Date    A-scope knees      Bilateral    ARTHROSCOPIC REPAIR OF ROTATOR CUFF OF SHOULDER Left 09/20/2019    Procedure: REPAIR, ROTATOR CUFF, ARTHROSCOPIC;  Surgeon: Felix Levin MD;  Location: [facility] OR;  Service: Orthopedics;  Laterality: Left;    ARTHROSCOPIC REPAIR OF ROTATOR CUFF OF SHOULDER Right 3/17/2023    Procedure: REPAIR, ROTATOR CUFF, ARTHROSCOPIC;  Surgeon: Felix Levin MD;  Location: [facility] OR;  Service: Orthopedics;  Laterality: Right;    ARTHROSCOPY OF SHOULDER WITH DECOMPRESSION OF SUBACROMIAL SPACE Left 09/20/2019    Procedure: ARTHROSCOPY, SHOULDER, WITH SUBACROMIAL SPACE DECOMPRESSION;  Surgeon: Felix Levin MD;  Location: [facility] OR;  Service: Orthopedics;  Laterality: Left;  Jaspreet- Arthrex notified of all case today 9/16-tcb    ARTHROSCOPY,SHOULDER,WITH BICEPS TENODESIS Right 3/17/2023    Procedure: ARTHROSCOPY,SHOULDER,WITH BICEPS TENODESIS;  Surgeon: Felix Levin MD;  Location: [facility] OR;  Service: Orthopedics;  Laterality: Right;    CARPAL TUNNEL RELEASE      Bilateral    COLONOSCOPY  N/A 08/05/2022    Procedure: COLONOSCOPY;  Surgeon: Zaida Ansari MD;  Location: [facility] ENDO;  Service: Endoscopy;  Laterality: N/A;    ESOPHAGEAL MANOMETRY WITH MEASUREMENT OF IMPEDANCE N/A 3/24/2023    Procedure: MANOMETRY, ESOPHAGUS, WITH IMPEDANCE MEASUREMENT;  Surgeon: Sofie Walker MD;  Location: [facility] ENDO (2ND FLR);  Service: Endoscopy;  Laterality: N/A;  r/o rumination and supragastric belching  hold baclofen x3 days, hold doxepin x24 hours prior to procedure    ESOPHAGOGASTRODUODENOSCOPY N/A 08/05/2022    Procedure: EGD (ESOPHAGOGASTRODUODENOSCOPY);  Surgeon: Zaida Ansari MD;  Location: [facility] ENDO;  Service: Endoscopy;  Laterality: N/A;    ESOPHAGOGASTRODUODENOSCOPY N/A 3/24/2023    Procedure: EGD (ESOPHAGOGASTRODUODENOSCOPY);  Surgeon: Sofie Walker MD;  Location: [facility] ENDO (2ND FLR);  Service: Endoscopy;  Laterality: N/A;  Endoflip/Endoscopically placed manometry probe  2nd floor-per Dr. Walker (higher than average risk procedure)  propofol only  full liquid diet x3 days, clear liquid diet x1 day prior to procedure  [facility] and emailed to [email or web address]    EYE SURGERY      Lasik    HERNIA REPAIR      INCISION AND DRAINAGE OF HEMATOMA Right 09/21/2021    Procedure: INCISION AND DRAINAGE, HEMATOMA;  Surgeon: Felix Levin MD;  Location: [facility] OR;  Service: Orthopedics;  Laterality: Right;    INSERTION OF ANTIBIOTIC SPACER Right 06/01/2021    Procedure: INSERTION, ANTIBIOTIC SPACER;  Surgeon: Felix Levin MD;  Location: [facility] OR;  Service: Orthopedics;  Laterality: Right;    JOINT REPLACEMENT      KNEE ARTHROPLASTY Left 08/04/2020    Procedure: ARTHROPLASTY, KNEE;  Surgeon: Felix Levin MD;  Location: [facility] OR;  Service: Orthopedics;  Laterality: Left;    KNEE ARTHROPLASTY Right 03/23/2021    Procedure: ARTHROPLASTY, KNEE;  Surgeon: Felix Levin MD;  Location: [facility] OR;  Service: Orthopedics;  Laterality: Right;    KNEE ARTHROSCOPY W/  MENISCECTOMY Left 10/15/2019    Procedure: ARTHROSCOPY, KNEE, WITH MENISCECTOMY;  Surgeon: Felix Levin MD;  Location: [facility] OR;  Service: Orthopedics;  Laterality: Left;  Medial and Lateral Meniscectomy    KNEE ARTHROSCOPY W/ MENISCECTOMY Right 11/22/2019    Procedure: ARTHROSCOPY, KNEE, WITH MENISCECTOMY;  Surgeon: Felix Levin MD;  Location: [facility] OR;  Service: Orthopedics;  Laterality: Right;    KNEE SURGERY      osmar    MYOTOMY, PERORAL, ENDOSCOPIC N/A 10/2/2023    Procedure: MYOTOMY, PERORAL, ENDOSCOPIC POEM;  Surgeon: Iker Méndez Jr., MD;  Location: [facility] OR [address] [place] [address];  Service: General;  Laterality: N/A;    NISSEN FUNDOPLICATION      X 2    REVERSE TOTAL SHOULDER ARTHROPLASTY Left 02/11/2020    Procedure: ARTHROPLASTY, SHOULDER, TOTAL, REVERSE;  Surgeon: Felix Levin MD;  Location: [facility] OR;  Service: Orthopedics;  Laterality: Left;  [place]    revision of gastrojejunostomy  05/10/2021    [facility] [place]    REVISION OF KNEE ARTHROPLASTY Right 09/07/2021    Procedure: REVISION, ARTHROPLASTY, KNEE;  Surgeon: Felix Levin MD;  Location: [facility] OR;  Service: Orthopedics;  Laterality: Right;    ROTATOR CUFF REPAIR      right    RJ-EN-Y PROCEDURE N/A 05/2021    SLEEVE GASTROPLASTY  12/2013       Current Outpatient Medications   Medication Sig    albuterol (PROVENTIL/VENTOLIN HFA) 90 mcg/actuation inhaler INHALE 2 PUFFS BY MOUTH FOUR TIMES A DAY AS NEEDED FOR BREATHING    ascorbic acid, vitamin C, (VITAMIN C) 500 MG tablet 500 mg.    cyanocobalamin (VITAMIN B-12) 1000 MCG tablet Take 1,000 mcg by mouth once daily.     diclofenac sodium (VOLTAREN) 1 % Gel Apply topically daily as needed.    doxepin (SINEQUAN) 50 MG capsule 50 mg.    EScitalopram oxalate (LEXAPRO) 20 MG tablet 20 mg.    LIDOcaine (LIDODERM) 5 % APPLY 1 PATCH TOPICALLY EVERY DAY FOR PAIN. WEAR FOR 12 HOURS, THEN REMOVE. DO NOT APPLY NEW PATCH FOR AT LEAST 12 HOURS.    losartan-hydrochlorothiazide 100-25 mg  (HYZAAR) 100-25 mg per tablet 1 tablet.    methyl salicylate-menthol 15-10% 15-10 % Crea APPLY LIBERAL AMOUNT TOPICALLY FOUR TIMES A DAY AS NEEDED    naloxone (NARCAN) 4 mg/actuation Spry SMARTSIG:Spray(s) Both Nares    omeprazole (PRILOSEC) 40 MG capsule 40 mg.    ondansetron (ZOFRAN) 4 MG tablet Take 1 tablet (4 mg total) by mouth every 6 (six) hours as needed for Nausea.    oxyCODONE-acetaminophen (PERCOCET) 5-325 mg per tablet Take 1 tablet by mouth every 6 (six) hours as needed for Pain.    peg 400-propylene glycol 0.4-0.3 % Drop INSTILL ONE DROP IN EACH EYE FOUR TIMES A DAY AS NEEDED FOR DRY EYES    vitamin D (VITAMIN D3) 1000 units Tab Take 2,000 Units by mouth once daily.    aspirin 81 MG Chew Take 1 tablet (81 mg total) by mouth 2 (two) times daily.     No current facility-administered medications for this visit.       Review of patient's allergies indicates:   Allergen Reactions    Hydrocodone Hives       Family History   Problem Relation Age of Onset    Arthritis Mother     Hypertension Father     Kidney disease Father     Heart disease Father     COPD Father     Pancreatic cancer Maternal Grandfather     Colon cancer Paternal Grandfather     Colon polyps Neg Hx     Crohn's disease Neg Hx     Ulcerative colitis Neg Hx     Stomach cancer Neg Hx     Esophageal cancer Neg Hx        Social History     Socioeconomic History    Marital status: Single   Tobacco Use    Smoking status: Never    Smokeless tobacco: Current     Types: Chew    Tobacco comments:     rarely   Substance and Sexual Activity    Alcohol use: Yes     Comment: occasionally    Drug use: No    Sexual activity: Not Currently       Chief Complaint:   Chief Complaint   Patient presents with    Right Shoulder - Pain    Follow-up     Follow up right shoulder       Date of surgery:  March 17, 2023    History of present illness:  58-year-old male who underwent right arthroscopic biceps tenodesis and revision rotator cuff repair.  MRI did not show  obvious full-thickness recurrent tearing of the rotator cuff.  We tried a subacromial injection 6 weeks ago and it did not really.  Continued to have significant pain.  Pain is 7/10.  Pain at night.  Not improving.    Review of Systems:    Musculoskeletal:  See HPI        Physical Examination:    Vital Signs:    Vitals:    12/04/23 1437   Resp: 18           Body mass index is 32.82 kg/m².    This a well-developed, well nourished patient in no acute distress.  They are alert and oriented and cooperative to examination.  Pt. walks without an antalgic gait.      Examination of the right shoulder shows well-healed surgical portals.  No erythema or drainage.  Patient is neurovascular intact.  Full range of motion with just a little tightness in the biceps and front of his shoulder.  Positive Gerardo deformity.    Heart is regular rate without obvious murmurs   Normal respiratory effort without audible wheezing  Abdomen is soft and nontender     MRI of the right shoulder is reviewed and interpreted:Status post rotator cuff repair.  Tendinosis and extensive undersurface fraying of the supraspinatus and infraspinatus tendons without recurrent high-grade tear.  Moderate subscapularis tendinosis and interstitial tearing.  Status post biceps tenodesis.      Assessment::  Status post right revision rotator cuff repair and biceps tenodesis   Possible recurrent right rotator cuff tear      Plan:  Reviewed the MRI with him.  Talked about possible arthroscopic cleanup if still bothering him at that time versus revision repair with possible graft augmentation.  Risks, benefits, and alternatives to the procedure were explained to the patient including but not limited to damage to nerves, arteries, blood vessels, bones, tendons, ligaments, stiffness, instability, infection, permanent limb dysfunction, DVT, PE, as well as general anesthetic complications including seizure, stroke, heart attack and even death. The patient understood  these risks and wished to proceed and signed the informed consent.       This note was created using [employer] voice recognition software that occasionally misinterpreted phrases or words.

## 2023-12-04 NOTE — PROGRESS NOTES
Past Medical History:   Diagnosis Date    Alcohol abuse, unspecified     Allergic rhinitis, cause unspecified     Arthritis     Asthma attack     Cancer 2022    skin cancer on nose    Carpal tunnel syndrome     Depressive disorder, not elsewhere classified     Diarrhea     Encounter for blood transfusion     GERD (gastroesophageal reflux disease)     Hypersomnia, unspecified     Hypertension     Hypoglycemia     Lumbago     Obesity, unspecified     JAH (obstructive sleep apnea)     uses BIPAP    Other and unspecified hyperlipidemia     Other ankle sprain and strain     Psychosexual dysfunction with inhibited sexual excitement     Pyogenic arthritis of knee 06/2021    staph pseudintermedius, TKA    Pyogenic arthritis of knee 09/21/2021    Enterococcus    Ventral hernia, unspecified, without mention of obstruction or gangrene        Past Surgical History:   Procedure Laterality Date    A-scope knees      Bilateral    ARTHROSCOPIC REPAIR OF ROTATOR CUFF OF SHOULDER Left 09/20/2019    Procedure: REPAIR, ROTATOR CUFF, ARTHROSCOPIC;  Surgeon: Felix Levin MD;  Location: [facility] OR;  Service: Orthopedics;  Laterality: Left;    ARTHROSCOPIC REPAIR OF ROTATOR CUFF OF SHOULDER Right 3/17/2023    Procedure: REPAIR, ROTATOR CUFF, ARTHROSCOPIC;  Surgeon: Felix Levin MD;  Location: [facility] OR;  Service: Orthopedics;  Laterality: Right;    ARTHROSCOPY OF SHOULDER WITH DECOMPRESSION OF SUBACROMIAL SPACE Left 09/20/2019    Procedure: ARTHROSCOPY, SHOULDER, WITH SUBACROMIAL SPACE DECOMPRESSION;  Surgeon: Felix Levin MD;  Location: [facility] OR;  Service: Orthopedics;  Laterality: Left;  Jaspreet- Arthrex notified of all case today 9/16-tcb    ARTHROSCOPY,SHOULDER,WITH BICEPS TENODESIS Right 3/17/2023    Procedure: ARTHROSCOPY,SHOULDER,WITH BICEPS TENODESIS;  Surgeon: Felix Levin MD;  Location: [facility] OR;  Service: Orthopedics;  Laterality: Right;    CARPAL TUNNEL RELEASE      Bilateral    COLONOSCOPY  N/A 08/05/2022    Procedure: COLONOSCOPY;  Surgeon: Zaida Ansari MD;  Location: [facility] ENDO;  Service: Endoscopy;  Laterality: N/A;    ESOPHAGEAL MANOMETRY WITH MEASUREMENT OF IMPEDANCE N/A 3/24/2023    Procedure: MANOMETRY, ESOPHAGUS, WITH IMPEDANCE MEASUREMENT;  Surgeon: Sofie Walker MD;  Location: [facility] ENDO (2ND FLR);  Service: Endoscopy;  Laterality: N/A;  r/o rumination and supragastric belching  hold baclofen x3 days, hold doxepin x24 hours prior to procedure    ESOPHAGOGASTRODUODENOSCOPY N/A 08/05/2022    Procedure: EGD (ESOPHAGOGASTRODUODENOSCOPY);  Surgeon: Zaida Ansari MD;  Location: [facility] ENDO;  Service: Endoscopy;  Laterality: N/A;    ESOPHAGOGASTRODUODENOSCOPY N/A 3/24/2023    Procedure: EGD (ESOPHAGOGASTRODUODENOSCOPY);  Surgeon: Sofie Walker MD;  Location: [facility] ENDO (2ND FLR);  Service: Endoscopy;  Laterality: N/A;  Endoflip/Endoscopically placed manometry probe  2nd floor-per Dr. Walker (higher than average risk procedure)  propofol only  full liquid diet x3 days, clear liquid diet x1 day prior to procedure  [facility] and emailed to [email or web address]    EYE SURGERY      Lasik    HERNIA REPAIR      INCISION AND DRAINAGE OF HEMATOMA Right 09/21/2021    Procedure: INCISION AND DRAINAGE, HEMATOMA;  Surgeon: Felix Levin MD;  Location: [facility] OR;  Service: Orthopedics;  Laterality: Right;    INSERTION OF ANTIBIOTIC SPACER Right 06/01/2021    Procedure: INSERTION, ANTIBIOTIC SPACER;  Surgeon: Felix Levin MD;  Location: [facility] OR;  Service: Orthopedics;  Laterality: Right;    JOINT REPLACEMENT      KNEE ARTHROPLASTY Left 08/04/2020    Procedure: ARTHROPLASTY, KNEE;  Surgeon: Felix Levin MD;  Location: [facility] OR;  Service: Orthopedics;  Laterality: Left;    KNEE ARTHROPLASTY Right 03/23/2021    Procedure: ARTHROPLASTY, KNEE;  Surgeon: Felix Levin MD;  Location: [facility] OR;  Service: Orthopedics;  Laterality: Right;    KNEE ARTHROSCOPY W/  MENISCECTOMY Left 10/15/2019    Procedure: ARTHROSCOPY, KNEE, WITH MENISCECTOMY;  Surgeon: Felix Levin MD;  Location: [facility] OR;  Service: Orthopedics;  Laterality: Left;  Medial and Lateral Meniscectomy    KNEE ARTHROSCOPY W/ MENISCECTOMY Right 11/22/2019    Procedure: ARTHROSCOPY, KNEE, WITH MENISCECTOMY;  Surgeon: Felix Levin MD;  Location: [facility] OR;  Service: Orthopedics;  Laterality: Right;    KNEE SURGERY      osmar    MYOTOMY, PERORAL, ENDOSCOPIC N/A 10/2/2023    Procedure: MYOTOMY, PERORAL, ENDOSCOPIC POEM;  Surgeon: Iker Méndez Jr., MD;  Location: [facility] OR [address] [place] [address];  Service: General;  Laterality: N/A;    NISSEN FUNDOPLICATION      X 2    REVERSE TOTAL SHOULDER ARTHROPLASTY Left 02/11/2020    Procedure: ARTHROPLASTY, SHOULDER, TOTAL, REVERSE;  Surgeon: Felix Lvein MD;  Location: [facility] OR;  Service: Orthopedics;  Laterality: Left;  [place]    revision of gastrojejunostomy  [facility]/10/[facility]21    [facility] [place]    REVISION OF KNEE ARTHROPLASTY Right 09/07/2021    Procedure: REVISION, ARTHROPLASTY, KNEE;  Surgeon: Felix Levin MD;  Location: [facility] OR;  Service: Orthopedics;  Laterality: Right;    ROTATOR CUFF REPAIR      right    RJ-EN-Y PROCEDURE N/A 05/2021    SLEEVE GASTROPLASTY  12/2013       Current Outpatient Medications   Medication Sig    albuterol (PROVENTIL/VENTOLIN HFA) 90 mcg/actuation inhaler INHALE 2 PUFFS BY MOUTH FOUR TIMES A DAY AS NEEDED FOR BREATHING    ascorbic acid, vitamin C, (VITAMIN C) 500 MG tablet 500 mg.    cyanocobalamin (VITAMIN B-12) 1000 MCG tablet Take 1,000 mcg by mouth once daily.     diclofenac sodium (VOLTAREN) 1 % Gel Apply topically daily as needed.    doxepin (SINEQUAN) 50 MG capsule 50 mg.    EScitalopram oxalate (LEXAPRO) 20 MG tablet 20 mg.    LIDOcaine (LIDODERM) 5 % APPLY 1 PATCH TOPICALLY EVERY DAY FOR PAIN. WEAR FOR 12 HOURS, THEN REMOVE. DO NOT APPLY NEW PATCH FOR AT LEAST 12 HOURS.    losartan-hydrochlorothiazide 100-25 mg  (HYZAAR) 100-25 mg per tablet 1 tablet.    methyl salicylate-menthol 15-10% 15-10 % Crea APPLY LIBERAL AMOUNT TOPICALLY FOUR TIMES A DAY AS NEEDED    naloxone (NARCAN) 4 mg/actuation Spry SMARTSIG:Spray(s) Both Nares    omeprazole (PRILOSEC) 40 MG capsule 40 mg.    ondansetron (ZOFRAN) 4 MG tablet Take 1 tablet (4 mg total) by mouth every 6 (six) hours as needed for Nausea.    oxyCODONE-acetaminophen (PERCOCET) 5-325 mg per tablet Take 1 tablet by mouth every 6 (six) hours as needed for Pain.    peg 400-propylene glycol 0.4-0.3 % Drop INSTILL ONE DROP IN EACH EYE FOUR TIMES A DAY AS NEEDED FOR DRY EYES    vitamin D (VITAMIN D3) 1000 units Tab Take 2,000 Units by mouth once daily.    aspirin 81 MG Chew Take 1 tablet (81 mg total) by mouth 2 (two) times daily.     No current facility-administered medications for this visit.       Review of patient's allergies indicates:   Allergen Reactions    Hydrocodone Hives       Family History   Problem Relation Age of Onset    Arthritis Mother     Hypertension Father     Kidney disease Father     Heart disease Father     COPD Father     Pancreatic cancer Maternal Grandfather     Colon cancer Paternal Grandfather     Colon polyps Neg Hx     Crohn's disease Neg Hx     Ulcerative colitis Neg Hx     Stomach cancer Neg Hx     Esophageal cancer Neg Hx        Social History     Socioeconomic History    Marital status: Single   Tobacco Use    Smoking status: Never    Smokeless tobacco: Current     Types: Chew    Tobacco comments:     rarely   Substance and Sexual Activity    Alcohol use: Yes     Comment: occasionally    Drug use: No    Sexual activity: Not Currently       Chief Complaint:   Chief Complaint   Patient presents with    Right Shoulder - Pain    Follow-up     Follow up right shoulder       Date of surgery:  March 17, 2023    History of present illness:  58-year-old male who underwent right arthroscopic biceps tenodesis and revision rotator cuff repair.  MRI did not show  obvious full-thickness recurrent tearing of the rotator cuff.  We tried a subacromial injection 6 weeks ago and it did not really.  Continued to have significant pain.  Pain is 7/10.  Pain at night.  Not improving.    Review of Systems:    Musculoskeletal:  See HPI        Physical Examination:    Vital Signs:    Vitals:    12/04/23 1437   Resp: 18           Body mass index is 32.82 kg/m².    This a well-developed, well nourished patient in no acute distress.  They are alert and oriented and cooperative to examination.  Pt. walks without an antalgic gait.      Examination of the right shoulder shows well-healed surgical portals.  No erythema or drainage.  Patient is neurovascular intact.  Full range of motion with just a little tightness in the biceps and front of his shoulder.  Positive Gerardo deformity.    Heart is regular rate without obvious murmurs   Normal respiratory effort without audible wheezing  Abdomen is soft and nontender     MRI of the right shoulder is reviewed and interpreted:Status post rotator cuff repair.  Tendinosis and extensive undersurface fraying of the supraspinatus and infraspinatus tendons without recurrent high-grade tear.  Moderate subscapularis tendinosis and interstitial tearing.  Status post biceps tenodesis.      Assessment::  Status post right revision rotator cuff repair and biceps tenodesis   Possible recurrent right rotator cuff tear      Plan:  Reviewed the MRI with him.  Talked about possible arthroscopic cleanup if still bothering him at that time versus revision repair with possible graft augmentation.  Risks, benefits, and alternatives to the procedure were explained to the patient including but not limited to damage to nerves, arteries, blood vessels, bones, tendons, ligaments, stiffness, instability, infection, permanent limb dysfunction, DVT, PE, as well as general anesthetic complications including seizure, stroke, heart attack and even death. The patient understood  these risks and wished to proceed and signed the informed consent.       This note was created using [employer] voice recognition software that occasionally misinterpreted phrases or words.

## 2023-12-05 DIAGNOSIS — M75.100 ROTATOR CUFF SYNDROME, UNSPECIFIED LATERALITY: ICD-10-CM

## 2023-12-05 DIAGNOSIS — Z01.818 PRE-OP TESTING: ICD-10-CM

## 2023-12-05 DIAGNOSIS — M75.21 BICEPS TENDINITIS, RIGHT: Primary | ICD-10-CM

## 2023-12-05 RX ORDER — MUPIROCIN 20 MG/G
OINTMENT TOPICAL
Status: CANCELLED | OUTPATIENT
Start: 2023-12-05

## 2023-12-06 ENCOUNTER — OFFICE VISIT (OUTPATIENT)
Dept: SURGERY | Facility: CLINIC | Age: 58
End: 2023-12-06
Payer: OTHER GOVERNMENT

## 2023-12-06 VITALS
DIASTOLIC BLOOD PRESSURE: 90 MMHG | SYSTOLIC BLOOD PRESSURE: 147 MMHG | HEIGHT: 72 IN | WEIGHT: 234.44 LBS | HEART RATE: 76 BPM | BODY MASS INDEX: 31.75 KG/M2

## 2023-12-06 DIAGNOSIS — Z09 POSTOP CHECK: Primary | ICD-10-CM

## 2023-12-06 PROCEDURE — 99024 PR POST-OP FOLLOW-UP VISIT: ICD-10-PCS | Mod: ,,, | Performed by: SURGERY

## 2023-12-06 PROCEDURE — 99214 OFFICE O/P EST MOD 30 MIN: CPT | Mod: PBBFAC | Performed by: SURGERY

## 2023-12-06 PROCEDURE — 99999 PR PBB SHADOW E&M-EST. PATIENT-LVL IV: ICD-10-PCS | Mod: PBBFAC,,, | Performed by: SURGERY

## 2023-12-06 PROCEDURE — 99024 POSTOP FOLLOW-UP VISIT: CPT | Mod: ,,, | Performed by: SURGERY

## 2023-12-06 PROCEDURE — 99999 PR PBB SHADOW E&M-EST. PATIENT-LVL IV: CPT | Mod: PBBFAC,,, | Performed by: SURGERY

## 2023-12-06 NOTE — PROGRESS NOTES
[facility]  Post Op Visit    SUBJECTIVE:  Himanshu Connor II is a 58 y.o. male who presents to clinic today for post op follow up after Myotomy, Peroral, Endoscopic Poem on 10/2/2023 . He presents for 1 month follow up to evaluate how his tolerance for solid foods has been progressing after surgery. He still has trouble with solid, dry proteins without sauces as well as trouble swallowing his omeprazole capsule. Primary symptom is these items sticking in his throat . He has been supplementing his protein intake with protein shakes and has not noticed any weight gain.    OBJECTIVE:  Vitals:    12/06/23 1016   BP: (!) 147/90   Pulse: 76     Body mass index is 31.8 kg/m².    General: male in NAD. Not toxic appearing.   HENT: Normocephalic and atraumatic. EOM intact.   Pulmonary: No respiratory distress. Effort normal.  Cardiovascular: Regular rate.   Abdomen: soft, non-tender, non-distended  Skin: Well healed incision. No erythema, drainage, or increased warmth noted.   MSK: normal range of motion  Neurological: No focal deficit present; alert and oriented to person, place, and time.  Psychiatric: normal mood and behavior      Path:   Lab Results   Component Value Date    FPATHDX  03/24/2023     1. Esophagus, mid and proximal, biopsy:  - Squamous mucosa with no diagnostic histopathologic alterations  - Negative for active inflammation and increased intraepithelial eosinophils           ASSESSMENT/PLAN:    Himanshu Connor II is a 58 y.o. y/o male w/ PMH of HTN, JAH, arthritis, GERD, and Achalasia s/p POEM.  who presents to clinic today for f/u s/p Myotomy, Peroral, Endoscopic Poem on 10/2/2023.     - He will follow up in clinic in 4 months to reassess his symptoms.  - Repeat EGD at 4 months which will be 6 months postoperatively at that point. If still having trouble with dysphagia to solids can consider possible dilation at that time.    Agueda Barkley MD   General Surgery PGY-1  12/6/2023    I have  personally taken the history and examined this patient and agree with the resident's note as stated above.         Iker Méndez MD

## 2023-12-12 ENCOUNTER — HOSPITAL ENCOUNTER (OUTPATIENT)
Dept: PREADMISSION TESTING | Facility: HOSPITAL | Age: 58
Discharge: HOME | End: 2023-12-12
Attending: ORTHOPAEDIC SURGERY
Payer: OTHER GOVERNMENT

## 2023-12-12 DIAGNOSIS — Z01.818 PRE-OP TESTING: ICD-10-CM

## 2023-12-12 DIAGNOSIS — M75.100 ROTATOR CUFF SYNDROME, UNSPECIFIED LATERALITY: ICD-10-CM

## 2023-12-12 DIAGNOSIS — M75.21 BICEPS TENDINITIS, RIGHT: ICD-10-CM

## 2023-12-12 RX ORDER — CETIRIZINE HYDROCHLORIDE 10 MG/1
10 TABLET ORAL
COMMUNITY
Start: 2023-07-03

## 2023-12-12 RX ORDER — FERROUS SULFATE 325(65) MG
TABLET, DELAYED RELEASE (ENTERIC COATED) ORAL
COMMUNITY
Start: 2022-12-15 | End: 2023-12-16

## 2023-12-12 RX ORDER — CYCLOBENZAPRINE HCL 5 MG
TABLET ORAL
COMMUNITY
Start: 2023-09-27 | End: 2024-03-11 | Stop reason: ALTCHOICE

## 2023-12-12 RX ORDER — PREGABALIN 100 MG/1
CAPSULE ORAL
COMMUNITY
Start: 2023-10-04 | End: 2024-02-05

## 2023-12-12 RX ORDER — MULTIVITAMIN WITH IRON
TABLET ORAL
COMMUNITY
Start: 2023-07-27

## 2023-12-12 RX ORDER — ESZOPICLONE 3 MG/1
1 TABLET, FILM COATED ORAL NIGHTLY PRN
COMMUNITY
Start: 2023-11-06

## 2023-12-12 RX ORDER — TADALAFIL 20 MG/1
20 TABLET ORAL
COMMUNITY
Start: 2023-08-17 | End: 2024-02-05

## 2023-12-12 RX ORDER — AZELASTINE 1 MG/ML
SPRAY, METERED NASAL
COMMUNITY
Start: 2023-07-03

## 2023-12-12 RX ORDER — BUSPIRONE HYDROCHLORIDE 10 MG/1
5 TABLET ORAL
COMMUNITY
Start: 2023-11-06

## 2023-12-12 NOTE — DISCHARGE INSTRUCTIONS
To confirm, Your doctor has instructed you that surgery is scheduled for: 12/15/23 WITH DR. JUSTICE    Please report to [facility], Registration the morning of surgery. You must check-in and receive a wristband before going to your procedure.  [address] [place] [address] [place] [address]    Pre-Op will call the afternoon prior to surgery between 1:00 and 6:00 PM with the final arrival time.  Phone number: [phone]    PLEASE NOTE:  The surgery schedule has many variables which may affect the time of your surgery case.  Family members should be available if your surgery time changes.  Plan to be here the day of your procedure between 4-6 hours.    MEDICATIONS:  TAKE ONLY THESE MEDICATIONS WITH A SMALL SIP OF WATER THE MORNING OF YOUR PROCEDURE:  SEE LIST      DO NOT TAKE THESE MEDICATIONS 5-7 DAYS PRIOR to your procedure or per your surgeon's request:   ASPIRIN, ALEVE, ADVIL, IBUPROFEN, FISH OIL VITAMIN E, HERBALS  (May take Tylenol)    ONLY if you are prescribed any types of blood thinners such as:  Aspirin, Coumadin, Plavix, Pradaxa, Xarelto, Aggrenox, Effient, Eliquis, Savasya, Brilinta, or any other, ask your surgeon whether you should stop taking them and how long before surgery you should stop.  You may also need to verify with the prescribing physician if it is ok to stop your medication.      INSTRUCTIONS IMPORTANT!!  Do not eat or drink anything between midnight and the time of your procedure- this includes gum, mints, and candy.  Do not smoke or drink alcoholic beverages 24 hours prior to your procedure.  Shower the night before AND the morning of your procedure with a Chlorhexidine wash such as Hibiclens or Dial antibacterial soap from the neck down.  Do not get it on your face or in your eyes.  You may use your own shampoo and face wash. This helps your skin to be as bacteria free as possible.    If you wear contact lenses, dentures, hearing aids or glasses, bring a container to put  them in during surgery and give to a family member for safe keeping.  Please leave all jewelry, piercing's and valuables at home. You must remove your false eyelashes prior to surgery.    DO NOT remove hair from the surgery site.  Do not shave the incision site unless you are given specific instructions to do so.    ONLY if you have been diagnosed with sleep apnea please bring your C-PAP machine.  ONLY if you wear home oxygen please bring your portable oxygen tank the day of your procedure.  ONLY if you have a history of OPEN HEART SURGERY you will need a clearance from your Cardiologist per Anesthesia.      ONLY for patients requiring bowel prep, written instructions will be given by your doctor's office.  ONLY if you have a neuro stimulator, please bring the controller with you the morning of surgery  ONLY if a type and screen test is needed before surgery, please return:  If your doctor has scheduled you for an overnight stay, bring a small overnight bag with any personal items you need.  Make arrangements in advance for transportation home by a responsible adult.  It is not safe to drive a vehicle during the 24 hours after anesthesia.          All  facilities and properties are tobacco free.  Smoking is NOT allowed.   If you have any questions about these instructions, call Pre-Op Admit  Nursing at [phone] or the Pre-Op Day Surgery Unit at [phone].

## 2023-12-13 DIAGNOSIS — M75.21 BICEPS TENDINITIS, RIGHT: Primary | ICD-10-CM

## 2023-12-13 RX ORDER — OXYCODONE AND ACETAMINOPHEN 5; 325 MG/1; MG/1
1 TABLET ORAL EVERY 6 HOURS PRN
Qty: 14 TABLET | Refills: 0 | Status: SHIPPED | OUTPATIENT
Start: 2023-12-13 | End: 2024-02-05

## 2023-12-13 RX ORDER — ACETAMINOPHEN 500 MG
1000 TABLET ORAL EVERY 8 HOURS PRN
Qty: 30 TABLET | Refills: 0 | Status: SHIPPED | OUTPATIENT
Start: 2023-12-13 | End: 2024-02-05

## 2023-12-13 RX ORDER — CYCLOBENZAPRINE HCL 10 MG
10 TABLET ORAL 3 TIMES DAILY PRN
Qty: 30 TABLET | Refills: 0 | Status: SHIPPED | OUTPATIENT
Start: 2023-12-13 | End: 2023-12-25

## 2023-12-13 RX ORDER — ONDANSETRON 4 MG/1
4 TABLET, FILM COATED ORAL EVERY 6 HOURS PRN
Qty: 30 TABLET | Refills: 0 | Status: SHIPPED | OUTPATIENT
Start: 2023-12-13 | End: 2024-03-11 | Stop reason: ALTCHOICE

## 2023-12-14 ENCOUNTER — ANESTHESIA EVENT (OUTPATIENT)
Dept: SURGERY | Facility: HOSPITAL | Age: 58
End: 2023-12-14
Payer: OTHER GOVERNMENT

## 2023-12-15 ENCOUNTER — ANESTHESIA (OUTPATIENT)
Dept: SURGERY | Facility: HOSPITAL | Age: 58
End: 2023-12-15
Payer: OTHER GOVERNMENT

## 2023-12-15 ENCOUNTER — HOSPITAL ENCOUNTER (OUTPATIENT)
Facility: HOSPITAL | Age: 58
Discharge: HOME | End: 2023-12-15
Attending: ORTHOPAEDIC SURGERY | Admitting: ORTHOPAEDIC SURGERY
Payer: OTHER GOVERNMENT

## 2023-12-15 VITALS
HEART RATE: 76 BPM | DIASTOLIC BLOOD PRESSURE: 87 MMHG | RESPIRATION RATE: 16 BRPM | HEIGHT: 72 IN | TEMPERATURE: 98 F | OXYGEN SATURATION: 100 % | BODY MASS INDEX: 31.69 KG/M2 | SYSTOLIC BLOOD PRESSURE: 144 MMHG | WEIGHT: 234 LBS

## 2023-12-15 DIAGNOSIS — M75.100 ROTATOR CUFF SYNDROME, UNSPECIFIED LATERALITY: ICD-10-CM

## 2023-12-15 DIAGNOSIS — M75.21 BICEPS TENDINITIS, RIGHT: ICD-10-CM

## 2023-12-15 DIAGNOSIS — Z01.818 PRE-OP TESTING: ICD-10-CM

## 2023-12-15 PROCEDURE — 63600175 PHARM REV CODE 636 W HCPCS

## 2023-12-15 PROCEDURE — 37000009 HC ANESTHESIA EA ADD 15 MINS: Performed by: ORTHOPAEDIC SURGERY

## 2023-12-15 PROCEDURE — 29823 SHO ARTHRS SRG XTNSV DBRDMT: CPT | Mod: RT,,, | Performed by: ORTHOPAEDIC SURGERY

## 2023-12-15 PROCEDURE — 36000711: Performed by: ORTHOPAEDIC SURGERY

## 2023-12-15 PROCEDURE — 63600175 PHARM REV CODE 636 W HCPCS: Performed by: NURSE ANESTHETIST, CERTIFIED REGISTERED

## 2023-12-15 PROCEDURE — 25000003 PHARM REV CODE 250: Performed by: ANESTHESIOLOGY

## 2023-12-15 PROCEDURE — 63600175 PHARM REV CODE 636 W HCPCS: Performed by: ANESTHESIOLOGY

## 2023-12-15 PROCEDURE — 71000033 HC RECOVERY, INTIAL HOUR: Performed by: ORTHOPAEDIC SURGERY

## 2023-12-15 PROCEDURE — 64415 NJX AA&/STRD BRCH PLXS IMG: CPT | Mod: 59,RT,, | Performed by: ANESTHESIOLOGY

## 2023-12-15 PROCEDURE — 63600175 PHARM REV CODE 636 W HCPCS: Performed by: ORTHOPAEDIC SURGERY

## 2023-12-15 PROCEDURE — D9220A PRA ANESTHESIA: Mod: CRNA,,, | Performed by: NURSE ANESTHETIST, CERTIFIED REGISTERED

## 2023-12-15 PROCEDURE — 27201423 OPTIME MED/SURG SUP & DEVICES STERILE SUPPLY: Performed by: ORTHOPAEDIC SURGERY

## 2023-12-15 PROCEDURE — 71000015 HC POSTOP RECOV 1ST HR: Performed by: ORTHOPAEDIC SURGERY

## 2023-12-15 PROCEDURE — C1889 IMPLANT/INSERT DEVICE, NOC: HCPCS | Performed by: ORTHOPAEDIC SURGERY

## 2023-12-15 PROCEDURE — C1713 ANCHOR/SCREW BN/BN,TIS/BN: HCPCS | Performed by: ORTHOPAEDIC SURGERY

## 2023-12-15 PROCEDURE — 37000008 HC ANESTHESIA 1ST 15 MINUTES: Performed by: ORTHOPAEDIC SURGERY

## 2023-12-15 PROCEDURE — 29823 PR SHLDR ARTHROSCOP,EXTEN DEBRIDE: ICD-10-PCS | Mod: RT,,, | Performed by: ORTHOPAEDIC SURGERY

## 2023-12-15 PROCEDURE — D9220A PRA ANESTHESIA: ICD-10-PCS | Mod: CRNA,,, | Performed by: NURSE ANESTHETIST, CERTIFIED REGISTERED

## 2023-12-15 PROCEDURE — 27200750 HC INSULATED NEEDLE/ STIMUPLEX: Performed by: ANESTHESIOLOGY

## 2023-12-15 PROCEDURE — 29827 SHO ARTHRS SRG RT8TR CUF RPR: CPT | Mod: 22,RT,, | Performed by: ORTHOPAEDIC SURGERY

## 2023-12-15 PROCEDURE — C9290 INJ, BUPIVACAINE LIPOSOME: HCPCS | Performed by: ANESTHESIOLOGY

## 2023-12-15 PROCEDURE — 64415 PERIPHERAL BLOCK: ICD-10-PCS | Mod: 59,RT,, | Performed by: ANESTHESIOLOGY

## 2023-12-15 PROCEDURE — 64415 NJX AA&/STRD BRCH PLXS IMG: CPT | Performed by: ANESTHESIOLOGY

## 2023-12-15 PROCEDURE — 94799 UNLISTED PULMONARY SVC/PX: CPT | Mod: XB

## 2023-12-15 PROCEDURE — D9220A PRA ANESTHESIA: ICD-10-PCS | Mod: ANES,,, | Performed by: ANESTHESIOLOGY

## 2023-12-15 PROCEDURE — D9220A PRA ANESTHESIA: Mod: ANES,,, | Performed by: ANESTHESIOLOGY

## 2023-12-15 PROCEDURE — 71000039 HC RECOVERY, EACH ADD'L HOUR: Performed by: ORTHOPAEDIC SURGERY

## 2023-12-15 PROCEDURE — 36000710: Performed by: ORTHOPAEDIC SURGERY

## 2023-12-15 PROCEDURE — 29827 PR SHLDR ARTHROSCOP,SURG,W/ROTAT CUFF REPR: ICD-10-PCS | Mod: 22,RT,, | Performed by: ORTHOPAEDIC SURGERY

## 2023-12-15 PROCEDURE — 25000003 PHARM REV CODE 250: Performed by: NURSE ANESTHETIST, CERTIFIED REGISTERED

## 2023-12-15 DEVICE — IMPLANT ARTHSCP BIOINDUCTV LG: Type: IMPLANTABLE DEVICE | Site: SHOULDER | Status: FUNCTIONAL

## 2023-12-15 DEVICE — ANCHOR TENDON 8: Type: IMPLANTABLE DEVICE | Site: SHOULDER | Status: FUNCTIONAL

## 2023-12-15 DEVICE — SYS ANCHOR SUTURE 4.75MM SWVL: Type: IMPLANTABLE DEVICE | Site: SHOULDER | Status: FUNCTIONAL

## 2023-12-15 DEVICE — ANCHOR BONE ARTHSCP DEL SYS: Type: IMPLANTABLE DEVICE | Site: SHOULDER | Status: FUNCTIONAL

## 2023-12-15 RX ORDER — LIDOCAINE HYDROCHLORIDE 10 MG/ML
0.5 INJECTION, SOLUTION EPIDURAL; INFILTRATION; INTRACAUDAL; PERINEURAL ONCE
Status: ACTIVE | OUTPATIENT
Start: 2023-12-15

## 2023-12-15 RX ORDER — SUCCINYLCHOLINE CHLORIDE 20 MG/ML
INJECTION INTRAMUSCULAR; INTRAVENOUS
Status: DISCONTINUED | OUTPATIENT
Start: 2023-12-15 | End: 2023-12-15

## 2023-12-15 RX ORDER — LIDOCAINE HYDROCHLORIDE 20 MG/ML
INJECTION INTRAVENOUS
Status: DISCONTINUED | OUTPATIENT
Start: 2023-12-15 | End: 2023-12-15

## 2023-12-15 RX ORDER — EPINEPHRINE 1 MG/ML
INJECTION, SOLUTION, CONCENTRATE INTRAVENOUS
Status: DISCONTINUED | OUTPATIENT
Start: 2023-12-15 | End: 2023-12-15 | Stop reason: HOSPADM

## 2023-12-15 RX ORDER — ROCURONIUM BROMIDE 10 MG/ML
INJECTION, SOLUTION INTRAVENOUS
Status: DISCONTINUED | OUTPATIENT
Start: 2023-12-15 | End: 2023-12-15

## 2023-12-15 RX ORDER — PROCHLORPERAZINE EDISYLATE 5 MG/ML
5 INJECTION INTRAMUSCULAR; INTRAVENOUS EVERY 4 HOURS PRN
Status: ACTIVE | OUTPATIENT
Start: 2023-12-15

## 2023-12-15 RX ORDER — ONDANSETRON 2 MG/ML
INJECTION INTRAMUSCULAR; INTRAVENOUS
Status: DISCONTINUED | OUTPATIENT
Start: 2023-12-15 | End: 2023-12-15

## 2023-12-15 RX ORDER — ACETAMINOPHEN 10 MG/ML
INJECTION, SOLUTION INTRAVENOUS
Status: DISCONTINUED | OUTPATIENT
Start: 2023-12-15 | End: 2023-12-15

## 2023-12-15 RX ORDER — HYDROCODONE BITARTRATE AND ACETAMINOPHEN 5; 325 MG/1; MG/1
1 TABLET ORAL EVERY 4 HOURS PRN
Status: CANCELLED | OUTPATIENT
Start: 2023-12-15

## 2023-12-15 RX ORDER — PROPOFOL 10 MG/ML
VIAL (ML) INTRAVENOUS
Status: DISCONTINUED | OUTPATIENT
Start: 2023-12-15 | End: 2023-12-15

## 2023-12-15 RX ORDER — FENTANYL CITRATE 50 UG/ML
INJECTION, SOLUTION INTRAMUSCULAR; INTRAVENOUS
Status: DISCONTINUED | OUTPATIENT
Start: 2023-12-15 | End: 2023-12-15

## 2023-12-15 RX ORDER — DEXAMETHASONE SODIUM PHOSPHATE 4 MG/ML
INJECTION, SOLUTION INTRA-ARTICULAR; INTRALESIONAL; INTRAMUSCULAR; INTRAVENOUS; SOFT TISSUE
Status: DISCONTINUED | OUTPATIENT
Start: 2023-12-15 | End: 2023-12-15

## 2023-12-15 RX ORDER — FENTANYL CITRATE 50 UG/ML
25 INJECTION, SOLUTION INTRAMUSCULAR; INTRAVENOUS EVERY 5 MIN PRN
Status: COMPLETED | OUTPATIENT
Start: 2023-12-15 | End: 2023-12-15

## 2023-12-15 RX ORDER — BUPIVACAINE HYDROCHLORIDE 5 MG/ML
INJECTION, SOLUTION EPIDURAL; INTRACAUDAL
Status: DISCONTINUED | OUTPATIENT
Start: 2023-12-15 | End: 2023-12-15

## 2023-12-15 RX ORDER — CEFAZOLIN SODIUM 2 G/50ML
2 SOLUTION INTRAVENOUS
Status: COMPLETED | OUTPATIENT
Start: 2023-12-15 | End: 2023-12-15

## 2023-12-15 RX ORDER — MUPIROCIN 20 MG/G
OINTMENT TOPICAL
Status: DISCONTINUED | OUTPATIENT
Start: 2023-12-15 | End: 2023-12-15 | Stop reason: HOSPADM

## 2023-12-15 RX ORDER — MIDAZOLAM HYDROCHLORIDE 1 MG/ML
INJECTION, SOLUTION INTRAMUSCULAR; INTRAVENOUS
Status: DISCONTINUED | OUTPATIENT
Start: 2023-12-15 | End: 2023-12-15

## 2023-12-15 RX ORDER — DIPHENHYDRAMINE HYDROCHLORIDE 50 MG/ML
25 INJECTION INTRAMUSCULAR; INTRAVENOUS EVERY 6 HOURS PRN
Status: ACTIVE | OUTPATIENT
Start: 2023-12-15

## 2023-12-15 RX ORDER — SODIUM CHLORIDE, SODIUM LACTATE, POTASSIUM CHLORIDE, CALCIUM CHLORIDE 600; 310; 30; 20 MG/100ML; MG/100ML; MG/100ML; MG/100ML
INJECTION, SOLUTION INTRAVENOUS CONTINUOUS
Status: ACTIVE | OUTPATIENT
Start: 2023-12-15

## 2023-12-15 RX ORDER — HYDROMORPHONE HYDROCHLORIDE 2 MG/ML
0.2 INJECTION, SOLUTION INTRAMUSCULAR; INTRAVENOUS; SUBCUTANEOUS EVERY 5 MIN PRN
Status: DISPENSED | OUTPATIENT
Start: 2023-12-15

## 2023-12-15 RX ORDER — OXYCODONE HYDROCHLORIDE 5 MG/1
5 TABLET ORAL
Status: DISPENSED | OUTPATIENT
Start: 2023-12-15

## 2023-12-15 RX ORDER — MEPERIDINE HYDROCHLORIDE 50 MG/ML
12.5 INJECTION INTRAMUSCULAR; INTRAVENOUS; SUBCUTANEOUS ONCE
Status: ACTIVE | OUTPATIENT
Start: 2023-12-15 | End: 2023-12-16

## 2023-12-15 RX ORDER — ONDANSETRON 2 MG/ML
4 INJECTION INTRAMUSCULAR; INTRAVENOUS ONCE
Status: ACTIVE | OUTPATIENT
Start: 2023-12-15

## 2023-12-15 RX ADMIN — MIDAZOLAM 2 MG: 1 INJECTION INTRAMUSCULAR; INTRAVENOUS at 08:12

## 2023-12-15 RX ADMIN — BUPIVACAINE HYDROCHLORIDE 5 ML: 5 INJECTION, SOLUTION EPIDURAL; INTRACAUDAL; PERINEURAL at 08:12

## 2023-12-15 RX ADMIN — HYDROMORPHONE HYDROCHLORIDE 0.2 MG: 2 INJECTION INTRAMUSCULAR; INTRAVENOUS; SUBCUTANEOUS at 11:12

## 2023-12-15 RX ADMIN — GLYCOPYRROLATE 0.2 MG: 0.2 INJECTION, SOLUTION INTRAMUSCULAR; INTRAVITREAL at 09:12

## 2023-12-15 RX ADMIN — SODIUM CHLORIDE, SODIUM GLUCONATE, SODIUM ACETATE, POTASSIUM CHLORIDE AND MAGNESIUM CHLORIDE: 526; 502; 368; 37; 30 INJECTION, SOLUTION INTRAVENOUS at 07:12

## 2023-12-15 RX ADMIN — FENTANYL CITRATE 25 MCG: 50 INJECTION INTRAMUSCULAR; INTRAVENOUS at 11:12

## 2023-12-15 RX ADMIN — SODIUM CHLORIDE, SODIUM GLUCONATE, SODIUM ACETATE, POTASSIUM CHLORIDE AND MAGNESIUM CHLORIDE: 526; 502; 368; 37; 30 INJECTION, SOLUTION INTRAVENOUS at 09:12

## 2023-12-15 RX ADMIN — FENTANYL CITRATE 25 MCG: 50 INJECTION INTRAMUSCULAR; INTRAVENOUS at 10:12

## 2023-12-15 RX ADMIN — BUPIVACAINE 10 ML: 13.3 INJECTION, SUSPENSION, LIPOSOMAL INFILTRATION at 08:12

## 2023-12-15 RX ADMIN — SUCCINYLCHOLINE CHLORIDE 180 MG: 20 INJECTION, SOLUTION INTRAMUSCULAR; INTRAVENOUS at 08:12

## 2023-12-15 RX ADMIN — PROPOFOL 200 MG: 10 INJECTION, EMULSION INTRAVENOUS at 08:12

## 2023-12-15 RX ADMIN — ACETAMINOPHEN 1000 MG: 10 INJECTION, SOLUTION INTRAVENOUS at 09:12

## 2023-12-15 RX ADMIN — FENTANYL CITRATE 50 MCG: 50 INJECTION, SOLUTION INTRAMUSCULAR; INTRAVENOUS at 08:12

## 2023-12-15 RX ADMIN — ONDANSETRON 4 MG: 2 INJECTION INTRAMUSCULAR; INTRAVENOUS at 09:12

## 2023-12-15 RX ADMIN — CEFAZOLIN SODIUM 2 G: 2 SOLUTION INTRAVENOUS at 09:12

## 2023-12-15 RX ADMIN — LIDOCAINE HYDROCHLORIDE 100 MG: 20 INJECTION, SOLUTION INTRAVENOUS at 08:12

## 2023-12-15 RX ADMIN — OXYCODONE 5 MG: 5 TABLET ORAL at 10:12

## 2023-12-15 RX ADMIN — ROCURONIUM BROMIDE 10 MG: 10 INJECTION, SOLUTION INTRAVENOUS at 08:12

## 2023-12-15 RX ADMIN — DEXAMETHASONE SODIUM PHOSPHATE 4 MG: 4 INJECTION, SOLUTION INTRA-ARTICULAR; INTRALESIONAL; INTRAMUSCULAR; INTRAVENOUS; SOFT TISSUE at 09:12

## 2023-12-15 NOTE — CARE UPDATE
12/15/23 0727   Patient Assessment/Suction   Level of Consciousness (AVPU) alert   PRE-TX-O2   Device (Oxygen Therapy) room air   Incentive Spirometer   $ Incentive Spirometer Charges preop instruction   Incentive Spirometer Predicted Level (mL) 2320   Administration (IS) mouthpiece utilized;instruction provided, initial   Number of Repetitions (IS) 2   Level Incentive Spirometer (mL) 4000   Patient Tolerance (IS) good;no adverse signs/symptoms present

## 2023-12-15 NOTE — PROGRESS NOTES
Criteria per anesthesia for transfer to post op . Tolerating po fluids Pain addressed with po and IV analgesics Transferred per stretcher to phase 2 Report given to Laura

## 2023-12-15 NOTE — ANESTHESIA PROCEDURE NOTES
Peripheral Block    Patient location during procedure: pre-op   Block not for primary anesthetic.  Reason for block: at surgeon's request and post-op pain management   Post-op Pain Location: right shoulder   Start time: 12/15/2023 8:10 AM  Timeout: 12/15/2023 8:10 AM   End time: 12/15/2023 8:16 AM    Staffing  Authorizing Provider: Guevara Reid MD  Performing Provider: Guevara Reid MD    Staffing  Performed by: Guevara Reid MD  Authorized by: Guevara Reid MD    Preanesthetic Checklist  Completed: patient identified, IV checked, site marked, risks and benefits discussed, surgical consent, monitors and equipment checked, pre-op evaluation and timeout performed  Peripheral Block  Patient position: sitting  Prep: ChloraPrep  Patient monitoring: heart rate, cardiac monitor, continuous pulse ox, continuous capnometry and frequent blood pressure checks  Block type: interscalene  Laterality: right  Injection technique: single shot  Needle  Needle type: Stimuplex   Needle gauge: 22 G  Needle length: 4 in  Needle localization: anatomical landmarks and ultrasound guidance   -ultrasound image captured on disc.  Assessment  Injection assessment: negative aspiration, negative parasthesia and local visualized surrounding nerve  Paresthesia pain: none  Heart rate change: no  Slow fractionated injection: yes  Pain Tolerance: comfortable throughout block and no complaints      Additional Notes  VSS.  [facility] RN monitoring vitals throughout procedure.  Patient tolerated procedure well.

## 2023-12-15 NOTE — ANESTHESIA PREPROCEDURE EVALUATION
12/15/2023  Himanshu Connor II is a 58 y.o., male.      Pre-op Assessment    I have reviewed the Patient Summary Reports.     I have reviewed the Nursing Notes. I have reviewed the NPO Status.   I have reviewed the Medications.     Review of Systems  Anesthesia Hx:  No problems with previous Anesthesia                Social:  Non-Smoker       Cardiovascular:     Hypertension, well controlled              ECG has been reviewed.                          Pulmonary:    Asthma asymptomatic   Sleep Apnea                Renal/:  Renal/ Normal                 Hepatic/GI:     GERD, well controlled   Gastric bypass          Musculoskeletal:  Arthritis    knee arthritis, shoulder             Neurological:    Neuromuscular Disease,             Chronic Pain Syndrome                         Endocrine:        Obesity / BMI > 30  Psych:  Psychiatric History  depression                Physical Exam  General: Well nourished    Airway:  Mallampati: II   Mouth Opening: Normal  TM Distance: Normal  Tongue: Normal  Neck ROM: Normal ROM    Dental:  Intact    Chest/Lungs:  Clear to auscultation, Normal Respiratory Rate        Anesthesia Plan  Type of Anesthesia, risks & benefits discussed:    Anesthesia Type: Gen ETT  Intra-op Monitoring Plan: Standard ASA Monitors  Post Op Pain Control Plan: multimodal analgesia, IV/PO Opioids PRN and peripheral nerve block  Induction:  IV and Inhalation  Informed Consent: Informed consent signed with the Patient and all parties understand the risks and agree with anesthesia plan.  All questions answered.   ASA Score: 3    Ready For Surgery From Anesthesia Perspective.     .

## 2023-12-15 NOTE — ANESTHESIA POSTPROCEDURE EVALUATION
Anesthesia Post Evaluation    Patient: Himanshu Connor II    Procedure(s) Performed: Procedure(s) (LRB):  REPAIR, ROTATOR CUFF, ARTHROSCOPIC, WITH PATCH (Right)  EXTENSIVE DEBRIDEMENT, SHOULDER, ARTHROSCOPIC (Right)    Final Anesthesia Type: general      Patient location during evaluation: PACU  Patient participation: Yes- Able to Participate  Level of consciousness: sedated and awake  Post-procedure vital signs: reviewed and stable  Pain management: adequate  Airway patency: patent    PONV status at discharge: No PONV  Anesthetic complications: no      Cardiovascular status: hypertensive, blood pressure returned to baseline and hemodynamically stable  Respiratory status: spontaneous ventilation  Hydration status: euvolemic  Follow-up not needed.              Vitals Value Taken Time   /87 12/15/23 1215   Temp 36.6 °C (97.9 °F) 12/15/23 1150   Pulse 76 12/15/23 1215   Resp 16 12/15/23 1215   SpO2 100 % 12/15/23 1215         Event Time   Out of Recovery 11:58:00         Pain/Yovany Score: Pain Rating Prior to Med Admin: 5 (12/15/2023 11:45 AM)  Pain Rating Post Med Admin: 5 (12/15/2023 11:45 AM)  Yovany Score: 10 (12/15/2023 12:26 PM)

## 2023-12-15 NOTE — TRANSFER OF CARE
Anesthesia Transfer of Care Note    Patient: Himanshu Connor II    Procedure(s) Performed: Procedure(s) (LRB):  REPAIR, ROTATOR CUFF, ARTHROSCOPIC, WITH PATCH (Right)  EXTENSIVE DEBRIDEMENT, SHOULDER, ARTHROSCOPIC (Right)    Patient location: PACU    Anesthesia Type: general    Transport from OR: Transported from OR on 2-3 L/min O2 by NC with adequate spontaneous ventilation    Post pain: adequate analgesia    Post assessment: no apparent anesthetic complications    Post vital signs: stable    Level of consciousness: awake    Nausea/Vomiting: no nausea/vomiting    Complications: none    Transfer of care protocol was followed      Last vitals: Visit Vitals  BP (!) 142/96   Pulse 69   Temp 36.7 °C (98 °F) (Skin)   Resp 16   Ht 6' (1.829 m)   Wt 106.1 kg (234 lb)   SpO2 99%   BMI 31.74 kg/m²

## 2023-12-15 NOTE — OP NOTE
[facility]  Orthopedic Surgery  Operative Note    SUMMARY     Date of Procedure: 12/15/2023     Procedure: Procedure(s) (LRB):  Revision REPAIR, ROTATOR CUFF, ARTHROSCOPIC, WITH bio inductive implant /PATCH (Right)  EXTENSIVE DEBRIDEMENT, SHOULDER, ARTHROSCOPIC (Right)       Surgeon(s) and Role:     * Felix Levin MD - Primary    Assistant: Abel OLIVAS    Pre-Operative Diagnosis: Biceps tendinitis, right [M75.21]  Rotator cuff syndrome, unspecified laterality [M75.100]  Pre-op testing [Z01.818]    Post-Operative Diagnosis: Post-Op Diagnosis Codes:     * Biceps tendinitis, right [M75.21]     * Rotator cuff syndrome, unspecified laterality [M75.100]     * Pre-op testing [Z01.818]    Anesthesia: General/Regional      Description of the Findings of the Procedure: Diagnostic arthroscopy findings on the patient's Right shoulder showed some degenerative labral tearing circumferentially.  Patient had had a previous biceps tenotomy so no intra-articular portion was identified.  Patient had intact subscapularis.  The patient had recurrent tearing of the supraspinatus and infraspinatus with some visible anchors and suture.  Minimal retraction was noted.  Total tear width was about 4 centimeters.  Articular surface showed some early arthritic changes.  In the subacromial space, the patient had bursitis in the subacromial space with a type 2 acromion. Ac joint showed minimal arthritic changes.       Complications: No    Estimated Blood Loss (EBL): 30 mL           Implants:   Implant Name Type Inv. Item Serial No.  Lot No. LRB No. Used Action   ANCHOR SWIVELOCK KNTLS BLUE #2 - [id number]  ANCHOR SWIVELOCK KNTLS BLUE #2  [employer] [id number] Right 1 Implanted and Explanted   SYS ANCHOR SUTURE 4.75MM SWVL - [id number]  SYS ANCHOR SUTURE 4.75MM SWVL  [employer] [id number] Right 1 Implanted   IMPLANT ARTHSCP BIOINDUCTV LG - [id number]  IMPLANT ARTHSCP BIOINDUCTV LG  [employer] [id number] Right 1 Implanted   ANCHOR TENDON 8 - [id number]  ANCHOR TENDON 8  [employer] [id number] Right 1 Implanted   ANCHOR BONE ARTHSCP DEL SYS - [id number]  ANCHOR BONE ARTHSCP DEL SYS  [employer] [id number] Right 1 Implanted       Specimens:   Specimen (24h ago, onward)      None                    Condition: Good    Disposition: PACU - hemodynamically stable.    Attestation: I was present and scrubbed for the entire procedure.      Indications for the procedure:A 58 year old male with a history ofRight shoulder pain that failed to resolve with physical therapy, activity modification, injections, and rest.  Patient desired the procedure listed above after MRI was obtained.    PROCEDURE IN DETAIL: Risks, benefits and alternatives of the procedure were   explained to the patient including, but not limited to damage to nerves,   arteries and blood vessels. Also explained risk of re-rupture, nonhealing, infection, DVT,   PE as well as anesthetic complications including seizure, stroke, heart attack   and death. They understood this, signed informed consent. The patient's Right  shoulder was marked prior to coming to the Operating Room. Once there a formal   timeout was done in which correct patient, procedure and operative site were all   correctly identified and confirmed by the entire operating team.  Appropriate preoperative antibiotics was   given prior to surgical incision. General anesthesia was induced. The   patient was placed in lateral decubitus position on a bean bag with his Right upper extremity   hanging in balanced suspension.The arm was suspended with 10 lbs of weight for balanced traction. The extremity was prepped and draped in normal   sterile fashion.A standard posterior portal was then made. A spinal   needle was used to localize an anterior portal within the rotator interval and   this was made as well. We then performed a diagnostic arthroscopy of the   glenohumeral joint through  both the anterior and posterior viewing portals,with the findings listed above.  Shaver was used to perform debridement within the joint.  Some of the frayed labrum was debrided as well as some of the rotator cuff tearing.  Bone was debrided of some soft tissue for later repair.  Some chondral surfaces were debrided back as well to get back to more stable chondral margins.    After this was done, we then proceeded up in the subacromial space   where a spinal needle was used to localize a lateral portal and then this was   made as well. A 4.0 shaver was used to perform a subtotal bursectomy. We then   used the ablator to remove the soft tissue off the undersurface of the acromion.We then thoroughly inspected the cuff on the rotator cuff side. We shaved away any additional adhesions in the anterior, lateral on posterior gutters.  Further debridement of the cuff was performed.Passport cannulas were placed in the lateral portal and an accessory lateral portal was created right off the acromial edge. Cannula placed here also. Plastic shuttling cannula placed anteriorly. Footprint was prepared using the shaver and faustino. 3 x 4.75 Biocomposite Swivellock anchors loaded with Fibertape were punched and placed for the medial row right off the articular margin. The tapes were passed through the cuff tissue using a Fast Pass Scorpion.  The retention stitches from to the anchors were also passed through the cuff and tied down in a simple manner using a modified [place] knot backed up with 4 alternating half hitches on alternating posts.  The tapes were then secured in the lateral row to 2 x 4.75 Biocomposite Swivellock anchors in a SpeedBridge pattern with one blue and one white in each anchor.  We then placed our [employer] Regeneten bio inductive implant on top of the repair and secured it with the PDS staples all around the implant to get good fixation to the underlying cuff repair.        All excess fluid was removed  from the shoulder. The portals were closed using nylon. Sterile dressing applied.  They were then placed in a cryotherapy cuff with   UltraSling, extubated, awakened and transferred from the Operating Room to   Recovery Room in stable condition.     Postoperative rehab course will be for revision rotator cuff repair with bio inductive implant    Twenty-two modifier is being applied to the case simply because it was the 3rd time rotator cuff repair on this shoulder and advanced techniques were needed including use of a bio inductive implant/patch to try and obtain healing.

## 2023-12-15 NOTE — PLAN OF CARE
Patient and friend given discharge instructions. Educated on post-anesthesia precautions, dressing care and when to notify MD. Instructed when to follow up. Peripheral IV discontinued with catheter intact. Transferred to car via wheelchair and left with friend to home.

## 2023-12-15 NOTE — DISCHARGE INSTRUCTIONS
1.Diet: Ice chips, clear liquids, and then diet as tolerated. Drink plenty of liquids.  2.Ice the area at least three times a day (20 minutes per session).  3.Elevate the extremity above the level of the heart to help reduce swelling.  4.Pain medication can be taken every four to six hours as needed. It is helpful to take pain medication prior to physical therapy.  Use Tylenol or anti-inflammatories for pain as much as possible.  Pain medication is for pain not responding to over-the-counter medications only.  5.Any activity that requires precise thinking or accuracy should be avoided for a minimum of 72 hours after surgery and while on narcotic pain medication. This includes operating machinery and/or driving a vehicle.  6.All sutures/staples will be removed approximately 14 days from the time of surgery. Leave steri-strips (skin tapes) in place until sutures are removed.  7. If skin glue is used instead of stitches, do not apply ointments or solutions to the incision. Keep the incision dry. The skin glue will peel off in 3-4 weeks.  8. Change dressing on the 2nd post-op day. Use gauze for the first 3 days, then start using Band-Aids over the incision sites.   9. All casts, splints, braces, slings, crutches, abduction pillows, etc... Are to be worn as instructed. Use sling at all times except for showering and exercises.  10. Keep the incision dry for 10-14 days. A waterproof dressing (purchase at [employer], [employer], etc) can be used to shower. No bath, pool, hot tub until instructed.  11. Start PT in 14 days. Call office to help with scheduling.  12. Call [phone] with any questions or concerns.        Post op instructions for prevention of DVT  What is deep vein thrombosis?  Deep vein thrombosis (DVT) is the medical term for blood clots in the deep veins of the leg.  These blood clots can be dangerous.  A DVT can block a blood vessel and keep blood from getting where it needs to go.  Another problem is that the clot  can travel to other parts of the body such as the lungs.  A clot that travels to the lungs is called a pulmonary embolus (PE) and can cause serious problems with breathing which can lead to death.  Am I at risk for DVT/PE?  If you are not very active, you are at risk of DVT.  Anyone confined to bed, sitting for long periods of time, recovering from surgery, etc. increases the risk of DVT.  Other risk factors are cancer diagnosis, certain medications, estrogen replacement in any form,older age, obesity, pregnancy, smoking, history of clotting disorders, and dehydration.  How will I know if I have a DVT?  Swelling in the lower leg  Pain  Warmth, redness, hardness or bulging of the vein  If you have any of these symptoms, call your doctors office right away.  Some people will not have any symptoms until the clot moves to the lungs.  What are the symptoms of a PE?  Panting, shortness of breath, or trouble breathing  Sharp, knife-like chest pain when you breathe  Coughing or coughing up blood  Rapid heartbeat  If you have any of these symptoms or get worse quickly, call 911 for emergency treatment.  How can I prevent a DVT?  Avoid long periods of inactivity and dont cross your legs--get up and walk around every hour or so.  Stay active--walking after surgery is highly encouraged.  This means you should get out of the house and walk in the neighborhood.  Going up and down stairs will not impair healing (unless advised against such activity by your doctor).    Drink plenty of noncaffeinated, nonalcoholic fluids each day to prevent dehydration.  Wear special support stockings, if they have been advised by your doctor.  If you travel, stop at least once an hour and walk around.  Avoid smoking (assistance with stopping is available through your healthcare provider)  Always notify your doctor if you are not able to follow the post operative instructions that are given to you at the time of discharge.  It may be necessary to  prescribe one of the medications available to prevent DVT.        Using an Incentive Spirometer    An incentive spirometer is a device that helps you do deep breathing exercises. These exercises expand your lungs, aid in circulation, and help prevent pneumonia. Deep breathing exercises also help you breathe better and improve the function of your lungs by:  Keeping your lungs clear  Strengthening your breathing muscles  Helping prevent respiratory complications or problems  The incentive spirometer gives you a way to take an active part in recover. A nurse or therapist will teach you breathing exercises. To do these exercises, you will breathe in through your mouth and not your nose. The incentive spirometer only works correctly if you breathe in through your mouth.  Steps to clear lungs  Step 1. Exhale normally. Then, inhale normally.  Relax and breathe out.  Step 2. Place your lips tightly around the mouthpiece.  Make sure the device is upright and not tilted.  Step 3. Inhale as much air as you can through the mouthpiece (don't breath through your nose).  Inhale slowly and deeply.  Hold your breath long enough to keep the balls or disk raised for at least 3 to 5 seconds, or as instructed by your healthcare provider.  Some spirometers have an indicator to let you know that you are breathing in too fast. If the indicator goes off, breathe in more slowly.  Step 4. Repeat the exercise regularly.  Do this exercise every hour while you're awake, or as instructed by your healthcare provider.  If you were taught deep breathing and coughing exercises, do them regularly as instructed by your healthcare provider.

## 2023-12-15 NOTE — ANESTHESIA PROCEDURE NOTES
Intubation    Date/Time: 12/15/2023 8:57 AM    Performed by: Chester Gibson CRNA  Authorized by: Guevara Reid MD    Intubation:     Induction:  Intravenous    Intubated:  Postinduction    Mask Ventilation:  Easy mask    Attempts:  1    Attempted By:  CRNA    Method of Intubation:  Video laryngoscopy    Blade:  Shaikh 4    Laryngeal View Grade: Grade I - full view of cords      Difficult Airway Encountered?: No      Complications:  None    Airway Device:  Oral endotracheal tube    Airway Device Size:  7.5    Style/Cuff Inflation:  Cuffed (inflated to minimal occlusive pressure)    Inflation Amount (mL):  6    Tube secured:  23    Secured at:  The lips    Placement Verified By:  Capnometry    Complicating Factors:  None    Findings Post-Intubation:  BS equal bilateral

## 2023-12-15 NOTE — DISCHARGE SUMMARY
[facility]  Discharge Note  Short Stay    Procedure(s) (LRB):  REPAIR, ROTATOR CUFF, ARTHROSCOPIC, WITH PATCH (Right)  EXTENSIVE DEBRIDEMENT, SHOULDER, ARTHROSCOPIC (Right)      OUTCOME: Patient tolerated treatment/procedure well without complication and is now ready for discharge.    DISPOSITION: Home or Self Care    FINAL DIAGNOSIS:  <principal problem not specified>    FOLLOWUP: In clinic    DISCHARGE INSTRUCTIONS:    Discharge Procedure Orders   Diet general     Ice to affected area     Lifting restrictions     No driving, operating heavy equipment or signing legal documents while taking pain medication     Remove dressing in 48 hours     Change dressing (specify)   Order Comments: Dressing change: 1 times per day using waterproof bandaids.     Call MD for:  temperature >100.4     Call MD for:  persistent nausea and vomiting     Call MD for:  severe uncontrolled pain     Call MD for:  difficulty breathing, headache or visual disturbances     Call MD for:  redness, tenderness, or signs of infection (pain, swelling, redness, odor or green/yellow discharge around incision site)     Call MD for:  hives     Call MD for:  persistent dizziness or light-headedness     Call MD for:  extreme fatigue        TIME SPENT ON DISCHARGE: 5 minutes

## 2023-12-28 ENCOUNTER — OFFICE VISIT (OUTPATIENT)
Dept: ORTHOPEDICS | Facility: CLINIC | Age: 58
End: 2023-12-28
Payer: OTHER GOVERNMENT

## 2023-12-28 DIAGNOSIS — M75.121 NONTRAUMATIC COMPLETE TEAR OF RIGHT ROTATOR CUFF: Primary | ICD-10-CM

## 2023-12-28 PROCEDURE — 99999 PR PBB SHADOW E&M-EST. PATIENT-LVL II: ICD-10-PCS | Mod: PBBFAC,,, | Performed by: ORTHOPAEDIC SURGERY

## 2023-12-28 PROCEDURE — 99024 POSTOP FOLLOW-UP VISIT: CPT | Mod: ,,, | Performed by: ORTHOPAEDIC SURGERY

## 2023-12-28 PROCEDURE — 99999 PR PBB SHADOW E&M-EST. PATIENT-LVL II: CPT | Mod: PBBFAC,,, | Performed by: ORTHOPAEDIC SURGERY

## 2023-12-28 PROCEDURE — 99024 PR POST-OP FOLLOW-UP VISIT: ICD-10-PCS | Mod: ,,, | Performed by: ORTHOPAEDIC SURGERY

## 2023-12-28 PROCEDURE — 99212 OFFICE O/P EST SF 10 MIN: CPT | Mod: PBBFAC,PO | Performed by: ORTHOPAEDIC SURGERY

## 2023-12-28 NOTE — PROGRESS NOTES
Past Medical History:   Diagnosis Date    Alcohol abuse, unspecified     Allergic rhinitis, cause unspecified     Arthritis     Asthma attack     Cancer 2022    skin cancer on nose    Carpal tunnel syndrome     Depressive disorder, not elsewhere classified     Diarrhea     Encounter for blood transfusion     GERD (gastroesophageal reflux disease)     Hypersomnia, unspecified     Hypertension     Hypoglycemia     Lumbago     Obesity, unspecified     JAH (obstructive sleep apnea)     uses BIPAP    Other and unspecified hyperlipidemia     Other ankle sprain and strain     Psychosexual dysfunction with inhibited sexual excitement     Pyogenic arthritis of knee 06/2021    staph pseudintermedius, TKA    Pyogenic arthritis of knee 09/21/2021    Enterococcus    Ventral hernia, unspecified, without mention of obstruction or gangrene        Past Surgical History:   Procedure Laterality Date    A-scope knees      Bilateral    ARTHROSCOPIC DEBRIDEMENT OF SHOULDER Right 12/15/2023    Procedure: EXTENSIVE DEBRIDEMENT, SHOULDER, ARTHROSCOPIC;  Surgeon: Felix Levin MD;  Location: [facility] OR;  Service: Orthopedics;  Laterality: Right;    ARTHROSCOPIC REPAIR OF ROTATOR CUFF OF SHOULDER Left 09/20/2019    Procedure: REPAIR, ROTATOR CUFF, ARTHROSCOPIC;  Surgeon: Felix Levin MD;  Location: [facility] OR;  Service: Orthopedics;  Laterality: Left;    ARTHROSCOPIC REPAIR OF ROTATOR CUFF OF SHOULDER Right 3/17/2023    Procedure: REPAIR, ROTATOR CUFF, ARTHROSCOPIC;  Surgeon: Felix Levin MD;  Location: [facility] OR;  Service: Orthopedics;  Laterality: Right;    ARTHROSCOPIC REPAIR OF ROTATOR CUFF OF SHOULDER Right 12/15/2023    Procedure: REPAIR, ROTATOR CUFF, ARTHROSCOPIC, WITH PATCH;  Surgeon: Felix Levin MD;  Location: [facility] OR;  Service: Orthopedics;  Laterality: Right;  arthrex , schmitt and nephew regenatin patch    ARTHROSCOPY OF SHOULDER WITH DECOMPRESSION OF SUBACROMIAL SPACE Left 09/20/2019     Procedure: ARTHROSCOPY, SHOULDER, WITH SUBACROMIAL SPACE DECOMPRESSION;  Surgeon: Felix Levin MD;  Location: [facility] OR;  Service: Orthopedics;  Laterality: Left;  Jaspreet- Arthrex notified of all case today 9/16-tcb    ARTHROSCOPY,SHOULDER,WITH BICEPS TENODESIS Right 3/17/2023    Procedure: ARTHROSCOPY,SHOULDER,WITH BICEPS TENODESIS;  Surgeon: Felix Levin MD;  Location: [facility] OR;  Service: Orthopedics;  Laterality: Right;    CARPAL TUNNEL RELEASE      Bilateral    COLONOSCOPY N/A 08/05/2022    Procedure: COLONOSCOPY;  Surgeon: Zaida Ansari MD;  Location: [facility] ENDO;  Service: Endoscopy;  Laterality: N/A;    ESOPHAGEAL MANOMETRY WITH MEASUREMENT OF IMPEDANCE N/A 3/24/2023    Procedure: MANOMETRY, ESOPHAGUS, WITH IMPEDANCE MEASUREMENT;  Surgeon: Sofie Walker MD;  Location: [facility] ENDO (2ND FLR);  Service: Endoscopy;  Laterality: N/A;  r/o rumination and supragastric belching  hold baclofen x3 days, hold doxepin x24 hours prior to procedure    ESOPHAGOGASTRODUODENOSCOPY N/A 08/05/2022    Procedure: EGD (ESOPHAGOGASTRODUODENOSCOPY);  Surgeon: Zaida Ansari MD;  Location: [facility] ENDO;  Service: Endoscopy;  Laterality: N/A;    ESOPHAGOGASTRODUODENOSCOPY N/A 3/24/2023    Procedure: EGD (ESOPHAGOGASTRODUODENOSCOPY);  Surgeon: Sofie Walker MD;  Location: [facility] ENDO (2ND FLR);  Service: Endoscopy;  Laterality: N/A;  Endoflip/Endoscopically placed manometry probe  2nd floor-per Dr. Walker (higher than average risk procedure)  propofol only  full liquid diet x3 days, clear liquid diet x1 day prior to procedure  instructions sent to [facility] and emailed to [email or web address]    EYE SURGERY      Lasik    HERNIA REPAIR      INCISION AND DRAINAGE OF HEMATOMA Right 09/21/2021    Procedure: INCISION AND DRAINAGE, HEMATOMA;  Surgeon: Felix Levin MD;  Location: [facility] OR;  Service: Orthopedics;  Laterality: Right;    INSERTION OF ANTIBIOTIC SPACER Right 06/01/2021    Procedure: INSERTION, ANTIBIOTIC  SPACER;  Surgeon: Felix Levin MD;  Location: [facility] OR;  Service: Orthopedics;  Laterality: Right;    JOINT REPLACEMENT      KNEE ARTHROPLASTY Left 08/04/2020    Procedure: ARTHROPLASTY, KNEE;  Surgeon: Felix Levin MD;  Location: [facility] OR;  Service: Orthopedics;  Laterality: Left;    KNEE ARTHROPLASTY Right 03/23/2021    Procedure: ARTHROPLASTY, KNEE;  Surgeon: Felix Levin MD;  Location: [facility] OR;  Service: Orthopedics;  Laterality: Right;    KNEE ARTHROSCOPY W/ MENISCECTOMY Left 10/15/2019    Procedure: ARTHROSCOPY, KNEE, WITH MENISCECTOMY;  Surgeon: Felix Levin MD;  Location: [facility] OR;  Service: Orthopedics;  Laterality: Left;  Medial and Lateral Meniscectomy    KNEE ARTHROSCOPY W/ MENISCECTOMY Right 11/22/2019    Procedure: ARTHROSCOPY, KNEE, WITH MENISCECTOMY;  Surgeon: Felix Levin MD;  Location: [facility] OR;  Service: Orthopedics;  Laterality: Right;    KNEE SURGERY      osmar    MYOTOMY, PERORAL, ENDOSCOPIC N/A 10/2/2023    Procedure: MYOTOMY, PERORAL, ENDOSCOPIC POEM;  Surgeon: Iker Méndez Jr., MD;  Location: [address];  Service: General;  Laterality: N/A;    NISSEN FUNDOPLICATION      X 2    REVERSE TOTAL SHOULDER ARTHROPLASTY Left 02/11/2020    Procedure: ARTHROPLASTY, SHOULDER, TOTAL, REVERSE;  Surgeon: Felix Levin MD;  Location: [facility] OR;  Service: Orthopedics;  Laterality: Left;  [place]    revision of gastrojejunostomy  [facility]/10/2021    [facility] [place]    REVISION OF KNEE ARTHROPLASTY Right 09/07/2021    Procedure: REVISION, ARTHROPLASTY, KNEE;  Surgeon: Felix Levin MD;  Location: [facility] OR;  Service: Orthopedics;  Laterality: Right;    ROTATOR CUFF REPAIR      right    RJ-EN-Y PROCEDURE N/A 05/2021    SLEEVE GASTROPLASTY  12/2013       Current Outpatient Medications   Medication Sig    acetaminophen (TYLENOL) 500 MG tablet Take 2 tablets (1,000 mg total) by mouth every 8 (eight) hours as needed for Pain.    albuterol  (PROVENTIL/VENTOLIN HFA) 90 mcg/actuation inhaler INHALE 2 PUFFS BY MOUTH FOUR TIMES A DAY AS NEEDED FOR BREATHING    ascorbic acid, vitamin C, (VITAMIN C) 500 MG tablet 500 mg.    aspirin 81 MG Chew Take 1 tablet (81 mg total) by mouth 2 (two) times daily. (Patient taking differently: Take 81 mg by mouth once daily. PATIENT STATED TAKES 5 TIMES WEEKLY)    azelastine (ASTELIN) 137 mcg (0.1 %) nasal spray INHALE 1 SPRAY IN EACH NOSTRIL TWICE A DAY FOR ALLERGIES    busPIRone (BUSPAR) 10 MG tablet 5 mg.    cetirizine (ZYRTEC) 10 MG tablet 10 mg.    cyanocobalamin (VITAMIN B-12) 1000 MCG tablet Take 1,000 mcg by mouth once daily.     cyclobenzaprine (FLEXERIL) 5 MG tablet TAKE ONE TABLET BY MOUTH EVERY EIGHT HOURS AS NEEDED FOR MUSCLE SPASM    diclofenac sodium (VOLTAREN) 1 % Gel Apply topically daily as needed.    doxepin (SINEQUAN) 50 MG capsule 50 mg nightly.    EScitalopram oxalate (LEXAPRO) 20 MG tablet 20 mg.    eszopiclone (LUNESTA) 3 mg Tab Take 1 tablet by mouth nightly as needed.    LIDOcaine (LIDODERM) 5 % APPLY 1 PATCH TOPICALLY EVERY DAY FOR PAIN. WEAR FOR 12 HOURS, THEN REMOVE. DO NOT APPLY NEW PATCH FOR AT LEAST 12 HOURS.    losartan-hydrochlorothiazide 100-25 mg (HYZAAR) 100-25 mg per tablet 1 tablet.    MELATONIN ORAL 10 mg.    methyl salicylate-menthol 15-10% 15-10 % Crea APPLY LIBERAL AMOUNT TOPICALLY FOUR TIMES A DAY AS NEEDED    multivitamin with iron Tab TAKE TWO TABLETS BY MOUTH EVERY DAY WITH FOOD    naloxone (NARCAN) 4 mg/actuation Spry SMARTSIG:Spray(s) Both Nares    omeprazole (PRILOSEC) 40 MG capsule 40 mg.    ondansetron (ZOFRAN) 4 MG tablet Take 1 tablet (4 mg total) by mouth every 6 (six) hours as needed for Nausea.    ondansetron (ZOFRAN) 4 MG tablet Take 1 tablet (4 mg total) by mouth every 6 (six) hours as needed for Nausea.    oxyCODONE-acetaminophen (PERCOCET) 5-325 mg per tablet Take 1 tablet by mouth every 6 (six) hours as needed for Pain.    oxyCODONE-acetaminophen (PERCOCET) 5-325  mg per tablet Take 1 tablet by mouth every 6 (six) hours as needed for Pain.    peg 400-propylene glycol 0.4-0.3 % Drop INSTILL ONE DROP IN EACH EYE FOUR TIMES A DAY AS NEEDED FOR DRY EYES    pregabalin (LYRICA) 100 MG capsule TAKE ONE CAPSULE BY MOUTH TWICE A DAY FOR NERVE PAIN    tadalafiL (CIALIS) 20 MG Tab 20 mg.    vitamin D (VITAMIN D3) 1000 units Tab Take 2,000 Units by mouth once daily.     No current facility-administered medications for this visit.     Facility-Administered Medications Ordered in Other Visits   Medication    diphenhydrAMINE injection 25 mg    electrolyte-S (ISOLYTE)    HYDROmorphone (PF) injection 0.2 mg    lactated ringers infusion    LIDOcaine (PF) 10 mg/ml (1%) injection 5 mg    ondansetron injection 4 mg    oxyCODONE immediate release tablet 5 mg    prochlorperazine injection Soln 5 mg       Review of patient's allergies indicates:   Allergen Reactions    Hydrocodone Hives       Family History   Problem Relation Age of Onset    Arthritis Mother     Hypertension Father     Kidney disease Father     Heart disease Father     COPD Father     Pancreatic cancer Maternal Grandfather     Colon cancer Paternal Grandfather     Colon polyps Neg Hx     Crohn's disease Neg Hx     Ulcerative colitis Neg Hx     Stomach cancer Neg Hx     Esophageal cancer Neg Hx        Social History     Socioeconomic History    Marital status: Single   Tobacco Use    Smoking status: Never    Smokeless tobacco: Current     Types: Chew    Tobacco comments:     rarely   Substance and Sexual Activity    Alcohol use: Yes     Comment: occasionally    Drug use: No    Sexual activity: Not Currently       Chief Complaint: No chief complaint on file.      Date of surgery:  September 15, 2023    History of present illness:  58-year-old male underwent right revision rotator cuff repair with Regeneten patch augmentation.  He is doing well.  Compliant with the sling.  Pain is a 5/10.  No wound issues.      Review of  Systems:    Musculoskeletal:  See HPI        Physical Examination:    Vital Signs:  There were no vitals filed for this visit.    There is no height or weight on file to calculate BMI.    This a well-developed, well nourished patient in no acute distress.  They are alert and oriented and cooperative to examination.  Pt. walks without an antalgic gait.      Examination of the right shoulder shows well-healing surgical portals.  No erythema drainage.  Patient neurovascularly intact.  Compliant with the sling.    X-rays:  None     Assessment::  Status post right rotator cuff repair with patch augmentation    Plan:  I reviewed the arthroscopic photos with him today.  Encouraged him to go really slow as this is a 3rd surgery on this shoulder.  Continue the sling.  I will see him back in 4 weeks.  We will start some physical therapy at that time.    This note was created using [employer] voice recognition software that occasionally misinterpreted phrases or words.

## 2024-01-29 ENCOUNTER — OFFICE VISIT (OUTPATIENT)
Dept: ORTHOPEDICS | Facility: CLINIC | Age: 59
End: 2024-01-29
Payer: OTHER GOVERNMENT

## 2024-01-29 VITALS — HEIGHT: 72 IN | BODY MASS INDEX: 31.69 KG/M2 | WEIGHT: 234 LBS

## 2024-01-29 DIAGNOSIS — M25.511 RIGHT SHOULDER PAIN, UNSPECIFIED CHRONICITY: Primary | ICD-10-CM

## 2024-01-29 DIAGNOSIS — M75.121 NONTRAUMATIC COMPLETE TEAR OF RIGHT ROTATOR CUFF: Primary | ICD-10-CM

## 2024-01-29 DIAGNOSIS — M75.121 NONTRAUMATIC COMPLETE TEAR OF RIGHT ROTATOR CUFF: ICD-10-CM

## 2024-01-29 PROCEDURE — 99999 PR PBB SHADOW E&M-EST. PATIENT-LVL III: CPT | Mod: PBBFAC,,, | Performed by: ORTHOPAEDIC SURGERY

## 2024-01-29 PROCEDURE — 99213 OFFICE O/P EST LOW 20 MIN: CPT | Mod: PBBFAC,PO | Performed by: ORTHOPAEDIC SURGERY

## 2024-01-29 PROCEDURE — 99024 POSTOP FOLLOW-UP VISIT: CPT | Mod: S$PBB,,, | Performed by: ORTHOPAEDIC SURGERY

## 2024-01-29 NOTE — PROGRESS NOTES
Past Medical History:   Diagnosis Date    Alcohol abuse, unspecified     Allergic rhinitis, cause unspecified     Arthritis     Asthma attack     Cancer 2022    skin cancer on nose    Carpal tunnel syndrome     Depressive disorder, not elsewhere classified     Diarrhea     Encounter for blood transfusion     GERD (gastroesophageal reflux disease)     Hypersomnia, unspecified     Hypertension     Hypoglycemia     Lumbago     Obesity, unspecified     JAH (obstructive sleep apnea)     uses BIPAP    Other and unspecified hyperlipidemia     Other ankle sprain and strain     Psychosexual dysfunction with inhibited sexual excitement     Pyogenic arthritis of knee 06/2021    staph pseudintermedius, TKA    Pyogenic arthritis of knee 09/21/2021    Enterococcus    Ventral hernia, unspecified, without mention of obstruction or gangrene        Past Surgical History:   Procedure Laterality Date    A-scope knees      Bilateral    ARTHROSCOPIC DEBRIDEMENT OF SHOULDER Right 12/15/2023    Procedure: EXTENSIVE DEBRIDEMENT, SHOULDER, ARTHROSCOPIC;  Surgeon: Felix Levin MD;  Location: [facility] OR;  Service: Orthopedics;  Laterality: Right;    ARTHROSCOPIC REPAIR OF ROTATOR CUFF OF SHOULDER Left 09/20/2019    Procedure: REPAIR, ROTATOR CUFF, ARTHROSCOPIC;  Surgeon: Felix Levin MD;  Location: [facility] OR;  Service: Orthopedics;  Laterality: Left;    ARTHROSCOPIC REPAIR OF ROTATOR CUFF OF SHOULDER Right 3/17/2023    Procedure: REPAIR, ROTATOR CUFF, ARTHROSCOPIC;  Surgeon: Felix Levin MD;  Location: [facility] OR;  Service: Orthopedics;  Laterality: Right;    ARTHROSCOPIC REPAIR OF ROTATOR CUFF OF SHOULDER Right 12/15/2023    Procedure: REPAIR, ROTATOR CUFF, ARTHROSCOPIC, WITH PATCH;  Surgeon: Felix Levin MD;  Location: [facility] OR;  Service: Orthopedics;  Laterality: Right;  arthrex , schmitt and nephew regenatin patch    ARTHROSCOPY OF SHOULDER WITH DECOMPRESSION OF SUBACROMIAL SPACE Left 09/20/2019     Procedure: ARTHROSCOPY, SHOULDER, WITH SUBACROMIAL SPACE DECOMPRESSION;  Surgeon: Felix Levin MD;  Location: [facility] OR;  Service: Orthopedics;  Laterality: Left;  Jaspreet- Arthrex notified of all case today 9/16-tcb    ARTHROSCOPY,SHOULDER,WITH BICEPS TENODESIS Right 3/17/2023    Procedure: ARTHROSCOPY,SHOULDER,WITH BICEPS TENODESIS;  Surgeon: Felix Levin MD;  Location: [facility] OR;  Service: Orthopedics;  Laterality: Right;    CARPAL TUNNEL RELEASE      Bilateral    COLONOSCOPY N/A 08/05/2022    Procedure: COLONOSCOPY;  Surgeon: Zaida Ansari MD;  Location: [facility] ENDO;  Service: Endoscopy;  Laterality: N/A;    ESOPHAGEAL MANOMETRY WITH MEASUREMENT OF IMPEDANCE N/A 3/24/2023    Procedure: MANOMETRY, ESOPHAGUS, WITH IMPEDANCE MEASUREMENT;  Surgeon: Sofie Walker MD;  Location: [facility] ENDO (2ND FLR);  Service: Endoscopy;  Laterality: N/A;  r/o rumination and supragastric belching  hold baclofen x3 days, hold doxepin x24 hours prior to procedure    ESOPHAGOGASTRODUODENOSCOPY N/A 08/05/2022    Procedure: EGD (ESOPHAGOGASTRODUODENOSCOPY);  Surgeon: Zaida Ansari MD;  Location: [facility] ENDO;  Service: Endoscopy;  Laterality: N/A;    ESOPHAGOGASTRODUODENOSCOPY N/A 3/24/2023    Procedure: EGD (ESOPHAGOGASTRODUODENOSCOPY);  Surgeon: Sofie Walker MD;  Location: [facility] ENDO (2ND FLR);  Service: Endoscopy;  Laterality: N/A;  Endoflip/Endoscopically placed manometry probe  2nd floor-per Dr. Walker (higher than average risk procedure)  propofol only  full liquid diet x3 days, clear liquid diet x1 day prior to procedure  instructions sent to [facility] and emailed to [email or web address]    EYE SURGERY      Lasik    HERNIA REPAIR      INCISION AND DRAINAGE OF HEMATOMA Right 09/21/2021    Procedure: INCISION AND DRAINAGE, HEMATOMA;  Surgeon: Felix Levin MD;  Location: [facility] OR;  Service: Orthopedics;  Laterality: Right;    INSERTION OF ANTIBIOTIC SPACER Right 06/01/2021    Procedure: INSERTION, ANTIBIOTIC  SPACER;  Surgeon: Felix Levin MD;  Location: [facility] OR;  Service: Orthopedics;  Laterality: Right;    JOINT REPLACEMENT      KNEE ARTHROPLASTY Left 08/04/2020    Procedure: ARTHROPLASTY, KNEE;  Surgeon: Felix Levin MD;  Location: [facility] OR;  Service: Orthopedics;  Laterality: Left;    KNEE ARTHROPLASTY Right 03/23/2021    Procedure: ARTHROPLASTY, KNEE;  Surgeon: Felix Levin MD;  Location: [facility] OR;  Service: Orthopedics;  Laterality: Right;    KNEE ARTHROSCOPY W/ MENISCECTOMY Left 10/15/2019    Procedure: ARTHROSCOPY, KNEE, WITH MENISCECTOMY;  Surgeon: Felix Levin MD;  Location: [facility] OR;  Service: Orthopedics;  Laterality: Left;  Medial and Lateral Meniscectomy    KNEE ARTHROSCOPY W/ MENISCECTOMY Right 11/22/2019    Procedure: ARTHROSCOPY, KNEE, WITH MENISCECTOMY;  Surgeon: Felix Levin MD;  Location: [facility] OR;  Service: Orthopedics;  Laterality: Right;    KNEE SURGERY      osmar    MYOTOMY, PERORAL, ENDOSCOPIC N/A 10/2/2023    Procedure: MYOTOMY, PERORAL, ENDOSCOPIC POEM;  Surgeon: Iker Méndez Jr., MD;  Location: [address];  Service: General;  Laterality: N/A;    NISSEN FUNDOPLICATION      X 2    REVERSE TOTAL SHOULDER ARTHROPLASTY Left 02/11/2020    Procedure: ARTHROPLASTY, SHOULDER, TOTAL, REVERSE;  Surgeon: Felix Levin MD;  Location: [facility] OR;  Service: Orthopedics;  Laterality: Left;  [place]    revision of gastrojejunostomy  05/10/2021    [facility] [place]    REVISION OF KNEE ARTHROPLASTY Right 09/07/2021    Procedure: REVISION, ARTHROPLASTY, KNEE;  Surgeon: Felix Levin MD;  Location: [facility] OR;  Service: Orthopedics;  Laterality: Right;    ROTATOR CUFF REPAIR      right    RJ-EN-Y PROCEDURE N/A 05/2021    SLEEVE GASTROPLASTY  12/2013       Current Outpatient Medications   Medication Sig    acetaminophen (TYLENOL) 500 MG tablet Take 2 tablets (1,000 mg total) by mouth every 8 (eight) hours as needed for Pain.    albuterol  (PROVENTIL/VENTOLIN HFA) 90 mcg/actuation inhaler INHALE 2 PUFFS BY MOUTH FOUR TIMES A DAY AS NEEDED FOR BREATHING    ascorbic acid, vitamin C, (VITAMIN C) 500 MG tablet 500 mg.    aspirin 81 MG Chew Take 1 tablet (81 mg total) by mouth 2 (two) times daily. (Patient taking differently: Take 81 mg by mouth once daily. PATIENT STATED TAKES 5 TIMES WEEKLY)    azelastine (ASTELIN) 137 mcg (0.1 %) nasal spray INHALE 1 SPRAY IN EACH NOSTRIL TWICE A DAY FOR ALLERGIES    busPIRone (BUSPAR) 10 MG tablet 5 mg.    cetirizine (ZYRTEC) 10 MG tablet 10 mg.    cyanocobalamin (VITAMIN B-12) 1000 MCG tablet Take 1,000 mcg by mouth once daily.     cyclobenzaprine (FLEXERIL) 5 MG tablet TAKE ONE TABLET BY MOUTH EVERY EIGHT HOURS AS NEEDED FOR MUSCLE SPASM    diclofenac sodium (VOLTAREN) 1 % Gel Apply topically daily as needed.    doxepin (SINEQUAN) 50 MG capsule 50 mg nightly.    EScitalopram oxalate (LEXAPRO) 20 MG tablet 20 mg.    eszopiclone (LUNESTA) 3 mg Tab Take 1 tablet by mouth nightly as needed.    LIDOcaine (LIDODERM) 5 % APPLY 1 PATCH TOPICALLY EVERY DAY FOR PAIN. WEAR FOR 12 HOURS, THEN REMOVE. DO NOT APPLY NEW PATCH FOR AT LEAST 12 HOURS.    losartan-hydrochlorothiazide 100-25 mg (HYZAAR) 100-25 mg per tablet 1 tablet.    MELATONIN ORAL 10 mg.    methyl salicylate-menthol 15-10% 15-10 % Crea APPLY LIBERAL AMOUNT TOPICALLY FOUR TIMES A DAY AS NEEDED    multivitamin with iron Tab TAKE TWO TABLETS BY MOUTH EVERY DAY WITH FOOD    naloxone (NARCAN) 4 mg/actuation Spry SMARTSIG:Spray(s) Both Nares    omeprazole (PRILOSEC) 40 MG capsule 40 mg.    ondansetron (ZOFRAN) 4 MG tablet Take 1 tablet (4 mg total) by mouth every 6 (six) hours as needed for Nausea.    ondansetron (ZOFRAN) 4 MG tablet Take 1 tablet (4 mg total) by mouth every 6 (six) hours as needed for Nausea.    oxyCODONE-acetaminophen (PERCOCET) 5-325 mg per tablet Take 1 tablet by mouth every 6 (six) hours as needed for Pain.    oxyCODONE-acetaminophen (PERCOCET) 5-325  mg per tablet Take 1 tablet by mouth every 6 (six) hours as needed for Pain.    peg 400-propylene glycol 0.4-0.3 % Drop INSTILL ONE DROP IN EACH EYE FOUR TIMES A DAY AS NEEDED FOR DRY EYES    pregabalin (LYRICA) 100 MG capsule TAKE ONE CAPSULE BY MOUTH TWICE A DAY FOR NERVE PAIN    tadalafiL (CIALIS) 20 MG Tab 20 mg.    vitamin D (VITAMIN D3) 1000 units Tab Take 2,000 Units by mouth once daily.     No current facility-administered medications for this visit.     Facility-Administered Medications Ordered in Other Visits   Medication    diphenhydrAMINE injection 25 mg    electrolyte-S (ISOLYTE)    HYDROmorphone (PF) injection 0.2 mg    lactated ringers infusion    LIDOcaine (PF) 10 mg/ml (1%) injection 5 mg    ondansetron injection 4 mg    oxyCODONE immediate release tablet 5 mg    prochlorperazine injection Soln 5 mg       Review of patient's allergies indicates:   Allergen Reactions    Hydrocodone Hives       Family History   Problem Relation Age of Onset    Arthritis Mother     Hypertension Father     Kidney disease Father     Heart disease Father     COPD Father     Pancreatic cancer Maternal Grandfather     Colon cancer Paternal Grandfather     Colon polyps Neg Hx     Crohn's disease Neg Hx     Ulcerative colitis Neg Hx     Stomach cancer Neg Hx     Esophageal cancer Neg Hx        Social History     Socioeconomic History    Marital status: Single   Tobacco Use    Smoking status: Never    Smokeless tobacco: Current     Types: Chew    Tobacco comments:     rarely   Substance and Sexual Activity    Alcohol use: Yes     Comment: occasionally    Drug use: No    Sexual activity: Not Currently       Chief Complaint: No chief complaint on file.      Date of surgery:  December 15, 2023    History of present illness:  58-year-old male underwent right revision rotator cuff repair with Regeneten patch augmentation.  Patient was rolling over in bed and felt a pull.  Since then he has had more pain flexing and abducting his  shoulder.  Pain right over the anterior lateral shoulder where the repair was performed.  Pain is a 9/10.  This was 2-3 days ago.      Review of Systems:    Musculoskeletal:  See HPI        Physical Examination:    Vital Signs:  There were no vitals filed for this visit.    There is no height or weight on file to calculate BMI.    This a well-developed, well nourished patient in no acute distress.  They are alert and oriented and cooperative to examination.  Pt. walks without an antalgic gait.      Examination of the right shoulder shows well-healed surgical portals.  No erythema drainage.  Patient neurovascularly intact.  Patient has pretty significant irritability with pain and weakness with forward flexion and abduction.  Tenderness over the lateral and anterolateral shoulder.    X-rays:  None     Assessment::  Status post right rotator cuff repair with patch augmentation  Possible recurrence of the right rotator cuff tear    Plan:  I reviewed the findings with him today.  Recommended more advanced imaging to make sure that nothing is torn before advancing with physical therapy.  We will get an MRI of his right shoulder to evaluate the integrity of his shoulder.  If the MRI is denied, we will get him in with 1 of my partners for diagnostic ultrasound.    This note was created using [employer] voice recognition software that occasionally misinterpreted phrases or words.

## 2024-02-01 ENCOUNTER — HOSPITAL ENCOUNTER (OUTPATIENT)
Dept: RADIOLOGY | Facility: HOSPITAL | Age: 59
Discharge: HOME | End: 2024-02-01
Attending: ORTHOPAEDIC SURGERY
Payer: OTHER GOVERNMENT

## 2024-02-01 DIAGNOSIS — M75.121 NONTRAUMATIC COMPLETE TEAR OF RIGHT ROTATOR CUFF: ICD-10-CM

## 2024-02-01 DIAGNOSIS — M25.511 RIGHT SHOULDER PAIN, UNSPECIFIED CHRONICITY: ICD-10-CM

## 2024-02-01 PROCEDURE — 73221 MRI JOINT UPR EXTREM W/O DYE: CPT | Mod: 26,RT,, | Performed by: RADIOLOGY

## 2024-02-01 PROCEDURE — 73221 MRI JOINT UPR EXTREM W/O DYE: CPT | Mod: TC,RT

## 2024-02-05 ENCOUNTER — OFFICE VISIT (OUTPATIENT)
Dept: ORTHOPEDICS | Facility: CLINIC | Age: 59
End: 2024-02-05
Payer: OTHER GOVERNMENT

## 2024-02-05 VITALS — RESPIRATION RATE: 18 BRPM | HEIGHT: 72 IN | BODY MASS INDEX: 31.69 KG/M2 | WEIGHT: 234 LBS

## 2024-02-05 DIAGNOSIS — M75.121 NONTRAUMATIC COMPLETE TEAR OF RIGHT ROTATOR CUFF: Primary | ICD-10-CM

## 2024-02-05 PROCEDURE — 99214 OFFICE O/P EST MOD 30 MIN: CPT | Mod: 57,S$PBB,, | Performed by: ORTHOPAEDIC SURGERY

## 2024-02-05 PROCEDURE — 99214 OFFICE O/P EST MOD 30 MIN: CPT | Mod: PBBFAC,PO | Performed by: ORTHOPAEDIC SURGERY

## 2024-02-05 PROCEDURE — 99999 PR PBB SHADOW E&M-EST. PATIENT-LVL IV: CPT | Mod: PBBFAC,,, | Performed by: ORTHOPAEDIC SURGERY

## 2024-02-05 NOTE — PROGRESS NOTES
Past Medical History:   Diagnosis Date    Alcohol abuse, unspecified     Allergic rhinitis, cause unspecified     Arthritis     Asthma attack     Cancer 2022    skin cancer on nose    Carpal tunnel syndrome     Depressive disorder, not elsewhere classified     Diarrhea     Encounter for blood transfusion     GERD (gastroesophageal reflux disease)     Hypersomnia, unspecified     Hypertension     Hypoglycemia     Lumbago     Obesity, unspecified     JAH (obstructive sleep apnea)     uses BIPAP    Other and unspecified hyperlipidemia     Other ankle sprain and strain     Psychosexual dysfunction with inhibited sexual excitement     Pyogenic arthritis of knee 06/2021    staph pseudintermedius, TKA    Pyogenic arthritis of knee 09/21/2021    Enterococcus    Ventral hernia, unspecified, without mention of obstruction or gangrene        Past Surgical History:   Procedure Laterality Date    A-scope knees      Bilateral    ARTHROSCOPIC DEBRIDEMENT OF SHOULDER Right 12/15/2023    Procedure: EXTENSIVE DEBRIDEMENT, SHOULDER, ARTHROSCOPIC;  Surgeon: Felix Levin MD;  Location[facility];  Service: Orthopedics;  Laterality: Right;    ARTHROSCOPIC REPAIR OF ROTATOR CUFF OF SHOULDER Left 09/20/2019    Procedure: REPAIR, ROTATOR CUFF, ARTHROSCOPIC;  Surgeon: Felix Levin MD;  Location: [facility] OR;  Service: Orthopedics;  Laterality: Left;    ARTHROSCOPIC REPAIR OF ROTATOR CUFF OF SHOULDER Right 3/17/2023    Procedure: REPAIR, ROTATOR CUFF, ARTHROSCOPIC;  Surgeon: Felix Levin MD;  Location: [facility] OR;  Service: Orthopedics;  Laterality: Right;    ARTHROSCOPIC REPAIR OF ROTATOR CUFF OF SHOULDER Right 12/15/2023    Procedure: REPAIR, ROTATOR CUFF, ARTHROSCOPIC, WITH PATCH;  Surgeon: Felix Levin MD;  Location: [facility] OR;  Service: Orthopedics;  Laterality: Right;  arthrex , schmitt and nephew regenatin patch    ARTHROSCOPY OF SHOULDER WITH DECOMPRESSION OF SUBACROMIAL SPACE Left 09/20/2019     Procedure: ARTHROSCOPY, SHOULDER, WITH SUBACROMIAL SPACE DECOMPRESSION;  Surgeon: Felix Levin MD;  Location: [facility] OR;  Service: Orthopedics;  Laterality: Left;  Jaspreet- Arthrex notified of all case today 9/16-tcb    ARTHROSCOPY,SHOULDER,WITH BICEPS TENODESIS Right 3/17/2023    Procedure: ARTHROSCOPY,SHOULDER,WITH BICEPS TENODESIS;  Surgeon: Felix Levin MD;  Location: [facility] OR;  Service: Orthopedics;  Laterality: Right;    CARPAL TUNNEL RELEASE      Bilateral    COLONOSCOPY N/A 08/05/2022    Procedure: COLONOSCOPY;  Surgeon: Zaida Ansari MD;  Location: [facility] ENDO;  Service: Endoscopy;  Laterality: N/A;    ESOPHAGEAL MANOMETRY WITH MEASUREMENT OF IMPEDANCE N/A 3/24/2023    Procedure: MANOMETRY, ESOPHAGUS, WITH IMPEDANCE MEASUREMENT;  Surgeon: Sofie Walker MD;  Location: [facility] ENDO (2ND FLR);  Service: Endoscopy;  Laterality: N/A;  r/o rumination and supragastric belching  hold baclofen x3 days, hold doxepin x24 hours prior to procedure    ESOPHAGOGASTRODUODENOSCOPY N/A 08/05/2022    Procedure: EGD (ESOPHAGOGASTRODUODENOSCOPY);  Surgeon: Zaida Ansari MD;  Location: [facility] ENDO;  Service: Endoscopy;  Laterality: N/A;    ESOPHAGOGASTRODUODENOSCOPY N/A 3/24/2023    Procedure: EGD (ESOPHAGOGASTRODUODENOSCOPY);  Surgeon: Sofie Walker MD;  Location: [facility] ENDO (2ND FLR);  Service: Endoscopy;  Laterality: N/A;  Endoflip/Endoscopically placed manometry probe  2nd floor-per Dr. Walker (higher than average risk procedure)  propofol only  full liquid diet x3 days, clear liquid diet x1 day prior to procedure  instructions sent to [email or web address] and emailed to [email or web address]    EYE SURGERY      Lasik    HERNIA REPAIR      INCISION AND DRAINAGE OF HEMATOMA Right 09/21/2021    Procedure: INCISION AND DRAINAGE, HEMATOMA;  Surgeon: Felix Levin MD;  Location: [facility] OR;  Service: Orthopedics;  Laterality: Right;    INSERTION OF ANTIBIOTIC SPACER Right 06/01/2021    Procedure: INSERTION, ANTIBIOTIC  SPACER;  Surgeon: Felix Levin MD;  Location: [facility] OR;  Service: Orthopedics;  Laterality: Right;    JOINT REPLACEMENT      KNEE ARTHROPLASTY Left 08/04/2020    Procedure: ARTHROPLASTY, KNEE;  Surgeon: Felix Levin MD;  Location: [facility] OR;  Service: Orthopedics;  Laterality: Left;    KNEE ARTHROPLASTY Right 03/23/2021    Procedure: ARTHROPLASTY, KNEE;  Surgeon: Felix Levin MD;  Location: [facility] OR;  Service: Orthopedics;  Laterality: Right;    KNEE ARTHROSCOPY W/ MENISCECTOMY Left 10/15/2019    Procedure: ARTHROSCOPY, KNEE, WITH MENISCECTOMY;  Surgeon: Felix Levin MD;  Location: [facility] OR;  Service: Orthopedics;  Laterality: Left;  Medial and Lateral Meniscectomy    KNEE ARTHROSCOPY W/ MENISCECTOMY Right 11/22/2019    Procedure: ARTHROSCOPY, KNEE, WITH MENISCECTOMY;  Surgeon: Felix Levin MD;  Location: [facility] OR;  Service: Orthopedics;  Laterality: Right;    KNEE SURGERY      osmar    MYOTOMY, PERORAL, ENDOSCOPIC N/A 10/2/2023    Procedure: MYOTOMY, PERORAL, ENDOSCOPIC POEM;  Surgeon: Iker Méndez Jr., MD;  Location: [address];  Service: General;  Laterality: N/A;    NISSEN FUNDOPLICATION      X 2    REVERSE TOTAL SHOULDER ARTHROPLASTY Left 02/11/2020    Procedure: ARTHROPLASTY, SHOULDER, TOTAL, REVERSE;  Surgeon: Felix Levin MD;  Location: [facility] OR;  Service: Orthopedics;  Laterality: Left;  [place]    revision of gastrojejunostomy  05/10/2021    [facility] [place]    REVISION OF KNEE ARTHROPLASTY Right 09/07/2021    Procedure: REVISION, ARTHROPLASTY, KNEE;  Surgeon: Felix Levin MD;  Location: [facility] OR;  Service: Orthopedics;  Laterality: Right;    ROTATOR CUFF REPAIR      right    RJ-EN-Y PROCEDURE N/A 05/2021    SLEEVE GASTROPLASTY  12/2013       Current Outpatient Medications   Medication Sig    albuterol (PROVENTIL/VENTOLIN HFA) 90 mcg/actuation inhaler INHALE 2 PUFFS BY MOUTH FOUR TIMES A DAY AS NEEDED FOR BREATHING    ascorbic  acid, vitamin C, (VITAMIN C) 500 MG tablet 500 mg.    azelastine (ASTELIN) 137 mcg (0.1 %) nasal spray INHALE 1 SPRAY IN EACH NOSTRIL TWICE A DAY FOR ALLERGIES    busPIRone (BUSPAR) 10 MG tablet 5 mg.    cetirizine (ZYRTEC) 10 MG tablet 10 mg.    cyanocobalamin (VITAMIN B-12) 1000 MCG tablet Take 1,000 mcg by mouth once daily.     cyclobenzaprine (FLEXERIL) 5 MG tablet TAKE ONE TABLET BY MOUTH EVERY EIGHT HOURS AS NEEDED FOR MUSCLE SPASM    diclofenac sodium (VOLTAREN) 1 % Gel Apply topically daily as needed.    EScitalopram oxalate (LEXAPRO) 20 MG tablet 20 mg.    eszopiclone (LUNESTA) 3 mg Tab Take 1 tablet by mouth nightly as needed.    LIDOcaine (LIDODERM) 5 % APPLY 1 PATCH TOPICALLY EVERY DAY FOR PAIN. WEAR FOR 12 HOURS, THEN REMOVE. DO NOT APPLY NEW PATCH FOR AT LEAST 12 HOURS.    losartan-hydrochlorothiazide 100-25 mg (HYZAAR) 100-25 mg per tablet 1 tablet.    MELATONIN ORAL 10 mg.    methyl salicylate-menthol 15-10% 15-10 % Crea APPLY LIBERAL AMOUNT TOPICALLY FOUR TIMES A DAY AS NEEDED    multivitamin with iron Tab TAKE TWO TABLETS BY MOUTH EVERY DAY WITH FOOD    naloxone (NARCAN) 4 mg/actuation Spry SMARTSIG:Spray(s) Both Nares    omeprazole (PRILOSEC) 40 MG capsule 40 mg.    ondansetron (ZOFRAN) 4 MG tablet Take 1 tablet (4 mg total) by mouth every 6 (six) hours as needed for Nausea.    peg 400-propylene glycol 0.4-0.3 % Drop INSTILL ONE DROP IN EACH EYE FOUR TIMES A DAY AS NEEDED FOR DRY EYES    vitamin D (VITAMIN D3) 1000 units Tab Take 2,000 Units by mouth once daily.    acetaminophen (TYLENOL) 500 MG tablet Take 2 tablets (1,000 mg total) by mouth every 8 (eight) hours as needed for Pain.    aspirin 81 MG Chew Take 1 tablet (81 mg total) by mouth 2 (two) times daily. (Patient taking differently: Take 81 mg by mouth once daily. PATIENT STATED TAKES 5 TIMES WEEKLY)    doxepin (SINEQUAN) 50 MG capsule 50 mg nightly.    ondansetron (ZOFRAN) 4 MG tablet Take 1 tablet (4 mg total) by mouth every 6 (six)  hours as needed for Nausea.    oxyCODONE-acetaminophen (PERCOCET) 5-325 mg per tablet Take 1 tablet by mouth every 6 (six) hours as needed for Pain.    oxyCODONE-acetaminophen (PERCOCET) 5-325 mg per tablet Take 1 tablet by mouth every 6 (six) hours as needed for Pain.    pregabalin (LYRICA) 100 MG capsule TAKE ONE CAPSULE BY MOUTH TWICE A DAY FOR NERVE PAIN    tadalafiL (CIALIS) 20 MG Tab 20 mg.     No current facility-administered medications for this visit.     Facility-Administered Medications Ordered in Other Visits   Medication    diphenhydrAMINE injection 25 mg    electrolyte-S (ISOLYTE)    HYDROmorphone (PF) injection 0.2 mg    lactated ringers infusion    LIDOcaine (PF) 10 mg/ml (1%) injection 5 mg    ondansetron injection 4 mg    oxyCODONE immediate release tablet 5 mg    prochlorperazine injection Soln 5 mg       Review of patient's allergies indicates:   Allergen Reactions    Hydrocodone Hives       Family History   Problem Relation Age of Onset    Arthritis Mother     Hypertension Father     Kidney disease Father     Heart disease Father     COPD Father     Pancreatic cancer Maternal Grandfather     Colon cancer Paternal Grandfather     Colon polyps Neg Hx     Crohn's disease Neg Hx     Ulcerative colitis Neg Hx     Stomach cancer Neg Hx     Esophageal cancer Neg Hx        Social History     Socioeconomic History    Marital status: Single   Tobacco Use    Smoking status: Never    Smokeless tobacco: Current     Types: Chew    Tobacco comments:     rarely   Substance and Sexual Activity    Alcohol use: Yes     Comment: occasionally    Drug use: No    Sexual activity: Not Currently       Chief Complaint:   Chief Complaint   Patient presents with    Follow-up     Follow up right shoulder        Date of surgery:  December 15, 2023    History of present illness:  58-year-old male underwent right revision rotator cuff repair with Regeneten patch augmentation.  Patient was rolling over in bed and felt a pull.   Since then he has had more pain flexing and abducting his shoulder.  Pain right over the anterior lateral shoulder where the repair was performed.  Pain is a 9/10.  MRI confirmed recurrence of the rotator cuff tear but also shows some tearing of the deltoid.      Review of Systems:  Musculoskeletal:  See HPI    Physical Examination:    Vital Signs:    Vitals:    02/05/24 1456   Resp: 18       Body mass index is 31.74 kg/m².    This a well-developed, well nourished patient in no acute distress.  They are alert and oriented and cooperative to examination.  Pt. walks without an antalgic gait.      Examination of the right shoulder shows well-healed surgical portals.  No erythema drainage.  Patient neurovascularly intact.  Patient has pretty significant irritability with pain and weakness with forward flexion and abduction.  Tenderness over the lateral and anterolateral shoulder.    Heart is regular rate without obvious murmurs   Normal respiratory effort without audible wheezing  Abdomen is soft and nontender     MRI of the right knee is reviewed and interpreted:   1. Retear of the previously repaired supraspinatus tendon involving the anterior 2/3 insertional fibers with retraction.  2. Undersurface tear/fraying of the infraspinatus tendon just proximal to the insertion.  3. Low-grade concealed interstitial tear in the superior subscapularis fibers, just proximal to the insertion.  4. Degenerative change, moderate at the AC joint and mild at the glenohumeral joint.  5. Grade 2 muscle tear involving the lateral deltoid.    Assessment::  Status post right rotator cuff repair with patch augmentation  recurrence of the right rotator cuff tear as well as some deltoid tearing    Plan:  I reviewed the findings with him today.  Patient would like just to proceed with a reverse total shoulder.  He did well in the other side.  Is not interested in more attempts at repair.  Plan will be for a right [place] reverse total shoulder  arthroplasty.  Risks, benefits, and alternatives to the procedure were explained to the patient including but not limited to damage to nerves, arteries, blood vessels, bones, tendons, ligaments, stiffness, instability, infection, permanent limb dysfunction, DVT, PE, as well as general anesthetic complications including seizure, stroke, heart attack and even death. The patient understood these risks and wished to proceed and signed the informed consent.       This note was created using M Modal voice recognition software that occasionally misinterpreted phrases or words.

## 2024-02-07 ENCOUNTER — TELEPHONE (OUTPATIENT)
Dept: ORTHOPEDICS | Facility: CLINIC | Age: 59
End: 2024-02-07
Payer: OTHER GOVERNMENT

## 2024-02-07 NOTE — TELEPHONE ENCOUNTER
----- Message from Teresa Reynolds LPN sent at 2/6/2024  3:01 PM CST -----  Contact: Citlali/lily PCP office    ----- Message -----  From: Axel Mendez MA  Sent: 2/6/2024   2:57 PM CST  To: [facility]ll[facility] Staff    Needs patients surgery date.    Call back number is [phone]

## 2024-02-07 NOTE — TELEPHONE ENCOUNTER
Called and spoke to Citlali with PCP office.  Informed her we do not pick day for surgery until we have clearance but we are hoping to this his surgery done within 4-6 weeks.  So as soon as they send over clearance we will get him on the schedule for surgery.     Asa

## 2024-02-12 ENCOUNTER — PATIENT MESSAGE (OUTPATIENT)
Dept: ORTHOPEDICS | Facility: CLINIC | Age: 59
End: 2024-02-12
Payer: OTHER GOVERNMENT

## 2024-02-15 DIAGNOSIS — M19.011 ARTHRITIS OF RIGHT SHOULDER REGION: Primary | ICD-10-CM

## 2024-02-15 DIAGNOSIS — Z01.818 PREOP TESTING: ICD-10-CM

## 2024-02-15 RX ORDER — MUPIROCIN 20 MG/G
OINTMENT TOPICAL
Status: CANCELLED | OUTPATIENT
Start: 2024-02-15

## 2024-02-15 RX ORDER — SODIUM CHLORIDE 9 MG/ML
INJECTION, SOLUTION INTRAVENOUS CONTINUOUS
Status: CANCELLED | OUTPATIENT
Start: 2024-02-15

## 2024-03-03 ENCOUNTER — PATIENT MESSAGE (OUTPATIENT)
Dept: ORTHOPEDICS | Facility: CLINIC | Age: 59
End: 2024-03-03
Payer: OTHER GOVERNMENT

## 2024-03-05 ENCOUNTER — HOSPITAL ENCOUNTER (OUTPATIENT)
Dept: PREADMISSION TESTING | Facility: HOSPITAL | Age: 59
Discharge: HOME | End: 2024-03-05
Attending: ORTHOPAEDIC SURGERY
Payer: OTHER GOVERNMENT

## 2024-03-05 DIAGNOSIS — Z01.818 PREOP TESTING: ICD-10-CM

## 2024-03-05 DIAGNOSIS — M19.011 ARTHRITIS OF RIGHT SHOULDER REGION: ICD-10-CM

## 2024-03-05 DIAGNOSIS — Z01.818 PRE-OP TESTING: ICD-10-CM

## 2024-03-05 DIAGNOSIS — Z01.818 PRE-OP EXAM: ICD-10-CM

## 2024-03-05 RX ORDER — PREGABALIN 300 MG/1
300 CAPSULE ORAL 3 TIMES DAILY
COMMUNITY

## 2024-03-05 NOTE — DISCHARGE INSTRUCTIONS
To confirm, Your doctor has instructed you that surgery is scheduled for: 3/12/24    Please report to [facility], Registration the morning of surgery. You must check-in and receive a wristband before going to your procedure.  [address] [place] [address] [place] [address]    Pre-Op will call the afternoon prior to surgery between 1:00 and 6:00 PM with the final arrival time.  Phone number: [phone]    PLEASE NOTE:  The surgery schedule has many variables which may affect the time of your surgery case.  Family members should be available if your surgery time changes.  Plan to be here the day of your procedure between 4-6 hours.    MEDICATIONS:  TAKE ONLY THESE MEDICATIONS WITH A SMALL SIP OF WATER THE MORNING OF YOUR PROCEDURE:    SEE MED LIST          DO NOT TAKE THESE MEDICATIONS 5-7 DAYS PRIOR to your procedure or per your surgeon's request:   ASPIRIN, ALEVE, ADVIL, IBUPROFEN, FISH OIL VITAMIN E, HERBALS  (May take Tylenol)    ONLY if you are prescribed any types of blood thinners such as:  Aspirin, Coumadin, Plavix, Pradaxa, Xarelto, Aggrenox, Effient, Eliquis, Savasya, Brilinta, or any other, ask your surgeon whether you should stop taking them and how long before surgery you should stop.  You may also need to verify with the prescribing physician if it is ok to stop your medication.      INSTRUCTIONS IMPORTANT!!  Do not eat or drink anything between midnight and the time of your procedure- this includes gum, mints, and candy.  Do not smoke or drink alcoholic beverages 24 hours prior to your procedure.  Shower the night before AND the morning of your procedure with a Chlorhexidine wash such as Hibiclens or Dial antibacterial soap from the neck down.  Do not get it on your face or in your eyes.  You may use your own shampoo and face wash. This helps your skin to be as bacteria free as possible.    If you wear contact lenses, dentures, hearing aids or glasses, bring a container to put them in  during surgery and give to a family member for safe keeping.  Please leave all jewelry, piercing's and valuables at home. You must remove your false eyelashes prior to surgery.    DO NOT remove hair from the surgery site.  Do not shave the incision site unless you are given specific instructions to do so.    ONLY if you have been diagnosed with sleep apnea please bring your C-PAP machine.  ONLY if you wear home oxygen please bring your portable oxygen tank the day of your procedure.  ONLY if you have a history of OPEN HEART SURGERY you will need a clearance from your Cardiologist per Anesthesia.      ONLY for patients requiring bowel prep, written instructions will be given by your doctor's office.  ONLY if you have a neuro stimulator, please bring the controller with you the morning of surgery  ONLY if a type and screen test is needed before surgery, please return:  If your doctor has scheduled you for an overnight stay, bring a small overnight bag with any personal items you need.  Make arrangements in advance for transportation home by a responsible adult. You can not go home in an uber or a cab per hospital policy.  It is not safe to drive a vehicle during the 24 hours after anesthesia.          All  facilities and properties are tobacco free.  Smoking is NOT allowed.   If you have any questions about these instructions, call Pre-Op Admit  Nursing at [phone] or the Pre-Op Day Surgery Unit at [phone].

## 2024-03-11 ENCOUNTER — ANESTHESIA EVENT (OUTPATIENT)
Dept: SURGERY | Facility: HOSPITAL | Age: 59
End: 2024-03-11
Payer: OTHER GOVERNMENT

## 2024-03-11 RX ORDER — ACETAMINOPHEN 500 MG
1000 TABLET ORAL EVERY 8 HOURS PRN
Qty: 30 TABLET | Refills: 0 | Status: SHIPPED | OUTPATIENT
Start: 2024-03-11

## 2024-03-11 RX ORDER — CYCLOBENZAPRINE HCL 10 MG
10 TABLET ORAL 3 TIMES DAILY PRN
Qty: 30 TABLET | Refills: 0 | Status: SHIPPED | OUTPATIENT
Start: 2024-03-11 | End: 2024-03-22

## 2024-03-11 RX ORDER — ASPIRIN 81 MG/1
81 TABLET ORAL 2 TIMES DAILY
Qty: 56 TABLET | Refills: 0 | Status: SHIPPED | OUTPATIENT
Start: 2024-03-11 | End: 2025-03-11

## 2024-03-11 RX ORDER — OXYCODONE AND ACETAMINOPHEN 5; 325 MG/1; MG/1
1 TABLET ORAL EVERY 6 HOURS PRN
Qty: 28 TABLET | Refills: 0 | Status: SHIPPED | OUTPATIENT
Start: 2024-03-11 | End: 2024-03-25

## 2024-03-11 RX ORDER — IBUPROFEN 600 MG/1
600 TABLET ORAL 3 TIMES DAILY PRN
Qty: 30 TABLET | Refills: 0 | Status: SHIPPED | OUTPATIENT
Start: 2024-03-11

## 2024-03-11 RX ORDER — ONDANSETRON 4 MG/1
4 TABLET, FILM COATED ORAL EVERY 6 HOURS PRN
Qty: 30 TABLET | Refills: 0 | Status: SHIPPED | OUTPATIENT
Start: 2024-03-11

## 2024-03-12 ENCOUNTER — HOSPITAL ENCOUNTER (OUTPATIENT)
Facility: HOSPITAL | Age: 59
Discharge: HOME | End: 2024-03-12
Attending: ORTHOPAEDIC SURGERY | Admitting: ORTHOPAEDIC SURGERY
Payer: OTHER GOVERNMENT

## 2024-03-12 ENCOUNTER — ANESTHESIA (OUTPATIENT)
Dept: SURGERY | Facility: HOSPITAL | Age: 59
End: 2024-03-12
Payer: OTHER GOVERNMENT

## 2024-03-12 VITALS
SYSTOLIC BLOOD PRESSURE: 137 MMHG | RESPIRATION RATE: 16 BRPM | BODY MASS INDEX: 31.74 KG/M2 | HEART RATE: 81 BPM | TEMPERATURE: 98 F | OXYGEN SATURATION: 99 % | DIASTOLIC BLOOD PRESSURE: 83 MMHG | WEIGHT: 234 LBS

## 2024-03-12 DIAGNOSIS — M19.011 ARTHRITIS OF RIGHT SHOULDER REGION: ICD-10-CM

## 2024-03-12 DIAGNOSIS — Z01.818 PREOP TESTING: ICD-10-CM

## 2024-03-12 PROCEDURE — 71000015 HC POSTOP RECOV 1ST HR: Performed by: ORTHOPAEDIC SURGERY

## 2024-03-12 PROCEDURE — C1713 ANCHOR/SCREW BN/BN,TIS/BN: HCPCS | Performed by: ORTHOPAEDIC SURGERY

## 2024-03-12 PROCEDURE — C1769 GUIDE WIRE: HCPCS | Performed by: ORTHOPAEDIC SURGERY

## 2024-03-12 PROCEDURE — 63600175 PHARM REV CODE 636 W HCPCS: Performed by: ORTHOPAEDIC SURGERY

## 2024-03-12 PROCEDURE — C1776 JOINT DEVICE (IMPLANTABLE): HCPCS | Performed by: ORTHOPAEDIC SURGERY

## 2024-03-12 PROCEDURE — 37000008 HC ANESTHESIA 1ST 15 MINUTES: Performed by: ORTHOPAEDIC SURGERY

## 2024-03-12 PROCEDURE — 27201423 OPTIME MED/SURG SUP & DEVICES STERILE SUPPLY: Performed by: ORTHOPAEDIC SURGERY

## 2024-03-12 PROCEDURE — 25000003 PHARM REV CODE 250: Performed by: ORTHOPAEDIC SURGERY

## 2024-03-12 PROCEDURE — 36000710: Performed by: ORTHOPAEDIC SURGERY

## 2024-03-12 PROCEDURE — 27200750 HC INSULATED NEEDLE/ STIMUPLEX: Performed by: ANESTHESIOLOGY

## 2024-03-12 PROCEDURE — 63600175 PHARM REV CODE 636 W HCPCS: Performed by: STUDENT IN AN ORGANIZED HEALTH CARE EDUCATION/TRAINING PROGRAM

## 2024-03-12 PROCEDURE — 25000003 PHARM REV CODE 250: Performed by: ANESTHESIOLOGY

## 2024-03-12 PROCEDURE — 25000003 PHARM REV CODE 250: Performed by: NURSE ANESTHETIST, CERTIFIED REGISTERED

## 2024-03-12 PROCEDURE — 63600175 PHARM REV CODE 636 W HCPCS: Mod: JZ,JG | Performed by: ANESTHESIOLOGY

## 2024-03-12 PROCEDURE — 23472 RECONSTRUCT SHOULDER JOINT: CPT | Mod: 58,RT,, | Performed by: ORTHOPAEDIC SURGERY

## 2024-03-12 PROCEDURE — 63600175 PHARM REV CODE 636 W HCPCS

## 2024-03-12 PROCEDURE — 25000003 PHARM REV CODE 250: Performed by: STUDENT IN AN ORGANIZED HEALTH CARE EDUCATION/TRAINING PROGRAM

## 2024-03-12 PROCEDURE — 64415 NJX AA&/STRD BRCH PLXS IMG: CPT | Mod: 59 | Performed by: ANESTHESIOLOGY

## 2024-03-12 PROCEDURE — D9220A PRA ANESTHESIA: Mod: ANES,,, | Performed by: ANESTHESIOLOGY

## 2024-03-12 PROCEDURE — 36000711: Performed by: ORTHOPAEDIC SURGERY

## 2024-03-12 PROCEDURE — 94799 UNLISTED PULMONARY SVC/PX: CPT | Mod: XB

## 2024-03-12 PROCEDURE — 37000009 HC ANESTHESIA EA ADD 15 MINS: Performed by: ORTHOPAEDIC SURGERY

## 2024-03-12 PROCEDURE — C9290 INJ, BUPIVACAINE LIPOSOME: HCPCS | Performed by: ANESTHESIOLOGY

## 2024-03-12 PROCEDURE — C1729 CATH, DRAINAGE: HCPCS | Performed by: ORTHOPAEDIC SURGERY

## 2024-03-12 PROCEDURE — 71000039 HC RECOVERY, EACH ADD'L HOUR: Performed by: ORTHOPAEDIC SURGERY

## 2024-03-12 PROCEDURE — 71000033 HC RECOVERY, INTIAL HOUR: Performed by: ORTHOPAEDIC SURGERY

## 2024-03-12 PROCEDURE — D9220A PRA ANESTHESIA: Mod: CRNA,,, | Performed by: NURSE ANESTHETIST, CERTIFIED REGISTERED

## 2024-03-12 DEVICE — 4.5MM PERIPHERAL SCREW
Type: IMPLANTABLE DEVICE | Site: SHOULDER | Status: FUNCTIONAL
Brand: REUNION

## 2024-03-12 DEVICE — X3 HUMERAL INSERT
Type: IMPLANTABLE DEVICE | Site: SHOULDER | Status: FUNCTIONAL
Brand: REUNION

## 2024-03-12 DEVICE — HUMERAL CUP
Type: IMPLANTABLE DEVICE | Site: SHOULDER | Status: FUNCTIONAL
Brand: REUNION

## 2024-03-12 DEVICE — PRESS-FIT HUMERAL STEM
Type: IMPLANTABLE DEVICE | Site: SHOULDER | Status: FUNCTIONAL
Brand: REUNION

## 2024-03-12 DEVICE — 6.5MM CENTER SCREW
Type: IMPLANTABLE DEVICE | Site: SHOULDER | Status: FUNCTIONAL
Brand: REUNION

## 2024-03-12 DEVICE — NITINOL PILOT WIRE
Type: IMPLANTABLE DEVICE | Site: SHOULDER | Status: FUNCTIONAL
Brand: REUNION

## 2024-03-12 DEVICE — GLENOID BASEPLATE
Type: IMPLANTABLE DEVICE | Site: SHOULDER | Status: FUNCTIONAL
Brand: REUNION

## 2024-03-12 DEVICE — GLENOSPHERE , CONCENTRIC
Type: IMPLANTABLE DEVICE | Site: SHOULDER | Status: FUNCTIONAL
Brand: REUNION

## 2024-03-12 RX ORDER — PROPOFOL 10 MG/ML
VIAL (ML) INTRAVENOUS
Status: DISCONTINUED | OUTPATIENT
Start: 2024-03-12 | End: 2024-03-12

## 2024-03-12 RX ORDER — BUPIVACAINE HYDROCHLORIDE 5 MG/ML
INJECTION, SOLUTION EPIDURAL; INTRACAUDAL
Status: DISCONTINUED | OUTPATIENT
Start: 2024-03-12 | End: 2024-03-12

## 2024-03-12 RX ORDER — PROCHLORPERAZINE EDISYLATE 5 MG/ML
5 INJECTION INTRAMUSCULAR; INTRAVENOUS EVERY 4 HOURS PRN
Status: DISCONTINUED | OUTPATIENT
Start: 2024-03-12 | End: 2024-03-12 | Stop reason: HOSPADM

## 2024-03-12 RX ORDER — MIDAZOLAM HYDROCHLORIDE 1 MG/ML
INJECTION, SOLUTION INTRAMUSCULAR; INTRAVENOUS
Status: DISCONTINUED | OUTPATIENT
Start: 2024-03-12 | End: 2024-03-12

## 2024-03-12 RX ORDER — ROCURONIUM BROMIDE 10 MG/ML
INJECTION, SOLUTION INTRAVENOUS
Status: DISCONTINUED | OUTPATIENT
Start: 2024-03-12 | End: 2024-03-12

## 2024-03-12 RX ORDER — LIDOCAINE HYDROCHLORIDE 10 MG/ML
0.5 INJECTION, SOLUTION EPIDURAL; INFILTRATION; INTRACAUDAL; PERINEURAL ONCE
Status: DISCONTINUED | OUTPATIENT
Start: 2024-03-12 | End: 2024-03-12 | Stop reason: HOSPADM

## 2024-03-12 RX ORDER — HYDROMORPHONE HYDROCHLORIDE 2 MG/ML
0.2 INJECTION, SOLUTION INTRAMUSCULAR; INTRAVENOUS; SUBCUTANEOUS EVERY 5 MIN PRN
Status: DISCONTINUED | OUTPATIENT
Start: 2024-03-12 | End: 2024-03-12 | Stop reason: HOSPADM

## 2024-03-12 RX ORDER — MUPIROCIN 20 MG/G
OINTMENT TOPICAL
Status: DISCONTINUED | OUTPATIENT
Start: 2024-03-12 | End: 2024-03-12 | Stop reason: HOSPADM

## 2024-03-12 RX ORDER — ONDANSETRON HYDROCHLORIDE 2 MG/ML
INJECTION, SOLUTION INTRAVENOUS
Status: DISCONTINUED | OUTPATIENT
Start: 2024-03-12 | End: 2024-03-12

## 2024-03-12 RX ORDER — FENTANYL CITRATE 50 UG/ML
25-200 INJECTION, SOLUTION INTRAMUSCULAR; INTRAVENOUS
Status: DISCONTINUED | OUTPATIENT
Start: 2024-03-12 | End: 2024-03-12 | Stop reason: HOSPADM

## 2024-03-12 RX ORDER — EPHEDRINE SULFATE 50 MG/ML
INJECTION, SOLUTION INTRAVENOUS
Status: DISCONTINUED | OUTPATIENT
Start: 2024-03-12 | End: 2024-03-12

## 2024-03-12 RX ORDER — SODIUM CHLORIDE 9 MG/ML
INJECTION, SOLUTION INTRAVENOUS CONTINUOUS
Status: DISCONTINUED | OUTPATIENT
Start: 2024-03-12 | End: 2024-03-12 | Stop reason: HOSPADM

## 2024-03-12 RX ORDER — ONDANSETRON HYDROCHLORIDE 2 MG/ML
4 INJECTION, SOLUTION INTRAVENOUS ONCE
Status: DISCONTINUED | OUTPATIENT
Start: 2024-03-12 | End: 2024-03-12 | Stop reason: HOSPADM

## 2024-03-12 RX ORDER — MIDAZOLAM HYDROCHLORIDE 1 MG/ML
.5-4 INJECTION INTRAMUSCULAR; INTRAVENOUS
Status: DISCONTINUED | OUTPATIENT
Start: 2024-03-12 | End: 2024-03-12 | Stop reason: HOSPADM

## 2024-03-12 RX ORDER — DIPHENHYDRAMINE HYDROCHLORIDE 50 MG/ML
25 INJECTION INTRAMUSCULAR; INTRAVENOUS EVERY 6 HOURS PRN
Status: DISCONTINUED | OUTPATIENT
Start: 2024-03-12 | End: 2024-03-12 | Stop reason: HOSPADM

## 2024-03-12 RX ORDER — SODIUM CHLORIDE, SODIUM LACTATE, POTASSIUM CHLORIDE, CALCIUM CHLORIDE 600; 310; 30; 20 MG/100ML; MG/100ML; MG/100ML; MG/100ML
INJECTION, SOLUTION INTRAVENOUS CONTINUOUS
Status: DISCONTINUED | OUTPATIENT
Start: 2024-03-12 | End: 2024-03-12 | Stop reason: HOSPADM

## 2024-03-12 RX ORDER — FENTANYL CITRATE 50 UG/ML
25-200 INJECTION, SOLUTION INTRAMUSCULAR; INTRAVENOUS
Status: CANCELLED | OUTPATIENT
Start: 2024-03-12

## 2024-03-12 RX ORDER — FENTANYL CITRATE 50 UG/ML
INJECTION, SOLUTION INTRAMUSCULAR; INTRAVENOUS
Status: DISCONTINUED | OUTPATIENT
Start: 2024-03-12 | End: 2024-03-12

## 2024-03-12 RX ORDER — LIDOCAINE HYDROCHLORIDE 20 MG/ML
INJECTION INTRAVENOUS
Status: DISCONTINUED | OUTPATIENT
Start: 2024-03-12 | End: 2024-03-12

## 2024-03-12 RX ORDER — MIDAZOLAM HYDROCHLORIDE 1 MG/ML
.5-4 INJECTION INTRAMUSCULAR; INTRAVENOUS
Status: CANCELLED | OUTPATIENT
Start: 2024-03-12

## 2024-03-12 RX ORDER — MEPERIDINE HYDROCHLORIDE 50 MG/ML
12.5 INJECTION INTRAMUSCULAR; INTRAVENOUS; SUBCUTANEOUS ONCE
Status: DISCONTINUED | OUTPATIENT
Start: 2024-03-12 | End: 2024-03-12 | Stop reason: HOSPADM

## 2024-03-12 RX ORDER — DEXAMETHASONE SODIUM PHOSPHATE 4 MG/ML
INJECTION, SOLUTION INTRA-ARTICULAR; INTRALESIONAL; INTRAMUSCULAR; INTRAVENOUS; SOFT TISSUE
Status: DISCONTINUED | OUTPATIENT
Start: 2024-03-12 | End: 2024-03-12

## 2024-03-12 RX ORDER — SUCCINYLCHOLINE CHLORIDE 20 MG/ML
INJECTION INTRAMUSCULAR; INTRAVENOUS
Status: DISCONTINUED | OUTPATIENT
Start: 2024-03-12 | End: 2024-03-12

## 2024-03-12 RX ORDER — OXYCODONE HYDROCHLORIDE 5 MG/1
5 TABLET ORAL
Status: DISCONTINUED | OUTPATIENT
Start: 2024-03-12 | End: 2024-03-12 | Stop reason: HOSPADM

## 2024-03-12 RX ORDER — ACETAMINOPHEN 10 MG/ML
INJECTION, SOLUTION INTRAVENOUS
Status: DISCONTINUED | OUTPATIENT
Start: 2024-03-12 | End: 2024-03-12

## 2024-03-12 RX ORDER — FENTANYL CITRATE 50 UG/ML
25 INJECTION, SOLUTION INTRAMUSCULAR; INTRAVENOUS EVERY 5 MIN PRN
Status: DISCONTINUED | OUTPATIENT
Start: 2024-03-12 | End: 2024-03-12 | Stop reason: HOSPADM

## 2024-03-12 RX ORDER — HYDROCODONE BITARTRATE AND ACETAMINOPHEN 5; 325 MG/1; MG/1
1 TABLET ORAL EVERY 4 HOURS PRN
Status: CANCELLED | OUTPATIENT
Start: 2024-03-12

## 2024-03-12 RX ADMIN — FENTANYL CITRATE 25 MCG: 50 INJECTION, SOLUTION INTRAMUSCULAR; INTRAVENOUS at 12:03

## 2024-03-12 RX ADMIN — CEFAZOLIN 2 G: 2 INJECTION, POWDER, FOR SOLUTION INTRAMUSCULAR; INTRAVENOUS at 12:03

## 2024-03-12 RX ADMIN — ONDANSETRON 4 MG: 2 INJECTION INTRAMUSCULAR; INTRAVENOUS at 12:03

## 2024-03-12 RX ADMIN — ROCURONIUM BROMIDE 5 MG: 10 INJECTION, SOLUTION INTRAVENOUS at 12:03

## 2024-03-12 RX ADMIN — ACETAMINOPHEN 1000 MG: 10 INJECTION, SOLUTION INTRAVENOUS at 01:03

## 2024-03-12 RX ADMIN — MIDAZOLAM 2 MG: 1 INJECTION INTRAMUSCULAR; INTRAVENOUS at 11:03

## 2024-03-12 RX ADMIN — GLYCOPYRROLATE 0.2 MG: 0.2 INJECTION, SOLUTION INTRAMUSCULAR; INTRAVITREAL at 12:03

## 2024-03-12 RX ADMIN — TRANEXAMIC ACID 1000 MG: 100 INJECTION, SOLUTION INTRAVENOUS at 12:03

## 2024-03-12 RX ADMIN — SODIUM CHLORIDE, SODIUM GLUCONATE, SODIUM ACETATE, POTASSIUM CHLORIDE, MAGNESIUM CHLORIDE, SODIUM PHOSPHATE, DIBASIC, AND POTASSIUM PHOSPHATE: .53; .5; .37; .037; .03; .012; .00082 INJECTION, SOLUTION INTRAVENOUS at 01:03

## 2024-03-12 RX ADMIN — PHENYLEPHRINE HYDROCHLORIDE 0.3 MCG/KG/MIN: 10 INJECTION INTRAVENOUS at 12:03

## 2024-03-12 RX ADMIN — EPHEDRINE SULFATE 15 MG: 50 INJECTION, SOLUTION INTRAMUSCULAR; INTRAVENOUS; SUBCUTANEOUS at 12:03

## 2024-03-12 RX ADMIN — FENTANYL CITRATE 25 MCG: 50 INJECTION, SOLUTION INTRAMUSCULAR; INTRAVENOUS at 02:03

## 2024-03-12 RX ADMIN — BUPIVACAINE HYDROCHLORIDE 5 ML: 5 INJECTION, SOLUTION EPIDURAL; INTRACAUDAL; PERINEURAL at 11:03

## 2024-03-12 RX ADMIN — DEXAMETHASONE SODIUM PHOSPHATE 4 MG: 4 INJECTION, SOLUTION INTRA-ARTICULAR; INTRALESIONAL; INTRAMUSCULAR; INTRAVENOUS; SOFT TISSUE at 12:03

## 2024-03-12 RX ADMIN — TRANEXAMIC ACID 1000 MG: 100 INJECTION, SOLUTION INTRAVENOUS at 01:03

## 2024-03-12 RX ADMIN — SUCCINYLCHOLINE CHLORIDE 160 MG: 20 INJECTION, SOLUTION INTRAMUSCULAR; INTRAVENOUS at 12:03

## 2024-03-12 RX ADMIN — PROPOFOL 200 MG: 10 INJECTION, EMULSION INTRAVENOUS at 12:03

## 2024-03-12 RX ADMIN — FENTANYL CITRATE 100 MCG: 50 INJECTION, SOLUTION INTRAMUSCULAR; INTRAVENOUS at 11:03

## 2024-03-12 RX ADMIN — SODIUM CHLORIDE, SODIUM GLUCONATE, SODIUM ACETATE, POTASSIUM CHLORIDE, MAGNESIUM CHLORIDE, SODIUM PHOSPHATE, DIBASIC, AND POTASSIUM PHOSPHATE: .53; .5; .37; .037; .03; .012; .00082 INJECTION, SOLUTION INTRAVENOUS at 10:03

## 2024-03-12 RX ADMIN — LIDOCAINE HYDROCHLORIDE 100 MG: 20 INJECTION, SOLUTION INTRAVENOUS at 02:03

## 2024-03-12 RX ADMIN — BUPIVACAINE 10 ML: 13.3 INJECTION, SUSPENSION, LIPOSOMAL INFILTRATION at 11:03

## 2024-03-12 RX ADMIN — EPHEDRINE SULFATE 10 MG: 50 INJECTION, SOLUTION INTRAMUSCULAR; INTRAVENOUS; SUBCUTANEOUS at 01:03

## 2024-03-12 RX ADMIN — OXYCODONE 5 MG: 5 TABLET ORAL at 03:03

## 2024-03-12 RX ADMIN — LIDOCAINE HYDROCHLORIDE 100 MG: 20 INJECTION, SOLUTION INTRAVENOUS at 12:03

## 2024-03-12 RX ADMIN — MUPIROCIN: 20 OINTMENT TOPICAL at 09:03

## 2024-03-12 NOTE — PLAN OF CARE
Hemovac drain to R shoulder removed per MD order. 60 mL serosanguineous output noted. Gauze and silk tape applied to site. Pt tolerated well. Educated pt to remove dressing to site in 24-48 hours and apply waterproof bandaid to site and change bandaid daily--signs and symptoms of infection reviewed with pt, educated pt to notify Dr. Levin of any signs of infection. Pt verbalized understanding.

## 2024-03-12 NOTE — ANESTHESIA PROCEDURE NOTES
Intubation    Date/Time: 3/12/2024 12:42 PM    Performed by: Daron Rodriguez CRNA  Authorized by: Daron Rodriguez CRNA    Intubation:     Induction:  Intravenous    Intubated:  Postinduction    Mask Ventilation:  Moderately difficult with oral airway    Attempts:  1    Attempted By:  GISELE (Daron Rodriguez)    Method of Intubation:  Video laryngoscopy    Blade:  Shaikh 3    Laryngeal View Grade: Grade I - full view of cords      Difficult Airway Encountered?: No      Complications:  None    Airway Device:  Oral endotracheal tube    Airway Device Size:  7.0    Style/Cuff Inflation:  Cuffed    Inflation Amount (mL):  6    Tube secured:  23    Secured at:  The lips    Placement Verified By:  Capnometry    Complicating Factors:  None    Findings Post-Intubation:  BS equal bilateral and atraumatic/condition of teeth unchanged

## 2024-03-12 NOTE — DISCHARGE SUMMARY
[facility]  Discharge Note  Short Stay    Procedure(s) (LRB):  ARTHROPLASTY, SHOULDER, TOTAL, REVERSE (Right)      OUTCOME: Patient tolerated treatment/procedure well without complication and is now ready for discharge.    DISPOSITION: Home or Self Care    FINAL DIAGNOSIS:  <principal problem not specified>    FOLLOWUP: In clinic    DISCHARGE INSTRUCTIONS:    Discharge Procedure Orders   Diet general     Ice to affected area     Lifting restrictions     No driving, operating heavy equipment or signing legal documents while taking pain medication     Call MD for:  temperature >100.4     Call MD for:  persistent nausea and vomiting     Call MD for:  severe uncontrolled pain     Call MD for:  difficulty breathing, headache or visual disturbances     Call MD for:  redness, tenderness, or signs of infection (pain, swelling, redness, odor or green/yellow discharge around incision site)     Call MD for:  hives     Call MD for:  persistent dizziness or light-headedness     Call MD for:  extreme fatigue     Leave dressing on - Keep it clean, dry, and intact until clinic visit        TIME SPENT ON DISCHARGE: 5 minutes

## 2024-03-12 NOTE — PLAN OF CARE
Patient ready for surgery. Surgery and anesthesia consents signed. Educated on incentive spirometer use. Belongings in pre-op locker. Friend set up with text message notifications.

## 2024-03-12 NOTE — ANESTHESIA PREPROCEDURE EVALUATION
03/12/2024  Himanshu Connor II is a 58 y.o., male.      Pre-op Assessment    I have reviewed the Patient Summary Reports.     I have reviewed the Nursing Notes. I have reviewed the NPO Status.   I have reviewed the Medications.     Review of Systems  Anesthesia Hx:  No problems with previous Anesthesia                Social:  Non-Smoker, Alcohol Use H/o etoh abuse       Hematology/Oncology:       -- Anemia:                                  Cardiovascular:     Hypertension, well controlled              ECG has been reviewed.                    Hypertension         Pulmonary:    Asthma asymptomatic   Sleep Apnea   Asthma:    Obstructive Sleep Apnea (JAH).           Renal/:  Renal/ Normal                 Hepatic/GI:     GERD, well controlled   Gastric bypass   Gerd          Musculoskeletal:  Arthritis    Right shoulder arthritis             Neurological:    Neuromuscular Disease,             Chronic Pain Syndrome                       Neuromuscular Disease   Endocrine:        Obesity / BMI > 30  Psych:  Psychiatric History  depression                Physical Exam  General: Well nourished, Cooperative, Alert and Oriented    Airway:  Mallampati: II   Mouth Opening: Normal  TM Distance: Normal  Tongue: Normal  Neck ROM: Normal ROM    Dental:  Intact    Chest/Lungs:  Clear to auscultation, Normal Respiratory Rate    Heart:  Rate: Normal        Anesthesia Plan  Type of Anesthesia, risks & benefits discussed:    Anesthesia Type: Gen ETT  Intra-op Monitoring Plan: Standard ASA Monitors  Post Op Pain Control Plan: multimodal analgesia, IV/PO Opioids PRN and peripheral nerve block  Induction:  IV  Airway Plan: Direct and Video, Post-Induction  Informed Consent: Informed consent signed with the Patient and all parties understand the risks and agree with anesthesia plan.  All questions answered.   ASA Score:  3    Ready For Surgery From Anesthesia Perspective.     .

## 2024-03-12 NOTE — H&P
Past Medical History:   Diagnosis Date    Alcohol abuse, unspecified      Allergic rhinitis, cause unspecified      Arthritis      Asthma attack      Cancer 2022     skin cancer on nose    Carpal tunnel syndrome      Depressive disorder, not elsewhere classified      Diarrhea      Encounter for blood transfusion      GERD (gastroesophageal reflux disease)      Hypersomnia, unspecified      Hypertension      Hypoglycemia      Lumbago      Obesity, unspecified      JAH (obstructive sleep apnea)       uses BIPAP    Other and unspecified hyperlipidemia      Other ankle sprain and strain      Psychosexual dysfunction with inhibited sexual excitement      Pyogenic arthritis of knee 06/2021     staph pseudintermedius, TKA    Pyogenic arthritis of knee 09/21/2021     Enterococcus    Ventral hernia, unspecified, without mention of obstruction or gangrene                 Past Surgical History:   Procedure Laterality Date    A-scope knees         Bilateral    ARTHROSCOPIC DEBRIDEMENT OF SHOULDER Right 12/15/2023     Procedure: EXTENSIVE DEBRIDEMENT, SHOULDER, ARTHROSCOPIC;  Surgeon: Felix Levin MD;  Location: [facility] OR;  Service: Orthopedics;  Laterality: Right;    ARTHROSCOPIC REPAIR OF ROTATOR CUFF OF SHOULDER Left 09/20/2019     Procedure: REPAIR, ROTATOR CUFF, ARTHROSCOPIC;  Surgeon: Felix Levin MD;  Location: [facility] OR;  Service: Orthopedics;  Laterality: Left;    ARTHROSCOPIC REPAIR OF ROTATOR CUFF OF SHOULDER Right 3/17/2023     Procedure: REPAIR, ROTATOR CUFF, ARTHROSCOPIC;  Surgeon: Felix Levin MD;  Location: [facility] OR;  Service: Orthopedics;  Laterality: Right;    ARTHROSCOPIC REPAIR OF ROTATOR CUFF OF SHOULDER Right 12/15/2023     Procedure: REPAIR, ROTATOR CUFF, ARTHROSCOPIC, WITH PATCH;  Surgeon: Felix Levin MD;  Location: [facility] OR;  Service: Orthopedics;  Laterality: Right;  arthrex , schmitt and nephew regenatin patch    ARTHROSCOPY OF SHOULDER WITH DECOMPRESSION OF  SUBACROMIAL SPACE Left 09/20/2019     Procedure: ARTHROSCOPY, SHOULDER, WITH SUBACROMIAL SPACE DECOMPRESSION;  Surgeon: Felix Levin MD;  Location: [facility] OR;  Service: Orthopedics;  Laterality: Left;  Jaspreet- Arthrex notified of all case today 9/16-tcb    ARTHROSCOPY,SHOULDER,WITH BICEPS TENODESIS Right 3/17/2023     Procedure: ARTHROSCOPY,SHOULDER,WITH BICEPS TENODESIS;  Surgeon: Felix Levin MD;  Location: [facility] OR;  Service: Orthopedics;  Laterality: Right;    CARPAL TUNNEL RELEASE         Bilateral    COLONOSCOPY N/A 08/05/2022     Procedure: COLONOSCOPY;  Surgeon: Zaida Ansari MD;  Location: [facility] ENDO;  Service: Endoscopy;  Laterality: N/A;    ESOPHAGEAL MANOMETRY WITH MEASUREMENT OF IMPEDANCE N/A 3/24/2023     Procedure: MANOMETRY, ESOPHAGUS, WITH IMPEDANCE MEASUREMENT;  Surgeon: Sofie Walker MD;  Location: [facility] ENDO (2ND FLR);  Service: Endoscopy;  Laterality: N/A;  r/o rumination and supragastric belching  hold baclofen x3 days, hold doxepin x24 hours prior to procedure    ESOPHAGOGASTRODUODENOSCOPY N/A 08/05/2022     Procedure: EGD (ESOPHAGOGASTRODUODENOSCOPY);  Surgeon: Zaida Ansari MD;  Location: [facility] ENDO;  Service: Endoscopy;  Laterality: N/A;    ESOPHAGOGASTRODUODENOSCOPY N/A 3/24/2023     Procedure: EGD (ESOPHAGOGASTRODUODENOSCOPY);  Surgeon: Sofie Walker MD;  Location: [facility] ENDO (2ND FLR);  Service: Endoscopy;  Laterality: N/A;  Endoflip/Endoscopically placed manometry probe  2nd floor-per Dr. Walker (higher than average risk procedure)  propofol only  full liquid diet x3 days, clear liquid diet x1 day prior to procedure  [facility] and emailed to [email or web address]    EYE SURGERY         Lasik    HERNIA REPAIR        INCISION AND DRAINAGE OF HEMATOMA Right 09/21/2021     Procedure: INCISION AND DRAINAGE, HEMATOMA;  Surgeon: Felix Levin MD;  Location: [facility] OR;  Service: Orthopedics;  Laterality: Right;    INSERTION OF ANTIBIOTIC SPACER  Right 06/01/2021     Procedure: INSERTION, ANTIBIOTIC SPACER;  Surgeon: Felix Levin MD;  Location: [facility] OR;  Service: Orthopedics;  Laterality: Right;    JOINT REPLACEMENT        KNEE ARTHROPLASTY Left 08/04/2020     Procedure: ARTHROPLASTY, KNEE;  Surgeon: Felix Levin MD;  Location: [facility] OR;  Service: Orthopedics;  Laterality: Left;    KNEE ARTHROPLASTY Right 03/23/2021     Procedure: ARTHROPLASTY, KNEE;  Surgeon: Felix Levin MD;  Location: [facility] OR;  Service: Orthopedics;  Laterality: Right;    KNEE ARTHROSCOPY W/ MENISCECTOMY Left 10/15/2019     Procedure: ARTHROSCOPY, KNEE, WITH MENISCECTOMY;  Surgeon: Felix Levin MD;  Location: [facility] OR;  Service: Orthopedics;  Laterality: Left;  Medial and Lateral Meniscectomy    KNEE ARTHROSCOPY W/ MENISCECTOMY Right 11/22/2019     Procedure: ARTHROSCOPY, KNEE, WITH MENISCECTOMY;  Surgeon: Felix Levin MD;  Location: [facility] OR;  Service: Orthopedics;  Laterality: Right;    KNEE SURGERY         osmar    MYOTOMY, PERORAL, ENDOSCOPIC N/A 10/2/2023     Procedure: MYOTOMY, PERORAL, ENDOSCOPIC POEM;  Surgeon: Iker Méndez Jr., MD;  Location: [address];  Service: General;  Laterality: N/A;    NISSEN FUNDOPLICATION         X 2    REVERSE TOTAL SHOULDER ARTHROPLASTY Left 02/11/2020     Procedure: ARTHROPLASTY, SHOULDER, TOTAL, REVERSE;  Surgeon: Felix Levin MD;  Location: [facility] OR;  Service: Orthopedics;  Laterality: Left;  [place]    revision of gastrojejunostomy   05/10/2021     [facility] [place]    REVISION OF KNEE ARTHROPLASTY Right 09/07/2021     Procedure: REVISION, ARTHROPLASTY, KNEE;  Surgeon: Felix Levin MD;  Location: [facility] OR;  Service: Orthopedics;  Laterality: Right;    ROTATOR CUFF REPAIR         right    RJ-EN-Y PROCEDURE N/A 05/2021    SLEEVE GASTROPLASTY   12/2013              Current Outpatient Medications   Medication Sig    albuterol (PROVENTIL/VENTOLIN HFA) 90 mcg/actuation inhaler  INHALE 2 PUFFS BY MOUTH FOUR TIMES A DAY AS NEEDED FOR BREATHING    ascorbic acid, vitamin C, (VITAMIN C) 500 MG tablet 500 mg.    azelastine (ASTELIN) 137 mcg (0.1 %) nasal spray INHALE 1 SPRAY IN EACH NOSTRIL TWICE A DAY FOR ALLERGIES    busPIRone (BUSPAR) 10 MG tablet 5 mg.    cetirizine (ZYRTEC) 10 MG tablet 10 mg.    cyanocobalamin (VITAMIN B-12) 1000 MCG tablet Take 1,000 mcg by mouth once daily.     cyclobenzaprine (FLEXERIL) 5 MG tablet TAKE ONE TABLET BY MOUTH EVERY EIGHT HOURS AS NEEDED FOR MUSCLE SPASM    diclofenac sodium (VOLTAREN) 1 % Gel Apply topically daily as needed.    EScitalopram oxalate (LEXAPRO) 20 MG tablet 20 mg.    eszopiclone (LUNESTA) 3 mg Tab Take 1 tablet by mouth nightly as needed.    LIDOcaine (LIDODERM) 5 % APPLY 1 PATCH TOPICALLY EVERY DAY FOR PAIN. WEAR FOR 12 HOURS, THEN REMOVE. DO NOT APPLY NEW PATCH FOR AT LEAST 12 HOURS.    losartan-hydrochlorothiazide 100-25 mg (HYZAAR) 100-25 mg per tablet 1 tablet.    MELATONIN ORAL 10 mg.    methyl salicylate-menthol 15-10% 15-10 % Crea APPLY LIBERAL AMOUNT TOPICALLY FOUR TIMES A DAY AS NEEDED    multivitamin with iron Tab TAKE TWO TABLETS BY MOUTH EVERY DAY WITH FOOD    naloxone (NARCAN) 4 mg/actuation Spry SMARTSIG:Spray(s) Both Nares    omeprazole (PRILOSEC) 40 MG capsule 40 mg.    ondansetron (ZOFRAN) 4 MG tablet Take 1 tablet (4 mg total) by mouth every 6 (six) hours as needed for Nausea.    peg 400-propylene glycol 0.4-0.3 % Drop INSTILL ONE DROP IN EACH EYE FOUR TIMES A DAY AS NEEDED FOR DRY EYES    vitamin D (VITAMIN D3) 1000 units Tab Take 2,000 Units by mouth once daily.    acetaminophen (TYLENOL) 500 MG tablet Take 2 tablets (1,000 mg total) by mouth every 8 (eight) hours as needed for Pain.    aspirin 81 MG Chew Take 1 tablet (81 mg total) by mouth 2 (two) times daily. (Patient taking differently: Take 81 mg by mouth once daily. PATIENT STATED TAKES 5 TIMES WEEKLY)    doxepin (SINEQUAN) 50 MG capsule 50 mg nightly.     ondansetron (ZOFRAN) 4 MG tablet Take 1 tablet (4 mg total) by mouth every 6 (six) hours as needed for Nausea.    oxyCODONE-acetaminophen (PERCOCET) 5-325 mg per tablet Take 1 tablet by mouth every 6 (six) hours as needed for Pain.    oxyCODONE-acetaminophen (PERCOCET) 5-325 mg per tablet Take 1 tablet by mouth every 6 (six) hours as needed for Pain.    pregabalin (LYRICA) 100 MG capsule TAKE ONE CAPSULE BY MOUTH TWICE A DAY FOR NERVE PAIN    tadalafiL (CIALIS) 20 MG Tab 20 mg.      No current facility-administered medications for this visit.          Facility-Administered Medications Ordered in Other Visits   Medication    diphenhydrAMINE injection 25 mg    electrolyte-S (ISOLYTE)    HYDROmorphone (PF) injection 0.2 mg    lactated ringers infusion    LIDOcaine (PF) 10 mg/ml (1%) injection 5 mg    ondansetron injection 4 mg    oxyCODONE immediate release tablet 5 mg    prochlorperazine injection Soln 5 mg              Review of patient's allergies indicates:   Allergen Reactions    Hydrocodone Hives               Family History   Problem Relation Age of Onset    Arthritis Mother      Hypertension Father      Kidney disease Father      Heart disease Father      COPD Father      Pancreatic cancer Maternal Grandfather      Colon cancer Paternal Grandfather      Colon polyps Neg Hx      Crohn's disease Neg Hx      Ulcerative colitis Neg Hx      Stomach cancer Neg Hx      Esophageal cancer Neg Hx           Social History               Socioeconomic History    Marital status: Single   Tobacco Use    Smoking status: Never    Smokeless tobacco: Current       Types: Chew    Tobacco comments:       rarely   Substance and Sexual Activity    Alcohol use: Yes       Comment: occasionally    Drug use: No    Sexual activity: Not Currently            Chief Complaint:        Chief Complaint   Patient presents with    Follow-up       Follow up right shoulder          Date of surgery:  December 15, 2023     History of present illness:   58-year-old male underwent right revision rotator cuff repair with Regeneten patch augmentation.  Patient was rolling over in bed and felt a pull.  Since then he has had more pain flexing and abducting his shoulder.  Pain right over the anterior lateral shoulder where the repair was performed.  Pain is a 9/10.  MRI confirmed recurrence of the rotator cuff tear but also shows some tearing of the deltoid.        Review of Systems:  Musculoskeletal:  See HPI     Physical Examination:     Vital Signs:        Vitals:     02/05/24 1456   Resp: 18         Body mass index is 31.74 kg/m².     This a well-developed, well nourished patient in no acute distress.  They are alert and oriented and cooperative to examination.  Pt. walks without an antalgic gait.       Examination of the right shoulder shows well-healed surgical portals.  No erythema drainage.  Patient neurovascularly intact.  Patient has pretty significant irritability with pain and weakness with forward flexion and abduction.  Tenderness over the lateral and anterolateral shoulder.     Heart is regular rate without obvious murmurs   Normal respiratory effort without audible wheezing  Abdomen is soft and nontender      MRI of the right knee is reviewed and interpreted:   1. Retear of the previously repaired supraspinatus tendon involving the anterior 2/3 insertional fibers with retraction.  2. Undersurface tear/fraying of the infraspinatus tendon just proximal to the insertion.  3. Low-grade concealed interstitial tear in the superior subscapularis fibers, just proximal to the insertion.  4. Degenerative change, moderate at the AC joint and mild at the glenohumeral joint.  5. Grade 2 muscle tear involving the lateral deltoid.     Assessment::  Status post right rotator cuff repair with patch augmentation  recurrence of the right rotator cuff tear as well as some deltoid tearing     Plan:  I reviewed the findings with him today.  Patient would like just to proceed  with a reverse total shoulder.  He did well in the other side.  Is not interested in more attempts at repair.  Plan will be for a right [place] reverse total shoulder arthroplasty.  Risks, benefits, and alternatives to the procedure were explained to the patient including but not limited to damage to nerves, arteries, blood vessels, bones, tendons, ligaments, stiffness, instability, infection, permanent limb dysfunction, DVT, PE, as well as general anesthetic complications including seizure, stroke, heart attack and even death. The patient understood these risks and wished to proceed and signed the informed consent.         This note was created using [employer] voice recognition software that occasionally misinterpreted phrases or words.

## 2024-03-12 NOTE — PLAN OF CARE
Post-op meds noted. Allergies noted. Pt reports he can take percocet and is OK with taking it post-op as needed.

## 2024-03-12 NOTE — DISCHARGE INSTRUCTIONS
"Discharge Instructions: After Your Surgery/Procedure  Youve just had surgery. During surgery you were given medicine called anesthesia to keep you relaxed and free of pain. After surgery you may have some pain or nausea. This is common. Here are some tips for feeling better and getting well after surgery.     Stay on schedule with your medication.   Going home  Your doctor or nurse will show you how to take care of yourself when you go home. He or she will also answer your questions. Have an adult family member or friend drive you home.      For your safety we recommend these precaution for the first 24 hours after your procedure:  Do not drive or use heavy equipment.  Do not make important decisions or sign legal papers.  Do not drink alcohol.  Have someone stay with you, if needed. He or she can watch for problems and help keep you safe.  Your concentration, balance, coordination, and judgement may be impaired for many hours after anesthesia.  Use caution when ambulating or standing up.     You may feel weak and "washed out" after anesthesia and surgery.      Subtle residual effects of general anesthesia or sedation with regional / local anesthesia can last more than 24 hours.  Rest for the remainder of the day or longer if your Doctor/Surgeon has advised you to do so.  Although you may feel normal within the first 24 hours, your reflexes and mental ability may be impaired without you realizing it.  You may feel dizzy, lightheaded or sleepy for 24 hours or longer.      Be sure to go to all follow-up visits with your doctor. And rest after your surgery for as long as your doctor tells you to.  Coping with pain  If you have pain after surgery, pain medicine will help you feel better. Take it as told, before pain becomes severe. Also, ask your doctor or pharmacist about other ways to control pain. This might be with heat, ice, or relaxation. And follow any other instructions your surgeon or nurse gives you.  Tips " for taking pain medicine  To get the best relief possible, remember these points:  Pain medicines can upset your stomach. Taking them with a little food may help.  Most pain relievers taken by mouth need at least 20 to 30 minutes to start to work.  Taking medicine on a schedule can help you remember to take it. Try to time your medicine so that you can take it before starting an activity. This might be before you get dressed, go for a walk, or sit down for dinner.  Constipation is a common side effect of pain medicines. Call your doctor before taking any medicines such as laxatives or stool softeners to help ease constipation. Also ask if you should skip any foods. Drinking lots of fluids and eating foods such as fruits and vegetables that are high in fiber can also help. Remember, do not take laxatives unless your surgeon has prescribed them.  Drinking alcohol and taking pain medicine can cause dizziness and slow your breathing. It can even be deadly. Do not drink alcohol while taking pain medicine.  Pain medicine can make you react more slowly to things. Do not drive or run machinery while taking pain medicine.  Your health care provider may tell you to take acetaminophen to help ease your pain. Ask him or her how much you are supposed to take each day. Acetaminophen or other pain relievers may interact with your prescription medicines or other over-the-counter (OTC) drugs. Some prescription medicines have acetaminophen and other ingredients. Using both prescription and OTC acetaminophen for pain can cause you to overdose. Read the labels on your OTC medicines with care. This will help you to clearly know the list of ingredients, how much to take, and any warnings. It may also help you not take too much acetaminophen. If you have questions or do not understand the information, ask your pharmacist or health care provider to explain it to you before you take the OTC medicine.  Managing nausea  Some people have an  upset stomach after surgery. This is often because of anesthesia, pain, or pain medicine, or the stress of surgery. These tips will help you handle nausea and eat healthy foods as you get better. If you were on a special food plan before surgery, ask your doctor if you should follow it while you get better. These tips may help:  Do not push yourself to eat. Your body will tell you when to eat and how much.  Start off with clear liquids and soup. They are easier to digest.  Next try semi-solid foods, such as mashed potatoes, applesauce, and gelatin, as you feel ready.  Slowly move to solid foods. Dont eat fatty, rich, or spicy foods at first.  Do not force yourself to have 3 large meals a day. Instead eat smaller amounts more often.  Take pain medicines with a small amount of solid food, such as crackers or toast, to avoid nausea.     Call your surgeon if  You still have pain an hour after taking medicine. The medicine may not be strong enough.  You feel too sleepy, dizzy, or groggy. The medicine may be too strong.  You have side effects like nausea, vomiting, or skin changes, such as rash, itching, or hives.       If you have obstructive sleep apnea  You were given anesthesia medicine during surgery to keep you comfortable and free of pain. After surgery, you may have more apnea spells because of this medicine and other medicines you were given. The spells may last longer than usual.   At home:  Keep using the continuous positive airway pressure (CPAP) device when you sleep. Unless your health care provider tells you not to, use it when you sleep, day or night. CPAP is a common device used to treat obstructive sleep apnea.  Talk with your provider before taking any pain medicine, muscle relaxants, or sedatives. Your provider will tell you about the possible dangers of taking these medicines.  © 7768-9539 [employer]. [address] [place] [address], [place] [address]. All rights reserved. This information is  not intended as a substitute for professional medical care. Always follow your healthcare professional's instructions.          Using an Incentive Spirometer    An incentive spirometer is a device that helps you do deep breathing exercises. These exercises expand your lungs, aid in circulation, and help prevent pneumonia. Deep breathing exercises also help you breathe better and improve the function of your lungs by:  Keeping your lungs clear  Strengthening your breathing muscles  Helping prevent respiratory complications or problems  The incentive spirometer gives you a way to take an active part in recover. A nurse or therapist will teach you breathing exercises. To do these exercises, you will breathe in through your mouth and not your nose. The incentive spirometer only works correctly if you breathe in through your mouth.  Steps to clear lungs  Step 1. Exhale normally. Then, inhale normally.  Relax and breathe out.  Step 2. Place your lips tightly around the mouthpiece.  Make sure the device is upright and not tilted.  Step 3. Inhale as much air as you can through the mouthpiece (don't breath through your nose).  Inhale slowly and deeply.  Hold your breath long enough to keep the balls or disk raised for at least 3 to 5 seconds, or as instructed by your healthcare provider.  Some spirometers have an indicator to let you know that you are breathing in too fast. If the indicator goes off, breathe in more slowly.  Step 4. Repeat the exercise regularly.  Do this exercise every hour while you're awake, or as instructed by your healthcare provider.  If you were taught deep breathing and coughing exercises, do them regularly as instructed by your healthcare provider.           Post op instructions for prevention of DVT  What is deep vein thrombosis?  Deep vein thrombosis (DVT) is the medical term for blood clots in the deep veins of the leg.  These blood clots can be dangerous.  A DVT can block a blood vessel and keep  blood from getting where it needs to go.  Another problem is that the clot can travel to other parts of the body such as the lungs.  A clot that travels to the lungs is called a pulmonary embolus (PE) and can cause serious problems with breathing which can lead to death.  Am I at risk for DVT/PE?  If you are not very active, you are at risk of DVT.  Anyone confined to bed, sitting for long periods of time, recovering from surgery, etc. increases the risk of DVT.  Other risk factors are cancer diagnosis, certain medications, estrogen replacement in any form,older age, obesity, pregnancy, smoking, history of clotting disorders, and dehydration.  How will I know if I have a DVT?  Swelling in the lower leg  Pain  Warmth, redness, hardness or bulging of the vein  If you have any of these symptoms, call your doctors office right away.  Some people will not have any symptoms until the clot moves to the lungs.  What are the symptoms of a PE?  Panting, shortness of breath, or trouble breathing  Sharp, knife-like chest pain when you breathe  Coughing or coughing up blood  Rapid heartbeat  If you have any of these symptoms or get worse quickly, call 911 for emergency treatment.  How can I prevent a DVT?  Avoid long periods of inactivity and dont cross your legs--get up and walk around every hour or so.  Stay active--walking after surgery is highly encouraged.  This means you should get out of the house and walk in the neighborhood.  Going up and down stairs will not impair healing (unless advised against such activity by your doctor).    Drink plenty of noncaffeinated, nonalcoholic fluids each day to prevent dehydration.  Wear special support stockings, if they have been advised by your doctor.  If you travel, stop at least once an hour and walk around.  Avoid smoking (assistance with stopping is available through your healthcare provider)  Always notify your doctor if you are not able to follow the post operative  instructions that are given to you at the time of discharge.  It may be necessary to prescribe one of the medications available to prevent DVT.          Exparel(bupivacaine) has been injected to provide approximately 72 hours of reduced pain after your surgery.  Do not remove the bracelet for five days.  Report to your doctor as soon as possible if you experience any of the following:   Restlessness   Anxiety   Speech problems    Lightheadedness   Numbness and tingling of the mouth and lips   Seizures    Metallic taste   Blurred vision   Tremors    Twitching   Depression   Extreme drowsiness  Avoid additional use of local anesthetics (such as dental procedures) for five days (96 hours).          We hope your stay was comfortable as you heal now, mend and rest.    For we have enjoyed taking care of you by giving your our best.    And as you get better, by regaining your health and strength;   We count it as a privilege to have served you and hope your time at [facility] was well spent.      Thank  You!!!

## 2024-03-12 NOTE — ANESTHESIA POSTPROCEDURE EVALUATION
Anesthesia Post Evaluation    Patient: Himanshu Connor II    Procedure(s) Performed: Procedure(s) (LRB):  ARTHROPLASTY, SHOULDER, TOTAL, REVERSE (Right)    Final Anesthesia Type: general      Patient location during evaluation: PACU  Patient participation: Yes- Able to Participate  Level of consciousness: awake and alert  Post-procedure vital signs: reviewed and stable  Pain management: adequate  Airway patency: patent    PONV status at discharge: No PONV  Anesthetic complications: no      Cardiovascular status: hemodynamically stable  Respiratory status: unassisted and room air  Hydration status: euvolemic  Follow-up not needed.              Vitals Value Taken Time   /83 03/12/24 1545   Temp 36.5 °C (97.7 °F) 03/12/24 1520   Pulse 81 03/12/24 1545   Resp 16 03/12/24 1545   SpO2 99 % 03/12/24 1545         Event Time   Out of Recovery 15:30:00         Pain/Yovany Score: Pain Rating Prior to Med Admin: 5 (3/12/2024  3:16 PM)  Yovany Score: 10 (3/12/2024  3:15 PM)  Modified Yovany Score: 19 (3/12/2024  4:00 PM)

## 2024-03-12 NOTE — ANESTHESIA PROCEDURE NOTES
Peripheral Block    Patient location during procedure: pre-op   Block not for primary anesthetic.  Reason for block: at surgeon's request and post-op pain management   Post-op Pain Location: right shoulder   Start time: 3/12/2024 11:30 AM  Timeout: 3/12/2024 11:30 AM   End time: 3/12/2024 11:35 AM    Staffing  Authorizing Provider: Brent Sandhu MD  Performing Provider: Brent Sandhu MD    Staffing  Performed by: Brent Sandhu MD  Authorized by: Brent Sandhu MD    Preanesthetic Checklist  Completed: patient identified, IV checked, site marked, risks and benefits discussed, surgical consent, monitors and equipment checked, pre-op evaluation and timeout performed  Peripheral Block  Patient position: sitting  Prep: ChloraPrep  Patient monitoring: heart rate, cardiac monitor, continuous pulse ox, continuous capnometry and frequent blood pressure checks  Block type: interscalene  Laterality: right  Injection technique: single shot  Needle  Needle type: Stimuplex   Needle gauge: 22 G  Needle length: 2 in  Needle localization: anatomical landmarks and ultrasound guidance   -ultrasound image captured on disc.  Assessment  Injection assessment: negative aspiration, negative parasthesia and local visualized surrounding nerve  Paresthesia pain: none  Heart rate change: no  Slow fractionated injection: yes  Pain Tolerance: comfortable throughout block and no complaints  Medications:    Medications: bupivacaine (pf) (MARCAINE) injection 0.5% - Perineural   5 mL - 3/12/2024 11:35:00 AM  BUPivacaine liposome (PF) 1.3 % (13.3 mg/mL) suspension - Injection   10 mL - 3/12/2024 11:35:00 AM    Additional Notes  VSS.  [facility] RN monitoring vitals throughout procedure.  Patient tolerated procedure well.

## 2024-03-12 NOTE — TRANSFER OF CARE
Anesthesia Transfer of Care Note    Patient: Himanshu Connor II    Procedure(s) Performed: Procedure(s) (LRB):  ARTHROPLASTY, SHOULDER, TOTAL, REVERSE (Right)    Patient location: PACU    Anesthesia Type: general    Transport from OR: Transported from OR on 2-3 L/min O2 by NC with adequate spontaneous ventilation    Post pain: adequate analgesia    Post assessment: no apparent anesthetic complications and tolerated procedure well    Post vital signs: stable    Level of consciousness: sedated and responds to stimulation    Nausea/Vomiting: no nausea/vomiting    Complications: none    Transfer of care protocol was followed      Last vitals: Visit Vitals  /65 (BP Location: Left arm, Patient Position: Lying)   Pulse 69   Temp 36.6 °C (97.9 °F) (Skin)   Resp 14   Wt 106.1 kg (234 lb)   SpO2 96%   BMI 31.74 kg/m²

## 2024-03-12 NOTE — PLAN OF CARE
Released per anesthesia, when criteria met. VS WDL, Resp even and unlabored. Pt alert and oriented. IS reviewed and pt encouraged to continue independently. Dressing dry and intact, hemovac intact draining serosanguinous fluid,pain managed with meds and block. Pt has tolerated liquids without nausea.

## 2024-03-12 NOTE — OP NOTE
[facility]  Orthopedic Surgery  Operative Note    SUMMARY     Date of Procedure: 3/12/2024     Procedure: Procedure(s) (LRB):  ARTHROPLASTY, SHOULDER, TOTAL, REVERSE (Right)     Right multiple foreign body removal      Surgeon(s) and Role:     * Felix Levin MD - Primary    Assistant: SMA Mayra    Pre-Operative Diagnosis: Arthritis of right shoulder region [M19.011]  Preop testing [Z01.818]    Post-Operative Diagnosis: Post-Op Diagnosis Codes:     * Arthritis of right shoulder region [M19.011]     * Preop testing [Z01.818]    Anesthesia: Choice    Description of the Findings of the Procedure:  Patient had multiple metal and BioComposite suture anchors from prior rotator cuff repairs.  Patient also had a patch still in place from prior SCR.  This was all removed.    Complications: No    Estimated Blood Loss (EBL): 75ml           Implants:   Implant Name Type Inv. Item Serial No.  Lot No. LRB No. Used Action   BASEPLATE REUNION MITCHELL [id number] - [id number]  BASEPLATE REUNION MITCHELL [id number]  [employer] CHANTELL. [id number] Right 1 Implanted   WIRE FIXATION REUN NIT PILT - [id number]  WIRE FIXATION REUN NIT PILT  DEANNA [employer] CHANTELL. [id number] Right 1 Implanted   SCREW BONE TSA 6.5X32MM - [id number]  SCREW BONE TSA 6.5X32MM  DEANNA [employer] CHANTELL. [id number] Right 1 Implanted   SCREW BONE GLENOID 4.5X24MM - [id number]  SCREW BONE GLENOID 4.5X24MM  DEANNA [employer] CHANTELL. [id number] Right 1 Implanted   SCREW BONE GLENOID 4.5X24MM - [id number]  SCREW BONE GLENOID 4.5X24MM  DEANNA [employer] CHANTELL. [id number] Right 1 Implanted   SCREW BONE TSA 4.5X20MM - [id number]  SCREW BONE TSA 4.5X20MM  DEANNA [employer] CHANTELL. [id number] Right 1 Implanted   SCREW PERIPH REUNION 4.5X16MM - [id number]  SCREW PERIPH REUNION 4.5X16MM  [employer] CHANTELL. [id number] Right 1 Implanted   COMPONENT GLENOSPHERE  36X6MM - [id number]  COMPONENT GLENOSPHERE 36X6MM  DEANNA [employer] CHANTELL. [id number] Right 1 Implanted   STEM REUNION HUMERAL 65X32RA - [id number]  STEM REUNION HUMERAL 57W16TW  DEANNA [employer] CHANTELL. [id number] Right 1 Implanted   CUP HUMERAL REUN RSA 36X4MM - [id number]  CUP HUMERAL REUN RSA 36X4MM  [employer]ANNA [employer] CHANTELL. [id number] Right 1 Implanted   INSERT REUNION X3 HUM 4X36MM - [id number]  INSERT REUNION X3 HUM 4X36MM  DEANNA [employer] CHANTELL. [id number] Right 1 Implanted       Specimens:   Specimen (24h ago, onward)      None                    Condition: Good    Disposition: PACU - hemodynamically stable.    Attestation: I was present and scrubbed for the entire procedure.    INDICATIONS FOR THE PROCEDURE: A 58-year-old male with history of Right  Rotator cuff arthropathy. The patient had failed multiple surgeries as well as conservative management including   cortisone injections, physical therapy and after a long discussion, the patient   elected for the procedure listed above.      PROCEDURE IN DETAIL: Risks, benefits and alternatives of the procedure were   explained to the patient including, but not limited to damage to nerves,   arteries or blood vessels. Also, explained risk of instability, infection,   hardware failure, polyware infection, DVT, PE as well as anesthetic   complications including seizure, stroke, heart attack and death. They understood  this and signed informed consent. The patient's Right shoulder was marked   prior to coming to the Operating Room. Once there a formal timeout was done in which   correct patient, procedure and operating site were all correctly identified and   confirmed by the entire operating team, 2 g of Ancef given prior to surgical   incision. General endotracheal anesthesia was induced. The patient was placed   in a relaxed beach chair type position. The patient's Right upper extremity was  prepped and draped in normal  sterile fashion. Standard deltopectoral incision   was made. This was taken down through the skin and some fat. Cephalic vein was  identified and retracted laterally with the deltoid. Some clavipectoral fascia  was dissected and the conjoint tendon was retracted medially, protecting the   musculocutaneous nerve. The subdeltoid and subacromial spaces were then bluntly  dissected, so the retractor could be placed and full exposure of the proximal   humerus was obtained. The biceps tendon was identified and was tracked   proximally into the rotator interval. This was then released and tenodesed at   the pectoralis major tendon, which was released about 0.5 cm. After this was   done, we then did our subscapularis tenotomy tagging with some Ethibond at the   superior portion and the inferior portion. This was then fully released   dislocating the head. After this was done, we then planned for a humeral neck   cut, 30 degrees of retroversion was placed. Cutting guide was placed and pinned in   place, so the top of the cut was right of the edge of the rotator cuff   insertion. This was then cut. After this was done, we   then used a rongeur and osteotomes to remove inferior humeral osteophytes circumferentially.  We also spent some time using a rongeur to remove prior suture anchors and a lot of FiberWire suture.  Prior SCR graft was removed both from its glenoid attachment as well as its tuberosity attachment.  We then turned our attention   to the glenoid. Bat-wing retractors were placed posteriorly and anteriorly,   exposing the glenoid and the remaining labrum well. The labrum and the   remainder of the biceps tendon was circumferentially removed for full exposure.   guide could be   placed. This then hug the inferior cortex.  This lined up   perfectly with our plan. We held this flush and drilled our hole. After this   was done, we then removed the guide and examined our hole and liked our position, we then drilled    for the central hole bicortically. We then placed our initial reamer,   reaming to match our plan and just reaming about 1 to 2 mm inferiorly, getting good bleeding bone. We then measured for our screw and used the screw for a few threads as a tap. inserted our size 28mm baseplate with a 32mm screw. We got great purchase that moved the entire scapula once tight. We then placed the screw guide and drilled our 4 peripheral screws. The guide was removed and the screws were placed. We then planned for final glenosphere implantation.The Pulsavac was used to freshen up the bone and baseplate. It was then thoroughly dried. We then placed the 36+6 glenosphere on and impacted it down.     After this was done, we started preparing the stem. The stem was reamed up from  an 8 to 13 and then broached up to a 13 as well. After this was done, we did our planar reamer. We then trialed, seeing good   appropriate shuck with good stability, no impingement, and good ROM. We then implanted final components. The final 13 stem was  then placed in with a +4 cup and  +4 poly was placed  as well.  After   this was done, we then thoroughly irrigated the joint, reduced the shoulder and   then placed a drain in the subacromial space.   We then closed the deltopectoral interval with 0 Vicryl, subcutaneous tissue with 2-0 Vicryl and skin   was closed using a Stratafix and Dermabond. Sterile dressing was applied. She   was placed in a sling, extubated, awakened, transferred from the Operating Room   to the Recovery Room in stable condition.

## 2024-03-12 NOTE — PLAN OF CARE
"Pt awake, alert, oriented. Vital signs stable. Pt reports he is "ready to go". Discharge instructions reviewed with pt. Pt verbalized understanding. IV removed, catheter intact. Dressings to R shoulder clean, dry, and intact. RUE arm sling on. All belongings returned to patient. Pt transferred to friend's car via wheelchair. Safety maintained.   "

## 2024-03-19 DIAGNOSIS — M19.011 ARTHRITIS OF RIGHT SHOULDER REGION: Primary | ICD-10-CM

## 2024-03-19 NOTE — TELEPHONE ENCOUNTER
----- Message from Marty Gutierrez sent at 3/19/2024 11:42 AM CDT -----  Patient would like a refill of his pain medication with a dosage increase    [facility]     [phone]

## 2024-03-19 NOTE — TELEPHONE ENCOUNTER
----- Message from Dionne Gibson sent at 3/19/2024  9:52 AM CDT -----  Good Morning,      The [facility] would like to refer the following patient to the Orthopedic   department. The patients diagnosis is  Presence of artificial knee joint, bilateral . I have scanned the patients referral and records into . The patient is establish with Dr. Levin for his shoulder but now need to be seen for his knee. Please schedule     Thank you,    Dionne Ortega

## 2024-03-20 RX ORDER — OXYCODONE AND ACETAMINOPHEN 10; 325 MG/1; MG/1
1 TABLET ORAL EVERY 4 HOURS PRN
Qty: 28 TABLET | Refills: 0 | Status: SHIPPED | OUTPATIENT
Start: 2024-03-20 | End: 2024-03-25

## 2024-03-21 ENCOUNTER — TELEPHONE (OUTPATIENT)
Dept: ORTHOPEDICS | Facility: CLINIC | Age: 59
End: 2024-03-21
Payer: OTHER GOVERNMENT

## 2024-03-21 NOTE — TELEPHONE ENCOUNTER
----- Message from Dionne Gibson sent at 3/21/2024  3:42 PM CDT -----  Good Afternoon,     I would like to check the status of my previous message sent to schedule the patient for an appointment [facility] Please schedule   ----- Message -----  From: Dionne Gibson  Sent: 3/19/2024   9:54 AM CDT  To: Ollie Martin Staff    Good Morning,      [facility] would like to refer the following patient to the Orthopedic   department. The patients diagnosis is  Presence of artificial knee joint, bilateral . I have scanned the patients referral and records into media manager. The patient is establish with Dr. Levin for his shoulder but now need to be seen for his knee. Please schedule     Thank you,    Dionne Ortega

## 2024-03-25 ENCOUNTER — HOSPITAL ENCOUNTER (OUTPATIENT)
Dept: RADIOLOGY | Facility: HOSPITAL | Age: 59
Discharge: HOME | End: 2024-03-25
Attending: ORTHOPAEDIC SURGERY
Payer: OTHER GOVERNMENT

## 2024-03-25 ENCOUNTER — TELEPHONE (OUTPATIENT)
Dept: ORTHOPEDICS | Facility: CLINIC | Age: 59
End: 2024-03-25

## 2024-03-25 ENCOUNTER — OFFICE VISIT (OUTPATIENT)
Dept: ORTHOPEDICS | Facility: CLINIC | Age: 59
End: 2024-03-25
Payer: OTHER GOVERNMENT

## 2024-03-25 VITALS — HEIGHT: 72 IN | WEIGHT: 233.94 LBS | BODY MASS INDEX: 31.69 KG/M2

## 2024-03-25 DIAGNOSIS — M19.011 ARTHRITIS OF RIGHT SHOULDER REGION: ICD-10-CM

## 2024-03-25 DIAGNOSIS — Z96.611 STATUS POST REVERSE TOTAL ARTHROPLASTY OF RIGHT SHOULDER: Primary | ICD-10-CM

## 2024-03-25 PROCEDURE — 99024 POSTOP FOLLOW-UP VISIT: CPT | Mod: ,,, | Performed by: ORTHOPAEDIC SURGERY

## 2024-03-25 PROCEDURE — 73030 X-RAY EXAM OF SHOULDER: CPT | Mod: 26,RT,, | Performed by: RADIOLOGY

## 2024-03-25 PROCEDURE — 99213 OFFICE O/P EST LOW 20 MIN: CPT | Mod: PBBFAC,25,PO | Performed by: ORTHOPAEDIC SURGERY

## 2024-03-25 PROCEDURE — 73030 X-RAY EXAM OF SHOULDER: CPT | Mod: TC,PO,RT

## 2024-03-25 PROCEDURE — 99999 PR PBB SHADOW E&M-EST. PATIENT-LVL III: CPT | Mod: PBBFAC,,, | Performed by: ORTHOPAEDIC SURGERY

## 2024-03-25 RX ORDER — SULFAMETHOXAZOLE AND TRIMETHOPRIM 800; 160 MG/1; MG/1
1 TABLET ORAL 2 TIMES DAILY
Qty: 20 TABLET | Refills: 0 | Status: SHIPPED | OUTPATIENT
Start: 2024-03-25

## 2024-03-25 RX ORDER — OXYCODONE AND ACETAMINOPHEN 10; 325 MG/1; MG/1
1 TABLET ORAL EVERY 6 HOURS PRN
Qty: 28 TABLET | Refills: 0 | Status: SHIPPED | OUTPATIENT
Start: 2024-03-25 | End: 2024-04-01 | Stop reason: SDUPTHER

## 2024-03-25 NOTE — TELEPHONE ENCOUNTER
----- Message from Marty Gutierrez sent at 3/25/2024  9:30 AM CDT -----  Jason Deluna would like a callback regarding the patient's Percocet    [phone]

## 2024-03-25 NOTE — TELEPHONE ENCOUNTER
Pharmacy notified is post operative. Pharmacist, Mark Anthony approved MME due to recent surgery.

## 2024-03-25 NOTE — PROGRESS NOTES
Past Medical History:   Diagnosis Date    Alcohol abuse, unspecified     Allergic rhinitis, cause unspecified     Arthritis     Asthma attack     Cancer 2022    skin cancer on nose    Carpal tunnel syndrome     Depressive disorder, not elsewhere classified     Encounter for blood transfusion     GERD (gastroesophageal reflux disease)     Hypersomnia, unspecified     Hypertension     Hypoglycemia     Lumbago     Obesity, unspecified     JAH (obstructive sleep apnea)     uses BIPAP    Other and unspecified hyperlipidemia     Psychosexual dysfunction with inhibited sexual excitement     Pyogenic arthritis of knee 09/21/2021    Enterococcus    Ventral hernia, unspecified, without mention of obstruction or gangrene        Past Surgical History:   Procedure Laterality Date    ABDOMINAL SURGERY      x 25    ARTHROSCOPIC DEBRIDEMENT OF SHOULDER Right 12/15/2023    Procedure: EXTENSIVE DEBRIDEMENT, SHOULDER, ARTHROSCOPIC;  Surgeon: Felix Levin MD;  Location: [facility] OR;  Service: Orthopedics;  Laterality: Right;    ARTHROSCOPIC REPAIR OF ROTATOR CUFF OF SHOULDER Left 09/20/2019    Procedure: REPAIR, ROTATOR CUFF, ARTHROSCOPIC;  Surgeon: Felix Levin MD;  Location: [facility] OR;  Service: Orthopedics;  Laterality: Left;    ARTHROSCOPIC REPAIR OF ROTATOR CUFF OF SHOULDER Right 03/17/2023    Procedure: REPAIR, ROTATOR CUFF, ARTHROSCOPIC;  Surgeon: Felix Levin MD;  Location: [facility] OR;  Service: Orthopedics;  Laterality: Right;    ARTHROSCOPIC REPAIR OF ROTATOR CUFF OF SHOULDER Right 12/15/2023    Procedure: REPAIR, ROTATOR CUFF, ARTHROSCOPIC, WITH PATCH;  Surgeon: Felix Levin MD;  Location: [facility] OR;  Service: Orthopedics;  Laterality: Right;  arthrex , schmitt and nephew regenatin patch    ARTHROSCOPY OF SHOULDER WITH DECOMPRESSION OF SUBACROMIAL SPACE Left 09/20/2019    Procedure: ARTHROSCOPY, SHOULDER, WITH SUBACROMIAL SPACE DECOMPRESSION;  Surgeon: Felix Levin MD;  Location: [facility]  OR;  Service: Orthopedics;  Laterality: Left;  Jaspreet- Arthrex notified of all case today 9/16-tcb    ARTHROSCOPY,SHOULDER,WITH BICEPS TENODESIS Right 03/17/2023    Procedure: ARTHROSCOPY,SHOULDER,WITH BICEPS TENODESIS;  Surgeon: Felix Levin MD;  Location: [facility] OR;  Service: Orthopedics;  Laterality: Right;    CARPAL TUNNEL RELEASE      Bilateral    COLONOSCOPY N/A 08/05/2022    Procedure: COLONOSCOPY;  Surgeon: Zaida Ansari MD;  Location: [facility] ENDO;  Service: Endoscopy;  Laterality: N/A;    ESOPHAGEAL MANOMETRY WITH MEASUREMENT OF IMPEDANCE N/A 03/24/2023    Procedure: MANOMETRY, ESOPHAGUS, WITH IMPEDANCE MEASUREMENT;  Surgeon: Sofie Walker MD;  Location: [facility] ENDO (2ND FLR);  Service: Endoscopy;  Laterality: N/A;  r/o rumination and supragastric belching  hold baclofen x3 days, hold doxepin x24 hours prior to procedure    ESOPHAGOGASTRODUODENOSCOPY N/A 08/05/2022    Procedure: EGD (ESOPHAGOGASTRODUODENOSCOPY);  Surgeon: Zaida Ansari MD;  Location: [facility] ENDO;  Service: Endoscopy;  Laterality: N/A;    ESOPHAGOGASTRODUODENOSCOPY N/A 03/24/2023    Procedure: EGD (ESOPHAGOGASTRODUODENOSCOPY);  Surgeon: Sofie Walker MD;  Location: [facility] (2ND FLR);  Service: Endoscopy;  Laterality: N/A;  Endoflip/Endoscopically placed manometry probe  2nd floor-per Dr. Walker (higher than average risk procedure)  propofol only  full liquid diet x3 days, clear liquid diet x1 day prior to procedure  instructions sent to [email or web address] and emailed to [email or web address]    EYE SURGERY      Lasik    HERNIA REPAIR      x 15 surgeries    INCISION AND DRAINAGE OF HEMATOMA Right 09/21/2021    Procedure: INCISION AND DRAINAGE, HEMATOMA;  Surgeon: Felix Levin MD;  Location: [facility] OR;  Service: Orthopedics;  Laterality: Right;    INSERTION OF ANTIBIOTIC SPACER Right 06/01/2021    Procedure: INSERTION, ANTIBIOTIC SPACER;  Surgeon: Felix Levin MD;  Location: [facility] OR;  Service: Orthopedics;  Laterality:  Right;    JOINT REPLACEMENT      KNEE ARTHROPLASTY Left 08/04/2020    Procedure: ARTHROPLASTY, KNEE;  Surgeon: Felix Levin MD;  Location: [facility] OR;  Service: Orthopedics;  Laterality: Left;    KNEE ARTHROPLASTY Right 03/23/2021    Procedure: ARTHROPLASTY, KNEE;  Surgeon: Felix Levin MD;  Location: [facility] OR;  Service: Orthopedics;  Laterality: Right;    KNEE ARTHROSCOPY W/ MENISCECTOMY Left 10/15/2019    Procedure: ARTHROSCOPY, KNEE, WITH MENISCECTOMY;  Surgeon: Felix Levin MD;  Location: [facility] OR;  Service: Orthopedics;  Laterality: Left;  Medial and Lateral Meniscectomy    KNEE ARTHROSCOPY W/ MENISCECTOMY Right 11/22/2019    Procedure: ARTHROSCOPY, KNEE, WITH MENISCECTOMY;  Surgeon: Felix Levin MD;  Location: [facility] OR;  Service: Orthopedics;  Laterality: Right;    KNEE SURGERY      osmar    MYOTOMY, PERORAL, ENDOSCOPIC N/A 10/02/2023    Procedure: MYOTOMY, PERORAL, ENDOSCOPIC POEM;  Surgeon: Iker Méndez Jr., MD;  Location: [address];  Service: General;  Laterality: N/A;    NISSEN FUNDOPLICATION      X 2    REVERSE TOTAL SHOULDER ARTHROPLASTY Left 02/11/2020    Procedure: ARTHROPLASTY, SHOULDER, TOTAL, REVERSE;  Surgeon: Felix Levin MD;  Location: [facility];  Service: Orthopedics;  Laterality: Left;  Loren    REVERSE TOTAL SHOULDER ARTHROPLASTY Right 3/12/2024    Procedure: ARTHROPLASTY, SHOULDER, TOTAL, REVERSE;  Surgeon: Felix Levin MD;  Location: [facility] OR;  Service: Orthopedics;  Laterality: Right;  loren    revision of gastrojejunostomy  05/10/2021    [facility] [place]    REVISION OF KNEE ARTHROPLASTY Right 09/07/2021    Procedure: REVISION, ARTHROPLASTY, KNEE;  Surgeon: Felix Levin MD;  Location: [facility] OR;  Service: Orthopedics;  Laterality: Right;    ROTATOR CUFF REPAIR      right    RJ-EN-Y PROCEDURE N/A 05/2021    SLEEVE GASTROPLASTY  12/2013       Current Outpatient Medications   Medication Sig    acetaminophen  (TYLENOL) 500 MG tablet Take 2 tablets (1,000 mg total) by mouth every 8 (eight) hours as needed for Pain.    albuterol (PROVENTIL/VENTOLIN HFA) 90 mcg/actuation inhaler INHALE 2 PUFFS BY MOUTH FOUR TIMES A DAY AS NEEDED FOR BREATHING    ascorbic acid, vitamin C, (VITAMIN C) 500 MG tablet 500 mg.    aspirin (ECOTRIN) 81 MG EC tablet Take 1 tablet (81 mg total) by mouth 2 (two) times daily.    azelastine (ASTELIN) 137 mcg (0.1 %) nasal spray INHALE 1 SPRAY IN EACH NOSTRIL TWICE A DAY FOR ALLERGIES    busPIRone (BUSPAR) 10 MG tablet 5 mg.    cetirizine (ZYRTEC) 10 MG tablet 10 mg.    cyanocobalamin (VITAMIN B-12) 1000 MCG tablet Take 1,000 mcg by mouth once daily.     diclofenac sodium (VOLTAREN) 1 % Gel Apply topically daily as needed.    EScitalopram oxalate (LEXAPRO) 20 MG tablet 20 mg.    eszopiclone (LUNESTA) 3 mg Tab Take 1 tablet by mouth nightly as needed.    ibuprofen (ADVIL,MOTRIN) 600 MG tablet Take 1 tablet (600 mg total) by mouth 3 (three) times daily as needed for Pain.    LIDOcaine (LIDODERM) 5 % APPLY 1 PATCH TOPICALLY EVERY DAY FOR PAIN. WEAR FOR 12 HOURS, THEN REMOVE. DO NOT APPLY NEW PATCH FOR AT LEAST 12 HOURS.    losartan-hydrochlorothiazide 100-25 mg (HYZAAR) 100-25 mg per tablet 1 tablet.    MELATONIN ORAL 10 mg.    methyl salicylate-menthol 15-10% 15-10 % Crea APPLY LIBERAL AMOUNT TOPICALLY FOUR TIMES A DAY AS NEEDED    multivitamin with iron Tab TAKE TWO TABLETS BY MOUTH EVERY DAY WITH FOOD    naloxone (NARCAN) 4 mg/actuation Spry SMARTSIG:Spray(s) Both Nares    omeprazole (PRILOSEC) 40 MG capsule 40 mg.    ondansetron (ZOFRAN) 4 MG tablet Take 1 tablet (4 mg total) by mouth every 6 (six) hours as needed for Nausea.    oxyCODONE-acetaminophen (PERCOCET)  mg per tablet Take 1 tablet by mouth every 4 (four) hours as needed for Pain.    oxyCODONE-acetaminophen (PERCOCET) 5-325 mg per tablet Take 1 tablet by mouth every 6 (six) hours as needed for Pain.    peg 400-propylene glycol 0.4-0.3 %  Drop INSTILL ONE DROP IN EACH EYE FOUR TIMES A DAY AS NEEDED FOR DRY EYES    pregabalin (LYRICA) 300 MG Cap Take 300 mg by mouth 3 (three) times daily.    vitamin D (VITAMIN D3) 1000 units Tab Take 2,000 Units by mouth once daily.     No current facility-administered medications for this visit.     Facility-Administered Medications Ordered in Other Visits   Medication    diphenhydrAMINE injection 25 mg    electrolyte-S (ISOLYTE)    HYDROmorphone (PF) injection 0.2 mg    lactated ringers infusion    LIDOcaine (PF) 10 mg/ml (1%) injection 5 mg    ondansetron injection 4 mg    oxyCODONE immediate release tablet 5 mg    prochlorperazine injection Soln 5 mg       Review of patient's allergies indicates:   Allergen Reactions    Hydrocodone Anaphylaxis       Family History   Problem Relation Age of Onset    Arthritis Mother     Hypertension Father     Kidney disease Father     Heart disease Father     COPD Father     Pancreatic cancer Maternal Grandfather     Colon cancer Paternal Grandfather     Colon polyps Neg Hx     Crohn's disease Neg Hx     Ulcerative colitis Neg Hx     Stomach cancer Neg Hx     Esophageal cancer Neg Hx        Social History     Socioeconomic History    Marital status: Single   Tobacco Use    Smoking status: Never    Smokeless tobacco: Current     Types: Chew    Tobacco comments:     rarely   Substance and Sexual Activity    Alcohol use: Yes     Comment: occasionally    Drug use: No    Sexual activity: Not Currently       Chief Complaint:   Chief Complaint   Patient presents with    Post-op Evaluation     R shoulder rTSA       Date of surgery:  March 12, 2024    History of present illness:  58-year-old male underwent right reverse total shoulder arthroplasty for chronic rotator cuff tearing.  Patient has some residual hematoma underneath the incision.  Patient states he had some bruising and swelling all through his chest and even to the other shoulder.  Pain is 7/10.  Pain with range of motion.   Using the sling.      Review of Systems:    Musculoskeletal:  See HPI        Physical Examination:    Vital Signs:  There were no vitals filed for this visit.    Body mass index is 31.72 kg/m².    This a well-developed, well nourished patient in no acute distress.  They are alert and oriented and cooperative to examination.  Pt. walks without an antalgic gait.      Examination of the right shoulder shows healing surgical incision.  A little erythema around the incision from where the adhesive was.  Patient has an obvious fluid collection underneath the incision.  It feels very fluidy.  Resolving ecchymosis all around the rest of the shoulder and chest.    X-rays:  X-rays of the right shoulder ordered and review which show well-aligned reverse total shoulder arthroplasty without complication     Assessment::  Status post right reverse total shoulder arthroplasty   Postsurgical hematoma    Plan:  I reviewed the findings with him today.  Patient may get the incision wet.  Encouraged him to use some heat to help with hematoma.  Refilled his pain medicine.  Sling for comfort.  I will see him back in 3 weeks to make sure the hematoma has resolved.  Also send him in some Bactrim DS to make sure that the hematoma does not get seeded.    This note was created using [employer] voice recognition software that occasionally misinterpreted phrases or words.

## 2024-04-01 DIAGNOSIS — M19.011 ARTHRITIS OF RIGHT SHOULDER REGION: ICD-10-CM

## 2024-04-01 RX ORDER — OXYCODONE AND ACETAMINOPHEN 10; 325 MG/1; MG/1
1 TABLET ORAL EVERY 6 HOURS PRN
Qty: 28 TABLET | Refills: 0 | Status: SHIPPED | OUTPATIENT
Start: 2024-04-01

## 2024-04-01 RX ORDER — CYCLOBENZAPRINE HCL 10 MG
10 TABLET ORAL 3 TIMES DAILY PRN
Qty: 30 TABLET | Refills: 0 | Status: SHIPPED | OUTPATIENT
Start: 2024-04-01 | End: 2024-04-14

## 2024-04-01 RX ORDER — CYCLOBENZAPRINE HCL 10 MG
10 TABLET ORAL 3 TIMES DAILY PRN
Qty: 30 TABLET | Refills: 0 | Status: CANCELLED | OUTPATIENT
Start: 2024-04-01 | End: 2024-04-11

## 2024-04-18 ENCOUNTER — TELEPHONE (OUTPATIENT)
Dept: ORTHOPEDICS | Facility: CLINIC | Age: 59
End: 2024-04-18
Payer: OTHER GOVERNMENT

## 2024-04-21 ENCOUNTER — HOSPITAL ENCOUNTER (EMERGENCY)
Facility: HOSPITAL | Age: 59
Discharge: HOME | End: 2024-04-22
Attending: EMERGENCY MEDICINE
Payer: OTHER GOVERNMENT
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
## 2024-04-21 DIAGNOSIS — S00.01XA ABRASION OF SCALP, INITIAL ENCOUNTER: ICD-10-CM

## 2024-04-21 PROCEDURE — 99285 EMERGENCY DEPT VISIT HI MDM: CPT

## 2024-04-22 VITALS
OXYGEN SATURATION: 100 % | RESPIRATION RATE: 14 BRPM | TEMPERATURE: 98 F | WEIGHT: 230 LBS | DIASTOLIC BLOOD PRESSURE: 81 MMHG | SYSTOLIC BLOOD PRESSURE: 136 MMHG | HEART RATE: 69 BPM | HEIGHT: 72 IN | BODY MASS INDEX: 31.15 KG/M2

## 2024-04-22 NOTE — ED PROVIDER NOTES
Encounter Date: 4/21/2024       History     Chief Complaint   Patient presents with    Loss of Consciousness    Head Injury     S/p fall.  Abrasion to scalp     Patient presents emergency department with reported fall had apparently been drinking this evening states drank proximally 8 beers went home and was attempting to walk into his back door when he tripped over a window unit and fell to the ground he apparently did not get up his wife saw the activity on her ring camera and contacted EMS at their arrival patient was very easily arouse he did have an abrasion on his scalp he complains of chronic neck and back pain that he sees a pain management doctor for states it is unchanged from baseline he denies any headache he isn't sure when he had his last tetanus shot        Review of patient's allergies indicates:   Allergen Reactions    Hydrocodone Anaphylaxis     Past Medical History:   Diagnosis Date    Alcohol abuse, unspecified     Allergic rhinitis, cause unspecified     Arthritis     Asthma attack     Cancer 2022    skin cancer on nose    Carpal tunnel syndrome     Depressive disorder, not elsewhere classified     Encounter for blood transfusion     GERD (gastroesophageal reflux disease)     Hypersomnia, unspecified     Hypertension     Hypoglycemia     Lumbago     Obesity, unspecified     JAH (obstructive sleep apnea)     uses BIPAP    Other and unspecified hyperlipidemia     Psychosexual dysfunction with inhibited sexual excitement     Pyogenic arthritis of knee 09/21/2021    Enterococcus    Ventral hernia, unspecified, without mention of obstruction or gangrene      Past Surgical History:   Procedure Laterality Date    ABDOMINAL SURGERY      x 25    ARTHROSCOPIC DEBRIDEMENT OF SHOULDER Right 12/15/2023    Procedure: EXTENSIVE DEBRIDEMENT, SHOULDER, ARTHROSCOPIC;  Surgeon: Felix Levin MD;  Location: [facility];  Service: Orthopedics;  Laterality: Right;    ARTHROSCOPIC REPAIR OF ROTATOR CUFF OF  SHOULDER Left 09/20/2019    Procedure: REPAIR, ROTATOR CUFF, ARTHROSCOPIC;  Surgeon: Felix Levin MD;  Location: [facility] OR;  Service: Orthopedics;  Laterality: Left;    ARTHROSCOPIC REPAIR OF ROTATOR CUFF OF SHOULDER Right 03/17/2023    Procedure: REPAIR, ROTATOR CUFF, ARTHROSCOPIC;  Surgeon: Felix Levin MD;  Location: [facility] OR;  Service: Orthopedics;  Laterality: Right;    ARTHROSCOPIC REPAIR OF ROTATOR CUFF OF SHOULDER Right 12/15/2023    Procedure: REPAIR, ROTATOR CUFF, ARTHROSCOPIC, WITH PATCH;  Surgeon: Felix Levin MD;  Location: [facility] OR;  Service: Orthopedics;  Laterality: Right;  arthrex , schmitt and nephew regenatin patch    ARTHROSCOPY OF SHOULDER WITH DECOMPRESSION OF SUBACROMIAL SPACE Left 09/20/2019    Procedure: ARTHROSCOPY, SHOULDER, WITH SUBACROMIAL SPACE DECOMPRESSION;  Surgeon: Felix Levin MD;  Location: [facility] OR;  Service: Orthopedics;  Laterality: Left;  Jaspreet- Arthrex notified of all case today 9/16-tcb    ARTHROSCOPY,SHOULDER,WITH BICEPS TENODESIS Right 03/17/2023    Procedure: ARTHROSCOPY,SHOULDER,WITH BICEPS TENODESIS;  Surgeon: Felix Levin MD;  Location: [facility] OR;  Service: Orthopedics;  Laterality: Right;    CARPAL TUNNEL RELEASE      Bilateral    COLONOSCOPY N/A 08/05/2022    Procedure: COLONOSCOPY;  Surgeon: Zaida Ansari MD;  Location: [facility];  Service: Endoscopy;  Laterality: N/A;    ESOPHAGEAL MANOMETRY WITH MEASUREMENT OF IMPEDANCE N/A 03/24/2023    Procedure: MANOMETRY, ESOPHAGUS, WITH IMPEDANCE MEASUREMENT;  Surgeon: Sofie Walker MD;  Location: [facility] ([address] [place] [address]);  Service: Endoscopy;  Laterality: N/A;  r/o rumination and supragastric belching  hold baclofen x3 days, hold doxepin x24 hours prior to procedure    ESOPHAGOGASTRODUODENOSCOPY N/A 08/05/2022    Procedure: EGD (ESOPHAGOGASTRODUODENOSCOPY);  Surgeon: Zaida Ansari MD;  Location: [facility];  Service: Endoscopy;  Laterality: N/A;    ESOPHAGOGASTRODUODENOSCOPY  N/A 03/24/2023    Procedure: EGD (ESOPHAGOGASTRODUODENOSCOPY);  Surgeon: Sofie Walker MD;  Location: [facility] (2ND FLR);  Service: Endoscopy;  Laterality: N/A;  Endoflip/Endoscopically placed manometry probe  2nd floor-per Dr. Walker (higher than average risk procedure)  propofol only  full liquid diet x3 days, clear liquid diet x1 day prior to procedure  [facility] and emailed to [email or web address]    EYE SURGERY      Lasik    HERNIA REPAIR      x 15 surgeries    INCISION AND DRAINAGE OF HEMATOMA Right 09/21/2021    Procedure: INCISION AND DRAINAGE, HEMATOMA;  Surgeon: Felix Levin MD;  Location: [facility] OR;  Service: Orthopedics;  Laterality: Right;    INSERTION OF ANTIBIOTIC SPACER Right 06/01/2021    Procedure: INSERTION, ANTIBIOTIC SPACER;  Surgeon: Felix Levin MD;  Location: [facility] OR;  Service: Orthopedics;  Laterality: Right;    JOINT REPLACEMENT      KNEE ARTHROPLASTY Left 08/04/2020    Procedure: ARTHROPLASTY, KNEE;  Surgeon: Felix Levin MD;  Location: [facility] OR;  Service: Orthopedics;  Laterality: Left;    KNEE ARTHROPLASTY Right 03/23/2021    Procedure: ARTHROPLASTY, KNEE;  Surgeon: Felix Levin MD;  Location: [facility] OR;  Service: Orthopedics;  Laterality: Right;    KNEE ARTHROSCOPY W/ MENISCECTOMY Left 10/15/2019    Procedure: ARTHROSCOPY, KNEE, WITH MENISCECTOMY;  Surgeon: Felix Levin MD;  Location: [facility] OR;  Service: Orthopedics;  Laterality: Left;  Medial and Lateral Meniscectomy    KNEE ARTHROSCOPY W/ MENISCECTOMY Right 11/22/2019    Procedure: ARTHROSCOPY, KNEE, WITH MENISCECTOMY;  Surgeon: Felix Levin MD;  Location: [facility] OR;  Service: Orthopedics;  Laterality: Right;    KNEE SURGERY      osmar    MYOTOMY, PERORAL, ENDOSCOPIC N/A 10/02/2023    Procedure: MYOTOMY, PERORAL, ENDOSCOPIC POEM;  Surgeon: Iker Méndez Jr., MD;  Location: [facility] OR 2ND FLR;  Service: General;  Laterality: N/A;    NISSEN FUNDOPLICATION      X 2     REVERSE TOTAL SHOULDER ARTHROPLASTY Left 02/11/2020    Procedure: ARTHROPLASTY, SHOULDER, TOTAL, REVERSE;  Surgeon: Felix Levin MD;  Location: [facility] OR;  Service: Orthopedics;  Laterality: Left;  [place]    REVERSE TOTAL SHOULDER ARTHROPLASTY Right 3/12/2024    Procedure: ARTHROPLASTY, SHOULDER, TOTAL, REVERSE;  Surgeon: Felix Levin MD;  Location: [facility] OR;  Service: Orthopedics;  Laterality: Right;  [place]    revision of gastrojejunostomy  05/10/2021    [facility] [place]    REVISION OF KNEE ARTHROPLASTY Right 09/07/2021    Procedure: REVISION, ARTHROPLASTY, KNEE;  Surgeon: Felix Levin MD;  Location: [facility] OR;  Service: Orthopedics;  Laterality: Right;    ROTATOR CUFF REPAIR      right    RJ-EN-Y PROCEDURE N/A 05/2021    SLEEVE GASTROPLASTY  12/2013     Family History   Problem Relation Name Age of Onset    Arthritis Mother      Hypertension Father      Kidney disease Father      Heart disease Father      COPD Father      Pancreatic cancer Maternal Grandfather      Colon cancer Paternal Grandfather      Colon polyps Neg Hx      Crohn's disease Neg Hx      Ulcerative colitis Neg Hx      Stomach cancer Neg Hx      Esophageal cancer Neg Hx       Social History     Tobacco Use    Smoking status: Never    Smokeless tobacco: Current     Types: Chew    Tobacco comments:     rarely   Substance Use Topics    Alcohol use: Yes     Comment: occasionally    Drug use: No     Review of Systems   Musculoskeletal:  Positive for back pain and neck pain.   Skin:  Positive for wound.   All other systems reviewed and are negative.      Physical Exam     Initial Vitals   BP Pulse Resp Temp SpO2   04/21/24 2312 04/21/24 2312 04/21/24 2312 04/21/24 2351 04/21/24 2312   (!) 140/90 70 15 97.7 °F (36.5 °C) 96 %      MAP       --                Physical Exam    Constitutional: He appears well-developed and well-nourished. No distress.   HENT:   Head: Normocephalic.   Right Ear: External ear normal.   Left Ear:  External ear normal.   Mouth/Throat: Oropharynx is clear and moist.   Abrasion to scalp   Eyes: Pupils are equal, round, and reactive to light.   Neck: Neck supple.   Normal range of motion.  Cardiovascular:  Normal rate, regular rhythm, S1 normal, S2 normal and intact distal pulses.           Abdominal: Abdomen is soft. Bowel sounds are normal. There is no abdominal tenderness.   Musculoskeletal:         General: Normal range of motion.      Cervical back: Normal range of motion and neck supple.     Neurological: He is alert and oriented to person, place, and time. GCS eye subscore is 4. GCS verbal subscore is 5. GCS motor subscore is 6.   Skin: Skin is warm and dry. No rash noted.   Psychiatric: He has a normal mood and affect. His behavior is normal.         ED Course   Procedures  Labs Reviewed - No data to display       Imaging Results              CT Cervical Spine Without Contrast (Final result)  Result time 04/22/24 00:40:49      Final result by Noel Ponce MD (04/22/24 00:40:49)                   Impression:      No definite CT evidence of acute displaced fracture or traumatic subluxation of the cervical spine.  Moderately advanced multilevel degenerative changes as detailed above.      Electronically signed by: Noel Ponce MD  Date:    04/22/2024  Time:    00:40               Narrative:    EXAMINATION:  CT CERVICAL SPINE WITHOUT CONTRAST    CLINICAL HISTORY:  Neck trauma, intoxicated or obtunded (Age >= 16y);    TECHNIQUE:  Low dose axial images, sagittal and coronal reformations were performed though the cervical spine.  Contrast was not administered.    COMPARISON:  CT cervical spine 02/02/2023    FINDINGS:  There is straightening of the normal cervical lordosis which may relate to patient positioning or muscle spasm.  Cervical vertebral body heights appear adequately maintained without definite acute displaced fracture.  There is intervertebral disc space height loss at C5-C6.  There are  degenerative changes as below:    C2-C3: Bilateral facet arthropathy and uncovertebral spurring, left greater than right.  Moderate to severe left-sided neural foraminal narrowing.    C3-C4: Bilateral uncovertebral spurring and facet arthropathy.  Moderate left-sided and mild right-sided neural foraminal narrowing.    C4-C5: Bilateral uncovertebral spurring facet arthropathy, left greater than right.  Moderate left-sided and mild right-sided neural foraminal narrowing.    C5-C6: Posterior disc osteophyte complex, bilateral uncovertebral spurring, and bilateral facet arthropathy.  Moderate to severe bilateral neural foraminal narrowing.    C6-C7: No significant spinal canal stenosis or neural foraminal narrowing.    Prevertebral soft tissues are not significantly thickened.  The trachea is patent.  The visualized lung apices are unremarkable.                                       CT Head Without Contrast (Final result)  Result time 04/22/24 00:40:34      Final result by Noel Ponce MD (04/22/24 00:40:34)                   Impression:      No CT evidence of acute intracranial abnormality. Clinical correlation and further evaluation as warranted.    Mild chronic senescent and microvascular ischemic changes.      Electronically signed by: Noel Ponce MD  Date:    04/22/2024  Time:    00:40               Narrative:    EXAMINATION:  CT HEAD WITHOUT CONTRAST    CLINICAL HISTORY:  Head trauma, abnormal mental status (Age 19-64y);    TECHNIQUE:  Low dose axial images were obtained through the head.  Coronal and sagittal reformations were also performed. Contrast was not administered.    COMPARISON:  CT head 08/01/2023    FINDINGS:  There is mild generalized cerebral volume loss with compensatory sulcal widening and ventricular enlargement.  There is patchy periventricular white matter hypoattenuation suggesting sequelae of chronic microvascular ischemic change.  No convincing CT evidence of acute intracranial  hemorrhage.  There is no midline shift or hydrocephalus.  The basal cisterns are patent. The mastoid air cells and paranasal sinuses are clear of acute process. No displaced calvarial fracture identified.                                       Medications - No data to display  Medical Decision Making  CT scan of the head and cervical spine were obtained which were negative for acute injuries review of patient's vaccination record showed he received a tetanus 2 years ago recommend head injury precautions wound care precautions [facility] return to ER for any worsened symptoms or new symptoms    Amount and/or Complexity of Data Reviewed  Radiology: ordered. Decision-making details documented in ED Course.                                      Clinical Impression:  Final diagnoses:  [W19.XXXA] Fall, initial encounter (Primary)  [S00.01XA] Abrasion of scalp, initial encounter          ED Disposition Condition    Discharge Stable          ED Prescriptions    None       Follow-up Information       Follow up With Specialties Details Why Contact Info    [facility], [facility]  Call today for re-examination of your symptoms [address] [facility] [address]  [phone]               Venu Holly MD  04/22/24 0131       Venu Holly MD  04/22/24 0132

## 2024-04-29 ENCOUNTER — HOSPITAL ENCOUNTER (OUTPATIENT)
Dept: RADIOLOGY | Facility: HOSPITAL | Age: 59
Discharge: HOME | End: 2024-04-29
Attending: ORTHOPAEDIC SURGERY
Payer: OTHER GOVERNMENT

## 2024-04-29 ENCOUNTER — OFFICE VISIT (OUTPATIENT)
Dept: ORTHOPEDICS | Facility: CLINIC | Age: 59
End: 2024-04-29
Payer: OTHER GOVERNMENT

## 2024-04-29 VITALS — BODY MASS INDEX: 31.14 KG/M2 | HEIGHT: 72 IN | WEIGHT: 229.94 LBS

## 2024-04-29 DIAGNOSIS — Z96.611 STATUS POST REVERSE TOTAL ARTHROPLASTY OF RIGHT SHOULDER: Primary | ICD-10-CM

## 2024-04-29 DIAGNOSIS — Z96.651 STATUS POST TOTAL KNEE REPLACEMENT USING CEMENT, RIGHT: ICD-10-CM

## 2024-04-29 DIAGNOSIS — M25.561 ACUTE PAIN OF RIGHT KNEE: ICD-10-CM

## 2024-04-29 DIAGNOSIS — Z96.651 STATUS POST TOTAL KNEE REPLACEMENT USING CEMENT, RIGHT: Primary | ICD-10-CM

## 2024-04-29 PROCEDURE — 99213 OFFICE O/P EST LOW 20 MIN: CPT | Mod: PBBFAC,25,PO | Performed by: ORTHOPAEDIC SURGERY

## 2024-04-29 PROCEDURE — 99214 OFFICE O/P EST MOD 30 MIN: CPT | Mod: 24,S$PBB,, | Performed by: ORTHOPAEDIC SURGERY

## 2024-04-29 PROCEDURE — 99999 PR PBB SHADOW E&M-EST. PATIENT-LVL III: CPT | Mod: PBBFAC,,, | Performed by: ORTHOPAEDIC SURGERY

## 2024-04-29 PROCEDURE — 73564 X-RAY EXAM KNEE 4 OR MORE: CPT | Mod: 26,RT,, | Performed by: RADIOLOGY

## 2024-04-29 PROCEDURE — 73562 X-RAY EXAM OF KNEE 3: CPT | Mod: TC,PO,LT

## 2024-04-29 PROCEDURE — 73564 X-RAY EXAM KNEE 4 OR MORE: CPT | Mod: TC,PO,RT

## 2024-04-29 PROCEDURE — 73562 X-RAY EXAM OF KNEE 3: CPT | Mod: 26,59,LT, | Performed by: RADIOLOGY

## 2024-04-29 NOTE — PROGRESS NOTES
Past Medical History:   Diagnosis Date    Alcohol abuse, unspecified     Allergic rhinitis, cause unspecified     Arthritis     Asthma attack     Cancer 2022    skin cancer on nose    Carpal tunnel syndrome     Depressive disorder, not elsewhere classified     Encounter for blood transfusion     GERD (gastroesophageal reflux disease)     Hypersomnia, unspecified     Hypertension     Hypoglycemia     Lumbago     Obesity, unspecified     JAH (obstructive sleep apnea)     uses BIPAP    Other and unspecified hyperlipidemia     Psychosexual dysfunction with inhibited sexual excitement     Pyogenic arthritis of knee 09/21/2021    Enterococcus    Ventral hernia, unspecified, without mention of obstruction or gangrene        Past Surgical History:   Procedure Laterality Date    ABDOMINAL SURGERY      x 25    ARTHROSCOPIC DEBRIDEMENT OF SHOULDER Right 12/15/2023    Procedure: EXTENSIVE DEBRIDEMENT, SHOULDER, ARTHROSCOPIC;  Surgeon: Felix Levin MD;  Location: [facility] OR;  Service: Orthopedics;  Laterality: Right;    ARTHROSCOPIC REPAIR OF ROTATOR CUFF OF SHOULDER Left 09/20/2019    Procedure: REPAIR, ROTATOR CUFF, ARTHROSCOPIC;  Surgeon: Felix Levin MD;  Location: [facility] OR;  Service: Orthopedics;  Laterality: Left;    ARTHROSCOPIC REPAIR OF ROTATOR CUFF OF SHOULDER Right 03/17/2023    Procedure: REPAIR, ROTATOR CUFF, ARTHROSCOPIC;  Surgeon: Felix Levin MD;  Location: [facility] OR;  Service: Orthopedics;  Laterality: Right;    ARTHROSCOPIC REPAIR OF ROTATOR CUFF OF SHOULDER Right 12/15/2023    Procedure: REPAIR, ROTATOR CUFF, ARTHROSCOPIC, WITH PATCH;  Surgeon: Felix Levin MD;  Location: [facility] OR;  Service: Orthopedics;  Laterality: Right;  arthrex , schmitt and nephew regenatin patch    ARTHROSCOPY OF SHOULDER WITH DECOMPRESSION OF SUBACROMIAL SPACE Left 09/20/2019    Procedure: ARTHROSCOPY, SHOULDER, WITH SUBACROMIAL SPACE DECOMPRESSION;  Surgeon: Felix Levin MD;  Location: [facility]  OR;  Service: Orthopedics;  Laterality: Left;  Jaspreet- Arthrex notified of all case today 9/16-tcb    ARTHROSCOPY,SHOULDER,WITH BICEPS TENODESIS Right 03/17/2023    Procedure: ARTHROSCOPY,SHOULDER,WITH BICEPS TENODESIS;  Surgeon: Felix Levin MD;  Location: [facility] OR;  Service: Orthopedics;  Laterality: Right;    CARPAL TUNNEL RELEASE      Bilateral    COLONOSCOPY N/A 08/05/2022    Procedure: COLONOSCOPY;  Surgeon: Zaida Ansari MD;  Location: [facility] ENDO;  Service: Endoscopy;  Laterality: N/A;    ESOPHAGEAL MANOMETRY WITH MEASUREMENT OF IMPEDANCE N/A 03/24/2023    Procedure: MANOMETRY, ESOPHAGUS, WITH IMPEDANCE MEASUREMENT;  Surgeon: Sofie Walker MD;  Location: [facility] ENDO (2ND FLR);  Service: Endoscopy;  Laterality: N/A;  r/o rumination and supragastric belching  hold baclofen x3 days, hold doxepin x24 hours prior to procedure    ESOPHAGOGASTRODUODENOSCOPY N/A 08/05/2022    Procedure: EGD (ESOPHAGOGASTRODUODENOSCOPY);  Surgeon: Zaida Ansari MD;  Location: [facility] ENDO;  Service: Endoscopy;  Laterality: N/A;    ESOPHAGOGASTRODUODENOSCOPY N/A 03/24/2023    Procedure: EGD (ESOPHAGOGASTRODUODENOSCOPY);  Surgeon: Sofie Walker MD;  Location: [facility] (2ND FLR);  Service: Endoscopy;  Laterality: N/A;  Endoflip/Endoscopically placed manometry probe  2nd floor-per Dr. Walker (higher than average risk procedure)  propofol only  full liquid diet x3 days, clear liquid diet x1 day prior to procedure  instructions sent to [facility] and emailed to [email or web address]    EYE SURGERY      Lasik    HERNIA REPAIR      x 15 surgeries    INCISION AND DRAINAGE OF HEMATOMA Right 09/21/2021    Procedure: INCISION AND DRAINAGE, HEMATOMA;  Surgeon: Felix Levin MD;  Location: [facility] OR;  Service: Orthopedics;  Laterality: Right;    INSERTION OF ANTIBIOTIC SPACER Right 06/01/2021    Procedure: INSERTION, ANTIBIOTIC SPACER;  Surgeon: Felix Levin MD;  Location: [facility] OR;  Service: Orthopedics;  Laterality:  Right;    JOINT REPLACEMENT      KNEE ARTHROPLASTY Left 08/04/2020    Procedure: ARTHROPLASTY, KNEE;  Surgeon: Felix Levin MD;  Location: [facility] OR;  Service: Orthopedics;  Laterality: Left;    KNEE ARTHROPLASTY Right 03/23/2021    Procedure: ARTHROPLASTY, KNEE;  Surgeon: Felix Levin MD;  Location: [facility] OR;  Service: Orthopedics;  Laterality: Right;    KNEE ARTHROSCOPY W/ MENISCECTOMY Left 10/15/2019    Procedure: ARTHROSCOPY, KNEE, WITH MENISCECTOMY;  Surgeon: Felix Levin MD;  Location: [facility] OR;  Service: Orthopedics;  Laterality: Left;  Medial and Lateral Meniscectomy    KNEE ARTHROSCOPY W/ MENISCECTOMY Right 11/22/2019    Procedure: ARTHROSCOPY, KNEE, WITH MENISCECTOMY;  Surgeon: Felix Levin MD;  Location: [facility] OR;  Service: Orthopedics;  Laterality: Right;    KNEE SURGERY      osmar    MYOTOMY, PERORAL, ENDOSCOPIC N/A 10/02/2023    Procedure: MYOTOMY, PERORAL, ENDOSCOPIC POEM;  Surgeon: Iker Méndez Jr., MD;  Location: [address];  Service: General;  Laterality: N/A;    NISSEN FUNDOPLICATION      X 2    REVERSE TOTAL SHOULDER ARTHROPLASTY Left 02/11/2020    Procedure: ARTHROPLASTY, SHOULDER, TOTAL, REVERSE;  Surgeon: Felix Levin MD;  [facility]  Service: Orthopedics;  Laterality: Left;  Loren    REVERSE TOTAL SHOULDER ARTHROPLASTY Right 3/12/2024    Procedure: ARTHROPLASTY, SHOULDER, TOTAL, REVERSE;  Surgeon: Felix Levin MD;  Location: [facility] OR;  Service: Orthopedics;  Laterality: Right;  loren    revision of gastrojejunostomy  05/10/2021    [facility] [place]    REVISION OF KNEE ARTHROPLASTY Right 09/07/2021    Procedure: REVISION, ARTHROPLASTY, KNEE;  Surgeon: Felix Levin MD;  Location: [facility] OR;  Service: Orthopedics;  Laterality: Right;    ROTATOR CUFF REPAIR      right    RJ-EN-Y PROCEDURE N/A 05/2021    SLEEVE GASTROPLASTY  12/2013       Current Outpatient Medications   Medication Sig Dispense Refill     acetaminophen (TYLENOL) 500 MG tablet Take 2 tablets (1,000 mg total) by mouth every 8 (eight) hours as needed for Pain. 30 tablet 0    albuterol (PROVENTIL/VENTOLIN HFA) 90 mcg/actuation inhaler INHALE 2 PUFFS BY MOUTH FOUR TIMES A DAY AS NEEDED FOR BREATHING      ascorbic acid, vitamin C, (VITAMIN C) 500 MG tablet 500 mg.      aspirin (ECOTRIN) 81 MG EC tablet Take 1 tablet (81 mg total) by mouth 2 (two) times daily. 56 tablet 0    azelastine (ASTELIN) 137 mcg (0.1 %) nasal spray INHALE 1 SPRAY IN EACH NOSTRIL TWICE A DAY FOR ALLERGIES      busPIRone (BUSPAR) 10 MG tablet 5 mg.      cetirizine (ZYRTEC) 10 MG tablet 10 mg.      cyanocobalamin (VITAMIN B-12) 1000 MCG tablet Take 1,000 mcg by mouth once daily.       diclofenac sodium (VOLTAREN) 1 % Gel Apply topically daily as needed.      EScitalopram oxalate (LEXAPRO) 20 MG tablet 20 mg.      eszopiclone (LUNESTA) 3 mg Tab Take 1 tablet by mouth nightly as needed.      ibuprofen (ADVIL,MOTRIN) 600 MG tablet Take 1 tablet (600 mg total) by mouth 3 (three) times daily as needed for Pain. 30 tablet 0    LIDOcaine (LIDODERM) 5 % APPLY 1 PATCH TOPICALLY EVERY DAY FOR PAIN. WEAR FOR 12 HOURS, THEN REMOVE. DO NOT APPLY NEW PATCH FOR AT LEAST 12 HOURS.      losartan-hydrochlorothiazide 100-25 mg (HYZAAR) 100-25 mg per tablet 1 tablet.      MELATONIN ORAL 10 mg.      methyl salicylate-menthol 15-10% 15-10 % Crea APPLY LIBERAL AMOUNT TOPICALLY FOUR TIMES A DAY AS NEEDED      multivitamin with iron Tab TAKE TWO TABLETS BY MOUTH EVERY DAY WITH FOOD      naloxone (NARCAN) 4 mg/actuation Spry SMARTSIG:Spray(s) Both Nares      omeprazole (PRILOSEC) 40 MG capsule 40 mg.      ondansetron (ZOFRAN) 4 MG tablet Take 1 tablet (4 mg total) by mouth every 6 (six) hours as needed for Nausea. 30 tablet 0    oxyCODONE-acetaminophen (PERCOCET)  mg per tablet Take 1 tablet by mouth every 6 (six) hours as needed for Pain. 28 tablet 0    peg 400-propylene glycol 0.4-0.3 % Drop INSTILL ONE  DROP IN EACH EYE FOUR TIMES A DAY AS NEEDED FOR DRY EYES      pregabalin (LYRICA) 300 MG Cap Take 300 mg by mouth 3 (three) times daily.      sulfamethoxazole-trimethoprim 800-160mg (BACTRIM DS) 800-160 mg Tab Take 1 tablet by mouth 2 (two) times daily. 20 tablet 0    vitamin D (VITAMIN D3) 1000 units Tab Take 2,000 Units by mouth once daily.       No current facility-administered medications for this visit.     Facility-Administered Medications Ordered in Other Visits   Medication Dose Route Frequency Provider Last Rate Last Admin    diphenhydrAMINE injection 25 mg  25 mg Intravenous Q6H PRN John Truong MD        electrolyte-S (ISOLYTE)   Intravenous Continuous John Truong MD   Stopped at 12/15/23 1154    HYDROmorphone (PF) injection 0.2 mg  0.2 mg Intravenous Q5 Min PRN John Truong MD   0.2 mg at 12/15/23 1145    lactated ringers infusion   Intravenous Continuous John Truong MD        LIDOcaine (PF) 10 mg/ml (1%) injection 5 mg  0.5 mL Intradermal Once John Truong MD        ondansetron injection 4 mg  4 mg Intravenous Once John Truong MD        oxyCODONE immediate release tablet 5 mg  5 mg Oral Q3H PRN John Truong MD   5 mg at 12/15/23 1047    prochlorperazine injection Soln 5 mg  5 mg Intravenous Q4H PRN John Truong MD           Review of patient's allergies indicates:   Allergen Reactions    Hydrocodone Anaphylaxis       Family History   Problem Relation Name Age of Onset    Arthritis Mother      Hypertension Father      Kidney disease Father      Heart disease Father      COPD Father      Pancreatic cancer Maternal Grandfather      Colon cancer Paternal Grandfather      Colon polyps Neg Hx      Crohn's disease Neg Hx      Ulcerative colitis Neg Hx      Stomach cancer Neg Hx      Esophageal cancer Neg Hx         Social History     Socioeconomic History    Marital status: Single   Tobacco Use    Smoking status: Never    Smokeless tobacco: Current      Types: Chew    Tobacco comments:     rarely   Substance and Sexual Activity    Alcohol use: Yes     Comment: occasionally    Drug use: No    Sexual activity: Not Currently       Chief Complaint:   Chief Complaint   Patient presents with    Post-op Evaluation     R rTSA       Date of surgery:  March 12, 2024    History of present illness:  58-year-old male underwent right reverse total shoulder arthroplasty for chronic rotator cuff tearing.  Patient has some residual hematoma underneath the incision.  It is much better.  Still just has a little swelling in that area.  He is also having more trouble with the right knee.  Right knee started swelling and hurting about a week ago.  No injury or trauma.  This is the knee that has a history of infection with 2 stage revision.    Review of Systems:    Musculoskeletal:  See HPI        Physical Examination:    Vital Signs:  There were no vitals filed for this visit.    Body mass index is 31.19 kg/m².    This a well-developed, well nourished patient in no acute distress.  They are alert and oriented and cooperative to examination.  Pt. walks without an antalgic gait.      Examination of the right shoulder shows healing surgical incision.  Ecchymosis and swelling is almost fully resolved.  Just a little puffiness right in the center of the incision.  Still feels like hematoma.  No erythema drainage.  Range of motion slowly improving.    Examination of the right knee shows healed surgical incision.  No significant swelling of the knee joint at this time.  Full range of motion.  A little tenderness around the patella and front of the knee.  No instability on varus and valgus or with drawer exam.    X-rays:  X-rays of the right shoulder review which show well-aligned reverse total shoulder arthroplasty without complication  X-ray of the right knee is ordered and reviewed which shows no atypical findings.  Patient has stemmed revision total knee components without any sign of joint  effusion or osteolysis.     Assessment::  Status post right reverse total shoulder arthroplasty   Resolving Postsurgical hematoma  Right knee pain    Plan:  I reviewed the findings with him today.  Follow up in 6 weeks with another x-ray of the right shoulder.  Patient will put in another referral to be able to evaluate and treat the right knee.  Talked about possibly getting another CT scan if it continues to bother him.  X-ray looks pretty normal.  Would have to keep infection in the differential given his history.    This note was created using [email or web address] voice recognition software that occasionally misinterpreted phrases or words.

## 2024-05-29 ENCOUNTER — OFFICE VISIT (OUTPATIENT)
Dept: SURGERY | Facility: CLINIC | Age: 59
End: 2024-05-29
Payer: OTHER GOVERNMENT

## 2024-05-29 VITALS
HEART RATE: 73 BPM | HEIGHT: 72 IN | SYSTOLIC BLOOD PRESSURE: 173 MMHG | WEIGHT: 226 LBS | DIASTOLIC BLOOD PRESSURE: 98 MMHG | BODY MASS INDEX: 30.61 KG/M2

## 2024-05-29 DIAGNOSIS — R13.10 DYSPHAGIA, UNSPECIFIED TYPE: Primary | ICD-10-CM

## 2024-05-29 DIAGNOSIS — K22.0 ACHALASIA: ICD-10-CM

## 2024-05-29 PROCEDURE — 99999 PR PBB SHADOW E&M-EST. PATIENT-LVL V: CPT | Mod: PBBFAC,,, | Performed by: SURGERY

## 2024-05-29 PROCEDURE — 99215 OFFICE O/P EST HI 40 MIN: CPT | Mod: PBBFAC | Performed by: SURGERY

## 2024-05-29 PROCEDURE — 99213 OFFICE O/P EST LOW 20 MIN: CPT | Mod: S$PBB,,, | Performed by: SURGERY

## 2024-05-29 NOTE — PROGRESS NOTES
[facility]  Post Op Visit    SUBJECTIVE:  Himanshu Connor II is a 58 y.o. male who presents to clinic today for post op follow up after POEM on 10/2/23. He presents for 1 month follow up to evaluate how his tolerance for solid foods has been progressing after surgery. He still has trouble with solid, dry proteins without sauces as well as trouble swallowing his omeprazole capsule. Primary symptom is these items sticking in his throat . He has been supplementing his protein intake with protein shakes and has not noticed any weight gain.    Interval history 5/29/24  Patient presents to clinic for 6mo follow up after POEM. States that his symptoms are about the same as the last time we saw him in clinic. Still having trouble swallowing solid, dry foods. When he drink something with solids then he has regurgitation or a sensation of it getting stuck. Notes that this is not happening with every meal, gives the example that he cannot eat french fries but has no problem with sweet potato fries or mashed potatoes. Does not have any issues with liquids. Denies pain when he swallows.     OBJECTIVE:  Vitals:    05/29/24 1106   BP: (!) 173/98   Pulse: 73     Body mass index is 30.65 kg/m².    General: male in NAD. Not toxic appearing.   HENT: Normocephalic and atraumatic. EOM intact.   Pulmonary: No respiratory distress. Effort normal.  Cardiovascular: Regular rate.   Abdomen: soft, non-tender, non-distended  Skin: Well healed incision. No erythema, drainage, or increased warmth noted.   MSK: normal range of motion  Neurological: No focal deficit present; alert and oriented to person, place, and time.  Psychiatric: normal mood and behavior      Path:   Lab Results   Component Value Date    FPATHDX  03/24/2023     1. Esophagus, mid and proximal, biopsy:  - Squamous mucosa with no diagnostic histopathologic alterations  - Negative for active inflammation and increased intraepithelial eosinophils            ASSESSMENT/PLAN:    Himanshu KRISTIE Connor II is a 58 y.o. y/o male w/ PMH of HTN, JAH, arthritis, GERD, and Achalasia s/p POEM 10/2/2023.  who presents to clinic today for 6 mo post-op f/u    - plan for timed barium swallow to assess

## 2024-06-04 DIAGNOSIS — Z96.611 STATUS POST REVERSE TOTAL ARTHROPLASTY OF RIGHT SHOULDER: Primary | ICD-10-CM

## 2024-06-12 ENCOUNTER — TELEPHONE (OUTPATIENT)
Dept: ORTHOPEDICS | Facility: CLINIC | Age: 59
End: 2024-06-12
Payer: OTHER GOVERNMENT

## 2024-06-12 NOTE — TELEPHONE ENCOUNTER
Called and spoke to patient.  He was returning our call about completing patient IQ tasks.  I sent him the link to complete these to his email.  He said he would complete them for us before his appointment tomorrow.     Abel

## 2024-06-12 NOTE — TELEPHONE ENCOUNTER
----- Message from Ariadna Maravilla MA sent at 6/12/2024 11:13 AM CDT -----  Contact: pt  States he needs paperwork to his my chart to fill out  Call back

## 2024-06-20 ENCOUNTER — HOSPITAL ENCOUNTER (OUTPATIENT)
Dept: RADIOLOGY | Facility: HOSPITAL | Age: 59
Discharge: HOME | End: 2024-06-20
Attending: ORTHOPAEDIC SURGERY
Payer: OTHER GOVERNMENT

## 2024-06-20 ENCOUNTER — OFFICE VISIT (OUTPATIENT)
Dept: ORTHOPEDICS | Facility: CLINIC | Age: 59
End: 2024-06-20
Payer: OTHER GOVERNMENT

## 2024-06-20 VITALS — HEIGHT: 72 IN | WEIGHT: 226 LBS | BODY MASS INDEX: 30.61 KG/M2 | RESPIRATION RATE: 18 BRPM

## 2024-06-20 DIAGNOSIS — Z96.611 STATUS POST REVERSE TOTAL ARTHROPLASTY OF RIGHT SHOULDER: Primary | ICD-10-CM

## 2024-06-20 DIAGNOSIS — Z96.611 STATUS POST REVERSE TOTAL ARTHROPLASTY OF RIGHT SHOULDER: ICD-10-CM

## 2024-06-20 PROCEDURE — 99214 OFFICE O/P EST MOD 30 MIN: CPT | Mod: PBBFAC,25,PO | Performed by: ORTHOPAEDIC SURGERY

## 2024-06-20 PROCEDURE — 99214 OFFICE O/P EST MOD 30 MIN: CPT | Mod: S$PBB,,, | Performed by: ORTHOPAEDIC SURGERY

## 2024-06-20 PROCEDURE — 99999 PR PBB SHADOW E&M-EST. PATIENT-LVL IV: CPT | Mod: PBBFAC,,, | Performed by: ORTHOPAEDIC SURGERY

## 2024-06-20 PROCEDURE — 73030 X-RAY EXAM OF SHOULDER: CPT | Mod: TC,PO,RT

## 2024-06-20 PROCEDURE — 73030 X-RAY EXAM OF SHOULDER: CPT | Mod: 26,RT,, | Performed by: RADIOLOGY

## 2024-06-20 NOTE — PROGRESS NOTES
Past Medical History:   Diagnosis Date    Alcohol abuse, unspecified     Allergic rhinitis, cause unspecified     Arthritis     Asthma attack     Cancer 2022    skin cancer on nose    Carpal tunnel syndrome     Depressive disorder, not elsewhere classified     Encounter for blood transfusion     GERD (gastroesophageal reflux disease)     Hypersomnia, unspecified     Hypertension     Hypoglycemia     Lumbago     Obesity, unspecified     JAH (obstructive sleep apnea)     uses BIPAP    Other and unspecified hyperlipidemia     Psychosexual dysfunction with inhibited sexual excitement     Pyogenic arthritis of knee 09/21/2021    Enterococcus    Ventral hernia, unspecified, without mention of obstruction or gangrene        Past Surgical History:   Procedure Laterality Date    ABDOMINAL SURGERY      x 25    ARTHROSCOPIC DEBRIDEMENT OF SHOULDER Right 12/15/2023    Procedure: EXTENSIVE DEBRIDEMENT, SHOULDER, ARTHROSCOPIC;  Surgeon: Felix Levin MD;  Location: [facility] OR;  Service: Orthopedics;  Laterality: Right;    ARTHROSCOPIC REPAIR OF ROTATOR CUFF OF SHOULDER Left 09/20/2019    Procedure: REPAIR, ROTATOR CUFF, ARTHROSCOPIC;  Surgeon: Felix Levin MD;  Location: [facility] OR;  Service: Orthopedics;  Laterality: Left;    ARTHROSCOPIC REPAIR OF ROTATOR CUFF OF SHOULDER Right 03/17/2023    Procedure: REPAIR, ROTATOR CUFF, ARTHROSCOPIC;  Surgeon: Felix Levin MD;  Location: [facility] OR;  Service: Orthopedics;  Laterality: Right;    ARTHROSCOPIC REPAIR OF ROTATOR CUFF OF SHOULDER Right 12/15/2023    Procedure: REPAIR, ROTATOR CUFF, ARTHROSCOPIC, WITH PATCH;  Surgeon: Felix Levin MD;  Location: [facility] OR;  Service: Orthopedics;  Laterality: Right;  arthrex , schmitt and nephew regenatin patch    ARTHROSCOPY OF SHOULDER WITH DECOMPRESSION OF SUBACROMIAL SPACE Left 09/20/2019    Procedure: ARTHROSCOPY, SHOULDER, WITH SUBACROMIAL SPACE DECOMPRESSION;  Surgeon: Felix Levin MD;  Location: [facility]  OR;  Service: Orthopedics;  Laterality: Left;  Jaspreet- Arthrex notified of all case today 9/16-tcb    ARTHROSCOPY,SHOULDER,WITH BICEPS TENODESIS Right 03/17/2023    Procedure: ARTHROSCOPY,SHOULDER,WITH BICEPS TENODESIS;  Surgeon: Felix Levin MD;  Location: [facility] OR;  Service: Orthopedics;  Laterality: Right;    CARPAL TUNNEL RELEASE      Bilateral    COLONOSCOPY N/A 08/05/2022    Procedure: COLONOSCOPY;  Surgeon: Zaida Ansari MD;  Location: [facility] ENDO;  Service: Endoscopy;  Laterality: N/A;    ESOPHAGEAL MANOMETRY WITH MEASUREMENT OF IMPEDANCE N/A 03/24/2023    Procedure: MANOMETRY, ESOPHAGUS, WITH IMPEDANCE MEASUREMENT;  Surgeon: Sofie Walker MD;  Location: [facility] ENDO (2ND FLR);  Service: Endoscopy;  Laterality: N/A;  r/o rumination and supragastric belching  hold baclofen x3 days, hold doxepin x24 hours prior to procedure    ESOPHAGOGASTRODUODENOSCOPY N/A 08/05/2022    Procedure: EGD (ESOPHAGOGASTRODUODENOSCOPY);  Surgeon: Zaida Ansari MD;  Location: [facility] ENDO;  Service: Endoscopy;  Laterality: N/A;    ESOPHAGOGASTRODUODENOSCOPY N/A 03/24/2023    Procedure: EGD (ESOPHAGOGASTRODUODENOSCOPY);  Surgeon: Sofie Walker MD;  Location: [facility] (2ND FLR);  Service: Endoscopy;  Laterality: N/A;  Endoflip/Endoscopically placed manometry probe  2nd floor-per Dr. Walker (higher than average risk procedure)  propofol only  full liquid diet x3 days, clear liquid diet x1 day prior to procedure  instructions sent to [email or web address] and emailed to [email or web address]    EYE SURGERY      Lasik    HERNIA REPAIR      x 15 surgeries    INCISION AND DRAINAGE OF HEMATOMA Right 09/21/2021    Procedure: INCISION AND DRAINAGE, HEMATOMA;  Surgeon: Felix Levin MD;  Location: [facility] OR;  Service: Orthopedics;  Laterality: Right;    INSERTION OF ANTIBIOTIC SPACER Right 06/01/2021    Procedure: INSERTION, ANTIBIOTIC SPACER;  Surgeon: Felix Levin MD;  Location: [facility] OR;  Service: Orthopedics;  Laterality:  Right;    JOINT REPLACEMENT      KNEE ARTHROPLASTY Left 08/04/2020    Procedure: ARTHROPLASTY, KNEE;  Surgeon: Felix Levin MD;  Location: [facility] OR;  Service: Orthopedics;  Laterality: Left;    KNEE ARTHROPLASTY Right 03/23/2021    Procedure: ARTHROPLASTY, KNEE;  Surgeon: Felix Levin MD;  Location: [facility] OR;  Service: Orthopedics;  Laterality: Right;    KNEE ARTHROSCOPY W/ MENISCECTOMY Left 10/15/2019    Procedure: ARTHROSCOPY, KNEE, WITH MENISCECTOMY;  Surgeon: Felix Levin MD;  Location: [facility] OR;  Service: Orthopedics;  Laterality: Left;  Medial and Lateral Meniscectomy    KNEE ARTHROSCOPY W/ MENISCECTOMY Right 11/22/2019    Procedure: ARTHROSCOPY, KNEE, WITH MENISCECTOMY;  Surgeon: Felix Levin MD;  Location: [facility] OR;  Service: Orthopedics;  Laterality: Right;    KNEE SURGERY      osmar    MYOTOMY, PERORAL, ENDOSCOPIC N/A 10/02/2023    Procedure: MYOTOMY, PERORAL, ENDOSCOPIC POEM;  Surgeon: Iker Méndez Jr., MD;  Location: [address];  Service: General;  Laterality: N/A;    NISSEN FUNDOPLICATION      X 2    REVERSE TOTAL SHOULDER ARTHROPLASTY Left 02/11/2020    Procedure: ARTHROPLASTY, SHOULDER, TOTAL, REVERSE;  Surgeon: Felix Levin MD;  Location: [facility];  Service: Orthopedics;  Laterality: Left;  Loren    REVERSE TOTAL SHOULDER ARTHROPLASTY Right 3/12/2024    Procedure: ARTHROPLASTY, SHOULDER, TOTAL, REVERSE;  Surgeon: Felix Levin MD;  Location: [facility] OR;  Service: Orthopedics;  Laterality: Right;  loren    revision of gastrojejunostomy  05/10/2021    [facility] [place]    REVISION OF KNEE ARTHROPLASTY Right 09/07/2021    Procedure: REVISION, ARTHROPLASTY, KNEE;  Surgeon: Felix Levin MD;  Location: [facility] OR;  Service: Orthopedics;  Laterality: Right;    ROTATOR CUFF REPAIR      right    RJ-EN-Y PROCEDURE N/A 05/2021    SLEEVE GASTROPLASTY  12/2013       Current Outpatient Medications   Medication Sig    acetaminophen  (TYLENOL) 500 MG tablet Take 2 tablets (1,000 mg total) by mouth every 8 (eight) hours as needed for Pain.    albuterol (PROVENTIL/VENTOLIN HFA) 90 mcg/actuation inhaler INHALE 2 PUFFS BY MOUTH FOUR TIMES A DAY AS NEEDED FOR BREATHING    ascorbic acid, vitamin C, (VITAMIN C) 500 MG tablet 500 mg.    aspirin (ECOTRIN) 81 MG EC tablet Take 1 tablet (81 mg total) by mouth 2 (two) times daily.    azelastine (ASTELIN) 137 mcg (0.1 %) nasal spray INHALE 1 SPRAY IN EACH NOSTRIL TWICE A DAY FOR ALLERGIES    busPIRone (BUSPAR) 10 MG tablet 5 mg.    cetirizine (ZYRTEC) 10 MG tablet 10 mg.    cyanocobalamin (VITAMIN B-12) 1000 MCG tablet Take 1,000 mcg by mouth once daily.     diclofenac sodium (VOLTAREN) 1 % Gel Apply topically daily as needed.    EScitalopram oxalate (LEXAPRO) 20 MG tablet 20 mg.    eszopiclone (LUNESTA) 3 mg Tab Take 1 tablet by mouth nightly as needed.    ibuprofen (ADVIL,MOTRIN) 600 MG tablet Take 1 tablet (600 mg total) by mouth 3 (three) times daily as needed for Pain.    LIDOcaine (LIDODERM) 5 % APPLY 1 PATCH TOPICALLY EVERY DAY FOR PAIN. WEAR FOR 12 HOURS, THEN REMOVE. DO NOT APPLY NEW PATCH FOR AT LEAST 12 HOURS.    losartan-hydrochlorothiazide 100-25 mg (HYZAAR) 100-25 mg per tablet 1 tablet.    MELATONIN ORAL 10 mg.    methyl salicylate-menthol 15-10% 15-10 % Crea APPLY LIBERAL AMOUNT TOPICALLY FOUR TIMES A DAY AS NEEDED    multivitamin with iron Tab TAKE TWO TABLETS BY MOUTH EVERY DAY WITH FOOD    naloxone (NARCAN) 4 mg/actuation Spry SMARTSIG:Spray(s) Both Nares    omeprazole (PRILOSEC) 40 MG capsule 40 mg.    ondansetron (ZOFRAN) 4 MG tablet Take 1 tablet (4 mg total) by mouth every 6 (six) hours as needed for Nausea.    oxyCODONE-acetaminophen (PERCOCET)  mg per tablet Take 1 tablet by mouth every 6 (six) hours as needed for Pain. (Patient not taking: Reported on 5/29/2024)    peg 400-propylene glycol 0.4-0.3 % Drop INSTILL ONE DROP IN EACH EYE FOUR TIMES A DAY AS NEEDED FOR DRY EYES     pregabalin (LYRICA) 300 MG Cap Take 300 mg by mouth 3 (three) times daily.    sulfamethoxazole-trimethoprim 800-160mg (BACTRIM DS) 800-160 mg Tab Take 1 tablet by mouth 2 (two) times daily.    vitamin D (VITAMIN D3) 1000 units Tab Take 2,000 Units by mouth once daily.     No current facility-administered medications for this visit.     Facility-Administered Medications Ordered in Other Visits   Medication    diphenhydrAMINE injection 25 mg    electrolyte-S (ISOLYTE)    HYDROmorphone (PF) injection 0.2 mg    lactated ringers infusion    LIDOcaine (PF) 10 mg/ml (1%) injection 5 mg    ondansetron injection 4 mg    oxyCODONE immediate release tablet 5 mg    prochlorperazine injection Soln 5 mg       Review of patient's allergies indicates:   Allergen Reactions    Hydrocodone Anaphylaxis       Family History   Problem Relation Name Age of Onset    Arthritis Mother      Hypertension Father      Kidney disease Father      Heart disease Father      COPD Father      Pancreatic cancer Maternal Grandfather      Colon cancer Paternal Grandfather      Colon polyps Neg Hx      Crohn's disease Neg Hx      Ulcerative colitis Neg Hx      Stomach cancer Neg Hx      Esophageal cancer Neg Hx         Social History     Socioeconomic History    Marital status: Single   Tobacco Use    Smoking status: Never    Smokeless tobacco: Current     Types: Chew    Tobacco comments:     rarely   Substance and Sexual Activity    Alcohol use: Yes     Comment: occasionally    Drug use: No    Sexual activity: Not Currently       Chief Complaint:   No chief complaint on file.      Date of surgery:  March 12, 2024    History of present illness:  58-year-old male underwent right reverse total shoulder arthroplasty for chronic rotator cuff tearing.  Patient had some residual hematoma underneath the incision.  It is much better.  Swelling is improved.  Patient has range of motion that is near normal.  Just lacks the last 5° of external rotation and some pain  with internal rotation.    Review of Systems:    Musculoskeletal:  See HPI        Physical Examination:    Vital Signs:  There were no vitals filed for this visit.    There is no height or weight on file to calculate BMI.    This a well-developed, well nourished patient in no acute distress.  They are alert and oriented and cooperative to examination.  Pt. walks without an antalgic gait.      Examination of the right shoulder shows healed surgical incision.  Ecchymosis and swelling has fully resolved.    No erythema drainage.  Range of motion slowly improving.  Near normal    Examination of the right knee shows healed surgical incision.  No significant swelling of the knee joint at this time.  Full range of motion.  A little tenderness around the patella and front of the knee.  No instability on varus and valgus or with drawer exam.    X-rays:  X-rays of the right shoulder is ordered and review which show well-aligned reverse total shoulder arthroplasty without complication  X-ray of the right knee is  reviewed which shows no atypical findings.  Patient has stemmed revision total knee components without any sign of joint effusion or osteolysis.     Assessment::  Status post right reverse total shoulder arthroplasty       Plan:  I reviewed the findings with him today.  Continue with the home exercises.  I will see him back in 3 months.  No x-rays needed.    This note was created using M [employer] voice recognition software that occasionally misinterpreted phrases or words.

## 2024-07-17 ENCOUNTER — TELEPHONE (OUTPATIENT)
Dept: PODIATRY | Facility: CLINIC | Age: 59
End: 2024-07-17
Payer: OTHER GOVERNMENT

## 2024-07-24 ENCOUNTER — OFFICE VISIT (OUTPATIENT)
Dept: PODIATRY | Facility: CLINIC | Age: 59
End: 2024-07-24
Payer: OTHER GOVERNMENT

## 2024-07-24 VITALS — OXYGEN SATURATION: 98 % | WEIGHT: 228.38 LBS | HEIGHT: 72 IN | BODY MASS INDEX: 30.93 KG/M2

## 2024-07-24 DIAGNOSIS — M79.672 LEFT FOOT PAIN: ICD-10-CM

## 2024-07-24 DIAGNOSIS — M72.2 PLANTAR FASCIITIS: Primary | ICD-10-CM

## 2024-07-24 PROCEDURE — 99999PBSHW PR PBB SHADOW TECHNICAL ONLY FILED TO HB: Mod: PBBFAC,,,

## 2024-07-24 PROCEDURE — 20550 NJX 1 TENDON SHEATH/LIGAMENT: CPT | Mod: PBBFAC,PN,LT | Performed by: PODIATRIST

## 2024-07-24 PROCEDURE — 99215 OFFICE O/P EST HI 40 MIN: CPT | Mod: PBBFAC,PN | Performed by: PODIATRIST

## 2024-07-24 PROCEDURE — 99999 PR PBB SHADOW E&M-EST. PATIENT-LVL V: CPT | Mod: PBBFAC,,, | Performed by: PODIATRIST

## 2024-07-24 PROCEDURE — 20550 NJX 1 TENDON SHEATH/LIGAMENT: CPT | Mod: S$PBB,LT,, | Performed by: PODIATRIST

## 2024-07-24 PROCEDURE — 99203 OFFICE O/P NEW LOW 30 MIN: CPT | Mod: 25,S$PBB,, | Performed by: PODIATRIST

## 2024-07-24 RX ORDER — BUPIVACAINE HYDROCHLORIDE 5 MG/ML
1.5 INJECTION, SOLUTION PERINEURAL
Status: COMPLETED | OUTPATIENT
Start: 2024-07-24 | End: 2024-07-24

## 2024-07-24 RX ORDER — DEXAMETHASONE SODIUM PHOSPHATE 4 MG/ML
4 INJECTION, SOLUTION INTRA-ARTICULAR; INTRALESIONAL; INTRAMUSCULAR; INTRAVENOUS; SOFT TISSUE
Status: COMPLETED | OUTPATIENT
Start: 2024-07-24 | End: 2024-07-24

## 2024-07-24 RX ORDER — METHYLPREDNISOLONE ACETATE 40 MG/ML
20 INJECTION, SUSPENSION INTRA-ARTICULAR; INTRALESIONAL; INTRAMUSCULAR; SOFT TISSUE
Status: COMPLETED | OUTPATIENT
Start: 2024-07-24 | End: 2024-07-24

## 2024-07-24 RX ADMIN — BUPIVACAINE HYDROCHLORIDE 7.5 MG: 5 INJECTION, SOLUTION PERINEURAL at 02:07

## 2024-07-24 RX ADMIN — DEXAMETHASONE SODIUM PHOSPHATE 4 MG: 4 INJECTION INTRA-ARTICULAR; INTRALESIONAL; INTRAMUSCULAR; INTRAVENOUS; SOFT TISSUE at 02:07

## 2024-07-24 RX ADMIN — METHYLPREDNISOLONE ACETATE 20 MG: 40 INJECTION, SUSPENSION INTRA-ARTICULAR; INTRALESIONAL; INTRAMUSCULAR; INTRASYNOVIAL; SOFT TISSUE at 02:07

## 2024-07-24 NOTE — PATIENT INSTRUCTIONS

## 2024-07-24 NOTE — PROGRESS NOTES
[address] [facility] [address]  [place] [address]  Phone: [phone]   [phone]    Patient's PCP:Deandra Finch  Referring Provider: [facility]    Subjective:      Chief Complaint:: Heel Pain (LT heel pain)    SONIYA Connor II is a 59 y.o. male who presents today with a complaint of LT heel pain. The current episode started 2-3 months.  The symptoms include aching pain. Probable cause of complaint unknown.  The symptoms are aggravated by walking, first few steps. The problem has worsened. Treatment to date have included stretches which provided no relief.         Vitals:    07/24/24 1433   SpO2: 98%   Weight: 103.6 kg (228 lb 6.3 oz)   Height: 6' (1.829 m)   PainSc:   4      Shoe Size: 10.5    Past Surgical History:   Procedure Laterality Date    ABDOMINAL SURGERY      x 25    ARTHROSCOPIC DEBRIDEMENT OF SHOULDER Right 12/15/2023    Procedure: EXTENSIVE DEBRIDEMENT, SHOULDER, ARTHROSCOPIC;  Surgeon: Felix Levin MD;  Location: [facility] OR;  Service: Orthopedics;  Laterality: Right;    ARTHROSCOPIC REPAIR OF ROTATOR CUFF OF SHOULDER Left 09/20/2019    Procedure: REPAIR, ROTATOR CUFF, ARTHROSCOPIC;  Surgeon: Felix Levin MD;  Location: [facility] OR;  Service: Orthopedics;  Laterality: Left;    ARTHROSCOPIC REPAIR OF ROTATOR CUFF OF SHOULDER Right 03/17/2023    Procedure: REPAIR, ROTATOR CUFF, ARTHROSCOPIC;  Surgeon: Felix Levin MD;  Location: [facility] OR;  Service: Orthopedics;  Laterality: Right;    ARTHROSCOPIC REPAIR OF ROTATOR CUFF OF SHOULDER Right 12/15/2023    Procedure: REPAIR, ROTATOR CUFF, ARTHROSCOPIC, WITH PATCH;  Surgeon: Felix Levin MD;  Location: [facility] OR;  Service: Orthopedics;  Laterality: Right;  arthrex , schmitt and nephew regenatin patch    ARTHROSCOPY OF SHOULDER WITH DECOMPRESSION OF SUBACROMIAL SPACE Left 09/20/2019    Procedure: ARTHROSCOPY, SHOULDER, WITH SUBACROMIAL SPACE DECOMPRESSION;  Surgeon: Felix Levin  MD;  Location: [facility] OR;  Service: Orthopedics;  Laterality: Left;  Jaspreet- Arthrex notified of all case today 9/16-tcb    ARTHROSCOPY,SHOULDER,WITH BICEPS TENODESIS Right 03/17/2023    Procedure: ARTHROSCOPY,SHOULDER,WITH BICEPS TENODESIS;  Surgeon: Felix Levin MD;  Location: [facility] OR;  Service: Orthopedics;  Laterality: Right;    CARPAL TUNNEL RELEASE      Bilateral    COLONOSCOPY N/A 08/05/2022    Procedure: COLONOSCOPY;  Surgeon: Zaida Ansari MD;  Location: [facility] ENDO;  Service: Endoscopy;  Laterality: N/A;    ESOPHAGEAL MANOMETRY WITH MEASUREMENT OF IMPEDANCE N/A 03/24/2023    Procedure: MANOMETRY, ESOPHAGUS, WITH IMPEDANCE MEASUREMENT;  Surgeon: Sofie Walker MD;  Location: [facility] ENDO (2ND FLR);  Service: Endoscopy;  Laterality: N/A;  r/o rumination and supragastric belching  hold baclofen x3 days, hold doxepin x24 hours prior to procedure    ESOPHAGOGASTRODUODENOSCOPY N/A 08/05/2022    Procedure: EGD (ESOPHAGOGASTRODUODENOSCOPY);  Surgeon: Zaida Ansari MD;  Location: [facility] ENDO;  Service: Endoscopy;  Laterality: N/A;    ESOPHAGOGASTRODUODENOSCOPY N/A 03/24/2023    Procedure: EGD (ESOPHAGOGASTRODUODENOSCOPY);  Surgeon: Sofie Walker MD;  Location: [facility] ENDO (2ND FLR);  Service: Endoscopy;  Laterality: N/A;  Endoflip/Endoscopically placed manometry probe  2nd floor-per Dr. Walker (higher than average risk procedure)  propofol only  full liquid diet x3 days, clear liquid diet x1 day prior to procedure  instructions sent to [email or web address] and emailed to [email or web address]    EYE SURGERY      Lasik    HERNIA REPAIR      x 15 surgeries    INCISION AND DRAINAGE OF HEMATOMA Right 09/21/2021    Procedure: INCISION AND DRAINAGE, HEMATOMA;  Surgeon: Felix Levin MD;  Location: [facility] OR;  Service: Orthopedics;  Laterality: Right;    INSERTION OF ANTIBIOTIC SPACER Right 06/01/2021    Procedure: INSERTION, ANTIBIOTIC SPACER;  Surgeon: Felix Levin MD;  Location: [facility] OR;  Service:  Orthopedics;  Laterality: Right;    JOINT REPLACEMENT      KNEE ARTHROPLASTY Left 08/04/2020    Procedure: ARTHROPLASTY, KNEE;  Surgeon: Felix Levin MD;  Location: [facility] OR;  Service: Orthopedics;  Laterality: Left;    KNEE ARTHROPLASTY Right 03/23/2021    Procedure: ARTHROPLASTY, KNEE;  Surgeon: Felix Levin MD;  Location: [facility] OR;  Service: Orthopedics;  Laterality: Right;    KNEE ARTHROSCOPY W/ MENISCECTOMY Left 10/15/2019    Procedure: ARTHROSCOPY, KNEE, WITH MENISCECTOMY;  Surgeon: Felix Levin MD;  Location: [facility] OR;  Service: Orthopedics;  Laterality: Left;  Medial and Lateral Meniscectomy    KNEE ARTHROSCOPY W/ MENISCECTOMY Right 11/22/2019    Procedure: ARTHROSCOPY, KNEE, WITH MENISCECTOMY;  Surgeon: Felix Levin MD;  Location: [facility] OR;  Service: Orthopedics;  Laterality: Right;    KNEE SURGERY      osmar    MYOTOMY, PERORAL, ENDOSCOPIC N/A 10/02/2023    Procedure: MYOTOMY, PERORAL, ENDOSCOPIC POEM;  Surgeon: Iker Méndez Jr., MD;  Location: [address];  Service: General;  Laterality: N/A;    NISSEN FUNDOPLICATION      X 2    REVERSE TOTAL SHOULDER ARTHROPLASTY Left 02/11/2020    Procedure: ARTHROPLASTY, SHOULDER, TOTAL, REVERSE;  Surgeon: Felix Levin MD;  [facility]: [facility];  Service: Orthopedics;  Laterality: Left;  Loren    REVERSE TOTAL SHOULDER ARTHROPLASTY Right 3/12/2024    Procedure: ARTHROPLASTY, SHOULDER, TOTAL, REVERSE;  Surgeon: Felix Levin MD;  Location: [facility] OR;  Service: Orthopedics;  Laterality: Right;  loren    revision of gastrojejunostomy  05/10/2021    [facility] [place]    REVISION OF KNEE ARTHROPLASTY Right 09/07/2021    Procedure: REVISION, ARTHROPLASTY, KNEE;  Surgeon: Felix Levin MD;  Location: [facility] OR;  Service: Orthopedics;  Laterality: Right;    ROTATOR CUFF REPAIR      right    RJ-EN-Y PROCEDURE N/A 05/2021    SLEEVE GASTROPLASTY  12/2013     Past Medical History:   Diagnosis Date    Alcohol  abuse, unspecified     Allergic rhinitis, cause unspecified     Arthritis     Asthma attack     Cancer 2022    skin cancer on nose    Carpal tunnel syndrome     Depressive disorder, not elsewhere classified     Encounter for blood transfusion     GERD (gastroesophageal reflux disease)     Hypersomnia, unspecified     Hypertension     Hypoglycemia     Lumbago     Obesity, unspecified     JAH (obstructive sleep apnea)     uses BIPAP    Other and unspecified hyperlipidemia     Psychosexual dysfunction with inhibited sexual excitement     Pyogenic arthritis of knee 09/21/2021    Enterococcus    Ventral hernia, unspecified, without mention of obstruction or gangrene      Family History   Problem Relation Name Age of Onset    Arthritis Mother      Hypertension Father      Kidney disease Father      Heart disease Father      COPD Father      Pancreatic cancer Maternal Grandfather      Colon cancer Paternal Grandfather      Colon polyps Neg Hx      Crohn's disease Neg Hx      Ulcerative colitis Neg Hx      Stomach cancer Neg Hx      Esophageal cancer Neg Hx          Social History:   Marital Status: Single  Alcohol History:  reports current alcohol use.  Tobacco History:  reports that he has never smoked. His smokeless tobacco use includes chew.  Drug History:  reports no history of drug use.    Review of patient's allergies indicates:   Allergen Reactions    Hydrocodone Anaphylaxis       Current Outpatient Medications   Medication Sig Dispense Refill    acetaminophen (TYLENOL) 500 MG tablet Take 2 tablets (1,000 mg total) by mouth every 8 (eight) hours as needed for Pain. 30 tablet 0    albuterol (PROVENTIL/VENTOLIN HFA) 90 mcg/actuation inhaler INHALE 2 PUFFS BY MOUTH FOUR TIMES A DAY AS NEEDED FOR BREATHING      ascorbic acid, vitamin C, (VITAMIN C) 500 MG tablet 500 mg.      aspirin (ECOTRIN) 81 MG EC tablet Take 1 tablet (81 mg total) by mouth 2 (two) times daily. 56 tablet 0    azelastine (ASTELIN) 137 mcg (0.1 %)  nasal spray INHALE 1 SPRAY IN EACH NOSTRIL TWICE A DAY FOR ALLERGIES      busPIRone (BUSPAR) 10 MG tablet 5 mg.      cetirizine (ZYRTEC) 10 MG tablet 10 mg.      cyanocobalamin (VITAMIN B-12) 1000 MCG tablet Take 1,000 mcg by mouth once daily.       diclofenac sodium (VOLTAREN) 1 % Gel Apply topically daily as needed.      EScitalopram oxalate (LEXAPRO) 20 MG tablet 20 mg.      eszopiclone (LUNESTA) 3 mg Tab Take 1 tablet by mouth nightly as needed.      ibuprofen (ADVIL,MOTRIN) 600 MG tablet Take 1 tablet (600 mg total) by mouth 3 (three) times daily as needed for Pain. 30 tablet 0    LIDOcaine (LIDODERM) 5 % APPLY 1 PATCH TOPICALLY EVERY DAY FOR PAIN. WEAR FOR 12 HOURS, THEN REMOVE. DO NOT APPLY NEW PATCH FOR AT LEAST 12 HOURS.      losartan-hydrochlorothiazide 100-25 mg (HYZAAR) 100-25 mg per tablet 1 tablet.      MELATONIN ORAL 10 mg.      methyl salicylate-menthol 15-10% 15-10 % Crea APPLY LIBERAL AMOUNT TOPICALLY FOUR TIMES A DAY AS NEEDED      multivitamin with iron Tab TAKE TWO TABLETS BY MOUTH EVERY DAY WITH FOOD      naloxone (NARCAN) 4 mg/actuation Spry SMARTSIG:Spray(s) Both Nares      omeprazole (PRILOSEC) 40 MG capsule 40 mg.      ondansetron (ZOFRAN) 4 MG tablet Take 1 tablet (4 mg total) by mouth every 6 (six) hours as needed for Nausea. 30 tablet 0    oxyCODONE-acetaminophen (PERCOCET)  mg per tablet Take 1 tablet by mouth every 6 (six) hours as needed for Pain. 28 tablet 0    peg 400-propylene glycol 0.4-0.3 % Drop INSTILL ONE DROP IN EACH EYE FOUR TIMES A DAY AS NEEDED FOR DRY EYES      pregabalin (LYRICA) 300 MG Cap Take 300 mg by mouth 3 (three) times daily.      vitamin D (VITAMIN D3) 1000 units Tab Take 2,000 Units by mouth once daily.       No current facility-administered medications for this visit.     Facility-Administered Medications Ordered in Other Visits   Medication Dose Route Frequency Provider Last Rate Last Admin    diphenhydrAMINE injection 25 mg  25 mg Intravenous Q6H PRN  John Truong MD        electrolyte-S (ISOLYTE)   Intravenous Continuous John Truong MD   Stopped at 12/15/23 1154    HYDROmorphone (PF) injection 0.2 mg  0.2 mg Intravenous Q5 Min PRN John Truong MD   0.2 mg at 12/15/23 1145    lactated ringers infusion   Intravenous Continuous John Truong MD        LIDOcaine (PF) 10 mg/ml (1%) injection 5 mg  0.5 mL Intradermal Once John Truong MD        ondansetron injection 4 mg  4 mg Intravenous Once John Truong MD        oxyCODONE immediate release tablet 5 mg  5 mg Oral Q3H PRN John Truong MD   5 mg at 12/15/23 1047    prochlorperazine injection Soln 5 mg  5 mg Intravenous Q4H PRN John Truong MD           Review of Systems   Constitutional:  Negative for chills, fatigue, fever and unexpected weight change.   HENT:  Negative for hearing loss and trouble swallowing.    Eyes:  Negative for photophobia and visual disturbance.   Respiratory:  Negative for cough, shortness of breath and wheezing.    Cardiovascular:  Negative for chest pain, palpitations and leg swelling.   Gastrointestinal:  Negative for abdominal pain and nausea.   Genitourinary:  Negative for dysuria and frequency.   Musculoskeletal:  Negative for arthralgias, back pain, gait problem, joint swelling, myalgias and neck pain.   Skin:  Negative for rash and wound.   Neurological:  Negative for tremors, seizures, weakness, numbness and headaches.   Hematological:  Does not bruise/bleed easily.   Psychiatric/Behavioral:  Negative for hallucinations.          Objective:        Physical Exam:   Foot Exam    General  General Appearance: appears stated age and healthy   Orientation: alert and oriented to person, place, and time   Affect: appropriate   Gait: unimpaired       Left Foot/Ankle      Inspection and Palpation  Ecchymosis: none  Tenderness: plantar fascia   Swelling: none   Arch: normal  Hammertoes: absent  Claw toes: absent  Hallux valgus: no  Hallux  limitus: no  Skin Exam: skin intact; no drainage, no ulcer and no erythema   Neurovascular  Dorsalis pedis: 2+  Posterior tibial: 2+  Capillary refill: 2+  Varicose veins: not present  Saphenous nerve sensation: normal  Tibial nerve sensation: normal  Superficial peroneal nerve sensation: normal  Deep peroneal nerve sensation: normal  Sural nerve sensation: normal    Muscle Strength  Ankle dorsiflexion: 5  Ankle plantar flexion: 5  Ankle inversion: 5  Ankle eversion: 5  Great toe extension: 5  Great toe flexion: 5    Range of Motion    Normal left ankle ROM    Tests  Anterior drawer: negative   Talar tilt: negative   PT Tinel's sign: negative  Paresthesia: negative  Comments  Pain on palpation of the infeior medial heel and central plantar heel. No pain present  with side to side compression of the calcaneus. Negative tinnel's sign  at the tarsal tunnel. Negative Griffin's nerve pain. Negative Calcaneal nerve pain. No soft tissue masses. Pain absent  with dorsiflexion of the ankle. No edema, erythema, or ecchymosis noted.       Physical Exam  Cardiovascular:      Pulses:           Dorsalis pedis pulses are 2+ on the left side.        Posterior tibial pulses are 2+ on the left side.   Musculoskeletal:      Left foot: No bunion.   Feet:      Left foot:      Skin integrity: No ulcer or erythema.               Left Ankle/Foot Exam     Range of Motion   The patient has normal left ankle ROM.     Comments:  Pain on palpation of the infeior medial heel and central plantar heel. No pain present  with side to side compression of the calcaneus. Negative tinnel's sign  at the tarsal tunnel. Negative Griffin's nerve pain. Negative Calcaneal nerve pain. No soft tissue masses. Pain absent  with dorsiflexion of the ankle. No edema, erythema, or ecchymosis noted.         Muscle Strength   Left Lower Extremity   Ankle Dorsiflexion:  5   Plantar flexion:  5/5     Vascular Exam       Left Pulses  Dorsalis Pedis:      2+  Posterior  Tibial:      2+           Imaging: none            Assessment:       1. Plantar fasciitis    2. Left foot pain      Plan:   Plantar fasciitis  -     methylPREDNISolone acetate injection 20 mg  -     BUPivacaine injection 7.5 mg  -     dexAMETHasone injection 4 mg    Left foot pain  -     methylPREDNISolone acetate injection 20 mg  -     BUPivacaine injection 7.5 mg  -     dexAMETHasone injection 4 mg      Follow up if symptoms worsen or fail to improve.    Procedures Informed consent was obtained.  Time-out was called.  The area was prepped with alcohol, and a steroid injection was performed at left plantar fascia using 1.5 cc of 0.5% Marcaine w/out epi, 1 cc Dexamethasone, 0.5 cc Methylprednisolone. Patient tolerated the procedure well.           Discussed different treatment options for heel pain. I gave written and verbal instructions on heel cord stretching and this was demonstrated for the patient. Patient expressed understanding. Discussed wearing appropriate shoe gear and avoiding flats, slippers, sandals, barefoot, and sockfeet. Recommended arch supports. My recommendation for OTC supports is Spenco polysorb replacement insoles or patient may elect more aggressive treatment with prescription arch supports. We also discussed cortisone injections and NSAID therapy.       Counseling:     I provided patient education verbally regarding:   Patient diagnosis, treatment options, as well as alternatives, risks, and benefits.     This note was created using Dragon voice recognition software that occasionally misinterpreted phrases or words.

## 2024-09-12 ENCOUNTER — TELEPHONE (OUTPATIENT)
Dept: SURGERY | Facility: CLINIC | Age: 59
End: 2024-09-12
Payer: OTHER GOVERNMENT

## 2024-09-12 NOTE — TELEPHONE ENCOUNTER
[facility] for patient to call me back at [phone] re:  Dr. Méndez wanted timed barium swallow test done after office visit 05/29/2024 calling to schedule or discuss; and, also, he needs an EGD for 6 mo f/u post surgery in 10/2023.  Cam either speak to myself or Camelia Chirinos

## 2024-09-16 ENCOUNTER — TELEPHONE (OUTPATIENT)
Dept: SURGERY | Facility: CLINIC | Age: 59
End: 2024-09-16
Payer: OTHER GOVERNMENT

## 2024-09-16 NOTE — TELEPHONE ENCOUNTER
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   If he doesn't want the GES that's fine, but he definitely needs the EGD    Reminder set to follow up

## 2024-09-16 NOTE — TELEPHONE ENCOUNTER
----- Message from Nisha Parra RN sent at 9/16/2024 10:45 AM CDT -----    ----- Message -----  From: Tanya Chen  Sent: 9/13/2024   3:10 PM CDT  To: Dev Porras Staff    Patient Returning Call    Who Called? PT     Who Left Message for Patient? Yessica Coreas    Does the patient know what this is regarding?: MISSED CALL PLEASE SEE CHART  MESSAGE 9/12/2024    Would the patient rather a call back? YES      Best Call Back Number:[phone]    Additional Information:  THANK YOU

## 2024-09-18 ENCOUNTER — TELEPHONE (OUTPATIENT)
Dept: SURGERY | Facility: CLINIC | Age: 59
End: 2024-09-18
Payer: OTHER GOVERNMENT

## 2024-09-18 NOTE — TELEPHONE ENCOUNTER
Spoke with patient  He does not want to do GES, but he does want the EGD.  States [facility]  Advised will look into that and once it is authorized we will be in touch.

## 2024-09-18 NOTE — TELEPHONE ENCOUNTER
M for patient to call me back at [phone] re:  Call goes straight to voicemail  Left personal cell number to call back

## 2024-09-19 ENCOUNTER — OFFICE VISIT (OUTPATIENT)
Dept: ORTHOPEDICS | Facility: CLINIC | Age: 59
End: 2024-09-19
Payer: OTHER GOVERNMENT

## 2024-09-19 VITALS — HEIGHT: 72 IN | WEIGHT: 228.38 LBS | RESPIRATION RATE: 18 BRPM | BODY MASS INDEX: 30.93 KG/M2

## 2024-09-19 DIAGNOSIS — M16.12 ARTHRITIS OF LEFT HIP: ICD-10-CM

## 2024-09-19 DIAGNOSIS — M16.12 PRIMARY OSTEOARTHRITIS OF LEFT HIP: Primary | ICD-10-CM

## 2024-09-19 DIAGNOSIS — Z96.611 STATUS POST REVERSE TOTAL ARTHROPLASTY OF RIGHT SHOULDER: Primary | ICD-10-CM

## 2024-09-19 PROCEDURE — 99999 PR PBB SHADOW E&M-EST. PATIENT-LVL IV: CPT | Mod: PBBFAC,,, | Performed by: ORTHOPAEDIC SURGERY

## 2024-09-19 PROCEDURE — 99214 OFFICE O/P EST MOD 30 MIN: CPT | Mod: S$PBB,,, | Performed by: ORTHOPAEDIC SURGERY

## 2024-09-19 PROCEDURE — 99214 OFFICE O/P EST MOD 30 MIN: CPT | Mod: PBBFAC,PO | Performed by: ORTHOPAEDIC SURGERY

## 2024-09-19 NOTE — PROGRESS NOTES
Past Medical History:   Diagnosis Date    Alcohol abuse, unspecified     Allergic rhinitis, cause unspecified     Arthritis     Asthma attack     Cancer 2022    skin cancer on nose    Carpal tunnel syndrome     Depressive disorder, not elsewhere classified     Encounter for blood transfusion     GERD (gastroesophageal reflux disease)     Hypersomnia, unspecified     Hypertension     Hypoglycemia     Lumbago     Obesity, unspecified     JAH (obstructive sleep apnea)     uses BIPAP    Other and unspecified hyperlipidemia     Psychosexual dysfunction with inhibited sexual excitement     Pyogenic arthritis of knee 09/21/2021    Enterococcus    Ventral hernia, unspecified, without mention of obstruction or gangrene        Past Surgical History:   Procedure Laterality Date    ABDOMINAL SURGERY      x 25    ARTHROSCOPIC DEBRIDEMENT OF SHOULDER Right 12/15/2023    Procedure: EXTENSIVE DEBRIDEMENT, SHOULDER, ARTHROSCOPIC;  Surgeon: Felix Levin MD;  Location: [facility] OR;  Service: Orthopedics;  Laterality: Right;    ARTHROSCOPIC REPAIR OF ROTATOR CUFF OF SHOULDER Left 09/20/2019    Procedure: REPAIR, ROTATOR CUFF, ARTHROSCOPIC;  Surgeon: Felix Levin MD;  Location: [facility] OR;  Service: Orthopedics;  Laterality: Left;    ARTHROSCOPIC REPAIR OF ROTATOR CUFF OF SHOULDER Right 03/17/2023    Procedure: REPAIR, ROTATOR CUFF, ARTHROSCOPIC;  Surgeon: Felix Levin MD;  Location: [facility] OR;  Service: Orthopedics;  Laterality: Right;    ARTHROSCOPIC REPAIR OF ROTATOR CUFF OF SHOULDER Right 12/15/2023    Procedure: REPAIR, ROTATOR CUFF, ARTHROSCOPIC, WITH PATCH;  Surgeon: Felix Levin MD;  Location: [facility] OR;  Service: Orthopedics;  Laterality: Right;  arthrex , schmitt and nephew regenatin patch    ARTHROSCOPY OF SHOULDER WITH DECOMPRESSION OF SUBACROMIAL SPACE Left 09/20/2019    Procedure: ARTHROSCOPY, SHOULDER, WITH SUBACROMIAL SPACE DECOMPRESSION;  Surgeon: Felix Levin MD;  Location: [facility]  OR;  Service: Orthopedics;  Laterality: Left;  Jaspreet- Arthrex notified of all case today 9/16-tcb    ARTHROSCOPY,SHOULDER,WITH BICEPS TENODESIS Right 03/17/2023    Procedure: ARTHROSCOPY,SHOULDER,WITH BICEPS TENODESIS;  Surgeon: Felix Levin MD;  Location: [facility] OR;  Service: Orthopedics;  Laterality: Right;    CARPAL TUNNEL RELEASE      Bilateral    COLONOSCOPY N/A 08/05/2022    Procedure: COLONOSCOPY;  Surgeon: Zaida Ansari MD;  Location: [facility] ENDO;  Service: Endoscopy;  Laterality: N/A;    ESOPHAGEAL MANOMETRY WITH MEASUREMENT OF IMPEDANCE N/A 03/24/2023    Procedure: MANOMETRY, ESOPHAGUS, WITH IMPEDANCE MEASUREMENT;  Surgeon: Sofie Walker MD;  Location: [facility] ENDO (2ND FLR);  Service: Endoscopy;  Laterality: N/A;  r/o rumination and supragastric belching  hold baclofen x3 days, hold doxepin x24 hours prior to procedure    ESOPHAGOGASTRODUODENOSCOPY N/A 08/05/2022    Procedure: EGD (ESOPHAGOGASTRODUODENOSCOPY);  Surgeon: Zaida Ansari MD;  Location: [facility] ENDO;  Service: Endoscopy;  Laterality: N/A;    ESOPHAGOGASTRODUODENOSCOPY N/A 03/24/2023    Procedure: EGD (ESOPHAGOGASTRODUODENOSCOPY);  Surgeon: Sofie Walker MD;  Location: [facility] (2ND FLR);  Service: Endoscopy;  Laterality: N/A;  Endoflip/Endoscopically placed manometry probe  2nd floor-per Dr. Walker (higher than average risk procedure)  propofol only  full liquid diet x3 days, clear liquid diet x1 day prior to procedure  instructions sent to [email or web address] and emailed to [email or web address]    EYE SURGERY      Lasik    HERNIA REPAIR      x 15 surgeries    INCISION AND DRAINAGE OF HEMATOMA Right 09/21/2021    Procedure: INCISION AND DRAINAGE, HEMATOMA;  Surgeon: Felix Levin MD;  Location: [facility] OR;  Service: Orthopedics;  Laterality: Right;    INSERTION OF ANTIBIOTIC SPACER Right 06/01/2021    Procedure: INSERTION, ANTIBIOTIC SPACER;  Surgeon: Felix Levin MD;  Location: [facility] OR;  Service: Orthopedics;  Laterality:  Right;    JOINT REPLACEMENT      KNEE ARTHROPLASTY Left 08/04/2020    Procedure: ARTHROPLASTY, KNEE;  Surgeon: Felix Levin MD;  Location: [facility] OR;  Service: Orthopedics;  Laterality: Left;    KNEE ARTHROPLASTY Right 03/23/2021    Procedure: ARTHROPLASTY, KNEE;  Surgeon: Felix Levin MD;  Location: [facility] OR;  Service: Orthopedics;  Laterality: Right;    KNEE ARTHROSCOPY W/ MENISCECTOMY Left 10/15/2019    Procedure: ARTHROSCOPY, KNEE, WITH MENISCECTOMY;  Surgeon: Felix Levin MD;  Location: [facility] OR;  Service: Orthopedics;  Laterality: Left;  Medial and Lateral Meniscectomy    KNEE ARTHROSCOPY W/ MENISCECTOMY Right 11/22/2019    Procedure: ARTHROSCOPY, KNEE, WITH MENISCECTOMY;  Surgeon: Felix Levin MD;  Location: [facility] OR;  Service: Orthopedics;  Laterality: Right;    KNEE SURGERY      osmar    MYOTOMY, PERORAL, ENDOSCOPIC N/A 10/02/2023    Procedure: MYOTOMY, PERORAL, ENDOSCOPIC POEM;  Surgeon: Iker Méndez Jr., MD;  Location: [address];  Service: General;  Laterality: N/A;    NISSEN FUNDOPLICATION      X 2    REVERSE TOTAL SHOULDER ARTHROPLASTY Left 02/11/2020    Procedure: ARTHROPLASTY, SHOULDER, TOTAL, REVERSE;  Surgeon: Felix Levin MD;  [facility]: [facility];  Service: Orthopedics;  Laterality: Left;  Loren    REVERSE TOTAL SHOULDER ARTHROPLASTY Right 3/12/2024    Procedure: ARTHROPLASTY, SHOULDER, TOTAL, REVERSE;  Surgeon: Felix Levin MD;  Location: [facility] OR;  Service: Orthopedics;  Laterality: Right;  loren    revision of gastrojejunostomy  05/10/2021    [facility] [place]    REVISION OF KNEE ARTHROPLASTY Right 09/07/2021    Procedure: REVISION, ARTHROPLASTY, KNEE;  Surgeon: Felix Levin MD;  Location: [facility] OR;  Service: Orthopedics;  Laterality: Right;    ROTATOR CUFF REPAIR      right    RJ-EN-Y PROCEDURE N/A 05/2021    SLEEVE GASTROPLASTY  12/2013       Current Outpatient Medications   Medication Sig    albuterol  (PROVENTIL/VENTOLIN HFA) 90 mcg/actuation inhaler INHALE 2 PUFFS BY MOUTH FOUR TIMES A DAY AS NEEDED FOR BREATHING    ascorbic acid, vitamin C, (VITAMIN C) 500 MG tablet 500 mg.    aspirin (ECOTRIN) 81 MG EC tablet Take 1 tablet (81 mg total) by mouth 2 (two) times daily.    azelastine (ASTELIN) 137 mcg (0.1 %) nasal spray INHALE 1 SPRAY IN EACH NOSTRIL TWICE A DAY FOR ALLERGIES    busPIRone (BUSPAR) 10 MG tablet 5 mg.    cetirizine (ZYRTEC) 10 MG tablet 10 mg.    cyanocobalamin (VITAMIN B-12) 1000 MCG tablet Take 1,000 mcg by mouth once daily.     diclofenac sodium (VOLTAREN) 1 % Gel Apply topically daily as needed.    EScitalopram oxalate (LEXAPRO) 20 MG tablet 20 mg.    eszopiclone (LUNESTA) 3 mg Tab Take 1 tablet by mouth nightly as needed.    LIDOcaine (LIDODERM) 5 % APPLY 1 PATCH TOPICALLY EVERY DAY FOR PAIN. WEAR FOR 12 HOURS, THEN REMOVE. DO NOT APPLY NEW PATCH FOR AT LEAST 12 HOURS.    losartan-hydrochlorothiazide 100-25 mg (HYZAAR) 100-25 mg per tablet 1 tablet.    MELATONIN ORAL 10 mg.    methyl salicylate-menthol 15-10% 15-10 % Crea APPLY LIBERAL AMOUNT TOPICALLY FOUR TIMES A DAY AS NEEDED    multivitamin with iron Tab TAKE TWO TABLETS BY MOUTH EVERY DAY WITH FOOD    naloxone (NARCAN) 4 mg/actuation Spry SMARTSIG:Spray(s) Both Nares    omeprazole (PRILOSEC) 40 MG capsule 40 mg.    ondansetron (ZOFRAN) 4 MG tablet Take 1 tablet (4 mg total) by mouth every 6 (six) hours as needed for Nausea.    oxyCODONE-acetaminophen (PERCOCET)  mg per tablet Take 1 tablet by mouth every 6 (six) hours as needed for Pain.    peg 400-propylene glycol 0.4-0.3 % Drop INSTILL ONE DROP IN EACH EYE FOUR TIMES A DAY AS NEEDED FOR DRY EYES    pregabalin (LYRICA) 300 MG Cap Take 300 mg by mouth 3 (three) times daily.    vitamin D (VITAMIN D3) 1000 units Tab Take 2,000 Units by mouth once daily.     No current facility-administered medications for this visit.     Facility-Administered Medications Ordered in Other Visits    Medication    diphenhydrAMINE injection 25 mg    electrolyte-S (ISOLYTE)    HYDROmorphone (PF) injection 0.2 mg    lactated ringers infusion    LIDOcaine (PF) 10 mg/ml (1%) injection 5 mg    ondansetron injection 4 mg    oxyCODONE immediate release tablet 5 mg    prochlorperazine injection Soln 5 mg       Review of patient's allergies indicates:   Allergen Reactions    Hydrocodone Anaphylaxis       Family History   Problem Relation Name Age of Onset    Arthritis Mother      Hypertension Father      Kidney disease Father      Heart disease Father      COPD Father      Pancreatic cancer Maternal Grandfather      Colon cancer Paternal Grandfather      Colon polyps Neg Hx      Crohn's disease Neg Hx      Ulcerative colitis Neg Hx      Stomach cancer Neg Hx      Esophageal cancer Neg Hx         Social History     Socioeconomic History    Marital status: Single   Tobacco Use    Smoking status: Never    Smokeless tobacco: Current     Types: Chew    Tobacco comments:     rarely   Substance and Sexual Activity    Alcohol use: Yes     Comment: occasionally    Drug use: No    Sexual activity: Not Currently       Chief Complaint:   Chief Complaint   Patient presents with    Follow-up     Follow up right shoulder s/p RTSA. Dos 3/12/24       Date of surgery:  March 12, 2024    History of present illness:  59-year-old male underwent right reverse total shoulder arthroplasty for chronic rotator cuff tearing.  Patient is doing great.  Great range of motion.  No pain.  Complaining mainly of some left hip pain.  [facility], some bursitis and possibly ligament injuries.  No previous treatment other than some bursal injections.    Review of Systems:    Musculoskeletal:  See HPI        Physical Examination:    Vital Signs:    Vitals:    09/19/24 1350   Resp: 18       Body mass index is 30.98 kg/m².    This a well-developed, well nourished patient in no acute distress.  They are  alert and oriented and cooperative to examination.  Pt. walks without an antalgic gait.      Examination of the right shoulder shows healed surgical incision.  Ecchymosis and swelling has fully resolved.    No erythema drainage.  Range of motion slowly improving.  Near normal    Examination of the right knee shows healed surgical incision.  No significant swelling of the knee joint at this time.  Full range of motion.  A little tenderness around the patella and front of the knee.  No instability on varus and valgus or with drawer exam.    X-rays:  X-rays of the right shoulder is  review which show well-aligned reverse total shoulder arthroplasty without complication  X-ray of the right knee is  reviewed which shows no atypical findings.  Patient has stemmed revision total knee components without any sign of joint effusion or osteolysis.     Assessment::  Status post right reverse total shoulder arthroplasty   Left hip arthritis      Plan:  I reviewed the findings with him today.  Continue with the home exercises.  I agreed to get him set up for an intra-articular left hip injection.  If he needs further treatment for the hip, we will get him in with Dr. Ruff  Follow up in March with annual evaluation of both shoulders and both knees.    This note was created using [employer] voice recognition software that occasionally misinterpreted phrases or words.

## 2024-09-23 DIAGNOSIS — M16.12 PRIMARY OSTEOARTHRITIS OF LEFT HIP: Primary | ICD-10-CM

## 2024-10-10 ENCOUNTER — HOSPITAL ENCOUNTER (OUTPATIENT)
Dept: RADIOLOGY | Facility: HOSPITAL | Age: 59
Discharge: HOME | End: 2024-10-10
Attending: ORTHOPAEDIC SURGERY
Payer: OTHER GOVERNMENT

## 2024-10-10 DIAGNOSIS — M16.12 PRIMARY OSTEOARTHRITIS OF LEFT HIP: ICD-10-CM

## 2024-10-10 PROCEDURE — 77002 NEEDLE LOCALIZATION BY XRAY: CPT | Mod: TC

## 2024-10-10 PROCEDURE — 63600175 PHARM REV CODE 636 W HCPCS

## 2024-10-10 PROCEDURE — 77002 NEEDLE LOCALIZATION BY XRAY: CPT | Mod: 26,,, | Performed by: RADIOLOGY

## 2024-10-10 PROCEDURE — 25500020 PHARM REV CODE 255

## 2024-10-10 PROCEDURE — 20610 DRAIN/INJ JOINT/BURSA W/O US: CPT | Mod: LT,,, | Performed by: RADIOLOGY

## 2024-10-10 RX ORDER — BUPIVACAINE HYDROCHLORIDE 2.5 MG/ML
INJECTION, SOLUTION EPIDURAL; INFILTRATION; INTRACAUDAL
Status: COMPLETED
Start: 2024-10-10 | End: 2024-10-10

## 2024-10-10 RX ORDER — LIDOCAINE HYDROCHLORIDE 10 MG/ML
INJECTION, SOLUTION EPIDURAL; INFILTRATION; INTRACAUDAL; PERINEURAL
Status: COMPLETED
Start: 2024-10-10 | End: 2024-10-10

## 2024-10-10 RX ORDER — METHYLPREDNISOLONE ACETATE 80 MG/ML
INJECTION, SUSPENSION INTRA-ARTICULAR; INTRALESIONAL; INTRAMUSCULAR; SOFT TISSUE
Status: COMPLETED
Start: 2024-10-10 | End: 2024-10-10

## 2024-10-10 RX ADMIN — BUPIVACAINE HYDROCHLORIDE 10 MG: 2.5 INJECTION, SOLUTION EPIDURAL; INFILTRATION; INTRACAUDAL at 04:10

## 2024-10-10 RX ADMIN — METHYLPREDNISOLONE ACETATE 80 MG: 80 INJECTION, SUSPENSION INTRA-ARTICULAR; INTRALESIONAL; INTRAMUSCULAR; SOFT TISSUE at 04:10

## 2024-10-10 RX ADMIN — LIDOCAINE HYDROCHLORIDE 50 MG: 10 SOLUTION INTRAVENOUS at 04:10

## 2024-10-10 RX ADMIN — IOHEXOL 15 ML: 350 INJECTION, SOLUTION INTRAVENOUS at 04:10

## 2024-10-22 NOTE — DISCHARGE SUMMARY
Let him know his ct shows a stone stuck in his left ureter  We will discuss plan on Thursday at his appt  But he should go to er if he has any fevers or chills    Lab Results       Component                Value               Date                       CREATININE               0.9                 10/21/2024            Lab Results       Component                Value               Date                       COLORU                   Yellow              10/21/2024                 APPEARANCEUA             Clear               10/21/2024                 PHUR                     7.0                 10/21/2024                 SPECGRAV                 1.015               10/21/2024                 PROTEINUA                Negative            10/21/2024                 GLUCUA                   Negative            10/21/2024                 OCCULTUA                 3+ (A)              10/21/2024                 NITRITE                  Negative            10/21/2024                 LEUKOCYTESUR             Negative            10/21/2024                [facility]  Discharge Note  Short Stay    Admit Date: 10/15/2019    Discharge Date and Time: 10/15/2019    Attending Physician: Feilx Levin MD     Discharge Provider: Felix Levin    Diagnoses:  Active Hospital Problems    Diagnosis  POA    *Acute lateral meniscal tear, left, sequela [S83.282S]  Not Applicable      Resolved Hospital Problems   No resolved problems to display.       Discharged Condition: good    Hospital Course: Patient was admitted for an outpatient procedure and tolerated the procedure well with no complications.    Final Diagnoses: Same as principal problem.    Disposition: Home or Self Care    Follow up/Patient Instructions:    Medications:  Reconciled Home Medications:      Medication List      CONTINUE taking these medications    ALPRAZolam 0.5 MG tablet  Commonly known as:  XANAX  TK 1 T PO BID     cyanocobalamin 1000 MCG tablet  Commonly known as:  VITAMIN B-12  Take 1,000 mcg by mouth once daily.     cyclobenzaprine 10 MG tablet  Commonly known as:  FLEXERIL  Take 10 mg by mouth 3 (three) times daily as needed for Muscle spasms.     escitalopram oxalate 20 MG tablet  Commonly known as:  LEXAPRO  Take 20 mg by mouth once daily.     losartan 25 MG tablet  Commonly known as:  COZAAR  Take 25 mg by mouth once daily.     pantoprazole 40 MG tablet  Commonly known as:  PROTONIX  TK ONE  T PO D     ranitidine 150 MG capsule  Commonly known as:  ZANTAC  Take 150 mg by mouth 2 (two) times daily as needed.     sucralfate 1 gram tablet  Commonly known as:  CARAFATE  TK 1 T PO QID 1 HOUR BEFORE MEALS AND 1 T QHS OES          Discharge Procedure Orders   Diet Adult Regular     Keep surgical extremity elevated     Ice to affected area     Call MD for:  temperature >100.4     Call MD for:  severe uncontrolled pain     Call MD for:  redness, tenderness, or signs of infection (pain, swelling, redness, odor or green/yellow discharge around incision site)     Remove  dressing in 48 hours     Change dressing (specify)   Order Comments: Dressing change: One time per day using Waterproof Bandaids.     Activity as tolerated     Shower on day dressing removed (No bath)     Weight bearing restrictions (specify):     Follow-up Information     Felix Levin MD In 2 weeks.    Specialties:  Sports Medicine, Orthopedic Surgery  Contact information:  [address] [place] [address] DR Devan PATEL [id number]  [phone]                   Discharge Procedure Orders (must include Diet, Follow-up, Activity):   Discharge Procedure Orders (must include Diet, Follow-up, Activity)   Diet Adult Regular     Keep surgical extremity elevated     Ice to affected area     Call MD for:  temperature >100.4     Call MD for:  severe uncontrolled pain     Call MD for:  redness, tenderness, or signs of infection (pain, swelling, redness, odor or green/yellow discharge around incision site)     Remove dressing in 48 hours     Change dressing (specify)   Order Comments: Dressing change: One time per day using Waterproof Bandaids.     Activity as tolerated     Shower on day dressing removed (No bath)     Weight bearing restrictions (specify):

## 2024-12-09 ENCOUNTER — OFFICE VISIT (OUTPATIENT)
Dept: PODIATRY | Facility: CLINIC | Age: 59
End: 2024-12-09
Payer: OTHER GOVERNMENT

## 2024-12-09 VITALS — BODY MASS INDEX: 29.3 KG/M2 | WEIGHT: 216.06 LBS

## 2024-12-09 DIAGNOSIS — M72.2 PLANTAR FASCIITIS: Primary | ICD-10-CM

## 2024-12-09 DIAGNOSIS — M79.672 LEFT FOOT PAIN: ICD-10-CM

## 2024-12-09 PROCEDURE — 20550 NJX 1 TENDON SHEATH/LIGAMENT: CPT | Mod: PBBFAC,PN | Performed by: PODIATRIST

## 2024-12-09 PROCEDURE — 99999PBSHW PR PBB SHADOW TECHNICAL ONLY FILED TO HB: Mod: PBBFAC,,,

## 2024-12-09 PROCEDURE — 99999 PR PBB SHADOW E&M-EST. PATIENT-LVL IV: CPT | Mod: PBBFAC,,, | Performed by: PODIATRIST

## 2024-12-09 PROCEDURE — 99214 OFFICE O/P EST MOD 30 MIN: CPT | Mod: PBBFAC,PN | Performed by: PODIATRIST

## 2024-12-09 PROCEDURE — 20550 NJX 1 TENDON SHEATH/LIGAMENT: CPT | Mod: S$PBB,LT,, | Performed by: PODIATRIST

## 2024-12-09 PROCEDURE — 99213 OFFICE O/P EST LOW 20 MIN: CPT | Mod: 25,S$PBB,, | Performed by: PODIATRIST

## 2024-12-09 RX ORDER — METHYLPREDNISOLONE ACETATE 40 MG/ML
20 INJECTION, SUSPENSION INTRA-ARTICULAR; INTRALESIONAL; INTRAMUSCULAR; SOFT TISSUE
Status: COMPLETED | OUTPATIENT
Start: 2024-12-09 | End: 2024-12-09

## 2024-12-09 RX ORDER — BUPIVACAINE HYDROCHLORIDE 5 MG/ML
1.5 INJECTION, SOLUTION PERINEURAL
Status: COMPLETED | OUTPATIENT
Start: 2024-12-09 | End: 2024-12-09

## 2024-12-09 RX ORDER — DEXAMETHASONE SODIUM PHOSPHATE 4 MG/ML
4 INJECTION, SOLUTION INTRA-ARTICULAR; INTRALESIONAL; INTRAMUSCULAR; INTRAVENOUS; SOFT TISSUE
Status: COMPLETED | OUTPATIENT
Start: 2024-12-09 | End: 2024-12-09

## 2024-12-09 RX ADMIN — METHYLPREDNISOLONE ACETATE 20 MG: 40 INJECTION, SUSPENSION INTRA-ARTICULAR; INTRALESIONAL; INTRAMUSCULAR; SOFT TISSUE at 12:12

## 2024-12-09 RX ADMIN — DEXAMETHASONE SODIUM PHOSPHATE 4 MG: 4 INJECTION INTRA-ARTICULAR; INTRALESIONAL; INTRAMUSCULAR; INTRAVENOUS; SOFT TISSUE at 12:12

## 2024-12-09 RX ADMIN — BUPIVACAINE HYDROCHLORIDE 7.5 MG: 5 INJECTION, SOLUTION PERINEURAL at 12:12

## 2024-12-09 NOTE — PROGRESS NOTES
[address]  [place] [address]  Phone: [phone]   [phone]    Patient's PCP:Administration, Deandra  Referring Provider: No ref. provider found    Subjective:      Chief Complaint:: Follow-up (Left foot pain)    HPI  Himanshu Connor II is a 59 y.o. male who presents today for follow up left foot pain.  Patient states that this began to reoccur several weeks ago.  He states that he is having some pain on the bottom and towards the back of the heel.    Vitals:    12/09/24 1051   Weight: 98 kg (216 lb 0.8 oz)   PainSc:   5      Shoe Size: 10.5    Past Surgical History:   Procedure Laterality Date    ABDOMINAL SURGERY      x 25    ARTHROSCOPIC DEBRIDEMENT OF SHOULDER Right 12/15/2023    Procedure: EXTENSIVE DEBRIDEMENT, SHOULDER, ARTHROSCOPIC;  Surgeon: Felix Levin MD;  Location: [facility] OR;  Service: Orthopedics;  Laterality: Right;    ARTHROSCOPIC REPAIR OF ROTATOR CUFF OF SHOULDER Left 09/20/2019    Procedure: REPAIR, ROTATOR CUFF, ARTHROSCOPIC;  Surgeon: Felix Levin MD;  Location: [facility] OR;  Service: Orthopedics;  Laterality: Left;    ARTHROSCOPIC REPAIR OF ROTATOR CUFF OF SHOULDER Right 03/17/2023    Procedure: REPAIR, ROTATOR CUFF, ARTHROSCOPIC;  Surgeon: Felix Levin MD;  Location: [facility] OR;  Service: Orthopedics;  Laterality: Right;    ARTHROSCOPIC REPAIR OF ROTATOR CUFF OF SHOULDER Right 12/15/2023    Procedure: REPAIR, ROTATOR CUFF, ARTHROSCOPIC, WITH PATCH;  Surgeon: Felix Levin MD;  Location: [facility] OR;  Service: Orthopedics;  Laterality: Right;  arthrex , schmitt and nephew regenatin patch    ARTHROSCOPY OF SHOULDER WITH DECOMPRESSION OF SUBACROMIAL SPACE Left 09/20/2019    Procedure: ARTHROSCOPY, SHOULDER, WITH SUBACROMIAL SPACE DECOMPRESSION;  Surgeon: Felix Levin MD;  Location: [facility] OR;  Service: Orthopedics;  Laterality: Left;  Jaspreet- Arthedith notified of all case today 9/16-tcb    ARTHROSCOPY,SHOULDER,WITH BICEPS  TENODESIS Right 03/17/2023    Procedure: ARTHROSCOPY,SHOULDER,WITH BICEPS TENODESIS;  Surgeon: Felix Levin MD;  Location: [facility] OR;  Service: Orthopedics;  Laterality: Right;    CARPAL TUNNEL RELEASE      Bilateral    COLONOSCOPY N/A 08/05/2022    Procedure: COLONOSCOPY;  Surgeon: Zaida Ansari MD;  Location: [facility] ENDO;  Service: Endoscopy;  Laterality: N/A;    ESOPHAGEAL MANOMETRY WITH MEASUREMENT OF IMPEDANCE N/A 03/24/2023    Procedure: MANOMETRY, ESOPHAGUS, WITH IMPEDANCE MEASUREMENT;  Surgeon: Sofie Walker MD;  Location: [facility] ENDO (2ND FLR);  Service: Endoscopy;  Laterality: N/A;  r/o rumination and supragastric belching  hold baclofen x3 days, hold doxepin x24 hours prior to procedure    ESOPHAGOGASTRODUODENOSCOPY N/A 08/05/2022    Procedure: EGD (ESOPHAGOGASTRODUODENOSCOPY);  Surgeon: Zaida Ansari MD;  Location: [facility] ENDO;  Service: Endoscopy;  Laterality: N/A;    ESOPHAGOGASTRODUODENOSCOPY N/A 03/24/2023    Procedure: EGD (ESOPHAGOGASTRODUODENOSCOPY);  Surgeon: Sofie Walker MD;  Location: [facility] ENDO (2ND FLR);  Service: Endoscopy;  Laterality: N/A;  Endoflip/Endoscopically placed manometry probe  2nd floor-per Dr. Walker (higher than average risk procedure)  propofol only  full liquid diet x3 days, clear liquid diet x1 day prior to procedure  instructions sent to [facility] and emailed to [email or web address]    EYE SURGERY      Lasik    HERNIA REPAIR      x 15 surgeries    INCISION AND DRAINAGE OF HEMATOMA Right 09/21/2021    Procedure: INCISION AND DRAINAGE, HEMATOMA;  Surgeon: Felix Levin MD;  Location: [facility] OR;  Service: Orthopedics;  Laterality: Right;    INSERTION OF ANTIBIOTIC SPACER Right 06/01/2021    Procedure: INSERTION, ANTIBIOTIC SPACER;  Surgeon: Felix Levin MD;  Location: [facility] OR;  Service: Orthopedics;  Laterality: Right;    JOINT REPLACEMENT      KNEE ARTHROPLASTY Left 08/04/2020    Procedure: ARTHROPLASTY, KNEE;  Surgeon: Felix Levin MD;   Location: [facility] OR;  Service: Orthopedics;  Laterality: Left;    KNEE ARTHROPLASTY Right 03/23/2021    Procedure: ARTHROPLASTY, KNEE;  Surgeon: Felix Levin MD;  Location: [facility] OR;  Service: Orthopedics;  Laterality: Right;    KNEE ARTHROSCOPY W/ MENISCECTOMY Left 10/15/2019    Procedure: ARTHROSCOPY, KNEE, WITH MENISCECTOMY;  Surgeon: Felix Levin MD;  Location: [facility] OR;  Service: Orthopedics;  Laterality: Left;  Medial and Lateral Meniscectomy    KNEE ARTHROSCOPY W/ MENISCECTOMY Right 11/22/2019    Procedure: ARTHROSCOPY, KNEE, WITH MENISCECTOMY;  Surgeon: Felix Levin MD;  Location: [facility] OR;  Service: Orthopedics;  Laterality: Right;    KNEE SURGERY      osmar    MYOTOMY, PERORAL, ENDOSCOPIC N/A 10/02/2023    Procedure: MYOTOMY, PERORAL, ENDOSCOPIC POEM;  Surgeon: Iker Méndez Jr., MD;  Location: [address];  Service: General;  Laterality: N/A;    NISSEN FUNDOPLICATION      X 2    REVERSE TOTAL SHOULDER ARTHROPLASTY Left 02/11/2020    Procedure: ARTHROPLASTY, SHOULDER, TOTAL, REVERSE;  Surgeon: Felix Levin MD;  Location: [facility] OR;  Service: Orthopedics;  Laterality: Left;  [place]    REVERSE TOTAL SHOULDER ARTHROPLASTY Right 3/12/2024    Procedure: ARTHROPLASTY, SHOULDER, TOTAL, REVERSE;  Surgeon: Felix Levin MD;  Location: [facility] OR;  Service: Orthopedics;  Laterality: Right;  [place]    revision of gastrojejunostomy  05/10/2021    [facility] [place]    REVISION OF KNEE ARTHROPLASTY Right 09/07/2021    Procedure: REVISION, ARTHROPLASTY, KNEE;  Surgeon: Felix Levin MD;  Location: [facility] OR;  Service: Orthopedics;  Laterality: Right;    ROTATOR CUFF REPAIR      right    RJ-EN-Y PROCEDURE N/A 05/2021    SLEEVE GASTROPLASTY  12/2013     Past Medical History:   Diagnosis Date    Alcohol abuse, unspecified     Allergic rhinitis, cause unspecified     Arthritis     Asthma attack     Cancer 2022    skin cancer on nose    Carpal tunnel syndrome      Depressive disorder, not elsewhere classified     Encounter for blood transfusion     GERD (gastroesophageal reflux disease)     Hypersomnia, unspecified     Hypertension     Hypoglycemia     Lumbago     Obesity, unspecified     JAH (obstructive sleep apnea)     uses BIPAP    Other and unspecified hyperlipidemia     Psychosexual dysfunction with inhibited sexual excitement     Pyogenic arthritis of knee 09/21/2021    Enterococcus    Ventral hernia, unspecified, without mention of obstruction or gangrene      Family History   Problem Relation Name Age of Onset    Arthritis Mother      Hypertension Father      Kidney disease Father      Heart disease Father      COPD Father      Pancreatic cancer Maternal Grandfather      Colon cancer Paternal Grandfather      Colon polyps Neg Hx      Crohn's disease Neg Hx      Ulcerative colitis Neg Hx      Stomach cancer Neg Hx      Esophageal cancer Neg Hx          Social History:   Marital Status: Single  Alcohol History:  reports current alcohol use.  Tobacco History:  reports that he has never smoked. His smokeless tobacco use includes chew.  Drug History:  reports no history of drug use.    Review of patient's allergies indicates:   Allergen Reactions    Hydrocodone Anaphylaxis       Current Outpatient Medications   Medication Sig Dispense Refill    albuterol (PROVENTIL/VENTOLIN HFA) 90 mcg/actuation inhaler INHALE 2 PUFFS BY MOUTH FOUR TIMES A DAY AS NEEDED FOR BREATHING      ascorbic acid, vitamin C, (VITAMIN C) 500 MG tablet 500 mg.      aspirin (ECOTRIN) 81 MG EC tablet Take 1 tablet (81 mg total) by mouth 2 (two) times daily. 56 tablet 0    azelastine (ASTELIN) 137 mcg (0.1 %) nasal spray INHALE 1 SPRAY IN EACH NOSTRIL TWICE A DAY FOR ALLERGIES      busPIRone (BUSPAR) 10 MG tablet 5 mg.      cetirizine (ZYRTEC) 10 MG tablet 10 mg.      cyanocobalamin (VITAMIN B-12) 1000 MCG tablet Take 1,000 mcg by mouth once daily.       diclofenac sodium (VOLTAREN) 1 % Gel Apply  topically daily as needed.      EScitalopram oxalate (LEXAPRO) 20 MG tablet 20 mg.      eszopiclone (LUNESTA) 3 mg Tab Take 1 tablet by mouth nightly as needed.      LIDOcaine (LIDODERM) 5 % APPLY 1 PATCH TOPICALLY EVERY DAY FOR PAIN. WEAR FOR 12 HOURS, THEN REMOVE. DO NOT APPLY NEW PATCH FOR AT LEAST 12 HOURS.      losartan-hydrochlorothiazide 100-25 mg (HYZAAR) 100-25 mg per tablet 1 tablet.      MELATONIN ORAL 10 mg.      methyl salicylate-menthol 15-10% 15-10 % Crea APPLY LIBERAL AMOUNT TOPICALLY FOUR TIMES A DAY AS NEEDED      multivitamin with iron Tab TAKE TWO TABLETS BY MOUTH EVERY DAY WITH FOOD      naloxone (NARCAN) 4 mg/actuation Spry SMARTSIG:Spray(s) Both Nares      omeprazole (PRILOSEC) 40 MG capsule 40 mg.      ondansetron (ZOFRAN) 4 MG tablet Take 1 tablet (4 mg total) by mouth every 6 (six) hours as needed for Nausea. 30 tablet 0    oxyCODONE-acetaminophen (PERCOCET)  mg per tablet Take 1 tablet by mouth every 6 (six) hours as needed for Pain. 28 tablet 0    peg 400-propylene glycol 0.4-0.3 % Drop INSTILL ONE DROP IN EACH EYE FOUR TIMES A DAY AS NEEDED FOR DRY EYES      pregabalin (LYRICA) 300 MG Cap Take 300 mg by mouth 3 (three) times daily.      vitamin D (VITAMIN D3) 1000 units Tab Take 2,000 Units by mouth once daily.       No current facility-administered medications for this visit.     Facility-Administered Medications Ordered in Other Visits   Medication Dose Route Frequency Provider Last Rate Last Admin    diphenhydrAMINE injection 25 mg  25 mg Intravenous Q6H PRN John Truong MD        electrolyte-S (ISOLYTE)   Intravenous Continuous John Truong MD   Stopped at 12/15/23 1154    HYDROmorphone (PF) injection 0.2 mg  0.2 mg Intravenous Q5 Min PRN John Truong MD   0.2 mg at 12/15/23 1145    lactated ringers infusion   Intravenous Continuous John Truong MD        LIDOcaine (PF) 10 mg/ml (1%) injection 5 mg  0.5 mL Intradermal Once John Truong MD         ondansetron injection 4 mg  4 mg Intravenous Once John Truong MD        oxyCODONE immediate release tablet 5 mg  5 mg Oral Q3H PRN John Trunog MD   5 mg at 12/15/23 1047    prochlorperazine injection Soln 5 mg  5 mg Intravenous Q4H PRN John Truong MD           Review of Systems   Constitutional:  Negative for chills, fatigue, fever and unexpected weight change.   HENT:  Negative for hearing loss and trouble swallowing.    Eyes:  Negative for photophobia and visual disturbance.   Respiratory:  Negative for cough, shortness of breath and wheezing.    Cardiovascular:  Negative for chest pain, palpitations and leg swelling.   Gastrointestinal:  Negative for abdominal pain and nausea.   Genitourinary:  Negative for dysuria and frequency.   Musculoskeletal:  Negative for arthralgias, back pain, gait problem, joint swelling, myalgias and neck pain.   Skin:  Negative for rash and wound.   Neurological:  Negative for tremors, seizures, weakness, numbness and headaches.   Hematological:  Does not bruise/bleed easily.   Psychiatric/Behavioral:  Negative for hallucinations.          Objective:        Physical Exam:   Foot Exam    General  General Appearance: appears stated age and healthy   Orientation: alert and oriented to person, place, and time   Affect: appropriate   Gait: unimpaired       Left Foot/Ankle      Inspection and Palpation  Ecchymosis: none  Tenderness: plantar fascia   Swelling: none   Arch: normal  Hammertoes: absent  Claw toes: absent  Hallux valgus: no  Hallux limitus: no  Skin Exam: skin intact; no drainage, no ulcer and no erythema   Neurovascular  Dorsalis pedis: 2+  Posterior tibial: 2+  Capillary refill: 2+  Varicose veins: not present  Saphenous nerve sensation: normal  Tibial nerve sensation: normal  Superficial peroneal nerve sensation: normal  Deep peroneal nerve sensation: normal  Sural nerve sensation: normal    Muscle Strength  Ankle dorsiflexion: 5  Ankle plantar  flexion: 5  Ankle inversion: 5  Ankle eversion: 5  Great toe extension: 5  Great toe flexion: 5    Range of Motion    Normal left ankle ROM    Tests  Anterior drawer: negative   Talar tilt: negative   PT Tinel's sign: negative  Paresthesia: negative  Comments  Pain on palpation of the infeior medial heel and central plantar heel. No pain present  with side to side compression of the calcaneus. Negative tinnel's sign  at the tarsal tunnel. Negative Griffin's nerve pain. Negative Calcaneal nerve pain. No soft tissue masses. Pain absent  with dorsiflexion of the ankle. No edema, erythema, or ecchymosis noted.       Physical Exam  Cardiovascular:      Pulses:           Dorsalis pedis pulses are 2+ on the left side.        Posterior tibial pulses are 2+ on the left side.   Musculoskeletal:      Left foot: No bunion.   Feet:      Left foot:      Skin integrity: No ulcer or erythema.               Left Ankle/Foot Exam     Range of Motion   The patient has normal left ankle ROM.     Comments:  Pain on palpation of the infeior medial heel and central plantar heel. No pain present  with side to side compression of the calcaneus. Negative tinnel's sign  at the tarsal tunnel. Negative Griffin's nerve pain. Negative Calcaneal nerve pain. No soft tissue masses. Pain absent  with dorsiflexion of the ankle. No edema, erythema, or ecchymosis noted.         Muscle Strength   Left Lower Extremity   Ankle Dorsiflexion:  5   Plantar flexion:  5/5     Vascular Exam       Left Pulses  Dorsalis Pedis:      2+  Posterior Tibial:      2+           Imaging: none            Assessment:       1. Plantar fasciitis    2. Left foot pain      Plan:   Plantar fasciitis  -     methylPREDNISolone acetate injection 20 mg  -     BUPivacaine injection 7.5 mg  -     dexAMETHasone injection 4 mg    Left foot pain  -     methylPREDNISolone acetate injection 20 mg  -     BUPivacaine injection 7.5 mg  -     dexAMETHasone injection 4 mg      Follow up if  symptoms worsen or fail to improve.    I discussed the patient that does appear to be a flare-up of his plantar fasciitis.  Reviewed different treatment options.  He has had benefit previous steroid injection.  We are going to do an injection today.    Procedures Informed consent was obtained.  Time-out was called.  The area was prepped with alcohol, and a steroid injection was performed at left plantar fascia using 1.5 cc of 0.5% Marcaine w/out epi, 1 cc Dexamethasone, 0.5 cc Methylprednisolone. Patient tolerated the procedure well.           Discussed different treatment options for heel pain. I gave written and verbal instructions on heel cord stretching and this was demonstrated for the patient. Patient expressed understanding. Discussed wearing appropriate shoe gear and avoiding flats, slippers, sandals, barefoot, and sockfeet. Recommended arch supports. My recommendation for OTC supports is Spenco polysorb replacement insoles or patient may elect more aggressive treatment with prescription arch supports. We also discussed cortisone injections and NSAID therapy.       Counseling:     I provided patient education verbally regarding:   Patient diagnosis, treatment options, as well as alternatives, risks, and benefits.     This note was created using Dragon voice recognition software that occasionally misinterpreted phrases or words.

## 2025-04-08 DIAGNOSIS — M79.662 PAIN IN LEFT LOWER LEG: Primary | ICD-10-CM

## 2025-04-14 ENCOUNTER — PATIENT MESSAGE (OUTPATIENT)
Dept: ORTHOPEDICS | Facility: CLINIC | Age: 60
End: 2025-04-14
Payer: OTHER GOVERNMENT

## 2025-05-12 DIAGNOSIS — Z00.8 ENCOUNTER FOR OTHER GENERAL EXAMINATION: Primary | ICD-10-CM

## 2025-05-13 ENCOUNTER — OFFICE VISIT (OUTPATIENT)
Dept: PODIATRY | Facility: CLINIC | Age: 60
End: 2025-05-13
Payer: OTHER GOVERNMENT

## 2025-05-13 VITALS — RESPIRATION RATE: 16 BRPM | WEIGHT: 224.44 LBS | HEIGHT: 72 IN | BODY MASS INDEX: 30.4 KG/M2

## 2025-05-13 DIAGNOSIS — M72.2 PLANTAR FASCIITIS: Primary | ICD-10-CM

## 2025-05-13 DIAGNOSIS — Z00.8 ENCOUNTER FOR OTHER GENERAL EXAMINATION: ICD-10-CM

## 2025-05-13 DIAGNOSIS — M79.672 LEFT FOOT PAIN: ICD-10-CM

## 2025-05-13 PROCEDURE — 99213 OFFICE O/P EST LOW 20 MIN: CPT | Mod: PBBFAC,PN | Performed by: PODIATRIST

## 2025-05-13 PROCEDURE — 99999PBSHW PR PBB SHADOW TECHNICAL ONLY FILED TO HB: Mod: PBBFAC,,,

## 2025-05-13 PROCEDURE — 20550 NJX 1 TENDON SHEATH/LIGAMENT: CPT | Mod: PBBFAC,PN | Performed by: PODIATRIST

## 2025-05-13 PROCEDURE — 99999 PR PBB SHADOW E&M-EST. PATIENT-LVL III: CPT | Mod: PBBFAC,,, | Performed by: PODIATRIST

## 2025-05-13 RX ORDER — BUPIVACAINE HYDROCHLORIDE 5 MG/ML
1.5 INJECTION, SOLUTION PERINEURAL
Status: COMPLETED | OUTPATIENT
Start: 2025-05-13 | End: 2025-05-13

## 2025-05-13 RX ORDER — ZOLPIDEM TARTRATE 12.5 MG/1
12.5 TABLET, FILM COATED, EXTENDED RELEASE ORAL
COMMUNITY
Start: 2025-04-15

## 2025-05-13 RX ORDER — DEXAMETHASONE SODIUM PHOSPHATE 4 MG/ML
4 INJECTION, SOLUTION INTRA-ARTICULAR; INTRALESIONAL; INTRAMUSCULAR; INTRAVENOUS; SOFT TISSUE
Status: COMPLETED | OUTPATIENT
Start: 2025-05-13 | End: 2025-05-13

## 2025-05-13 RX ORDER — METHYLPREDNISOLONE ACETATE 40 MG/ML
20 INJECTION, SUSPENSION INTRA-ARTICULAR; INTRALESIONAL; INTRAMUSCULAR; SOFT TISSUE
Status: COMPLETED | OUTPATIENT
Start: 2025-05-13 | End: 2025-05-13

## 2025-05-13 RX ORDER — TESTOSTERONE CYPIONATE 200 MG/ML
INJECTION, SOLUTION INTRAMUSCULAR
COMMUNITY
Start: 2025-04-09

## 2025-05-13 RX ADMIN — METHYLPREDNISOLONE ACETATE 20 MG: 40 INJECTION, SUSPENSION INTRA-ARTICULAR; INTRALESIONAL; INTRAMUSCULAR; SOFT TISSUE at 12:05

## 2025-05-13 RX ADMIN — BUPIVACAINE HYDROCHLORIDE 7.5 MG: 5 INJECTION, SOLUTION PERINEURAL at 12:05

## 2025-05-13 RX ADMIN — DEXAMETHASONE SODIUM PHOSPHATE 4 MG: 4 INJECTION INTRA-ARTICULAR; INTRALESIONAL; INTRAMUSCULAR; INTRAVENOUS; SOFT TISSUE at 12:05

## 2025-05-13 NOTE — PATIENT INSTRUCTIONS

## 2025-05-13 NOTE — PROGRESS NOTES
[address] [facility] [address]  [place] [address]  Phone: [phone]   [phone]    Patient's PCP:Administration, [facility]  Referring Provider: Dr. Starla Clemente    Subjective:      Chief Complaint:: Plantar Fasciitis (Left heel last injection 12/9/24)    HPI  Himanshu Connor II is a 59 y.o. male who presents today with a complaint of continued left foot pain related to plantar fasciitis. Last injection 12/9/2024. States current pain level is 5/10. Requesting injection and walking boot cast   Vitals:    05/13/25 0921   Resp: 16   Weight: 101.8 kg (224 lb 6.9 oz)   Height: 6' (1.829 m)   PainSc:   5      Shoe Size: 10-10.5    Past Surgical History:   Procedure Laterality Date    ABDOMINAL SURGERY      x 25    ARTHROSCOPIC DEBRIDEMENT OF SHOULDER Right 12/15/2023    Procedure: EXTENSIVE DEBRIDEMENT, SHOULDER, ARTHROSCOPIC;  Surgeon: Felix Levin MD;  Location: [facility] OR;  Service: Orthopedics;  Laterality: Right;    ARTHROSCOPIC REPAIR OF ROTATOR CUFF OF SHOULDER Left 09/20/2019    Procedure: REPAIR, ROTATOR CUFF, ARTHROSCOPIC;  Surgeon: Felix Levin MD;  Location: [facility] OR;  Service: Orthopedics;  Laterality: Left;    ARTHROSCOPIC REPAIR OF ROTATOR CUFF OF SHOULDER Right 03/17/2023    Procedure: REPAIR, ROTATOR CUFF, ARTHROSCOPIC;  Surgeon: Felix Levin MD;  Location: [facility] OR;  Service: Orthopedics;  Laterality: Right;    ARTHROSCOPIC REPAIR OF ROTATOR CUFF OF SHOULDER Right 12/15/2023    Procedure: REPAIR, ROTATOR CUFF, ARTHROSCOPIC, WITH PATCH;  Surgeon: Felix Levin MD;  Location: [facility] OR;  Service: Orthopedics;  Laterality: Right;  arthrex , cshmitt and nephew regenatin patch    ARTHROSCOPY OF SHOULDER WITH DECOMPRESSION OF SUBACROMIAL SPACE Left 09/20/2019    Procedure: ARTHROSCOPY, SHOULDER, WITH SUBACROMIAL SPACE DECOMPRESSION;  Surgeon: Felix Levin MD;  Location: [facility] OR;  Service: Orthopedics;  Laterality: Left;  Jaspreet- Arthrex notified of all case  today 9/16-tcb    ARTHROSCOPY,SHOULDER,WITH BICEPS TENODESIS Right 03/17/2023    Procedure: ARTHROSCOPY,SHOULDER,WITH BICEPS TENODESIS;  Surgeon: Felix Levin MD;  Location: [facility] OR;  Service: Orthopedics;  Laterality: Right;    CARPAL TUNNEL RELEASE      Bilateral    COLONOSCOPY N/A 08/05/2022    Procedure: COLONOSCOPY;  Surgeon: Zaida Ansari MD;  Location: [facility] ENDO;  Service: Endoscopy;  Laterality: N/A;    ESOPHAGEAL MANOMETRY WITH MEASUREMENT OF IMPEDANCE N/A 03/24/2023    Procedure: MANOMETRY, ESOPHAGUS, WITH IMPEDANCE MEASUREMENT;  Surgeon: Sofie Walker MD;  Location: [facility] ENDO (2ND FLR);  Service: Endoscopy;  Laterality: N/A;  r/o rumination and supragastric belching  hold baclofen x3 days, hold doxepin x24 hours prior to procedure    ESOPHAGOGASTRODUODENOSCOPY N/A 08/05/2022    Procedure: EGD (ESOPHAGOGASTRODUODENOSCOPY);  Surgeon: Zaida Ansari MD;  Location: [facility] ENDO;  Service: Endoscopy;  Laterality: N/A;    ESOPHAGOGASTRODUODENOSCOPY N/A 03/24/2023    Procedure: EGD (ESOPHAGOGASTRODUODENOSCOPY);  Surgeon: Sofie Walker MD;  Location: [facility] ENDO (2ND FLR);  Service: Endoscopy;  Laterality: N/A;  Endoflip/Endoscopically placed manometry probe  2nd floor-per Dr. Walker (higher than average risk procedure)  propofol only  full liquid diet x3 days, clear liquid diet x1 day prior to procedure  instructions sent to [email or web address] and emailed to [email or web address]    EYE SURGERY      Lasik    HERNIA REPAIR      x 15 surgeries    INCISION AND DRAINAGE OF HEMATOMA Right 09/21/2021    Procedure: INCISION AND DRAINAGE, HEMATOMA;  Surgeon: Felix Levin MD;  Location: [facility] OR;  Service: Orthopedics;  Laterality: Right;    INSERTION OF ANTIBIOTIC SPACER Right 06/01/2021    Procedure: INSERTION, ANTIBIOTIC SPACER;  Surgeon: Felix Levin MD;  Location: [facility] OR;  Service: Orthopedics;  Laterality: Right;    JOINT REPLACEMENT      KNEE ARTHROPLASTY Left 08/04/2020    Procedure:  ARTHROPLASTY, KNEE;  Surgeon: Felix Levin MD;  Location: [facility] OR;  Service: Orthopedics;  Laterality: Left;    KNEE ARTHROPLASTY Right 03/23/2021    Procedure: ARTHROPLASTY, KNEE;  Surgeon: Felix Levin MD;  Location: [facility] OR;  Service: Orthopedics;  Laterality: Right;    KNEE ARTHROSCOPY W/ MENISCECTOMY Left 10/15/2019    Procedure: ARTHROSCOPY, KNEE, WITH MENISCECTOMY;  Surgeon: Felix Levin MD;  Location: [facility] OR;  Service: Orthopedics;  Laterality: Left;  Medial and Lateral Meniscectomy    KNEE ARTHROSCOPY W/ MENISCECTOMY Right 11/22/2019    Procedure: ARTHROSCOPY, KNEE, WITH MENISCECTOMY;  Surgeon: Felix Levin MD;  Location: [facility] OR;  Service: Orthopedics;  Laterality: Right;    KNEE SURGERY      osmar    MYOTOMY, PERORAL, ENDOSCOPIC N/A 10/02/2023    Procedure: MYOTOMY, PERORAL, ENDOSCOPIC POEM;  Surgeon: Iker Méndez Jr., MD;  Location: [address];  Service: General;  Laterality: N/A;    NISSEN FUNDOPLICATION      X 2    REVERSE TOTAL SHOULDER ARTHROPLASTY Left 02/11/2020    Procedure: ARTHROPLASTY, SHOULDER, TOTAL, REVERSE;  Surgeon: Felix Levin MD;  Location: [facility] OR;  Service: Orthopedics;  Laterality: Left;  Loren    REVERSE TOTAL SHOULDER ARTHROPLASTY Right 3/12/2024    Procedure: ARTHROPLASTY, SHOULDER, TOTAL, REVERSE;  Surgeon: Felix Levin MD;  Location: [facility] OR;  Service: Orthopedics;  Laterality: Right;  loren    revision of gastrojejunostomy  05/10/2021    [facility] [place]    REVISION OF KNEE ARTHROPLASTY Right 09/07/2021    Procedure: REVISION, ARTHROPLASTY, KNEE;  Surgeon: Felix Levin MD;  Location: [facility] OR;  Service: Orthopedics;  Laterality: Right;    ROTATOR CUFF REPAIR      right    RJ-EN-Y PROCEDURE N/A 05/2021    SLEEVE GASTROPLASTY  12/2013     Past Medical History:   Diagnosis Date    Alcohol abuse, unspecified     Allergic rhinitis, cause unspecified     Arthritis     Asthma attack     Cancer  2022    skin cancer on nose    Carpal tunnel syndrome     Depressive disorder, not elsewhere classified     Encounter for blood transfusion     GERD (gastroesophageal reflux disease)     Hypersomnia, unspecified     Hypertension     Hypoglycemia     Lumbago     Obesity, unspecified     JAH (obstructive sleep apnea)     uses BIPAP    Other and unspecified hyperlipidemia     Psychosexual dysfunction with inhibited sexual excitement     Pyogenic arthritis of knee 09/21/2021    Enterococcus    Ventral hernia, unspecified, without mention of obstruction or gangrene      Family History   Problem Relation Name Age of Onset    Arthritis Mother      Hypertension Father      Kidney disease Father      Heart disease Father      COPD Father      Pancreatic cancer Maternal Grandfather      Colon cancer Paternal Grandfather      Colon polyps Neg Hx      Crohn's disease Neg Hx      Ulcerative colitis Neg Hx      Stomach cancer Neg Hx      Esophageal cancer Neg Hx          Social History:   Marital Status: Single  Alcohol History:  reports current alcohol use.  Tobacco History:  reports that he has never smoked. His smokeless tobacco use includes chew.  Drug History:  reports no history of drug use.    Review of patient's allergies indicates:   Allergen Reactions    Hydrocodone Anaphylaxis       Current Medications[1]    Review of Systems   Constitutional:  Negative for chills, fatigue, fever and unexpected weight change.   HENT:  Negative for hearing loss and trouble swallowing.    Eyes:  Negative for photophobia and visual disturbance.   Respiratory:  Negative for cough, shortness of breath and wheezing.    Cardiovascular:  Negative for chest pain, palpitations and leg swelling.   Gastrointestinal:  Negative for abdominal pain and nausea.   Genitourinary:  Negative for dysuria and frequency.   Musculoskeletal:  Negative for arthralgias, back pain, gait problem, joint swelling and myalgias.   Skin:  Negative for rash.    Neurological:  Negative for tremors, seizures, speech difficulty, weakness and headaches.   Hematological:  Does not bruise/bleed easily.         Objective:        Physical Exam:   Foot Exam    General  General Appearance: appears stated age and healthy   Orientation: alert and oriented to person, place, and time   Affect: appropriate   Gait: unimpaired       Left Foot/Ankle      Inspection and Palpation  Ecchymosis: none  Tenderness: plantar fascia   Swelling: none   Arch: normal  Hammertoes: absent  Claw toes: absent  Hallux valgus: no  Hallux limitus: no  Skin Exam: no drainage, no ulcer and no erythema   Neurovascular  Dorsalis pedis: 2+  Posterior tibial: 2+  Capillary refill: 2+  Varicose veins: not present  Saphenous nerve sensation: normal  Tibial nerve sensation: normal  Superficial peroneal nerve sensation: normal  Deep peroneal nerve sensation: normal  Sural nerve sensation: normal    Muscle Strength  Ankle dorsiflexion: 5  Ankle plantar flexion: 5  Ankle inversion: 5  Ankle eversion: 5  Great toe extension: 5  Great toe flexion: 5    Range of Motion    Normal left ankle ROM    Tests  Anterior drawer: negative   Talar tilt: negative   PT Tinel's sign: negative  Paresthesia: negative  Comments  Pain on palpation of the infeior medial heel and central plantar heel. No pain present  with side to side compression of the calcaneus. Negative tinnel's sign  at the tarsal tunnel. Negative Griffin's nerve pain. Negative Calcaneal nerve pain. No soft tissue masses. Pain absent  with dorsiflexion of the ankle. No edema, erythema, or ecchymosis noted.       Physical Exam  Cardiovascular:      Pulses:           Dorsalis pedis pulses are 2+ on the left side.        Posterior tibial pulses are 2+ on the left side.   Musculoskeletal:      Left foot: No bunion.   Feet:      Left foot:      Skin integrity: No ulcer or erythema.               Left Ankle/Foot Exam     Range of Motion   The patient has normal left ankle ROM.      Comments:  Pain on palpation of the infeior medial heel and central plantar heel. No pain present  with side to side compression of the calcaneus. Negative tinnel's sign  at the tarsal tunnel. Negative Griffin's nerve pain. Negative Calcaneal nerve pain. No soft tissue masses. Pain absent  with dorsiflexion of the ankle. No edema, erythema, or ecchymosis noted.         Muscle Strength   Left Lower Extremity   Ankle Dorsiflexion:  5   Plantar flexion:  5/5     Vascular Exam       Left Pulses  Dorsalis Pedis:      2+  Posterior Tibial:      2+           Imaging: none            Assessment:       1. Plantar fasciitis    2. Encounter for other general examination    3. Left foot pain      Plan:   Plantar fasciitis  -     AIR CAST WALKER BOOT FOR HOME USE  -     methylPREDNISolone acetate injection 20 mg  -     BUPivacaine injection 7.5 mg  -     dexAMETHasone injection 4 mg    Encounter for other general examination  -     Ambulatory referral/consult to Podiatry    Left foot pain  -     AIR CAST WALKER BOOT FOR HOME USE  -     methylPREDNISolone acetate injection 20 mg  -     BUPivacaine injection 7.5 mg  -     dexAMETHasone injection 4 mg      Follow up if symptoms worsen or fail to improve.    Discussed different treatment options for heel pain. I gave written and verbal instructions on heel cord stretching and this was demonstrated for the patient. Patient expressed understanding. Discussed wearing appropriate shoe gear and avoiding flats, slippers, sandals, barefoot, and sockfeet. Recommended arch supports. My recommendation for OTC supports is Spenco polysorb replacement insoles or patient may elect more aggressive treatment with prescription arch supports. We also discussed cortisone injections and NSAID therapy.       Procedures Informed consent was obtained.  Time-out was called.  The area was prepped with alcohol, and a steroid injection was performed at left plantar fascia using 1.5 cc of 0.5% Marcaine  w/out epi, 1 cc Dexamethasone, 0.5 cc Methylprednisolone. Patient tolerated the procedure well.           CAM walker boot with weight bearing  Ice to painful area, 15 minutes at a time  Ace-wrap to help with swelling  No barefoot   Keep affected extremity elevated while seated        Counseling:     I provided patient education verbally regarding:   Patient diagnosis, treatment options, as well as alternatives, risks, and benefits.     This note was created using Dragon voice recognition software that occasionally misinterpreted phrases or words.                      [1]   Current Outpatient Medications   Medication Sig Dispense Refill    testosterone cypionate (DEPOTESTOTERONE CYPIONATE) 200 mg/mL injection Inject into the muscle.      zolpidem (AMBIEN CR) 12.5 MG CR tablet Take 12.5 mg by mouth.      albuterol (PROVENTIL/VENTOLIN HFA) 90 mcg/actuation inhaler INHALE 2 PUFFS BY MOUTH FOUR TIMES A DAY AS NEEDED FOR BREATHING      ascorbic acid, vitamin C, (VITAMIN C) 500 MG tablet 500 mg.      aspirin (ECOTRIN) 81 MG EC tablet Take 1 tablet (81 mg total) by mouth 2 (two) times daily. 56 tablet 0    azelastine (ASTELIN) 137 mcg (0.1 %) nasal spray INHALE 1 SPRAY IN EACH NOSTRIL TWICE A DAY FOR ALLERGIES      busPIRone (BUSPAR) 10 MG tablet 5 mg.      cetirizine (ZYRTEC) 10 MG tablet 10 mg.      cyanocobalamin (VITAMIN B-12) 1000 MCG tablet Take 1,000 mcg by mouth once daily.       diclofenac sodium (VOLTAREN) 1 % Gel Apply topically daily as needed.      EScitalopram oxalate (LEXAPRO) 20 MG tablet 20 mg.      eszopiclone (LUNESTA) 3 mg Tab Take 1 tablet by mouth nightly as needed.      LIDOcaine (LIDODERM) 5 % APPLY 1 PATCH TOPICALLY EVERY DAY FOR PAIN. WEAR FOR 12 HOURS, THEN REMOVE. DO NOT APPLY NEW PATCH FOR AT LEAST 12 HOURS.      losartan-hydrochlorothiazide 100-25 mg (HYZAAR) 100-25 mg per tablet 1 tablet.      MELATONIN ORAL 10 mg.      methyl salicylate-menthol 15-10% 15-10 % Crea APPLY LIBERAL AMOUNT  TOPICALLY FOUR TIMES A DAY AS NEEDED      multivitamin with iron Tab TAKE TWO TABLETS BY MOUTH EVERY DAY WITH FOOD      naloxone (NARCAN) 4 mg/actuation Spry SMARTSIG:Spray(s) Both Nares      omeprazole (PRILOSEC) 40 MG capsule 40 mg.      ondansetron (ZOFRAN) 4 MG tablet Take 1 tablet (4 mg total) by mouth every 6 (six) hours as needed for Nausea. 30 tablet 0    oxyCODONE-acetaminophen (PERCOCET)  mg per tablet Take 1 tablet by mouth every 6 (six) hours as needed for Pain. 28 tablet 0    peg 400-propylene glycol 0.4-0.3 % Drop INSTILL ONE DROP IN EACH EYE FOUR TIMES A DAY AS NEEDED FOR DRY EYES      pregabalin (LYRICA) 300 MG Cap Take 300 mg by mouth 3 (three) times daily.      vitamin D (VITAMIN D3) 1000 units Tab Take 2,000 Units by mouth once daily.       No current facility-administered medications for this visit.     Facility-Administered Medications Ordered in Other Visits   Medication Dose Route Frequency Provider Last Rate Last Admin    diphenhydrAMINE injection 25 mg  25 mg Intravenous Q6H PRN John Truong MD        electrolyte-S (ISOLYTE)   Intravenous Continuous John Truong MD   Stopped at 12/15/23 1154    HYDROmorphone (PF) injection 0.2 mg  0.2 mg Intravenous Q5 Min PRN John Truong MD   0.2 mg at 12/15/23 1145    lactated ringers infusion   Intravenous Continuous John Truong MD        LIDOcaine (PF) 10 mg/ml (1%) injection 5 mg  0.5 mL Intradermal Once John Truong MD        ondansetron injection 4 mg  4 mg Intravenous Once John Truong MD        oxyCODONE immediate release tablet 5 mg  5 mg Oral Q3H PRN John Truong MD   5 mg at 12/15/23 1047    prochlorperazine injection Soln 5 mg  5 mg Intravenous Q4H PRN John Truong MD

## 2025-05-15 DIAGNOSIS — Z96.652 PRESENCE OF LEFT ARTIFICIAL KNEE JOINT: Primary | ICD-10-CM

## 2025-07-17 DIAGNOSIS — M25.562 LEFT KNEE PAIN, UNSPECIFIED CHRONICITY: Primary | ICD-10-CM

## (undated) DEVICE — ELECTRODE REM PLYHSV RETURN 9

## (undated) DEVICE — HYDROGEN PEROXIDE HDX10 3% 4OZ

## (undated) DEVICE — SEE MEDLINE ITEM 152530

## (undated) DEVICE — STRAP OR TABLE 5IN X 72IN

## (undated) DEVICE — SCRUB 10% POVIDONE IODINE 4OZ

## (undated) DEVICE — SEE MEDLINE ITEM 152622

## (undated) DEVICE — SYR SLIP TIP 20CC

## (undated) DEVICE — SUT ETHILON 3-0 FS-1 30

## (undated) DEVICE — SLEEVE SCD EXPRESS CALF MEDIUM

## (undated) DEVICE — LINER SUCTION 3000CC

## (undated) DEVICE — OSTEOTOME HARRIS FLEX 8X80

## (undated) DEVICE — SEE MEDLINE ITEM 146231

## (undated) DEVICE — GOWN TOGA SYS PEELWY ZIP 2 XL

## (undated) DEVICE — SEE MEDLINE ITEM 152487

## (undated) DEVICE — SOL NACL IRR 3000ML

## (undated) DEVICE — DRESSING PICO 7 TWO 10X30CM

## (undated) DEVICE — SPONGE SUPER KERLIX 6X6.75IN

## (undated) DEVICE — UNDERGLOVES BIOGEL PI SIZE 7.5

## (undated) DEVICE — GLOVE SURG ULTRA TOUCH 8

## (undated) DEVICE — SUT STRATAFIX PDS 1 CTX 18IN

## (undated) DEVICE — BLADE SURG CARBON STEEL #10

## (undated) DEVICE — TUBING SUC UNIV W/CONN 12FT

## (undated) DEVICE — SEE MEDLINE ITEM 157166

## (undated) DEVICE — BLADE SURG CARBON STEEL SZ11

## (undated) DEVICE — SEE MEDLINE ITEM 107746

## (undated) DEVICE — GLOVE SURG ULTRA TOUCH 7.5

## (undated) DEVICE — PAD ABD 8X10 STERILE

## (undated) DEVICE — PACK LOWER EXTREMITY

## (undated) DEVICE — CANNULA PASSPORT 8 MM X 4CM.

## (undated) DEVICE — SUT 2/0 18IN COATED VICRYL

## (undated) DEVICE — SET DECANTER MEDICHOICE

## (undated) DEVICE — PACK BASIC

## (undated) DEVICE — BLADE SAW SGTL DL STR 25X1.27X

## (undated) DEVICE — REUNION NITINOL PILOT WIRE

## (undated) DEVICE — PAD CAST SPECIALIST STRL 6

## (undated) DEVICE — DRAPE STERI INSTRUMENT 1018

## (undated) DEVICE — SYS CLSR DERMABOND PRINEO 22CM

## (undated) DEVICE — PACK CUSTOM UNIV BASIN SLI

## (undated) DEVICE — BRACE DONJOY KNEE X-ACT ROM

## (undated) DEVICE — CONTAINER SPECIMEN STRL 4OZ

## (undated) DEVICE — DRESSING AQUACEL AG 3.5X10IN

## (undated) DEVICE — DRAPE INCISE IOBAN 2 23X17IN

## (undated) DEVICE — SEE MEDLINE ITEM 146313

## (undated) DEVICE — SEE MEDLINE ITEM 157131

## (undated) DEVICE — GLOVE SURG ULTRA TOUCH 7

## (undated) DEVICE — SEE MEDLINE ITEM 157116

## (undated) DEVICE — NDL SPINAL 18GX3.5 SPINOCAN

## (undated) DEVICE — SEE MEDLINE ITEM 157171

## (undated) DEVICE — SEE MEDLINE ITEM 157216

## (undated) DEVICE — SEE L#159833

## (undated) DEVICE — SYS LABEL CORRECT MED

## (undated) DEVICE — SLING SHLDR IMMOBILIZER LG

## (undated) DEVICE — DRAPE THREE-QTR REINF 53X77IN

## (undated) DEVICE — CANNULA PASSPORT 8 MM X 3CM

## (undated) DEVICE — PADDING CAST SPECIALIST 6X4YD

## (undated) DEVICE — MAT QUICK 40X30 FLOOR FLUID LF

## (undated) DEVICE — INTERPULSE SET

## (undated) DEVICE — MAT SURGICAL ECOSUCTIONER

## (undated) DEVICE — IMPLANTABLE DEVICE
Type: IMPLANTABLE DEVICE | Site: KNEE | Status: NON-FUNCTIONAL
Removed: 2021-09-07

## (undated) DEVICE — SUT STRATAFIX SPIRAL VIOLET

## (undated) DEVICE — SOL IRR NACL .9% 3000ML

## (undated) DEVICE — SUT ETHILON 3-0 PS2 18 BLK

## (undated) DEVICE — DRAPE STERI U-SHAPED 47X51IN

## (undated) DEVICE — BANDAGE ESMARK 6X12

## (undated) DEVICE — SPONGE GAUZE 16PLY 4X4

## (undated) DEVICE — SPONGE BULKEE II ABSRB 6X6.75

## (undated) DEVICE — ADHESIVE DERMABOND ADVANCED

## (undated) DEVICE — GOWN B1 X-LG X-LONG

## (undated) DEVICE — ALCOHOL 70% ANTISEPTIC ISO 4OZ

## (undated) DEVICE — PACK SET UP 190 OMC-NS

## (undated) DEVICE — BLADE SAG DUAL 18MMX1.27MMX90M

## (undated) DEVICE — PROBE ABLATION RF ARTHSCP 3.5

## (undated) DEVICE — WRAP PROTECTIVE LEG POS STRL

## (undated) DEVICE — DRESSING AQUACEL AG ADV 3.5X12

## (undated) DEVICE — APPLICATOR CHLORAPREP ORN 26ML

## (undated) DEVICE — TOGA FLYTE PEEL AWAY XLARGE

## (undated) DEVICE — MANIFOLD 4 PORT

## (undated) DEVICE — CANNULA ARTHRO TWST 6.00MMX7CM

## (undated) DEVICE — TUBE SUCTION YANKAUER HI CAP

## (undated) DEVICE — UNDERGLOVES BIOGEL PI SZ 7 LF

## (undated) DEVICE — SOL 9P NACL IRR PIC IL

## (undated) DEVICE — TAPE SUTU FIBERTAPE BLU 2 17IN

## (undated) DEVICE — DRESSING N ADH OIL EMUL 3X3

## (undated) DEVICE — SLEEVE LATERAL TRACTION ARM

## (undated) DEVICE — ALCOHOL 70% ISOP RUBBING 4OZ

## (undated) DEVICE — SUT STRATAFIX PDS 2 CT-1 14IN

## (undated) DEVICE — GLOVE SURG ULTRA TOUCH 6

## (undated) DEVICE — BLADE SURG STAINLESS STEEL #11

## (undated) DEVICE — PIN PINABALL HEADLESS
Type: IMPLANTABLE DEVICE | Site: KNEE | Status: NON-FUNCTIONAL
Removed: 2021-09-07

## (undated) DEVICE — UNDERGLOVE BIOGEL PI SZ 6.5 LF

## (undated) DEVICE — ANCHOR SWIVELOCK KNTLS BLUE #2
Type: IMPLANTABLE DEVICE | Site: SHOULDER | Status: NON-FUNCTIONAL
Removed: 2023-12-15

## (undated) DEVICE — DRAPE INCISE IOBAN 2 23X33IN

## (undated) DEVICE — CONNECTOR TUBING STR 5 IN 1

## (undated) DEVICE — PACK SIRUS BASIC V SURG STRL

## (undated) DEVICE — CUTTER MENISCUS AGGRESSIVE 4.0

## (undated) DEVICE — SPONGE LAP 18X18 PREWASHED

## (undated) DEVICE — NDL HYPODERMIC BLUNT 18G 1.5IN

## (undated) DEVICE — SLEEVE SCD EXPRESS KNEE MEDIUM

## (undated) DEVICE — SUT 0 VICRYL / CT-1

## (undated) DEVICE — TOWEL OR DISP STRL BLUE 4/PK

## (undated) DEVICE — TUBING ARTHROSCOPY

## (undated) DEVICE — SUT X424H ETHIBOND 0-0

## (undated) DEVICE — COVER SURG LIGHT HANDLE

## (undated) DEVICE — GOWN POLY REINF BRTH SLV XL

## (undated) DEVICE — UNDERGLOVES BIOGEL PI SIZE 8

## (undated) DEVICE — SEE MEDLINE ITEM 146292

## (undated) DEVICE — CUTTER AGGRESSIVE PLUS 3.5MM

## (undated) DEVICE — TOURNIQUET SB QC DP 34X4IN

## (undated) DEVICE — STOCKINETTE IMPERV INTRM 9X44

## (undated) DEVICE — NDL INSUF ULTRA VERESS 120MM

## (undated) DEVICE — SWAB CULTURETTE SINGLE

## (undated) DEVICE — SYR LUER LOCK STERILE 10ML

## (undated) DEVICE — CUBE COLD THERAPY POLAR CARE

## (undated) DEVICE — DRAPE ORTH SPLIT 77X108IN

## (undated) DEVICE — BNDG COFLEX FOAM LF2 ST 6X5YD

## (undated) DEVICE — DRESSING N ADH OIL EMUL 3X8 3S

## (undated) DEVICE — DRAPE PLASTIC U 60X72

## (undated) DEVICE — GUIDE TENDON STABILIZATION

## (undated) DEVICE — SUT BONE WAX 2.5 GRMS 12/BX

## (undated) DEVICE — PINABALL PRELOADED PINS
Type: IMPLANTABLE DEVICE | Site: KNEE | Status: NON-FUNCTIONAL
Brand: PINABALL
Removed: 2021-03-23

## (undated) DEVICE — PACK SHOULDER

## (undated) DEVICE — SYR 50CC LL

## (undated) DEVICE — Device

## (undated) DEVICE — GLOVE SENSICARE PI ALOE 7.5

## (undated) DEVICE — KIT EVACUATOR 3-SPRING 1/8 DRN

## (undated) DEVICE — SUT FIBERWIRE

## (undated) DEVICE — SYR 10CC LUER LOCK

## (undated) DEVICE — WRAP SHLDR HIP ACCU THRM PACK

## (undated) DEVICE — PACK SHOULDER DRAPE POUCH

## (undated) DEVICE — GLOVE SENSICARE PI ALOE 6.5

## (undated) DEVICE — SUT 2-0 ETHILON 18 FS

## (undated) DEVICE — SYR 30CC LUER LOCK

## (undated) DEVICE — GLOVE SENSICARE PI ALOE 7

## (undated) DEVICE — GLOVE SENSICARE PI GRN 6.5

## (undated) DEVICE — NDL BOX COUNTER

## (undated) DEVICE — SUT STRATAFIX SPRL PS-2 3-0

## (undated) DEVICE — SUT CTD VICRYL CT-1 27

## (undated) DEVICE — ELECTRODE BLADE TEFLON 6

## (undated) DEVICE — SUT PDS II 0 CT-1 VIL MONO

## (undated) DEVICE — SUT ETHILON 2-0 FSLX 30 BLK

## (undated) DEVICE — DRESSING N ADH OIL EMUL 3X8

## (undated) DEVICE — TAPE MEDIPORE 4IN X 2YDS

## (undated) DEVICE — GLOVE SENSICARE PI GRN 8

## (undated) DEVICE — PAD ABDOMINAL STERILE 8X10IN

## (undated) DEVICE — DRAPE HALF SURGICAL 40X58IN

## (undated) DEVICE — DRAPE INVISISHIELD TOWEL SMALL

## (undated) DEVICE — CANNULA TWIST IN 7MM X 7CM

## (undated) DEVICE — BANDAGE MATRIX HK LOOP 4IN 5YD

## (undated) DEVICE — SUT FIBERLINK TAPE BLU WHT 1.3

## (undated) DEVICE — SLING SHLDR IMMOBILIZER MED

## (undated) DEVICE — SEE MEDLINE ITEM 146271

## (undated) DEVICE — DRAPE U SPLIT SHEET 54X76IN

## (undated) DEVICE — NDL SAFETY 21G X 1 1/2 ECLPSE

## (undated) DEVICE — DRESSING MEPILEX BORDR AG 4X10

## (undated) DEVICE — SYR ONLY LUER LOCK 20CC

## (undated) DEVICE — NDL SUREFIRE SCORPION RC

## (undated) DEVICE — DRESSING GAUZE OIL EMUL 3X8

## (undated) DEVICE — NDL SURG MAYO CATGUT

## (undated) DEVICE — ELECTRODE BLD EXT 6.50 ST DISP

## (undated) DEVICE — SUT 2-0 12-18IN SILK

## (undated) DEVICE — SUT FIBERWIRE #5 1/2 X 38

## (undated) DEVICE — COVER TABLE 44X90 STERILE

## (undated) DEVICE — GLOVE SENSICARE PI GRN 7

## (undated) DEVICE — DRAPE STERI-DRAPE 1000 17X11IN

## (undated) DEVICE — BIT DRILL 3.1MM

## (undated) DEVICE — SEE MEDLINE ITEM 146420

## (undated) DEVICE — SUT FIBERWIRE FIBER 2M

## (undated) DEVICE — PACK ARTHROSCOPY W/ISO BAC

## (undated) DEVICE — YANKAUER FLEX NO VENT HI CAP

## (undated) DEVICE — TOWEL OR DISP STRL BLUE 2/PK

## (undated) DEVICE — DRESSING DERMACEA SPNG 10S

## (undated) DEVICE — SKINMARKER W/RULER DEVON

## (undated) DEVICE — KIT ENDOKIT COMPLIANCE CUSTOM

## (undated) DEVICE — SEE MEDLINE ITEM 157117

## (undated) DEVICE — COLLECTOR SPECIMEN ANAEROBIC

## (undated) DEVICE — SUT TIGERWIRE 2 26IN BLK WH

## (undated) DEVICE — GOWN POLY REINF X-LONG XL